# Patient Record
Sex: FEMALE | Race: WHITE | NOT HISPANIC OR LATINO | Employment: UNEMPLOYED | URBAN - METROPOLITAN AREA
[De-identification: names, ages, dates, MRNs, and addresses within clinical notes are randomized per-mention and may not be internally consistent; named-entity substitution may affect disease eponyms.]

---

## 2017-08-02 ENCOUNTER — TELEPHONE (OUTPATIENT)
Dept: SMOKING CESSATION | Facility: CLINIC | Age: 62
End: 2017-08-02

## 2017-08-08 ENCOUNTER — TELEPHONE (OUTPATIENT)
Dept: SMOKING CESSATION | Facility: CLINIC | Age: 62
End: 2017-08-08

## 2017-08-14 ENCOUNTER — OFFICE VISIT (OUTPATIENT)
Dept: INTERNAL MEDICINE | Facility: CLINIC | Age: 62
End: 2017-08-14
Payer: COMMERCIAL

## 2017-08-14 VITALS
DIASTOLIC BLOOD PRESSURE: 63 MMHG | TEMPERATURE: 98 F | WEIGHT: 138.88 LBS | HEIGHT: 62 IN | BODY MASS INDEX: 25.55 KG/M2 | SYSTOLIC BLOOD PRESSURE: 138 MMHG | RESPIRATION RATE: 16 BRPM | HEART RATE: 90 BPM | OXYGEN SATURATION: 100 %

## 2017-08-14 DIAGNOSIS — Z72.0 TOBACCO USE: ICD-10-CM

## 2017-08-14 DIAGNOSIS — G47.00 INSOMNIA, UNSPECIFIED TYPE: ICD-10-CM

## 2017-08-14 DIAGNOSIS — Z12.11 COLON CANCER SCREENING: ICD-10-CM

## 2017-08-14 DIAGNOSIS — J30.9 ACUTE ALLERGIC RHINITIS, UNSPECIFIED SEASONALITY, UNSPECIFIED TRIGGER: Primary | ICD-10-CM

## 2017-08-14 DIAGNOSIS — Z11.59 ENCOUNTER FOR HEPATITIS C SCREENING TEST FOR LOW RISK PATIENT: ICD-10-CM

## 2017-08-14 PROCEDURE — 99214 OFFICE O/P EST MOD 30 MIN: CPT | Mod: 25,S$GLB,, | Performed by: FAMILY MEDICINE

## 2017-08-14 PROCEDURE — 90471 IMMUNIZATION ADMIN: CPT | Mod: S$GLB,,, | Performed by: FAMILY MEDICINE

## 2017-08-14 PROCEDURE — 99999 PR PBB SHADOW E&M-EST. PATIENT-LVL III: CPT | Mod: PBBFAC,,, | Performed by: FAMILY MEDICINE

## 2017-08-14 PROCEDURE — 3008F BODY MASS INDEX DOCD: CPT | Mod: S$GLB,,, | Performed by: FAMILY MEDICINE

## 2017-08-14 PROCEDURE — 90732 PPSV23 VACC 2 YRS+ SUBQ/IM: CPT | Mod: S$GLB,,, | Performed by: FAMILY MEDICINE

## 2017-08-14 RX ORDER — ZOLPIDEM TARTRATE 5 MG/1
TABLET ORAL
Qty: 30 TABLET | Refills: 0 | Status: SHIPPED | OUTPATIENT
Start: 2017-08-14 | End: 2017-09-07 | Stop reason: SDUPTHER

## 2017-08-14 NOTE — PROGRESS NOTES
Subjective:       Patient ID: Kenia Alonzo is a 62 y.o. female.    Chief Complaint: Insomnia and Rhinitis (allergies)    Sinus Problem   This is a new problem. The current episode started in the past 7 days. The problem has been waxing and waning since onset. There has been no fever. She is experiencing no pain. Associated symptoms include congestion, headaches and sinus pressure. Pertinent negatives include no coughing or neck pain. Past treatments include oral decongestants. The treatment provided mild relief.     Has tried melatonin to help her fall asleep, but this is not really helped very much.  She is taking medication for depression with Wellbutrin and feels like this is helping relatively.  Once she falls asleep she does not have any staying asleep but is open to have something that can help her fall asleep.  She has tried Benadryl also and this will make her very hung over the next day.    Family History   Problem Relation Age of Onset    Colon cancer Father     Diabetes Father     Diabetes Sister        Current Outpatient Prescriptions:     ascorbate calcium 500 mg Tab, Take 2 tablets by mouth once daily., Disp: 180 each, Rfl: 3    bumetanide (BUMEX) 2 MG tablet, Take 2 mg by mouth., Disp: , Rfl:     buPROPion (WELLBUTRIN XL) 300 MG 24 hr tablet, Take 1 tablet (300 mg total) by mouth once daily., Disp: 90 tablet, Rfl: 3    dicyclomine (BENTYL) 10 MG capsule, Take 1 capsule (10 mg total) by mouth 3 (three) times daily., Disp: 180 capsule, Rfl: 2    levocetirizine (XYZAL) 5 MG tablet, Take 1 tablet (5 mg total) by mouth every evening., Disp: 30 tablet, Rfl: 11    MELATONIN ORAL, Take by mouth., Disp: , Rfl:     simvastatin (ZOCOR) 20 MG tablet, Take 20 mg by mouth every evening., Disp: , Rfl:     topiramate (TOPAMAX) 100 MG tablet, Take 1 tablet (100 mg total) by mouth every evening., Disp: 90 tablet, Rfl: 3    fluticasone (FLONASE) 50 mcg/actuation nasal spray, 2 sprays by Each Nare route once  "daily., Disp: 16 g, Rfl: 3    ibuprofen-famotidine 800-26.6 mg Tab, Take 1 tablet by mouth., Disp: , Rfl:     Review of Systems   Constitutional: Negative for activity change and unexpected weight change.   HENT: Positive for congestion, hearing loss, rhinorrhea and sinus pressure. Negative for trouble swallowing.    Eyes: Positive for visual disturbance. Negative for discharge.   Respiratory: Positive for wheezing. Negative for cough.    Cardiovascular: Negative for chest pain and palpitations.   Gastrointestinal: Positive for constipation. Negative for blood in stool, diarrhea and vomiting.   Endocrine: Negative for polydipsia and polyuria.   Genitourinary: Negative for difficulty urinating, dysuria, hematuria and menstrual problem.   Musculoskeletal: Positive for arthralgias and joint swelling. Negative for neck pain.   Neurological: Positive for headaches. Negative for weakness.   Psychiatric/Behavioral: Positive for sleep disturbance (trouble falling asleep). Negative for confusion and dysphoric mood.       Objective:   /63 (BP Location: Left arm, Patient Position: Sitting, BP Method: Medium (Automatic))   Pulse 90   Temp 97.5 °F (36.4 °C) (Tympanic)   Resp 16   Ht 5' 2" (1.575 m)   Wt 63 kg (138 lb 14.2 oz)   SpO2 100%   BMI 25.40 kg/m²      Physical Exam   Constitutional: She is oriented to person, place, and time. She appears well-developed and well-nourished.   HENT:   Head: Normocephalic and atraumatic.   Eyes: Conjunctivae are normal.   Cardiovascular: Normal rate.    Pulmonary/Chest: Effort normal. No respiratory distress.   Musculoskeletal: She exhibits no edema.   Neurological: She is alert and oriented to person, place, and time. Coordination normal.   Skin: Skin is warm and dry. No rash noted.   Psychiatric: She has a normal mood and affect. Her behavior is normal.   Vitals reviewed.      Assessment & Plan     1. Acute allergic rhinitis, unspecified seasonality, unspecified " trigger  Recommended continued use of Xyzal and to add Flonase.  No signs of infection at this time.  Also recommend that she may want to consider sinus rinses.    2. Tobacco use  Emphasize the importance of smoking cessation.  She is interested in quitting.  She'll like to be referred to the smoking cessation program.  - Lipid panel; Future  - Comprehensive metabolic panel; Future  - Hemoglobin A1c; Future  - Ambulatory referral to Smoking Cessation Program    3. Insomnia, unspecified type  Once she is able to fall asleep she does not have any trouble staying asleep so I think Ambien would be a good option at least temporarily.  She has not had good results with melatonin.  Certainly don't want to continue this chronically.    4. Colon cancer screening  Needs initial colonoscopy.  - Case request GI: COLONOSCOPY    5. Encounter for hepatitis C screening test for low risk patient  - Hepatitis C antibody; Future

## 2017-08-16 DIAGNOSIS — F41.8 DEPRESSION WITH ANXIETY: ICD-10-CM

## 2017-08-16 DIAGNOSIS — E83.51 HYPOCALCEMIA: ICD-10-CM

## 2017-08-16 DIAGNOSIS — K58.9 IRRITABLE BOWEL SYNDROME WITHOUT DIARRHEA: ICD-10-CM

## 2017-08-16 RX ORDER — BUPROPION HYDROCHLORIDE 300 MG/1
300 TABLET ORAL DAILY
Qty: 90 TABLET | Refills: 3 | Status: SHIPPED | OUTPATIENT
Start: 2017-08-16 | End: 2018-08-30 | Stop reason: SDUPTHER

## 2017-08-16 RX ORDER — LEVOCETIRIZINE DIHYDROCHLORIDE 5 MG/1
5 TABLET, FILM COATED ORAL NIGHTLY
Qty: 90 TABLET | Refills: 3 | Status: SHIPPED | OUTPATIENT
Start: 2017-08-16 | End: 2018-06-26 | Stop reason: SDUPTHER

## 2017-08-16 RX ORDER — DICYCLOMINE HYDROCHLORIDE 10 MG/1
10 CAPSULE ORAL 3 TIMES DAILY
Qty: 180 CAPSULE | Refills: 3 | Status: SHIPPED | OUTPATIENT
Start: 2017-08-16 | End: 2018-08-30 | Stop reason: SDUPTHER

## 2017-08-16 RX ORDER — ASCORBATE CALCIUM 500 MG
2 TABLET ORAL DAILY
Qty: 180 EACH | Refills: 3 | Status: SHIPPED | OUTPATIENT
Start: 2017-08-16 | End: 2019-04-23 | Stop reason: ALTCHOICE

## 2017-08-17 ENCOUNTER — OFFICE VISIT (OUTPATIENT)
Dept: INTERNAL MEDICINE | Facility: CLINIC | Age: 62
End: 2017-08-17
Payer: COMMERCIAL

## 2017-08-17 ENCOUNTER — LAB VISIT (OUTPATIENT)
Dept: LAB | Facility: HOSPITAL | Age: 62
End: 2017-08-17
Attending: INTERNAL MEDICINE
Payer: COMMERCIAL

## 2017-08-17 ENCOUNTER — PATIENT MESSAGE (OUTPATIENT)
Dept: INTERNAL MEDICINE | Facility: CLINIC | Age: 62
End: 2017-08-17

## 2017-08-17 VITALS
HEIGHT: 62 IN | TEMPERATURE: 97 F | BODY MASS INDEX: 24.75 KG/M2 | DIASTOLIC BLOOD PRESSURE: 77 MMHG | WEIGHT: 134.5 LBS | HEART RATE: 105 BPM | RESPIRATION RATE: 16 BRPM | SYSTOLIC BLOOD PRESSURE: 142 MMHG | OXYGEN SATURATION: 98 %

## 2017-08-17 DIAGNOSIS — J01.00 ACUTE NON-RECURRENT MAXILLARY SINUSITIS: Primary | ICD-10-CM

## 2017-08-17 DIAGNOSIS — Z11.59 ENCOUNTER FOR HEPATITIS C SCREENING TEST FOR LOW RISK PATIENT: ICD-10-CM

## 2017-08-17 DIAGNOSIS — Z72.0 TOBACCO USE: ICD-10-CM

## 2017-08-17 LAB
ALBUMIN SERPL BCP-MCNC: 4.4 G/DL
ALP SERPL-CCNC: 81 U/L
ALT SERPL W/O P-5'-P-CCNC: 24 U/L
ANION GAP SERPL CALC-SCNC: 18 MMOL/L
AST SERPL-CCNC: 17 U/L
BILIRUB SERPL-MCNC: 0.2 MG/DL
BUN SERPL-MCNC: 47 MG/DL
CALCIUM SERPL-MCNC: 10.3 MG/DL
CHLORIDE SERPL-SCNC: 99 MMOL/L
CO2 SERPL-SCNC: 21 MMOL/L
CREAT SERPL-MCNC: 2.5 MG/DL
EST. GFR  (AFRICAN AMERICAN): 23.4 ML/MIN/1.73 M^2
EST. GFR  (NON AFRICAN AMERICAN): 20.3 ML/MIN/1.73 M^2
GLUCOSE SERPL-MCNC: 99 MG/DL
POTASSIUM SERPL-SCNC: 3.8 MMOL/L
PROT SERPL-MCNC: 8.4 G/DL
SODIUM SERPL-SCNC: 138 MMOL/L

## 2017-08-17 PROCEDURE — 80061 LIPID PANEL: CPT

## 2017-08-17 PROCEDURE — 99999 PR PBB SHADOW E&M-EST. PATIENT-LVL IV: CPT | Mod: PBBFAC,,, | Performed by: NURSE PRACTITIONER

## 2017-08-17 PROCEDURE — 3008F BODY MASS INDEX DOCD: CPT | Mod: S$GLB,,, | Performed by: NURSE PRACTITIONER

## 2017-08-17 PROCEDURE — 83036 HEMOGLOBIN GLYCOSYLATED A1C: CPT

## 2017-08-17 PROCEDURE — 86803 HEPATITIS C AB TEST: CPT

## 2017-08-17 PROCEDURE — 36415 COLL VENOUS BLD VENIPUNCTURE: CPT | Mod: PO

## 2017-08-17 PROCEDURE — 99214 OFFICE O/P EST MOD 30 MIN: CPT | Mod: S$GLB,,, | Performed by: NURSE PRACTITIONER

## 2017-08-17 PROCEDURE — 80053 COMPREHEN METABOLIC PANEL: CPT | Mod: PO

## 2017-08-17 RX ORDER — BUMETANIDE 2 MG/1
2 TABLET ORAL DAILY
Qty: 30 TABLET | Refills: 5 | Status: SHIPPED | OUTPATIENT
Start: 2017-08-17 | End: 2018-04-06 | Stop reason: SDUPTHER

## 2017-08-17 RX ORDER — AMOXICILLIN AND CLAVULANATE POTASSIUM 875; 125 MG/1; MG/1
1 TABLET, FILM COATED ORAL 2 TIMES DAILY
Qty: 20 TABLET | Refills: 0 | Status: SHIPPED | OUTPATIENT
Start: 2017-08-17 | End: 2017-08-27

## 2017-08-17 RX ORDER — METHYLPREDNISOLONE 4 MG/1
TABLET ORAL
Qty: 1 PACKAGE | Refills: 0 | Status: SHIPPED | OUTPATIENT
Start: 2017-08-17 | End: 2018-01-23

## 2017-08-17 NOTE — PATIENT INSTRUCTIONS
Acute Sinusitis    Acute sinusitis is irritation and swelling of the sinuses. It is usually caused by a viral infection after a common cold. Your doctor can help you find relief.  What is acute sinusitis?  Sinuses are air-filled spaces in the skull behind the face. They are kept moist and clean by a lining of mucosa. Things such as pollen, smoke, and chemical fumes can irritate the mucosa. It can then swell up. As a response to irritation, the mucosa makes more mucus and other fluids. Tiny hairlike cilia cover the mucosa. Cilia help carry mucus toward the opening of the sinus. Too much mucus may cause the cilia to stop working. This blocks the sinus opening. A buildup of fluid in the sinuses then causes pain and pressure. It can also encourage bacteria to grow in the sinuses.  Common symptoms of acute sinusitis  You may have:  · Facial soreness pain  · Headache  · Fever  · Fluid draining in the back of the throat (postnasal drip)  · Congestion  · Drainage that is thick and colored, instead of clear  · Cough  Diagnosing acute sinusitis  Your doctor will ask about your symptoms and health history. He or she will look at your ear, nose, and throat. You usually won't need to have X-rays taken.    The doctor may take a sample of mucus to check for bacteria. If you have sinusitis that keeps coming back, you may need imaging tests such as X-rays or CAT scans. This will help your doctor check for a structural problem that may be causing the infection.  Treating acute sinusitis  Treatment is aimed at unblocking the sinus opening and helping the cilia work again. You may need to take antihistamine and decongestant medicine. These can reduce inflammation and decrease the amount of fluid your sinuses make. If you have a bacterial infection, you will need to take antibiotic medicine for 10 to 14 days. Take this medicine until it is gone, even if you feel better.  Date Last Reviewed: 10/1/2016  © 2297-8828 [employer] [address] [place] [address]. All rights reserved. This information is not intended as a substitute for professional medical care. Always follow your healthcare professional's instructions.        Preventing Sinusitis    Colds, flu, and allergies make it more likely for you to get sinusitis. Do your best to prevent sinusitis by preventing these problems. Do what you can to avoid getting colds and other infections. Stay away from things that cause allergies (allergens). Keep your sinuses as moist as you can.  Tips for air travel  When traveling on an airplane, use saline nasal spray to keep your sinuses moist. Drink plenty of fluids. You may also want to take a decongestant before you get on the plane.   Prevent colds  Do what you can to avoid being exposed to colds and flu. When possible, take more time to rest when you feel something coming on.  · Wash your hands often. This is especially important during cold and flu season. Try not to touch your face.  · As much as possible, stay away from infected people.  · Follow these standbys for staying healthy: Eat balanced meals, exercise regularly, and get plenty of sleep.  Stay away from allergens  First find out what things youre allergic to. Then take steps to stay away from allergens or irritants in the air such as dust, pollution, and pollen.  · Wear a mask when you clean. Or consider hiring a  to help you stay away from dust.  · Sit in the nonsmoking sections of restaurants.  · Don't go outdoors during peak pollution hours such as rush hour.  · Keep an air conditioner on during allergy season. Clean its filter regularly.  · Ask your healthcare provider about a referral to have an allergy evaluation. Or ask for a referral to see an allergy specialist.  Boost moisture  Keeping your sinuses moist makes your mucus thinner. This allows your sinuses to drain better. And this helps prevent infection. Ask your doctor about these  suggestions:  · Use a humidifier. Clean it often to remove any mold or mildew.  · Drink several glasses of water a day.  · Stay away from drying beverages such as alcohol and coffee.  · Stay away from all types of smoke, which dries out sinus linings. This includes tobacco smoke and chemical smoke in workplace settings.  · Use saltwater rinses.  Date Last Reviewed: 10/1/2016  © 2706-2711 [employer]. [address] [place] [address], [place] [address]. All rights reserved. This information is not intended as a substitute for professional medical care. Always follow your healthcare professional's instructions.        Self-Care for Sinusitis     Drinking plenty of water can help sinuses drain.   Sinusitis can often be managed with self-care. Self-care can keep sinuses moist and make you feel more comfortable. Remember to follow your doctor's instructions closely. This can make a big difference in getting your sinus problem under control.  Drink fluids  Drinking extra fluids helps thin your mucus. This lets it drain from your sinuses more easily. Have a glass of water every hour or two. A humidifier helps in much the same way. Fluids can also offset the drying effects of certain medicines. If you use a humidifier, follow the product maker's instructions on how to use it. Clean it on a regular schedule.  Use saltwater rinses  Rinses help keep your sinuses and nose moist. Mix a teaspoon of salt in 8 ounces of fresh, warm water. Use a bulb syringe to gently squirt the water into your nose a few times a day. You can also buy ready-made saline nasal sprays.  Apply hot or cold packs  Applying heat to the area surrounding your sinuses may make you feel more comfortable. Use a hot water bottle or a hand towel dipped in hot water. Some people also find ice packs effective for relieving pain.  Medicines  Your doctor may prescribe medications to help treat your sinusitis. If you have an infection, antibiotics can help clear  it up. If you are prescribed antibiotics, take all pills on schedule until they are gone, even if you feel better. Decongestants help relieve swelling. Use decongestant sprays for short periods only under the direction of your doctor. If you have allergies, your doctor may prescribe medications to help relieve them.   Date Last Reviewed: 10/1/2016  © 5447-7568 [employer]. [address] [place] [address], [place] [address]. All rights reserved. This information is not intended as a substitute for professional medical care. Always follow your healthcare professional's instructions.        When to Use Antibiotics   Antibiotics are medicines used to treat infections caused by bacteria. They dont work for illnesses caused by viruses or an allergic reaction. In fact, taking antibiotics for reasons other than a bacterial infection can cause problems. For example, you may have side effects from the medicine. And if you really need an antibiotic, it may not work well.                                                                                                                                              When antibiotics wont help  Your healthcare provider wont usually prescribe antibiotics for the following conditions. You can help by not asking for them if you have:   · A cold. This type of illness is caused by a virus. It can cause a runny nose, stuffed-up nose, sneezing, coughing, headache, mild body aches, and low fever. A cold gets better on its own in a few days to a week.  · The flu (influenza). This is a respiratory illness caused by a virus. The flu usually goes away on its own in a week or so. It can cause fever, body aches, sore throat, and fatigue.  · Bronchitis. This is an infection in the lungs most often caused by a virus. You may have coughing, phlegm, body aches, and a low fever. A common type of bronchitis is known as a chest cold (acute bronchitis). This often happens after you have a  respiratory infection like a common cold. Bronchitis can take weeks to go away, but antibiotics usually dont help.  · Most sore throats. Sore throats are most often caused by viruses. Your throat may feel scratchy or achy, and it may hurt to swallow. You may also have a low fever and body aches. A sore throat usually gets better in a few days.  · Most ear infections. An ear infection may be caused by a virus or bacteria. It causes pain in the ear. Antibiotics usually dont help, and the infection goes away on its own.  · Most sinus infections (sinusitis). This kind of infection causes sinus pain and swelling, and a runny nose. In most cases, sinusitis goes away on its own, and antibiotics dont make recovery quicker.  · Allergic rhinitis. This is a set of symptoms caused by an allergic reaction. You may have sneezing, a runny nose, itchy or watery eyes, or a sore throat. Allergies are not treated with antibiotics.  · Low fever. A mild fever thats less than 100.4°F (38°C) most likely doesnt need treatment with antibiotics.   When antibiotics can help   Antibiotics can be used to treat:                                                     · Strep throat. This is a throat infectioncaused by a certain type of bacteria. Symptoms of strep throat include a sore throat, white patches on the tonsils, red spots on the roof of the mouth, fever, body aches, and nausea and vomiting.  · Urinary tract infection (UTI). This is a bacterial infection of the bladder and the tube that takes urine out of the body. It can cause burning pain and urine thats cloudy or tinted with blood. UTIs are very common. Antibiotics usually help treat these infections.  · Some ear infections. In some cases, a healthcare provider may prescribe antibiotics for an ear infection. You may need a test to show whats causing the ear infection.  · Some sinus infections. In some cases, yourhealthcare provider may give you antibiotics. He or she may first  need to make sure your symptoms arent caused by a virus, fungus, allergies, or air pollutants such as smoke.   Your doctor may also recommend antibiotics if you have a condition that can affect your immune system, such as diabetes or cancer.   Self-care at home   If your infection cant be treated with antibiotics, you can take other steps to feel better. Try the remedies below. In general:   · Rest and sleep as much as needed.  · Drink water and other clear fluids.  · Dont smoke, and avoid smoke from other people.  · Use over-the-counter medicine such as acetaminophen to ease pain or fever, as directed by your healthcare provider.   To treat sinus pain or nasal congestion:   · Put a warm, moist washcloth on your face where you feel sinus pain or pressure.  · Use a nasal spray with medicine or saline, as directed by your healthcare provider.  · Breathe in steam from a hot shower.  · Use a humidifier or cool mist vaporizer.   To quiet a cough:   · Use a humidifier or cool mist vaporizer.  · Breathe in steam from a hot shower.  · Use cough lozenges.   To sooth a sore throat:   · Suck on ice chips, popsicles, or lozenges.  · Use a sore throat spray.  · Use a humidifier or cool mist vaporizer.  · Gargle with saltwater.  · Drink warm liquids.   To ease ear pain:   · Hold a warm, moist washcloth on the ear for 10 minutes at a time.  Date Last Reviewed: 9/1/2016  © 4968-7827 [employer]. [address] [place] [address], [place] [address]. All rights reserved. This information is not intended as a substitute for professional medical care. Always follow your healthcare professional's instructions.

## 2017-08-17 NOTE — PROGRESS NOTES
Subjective:       Patient ID: Kenia Alonzo is a 62 y.o. female.    Chief Complaint: Nausea; Headache; and Ear Fullness (rt)    Sinus Problem   This is a new problem. The current episode started 1 to 4 weeks ago. The problem has been waxing and waning since onset. There has been no fever. She is experiencing no pain. Associated symptoms include chills, congestion, diaphoresis, ear pain, headaches, sinus pressure, sneezing and a sore throat. Pertinent negatives include no coughing, hoarse voice, neck pain or shortness of breath. Treatments tried: excedrine, pseudafed, xyzal, flonase. The treatment provided mild relief.     Review of Systems   Constitutional: Positive for appetite change, chills, diaphoresis and fatigue. Negative for activity change, fever and unexpected weight change.   HENT: Positive for congestion, ear pain, hearing loss, postnasal drip, rhinorrhea, sinus pressure, sneezing and sore throat. Negative for hoarse voice and trouble swallowing.    Eyes: Negative for pain, discharge, itching and visual disturbance.   Respiratory: Positive for wheezing. Negative for cough and shortness of breath.    Cardiovascular: Negative for chest pain and palpitations.   Gastrointestinal: Positive for constipation, nausea and vomiting. Negative for abdominal pain, blood in stool and diarrhea.   Endocrine: Negative for polydipsia and polyuria.   Genitourinary: Negative for difficulty urinating, dysuria, hematuria and menstrual problem.   Musculoskeletal: Positive for arthralgias and joint swelling. Negative for neck pain.   Skin: Negative.    Neurological: Positive for headaches. Negative for dizziness and weakness.   Psychiatric/Behavioral: Negative for confusion and dysphoric mood.       Objective:      Physical Exam   Constitutional: She is oriented to person, place, and time. She appears well-developed and well-nourished.   HENT:   Head: Normocephalic and atraumatic.   Right Ear: Hearing, tympanic membrane, external  ear and ear canal normal.   Left Ear: Hearing, tympanic membrane, external ear and ear canal normal.   Nose: Mucosal edema and rhinorrhea present. Right sinus exhibits maxillary sinus tenderness. Left sinus exhibits maxillary sinus tenderness.   Mouth/Throat: Posterior oropharyngeal edema and posterior oropharyngeal erythema present.   Eyes: Conjunctivae are normal.   Neck: Normal range of motion. Neck supple.   Cardiovascular: Normal rate, regular rhythm and normal heart sounds.    Pulmonary/Chest: Effort normal and breath sounds normal. No respiratory distress.   Abdominal: Soft. Bowel sounds are normal. She exhibits no distension. There is no tenderness.   Musculoskeletal: She exhibits no edema.   Lymphadenopathy:     She has no cervical adenopathy.   Neurological: She is alert and oriented to person, place, and time. Coordination normal.   Skin: Skin is warm and dry. No rash noted.   Psychiatric: She has a normal mood and affect. Her behavior is normal.   Vitals reviewed.      Assessment:       1. Acute non-recurrent maxillary sinusitis        Plan:       *Acute non-recurrent maxillary sinusitis  Discussed supportive home care including rest, hydration, hot fluids with honey, saline spray and nasal washes, avoidance of smoke and tylenol/motrin for sore throat and body aches. Handout given. Pt verbalizes understanding.  RTC if not improving in next 3-5 days  -     amoxicillin-clavulanate 875-125mg (AUGMENTIN) 875-125 mg per tablet; Take 1 tablet by mouth 2 (two) times daily.  Dispense: 20 tablet; Refill: 0  -     methylPREDNISolone (MEDROL DOSEPACK) 4 mg tablet; use as directed  Dispense: 1 Package; Refill: 0    **

## 2017-08-17 NOTE — LETTER
August 17, 2017               [facility] Internal Medicine  Internal Medicine  [address] [place] [id number]  Phone: [phone]   August 17, 2017     Patient: Kenia Alonzo   YOB: 1955   Date of Visit: 8/17/2017       To Whom it May Concern:    Kenia Alonzo was seen in my clinic on 8/17/2017. She may return to work on 8/18/2017.    If you have any questions or concerns, please don't hesitate to call.    Sincerely,         MARTA Valle,FNP-C

## 2017-08-18 ENCOUNTER — TELEPHONE (OUTPATIENT)
Dept: INTERNAL MEDICINE | Facility: CLINIC | Age: 62
End: 2017-08-18

## 2017-08-18 ENCOUNTER — PATIENT MESSAGE (OUTPATIENT)
Dept: INTERNAL MEDICINE | Facility: CLINIC | Age: 62
End: 2017-08-18

## 2017-08-18 DIAGNOSIS — N18.4 CHRONIC KIDNEY DISEASE (CKD) STAGE G4/A1, SEVERELY DECREASED GLOMERULAR FILTRATION RATE (GFR) BETWEEN 15-29 ML/MIN/1.73 SQUARE METER AND ALBUMINURIA CREATININE RATIO LESS THAN 30 MG/G: Primary | ICD-10-CM

## 2017-08-18 LAB
CHOLEST/HDLC SERPL: 5.8 {RATIO}
ESTIMATED AVG GLUCOSE: 97 MG/DL
HBA1C MFR BLD HPLC: 5 %
HCV AB SERPL QL IA: NEGATIVE
HDL/CHOLESTEROL RATIO: 17.3 %
HDLC SERPL-MCNC: 248 MG/DL
HDLC SERPL-MCNC: 43 MG/DL
LDLC SERPL CALC-MCNC: 165 MG/DL
NONHDLC SERPL-MCNC: 205 MG/DL
TRIGL SERPL-MCNC: 200 MG/DL

## 2017-08-18 NOTE — TELEPHONE ENCOUNTER
Left a message to contact   Spoke with patient explain doctor want her to see hans since she have decrease in function

## 2017-08-18 NOTE — TELEPHONE ENCOUNTER
Elevated lipids. Need to adhere to simvastatin therapy.  More importantly is stage 4 renal dysfunction. Has she seen nephrologist.  Made several Send message via patient portal

## 2017-08-18 NOTE — TELEPHONE ENCOUNTER
Elevated lipids. Need to adhere to simvastatin therapy.  More importantly is stage 4 renal dysfunction. Has she seen nephrologist?

## 2017-08-18 NOTE — TELEPHONE ENCOUNTER
----- Message from Jose Montalvo sent at 8/18/2017 12:16 PM CDT -----  Contact: Pt   Pt is returning Nurse staff call.Pt call back [phone] thanks

## 2017-08-18 NOTE — TELEPHONE ENCOUNTER
----- Message from Maria C Escobar sent at 8/18/2017 12:55 PM CDT -----  Patient states that she was returning your call.   Call hr at [phone].                                [email or web address]

## 2017-08-23 ENCOUNTER — TELEPHONE (OUTPATIENT)
Dept: SMOKING CESSATION | Facility: CLINIC | Age: 62
End: 2017-08-23

## 2017-09-07 RX ORDER — PANTOPRAZOLE SODIUM 40 MG/1
TABLET, DELAYED RELEASE ORAL
Qty: 30 TABLET | Refills: 5 | Status: SHIPPED | OUTPATIENT
Start: 2017-09-07 | End: 2019-07-31

## 2017-09-07 RX ORDER — ZOLPIDEM TARTRATE 5 MG/1
TABLET ORAL
Qty: 30 TABLET | Refills: 1 | Status: SHIPPED | OUTPATIENT
Start: 2017-09-07 | End: 2018-05-31

## 2017-09-11 ENCOUNTER — PATIENT MESSAGE (OUTPATIENT)
Dept: INTERNAL MEDICINE | Facility: CLINIC | Age: 62
End: 2017-09-11

## 2017-11-15 RX ORDER — ZOLPIDEM TARTRATE 5 MG/1
TABLET ORAL
Qty: 30 TABLET | OUTPATIENT
Start: 2017-11-15

## 2017-11-15 RX ORDER — HYDROXYZINE HYDROCHLORIDE 50 MG/1
TABLET, FILM COATED ORAL
Qty: 30 TABLET | OUTPATIENT
Start: 2017-11-15

## 2017-12-11 ENCOUNTER — OFFICE VISIT (OUTPATIENT)
Dept: INTERNAL MEDICINE | Facility: CLINIC | Age: 62
End: 2017-12-11
Payer: MEDICAID

## 2017-12-11 VITALS
BODY MASS INDEX: 25.36 KG/M2 | OXYGEN SATURATION: 98 % | RESPIRATION RATE: 18 BRPM | HEIGHT: 62 IN | WEIGHT: 137.81 LBS | SYSTOLIC BLOOD PRESSURE: 132 MMHG | HEART RATE: 113 BPM | DIASTOLIC BLOOD PRESSURE: 80 MMHG | TEMPERATURE: 98 F

## 2017-12-11 DIAGNOSIS — J11.1 INFLUENZA: Primary | ICD-10-CM

## 2017-12-11 LAB
FLUAV AG SPEC QL IA: NEGATIVE
FLUBV AG SPEC QL IA: NEGATIVE
SPECIMEN SOURCE: NORMAL

## 2017-12-11 PROCEDURE — 99214 OFFICE O/P EST MOD 30 MIN: CPT | Mod: PBBFAC,PO | Performed by: NURSE PRACTITIONER

## 2017-12-11 PROCEDURE — 99214 OFFICE O/P EST MOD 30 MIN: CPT | Mod: S$PBB,,, | Performed by: NURSE PRACTITIONER

## 2017-12-11 PROCEDURE — 87400 INFLUENZA A/B EACH AG IA: CPT | Mod: 59,PO

## 2017-12-11 PROCEDURE — 99999 PR PBB SHADOW E&M-EST. PATIENT-LVL IV: CPT | Mod: PBBFAC,,, | Performed by: NURSE PRACTITIONER

## 2017-12-11 RX ORDER — OSELTAMIVIR PHOSPHATE 75 MG/1
75 CAPSULE ORAL 2 TIMES DAILY
Qty: 10 CAPSULE | Refills: 0 | Status: SHIPPED | OUTPATIENT
Start: 2017-12-11 | End: 2017-12-16

## 2017-12-11 RX ORDER — IBUPROFEN 600 MG/1
600 TABLET ORAL EVERY 6 HOURS PRN
Qty: 20 TABLET | Refills: 0 | Status: SHIPPED | OUTPATIENT
Start: 2017-12-11 | End: 2017-12-16

## 2017-12-11 NOTE — PATIENT INSTRUCTIONS
The Flu (Influenza)     The virus that causes the flu spreads through the air in droplets when someone who has the flu coughs, sneezes, laughs, or talks.   The flu (influenza) is an infection that affects your respiratory tract. This tract is made up of your mouth, nose, and lungs, and the passages between them. Unlike a cold, the flu can make you very ill. And it can lead to pneumonia, a serious lung infection. The flu can have serious complications and even cause death.  Who is at risk for the flu?  Anyone can get the flu. But you are more likely to become infected if you:  · Have a weakened immune system  · Work in a healthcare setting where you may be exposed to flu germs  · Live or work with someone who has the flu  · Havent had an annual flu shot  How does the flu spread?  The flu is caused by a virus. The virus spreads through the air in droplets when someone who has the flu coughs, sneezes, laughs, or talks. You can become infected when you inhale these viruses directly. You can also become infected when you touch a surface on which the droplets have landed and then transfer the germs to your eyes, nose, or mouth. Touching used tissues, or sharing utensils, drinking glasses, or a toothbrush from an infected person can expose you to flu viruses, too.  What are the symptoms of the flu?  Flu symptoms tend to come on quickly and may last a few days to a few weeks. They include:  · Fever usually higher than 100.4°F  (38°C) and chills  · Sore throat and headache  · Dry cough  · Runny nose  · Tiredness and weakness  · Muscle aches  Who is at risk for flu complications?  For some people, the flu can be very serious. The risk for complications is greater for:  · Children younger than age 5  · Adults ages 65 and older  · People with a chronic illness such as diabetes or heart, kidney, or lung disease  · People who live in a nursing home or long-term care facility   How is the flu treated?  The flu usually gets  better after 7 days or so. In some cases, your healthcare provider may prescribe an antiviral medicine. This may help you get well a little sooner. For the medicine to help, you need to take it as soon as possible (ideally within 48 hours) after your symptoms start. If you develop pneumonia or other serious illness, you may need to stay in the hospital.  Easing flu symptoms  · Drink lots of fluids such as water, juice, and warm soup. A good rule is to drink enough so that you urinate your normal amount.  · Get plenty of rest.  · Ask your healthcare provider what to take for fever and pain.  · Call your provider if your fever is 100.4°F (38°C) or higher, or you become dizzy, lightheaded, or short of breath.  Taking steps to protect others  · Wash your hands often, especially after coughing or sneezing. Or clean your hands with an alcohol-based hand  containing at least 60% alcohol.  · Cough or sneeze into a tissue. Then throw the tissue away and wash your hands. If you dont have a tissue, cough and sneeze into your elbow.  · Stay home until at least 24 hours after you no longer have a fever or chills. Be sure the fever isnt being hidden by fever-reducing medicine.  · Dont share food, utensils, drinking glasses, or a toothbrush with others.  · Ask your healthcare provider if others in your household should get antiviral medicine to help them avoid infection.  How can the flu be prevented?  · One of the best ways to avoid the flu is to get a flu vaccine each year. The virus that causes the flu changes from year to year. For that reason, healthcare providers recommend getting the flu vaccine each year, as soon as it's available in your area. The vaccine is given as a shot. Your healthcare provider can tell you which vaccine is right for you. A nasal spray is also available but is not recommended for the 5847-4669 flu season. The CDC says this is because the nasal spray did not seem to protect against the flu  over the last several flu seasons. In the past, it was meant for people ages 2 to 49.  · Wash your hands often. Frequent handwashing is a proven way to help prevent infection.  · Carry an alcohol-based hand gel containing at least 60% alcohol. Use it when you can't use soap and water. Then wash your hands as soon as you can.  · Avoid touching your eyes, nose, and mouth.  · At home and work, clean phones, computer keyboards, and toys often with disinfectant wipes.  · If possible, avoid close contact with others who have the flu or symptoms of the flu.  Handwashing tips  Handwashing is one of the best ways to prevent many common infections. If you are caring for or visiting someone with the flu, wash your hands each time you enter and leave the room. Follow these steps:  · Use warm water and plenty of soap. Rub your hands together well.  · Clean the whole hand, including under your nails, between your fingers, and up the wrists.  · Wash for at least 15 seconds.  · Rinse, letting the water run down your fingers, not up your wrists.  · Dry your hands well. Use a paper towel to turn off the faucet and open the door.  Using alcohol-based hand   Alcohol-based hand  are also a good choice. Use them when you can't use soap and water. Follow these steps:  · Squeeze about a tablespoon of gel into the palm of one hand.  · Rub your hands together briskly, cleaning the backs of your hands, the palms, between your fingers, and up the wrists.  · Rub until the gel is gone and your hands are completely dry.  Preventing the flu in healthcare settings  The flu is a special concern for people in hospitals and long-term care facilities. To help prevent the spread of flu, many hospitals and nursing homes take these steps:  · Healthcare providers wash their hands or use an alcohol-based hand  before and after treating each patient.  · People with the flu have private rooms and bathrooms or share a room with someone  with the same infection.  · People who are at high risk for the flu but don't have it are encouraged to get the flu and pneumonia vaccines.  · All healthcare workers are encouraged or required to get flu shots.   Date Last Reviewed: 12/1/2016  © 3309-7737 [employer]. [address] [place] [address]. All rights reserved. This information is not intended as a substitute for professional medical care. Always follow your healthcare professional's instructions.

## 2017-12-11 NOTE — PROGRESS NOTES
Subjective:       Patient ID: Kenia Alonzo is a 62 y.o. female.    Chief Complaint: Generalized Body Aches; Epistaxis; and Cough    Cough   This is a new problem. The current episode started yesterday. The problem has been rapidly worsening. The problem occurs constantly. The cough is productive of blood-tinged sputum. Associated symptoms include chills, a fever, headaches, myalgias, nasal congestion, postnasal drip, a rash and rhinorrhea. Pertinent negatives include no chest pain, ear congestion, ear pain, heartburn, hemoptysis, sore throat, shortness of breath, sweats, weight loss or wheezing.     Review of Systems   Constitutional: Positive for appetite change, chills, diaphoresis, fatigue and fever. Negative for weight loss.   HENT: Positive for postnasal drip, rhinorrhea and sneezing. Negative for congestion, ear pain, sinus pain, sinus pressure and sore throat.    Respiratory: Positive for cough. Negative for hemoptysis, shortness of breath and wheezing.    Cardiovascular: Negative for chest pain.   Gastrointestinal: Negative.  Negative for heartburn.   Musculoskeletal: Positive for myalgias. Negative for arthralgias.   Skin: Positive for rash.   Neurological: Positive for light-headedness and headaches. Negative for dizziness and weakness.   Hematological: Negative.    Psychiatric/Behavioral: Negative.        Objective:      Physical Exam   Constitutional: She is oriented to person, place, and time. She appears well-developed and well-nourished. No distress.   HENT:   Head: Normocephalic and atraumatic.   Right Ear: Hearing, external ear and ear canal normal. A middle ear effusion is present.   Left Ear: Hearing, external ear and ear canal normal. A middle ear effusion is present.   Nose: Mucosal edema and rhinorrhea present. Right sinus exhibits no maxillary sinus tenderness and no frontal sinus tenderness. Left sinus exhibits no maxillary sinus tenderness and no frontal sinus tenderness.   Mouth/Throat: No  oropharyngeal exudate, posterior oropharyngeal edema or posterior oropharyngeal erythema. No tonsillar exudate.   Eyes: Conjunctivae are normal. Pupils are equal, round, and reactive to light. Right eye exhibits no discharge. Left eye exhibits no discharge.   Neck: Normal range of motion. Neck supple. Muscular tenderness present. No spinous process tenderness present. No neck rigidity. Normal range of motion present.   Cardiovascular: Regular rhythm.  Tachycardia present.    Pulmonary/Chest: Effort normal and breath sounds normal. No respiratory distress. She has no wheezes.   Abdominal: Soft. Bowel sounds are normal. She exhibits no distension. There is no tenderness.   Lymphadenopathy:     She has no cervical adenopathy.   Neurological: She is alert and oriented to person, place, and time.   Skin: Skin is warm and dry. No rash noted. She is not diaphoretic.   Psychiatric: She has a normal mood and affect. Her behavior is normal.       Assessment:       1. 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 Handout given. Pt verbalizes understanding.  RTC if not improving over next 3-5 days  -     Influenza antigen Nasal Swab  -     oseltamivir (TAMIFLU) 75 MG capsule; Take 1 capsule (75 mg total) by mouth 2 (two) times daily.  Dispense: 10 capsule; Refill: 0    **

## 2018-01-23 ENCOUNTER — TELEPHONE (OUTPATIENT)
Dept: INTERNAL MEDICINE | Facility: CLINIC | Age: 63
End: 2018-01-23

## 2018-01-23 ENCOUNTER — OFFICE VISIT (OUTPATIENT)
Dept: INTERNAL MEDICINE | Facility: CLINIC | Age: 63
End: 2018-01-23
Payer: MEDICAID

## 2018-01-23 VITALS
OXYGEN SATURATION: 99 % | DIASTOLIC BLOOD PRESSURE: 68 MMHG | HEART RATE: 94 BPM | BODY MASS INDEX: 24.25 KG/M2 | WEIGHT: 136.88 LBS | HEIGHT: 63 IN | RESPIRATION RATE: 16 BRPM | TEMPERATURE: 98 F | SYSTOLIC BLOOD PRESSURE: 128 MMHG

## 2018-01-23 DIAGNOSIS — J01.00 ACUTE NON-RECURRENT MAXILLARY SINUSITIS: Primary | ICD-10-CM

## 2018-01-23 PROCEDURE — 99214 OFFICE O/P EST MOD 30 MIN: CPT | Mod: S$PBB,,, | Performed by: NURSE PRACTITIONER

## 2018-01-23 PROCEDURE — 99214 OFFICE O/P EST MOD 30 MIN: CPT | Mod: PBBFAC,PO | Performed by: NURSE PRACTITIONER

## 2018-01-23 PROCEDURE — 99999 PR PBB SHADOW E&M-EST. PATIENT-LVL IV: CPT | Mod: PBBFAC,,, | Performed by: NURSE PRACTITIONER

## 2018-01-23 RX ORDER — BETAMETHASONE SODIUM PHOSPHATE AND BETAMETHASONE ACETATE 3; 3 MG/ML; MG/ML
12 INJECTION, SUSPENSION INTRA-ARTICULAR; INTRALESIONAL; INTRAMUSCULAR; SOFT TISSUE
Status: COMPLETED | OUTPATIENT
Start: 2018-01-23 | End: 2018-01-23

## 2018-01-23 RX ORDER — PROMETHAZINE HYDROCHLORIDE AND CODEINE PHOSPHATE 6.25; 1 MG/5ML; MG/5ML
5 SOLUTION ORAL EVERY 6 HOURS PRN
Qty: 100 ML | Refills: 0 | Status: SHIPPED | OUTPATIENT
Start: 2018-01-23 | End: 2018-02-02

## 2018-01-23 RX ADMIN — BETAMETHASONE ACETATE AND BETAMETHASONE SODIUM PHOSPHATE 12 MG: 3; 3 INJECTION, SUSPENSION INTRA-ARTICULAR; INTRALESIONAL; INTRAMUSCULAR; SOFT TISSUE at 03:01

## 2018-01-23 NOTE — PROGRESS NOTES
Subjective:       Patient ID: Kenia Alonzo is a 62 y.o. female.    Chief Complaint: No chief complaint on file.    Sinus Problem   This is a new problem. The current episode started yesterday. The problem has been rapidly worsening since onset. The maximum temperature recorded prior to her arrival was 100.4 - 100.9 F. The pain is mild. Associated symptoms include congestion, coughing, headaches, sinus pressure and sneezing. Pertinent negatives include no chills, diaphoresis, ear pain, hoarse voice, neck pain, shortness of breath, sore throat or swollen glands. Treatments tried: mucinex DM,nyquil.     Review of Systems   Constitutional: Positive for fatigue and fever. Negative for appetite change, chills and diaphoresis.   HENT: Positive for congestion, postnasal drip, rhinorrhea, sinus pain, sinus pressure and sneezing. Negative for ear pain, hoarse voice and sore throat.    Respiratory: Positive for cough. Negative for chest tightness, shortness of breath and wheezing.    Cardiovascular: Negative.    Gastrointestinal: Positive for nausea. Negative for abdominal pain, diarrhea and vomiting.   Musculoskeletal: Negative for arthralgias and neck pain.   Skin: Negative.    Neurological: Positive for headaches. Negative for dizziness and weakness.   Hematological: Negative.        Objective:      Physical Exam   Constitutional: She is oriented to person, place, and time. Vital signs are normal. She appears well-developed and well-nourished. No distress.   HENT:   Head: Normocephalic and atraumatic.   Right Ear: Hearing, tympanic membrane, external ear and ear canal normal.   Left Ear: Hearing, tympanic membrane, external ear and ear canal normal.   Nose: Mucosal edema and rhinorrhea present. Right sinus exhibits maxillary sinus tenderness. Right sinus exhibits no frontal sinus tenderness. Left sinus exhibits maxillary sinus tenderness. Left sinus exhibits no frontal sinus tenderness.   Mouth/Throat: No oropharyngeal  exudate, posterior oropharyngeal edema or posterior oropharyngeal erythema. No tonsillar exudate.   Eyes: Conjunctivae are normal. Pupils are equal, round, and reactive to light. Right eye exhibits no discharge. Left eye exhibits no discharge.   Neck: Normal range of motion. Neck supple.   Cardiovascular: Normal rate and regular rhythm.    Pulmonary/Chest: Effort normal and breath sounds normal. No respiratory distress. She has no wheezes.   Abdominal: Soft. Bowel sounds are normal. She exhibits no distension. There is no tenderness.   Lymphadenopathy:     She has no cervical adenopathy.   Neurological: She is alert and oriented to person, place, and time.   Skin: Skin is warm and dry. No rash noted. She is not diaphoretic.   Psychiatric: She has a normal mood and affect. Her behavior is normal.       Assessment:       1. Acute non-recurrent maxillary sinusitis        Plan:     Problem List Items Addressed This Visit        ENT    Acute non-recurrent maxillary sinusitis - Primary    Current Assessment & Plan     Discussed supportive home care including rest, hydration, hot fluids with honey, saline spray and nasal washes, avoidance of smoke and tylenol/motrin for sore throat and body aches. Handout given. Pt verbalizes understanding.  RTC if not improving in next 3-5 days will consider ABX         Relevant Medications    betamethasone acetate-betamethasone sodium phosphate injection 12 mg (Start on 1/23/2018  3:00 PM)    promethazine-codeine 6.25-10 mg/5 ml (PHENERGAN WITH CODEINE) 6.25-10 mg/5 mL syrup

## 2018-01-23 NOTE — ASSESSMENT & PLAN NOTE
Discussed supportive home care including rest, hydration, hot fluids with honey, saline spray and nasal washes, avoidance of smoke and tylenol/motrin for sore throat and body aches. Handout given. Pt verbalizes understanding.  RTC if not improving in next 3-5 days will consider ABX

## 2018-01-23 NOTE — PATIENT INSTRUCTIONS

## 2018-01-23 NOTE — TELEPHONE ENCOUNTER
----- Message from Jigna Clemons sent at 1/23/2018  8:51 AM CST -----  Contact: pt   States she's calling to see if she can come in later for her appt today around 1-2 pm and can be reached at [phone]//thanks/dbw

## 2018-01-25 ENCOUNTER — PATIENT MESSAGE (OUTPATIENT)
Dept: INTERNAL MEDICINE | Facility: CLINIC | Age: 63
End: 2018-01-25

## 2018-01-25 DIAGNOSIS — J01.00 ACUTE NON-RECURRENT MAXILLARY SINUSITIS: Primary | ICD-10-CM

## 2018-01-26 ENCOUNTER — PATIENT MESSAGE (OUTPATIENT)
Dept: INTERNAL MEDICINE | Facility: CLINIC | Age: 63
End: 2018-01-26

## 2018-01-26 RX ORDER — AMOXICILLIN AND CLAVULANATE POTASSIUM 875; 125 MG/1; MG/1
1 TABLET, FILM COATED ORAL EVERY 12 HOURS
Qty: 20 TABLET | Refills: 0 | Status: SHIPPED | OUTPATIENT
Start: 2018-01-26 | End: 2018-02-05

## 2018-03-12 ENCOUNTER — PATIENT MESSAGE (OUTPATIENT)
Dept: INTERNAL MEDICINE | Facility: CLINIC | Age: 63
End: 2018-03-12

## 2018-03-12 ENCOUNTER — OFFICE VISIT (OUTPATIENT)
Dept: INTERNAL MEDICINE | Facility: CLINIC | Age: 63
End: 2018-03-12
Payer: MEDICAID

## 2018-03-12 VITALS
WEIGHT: 135.56 LBS | SYSTOLIC BLOOD PRESSURE: 140 MMHG | RESPIRATION RATE: 18 BRPM | BODY MASS INDEX: 24.02 KG/M2 | HEIGHT: 63 IN | OXYGEN SATURATION: 99 % | TEMPERATURE: 97 F | HEART RATE: 105 BPM | DIASTOLIC BLOOD PRESSURE: 86 MMHG

## 2018-03-12 DIAGNOSIS — Z72.0 TOBACCO USE: ICD-10-CM

## 2018-03-12 DIAGNOSIS — H81.01 MENIERE'S DISEASE OF RIGHT EAR: Primary | ICD-10-CM

## 2018-03-12 PROCEDURE — 99213 OFFICE O/P EST LOW 20 MIN: CPT | Mod: PBBFAC,PO | Performed by: FAMILY MEDICINE

## 2018-03-12 PROCEDURE — 99214 OFFICE O/P EST MOD 30 MIN: CPT | Mod: S$PBB,,, | Performed by: FAMILY MEDICINE

## 2018-03-12 PROCEDURE — 99999 PR PBB SHADOW E&M-EST. PATIENT-LVL III: CPT | Mod: PBBFAC,,, | Performed by: FAMILY MEDICINE

## 2018-03-12 RX ORDER — PREDNISONE 20 MG/1
20 TABLET ORAL DAILY
Qty: 10 TABLET | Refills: 0 | Status: SHIPPED | OUTPATIENT
Start: 2018-03-12 | End: 2018-03-20 | Stop reason: SDUPTHER

## 2018-03-12 RX ORDER — SIMVASTATIN 5 MG/1
TABLET, FILM COATED ORAL
COMMUNITY
Start: 2018-02-12 | End: 2018-04-26 | Stop reason: SDUPTHER

## 2018-03-12 RX ORDER — DIAZEPAM 2 MG/1
2 TABLET ORAL EVERY 8 HOURS PRN
Qty: 30 TABLET | Refills: 0 | Status: SHIPPED | OUTPATIENT
Start: 2018-03-12 | End: 2018-03-20 | Stop reason: SDUPTHER

## 2018-03-14 ENCOUNTER — PATIENT MESSAGE (OUTPATIENT)
Dept: INTERNAL MEDICINE | Facility: CLINIC | Age: 63
End: 2018-03-14

## 2018-03-14 PROBLEM — Z72.0 TOBACCO USE: Status: ACTIVE | Noted: 2018-03-14

## 2018-03-14 PROBLEM — J01.00 ACUTE NON-RECURRENT MAXILLARY SINUSITIS: Status: RESOLVED | Noted: 2018-01-23 | Resolved: 2018-03-14

## 2018-03-14 NOTE — PROGRESS NOTES
Subjective:       Patient ID: Kenia Alonzo is a 62 y.o. female.    Chief Complaint: Sinus Problem and Cough    HPI  Kenia is here today presenting with several days of worsening symptoms similar to her whenever she had Ménière attacks in the past.  She had a partial vestibulectomy on the right.  She has not seen ENT in some time since she has some issues with insurance.  Her symptoms now include some dizziness as well as sinus pressure and ringing in her right ear.  Reports that in the past she used a course of steroid and Valium would be hopeful with her symptoms.  She is not having any fever or sinus pain.  She did have recent sinusitis that was treated with Augmentin in January.    Family History   Problem Relation Age of Onset    Colon cancer Father     Diabetes Father     Diabetes Sister        Current Outpatient Prescriptions:     ascorbate calcium 500 mg Tab, Take 2 tablets by mouth once daily., Disp: 180 each, Rfl: 3    bumetanide (BUMEX) 2 MG tablet, Take 1 tablet (2 mg total) by mouth once daily., Disp: 30 tablet, Rfl: 5    buPROPion (WELLBUTRIN XL) 300 MG 24 hr tablet, Take 1 tablet (300 mg total) by mouth once daily., Disp: 90 tablet, Rfl: 3    dicyclomine (BENTYL) 10 MG capsule, Take 1 capsule (10 mg total) by mouth 3 (three) times daily., Disp: 180 capsule, Rfl: 3    fluticasone (FLONASE) 50 mcg/actuation nasal spray, 2 sprays by Each Nare route once daily., Disp: 16 g, Rfl: 3    levocetirizine (XYZAL) 5 MG tablet, Take 1 tablet (5 mg total) by mouth every evening., Disp: 90 tablet, Rfl: 3    MELATONIN ORAL, Take by mouth., Disp: , Rfl:     pantoprazole (PROTONIX) 40 MG tablet, TAKE ONE TABLET BY MOUTH EVERY DAY, Disp: 30 tablet, Rfl: 5    diazePAM (VALIUM) 2 MG tablet, Take 1 tablet (2 mg total) by mouth every 8 (eight) hours as needed for Anxiety., Disp: 30 tablet, Rfl: 0    predniSONE (DELTASONE) 20 MG tablet, Take 1 tablet (20 mg total) by mouth once daily., Disp: 10 tablet, Rfl: 0     "simvastatin (ZOCOR) 20 MG tablet, Take 20 mg by mouth every evening., Disp: , Rfl:     simvastatin (ZOCOR) 5 MG tablet, , Disp: , Rfl:     zolpidem (AMBIEN) 5 MG Tab, TAKE ONE-HALF TO 1 TABLET BY MOUTH AT BEDTIME, Disp: 30 tablet, Rfl: 1    Review of Systems   Constitutional: Negative for chills and fever.   HENT: Positive for ear pain, sinus pain, sinus pressure and tinnitus.    Respiratory: Negative for cough and shortness of breath.    Cardiovascular: Negative for chest pain.   Gastrointestinal: Negative for abdominal pain.   Skin: Negative for rash.   Neurological: Positive for light-headedness. Negative for dizziness.       Objective:   BP (!) 140/86 (BP Location: Right arm, Patient Position: Sitting, BP Method: Large (Manual))   Pulse 105   Temp 97.4 °F (36.3 °C) (Tympanic)   Resp 18   Ht 5' 2.5" (1.588 m)   Wt 61.5 kg (135 lb 9.3 oz)   SpO2 99%   BMI 24.40 kg/m²      Physical Exam   Constitutional: She is oriented to person, place, and time. She appears well-developed and well-nourished. No distress.   HENT:   Head: Normocephalic and atraumatic.   Nose: Nose normal.   Dullness of right TM   Eyes: Conjunctivae and EOM are normal. Pupils are equal, round, and reactive to light. Right eye exhibits no discharge. Left eye exhibits no discharge.   Neck: No thyromegaly present.   Cardiovascular: Normal rate and regular rhythm.    No murmur heard.  Pulmonary/Chest: Effort normal and breath sounds normal. No respiratory distress.   Abdominal: Soft. She exhibits no distension.   Musculoskeletal: She exhibits no edema.   Neurological: She is alert and oriented to person, place, and time.   Skin: No rash noted. She is not diaphoretic.   Psychiatric: She has a normal mood and affect. Her behavior is normal.       Assessment & Plan     Problem List Items Addressed This Visit     None      Visit Diagnoses     Meniere's disease of right ear    -  Primary    Relevant Orders    Ambulatory Referral to ENT    Tobacco " use        Relevant Orders    Ambulatory referral to Smoking Cessation Program        She will arrange for follow up soon to discuss health maintenance    Follow-up if symptoms worsen or fail to improve.

## 2018-03-14 NOTE — ASSESSMENT & PLAN NOTE
Treatment course of oral steroid and Valium.  Recommended referral to ENT.  Advised her to let me know if symptoms worsening or she is developing more issues with her sinuses that was suggest a sinus infection.  No antibiotics indicated at this time.

## 2018-03-19 ENCOUNTER — TELEPHONE (OUTPATIENT)
Dept: OTOLARYNGOLOGY | Facility: CLINIC | Age: 63
End: 2018-03-19

## 2018-03-19 ENCOUNTER — PATIENT MESSAGE (OUTPATIENT)
Dept: INTERNAL MEDICINE | Facility: CLINIC | Age: 63
End: 2018-03-19

## 2018-03-19 DIAGNOSIS — H81.01 MENIERE'S DISEASE OF RIGHT EAR: Primary | ICD-10-CM

## 2018-03-19 NOTE — TELEPHONE ENCOUNTER
Can someone in ENT please schedule this pt to be seen by one of our ENT providers. It will not let me schedule her BC of her insurance. Please call her to make the appt.

## 2018-03-20 ENCOUNTER — PATIENT MESSAGE (OUTPATIENT)
Dept: INTERNAL MEDICINE | Facility: CLINIC | Age: 63
End: 2018-03-20

## 2018-03-20 RX ORDER — DIAZEPAM 2 MG/1
2 TABLET ORAL EVERY 8 HOURS PRN
Qty: 30 TABLET | Refills: 0 | Status: SHIPPED | OUTPATIENT
Start: 2018-03-20 | End: 2018-03-30

## 2018-03-20 RX ORDER — PREDNISONE 20 MG/1
20 TABLET ORAL DAILY
Qty: 10 TABLET | Refills: 0 | Status: SHIPPED | OUTPATIENT
Start: 2018-03-20 | End: 2018-03-30

## 2018-03-20 RX ORDER — LEVOFLOXACIN 750 MG/1
750 TABLET ORAL DAILY
Qty: 5 TABLET | Refills: 0 | Status: SHIPPED | OUTPATIENT
Start: 2018-03-20 | End: 2018-03-25

## 2018-03-21 ENCOUNTER — PATIENT MESSAGE (OUTPATIENT)
Dept: AUDIOLOGY | Facility: CLINIC | Age: 63
End: 2018-03-21

## 2018-03-22 ENCOUNTER — TELEPHONE (OUTPATIENT)
Dept: OTOLARYNGOLOGY | Facility: CLINIC | Age: 63
End: 2018-03-22

## 2018-03-22 ENCOUNTER — PATIENT MESSAGE (OUTPATIENT)
Dept: OTOLARYNGOLOGY | Facility: CLINIC | Age: 63
End: 2018-03-22

## 2018-03-22 NOTE — TELEPHONE ENCOUNTER
----- Message from Dexter Acosta, CCC-A sent at 3/21/2018  5:12 PM CDT -----  Patient missed her audio/ENT ([facility]) appt for today and sent a message requesting to reschedule to this Friday.  I responded to her that no ENT available at that time.  It don't know how to schedule ENT NP Medicaid appts - can one of you R/S ENT/Audio? Thanks!

## 2018-03-28 ENCOUNTER — TELEPHONE (OUTPATIENT)
Dept: INTERNAL MEDICINE | Facility: CLINIC | Age: 63
End: 2018-03-28

## 2018-03-28 NOTE — TELEPHONE ENCOUNTER
----- Message from Kelly Mathew sent at 3/28/2018 11:11 AM CDT -----  Contact: pt  States she has a migraine headache and would like to see Dr Hernadnez late this afternoon or tomorrow. Please call pt at [phone]. Thank you

## 2018-03-28 NOTE — TELEPHONE ENCOUNTER
----- Message from Sharyn Ledezma sent at 3/28/2018 11:51 AM CDT -----  Contact: Pt  Pt called and stated she was returning a call to the nurse. She can be reached at [phone].    Thanks,  TF

## 2018-04-06 RX ORDER — BUMETANIDE 2 MG/1
TABLET ORAL
Qty: 30 TABLET | Refills: 2 | Status: SHIPPED | OUTPATIENT
Start: 2018-04-06 | End: 2018-07-30 | Stop reason: SDUPTHER

## 2018-04-27 RX ORDER — SIMVASTATIN 5 MG/1
TABLET, FILM COATED ORAL
Qty: 30 TABLET | Refills: 1 | Status: SHIPPED | OUTPATIENT
Start: 2018-04-27 | End: 2018-06-13 | Stop reason: SDUPTHER

## 2018-05-23 ENCOUNTER — HOSPITAL ENCOUNTER (OUTPATIENT)
Dept: RADIOLOGY | Facility: HOSPITAL | Age: 63
Discharge: HOME | End: 2018-05-23
Attending: FAMILY MEDICINE
Payer: COMMERCIAL

## 2018-05-23 ENCOUNTER — PATIENT MESSAGE (OUTPATIENT)
Dept: INTERNAL MEDICINE | Facility: CLINIC | Age: 63
End: 2018-05-23

## 2018-05-23 ENCOUNTER — OFFICE VISIT (OUTPATIENT)
Dept: INTERNAL MEDICINE | Facility: CLINIC | Age: 63
End: 2018-05-23
Payer: COMMERCIAL

## 2018-05-23 VITALS
HEART RATE: 85 BPM | TEMPERATURE: 96 F | WEIGHT: 131.81 LBS | SYSTOLIC BLOOD PRESSURE: 136 MMHG | RESPIRATION RATE: 16 BRPM | HEIGHT: 63 IN | OXYGEN SATURATION: 99 % | DIASTOLIC BLOOD PRESSURE: 72 MMHG | BODY MASS INDEX: 23.36 KG/M2

## 2018-05-23 DIAGNOSIS — N18.4 CHRONIC KIDNEY DISEASE (CKD) STAGE G4/A1, SEVERELY DECREASED GLOMERULAR FILTRATION RATE (GFR) BETWEEN 15-29 ML/MIN/1.73 SQUARE METER AND ALBUMINURIA CREATININE RATIO LESS THAN 30 MG/G: Primary | ICD-10-CM

## 2018-05-23 DIAGNOSIS — J30.1 SEASONAL ALLERGIC RHINITIS DUE TO POLLEN: ICD-10-CM

## 2018-05-23 DIAGNOSIS — N18.4 CHRONIC KIDNEY DISEASE (CKD) STAGE G4/A1, SEVERELY DECREASED GLOMERULAR FILTRATION RATE (GFR) BETWEEN 15-29 ML/MIN/1.73 SQUARE METER AND ALBUMINURIA CREATININE RATIO LESS THAN 30 MG/G: ICD-10-CM

## 2018-05-23 DIAGNOSIS — Z72.0 TOBACCO USE: ICD-10-CM

## 2018-05-23 PROCEDURE — 99999 PR PBB SHADOW E&M-EST. PATIENT-LVL IV: CPT | Mod: PBBFAC,,, | Performed by: FAMILY MEDICINE

## 2018-05-23 PROCEDURE — 99214 OFFICE O/P EST MOD 30 MIN: CPT | Mod: S$GLB,,, | Performed by: FAMILY MEDICINE

## 2018-05-23 PROCEDURE — 3008F BODY MASS INDEX DOCD: CPT | Mod: CPTII,S$GLB,, | Performed by: FAMILY MEDICINE

## 2018-05-23 RX ORDER — METHYLPREDNISOLONE 4 MG/1
TABLET ORAL
Qty: 1 PACKAGE | Refills: 0 | Status: SHIPPED | OUTPATIENT
Start: 2018-05-23 | End: 2018-05-31

## 2018-05-23 RX ORDER — LIDOCAINE AND PRILOCAINE 25; 25 MG/G; MG/G
CREAM TOPICAL
COMMUNITY
Start: 2018-05-02 | End: 2019-09-11

## 2018-05-23 NOTE — ASSESSMENT & PLAN NOTE
No indication for antibiotics for sinusitis at this time.  Told her to continue using the nasal saline rinses along with daily Flonase and Xyzal.

## 2018-05-23 NOTE — PROGRESS NOTES
Subjective:       Patient ID: Kenia Alonzo is a 62 y.o. female.    Chief Complaint: Migraine    Sinus Problem   This is a new problem. The current episode started in the past 7 days. The problem has been gradually worsening since onset. There has been no fever. The pain is mild. Associated symptoms include chills, congestion, headaches and sinus pressure. Pertinent negatives include no coughing, ear pain, hoarse voice, neck pain, shortness of breath, sore throat or swollen glands. Past treatments include oral decongestants, saline sprays and spray decongestants. The treatment provided mild relief.     She also needs to reschedule ENT appointment and Nephrology.  She had lost insurance after we had made referral to Nephrology so she has not followed up on this.  I stressed the importance of her not using any nonsteroidal anti-inflammatory medication.    She is also concerned about a bump on her right upper back that has been there for quite some time now.  It has not really changed sizes and does not itch her or give her any pain but she just wants to know what it is.  She was told that it was may be a sebaceous cyst.  It has never had any drainage  .  Family History   Problem Relation Age of Onset    Colon cancer Father     Diabetes Father     Diabetes Sister        Current Outpatient Prescriptions:     ascorbate calcium 500 mg Tab, Take 2 tablets by mouth once daily., Disp: 180 each, Rfl: 3    bumetanide (BUMEX) 2 MG tablet, TAKE ONE TABLET BY MOUTH ONCE DAILY, Disp: 30 tablet, Rfl: 2    dicyclomine (BENTYL) 10 MG capsule, Take 1 capsule (10 mg total) by mouth 3 (three) times daily., Disp: 180 capsule, Rfl: 3    fluticasone (FLONASE) 50 mcg/actuation nasal spray, 2 sprays by Each Nare route once daily., Disp: 16 g, Rfl: 3    levocetirizine (XYZAL) 5 MG tablet, Take 1 tablet (5 mg total) by mouth every evening., Disp: 90 tablet, Rfl: 3    lidocaine-prilocaine (EMLA) cream, , Disp: , Rfl:     MELATONIN ORAL,  "Take by mouth., Disp: , Rfl:     pantoprazole (PROTONIX) 40 MG tablet, TAKE ONE TABLET BY MOUTH EVERY DAY, Disp: 30 tablet, Rfl: 5    simvastatin (ZOCOR) 5 MG tablet, TAKE 1 TABLET BY MOUTH ONCE DAILY AT BEDTIME, Disp: 30 tablet, Rfl: 1    buPROPion (WELLBUTRIN XL) 300 MG 24 hr tablet, Take 1 tablet (300 mg total) by mouth once daily., Disp: 90 tablet, Rfl: 3    diazePAM (VALIUM) 2 MG tablet, Take 1 tablet (2 mg total) by mouth every 8 (eight) hours as needed for Anxiety., Disp: 30 tablet, Rfl: 0    methylPREDNISolone (MEDROL DOSEPACK) 4 mg tablet, use as directed, Disp: 1 Package, Rfl: 0    zolpidem (AMBIEN) 5 MG Tab, TAKE ONE-HALF TO 1 TABLET BY MOUTH AT BEDTIME, Disp: 30 tablet, Rfl: 1    Review of Systems   Constitutional: Positive for chills. Negative for fever.   HENT: Positive for congestion and sinus pressure. Negative for ear pain, hoarse voice and sore throat.    Respiratory: Negative for cough and shortness of breath.    Cardiovascular: Negative for chest pain.   Gastrointestinal: Negative for abdominal pain.   Musculoskeletal: Negative for neck pain.   Skin: Negative for rash.   Neurological: Positive for headaches. Negative for dizziness.       Objective:   /72 (BP Location: Left arm, Patient Position: Sitting, BP Method: Large (Manual))   Pulse 85   Temp 96 °F (35.6 °C) (Tympanic)   Resp 16   Ht 5' 2.5" (1.588 m)   Wt 59.8 kg (131 lb 13.4 oz)   SpO2 99%   BMI 23.73 kg/m²      Physical Exam   Constitutional: She is oriented to person, place, and time. She appears well-developed and well-nourished.   HENT:   Head: Normocephalic and atraumatic.   Nose: Mucosal edema present. Right sinus exhibits maxillary sinus tenderness and frontal sinus tenderness. Left sinus exhibits maxillary sinus tenderness and frontal sinus tenderness.   Eyes: Conjunctivae are normal.   Cardiovascular: Normal rate.    Pulmonary/Chest: Effort normal. No respiratory distress.   Musculoskeletal: She exhibits no " edema.   Neurological: She is alert and oriented to person, place, and time. Coordination normal.   Skin: Skin is warm and dry. No rash noted.   Has a small lipoma on the right side of her back around the scapula.   Psychiatric: She has a normal mood and affect. Her behavior is normal.   Vitals reviewed.      Assessment & Plan     Problem List Items Addressed This Visit        Renal/    Chronic kidney disease (CKD) stage G4/A1, severely decreased glomerular filtration rate (GFR) between 15-29 mL/min/1.73 square meter and albuminuria creatinine ratio less than 30 mg/g - Primary    Current Assessment & Plan     Need to repeat renal function.  She has a history of taking a lot of nonsteroidal anti-inflammatories with high doses of ibuprofen in the past for knee pain.  I told her she needs to only take Tylenol for any pains that she has.  Trying to get her set up with Nephrology again.  Ordering renal ultrasound that she has not had this done since being found to have decreased GFR.         Relevant Orders    Comprehensive metabolic panel    Ambulatory referral to Nephrology    US Retroperitoneal Complete (Kidney and    CBC auto differential       Other    Allergic rhinitis due to pollen    Current Assessment & Plan     No indication for antibiotics for sinusitis at this time.  Told her to continue using the nasal saline rinses along with daily Flonase and Xyzal.         Tobacco use    Current Assessment & Plan     Emphasized the urgency to quit smoking and provided her with referral paper work for smoking cessation.                 No Follow-up on file.

## 2018-05-23 NOTE — ASSESSMENT & PLAN NOTE
Emphasized the urgency to quit smoking and provided her with referral paper work for smoking cessation.

## 2018-05-23 NOTE — ASSESSMENT & PLAN NOTE
Need to repeat renal function.  She has a history of taking a lot of nonsteroidal anti-inflammatories with high doses of ibuprofen in the past for knee pain.  I told her she needs to only take Tylenol for any pains that she has.  Trying to get her set up with Nephrology again.  Ordering renal ultrasound that she has not had this done since being found to have decreased GFR.

## 2018-05-27 ENCOUNTER — TELEPHONE (OUTPATIENT)
Dept: RADIOLOGY | Facility: HOSPITAL | Age: 63
End: 2018-05-27

## 2018-05-28 ENCOUNTER — PATIENT MESSAGE (OUTPATIENT)
Dept: INTERNAL MEDICINE | Facility: CLINIC | Age: 63
End: 2018-05-28

## 2018-05-28 ENCOUNTER — HOSPITAL ENCOUNTER (OUTPATIENT)
Dept: RADIOLOGY | Facility: HOSPITAL | Age: 63
Discharge: HOME | End: 2018-05-28
Attending: FAMILY MEDICINE
Payer: COMMERCIAL

## 2018-05-28 ENCOUNTER — TELEPHONE (OUTPATIENT)
Dept: INTERNAL MEDICINE | Facility: CLINIC | Age: 63
End: 2018-05-28

## 2018-05-28 ENCOUNTER — PATIENT OUTREACH (OUTPATIENT)
Dept: ADMINISTRATIVE | Facility: HOSPITAL | Age: 63
End: 2018-05-28

## 2018-05-28 PROCEDURE — 76770 US EXAM ABDO BACK WALL COMP: CPT | Mod: 26,,, | Performed by: RADIOLOGY

## 2018-05-28 PROCEDURE — 76770 US EXAM ABDO BACK WALL COMP: CPT | Mod: TC,PO

## 2018-05-28 NOTE — TELEPHONE ENCOUNTER
Left msg for pt I was returning her call. That she did not have to call us back that I sent her a msg to her pt portal that is short and sweet. But that if she needs us to call at [phone]

## 2018-05-28 NOTE — TELEPHONE ENCOUNTER
----- Message from Sharyn Ledezma sent at 5/28/2018  1:31 PM CDT -----  Contact: Pt  Pt called and stated she was returning a call to the nurse. She can be reached at [phone].    Thanks,  TF

## 2018-05-28 NOTE — PROGRESS NOTES
I have attempted without success to schedule annual mammogram exam, depression, and other health maintenance. Patient will call back with work schedule.

## 2018-05-31 ENCOUNTER — OFFICE VISIT (OUTPATIENT)
Dept: NEPHROLOGY | Facility: CLINIC | Age: 63
End: 2018-05-31
Payer: COMMERCIAL

## 2018-05-31 ENCOUNTER — PATIENT MESSAGE (OUTPATIENT)
Dept: NEPHROLOGY | Facility: CLINIC | Age: 63
End: 2018-05-31

## 2018-05-31 ENCOUNTER — PATIENT MESSAGE (OUTPATIENT)
Dept: INTERNAL MEDICINE | Facility: CLINIC | Age: 63
End: 2018-05-31

## 2018-05-31 VITALS
SYSTOLIC BLOOD PRESSURE: 128 MMHG | HEART RATE: 76 BPM | WEIGHT: 131.63 LBS | HEIGHT: 63 IN | BODY MASS INDEX: 23.32 KG/M2 | DIASTOLIC BLOOD PRESSURE: 80 MMHG

## 2018-05-31 DIAGNOSIS — N18.30 CKD (CHRONIC KIDNEY DISEASE) STAGE 3, GFR 30-59 ML/MIN: Primary | ICD-10-CM

## 2018-05-31 PROBLEM — N18.4 CHRONIC KIDNEY DISEASE (CKD) STAGE G4/A1, SEVERELY DECREASED GLOMERULAR FILTRATION RATE (GFR) BETWEEN 15-29 ML/MIN/1.73 SQUARE METER AND ALBUMINURIA CREATININE RATIO LESS THAN 30 MG/G: Status: RESOLVED | Noted: 2017-08-18 | Resolved: 2018-05-31

## 2018-05-31 PROCEDURE — 99205 OFFICE O/P NEW HI 60 MIN: CPT | Mod: S$GLB,,, | Performed by: INTERNAL MEDICINE

## 2018-05-31 PROCEDURE — 99999 PR PBB SHADOW E&M-EST. PATIENT-LVL III: CPT | Mod: PBBFAC,,, | Performed by: INTERNAL MEDICINE

## 2018-05-31 PROCEDURE — 3008F BODY MASS INDEX DOCD: CPT | Mod: CPTII,S$GLB,, | Performed by: INTERNAL MEDICINE

## 2018-05-31 NOTE — LETTER
May 31, 2018      [place]  [address] [id number] [place] [address] Nephrology  [address] [place] [id number]  Phone: [phone]  Fax: [phone]          Patient: Kenia Alonzo   MR Number: [id number]   YOB: 1955   Date of Visit: 5/31/2018       Dear Dr. Harish Hernandez:    Thank you for referring Kenia Alonzo to me for evaluation. Attached you will find relevant portions of my assessment and plan of care.    If you have questions, please do not hesitate to call me. I look forward to following Kenia Alonzo along with you.    Sincerely,    Tony Oliveros MD    Enclosure  CC:  No Recipients    If you would like to receive this communication electronically, please contact [email or web address] or [phone] to request more information on [facility] [employer] access.    For providers and/or their staff who would like to refer a patient to [facility], please contact us through our one-stop-shop provider referral line, [facility], at [phone].    If you feel you have received this communication in error or would no longer like to receive these types of communications, please e-mail [email or web address]

## 2018-05-31 NOTE — PATIENT INSTRUCTIONS
Avoid NSAID pain medications such as advil, aleve, motrin, ibuprofen, naprosyn, meloxicam, diclofenac, mobic.     Tylenol and Excedrin OK for pain.    Hydrate with 60-70 ounces of water per day.

## 2018-05-31 NOTE — PROGRESS NOTES
"NEPHROLOGY CONSULTATION    PHYSICIAN REQUESTING THE CONSULT:  Dr. Harish Hernandez    REASON FOR CONSULTATION: Renal insufficiency    62 y.o. female with history of Meniere's disease, hyperlipidemia, gout, migraines, IBS, allergic rhinitis  presents to the renal clinic for evaluation of renal insufficiency. A consultation has been requested by the patient's PMD, Dr. Harish Hernandez. Patient today presents to the clinic complaining of intermittent headaches, intermittent LE swelling. She denies any congenital hearing loss, chest pain, SOB, hemoptysis, abdominal or flank pain, diarrhea, nausea/vomiting, hematuria, dysuria, rashes, hand/foot paresthesia, nasal congestion. Patient reports strong NSAID use history. She had been on daily ibuprofen for at least 6 months. Last use was about 2 weeks ago.   Patient reports history of gout (associated with right toe pain, currently using "cherry pills" only, no recent attacks), migraines (treated with NSAIDS in past, triggered by MSG), Meniere's disease (using valium intermittently for exacerbations). She denies any history of nephrolithiasis, HTN, DM2, heart disease.  Patient's son suffers from obstructive nephropathy. Laboratory review revealed that the patient's renal function has been fluctuating with creatinine at 2.1 (16), 2.5 (17) and 1.5 (18).         Past Medical History:   Diagnosis Date    Depression     Gout     Hyperlipidemia     Meniere disease        Past Surgical History:   Procedure Laterality Date     SECTION         Review of patient's allergies indicates:   Allergen Reactions    Codeine Itching    Hydrocodone Itching       Current Outpatient Prescriptions   Medication Sig Dispense Refill    ascorbate calcium 500 mg Tab Take 2 tablets by mouth once daily. 180 each 3    bumetanide (BUMEX) 2 MG tablet TAKE ONE TABLET BY MOUTH ONCE DAILY 30 tablet 2    buPROPion (WELLBUTRIN XL) 300 MG 24 hr tablet Take 1 tablet (300 mg total) by mouth " once daily. 90 tablet 3    diazePAM (VALIUM) 2 MG tablet Take 1 tablet (2 mg total) by mouth every 8 (eight) hours as needed for Anxiety. 30 tablet 0    dicyclomine (BENTYL) 10 MG capsule Take 1 capsule (10 mg total) by mouth 3 (three) times daily. 180 capsule 3    fluticasone (FLONASE) 50 mcg/actuation nasal spray 2 sprays by Each Nare route once daily. 16 g 3    levocetirizine (XYZAL) 5 MG tablet Take 1 tablet (5 mg total) by mouth every evening. 90 tablet 3    lidocaine-prilocaine (EMLA) cream       MELATONIN ORAL Take by mouth.      methylPREDNISolone (MEDROL DOSEPACK) 4 mg tablet use as directed 1 Package 0    pantoprazole (PROTONIX) 40 MG tablet TAKE ONE TABLET BY MOUTH EVERY DAY 30 tablet 5    simvastatin (ZOCOR) 5 MG tablet TAKE 1 TABLET BY MOUTH ONCE DAILY AT BEDTIME 30 tablet 1    zolpidem (AMBIEN) 5 MG Tab TAKE ONE-HALF TO 1 TABLET BY MOUTH AT BEDTIME 30 tablet 1     No current facility-administered medications for this visit.        Family History   Problem Relation Age of Onset    Colon cancer Father     Diabetes Father     Diabetes Sister        Social History     Social History    Marital status:      Spouse name: N/A    Number of children: N/A    Years of education: N/A     Occupational History    Not on file.     Social History Main Topics    Smoking status: Current Every Day Smoker    Smokeless tobacco: Never Used      Comment: she is in the smoking program    Alcohol use No      Comment: social few times monthly    Drug use: No    Sexual activity: No     Other Topics Concern    Not on file     Social History Narrative    No narrative on file       Review of Systems:  1. GENERAL: patient denies any fever, weight changes, generalized weakness, dizziness.  2. HEENT: patient reports intermittent headaches, no visual disturbances, swallowing problems, sinus pain, nasal congestion.  3. CARDIOVASCULAR: patient denies chest pain, palpitations.  4. PULMONARY: patient denies  "SOB, coughing, hemoptysis, wheezing.  5. GASTROINTESTINAL: patient denies abdominal pain, nausea, vomiting, diarrhea.  6. GENITOURINARY: patient denies dysuria, hematuria, hesitancy, frequency.  7. EXTREMITIES: patient reports intermittent LE edema, no LE cramping.  8. DERMATOLOGY: patient denies rashes, ulcers.  9. NEURO: patient denies tremors, extremity weakness, extremity numbness/tingling.  10. MUSCULOSKELETAL: patient denies joint pain, joint swelling.  11. HEMATOLOGY: patient denies rectal or gum bleeding.  12: PSYCH: patient denies anxiety, depression.      PHYSICAL EXAM:  /80   Pulse 76   Ht 5' 2.5" (1.588 m)   Wt 59.7 kg (131 lb 9.8 oz)   BMI 23.69 kg/m²     GENERAL: Pleasant lady presents to clinic with non-labored breathing.  HEENT: PER, no nasal discharge, no icterus, no oral exudates, moist mucosal membranes.  NECK: no thyroid mass, no lymphadenopathy.  HEART: RRR S1/S2, no rubs, good peripheral pulses.  LUNGS: CTA bilaterally, no wheezing, breathing is nonlabored.  ABDOMEN: soft, mild epigastric tenderness, not distended, bowel sounds are present, no abdominal hernia.  EXTREM: no LE edema.  SKIN: no rashes, skin is warm and dry.  NEURO: A & O x 3, no obvious focal signs.    LABORATORY RESULTS:    Lab Results   Component Value Date    CREATININE 1.5 (H) 05/23/2018    BUN 29 (H) 05/23/2018     05/23/2018    K 4.2 05/23/2018     05/23/2018    CO2 24 05/23/2018      Lab Results   Component Value Date    CALCIUM 10.0 05/23/2018     Lab Results   Component Value Date    ALBUMIN 4.3 05/23/2018     Lab Results   Component Value Date    WBC 11.10 05/23/2018    HGB 14.3 05/23/2018    HCT 42.8 05/23/2018    MCV 94 05/23/2018     (H) 05/23/2018       Renal US from 5/28/18:  Bilateral renal cysts, some mildly complex.       ASSESSMENT AND PLAN:  62 y.o. female with history of Meniere's disease, hyperlipidemia, gout, migraines, IBS, allergic rhinitis  presents to the renal clinic for " evaluation of renal insufficiency.     1. Renal insufficiency: Patient presents with mild renal insufficiency, consistent with CKD stage 3. Likely cause of her renal insufficiency is his her previous NSAID use. Patient's renal function will be monitored closely and she will return to the clinic in 1 month for follow up. I will order a renal panel, CBC, urinalysis, protein creatinine ratio, PTH prior to his return visit. Patient was advised to avoid NSAID pain medications such as advil, aleve, motrin, ibuprofen, naprosyn, meloxicam, diclofenac, mobic. Patient was advised to hydrate with 60-65 ounces of water per day.     2. Electrolytes: Within normal limits.    3. Acid base status: No acute issues.    4. Volume: Euvolemic.    5. Hypertension: Good BP control.     6. Medications: Reviewed. Patient was advised to avoid NSAID pain medications such as advil, aleve, motrin, ibuprofen, naprosyn, meloxicam, diclofenac, mobic.     7. Mildly complex renal cysts: Repeat US in 18 months.         Thank you very much for this consult. Please see my note in Epic for recommendations.    Total time spent was about 45 min. 50 % or more was spend on counseling about treatment options disease etiology. Level 5 consult.

## 2018-06-13 RX ORDER — SIMVASTATIN 5 MG/1
TABLET, FILM COATED ORAL
Qty: 30 TABLET | Refills: 2 | Status: SHIPPED | OUTPATIENT
Start: 2018-06-13 | End: 2018-11-15 | Stop reason: SDUPTHER

## 2018-06-26 ENCOUNTER — OFFICE VISIT (OUTPATIENT)
Dept: OTOLARYNGOLOGY | Facility: CLINIC | Age: 63
End: 2018-06-26
Payer: COMMERCIAL

## 2018-06-26 ENCOUNTER — CLINICAL SUPPORT (OUTPATIENT)
Dept: AUDIOLOGY | Facility: CLINIC | Age: 63
End: 2018-06-26
Payer: COMMERCIAL

## 2018-06-26 VITALS
HEART RATE: 87 BPM | DIASTOLIC BLOOD PRESSURE: 70 MMHG | TEMPERATURE: 98 F | RESPIRATION RATE: 20 BRPM | HEIGHT: 63 IN | SYSTOLIC BLOOD PRESSURE: 119 MMHG

## 2018-06-26 DIAGNOSIS — H90.A22 SENSORINEURAL HEARING LOSS (SNHL) OF LEFT EAR WITH RESTRICTED HEARING OF RIGHT EAR: Primary | ICD-10-CM

## 2018-06-26 DIAGNOSIS — H90.A31 MIXED CONDUCTIVE AND SENSORINEURAL HEARING LOSS OF RIGHT EAR WITH RESTRICTED HEARING OF LEFT EAR: ICD-10-CM

## 2018-06-26 DIAGNOSIS — H81.01 MENIERE'S DISEASE OF RIGHT EAR: Primary | ICD-10-CM

## 2018-06-26 DIAGNOSIS — J30.2 SEASONAL ALLERGIC RHINITIS, UNSPECIFIED TRIGGER: ICD-10-CM

## 2018-06-26 PROCEDURE — 92567 TYMPANOMETRY: CPT | Mod: S$GLB,,, | Performed by: AUDIOLOGIST-HEARING AID FITTER

## 2018-06-26 PROCEDURE — 92557 COMPREHENSIVE HEARING TEST: CPT | Mod: S$GLB,,, | Performed by: AUDIOLOGIST-HEARING AID FITTER

## 2018-06-26 PROCEDURE — 99999 PR PBB SHADOW E&M-EST. PATIENT-LVL III: CPT | Mod: PBBFAC,,, | Performed by: ORTHOPAEDIC SURGERY

## 2018-06-26 PROCEDURE — 99204 OFFICE O/P NEW MOD 45 MIN: CPT | Mod: S$GLB,,, | Performed by: ORTHOPAEDIC SURGERY

## 2018-06-26 RX ORDER — LEVOCETIRIZINE DIHYDROCHLORIDE 5 MG/1
5 TABLET, FILM COATED ORAL NIGHTLY
Qty: 90 TABLET | Refills: 3 | Status: SHIPPED | OUTPATIENT
Start: 2018-06-26 | End: 2019-07-08 | Stop reason: SDUPTHER

## 2018-06-26 RX ORDER — DIAZEPAM 2 MG/1
2 TABLET ORAL EVERY 6 HOURS PRN
Qty: 10 TABLET | Refills: 0 | Status: SHIPPED | OUTPATIENT
Start: 2018-06-26 | End: 2018-07-26 | Stop reason: SDUPTHER

## 2018-06-26 NOTE — PROGRESS NOTES
Referring Provider: Dr. Ruiz    Kenia Aolnzo was seen 06/26/2018 for an audiological evaluation.  Patient complains of Meniere's disease previously diagnosed by an ENT in [place], [place]. Sh reports fluctuating hearing loss in her right ear and and slight hearing loss in her left ear. Reports constant roaring tinnitus.  She also reported allergy problems that seem worse today. Denied  otalgia and  aural fullness. She reports having significant cerumen bilaterally, per her primary care physician.     Results reveal a mild-to-moderately severe  Mixed hearing loss 250-8000 Hz for the right ear, and a mild-to-moderate  sensorineural hearing loss 250-8000 Hz for the left ear.   Speech Reception Thresholds were  30 dBHL for the right ear and 10 dBHL for the left ear.   Word recognition scores were excellent for the right ear and excellent for the left ear.   Tympanograms were Type A, normal for the right ear and Type As, shallow for the left ear.    Patient was counseled on the above findings.    Recommendations:  1. ENT  2. Cerumen removal bilaterally  3. Binaural hearing aids. Patient was provided hearing aid information and encouraged to schedule and appointment for a consultation.        Provided hearing aid information.

## 2018-06-26 NOTE — PROGRESS NOTES
Subjective:       Patient ID: Kenia Alonzo is a 62 y.o. female.    Chief Complaint: Nasal Congestion (for a week )    Patient is a very pleasant 61 yo female who presents to clinic for a consult for right hearing loss. She has a hx of Ménière's Disease and a vestibular nerve section around [phone] per Dr. Raul Sebastian, in [place], [place]. The last time she was seen by ENT was in 2016 in [place], [place]. She reports significant relief and decrease in episodes after surgery. She now only experiences episodes of dizziness/ nausea/ HA's once every 2-3 months, she believes episodes are brought on by a flare-up in allergy symptoms. Her allergy symptoms include sneezing and nasal congestion.  Her last episode was in April/May, she was seen by her PCP and treated with Valium 2 mg and prednisone which completely resolved. She reports an episode this AM when getting out of bed but this was not nearly as severe. She has managed allergies with Xyzal and Flonase in the past, which worked great. She has not taken Xyzal recently due to not having a prescription and insurance transfer. She does continue to experience hearing loss in her right ear and received an audiogram today.       Review of Systems   Constitutional: Negative for chills and fever.   HENT: Positive for congestion and sneezing. Negative for ear discharge, ear pain, facial swelling, nosebleeds, rhinorrhea, sinus pain, sinus pressure, sore throat and tinnitus.    Eyes: Negative for discharge, itching and visual disturbance.   Respiratory: Negative for shortness of breath.    Cardiovascular: Negative for chest pain.   Gastrointestinal: Negative for abdominal pain, nausea and vomiting.   Endocrine: Negative for cold intolerance and heat intolerance.   Genitourinary: Negative for dysuria and frequency.   Musculoskeletal: Negative for arthralgias and gait problem.   Skin: Negative for rash.   Neurological: Negative for speech difficulty and weakness.   Psychiatric/Behavioral:  Negative for agitation and sleep disturbance.       Objective:      Physical Exam   Constitutional: She is oriented to person, place, and time. She appears well-developed and well-nourished. No distress.   HENT:   Head: Normocephalic and atraumatic.   Right Ear: Tympanic membrane and ear canal normal.   Left Ear: Hearing, tympanic membrane and ear canal normal.   Nose: Nose normal.   Cerumen impactions present in bilateral EAC's    Eyes: EOM are normal. Right eye exhibits no discharge. Left eye exhibits no discharge.   Neck: Normal range of motion. Neck supple.   Cardiovascular: Normal rate and regular rhythm.    Pulmonary/Chest: Effort normal. No respiratory distress.   Neurological: She is alert and oriented to person, place, and time. No cranial nerve deficit.   Skin: Skin is warm and dry. No rash noted. She is not diaphoretic.   Psychiatric: She has a normal mood and affect. Her behavior is normal. Thought content normal.   Nursing note and vitals reviewed.      Audiogram:     Procedure Note    CHIEF COMPLAINT:  Cerumen Impaction    Description:  The patient was seated in an exam chair.  An ear speculum was placed in the right EAC and was examined under the microscope.  Suction and/or loop curettes were used to remove a large cerumen impaction.  The tympanic membrane was visualized and was normal in appearance.  The procedure was repeated on the left side in a similar fashion.  The TM was intact and normal on this side as well.  The patient tolerated the procedure well.        Assessment:       1. Meniere's disease of right ear    2. Seasonal allergic rhinitis, unspecified trigger    3. Asymmetrical right sensorineural hearing loss        Plan:       1. Meniere's disease:  Overall, she has been able to control her Meniere's with VNS and now is taking Valium/prednisone 3-4 times yearly for flares.  I would recommend she continue with that regimen.  Discussed that we could schedule her to see a neurotologist if  "needs further treatment (TT gent injection, etc.).  However, at this time she is stable and will continue to follow.  Continue with Hydrops diet.  2.  AR:  I would recommend the patient start on daily, consistent medication for allergies.  I would recommend daily use of an oral antihistamine as well as a steroid nasal spray.  There are a variety of oral antihistamines available over the counter, and one is not inherently the "best," though often patients will find that one works best for them.  We also discussed the proper mechanism of using a steroid nasal spray, to direct the spray away from the patient's nasal septum.  I would recommend continuing with these medications for 2-3 weeks, and then will have the patient contact me with progress.  If allergy symptoms persist at that time, would then consider pursuing allergy skin testing.  3.  Asymmetric right SNHL:  Will have her sign release for us to obtain her documentation from her previous ENT regarding her imaging and testing.  She has an asymmetric SNHL in the right ear, but has had a normal MRI (per patient).  We reviewed the patient's recent audiogram and hearing loss in detail.  We also discussed that she is a good candidate for hearing aids, if and when she the patient is motivated.  She was given handouts with information and pricing of hearing aids, and will contact audiology when ready to proceed.  We also discussed the use hearing protection when exposed to loud noise, including lawn equipment.        "

## 2018-06-26 NOTE — LETTER
June 26, 2018      [place]  [address] [address] [address]           [facility] - ENT  [address]  [place] [address]  Phone: [phone]  Fax: [phone]          Patient: Kenia Alonzo   MR Number: [id number]   YOB: 1955   Date of Visit: 6/26/2018       Dear Dr. Harish Hernandez:    Thank you for referring Kenia Alonzo to me for evaluation. Attached you will find relevant portions of my assessment and plan of care.    If you have questions, please do not hesitate to call me. I look forward to following Kenia Alonzo along with you.    Sincerely,    Nikole Ruiz MD    Enclosure  CC:  No Recipients    If you would like to receive this communication electronically, please contact [email or web address] or [phone] to request more information on [employer] Link access.    For providers and/or their staff who would like to refer a patient to [facility], please contact us through our one-stop-shop provider referral line, [facility], at [phone].    If you feel you have received this communication in error or would no longer like to receive these types of communications, please e-mail [email or web address]

## 2018-07-02 ENCOUNTER — PATIENT MESSAGE (OUTPATIENT)
Dept: OTOLARYNGOLOGY | Facility: CLINIC | Age: 63
End: 2018-07-02

## 2018-07-03 RX ORDER — AZITHROMYCIN 250 MG/1
TABLET, FILM COATED ORAL
Qty: 6 TABLET | Refills: 0 | Status: SHIPPED | OUTPATIENT
Start: 2018-07-03 | End: 2018-07-13 | Stop reason: ALTCHOICE

## 2018-07-11 ENCOUNTER — PATIENT MESSAGE (OUTPATIENT)
Dept: OTOLARYNGOLOGY | Facility: CLINIC | Age: 63
End: 2018-07-11

## 2018-07-13 ENCOUNTER — OFFICE VISIT (OUTPATIENT)
Dept: INTERNAL MEDICINE | Facility: CLINIC | Age: 63
End: 2018-07-13
Payer: COMMERCIAL

## 2018-07-13 VITALS
WEIGHT: 130.94 LBS | DIASTOLIC BLOOD PRESSURE: 62 MMHG | HEIGHT: 63 IN | SYSTOLIC BLOOD PRESSURE: 130 MMHG | RESPIRATION RATE: 18 BRPM | BODY MASS INDEX: 23.2 KG/M2 | TEMPERATURE: 99 F | HEART RATE: 116 BPM | OXYGEN SATURATION: 97 %

## 2018-07-13 DIAGNOSIS — J30.89 NON-SEASONAL ALLERGIC RHINITIS, UNSPECIFIED TRIGGER: Primary | ICD-10-CM

## 2018-07-13 DIAGNOSIS — Z12.39 BREAST CANCER SCREENING: ICD-10-CM

## 2018-07-13 PROCEDURE — 99213 OFFICE O/P EST LOW 20 MIN: CPT | Mod: S$GLB,,, | Performed by: FAMILY MEDICINE

## 2018-07-13 PROCEDURE — 3008F BODY MASS INDEX DOCD: CPT | Mod: CPTII,S$GLB,, | Performed by: FAMILY MEDICINE

## 2018-07-13 PROCEDURE — 99999 PR PBB SHADOW E&M-EST. PATIENT-LVL III: CPT | Mod: PBBFAC,,, | Performed by: FAMILY MEDICINE

## 2018-07-13 RX ORDER — ONDANSETRON 4 MG/1
4 TABLET, ORALLY DISINTEGRATING ORAL EVERY 8 HOURS PRN
Qty: 21 TABLET | Refills: 1 | Status: SHIPPED | OUTPATIENT
Start: 2018-07-13 | End: 2018-10-24 | Stop reason: SDUPTHER

## 2018-07-13 RX ORDER — FLUTICASONE PROPIONATE 50 MCG
2 SPRAY, SUSPENSION (ML) NASAL DAILY
Qty: 16 G | Refills: 3 | Status: SHIPPED | OUTPATIENT
Start: 2018-07-13 | End: 2019-09-26 | Stop reason: SDUPTHER

## 2018-07-13 NOTE — PROGRESS NOTES
Subjective:       Patient ID: Kenia Alonzo is a 63 y.o. female.    Chief Complaint: Sinusitis; Fatigue; and Otalgia (rt)    HPI    Here today with a few days of sinus congestion, fatigue with some nausea and mild right-sided ear pain.  She does have a history of meniere but her symptoms at this time are not consistent with that.  Recently her son was in the hospital for 8 days and she stayed there pretty much the entire time.  He was discharged about 1 week ago and she feels that maybe that combination of not getting a lot of sleep in being worried about him caused her to get run down.  She has not been eating well either.  She has not been using her Flonase on a routine basis for chronic seasonal allergies.  Family History   Problem Relation Age of Onset    Colon cancer Father     Diabetes Father     Diabetes Sister        Current Outpatient Prescriptions:     ascorbate calcium 500 mg Tab, Take 2 tablets by mouth once daily., Disp: 180 each, Rfl: 3    bumetanide (BUMEX) 2 MG tablet, TAKE ONE TABLET BY MOUTH ONCE DAILY, Disp: 30 tablet, Rfl: 2    buPROPion (WELLBUTRIN XL) 300 MG 24 hr tablet, Take 1 tablet (300 mg total) by mouth once daily., Disp: 90 tablet, Rfl: 3    diazePAM (VALIUM) 2 MG tablet, Take 1 tablet (2 mg total) by mouth every 6 (six) hours as needed for Anxiety., Disp: 10 tablet, Rfl: 0    dicyclomine (BENTYL) 10 MG capsule, Take 1 capsule (10 mg total) by mouth 3 (three) times daily., Disp: 180 capsule, Rfl: 3    fluticasone (FLONASE) 50 mcg/actuation nasal spray, 2 sprays (100 mcg total) by Each Nare route once daily., Disp: 16 g, Rfl: 3    levocetirizine (XYZAL) 5 MG tablet, Take 1 tablet (5 mg total) by mouth every evening., Disp: 90 tablet, Rfl: 3    lidocaine-prilocaine (EMLA) cream, , Disp: , Rfl:     MELATONIN ORAL, Take by mouth., Disp: , Rfl:     pantoprazole (PROTONIX) 40 MG tablet, TAKE ONE TABLET BY MOUTH EVERY DAY, Disp: 30 tablet, Rfl: 5    simvastatin (ZOCOR) 5 MG tablet,  "TAKE 1 TABLET BY MOUTH ONCE DAILY AT BEDTIME, Disp: 30 tablet, Rfl: 2    ondansetron (ZOFRAN-ODT) 4 MG TbDL, Take 1 tablet (4 mg total) by mouth every 8 (eight) hours as needed., Disp: 21 tablet, Rfl: 1    Review of Systems   Constitutional: Positive for fatigue. Negative for chills and fever.   HENT: Positive for congestion.    Respiratory: Negative for cough and shortness of breath.    Cardiovascular: Negative for chest pain.   Gastrointestinal: Positive for nausea. Negative for abdominal pain.   Skin: Negative for rash.   Neurological: Positive for headaches. Negative for dizziness.       Objective:   /62 (BP Location: Left arm, Patient Position: Sitting, BP Method: Large (Manual))   Pulse (!) 116   Temp 98.9 °F (37.2 °C) (Oral)   Resp 18   Ht 5' 2.5" (1.588 m)   Wt 59.4 kg (130 lb 15.3 oz)   SpO2 97%   BMI 23.57 kg/m²      Physical Exam   Constitutional: She is oriented to person, place, and time. She appears well-developed and well-nourished.   HENT:   Head: Normocephalic and atraumatic.   Eyes: Conjunctivae are normal.   Cardiovascular: Normal rate.    Pulmonary/Chest: Effort normal. No respiratory distress.   Musculoskeletal: She exhibits no edema.   Neurological: She is alert and oriented to person, place, and time. Coordination normal.   Skin: Skin is warm and dry. No rash noted.   Psychiatric: She has a normal mood and affect. Her behavior is normal.   Vitals reviewed.      Assessment & Plan     Problem List Items Addressed This Visit        Other    Non-seasonal allergic rhinitis - Primary    Current Assessment & Plan      No concerning findings on physical exam.  I suspect that her symptoms are secondary to possibly a mild viral infection or just being fatigued from being with her son recently.  I recommended rest hydration and to take some Zofran for the nausea and use her Flonase for nasal congestion.  Follow-up as needed                 No Follow-up on file.  "

## 2018-07-13 NOTE — ASSESSMENT & PLAN NOTE
No concerning findings on physical exam.  I suspect that her symptoms are secondary to possibly a mild viral infection or just being fatigued from being with her son recently.  I recommended rest hydration and to take some Zofran for the nausea and use her Flonase for nasal congestion.  Follow-up as needed

## 2018-07-17 ENCOUNTER — PATIENT MESSAGE (OUTPATIENT)
Dept: INTERNAL MEDICINE | Facility: CLINIC | Age: 63
End: 2018-07-17

## 2018-07-17 ENCOUNTER — TELEPHONE (OUTPATIENT)
Dept: INTERNAL MEDICINE | Facility: CLINIC | Age: 63
End: 2018-07-17

## 2018-07-17 NOTE — TELEPHONE ENCOUNTER
Called and spoke with patient ,who complain of her ear hurting. Patient though it was her tooth, so she saw dentist today, had x-rays done and was normal.  Patient states was told gland was swollen.Patient states was given a prescription for an antibiotic Amoxicillin 875 mg, but her right ear is hurting mostly at night about 7/10 on pain scale. Patient was advise antibiotic she got should help with ear, but will sent a message to doctor. Patient states would like something for ear pain.

## 2018-07-17 NOTE — TELEPHONE ENCOUNTER
Called and left a message advising patient she can take 600 mg of ibuprofen three times day as needed pre Dr. Hernandez. If she have any question to contact office at [phone]

## 2018-07-17 NOTE — TELEPHONE ENCOUNTER
----- Message from Nora Souza sent at 7/17/2018 10:08 AM CDT -----  Contact: pt  She's calling in regards to speak with nurse pls call pt back at [phone] (home)

## 2018-07-24 ENCOUNTER — PATIENT MESSAGE (OUTPATIENT)
Dept: NEPHROLOGY | Facility: CLINIC | Age: 63
End: 2018-07-24

## 2018-07-25 ENCOUNTER — PATIENT MESSAGE (OUTPATIENT)
Dept: OTOLARYNGOLOGY | Facility: CLINIC | Age: 63
End: 2018-07-25

## 2018-07-26 ENCOUNTER — PATIENT MESSAGE (OUTPATIENT)
Dept: OTOLARYNGOLOGY | Facility: CLINIC | Age: 63
End: 2018-07-26

## 2018-07-26 RX ORDER — DIAZEPAM 2 MG/1
2 TABLET ORAL EVERY 6 HOURS PRN
Qty: 10 TABLET | Refills: 0 | Status: SHIPPED | OUTPATIENT
Start: 2018-07-26 | End: 2018-10-24 | Stop reason: SDUPTHER

## 2018-07-26 RX ORDER — AZELASTINE 1 MG/ML
1 SPRAY, METERED NASAL 2 TIMES DAILY
Qty: 30 ML | Refills: 3 | Status: SHIPPED | OUTPATIENT
Start: 2018-07-26 | End: 2018-10-31

## 2018-07-30 RX ORDER — BUMETANIDE 2 MG/1
TABLET ORAL
Qty: 30 TABLET | Refills: 1 | Status: SHIPPED | OUTPATIENT
Start: 2018-07-30 | End: 2018-10-05 | Stop reason: SDUPTHER

## 2018-08-20 ENCOUNTER — PATIENT MESSAGE (OUTPATIENT)
Dept: INTERNAL MEDICINE | Facility: CLINIC | Age: 63
End: 2018-08-20

## 2018-08-20 DIAGNOSIS — Z12.11 COLON CANCER SCREENING: Primary | ICD-10-CM

## 2018-08-30 DIAGNOSIS — K58.9 IRRITABLE BOWEL SYNDROME WITHOUT DIARRHEA: ICD-10-CM

## 2018-08-30 DIAGNOSIS — F41.8 DEPRESSION WITH ANXIETY: ICD-10-CM

## 2018-08-30 RX ORDER — DICYCLOMINE HYDROCHLORIDE 10 MG/1
CAPSULE ORAL
Qty: 90 CAPSULE | Refills: 1 | Status: SHIPPED | OUTPATIENT
Start: 2018-08-30 | End: 2018-11-15 | Stop reason: SDUPTHER

## 2018-08-30 RX ORDER — BUPROPION HYDROCHLORIDE 300 MG/1
TABLET ORAL
Qty: 30 TABLET | Refills: 1 | Status: SHIPPED | OUTPATIENT
Start: 2018-08-30 | End: 2018-11-15 | Stop reason: SDUPTHER

## 2018-09-19 ENCOUNTER — PATIENT MESSAGE (OUTPATIENT)
Dept: INTERNAL MEDICINE | Facility: CLINIC | Age: 63
End: 2018-09-19

## 2018-10-05 RX ORDER — BUMETANIDE 2 MG/1
TABLET ORAL
Qty: 30 TABLET | Refills: 1 | Status: SHIPPED | OUTPATIENT
Start: 2018-10-05 | End: 2018-12-19 | Stop reason: SDUPTHER

## 2018-10-24 RX ORDER — ONDANSETRON 4 MG/1
4 TABLET, ORALLY DISINTEGRATING ORAL EVERY 8 HOURS PRN
Qty: 21 TABLET | Refills: 1 | Status: SHIPPED | OUTPATIENT
Start: 2018-10-24 | End: 2019-09-11

## 2018-10-24 RX ORDER — DIAZEPAM 2 MG/1
2 TABLET ORAL EVERY 6 HOURS PRN
Qty: 30 TABLET | Refills: 0 | Status: SHIPPED | OUTPATIENT
Start: 2018-10-24 | End: 2018-12-06 | Stop reason: SDUPTHER

## 2018-10-24 RX ORDER — PREDNISONE 20 MG/1
TABLET ORAL
Qty: 5 TABLET | Refills: 0 | Status: SHIPPED | OUTPATIENT
Start: 2018-10-24 | End: 2018-12-06 | Stop reason: SDUPTHER

## 2018-10-31 ENCOUNTER — OFFICE VISIT (OUTPATIENT)
Dept: INTERNAL MEDICINE | Facility: CLINIC | Age: 63
End: 2018-10-31
Payer: COMMERCIAL

## 2018-10-31 VITALS
DIASTOLIC BLOOD PRESSURE: 71 MMHG | WEIGHT: 131.63 LBS | SYSTOLIC BLOOD PRESSURE: 130 MMHG | HEIGHT: 62 IN | RESPIRATION RATE: 18 BRPM | HEART RATE: 104 BPM | TEMPERATURE: 97 F | BODY MASS INDEX: 24.22 KG/M2

## 2018-10-31 DIAGNOSIS — J40 BRONCHITIS: Primary | ICD-10-CM

## 2018-10-31 DIAGNOSIS — Z72.0 TOBACCO USE: ICD-10-CM

## 2018-10-31 PROCEDURE — 3008F BODY MASS INDEX DOCD: CPT | Mod: CPTII,S$GLB,, | Performed by: NURSE PRACTITIONER

## 2018-10-31 PROCEDURE — 99999 PR PBB SHADOW E&M-EST. PATIENT-LVL V: CPT | Mod: PBBFAC,,, | Performed by: NURSE PRACTITIONER

## 2018-10-31 PROCEDURE — 99214 OFFICE O/P EST MOD 30 MIN: CPT | Mod: S$GLB,,, | Performed by: NURSE PRACTITIONER

## 2018-10-31 RX ORDER — AZELASTINE 1 MG/ML
1 SPRAY, METERED NASAL 2 TIMES DAILY
Qty: 30 ML | Refills: 3 | Status: SHIPPED | OUTPATIENT
Start: 2018-10-31 | End: 2019-09-26 | Stop reason: SDUPTHER

## 2018-10-31 RX ORDER — BENZONATATE 100 MG/1
100 CAPSULE ORAL 3 TIMES DAILY PRN
Qty: 30 CAPSULE | Refills: 0 | Status: SHIPPED | OUTPATIENT
Start: 2018-10-31 | End: 2018-11-10

## 2018-10-31 RX ORDER — PROMETHAZINE HYDROCHLORIDE AND DEXTROMETHORPHAN HYDROBROMIDE 6.25; 15 MG/5ML; MG/5ML
5 SYRUP ORAL NIGHTLY PRN
Qty: 100 ML | Refills: 0 | Status: SHIPPED | OUTPATIENT
Start: 2018-10-31 | End: 2018-11-10

## 2018-10-31 NOTE — LETTER
October 31, 2018                 [facility] Internal Medicine  Internal Medicine  [address] [place] [id number]  Phone: [phone]   October 31, 2018     Patient: Kenia Alonzo   YOB: 1955   Date of Visit: 10/31/2018       To Whom it May Concern:    Kenia Alonzo was seen in my clinic on 10/31/2018. She may return to work on 11/1/2018.    If you have any questions or concerns, please don't hesitate to call.    Sincerely,         MARTA Valle,FNP-C

## 2018-10-31 NOTE — PATIENT INSTRUCTIONS
Bronchitis, Viral (Adult)    You have a viral bronchitis. Bronchitis is inflammation and swelling of the lining of the lungs. This is often caused by an infection. Symptoms include a dry, hacking cough that is worse at night. The cough may bring up yellow-green mucus. You may also feel short of breath or wheeze. Other symptoms may include tiredness, chest discomfort, and chills.  Bronchitis that is caused by a virus is not treated with antibiotics. Instead, medicines may be given to help relieve symptoms. Symptoms can last up to 2 weeks, although the cough may last much longer.  This illness is contagious during the first few days and is spread through the air by coughing and sneezing, or by direct contact (touching the sick person and then touching your own eyes, nose, or mouth).  Most viral illnesses resolve within 10 to 14 days with rest and simple home remedies, although they may sometimes last for several weeks.  Home care  · If symptoms are severe, rest at home for the first 2 to 3 days. When you go back to your usual activities, don't let yourself get too tired.  · Do not smoke. Also avoid being exposed to secondhand smoke.  · You may use over-the-counter medicine to control fever or pain, unless another pain medicine was prescribed. (Note: If you have chronic liver or kidney disease or have ever had a stomach ulcer or gastrointestinal bleeding, talk with your healthcare provider before using these medicines. Also talk to your provider if you are taking medicine to prevent blood clots.) Aspirin should never be given to anyone younger than 18 years of age who is ill with a viral infection or fever. It may cause severe liver or brain damage.  · Your appetite may be poor, so a light diet is fine. Avoid dehydration by drinking 6 to 8 glasses of fluids per day (such as water, soft drinks, sports drinks, juices, tea, or soup). Extra fluids will help loosen secretions in the nose and lungs.  · Over-the-counter  cough, cold, and sore-throat medicines will not shorten the length of the illness, but they may help to reduce symptoms. (Note: Do not use decongestants if you have high blood pressure.)  Follow-up care  Follow up with your healthcare provider, or as advised. If you had an X-ray or ECG (electrocardiogram), a specialist will review it. You will be notified of any new findings that may affect your care.  Note: If you are age 65 or older, or if you have a chronic lung disease or condition that affects your immune system, or you smoke, talk to your healthcare provider about having pneumococcal vaccinations and a yearly influenza vaccination (flu shot).  When to seek medical advice  Call your healthcare provider right away if any of these occur:  · Fever of 100.4°F (38°C) or higher  · Coughing up increased amounts of colored sputum  · Weakness, drowsiness, headache, facial pain, ear pain, or a stiff neck  Call 911, or get immediate medical care  Contact emergency services right away if any of these occur:  · Coughing up blood  · Worsening weakness, drowsiness, headache, or stiff neck  · Trouble breathing, wheezing, or pain with breathing  Date Last Reviewed: 9/13/2015  © 8019-9282 [employer]. [address] [place] [address], [place] [address]. All rights reserved. This information is not intended as a substitute for professional medical care. Always follow your healthcare professional's instructions.

## 2018-10-31 NOTE — PROGRESS NOTES
Subjective:       Patient ID: Kenia Alonzo is a 63 y.o. female.    Chief Complaint: Cough    Fever    This is a new problem. The current episode started in the past 7 days. The problem occurs constantly. The problem has been waxing and waning. The maximum temperature noted was 99 to 99.9 F. The temperature was taken using an oral thermometer. Associated symptoms include congestion, coughing, headaches, muscle aches, nausea, sleepiness and wheezing. Pertinent negatives include no abdominal pain, chest pain, diarrhea, ear pain, rash, sore throat, urinary pain or vomiting. She has tried NSAIDs and fluids for the symptoms. The treatment provided mild relief.     Review of Systems   Constitutional: Positive for appetite change, chills and fatigue. Negative for diaphoresis and fever.   HENT: Positive for congestion, postnasal drip and rhinorrhea. Negative for ear discharge, ear pain, sinus pressure, sinus pain and sore throat.    Eyes: Negative.    Respiratory: Positive for cough and wheezing. Negative for chest tightness and shortness of breath.    Cardiovascular: Negative for chest pain and palpitations.   Gastrointestinal: Positive for nausea. Negative for abdominal pain, diarrhea and vomiting.   Genitourinary: Negative for dysuria.   Musculoskeletal: Negative.    Skin: Negative for rash.   Allergic/Immunologic: Positive for environmental allergies. Negative for immunocompromised state.   Neurological: Positive for headaches. Negative for dizziness, weakness and light-headedness.   Hematological: Negative.        Objective:      Physical Exam   Constitutional: She is oriented to person, place, and time. Vital signs are normal. She appears well-developed and well-nourished. No distress.   HENT:   Head: Normocephalic and atraumatic.   Right Ear: Hearing, external ear and ear canal normal. A middle ear effusion is present.   Left Ear: Hearing, external ear and ear canal normal. A middle ear effusion is present.   Nose:  Mucosal edema and rhinorrhea present. Right sinus exhibits no maxillary sinus tenderness and no frontal sinus tenderness. Left sinus exhibits no maxillary sinus tenderness and no frontal sinus tenderness.   Mouth/Throat: Posterior oropharyngeal edema and posterior oropharyngeal erythema present. No oropharyngeal exudate. No tonsillar exudate.   Eyes: Conjunctivae are normal. Pupils are equal, round, and reactive to light. Right eye exhibits no discharge. Left eye exhibits no discharge.   Neck: Normal range of motion. Neck supple.   Cardiovascular: Normal rate and regular rhythm.   Pulmonary/Chest: Effort normal and breath sounds normal. No respiratory distress. She has no wheezes.   Abdominal: Soft. Bowel sounds are normal. She exhibits no distension. There is no tenderness.   Lymphadenopathy:     She has no cervical adenopathy.   Neurological: She is alert and oriented to person, place, and time.   Skin: Skin is warm and dry. No rash noted. She is not diaphoretic.   Psychiatric: She has a normal mood and affect. Her behavior is normal.       Assessment:       1. Bronchitis    2. Tobacco use        Plan:     Problem List Items Addressed This Visit        Pulmonary    Bronchitis - Primary    Current Assessment & Plan     Discussed supportive home care including rest, hydration, hot fluids with honey and tylenol for sore throat and body aches. Handout given. Pt verbalizes understanding.         Relevant Medications    promethazine-dextromethorphan (PROMETHAZINE-DM) 6.25-15 mg/5 mL Syrp    benzonatate (TESSALON) 100 MG capsule    acetaminophen (TYLENOL) 500 MG tablet       Other    Tobacco use    Relevant Orders    Ambulatory referral to Smoking Cessation Program        Follow-up if symptoms worsen or fail to improve.

## 2018-11-02 ENCOUNTER — PATIENT MESSAGE (OUTPATIENT)
Dept: INTERNAL MEDICINE | Facility: CLINIC | Age: 63
End: 2018-11-02

## 2018-11-02 RX ORDER — ACETAMINOPHEN 500 MG
500 TABLET ORAL EVERY 6 HOURS PRN
Refills: 0 | COMMUNITY
Start: 2018-11-02 | End: 2019-04-23

## 2018-11-02 RX ORDER — AZITHROMYCIN 250 MG/1
TABLET, FILM COATED ORAL
Qty: 6 TABLET | Refills: 0 | Status: SHIPPED | OUTPATIENT
Start: 2018-11-02 | End: 2018-11-07

## 2018-11-02 NOTE — ASSESSMENT & PLAN NOTE
Discussed supportive home care including rest, hydration, hot fluids with honey and tylenol for sore throat and body aches. Handout given. Pt verbalizes understanding.   ROM intact/strength intact/normal shape

## 2018-11-13 DIAGNOSIS — F41.8 DEPRESSION WITH ANXIETY: ICD-10-CM

## 2018-11-13 DIAGNOSIS — K58.9 IRRITABLE BOWEL SYNDROME WITHOUT DIARRHEA: ICD-10-CM

## 2018-11-13 RX ORDER — DICYCLOMINE HYDROCHLORIDE 10 MG/1
CAPSULE ORAL
Qty: 90 CAPSULE | Refills: 1 | Status: CANCELLED | OUTPATIENT
Start: 2018-11-13

## 2018-11-13 RX ORDER — SIMVASTATIN 5 MG/1
TABLET, FILM COATED ORAL
Qty: 30 TABLET | Refills: 2 | Status: CANCELLED | OUTPATIENT
Start: 2018-11-13

## 2018-11-15 ENCOUNTER — PATIENT MESSAGE (OUTPATIENT)
Dept: INTERNAL MEDICINE | Facility: CLINIC | Age: 63
End: 2018-11-15

## 2018-11-15 DIAGNOSIS — F41.8 DEPRESSION WITH ANXIETY: ICD-10-CM

## 2018-11-15 DIAGNOSIS — K58.9 IRRITABLE BOWEL SYNDROME WITHOUT DIARRHEA: ICD-10-CM

## 2018-11-15 RX ORDER — DICYCLOMINE HYDROCHLORIDE 10 MG/1
10 CAPSULE ORAL 3 TIMES DAILY
Qty: 90 CAPSULE | Refills: 1 | Status: SHIPPED | OUTPATIENT
Start: 2018-11-15 | End: 2019-03-01 | Stop reason: SDUPTHER

## 2018-11-15 RX ORDER — SIMVASTATIN 5 MG/1
5 TABLET, FILM COATED ORAL NIGHTLY
Qty: 90 TABLET | Refills: 3 | Status: SHIPPED | OUTPATIENT
Start: 2018-11-15 | End: 2020-02-27 | Stop reason: SDUPTHER

## 2018-11-15 RX ORDER — BUPROPION HYDROCHLORIDE 300 MG/1
TABLET ORAL
Qty: 30 TABLET | Refills: 1 | Status: SHIPPED | OUTPATIENT
Start: 2018-11-15 | End: 2019-04-23 | Stop reason: SDUPTHER

## 2018-11-15 RX ORDER — BUPROPION HYDROCHLORIDE 300 MG/1
300 TABLET ORAL DAILY
Qty: 30 TABLET | Refills: 1 | Status: SHIPPED | OUTPATIENT
Start: 2018-11-15 | End: 2018-11-15

## 2018-11-15 NOTE — TELEPHONE ENCOUNTER
called patient pharmacy to verified pharmacy has received prescription. Spoke with Anastasia, who stated she just received prescription

## 2018-11-15 NOTE — TELEPHONE ENCOUNTER
----- Message from Sharyn Salazarite sent at 11/15/2018  9:36 AM CST -----  Contact: Anastasia with [facility]  Anastasia called and stated she was checking the status of a refill request. She can be reached at [phone].    Thanks,  Tf

## 2018-12-06 RX ORDER — PREDNISONE 20 MG/1
TABLET ORAL
Qty: 5 TABLET | Refills: 0 | Status: SHIPPED | OUTPATIENT
Start: 2018-12-06 | End: 2019-07-31

## 2018-12-06 RX ORDER — DIAZEPAM 2 MG/1
2 TABLET ORAL EVERY 6 HOURS PRN
Qty: 30 TABLET | Refills: 0 | Status: SHIPPED | OUTPATIENT
Start: 2018-12-06 | End: 2019-05-01 | Stop reason: SDUPTHER

## 2018-12-06 NOTE — TELEPHONE ENCOUNTER
Valium and Prednisone refilled as requested.  Emailed patient to say appointment needed before any additional refills.

## 2018-12-19 ENCOUNTER — PATIENT MESSAGE (OUTPATIENT)
Dept: INTERNAL MEDICINE | Facility: CLINIC | Age: 63
End: 2018-12-19

## 2018-12-19 RX ORDER — BUMETANIDE 2 MG/1
2 TABLET ORAL DAILY
Qty: 30 TABLET | Refills: 1 | Status: SHIPPED | OUTPATIENT
Start: 2018-12-19 | End: 2019-03-01 | Stop reason: SDUPTHER

## 2018-12-20 ENCOUNTER — PATIENT MESSAGE (OUTPATIENT)
Dept: INTERNAL MEDICINE | Facility: CLINIC | Age: 63
End: 2018-12-20

## 2019-01-09 ENCOUNTER — TELEPHONE (OUTPATIENT)
Dept: INTERNAL MEDICINE | Facility: CLINIC | Age: 64
End: 2019-01-09

## 2019-01-09 NOTE — TELEPHONE ENCOUNTER
Tried to contact patient left a message to call office at [phone], I have the number for smoking cessation [phone]

## 2019-02-19 ENCOUNTER — CLINICAL SUPPORT (OUTPATIENT)
Dept: SMOKING CESSATION | Facility: CLINIC | Age: 64
End: 2019-02-19
Payer: COMMERCIAL

## 2019-02-19 DIAGNOSIS — F17.200 NICOTINE DEPENDENCE: Primary | ICD-10-CM

## 2019-02-19 PROCEDURE — 99999 PR PBB SHADOW E&M-EST. PATIENT-LVL III: CPT | Mod: PBBFAC,,,

## 2019-02-19 PROCEDURE — 99999 PR PBB SHADOW E&M-EST. PATIENT-LVL III: ICD-10-PCS | Mod: PBBFAC,,,

## 2019-02-19 PROCEDURE — 99404 PREV MED CNSL INDIV APPRX 60: CPT | Mod: S$GLB,,,

## 2019-02-19 PROCEDURE — 99404 PR PREVENT COUNSEL,INDIV,60 MIN: ICD-10-PCS | Mod: S$GLB,,,

## 2019-02-19 NOTE — Clinical Note
Patient currently smoking cigarettes 10 cigarettes per day. FTND score of 2 indicates a low level of nicotine dependence. NEFTALI- D score of 8 is perceived as no mental distress / possible depression at this time.  Patient will be participating in bi weekly tobacco cessation meetings and will begin the prescribed tobacco cessation medication regimen of Chantix. Patient currently on Wellbutrin 300 mg QD ordered by another physician. Exhaled carbon monoxide level of 14 ppm per [employer]oker[employer] ( 0-6 nonsmoker).

## 2019-02-20 VITALS
DIASTOLIC BLOOD PRESSURE: 70 MMHG | RESPIRATION RATE: 16 BRPM | OXYGEN SATURATION: 97 % | SYSTOLIC BLOOD PRESSURE: 130 MMHG | WEIGHT: 125 LBS | HEIGHT: 62 IN | BODY MASS INDEX: 23 KG/M2 | HEART RATE: 90 BPM

## 2019-02-20 RX ORDER — VARENICLINE TARTRATE 0.5 (11)-1
KIT ORAL
Qty: 53 TABLET | Refills: 0 | Status: SHIPPED | OUTPATIENT
Start: 2019-02-20 | End: 2019-09-04

## 2019-03-01 DIAGNOSIS — K58.9 IRRITABLE BOWEL SYNDROME WITHOUT DIARRHEA: ICD-10-CM

## 2019-03-01 RX ORDER — BUMETANIDE 2 MG/1
TABLET ORAL
Qty: 30 TABLET | Refills: 1 | Status: SHIPPED | OUTPATIENT
Start: 2019-03-01 | End: 2019-05-23 | Stop reason: SDUPTHER

## 2019-03-01 RX ORDER — DICYCLOMINE HYDROCHLORIDE 10 MG/1
CAPSULE ORAL
Qty: 90 CAPSULE | Refills: 1 | Status: SHIPPED | OUTPATIENT
Start: 2019-03-01 | End: 2019-05-23 | Stop reason: SDUPTHER

## 2019-03-06 ENCOUNTER — CLINICAL SUPPORT (OUTPATIENT)
Dept: SMOKING CESSATION | Facility: CLINIC | Age: 64
End: 2019-03-06
Payer: COMMERCIAL

## 2019-03-06 DIAGNOSIS — F17.200 NICOTINE DEPENDENCE: Primary | ICD-10-CM

## 2019-03-06 PROCEDURE — 99407 PR TOBACCO USE CESSATION INTENSIVE >10 MINUTES: ICD-10-PCS | Mod: S$GLB,,,

## 2019-03-06 PROCEDURE — 99407 BEHAV CHNG SMOKING > 10 MIN: CPT | Mod: S$GLB,,,

## 2019-04-08 DIAGNOSIS — F41.8 DEPRESSION WITH ANXIETY: ICD-10-CM

## 2019-04-08 RX ORDER — BUPROPION HYDROCHLORIDE 300 MG/1
TABLET ORAL
Qty: 30 TABLET | Refills: 1 | OUTPATIENT
Start: 2019-04-08

## 2019-04-11 DIAGNOSIS — F41.8 DEPRESSION WITH ANXIETY: ICD-10-CM

## 2019-04-11 RX ORDER — BUPROPION HYDROCHLORIDE 300 MG/1
300 TABLET ORAL DAILY
Qty: 30 TABLET | Refills: 1 | Status: CANCELLED | OUTPATIENT
Start: 2019-04-11

## 2019-04-23 ENCOUNTER — OFFICE VISIT (OUTPATIENT)
Dept: INTERNAL MEDICINE | Facility: CLINIC | Age: 64
End: 2019-04-23
Payer: COMMERCIAL

## 2019-04-23 ENCOUNTER — LAB VISIT (OUTPATIENT)
Dept: LAB | Facility: HOSPITAL | Age: 64
End: 2019-04-23
Attending: FAMILY MEDICINE
Payer: COMMERCIAL

## 2019-04-23 VITALS
HEIGHT: 62 IN | WEIGHT: 134.06 LBS | RESPIRATION RATE: 19 BRPM | TEMPERATURE: 98 F | OXYGEN SATURATION: 100 % | BODY MASS INDEX: 24.67 KG/M2 | HEART RATE: 86 BPM | DIASTOLIC BLOOD PRESSURE: 70 MMHG | SYSTOLIC BLOOD PRESSURE: 137 MMHG

## 2019-04-23 DIAGNOSIS — F43.9 STRESS: ICD-10-CM

## 2019-04-23 DIAGNOSIS — N18.30 CKD (CHRONIC KIDNEY DISEASE) STAGE 3, GFR 30-59 ML/MIN: Primary | ICD-10-CM

## 2019-04-23 DIAGNOSIS — Z12.31 ENCOUNTER FOR SCREENING MAMMOGRAM FOR BREAST CANCER: ICD-10-CM

## 2019-04-23 DIAGNOSIS — F33.1 MODERATE EPISODE OF RECURRENT MAJOR DEPRESSIVE DISORDER: ICD-10-CM

## 2019-04-23 DIAGNOSIS — F41.8 DEPRESSION WITH ANXIETY: ICD-10-CM

## 2019-04-23 DIAGNOSIS — N18.30 CKD (CHRONIC KIDNEY DISEASE) STAGE 3, GFR 30-59 ML/MIN: ICD-10-CM

## 2019-04-23 DIAGNOSIS — Z12.11 COLON CANCER SCREENING: ICD-10-CM

## 2019-04-23 DIAGNOSIS — H81.01 MENIERE'S DISEASE OF RIGHT EAR: ICD-10-CM

## 2019-04-23 LAB
ALBUMIN SERPL BCP-MCNC: 4 G/DL (ref 3.5–5.2)
ANION GAP SERPL CALC-SCNC: 11 MMOL/L (ref 8–16)
BASOPHILS # BLD AUTO: 0.09 K/UL (ref 0–0.2)
BASOPHILS NFR BLD: 0.9 % (ref 0–1.9)
BILIRUB UR QL STRIP: NEGATIVE
BUN SERPL-MCNC: 24 MG/DL (ref 8–23)
CALCIUM SERPL-MCNC: 9.8 MG/DL (ref 8.7–10.5)
CHLORIDE SERPL-SCNC: 108 MMOL/L (ref 95–110)
CLARITY UR REFRACT.AUTO: CLEAR
CO2 SERPL-SCNC: 22 MMOL/L (ref 23–29)
COLOR UR AUTO: YELLOW
CREAT SERPL-MCNC: 1.1 MG/DL (ref 0.5–1.4)
DIFFERENTIAL METHOD: ABNORMAL
EOSINOPHIL # BLD AUTO: 0.2 K/UL (ref 0–0.5)
EOSINOPHIL NFR BLD: 1.7 % (ref 0–8)
ERYTHROCYTE [DISTWIDTH] IN BLOOD BY AUTOMATED COUNT: 13.2 % (ref 11.5–14.5)
EST. GFR  (AFRICAN AMERICAN): >60 ML/MIN/1.73 M^2
EST. GFR  (NON AFRICAN AMERICAN): 54.3 ML/MIN/1.73 M^2
GLUCOSE SERPL-MCNC: 86 MG/DL (ref 70–110)
GLUCOSE UR QL STRIP: NEGATIVE
HCT VFR BLD AUTO: 40.2 % (ref 37–48.5)
HGB BLD-MCNC: 13.1 G/DL (ref 12–16)
HGB UR QL STRIP: NEGATIVE
KETONES UR QL STRIP: NEGATIVE
LEUKOCYTE ESTERASE UR QL STRIP: NEGATIVE
LYMPHOCYTES # BLD AUTO: 3.6 K/UL (ref 1–4.8)
LYMPHOCYTES NFR BLD: 34.7 % (ref 18–48)
MCH RBC QN AUTO: 32 PG (ref 27–31)
MCHC RBC AUTO-ENTMCNC: 32.6 G/DL (ref 32–36)
MCV RBC AUTO: 98 FL (ref 82–98)
MONOCYTES # BLD AUTO: 0.6 K/UL (ref 0.3–1)
MONOCYTES NFR BLD: 5.7 % (ref 4–15)
NEUTROPHILS # BLD AUTO: 5.8 K/UL (ref 1.8–7.7)
NEUTROPHILS NFR BLD: 56.6 % (ref 38–73)
NITRITE UR QL STRIP: NEGATIVE
PH UR STRIP: 6 [PH] (ref 5–8)
PHOSPHATE SERPL-MCNC: 2.4 MG/DL (ref 2.7–4.5)
PLATELET # BLD AUTO: 362 K/UL (ref 150–350)
PMV BLD AUTO: 9.7 FL (ref 9.2–12.9)
POTASSIUM SERPL-SCNC: 3.6 MMOL/L (ref 3.5–5.1)
PROT UR QL STRIP: NEGATIVE
RBC # BLD AUTO: 4.09 M/UL (ref 4–5.4)
SODIUM SERPL-SCNC: 141 MMOL/L (ref 136–145)
SP GR UR STRIP: <=1.005 (ref 1–1.03)
TSH SERPL DL<=0.005 MIU/L-ACNC: 0.97 UIU/ML (ref 0.4–4)
URN SPEC COLLECT METH UR: ABNORMAL
UROBILINOGEN UR STRIP-ACNC: NEGATIVE EU/DL
WBC # BLD AUTO: 10.32 K/UL (ref 3.9–12.7)

## 2019-04-23 PROCEDURE — 99214 OFFICE O/P EST MOD 30 MIN: CPT | Mod: 25,S$GLB,, | Performed by: FAMILY MEDICINE

## 2019-04-23 PROCEDURE — 3008F BODY MASS INDEX DOCD: CPT | Mod: CPTII,S$GLB,, | Performed by: FAMILY MEDICINE

## 2019-04-23 PROCEDURE — 81003 URINALYSIS AUTO W/O SCOPE: CPT | Mod: PO

## 2019-04-23 PROCEDURE — 90715 TDAP VACCINE 7 YRS/> IM: CPT | Mod: S$GLB,,, | Performed by: FAMILY MEDICINE

## 2019-04-23 PROCEDURE — 3008F PR BODY MASS INDEX (BMI) DOCUMENTED: ICD-10-PCS | Mod: CPTII,S$GLB,, | Performed by: FAMILY MEDICINE

## 2019-04-23 PROCEDURE — 83970 ASSAY OF PARATHORMONE: CPT

## 2019-04-23 PROCEDURE — 90471 TDAP VACCINE GREATER THAN OR EQUAL TO 7YO IM: ICD-10-PCS | Mod: S$GLB,,, | Performed by: FAMILY MEDICINE

## 2019-04-23 PROCEDURE — 90715 TDAP VACCINE GREATER THAN OR EQUAL TO 7YO IM: ICD-10-PCS | Mod: S$GLB,,, | Performed by: FAMILY MEDICINE

## 2019-04-23 PROCEDURE — 84443 ASSAY THYROID STIM HORMONE: CPT | Mod: PO

## 2019-04-23 PROCEDURE — 36415 COLL VENOUS BLD VENIPUNCTURE: CPT | Mod: PO

## 2019-04-23 PROCEDURE — 85025 COMPLETE CBC W/AUTO DIFF WBC: CPT | Mod: PO

## 2019-04-23 PROCEDURE — 82043 UR ALBUMIN QUANTITATIVE: CPT

## 2019-04-23 PROCEDURE — 90471 IMMUNIZATION ADMIN: CPT | Mod: S$GLB,,, | Performed by: FAMILY MEDICINE

## 2019-04-23 PROCEDURE — 99999 PR PBB SHADOW E&M-EST. PATIENT-LVL IV: ICD-10-PCS | Mod: PBBFAC,,, | Performed by: FAMILY MEDICINE

## 2019-04-23 PROCEDURE — 99214 PR OFFICE/OUTPT VISIT, EST, LEVL IV, 30-39 MIN: ICD-10-PCS | Mod: 25,S$GLB,, | Performed by: FAMILY MEDICINE

## 2019-04-23 PROCEDURE — 80069 RENAL FUNCTION PANEL: CPT | Mod: PO

## 2019-04-23 PROCEDURE — 99999 PR PBB SHADOW E&M-EST. PATIENT-LVL IV: CPT | Mod: PBBFAC,,, | Performed by: FAMILY MEDICINE

## 2019-04-23 RX ORDER — BUPROPION HYDROCHLORIDE 300 MG/1
300 TABLET ORAL DAILY
Qty: 30 TABLET | Refills: 11 | Status: SHIPPED | OUTPATIENT
Start: 2019-04-23 | End: 2020-01-24 | Stop reason: DRUGHIGH

## 2019-04-23 RX ORDER — FLUCONAZOLE 150 MG/1
TABLET ORAL
COMMUNITY
Start: 2019-04-10 | End: 2019-07-31

## 2019-04-23 RX ORDER — DOXYCYCLINE 100 MG/1
CAPSULE ORAL
COMMUNITY
Start: 2019-04-10 | End: 2019-09-04

## 2019-04-23 RX ORDER — BENZONATATE 200 MG/1
CAPSULE ORAL
COMMUNITY
Start: 2019-04-02 | End: 2019-07-31 | Stop reason: SDUPTHER

## 2019-04-23 RX ORDER — AMOXICILLIN 875 MG/1
TABLET, FILM COATED ORAL
COMMUNITY
Start: 2019-03-27 | End: 2019-04-23 | Stop reason: ALTCHOICE

## 2019-04-23 NOTE — PROGRESS NOTES
Subjective:       Patient ID: Kenia Alonzo is a 63 y.o. female.    Chief Complaint: Advice Only (medical leave, asked for blood work)    HPI  Came in today to follow-up on recent meniere flare ups.  She has been to different ENT physicians over the last few months outside of the [facility] system.  Currently she is doing stable but these episodes caused her to miss a lot of work.    Requesting short term leave from work. For meniere's. But, she is not having any symptoms at this time.    Her son has been having some medical issues.    Family History   Problem Relation Age of Onset    Colon cancer Father     Diabetes Father     Diabetes Sister        Current Outpatient Medications:     azelastine (ASTELIN) 137 mcg (0.1 %) nasal spray, 1 spray (137 mcg total) by Nasal route 2 (two) times daily., Disp: 30 mL, Rfl: 3    bumetanide (BUMEX) 2 MG tablet, TAKE ONE TABLET BY MOUTH ONCE DAILY, Disp: 30 tablet, Rfl: 1    buPROPion (WELLBUTRIN XL) 300 MG 24 hr tablet, Take 1 tablet (300 mg total) by mouth once daily., Disp: 30 tablet, Rfl: 11    dicyclomine (BENTYL) 10 MG capsule, TAKE 1 CAPSULE BY MOUTH THREE TIMES DAILY, Disp: 90 capsule, Rfl: 1    doxycycline (MONODOX) 100 MG capsule, , Disp: , Rfl:     fluconazole (DIFLUCAN) 150 MG Tab, , Disp: , Rfl:     fluticasone (FLONASE) 50 mcg/actuation nasal spray, 2 sprays (100 mcg total) by Each Nare route once daily., Disp: 16 g, Rfl: 3    levocetirizine (XYZAL) 5 MG tablet, Take 1 tablet (5 mg total) by mouth every evening., Disp: 90 tablet, Rfl: 3    lidocaine-prilocaine (EMLA) cream, , Disp: , Rfl:     MELATONIN ORAL, Take by mouth., Disp: , Rfl:     ondansetron (ZOFRAN-ODT) 4 MG TbDL, Take 1 tablet (4 mg total) by mouth every 8 (eight) hours as needed., Disp: 21 tablet, Rfl: 1    simvastatin (ZOCOR) 5 MG tablet, Take 1 tablet (5 mg total) by mouth nightly., Disp: 90 tablet, Rfl: 3    varenicline (CHANTIX STARTING MONTH BOX) 0.5 mg (11)- 1 mg (42) tablet, Take one  "0.5mg tab by mouth once daily for 3 days,then increase to one 0.5mg tab twice daily for 4 days,then increase to one 1mg tab twice daily, Disp: 53 tablet, Rfl: 0    benzonatate (TESSALON) 200 MG capsule, , Disp: , Rfl:     diazePAM (VALIUM) 2 MG tablet, Take 1 tablet (2 mg total) by mouth every 6 (six) hours as needed (dizziness/Meniere's attack)., Disp: 30 tablet, Rfl: 0    pantoprazole (PROTONIX) 40 MG tablet, TAKE ONE TABLET BY MOUTH EVERY DAY, Disp: 30 tablet, Rfl: 5    predniSONE (DELTASONE) 20 MG tablet, Take one tablet by mouth daily for 3 days and then one-half tablet by mouth for 3 days, Disp: 5 tablet, Rfl: 0    Review of Systems   Constitutional: Negative for chills and fever.   HENT: Positive for tinnitus.    Respiratory: Negative for cough and shortness of breath.    Cardiovascular: Negative for chest pain.   Gastrointestinal: Negative for abdominal pain.   Skin: Negative for rash.   Neurological: Positive for dizziness and headaches.       Objective:   /70 (BP Location: Left arm, Patient Position: Sitting, BP Method: Medium (Automatic))   Pulse 86   Temp 97.9 °F (36.6 °C) (Tympanic)   Resp 19   Ht 5' 2" (1.575 m)   Wt 60.8 kg (134 lb 0.6 oz)   SpO2 100%   BMI 24.52 kg/m²      Physical Exam   Constitutional: She is oriented to person, place, and time. She appears well-developed and well-nourished.   HENT:   Head: Normocephalic and atraumatic.   Eyes: Conjunctivae are normal.   Cardiovascular: Normal rate.   Pulmonary/Chest: Effort normal. No respiratory distress.   Musculoskeletal: She exhibits no edema.   Neurological: She is alert and oriented to person, place, and time. Coordination normal.   Skin: Skin is warm and dry. No rash noted.   Psychiatric: She has a normal mood and affect. Her behavior is normal.   Vitals reviewed.      Assessment & Plan     Problem List Items Addressed This Visit        Psychiatric    Depression    Current Assessment & Plan       Sent refill for Wellbutrin. "  She recently ran out of the medication and has been struggling a good bit with her symptoms of depression and anxiety.         Relevant Medications    buPROPion (WELLBUTRIN XL) 300 MG 24 hr tablet    Other Relevant Orders    CBC auto differential (Completed)    TSH (Completed)       ENT    Meniere's disease of right ear    Current Assessment & Plan       Been significant amount of time totalling about 25 min discussing with the patient and completing paperwork for FMLA.  Currently her symptoms are stable.  Encouraged her to keep follow-up with ENT.            Renal/    CKD (chronic kidney disease) stage 3, GFR 30-59 ml/min - Primary    Relevant Orders    PTH, intact (Completed)    Renal function panel (Completed)      Other Visit Diagnoses     Encounter for screening mammogram for breast cancer        Colon cancer screening        Relevant Orders    Fecal Immunochemical Test (iFOBT)    Stress        Relevant Orders    Ambulatory Referral to Psychology    Depression with anxiety        Relevant Medications    buPROPion (WELLBUTRIN XL) 300 MG 24 hr tablet            No follow-ups on file.    Disclaimer:  This note may have been prepared using voice recognition software, it may have not been extensively proofed, as such there could be errors within the text such as sound alike errors.

## 2019-04-24 ENCOUNTER — PATIENT MESSAGE (OUTPATIENT)
Dept: INTERNAL MEDICINE | Facility: CLINIC | Age: 64
End: 2019-04-24

## 2019-04-24 LAB
ALBUMIN/CREAT UR: 183.3 UG/MG (ref 0–30)
CREAT UR-MCNC: 12 MG/DL (ref 15–325)
MICROALBUMIN UR DL<=1MG/L-MCNC: 22 UG/ML
PTH-INTACT SERPL-MCNC: 84 PG/ML (ref 9–77)

## 2019-04-25 ENCOUNTER — HOSPITAL ENCOUNTER (OUTPATIENT)
Dept: RADIOLOGY | Facility: HOSPITAL | Age: 64
Discharge: HOME | End: 2019-04-25
Attending: FAMILY MEDICINE
Payer: COMMERCIAL

## 2019-04-25 ENCOUNTER — PATIENT MESSAGE (OUTPATIENT)
Dept: INTERNAL MEDICINE | Facility: CLINIC | Age: 64
End: 2019-04-25

## 2019-04-25 VITALS — BODY MASS INDEX: 24.66 KG/M2 | HEIGHT: 62 IN | WEIGHT: 134 LBS

## 2019-04-25 DIAGNOSIS — Z12.31 ENCOUNTER FOR SCREENING MAMMOGRAM FOR BREAST CANCER: ICD-10-CM

## 2019-04-25 PROCEDURE — 77063 BREAST TOMOSYNTHESIS BI: CPT | Mod: 26,,, | Performed by: RADIOLOGY

## 2019-04-25 PROCEDURE — 77067 MAMMO DIGITAL SCREENING BILAT WITH TOMOSYNTHESIS_CAD: ICD-10-PCS | Mod: 26,,, | Performed by: RADIOLOGY

## 2019-04-25 PROCEDURE — 77067 SCR MAMMO BI INCL CAD: CPT | Mod: 26,,, | Performed by: RADIOLOGY

## 2019-04-25 PROCEDURE — 77067 SCR MAMMO BI INCL CAD: CPT | Mod: TC,PO

## 2019-04-25 PROCEDURE — 77063 MAMMO DIGITAL SCREENING BILAT WITH TOMOSYNTHESIS_CAD: ICD-10-PCS | Mod: 26,,, | Performed by: RADIOLOGY

## 2019-04-26 NOTE — ASSESSMENT & PLAN NOTE
Been significant amount of time totalling about 25 min discussing with the patient and completing paperwork for FMLA.  Currently her symptoms are stable.  Encouraged her to keep follow-up with ENT.

## 2019-04-26 NOTE — ASSESSMENT & PLAN NOTE
Sent refill for Wellbutrin.  She recently ran out of the medication and has been struggling a good bit with her symptoms of depression and anxiety.

## 2019-04-29 ENCOUNTER — PATIENT OUTREACH (OUTPATIENT)
Dept: ADMINISTRATIVE | Facility: HOSPITAL | Age: 64
End: 2019-04-29

## 2019-04-29 ENCOUNTER — PATIENT MESSAGE (OUTPATIENT)
Dept: ADMINISTRATIVE | Facility: HOSPITAL | Age: 64
End: 2019-04-29

## 2019-04-30 ENCOUNTER — APPOINTMENT (OUTPATIENT)
Dept: LAB | Facility: HOSPITAL | Age: 64
End: 2019-04-30
Attending: FAMILY MEDICINE
Payer: COMMERCIAL

## 2019-05-01 ENCOUNTER — INITIAL CONSULT (OUTPATIENT)
Dept: PSYCHIATRY | Facility: CLINIC | Age: 64
End: 2019-05-01
Payer: COMMERCIAL

## 2019-05-01 ENCOUNTER — OFFICE VISIT (OUTPATIENT)
Dept: OTOLARYNGOLOGY | Facility: CLINIC | Age: 64
End: 2019-05-01
Payer: COMMERCIAL

## 2019-05-01 VITALS
SYSTOLIC BLOOD PRESSURE: 125 MMHG | HEART RATE: 96 BPM | BODY MASS INDEX: 24.44 KG/M2 | TEMPERATURE: 99 F | WEIGHT: 133.63 LBS | DIASTOLIC BLOOD PRESSURE: 66 MMHG

## 2019-05-01 DIAGNOSIS — G43.809 OTHER MIGRAINE WITHOUT STATUS MIGRAINOSUS, NOT INTRACTABLE: ICD-10-CM

## 2019-05-01 DIAGNOSIS — F33.1 MODERATE EPISODE OF RECURRENT MAJOR DEPRESSIVE DISORDER: ICD-10-CM

## 2019-05-01 DIAGNOSIS — H81.01 MENIERE'S DISEASE OF RIGHT EAR: Primary | ICD-10-CM

## 2019-05-01 DIAGNOSIS — F41.1 GENERALIZED ANXIETY DISORDER: Primary | ICD-10-CM

## 2019-05-01 PROCEDURE — 99999 PR PBB SHADOW E&M-EST. PATIENT-LVL V: CPT | Mod: PBBFAC,,, | Performed by: ORTHOPAEDIC SURGERY

## 2019-05-01 PROCEDURE — 90791 PR PSYCHIATRIC DIAGNOSTIC EVALUATION: ICD-10-PCS | Mod: S$GLB,,, | Performed by: SOCIAL WORKER

## 2019-05-01 PROCEDURE — 90791 PSYCH DIAGNOSTIC EVALUATION: CPT | Mod: S$GLB,,, | Performed by: SOCIAL WORKER

## 2019-05-01 PROCEDURE — 99999 PR PBB SHADOW E&M-EST. PATIENT-LVL V: ICD-10-PCS | Mod: PBBFAC,,, | Performed by: ORTHOPAEDIC SURGERY

## 2019-05-01 PROCEDURE — 99214 OFFICE O/P EST MOD 30 MIN: CPT | Mod: S$PBB,,, | Performed by: ORTHOPAEDIC SURGERY

## 2019-05-01 PROCEDURE — 99214 PR OFFICE/OUTPT VISIT, EST, LEVL IV, 30-39 MIN: ICD-10-PCS | Mod: S$PBB,,, | Performed by: ORTHOPAEDIC SURGERY

## 2019-05-01 RX ORDER — DIAZEPAM 2 MG/1
2 TABLET ORAL EVERY 6 HOURS PRN
Qty: 20 TABLET | Refills: 0 | Status: SHIPPED | OUTPATIENT
Start: 2019-05-01 | End: 2019-05-08 | Stop reason: SDUPTHER
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
## 2019-05-01 NOTE — PROGRESS NOTES
"Subjective:       Patient ID: Kenia Alonzo is a 63 y.o. female.    Chief Complaint: Dizziness and Tinnitus    Patient is a very pleasant 63 yo female who presents to clinic for a consult for right hearing loss. She has a hx of Ménière's Disease and a vestibular nerve section around [id number] per Dr. Raul Sebastian, in [place], [place]. The last time she was seen by ENT was in 2016 in [place], [place]. She reports significant relief and decrease in episodes after surgery. She now only experiences episodes of dizziness/ nausea/ HA's once every 2-3 months, she believes episodes are brought on by a flare-up in allergy symptoms. Her allergy symptoms include sneezing and nasal congestion.   She has recently had increased symptoms and was seen by Dr. Trejo 3/2019.  At that time, she had tinnitus that she describes as "crackling" in her right ear.  She had an audiogram done, and was told that she had an AOM.  She was given a steroid injection as well as oral antibiotics, Amoxil.  She did not have any improvement, and was then given cefdinir.  Currently, she has a headache and a generalized foggy sensation.  She is no longer having as much "crackling" in the right ear.  She has not noted any recent change in her hearing in the right ear.  She continues to have dizziness.  She has had recent increased life stress with regards to her son.  She has a history of migraines, was on Topamax in the past and she did find it to be helpful, but was worried it caused memory loss at higher doses.  She has had increased dizziness, and feels as if she is going to fall over if she bends down and is generally off balance when she is walking.  She denies spinning sensations, and has no issue with rolling over in bed.    Review of Systems   Constitutional: Positive for fever. Negative for chills, fatigue and unexpected weight change.   HENT: Positive for hearing loss and tinnitus. Negative for congestion, dental problem, ear discharge, ear pain, facial " swelling, nosebleeds, postnasal drip, rhinorrhea, sinus pressure, sneezing, sore throat, trouble swallowing and voice change.    Eyes: Negative for redness, itching and visual disturbance.   Respiratory: Negative for cough, choking, shortness of breath and wheezing.    Cardiovascular: Negative for chest pain and palpitations.   Gastrointestinal: Negative for abdominal pain.        No reflux.   Musculoskeletal: Negative for gait problem.   Skin: Negative for rash.   Neurological: Positive for dizziness, light-headedness and headaches.       Objective:      Physical Exam   Constitutional: She is oriented to person, place, and time. She appears well-developed and well-nourished. No distress.   HENT:   Head: Normocephalic and atraumatic.   Right Ear: Ear canal normal. Tympanic membrane is scarred.   Left Ear: Hearing, tympanic membrane and ear canal normal.   Nose: Nose normal.   Eyes: EOM are normal. Right eye exhibits no discharge. Left eye exhibits no discharge.   Neck: Normal range of motion. Neck supple.   Cardiovascular: Normal rate and regular rhythm.   Pulmonary/Chest: Effort normal. No respiratory distress.   Neurological: She is alert and oriented to person, place, and time. No cranial nerve deficit.   Skin: Skin is warm and dry. No rash noted. She is not diaphoretic.   Psychiatric: She has a normal mood and affect. Her behavior is normal. Thought content normal.   Nursing note and vitals reviewed.        While the patient was in clinic, her previous records from Dr. Trejo were requested and received.            Assessment:       1. Meniere's disease of right ear    2. Other migraine without status migrainosus, not intractable        Plan:       1.  Meniere's:  In reviewing her audiogram, it is slightly worse than her last audiogram done here 6 months prior, which does support that she is currently having a flare of her Meniere's disease.  While she does have a type B tympanogram on that side, there is  no associated conductive hearing loss to suggest active fluid in her middle ear.  At this point, I would recommend a 2 week course of Valium, 2 mg nightly.  Review was made of her prescription history, has not been refilled since December of 2018.  I would like to see her back with a repeat audiogram in 6 months, sooner if her symptoms worsen.  If she does not note any improvement, would then consider a VNG and possibly referral to otology as well.     2.  Migraine:  She also has noted increasing headaches recently, and she does have a strong history of migraine in the past.  I would recommend evaluation with Neurology, as she has found benefit with oral Topamax previously.

## 2019-05-03 ENCOUNTER — PATIENT MESSAGE (OUTPATIENT)
Dept: INTERNAL MEDICINE | Facility: CLINIC | Age: 64
End: 2019-05-03

## 2019-05-06 ENCOUNTER — PATIENT MESSAGE (OUTPATIENT)
Dept: OTOLARYNGOLOGY | Facility: CLINIC | Age: 64
End: 2019-05-06

## 2019-05-06 ENCOUNTER — PATIENT MESSAGE (OUTPATIENT)
Dept: INTERNAL MEDICINE | Facility: CLINIC | Age: 64
End: 2019-05-06

## 2019-05-06 NOTE — TELEPHONE ENCOUNTER
This patient has an appointment with Dr. Rios on 6/10/19.  The patient wanted to know if there was anyway to get in with him earlier so I told her I would send y'all a message.  I am going to send her back a message via [facility] letting her know that we are checking with y'all.  Thanks.

## 2019-05-08 ENCOUNTER — PATIENT MESSAGE (OUTPATIENT)
Dept: PSYCHIATRY | Facility: CLINIC | Age: 64
End: 2019-05-08

## 2019-05-08 ENCOUNTER — PATIENT MESSAGE (OUTPATIENT)
Dept: OTOLARYNGOLOGY | Facility: CLINIC | Age: 64
End: 2019-05-08

## 2019-05-08 RX ORDER — DIAZEPAM 2 MG/1
2 TABLET ORAL EVERY 6 HOURS PRN
Qty: 20 TABLET | Refills: 0 | Status: SHIPPED | OUTPATIENT
Start: 2019-05-08 | End: 2019-06-10 | Stop reason: SDUPTHER

## 2019-05-14 ENCOUNTER — OFFICE VISIT (OUTPATIENT)
Dept: PSYCHIATRY | Facility: CLINIC | Age: 64
End: 2019-05-14
Payer: COMMERCIAL

## 2019-05-14 ENCOUNTER — PATIENT MESSAGE (OUTPATIENT)
Dept: PSYCHIATRY | Facility: CLINIC | Age: 64
End: 2019-05-14

## 2019-05-14 DIAGNOSIS — F41.1 GENERALIZED ANXIETY DISORDER: Primary | ICD-10-CM

## 2019-05-14 DIAGNOSIS — F33.1 MODERATE EPISODE OF RECURRENT MAJOR DEPRESSIVE DISORDER: ICD-10-CM

## 2019-05-14 PROCEDURE — 90834 PSYTX W PT 45 MINUTES: CPT | Mod: S$GLB,,, | Performed by: SOCIAL WORKER

## 2019-05-14 PROCEDURE — 90834 PR PSYCHOTHERAPY W/PATIENT, 45 MIN: ICD-10-PCS | Mod: S$GLB,,, | Performed by: SOCIAL WORKER

## 2019-05-14 NOTE — PROGRESS NOTES
"Individual Psychotherapy (PhD/LCSW)    5/14/2019    Site:  [facility]         Therapeutic Intervention: Met with patient.  Outpatient - Insight oriented psychotherapy 45 min - CPT code 01245    Chief complaint/reason for encounter: depression and anxiety     Interval history and content of current session: Pt reports that depression has decreased (4-5/10 over past two weeks); denies SI, plan or intent. She endorses anxiety and fatigue. Her mood appears more stable today. Session focused on establishing rapport and exploring her history more in depth. She has been successful at maintaining boundaries with her son since last visit. Helped pt to identify codependent relationship patterns and draw connections between family of origin dynamics, low self esteem and current struggles. Encouraged her to read "Codependent No More" and "The Language of Letting Go" by Zuelika Rivera.    Treatment plan:  · Target symptoms: depression, anxiety   · Why chosen therapy is appropriate versus another modality: relevant to diagnosis, evidence based practice  · Outcome monitoring methods: self-report, observation  · Therapeutic intervention type: insight oriented psychotherapy    Risk parameters:  Patient reports no suicidal ideation  Patient reports no homicidal ideation  Patient reports no self-injurious behavior  Patient reports no violent behavior    Verbal deficits: None    Patient's response to intervention:  The patient's response to intervention is accepting.    Progress toward goals and other mental status changes:  The patient's progress toward goals is good.    Diagnosis:     ICD-10-CM ICD-9-CM   1. Generalized anxiety disorder F41.1 300.02   2. Moderate episode of recurrent major depressive disorder F33.1 296.32       Plan:  individual psychotherapy and medication management by physician    Return to clinic: 2 weeks    Length of Service (minutes): 45      "

## 2019-05-15 ENCOUNTER — PATIENT MESSAGE (OUTPATIENT)
Dept: PSYCHIATRY | Facility: CLINIC | Age: 64
End: 2019-05-15

## 2019-05-16 ENCOUNTER — PATIENT MESSAGE (OUTPATIENT)
Dept: PSYCHIATRY | Facility: CLINIC | Age: 64
End: 2019-05-16

## 2019-05-23 ENCOUNTER — OFFICE VISIT (OUTPATIENT)
Dept: NEUROLOGY | Facility: CLINIC | Age: 64
End: 2019-05-23
Payer: COMMERCIAL

## 2019-05-23 ENCOUNTER — PATIENT MESSAGE (OUTPATIENT)
Dept: INTERNAL MEDICINE | Facility: CLINIC | Age: 64
End: 2019-05-23

## 2019-05-23 VITALS
BODY MASS INDEX: 24.67 KG/M2 | HEIGHT: 62 IN | HEART RATE: 92 BPM | SYSTOLIC BLOOD PRESSURE: 118 MMHG | WEIGHT: 134.06 LBS | DIASTOLIC BLOOD PRESSURE: 80 MMHG

## 2019-05-23 DIAGNOSIS — K58.9 IRRITABLE BOWEL SYNDROME WITHOUT DIARRHEA: ICD-10-CM

## 2019-05-23 DIAGNOSIS — N18.30 CKD (CHRONIC KIDNEY DISEASE) STAGE 3, GFR 30-59 ML/MIN: ICD-10-CM

## 2019-05-23 DIAGNOSIS — G43.809 VESTIBULAR MIGRAINE: Primary | ICD-10-CM

## 2019-05-23 DIAGNOSIS — H81.01 MENIERE'S DISEASE OF RIGHT EAR: ICD-10-CM

## 2019-05-23 DIAGNOSIS — G43.709 CHRONIC MIGRAINE WITHOUT AURA WITHOUT STATUS MIGRAINOSUS, NOT INTRACTABLE: ICD-10-CM

## 2019-05-23 DIAGNOSIS — R25.9 ABNORMAL INVOLUNTARY MOVEMENTS: ICD-10-CM

## 2019-05-23 DIAGNOSIS — F32.A DEPRESSION, UNSPECIFIED DEPRESSION TYPE: ICD-10-CM

## 2019-05-23 DIAGNOSIS — E78.5 HYPERLIPIDEMIA, UNSPECIFIED HYPERLIPIDEMIA TYPE: ICD-10-CM

## 2019-05-23 DIAGNOSIS — G44.89 CHRONIC MIXED HEADACHE SYNDROME: ICD-10-CM

## 2019-05-23 DIAGNOSIS — Z72.0 TOBACCO USE: ICD-10-CM

## 2019-05-23 DIAGNOSIS — J40 BRONCHITIS: ICD-10-CM

## 2019-05-23 DIAGNOSIS — J30.89 NON-SEASONAL ALLERGIC RHINITIS, UNSPECIFIED TRIGGER: ICD-10-CM

## 2019-05-23 PROCEDURE — 99999 PR PBB SHADOW E&M-EST. PATIENT-LVL IV: ICD-10-PCS | Mod: PBBFAC,,, | Performed by: PSYCHIATRY & NEUROLOGY

## 2019-05-23 PROCEDURE — 99999 PR PBB SHADOW E&M-EST. PATIENT-LVL IV: CPT | Mod: PBBFAC,,, | Performed by: PSYCHIATRY & NEUROLOGY

## 2019-05-23 PROCEDURE — 99244 OFF/OP CNSLTJ NEW/EST MOD 40: CPT | Mod: S$GLB,,, | Performed by: PSYCHIATRY & NEUROLOGY

## 2019-05-23 PROCEDURE — 99244 PR OFFICE CONSULTATION,LEVEL IV: ICD-10-PCS | Mod: S$GLB,,, | Performed by: PSYCHIATRY & NEUROLOGY

## 2019-05-23 RX ORDER — BUMETANIDE 2 MG/1
TABLET ORAL
Qty: 30 TABLET | Refills: 1 | Status: SHIPPED | OUTPATIENT
Start: 2019-05-23 | End: 2019-07-08 | Stop reason: SDUPTHER

## 2019-05-23 RX ORDER — DICYCLOMINE HYDROCHLORIDE 10 MG/1
CAPSULE ORAL
Qty: 90 CAPSULE | Refills: 1 | Status: SHIPPED | OUTPATIENT
Start: 2019-05-23 | End: 2019-07-08 | Stop reason: SDUPTHER

## 2019-05-23 RX ORDER — SUMATRIPTAN SUCCINATE 100 MG/1
100 TABLET ORAL
Qty: 9 TABLET | Refills: 3 | Status: SHIPPED | OUTPATIENT
Start: 2019-05-23 | End: 2019-05-29 | Stop reason: SINTOL

## 2019-05-23 NOTE — PROGRESS NOTES
Subjective:       Patient ID: Kenia Alonzo is a 63 y.o. female.    Chief Complaint: Neurologic Problem    HPI     The patient was referred by Dr. Ruiz for evaluation.    The patient is here for intractable headaches. The patient has had migraine headaches throughout her adult life and improved afte menopause but seem to recur. In the past she failed Elavil, VPA and Inderal. TPM helped significantly but caused memory loss. PRN Imitrex helped but caused palpitation and chest tightness.  The headaches are 2-3 times a week, 08-10/10, throbbing, holocephalic, last for several hours and associated with nausea, light and noise sensitivity. The patient has been under tremendous stress related to her son's health problems. In addition, she has a longstanding history of Ménière's Disease  S/P vestibular nerve section in the late 1990's.  The patient has also noted recurrence of Ménière's Disease's symptoms.        Review of Systems   Constitutional: Negative for appetite change and fatigue.   HENT: Positive for hearing loss. Negative for tinnitus.    Eyes: Negative for photophobia and visual disturbance.   Respiratory: Negative for apnea and shortness of breath.    Cardiovascular: Negative for chest pain and palpitations.   Gastrointestinal: Negative for nausea and vomiting.   Endocrine: Negative for cold intolerance and heat intolerance.   Genitourinary: Negative for difficulty urinating and urgency.   Musculoskeletal: Negative for arthralgias, back pain, gait problem, joint swelling, myalgias, neck pain and neck stiffness.   Skin: Negative for color change and rash.   Allergic/Immunologic: Negative for environmental allergies and immunocompromised state.   Neurological: Positive for dizziness and headaches. Negative for tremors, seizures, syncope, facial asymmetry, speech difficulty, weakness, light-headedness and numbness.   Hematological: Negative for adenopathy. Does not bruise/bleed easily.   Psychiatric/Behavioral:  Negative for agitation, behavioral problems, confusion, decreased concentration, dysphoric mood, hallucinations, self-injury, sleep disturbance and suicidal ideas. The patient is not hyperactive.          Current Outpatient Medications:     bumetanide (BUMEX) 2 MG tablet, TAKE ONE TABLET BY MOUTH ONCE DAILY, Disp: 30 tablet, Rfl: 1    buPROPion (WELLBUTRIN XL) 300 MG 24 hr tablet, Take 1 tablet (300 mg total) by mouth once daily., Disp: 30 tablet, Rfl: 11    diazePAM (VALIUM) 2 MG tablet, Take 1 tablet (2 mg total) by mouth every 6 (six) hours as needed (dizziness/Meniere's attack)., Disp: 20 tablet, Rfl: 0    dicyclomine (BENTYL) 10 MG capsule, TAKE 1 CAPSULE BY MOUTH THREE TIMES DAILY, Disp: 90 capsule, Rfl: 1    doxycycline (MONODOX) 100 MG capsule, , Disp: , Rfl:     fluconazole (DIFLUCAN) 150 MG Tab, , Disp: , Rfl:     fluticasone (FLONASE) 50 mcg/actuation nasal spray, 2 sprays (100 mcg total) by Each Nare route once daily., Disp: 16 g, Rfl: 3    levocetirizine (XYZAL) 5 MG tablet, Take 1 tablet (5 mg total) by mouth every evening., Disp: 90 tablet, Rfl: 3    lidocaine-prilocaine (EMLA) cream, , Disp: , Rfl:     MELATONIN ORAL, Take by mouth., Disp: , Rfl:     ondansetron (ZOFRAN-ODT) 4 MG TbDL, Take 1 tablet (4 mg total) by mouth every 8 (eight) hours as needed., Disp: 21 tablet, Rfl: 1    pantoprazole (PROTONIX) 40 MG tablet, TAKE ONE TABLET BY MOUTH EVERY DAY, Disp: 30 tablet, Rfl: 5    predniSONE (DELTASONE) 20 MG tablet, Take one tablet by mouth daily for 3 days and then one-half tablet by mouth for 3 days, Disp: 5 tablet, Rfl: 0    simvastatin (ZOCOR) 5 MG tablet, Take 1 tablet (5 mg total) by mouth nightly., Disp: 90 tablet, Rfl: 3    varenicline (CHANTIX STARTING MONTH BOX) 0.5 mg (11)- 1 mg (42) tablet, Take one 0.5mg tab by mouth once daily for 3 days,then increase to one 0.5mg tab twice daily for 4 days,then increase to one 1mg tab twice daily, Disp: 53 tablet, Rfl: 0    azelastine  (ASTELIN) 137 mcg (0.1 %) nasal spray, 1 spray (137 mcg total) by Nasal route 2 (two) times daily., Disp: 30 mL, Rfl: 3    benzonatate (TESSALON) 200 MG capsule, , Disp: , Rfl:     erenumab-aooe 140 mg/mL AtIn, Inject 140 mg into the skin every 28 days., Disp: 1 mL, Rfl: 11    sumatriptan (IMITREX) 100 MG tablet, Take 1 tablet (100 mg total) by mouth every 72 hours as needed., Disp: 9 tablet, Rfl: 3  Past Medical History:   Diagnosis Date    Depression     Gout     Headache     Hyperlipidemia     Meniere disease     Vertigo      Past Surgical History:   Procedure Laterality Date    BREAST BIOPSY       SECTION       Social History     Socioeconomic History    Marital status:      Spouse name: Not on file    Number of children: Not on file    Years of education: Not on file    Highest education level: Not on file   Occupational History    Not on file   Social Needs    Financial resource strain: Not on file    Food insecurity:     Worry: Not on file     Inability: Not on file    Transportation needs:     Medical: Not on file     Non-medical: Not on file   Tobacco Use    Smoking status: Current Every Day Smoker     Packs/day: 0.50     Years: 43.00     Pack years: 21.50     Types: Cigarettes     Start date: 1976    Smokeless tobacco: Never Used   Substance and Sexual Activity    Alcohol use: Yes     Alcohol/week: 0.6 - 1.8 oz     Types: 1 - 3 Glasses of wine per week     Comment: social few times monthly    Drug use: No    Sexual activity: Never     Birth control/protection: None, Post-menopausal   Lifestyle    Physical activity:     Days per week: Not on file     Minutes per session: Not on file    Stress: Not on file   Relationships    Social connections:     Talks on phone: Not on file     Gets together: Not on file     Attends [employer] service: Not on file     Active member of club or organization: Not on file     Attends meetings of clubs or organizations: Not on file      Relationship status: Not on file   Other Topics Concern    Not on file   Social History Narrative    Not on file       Objective:     Vital signs reviewed     GENERAL APPEARANCE:     The patient looks comfortable.    No signs of medical or psychiatric distress.    Normal breathing pattern.    No dysmorphic features    Normal eye contact.     GENERAL MEDICAL EXAM:    HEENT:  Head is atraumatic normocephalic. No tender temporal arteries.     Neck and Axillae: No JVD. No carotid bruits. No thyromegaly. No lymphadenopathy.    Cardiopulmonary: No cyanosis. No tachypnea. Normal respiratory effort.  Clear breath sounds. Normal heart sounds with regular rhythm and no murmurs.    Gastrointestinal:  No stomas or lesions. No hernias.  Abdomen is soft non-tender. No masses or organomegaly.    Skin, Hair and Nails: No pathognonomic skin rash. No neurofibromatosis.   No stigmata of autoimmune disease.     Limbs: No varicose veins. No edema. Symmetric pulses.     Muskoskeletal: No deformities.No spine tenderness.   No signs of longstanding neuropathy. No dislocations or fractures.        Neurologic Exam     Mental Status   Oriented to person, place, and time.   Registration: recalls 3 of 3 objects. Recall at 5 minutes: recalls 3 of 3 objects. Follows 3 step commands.   Attention: normal. Concentration: normal.   Speech: speech is normal   Level of consciousness: alert  Knowledge: good and consistent with education. Able to perform simple calculations.   Able to name object. Able to read. Able to repeat. Able to write. Normal comprehension.     Cranial Nerves     CN II   Visual fields full to confrontation.   Visual acuity: normal  Right visual field deficit: none  Left visual field deficit: none     CN III, IV, VI   Pupils are equal, round, and reactive to light.  Extraocular motions are normal.   Right pupil: Size: 2 mm. Shape: regular. Reactivity: brisk. Consensual response: intact. Accommodation: intact.   Left pupil:  Size: 2 mm. Shape: regular. Reactivity: brisk. Consensual response: intact. Accommodation: intact.   CN III: no CN III palsy  CN VI: no CN VI palsy  Nystagmus: none   Diplopia: none  Ophthalmoparesis: none  Upgaze: normal  Downgaze: normal  Conjugate gaze: present  Vestibulo-ocular reflex: present    CN V   Facial sensation intact.   Right facial sensation deficit: none  Left facial sensation deficit: none  Right corneal reflex: normal  Left corneal reflex: normal    CN VII   Right facial weakness: none  Left facial weakness: none  Right taste: normal  Left taste: normal    CN VIII   CN VIII normal.   Hearing: impaired  Right Rinne: AC > BC  Left Rinne: AC > BC  Jenkins: does not lateralize     CN IX, X   CN IX normal.   CN X normal.   Palate: symmetric  Right gag reflex: normal  Left gag reflex: normal    CN XI   CN XI normal.   Right sternocleidomastoid strength: normal  Left sternocleidomastoid strength: normal  Right trapezius strength: normal  Left trapezius strength: normal    CN XII   CN XII normal.   Tongue: not atrophic  Fasciculations: absent  Tongue deviation: none    Motor Exam   Muscle bulk: normal  Overall muscle tone: normal  Right arm tone: normal  Left arm tone: normal  Right arm pronator drift: absent  Left arm pronator drift: absent  Right leg tone: normal  Left leg tone: normal    Strength   Strength 5/5 throughout.   Right neck flexion: 5/5  Left neck flexion: 5/5  Right neck extension: 5/5  Left neck extension: 5/5  Right deltoid: 5/5  Left deltoid: 5/5  Right biceps: 5/5  Left biceps: 5/5  Right triceps: 5/5  Left triceps: 5/5  Right wrist flexion: 5/5  Left wrist flexion: 5/5  Right wrist extension: 5/5  Left wrist extension: 5/5  Right interossei: 5/5  Left interossei: 5/5  Right abdominals: 5/5  Left abdominals: 5/5  Right iliopsoas: 5/5  Left iliopsoas: 5/5  Right quadriceps: 5/5  Left quadriceps: 5/5  Right hamstrin/5  Left hamstrin/5  Right glutei: 5/5  Left glutei: 5/5  Right  anterior tibial: 5/5  Left anterior tibial: 5/5  Right posterior tibial: 5/5  Left posterior tibial: 5/5  Right peroneal: 5/5  Left peroneal: 5/5  Right gastroc: 5/5  Left gastroc: 5/5    Sensory Exam   Light touch normal.   Right arm light touch: normal  Left arm light touch: normal  Right leg light touch: normal  Left leg light touch: normal  Vibration normal.   Right arm vibration: normal  Left arm vibration: normal  Right leg vibration: normal  Left leg vibration: normal  Proprioception normal.   Right arm proprioception: normal  Left arm proprioception: normal  Right leg proprioception: normal  Left leg proprioception: normal  Pinprick normal.   Right arm pinprick: normal  Left arm pinprick: normal  Right leg pinprick: normal  Left leg pinprick: normal  Graphesthesia: normal  Stereognosis: normal    Gait, Coordination, and Reflexes     Gait  Gait: normal    Coordination   Romberg: negative  Finger to nose coordination: normal  Heel to shin coordination: normal  Tandem walking coordination: normal    Tremor   Resting tremor: absent  Intention tremor: absent  Action tremor: absent    Reflexes   Right brachioradialis: 2+  Left brachioradialis: 2+  Right biceps: 2+  Left biceps: 2+  Right triceps: 2+  Left triceps: 2+  Right patellar: 2+  Left patellar: 2+  Right achilles: 2+  Left achilles: 2+  Right : 2+  Left : 2+  Right plantar: normal  Left plantar: normal  Right Olivares: absent  Left Olivares: absent  Right ankle clonus: absent  Left ankle clonus: absent  Right pendular knee jerk: absent  Left pendular knee jerk: absent      Lab Results   Component Value Date    WBC 10.32 04/23/2019    HGB 13.1 04/23/2019    HCT 40.2 04/23/2019    MCV 98 04/23/2019     (H) 04/23/2019     Sodium   Date Value Ref Range Status   04/23/2019 141 136 - 145 mmol/L Final     Potassium   Date Value Ref Range Status   04/23/2019 3.6 3.5 - 5.1 mmol/L Final     Chloride   Date Value Ref Range Status   04/23/2019 108 95 -  110 mmol/L Final     CO2   Date Value Ref Range Status   04/23/2019 22 (L) 23 - 29 mmol/L Final     Glucose   Date Value Ref Range Status   04/23/2019 86 70 - 110 mg/dL Final     BUN, Bld   Date Value Ref Range Status   04/23/2019 24 (H) 8 - 23 mg/dL Final     Creatinine   Date Value Ref Range Status   04/23/2019 1.1 0.5 - 1.4 mg/dL Final     Calcium   Date Value Ref Range Status   04/23/2019 9.8 8.7 - 10.5 mg/dL Final     Total Protein   Date Value Ref Range Status   05/23/2018 7.5 6.0 - 8.4 g/dL Final     Albumin   Date Value Ref Range Status   04/23/2019 4.0 3.5 - 5.2 g/dL Final     Total Bilirubin   Date Value Ref Range Status   05/23/2018 0.4 0.1 - 1.0 mg/dL Final     Comment:     For infants and newborns, interpretation of results should be based  on gestational age, weight and in agreement with clinical  observations.  Premature Infant recommended reference ranges:  Up to 24 hours.............<8.0 mg/dL  Up to 48 hours............<12.0 mg/dL  3-5 days..................<15.0 mg/dL  6-29 days.................<15.0 mg/dL       Alkaline Phosphatase   Date Value Ref Range Status   05/23/2018 67 55 - 135 U/L Final     AST   Date Value Ref Range Status   05/23/2018 15 10 - 40 U/L Final     ALT   Date Value Ref Range Status   05/23/2018 16 10 - 44 U/L Final     Anion Gap   Date Value Ref Range Status   04/23/2019 11 8 - 16 mmol/L Final     eGFR if    Date Value Ref Range Status   04/23/2019 >60.0 >60 mL/min/1.73 m^2 Final     eGFR if non    Date Value Ref Range Status   04/23/2019 54.3 (A) >60 mL/min/1.73 m^2 Final     Comment:     Calculation used to obtain the estimated glomerular filtration  rate (eGFR) is the CKD-EPI equation.        Lab Results   Component Value Date    [id number] 502 09/02/2016           Reviewed the neuroimaging independently       Assessment:       1. Vestibular migraine    2. Depression, unspecified depression type    3. Meniere's disease of right ear    4.  Chronic migraine without aura without status migrainosus, not intractable    5. Abnormal involuntary movements    6. Irritable bowel syndrome without diarrhea    7. Hyperlipidemia, unspecified hyperlipidemia type    8. Tobacco use    9. CKD (chronic kidney disease) stage 3, GFR 30-59 ml/min    10. Non-seasonal allergic rhinitis, unspecified trigger    11. Bronchitis    12. Chronic mixed headache syndrome        Plan:             VESTIBULAR MIGRAINE          Discussed with the patient the very close relationship between Ménière's Disease and Vestibular Migraine in terms of pathophysiology.     Check brain MRI WO    Migraine Diary       Lifestyle changes:    Good sleep hygiene  Avoid triggers like certain foods, TV and computer work   Minimize physical and emotional stress  Smoking avoidance and cessation  Tapering off caffeine   Good hydration   Small frequent meals   Moderate 30-minute-long aerobic exercises 3 times/week         Abortive medications:    Take at the ONSET of the headache sumatriptan 100 mg  PO in combination with naproxen 500 mg PO or Ibuprofen 800 mg PO  for headache without nausea or vomiting.  This regimen can be repeated only once in 24 hours after 2 hours. She can take 1/2 dose to alleviate SEs. Should only be taken 2-3 times/week to avoid rebound and overuse headaches.      Side effects of triptans were discussed and include rare cardiac and cerebral ischemia.  NSAIDs can cause peptic ulcers, renal insufficiency and may increase the risk of cardiovascular diseases.  SEs were discussed with the patient.        Preventative medications:    Since the patients headache is very frequent a lengthy discussion about preventative medications was carried out.    Amitriptyline/Elavil failed    Topiramate/Topamax caused cognitive SEs    Propranolol/Inderal failed    Valproic acid/Depakote failed     Will try Anti-CGRP Agents Erenumab (Aimovig) (Receptor Blocker): 140 mg SQ Pen monthly (Contains Latex).  The patient was made aware that any new medication can cause serious allergic reaction with unknown long term cardiovascular and wound healing effects    If no response after 3 months will proceed with Botox         MEDICAL/SURGICAL COMORBIDITIES     All relevant medical comorbidities noted and managed by primary care physician and medical care team.        I spent 60 minutes face to face with the patient    More than 35  minutes of the time spent in counseling and coordination of care including discussions etiology of diagnosis, pathonogenesis of diagnosis, prognosis of diagnosis,, diagnostic results, impression and recommendations, diagnostic studies, management, risks and benefits of treatment, instructions of disease self management, treatment instructions, follow up requirements, patient and family counseling/involvement in care compliance with treatment regimen. All of the patient's questions were answered during this discussion.      RTC in 3 months                     Vipin Matthews MD, FAAN    Attending Neurologist/Epileptologist         Diplomate, American Board-Psychiatry and Neurology (Neurology)    Diplomate, American Board-Clinical Neurophysiology (Epilpesy-Neuromuscular)     Fellow, American Academy of Neurology

## 2019-05-23 NOTE — PATIENT INSTRUCTIONS
Sumatriptan tablets  What is this medicine?  SUMATRIPTAN (ksenia ma TRIP tan) is used to treat migraines with or without aura. An aura is a strange feeling or visual disturbance that warns you of an attack. It is not used to prevent migraines.  How should I use this medicine?  Take this medicine by mouth with a glass of water. Follow the directions on the prescription label. This medicine is taken at the first symptoms of a migraine. It is not for everyday use. If your migraine headache returns after one dose, you can take another dose as directed. You must leave at least 2 hours between doses, and do not take more than 100 mg as a single dose. Do not take more than 200 mg total in any 24 hour period. If there is no improvement at all after the first dose, do not take a second dose without talking to your doctor or health care professional. Do not take your medicine more often than directed.  Talk to your pediatrician regarding the use of this medicine in children. Special care may be needed.  What side effects may I notice from receiving this medicine?  Side effects that you should report to your doctor or health care professional as soon as possible:  · allergic reactions like skin rash, itching or hives, swelling of the face, lips, or tongue  · bloody or watery diarrhea  · hallucination, loss of contact with reality  · pain, tingling, numbness in the face, hands, or feet  · seizures  · signs and symptoms of a blood clot such as breathing problems; changes in vision; chest pain; severe, sudden headache; pain, swelling, warmth in the leg; trouble speaking; sudden numbness or weakness of the face, arm, or leg  · signs and symptoms of a dangerous change in heartbeat or heart rhythm like chest pain; dizziness; fast or irregular heartbeat; palpitations, feeling faint or lightheaded; falls; breathing problems  · signs and symptoms of a stroke like changes in vision; confusion; trouble speaking or understanding; severe  headaches; sudden numbness or weakness of the face, arm, or leg; trouble walking; dizziness; loss of balance or coordination  · stomach pain  Side effects that usually do not require medical attention (report these to your doctor or health care professional if they continue or are bothersome):  · changes in taste  · facial flushing  · headache  · muscle cramps  · muscle pain  · nausea, vomiting  · weak or tired  What may interact with this medicine?  Do not take this medicine with any of the following medicines:  · cocaine  · ergot alkaloids like dihydroergotamine, ergonovine, ergotamine, methylergonovine  · feverfew  · MAOIs like Carbex, Eldepryl, Marplan, Nardil, and Parnate  · other medicines for migraine headache like almotriptan, eletriptan, frovatriptan, naratriptan, rizatriptan, zolmitriptan  · tryptophan  This medicine may also interact with the following medications:  · certain medicines for depression, anxiety, or psychotic disturbances  What if I miss a dose?  This does not apply; this medicine is not for regular use.  Where should I keep my medicine?  Keep out of the reach of children.  Store at room temperature between 2 and 30 degrees C (36 and 86 degrees F). Throw away any unused medicine after the expiration date.  What should I tell my health care provider before I take this medicine?  They need to know if you have any of these conditions:  · circulation problems in fingers and toes  · diabetes  · heart disease  · high blood pressure  · high cholesterol  · history of irregular heartbeat  · history of stroke  · kidney disease  · liver disease  · postmenopausal or surgical removal of uterus and ovaries  · seizures  · smoke tobacco  · stomach or intestine problems  · an unusual or allergic reaction to sumatriptan, other medicines, foods, dyes, or preservatives  · pregnant or trying to get pregnant  · breast-feeding  What should I watch for while using this medicine?  Only take this medicine for a  migraine headache. Take it if you get warning symptoms or at the start of a migraine attack. It is not for regular use to prevent migraine attacks.  You may get drowsy or dizzy. Do not drive, use machinery, or do anything that needs mental alertness until you know how this medicine affects you. To reduce dizzy or fainting spells, do not sit or stand up quickly, especially if you are an older patient. Alcohol can increase drowsiness, dizziness and flushing. Avoid alcoholic drinks.  Smoking cigarettes may increase the risk of heart-related side effects from using this medicine.  If you take migraine medicines for 10 or more days a month, your migraines may get worse. Keep a diary of headache days and medicine use. Contact your healthcare professional if your migraine attacks occur more frequently.  NOTE:This sheet is a summary. It may not cover all possible information. If you have questions about this medicine, talk to your doctor, pharmacist, or health care provider. Copyright© 2017 [employer]        Migraine Headache: Stages and Treatment    A migraine headache tends to progress in stages. Learning these stages can help you better understand what is happening. Then you can learn ways to reduce pain and relieve other symptoms. Methods for relieving your symptoms include self-care and medicines.  Migraine stages  Migraines tend to progress through 4 stages. Many people don't have all stages, and stages may differ with each headache:  · Prodrome. A few hours to a day or so before the headache, you may feel tired, (yawning many times), uneasy, or rodriguez. You may also feel bloated or crave certain foods.  · Aura. Up to an hour before the headache starts, some migraine sufferers experience aura--flashing lights, blind spots, other vision problems, confusion, difficulty speaking, or other neurologic symptoms.  · Headache. Moderate to severe pain affects one side of the head and then can spread to both sides, often along  "with nausea. You may be highly sensitive to light, sound, and odors. Vomiting or diarrhea may also happen. This stage lasts 4 to 72 hours.  · Postdrome. After your headache ends, you may feel tired, achy, and "washed out." This may last for a day or so.  Self-care during a migraine  Here is what you can do:  · Use a cold compress. Wrap a thin cloth around a cold pack, a cold can of soda, or a bag of frozen vegetables. Apply this to your temple or other pain site.  · Drink fluids. If nausea makes it hard to drink, try sucking on ice.  · Rest. If possible, lie down. Try not to bend over, as this may increase your pain. Sometimes laying in a dark quiet room can help the migraine from being aggravated.    · Try caffeine. Some people find that drinking fluids with caffeine, such as coffee or tea, helps to lessen migraine pain.  Using medicines  Work with your healthcare provider to find the right medicines for you. Medicines for migraine may relieve pain (analgesics), relieve nausea, or attack the migraine's root causes (migraine-specific medicines).  Rebound headache  Taking analgesics each day, or even several times a week, may lead to more frequent and severe headaches. These are called rebound headaches. If you think you're having rebound headaches, tell your healthcare provider. He or she can help you safely decrease your medicine. Rebound caffeine withdrawal headaches can also happen.    Date Last Reviewed: 10/9/2015  © 1255-5746 [employer]. [address] [place] [address], [place] [address]. All rights reserved. This information is not intended as a substitute for professional medical care. Always follow your healthcare professional's instructions.        "

## 2019-05-23 NOTE — LETTER
May 23, 2019      Lencho Childs MD  [address]  [place] [address]           [facility]  [address] [place] [address]  Phone: [phone]  Fax: [phone]          Patient: Kenia Alonzo   MR Number: [id number]   YOB: 1955   Date of Visit: 5/23/2019       Dear Dr. Lencho Childs:    Thank you for referring Kenia Alnozo to me for evaluation. Attached you will find relevant portions of my assessment and plan of care.    If you have questions, please do not hesitate to call me. I look forward to following Kenia Alonzo along with you.    Sincerely,    Vipin Matthews MD    Enclosure  CC:  No Recipients    If you would like to receive this communication electronically, please contact [email or web address] or [phone] to request more information on [employer] access.    For providers and/or their staff who would like to refer a patient to [facility], please contact us through our one-stop-shop provider referral line, [facility], at [phone].    If you feel you have received this communication in error or would no longer like to receive these types of communications, please e-mail [email or web address]

## 2019-05-24 ENCOUNTER — TELEPHONE (OUTPATIENT)
Dept: PHARMACY | Facility: CLINIC | Age: 64
End: 2019-05-24

## 2019-05-24 ENCOUNTER — PATIENT MESSAGE (OUTPATIENT)
Dept: NEUROLOGY | Facility: CLINIC | Age: 64
End: 2019-05-24

## 2019-05-24 RX ORDER — DIAZEPAM 5 MG/1
TABLET ORAL
Qty: 1 TABLET | Refills: 0 | Status: SHIPPED | OUTPATIENT
Start: 2019-05-24 | End: 2019-07-31 | Stop reason: DRUGHIGH

## 2019-05-24 NOTE — TELEPHONE ENCOUNTER
Informed Patient  that [facility] received prescription for Aimovig and prior authorization is required.  [facility] will be back in touch once insurance determination is received.

## 2019-05-27 ENCOUNTER — HOSPITAL ENCOUNTER (OUTPATIENT)
Dept: RADIOLOGY | Facility: HOSPITAL | Age: 64
Discharge: HOME | End: 2019-05-27
Attending: PSYCHIATRY & NEUROLOGY
Payer: COMMERCIAL

## 2019-05-27 DIAGNOSIS — G44.89 CHRONIC MIXED HEADACHE SYNDROME: ICD-10-CM

## 2019-05-27 PROCEDURE — 70551 MRI BRAIN WITHOUT CONTRAST: ICD-10-PCS | Mod: 26,,, | Performed by: RADIOLOGY

## 2019-05-27 PROCEDURE — 70551 MRI BRAIN STEM W/O DYE: CPT | Mod: TC,PO

## 2019-05-27 PROCEDURE — 70551 MRI BRAIN STEM W/O DYE: CPT | Mod: 26,,, | Performed by: RADIOLOGY

## 2019-05-28 ENCOUNTER — PATIENT MESSAGE (OUTPATIENT)
Dept: NEUROLOGY | Facility: CLINIC | Age: 64
End: 2019-05-28

## 2019-05-28 ENCOUNTER — TELEPHONE (OUTPATIENT)
Dept: NEUROLOGY | Facility: CLINIC | Age: 64
End: 2019-05-28

## 2019-05-28 NOTE — TELEPHONE ENCOUNTER
----- Message from Rosalva Angeles sent at 5/28/2019 11:23 AM CDT -----  Contact: pt   Type:  Patient Returning Call    Who Called: pt   Who Left Message for Patient: Eden   Does the patient know what this is regarding?: not sure   Would the patient rather a call back or a response via [employer]alinener? Call back   Best Call Back Number: [phone]   Additional Information:

## 2019-05-28 NOTE — TELEPHONE ENCOUNTER
----- Message from Rosalva Angeles sent at 5/28/2019 11:23 AM CDT -----  Contact: pt   Type:  Patient Returning Call    Who Called: pt   Who Left Message for Patient: Eden   Does the patient know what this is regarding?: not sure   Would the patient rather a call back or a response via Digifey[employer]? Call back   Best Call Back Number: [phone]   Additional Information:

## 2019-05-28 NOTE — TELEPHONE ENCOUNTER
Faxed prior authorization for Aimovig 140 mg/ml to insurance company for review on 5/28/2019 3:13pm [facility]

## 2019-05-28 NOTE — TELEPHONE ENCOUNTER
Tried calling Ms. Pantoja back but I was left on hold for 17mins I will send this message to Dr. Mathtews staff

## 2019-05-28 NOTE — TELEPHONE ENCOUNTER
----- Message from Jenny Ross sent at 5/28/2019  3:38 PM CDT -----  Contact: [facility]  needs call back to discuss aimovitj authorization, clarification...[phone] (case reference is [id number])

## 2019-05-29 RX ORDER — NARATRIPTAN 2.5 MG/1
2.5 TABLET ORAL
Qty: 9 TABLET | Refills: 0 | Status: SHIPPED | OUTPATIENT
Start: 2019-05-29 | End: 2019-06-10 | Stop reason: SDUPTHER

## 2019-05-29 NOTE — TELEPHONE ENCOUNTER
Aimovig Copay Card     BIN:[id number]  JUANY:JASMINE  ID:[id number]  GP:[id number]    Good from 05/29/2019 until 12/31/2019   Cover up to $3,500 annually

## 2019-05-29 NOTE — TELEPHONE ENCOUNTER
DOCUMENTATION ONLY:  Prior authorization for Aimovig approved from 5/28/2019 to 8/28/2019.      Case ID# [id number]    Co-pay: $40    Patient Assistance IS required and is being researched.     Forward to patient assistance for review. [facility]

## 2019-06-05 ENCOUNTER — TELEPHONE (OUTPATIENT)
Dept: PHARMACY | Facility: CLINIC | Age: 64
End: 2019-06-05

## 2019-06-10 ENCOUNTER — PATIENT MESSAGE (OUTPATIENT)
Dept: OTOLARYNGOLOGY | Facility: CLINIC | Age: 64
End: 2019-06-10

## 2019-06-10 ENCOUNTER — PATIENT MESSAGE (OUTPATIENT)
Dept: NEUROLOGY | Facility: CLINIC | Age: 64
End: 2019-06-10

## 2019-06-10 ENCOUNTER — PATIENT MESSAGE (OUTPATIENT)
Dept: INTERNAL MEDICINE | Facility: CLINIC | Age: 64
End: 2019-06-10

## 2019-06-10 RX ORDER — NARATRIPTAN 2.5 MG/1
2.5 TABLET ORAL
Qty: 9 TABLET | Refills: 0 | Status: SHIPPED | OUTPATIENT
Start: 2019-06-10 | End: 2019-07-08 | Stop reason: SDUPTHER

## 2019-06-10 RX ORDER — DIAZEPAM 2 MG/1
2 TABLET ORAL EVERY 6 HOURS PRN
Qty: 20 TABLET | Refills: 0 | Status: SHIPPED | OUTPATIENT
Start: 2019-06-10 | End: 2019-07-31 | Stop reason: SDUPTHER

## 2019-06-21 ENCOUNTER — TELEPHONE (OUTPATIENT)
Dept: INTERNAL MEDICINE | Facility: CLINIC | Age: 64
End: 2019-06-21

## 2019-06-21 NOTE — TELEPHONE ENCOUNTER
Pt call returned, pt declines appt. Pt states she will continue to apply warm soaks and see how it gets over the weekend. Pt states if it does not get any better she will call and schedule appt to be seen.

## 2019-06-21 NOTE — TELEPHONE ENCOUNTER
----- Message from Nayeli Beach sent at 6/21/2019  8:37 AM CDT -----  Contact: [phone]  Would like to consult with the nurse, patient states that's she has a cyst on her back, and its very painfully and would like to get some Antibiotic call in, patient states that she has no Insurance right now and cannot come in for an office visit,  Please, call back at [phone], thanks sj

## 2019-07-05 ENCOUNTER — TELEPHONE (OUTPATIENT)
Dept: PHARMACY | Facility: CLINIC | Age: 64
End: 2019-07-05

## 2019-07-05 DIAGNOSIS — G44.89 CHRONIC MIXED HEADACHE SYNDROME: Primary | ICD-10-CM

## 2019-07-05 NOTE — TELEPHONE ENCOUNTER
Informed Patient  that [facility] received prescription for Emgality and benefits investigation is required.  [facility] will be back in touch once insurance determination is received.

## 2019-07-08 DIAGNOSIS — K58.9 IRRITABLE BOWEL SYNDROME WITHOUT DIARRHEA: ICD-10-CM

## 2019-07-08 RX ORDER — LEVOCETIRIZINE DIHYDROCHLORIDE 5 MG/1
5 TABLET, FILM COATED ORAL NIGHTLY
Qty: 90 TABLET | Refills: 3 | Status: SHIPPED | OUTPATIENT
Start: 2019-07-08 | End: 2020-01-18

## 2019-07-08 RX ORDER — BUMETANIDE 2 MG/1
2 TABLET ORAL DAILY
Qty: 30 TABLET | Refills: 1 | Status: SHIPPED | OUTPATIENT
Start: 2019-07-08 | End: 2019-10-21 | Stop reason: SDUPTHER

## 2019-07-08 RX ORDER — NARATRIPTAN 2.5 MG/1
2.5 TABLET ORAL
Qty: 9 TABLET | Refills: 3 | Status: SHIPPED | OUTPATIENT
Start: 2019-07-08 | End: 2020-07-31

## 2019-07-08 RX ORDER — DICYCLOMINE HYDROCHLORIDE 10 MG/1
10 CAPSULE ORAL 3 TIMES DAILY
Qty: 90 CAPSULE | Refills: 1 | Status: SHIPPED | OUTPATIENT
Start: 2019-07-08 | End: 2020-01-21

## 2019-07-09 DIAGNOSIS — G43.809 VESTIBULAR MIGRAINE: Primary | ICD-10-CM

## 2019-07-12 ENCOUNTER — TELEPHONE (OUTPATIENT)
Dept: GASTROENTEROLOGY | Facility: CLINIC | Age: 64
End: 2019-07-12

## 2019-07-12 NOTE — TELEPHONE ENCOUNTER
----- Message from Nikole Palacios sent at 7/12/2019  3:27 PM CDT -----  Contact: leonie/Esme MITCHELL dept [phone]  States that she is calling to speak to nurse regarding PA for emgality. Please call back at [phone]//thank you acc

## 2019-07-31 ENCOUNTER — OFFICE VISIT (OUTPATIENT)
Dept: OTOLARYNGOLOGY | Facility: CLINIC | Age: 64
End: 2019-07-31
Payer: COMMERCIAL

## 2019-07-31 VITALS
SYSTOLIC BLOOD PRESSURE: 136 MMHG | BODY MASS INDEX: 24.07 KG/M2 | WEIGHT: 131.63 LBS | DIASTOLIC BLOOD PRESSURE: 78 MMHG | TEMPERATURE: 98 F | HEART RATE: 93 BPM

## 2019-07-31 DIAGNOSIS — H81.01 MENIERE'S DISEASE OF RIGHT EAR: ICD-10-CM

## 2019-07-31 DIAGNOSIS — J30.2 SEASONAL ALLERGIC RHINITIS, UNSPECIFIED TRIGGER: ICD-10-CM

## 2019-07-31 DIAGNOSIS — J01.00 ACUTE NON-RECURRENT MAXILLARY SINUSITIS: Primary | ICD-10-CM

## 2019-07-31 PROCEDURE — 99214 PR OFFICE/OUTPT VISIT, EST, LEVL IV, 30-39 MIN: ICD-10-PCS | Mod: S$GLB,,, | Performed by: PHYSICIAN ASSISTANT

## 2019-07-31 PROCEDURE — 99999 PR PBB SHADOW E&M-EST. PATIENT-LVL III: CPT | Mod: PBBFAC,,, | Performed by: PHYSICIAN ASSISTANT

## 2019-07-31 PROCEDURE — 99999 PR PBB SHADOW E&M-EST. PATIENT-LVL III: ICD-10-PCS | Mod: PBBFAC,,, | Performed by: PHYSICIAN ASSISTANT

## 2019-07-31 PROCEDURE — 99214 OFFICE O/P EST MOD 30 MIN: CPT | Mod: S$GLB,,, | Performed by: PHYSICIAN ASSISTANT

## 2019-07-31 RX ORDER — DIAZEPAM 2 MG/1
2 TABLET ORAL EVERY 6 HOURS PRN
Qty: 20 TABLET | Refills: 0 | Status: SHIPPED | OUTPATIENT
Start: 2019-07-31 | End: 2019-09-13 | Stop reason: SDUPTHER

## 2019-07-31 RX ORDER — BENZONATATE 200 MG/1
200 CAPSULE ORAL 2 TIMES DAILY PRN
Qty: 10 CAPSULE | Refills: 0 | Status: SHIPPED | OUTPATIENT
Start: 2019-07-31 | End: 2019-09-04

## 2019-07-31 RX ORDER — PREDNISONE 20 MG/1
TABLET ORAL
Qty: 5 TABLET | Refills: 0 | Status: SHIPPED | OUTPATIENT
Start: 2019-07-31 | End: 2019-09-04

## 2019-07-31 RX ORDER — CEFDINIR 300 MG/1
300 CAPSULE ORAL 2 TIMES DAILY
Qty: 20 CAPSULE | Refills: 0 | Status: SHIPPED | OUTPATIENT
Start: 2019-07-31 | End: 2019-08-10

## 2019-07-31 NOTE — PROGRESS NOTES
Subjective:       Patient ID: Kenia Alonzo is a 64 y.o. female.    Chief Complaint: Sore Throat (started Saturday) and Sinus Problem (post nasal drip)    Patient is a very pleasant 63 yo female who presents to clinic for evaluation of worsening sinus pain and pressure, postnasal drip causing cough and throat irritation.  Her symptoms started about 6 days ago and seem to be worsening.  She now has pain in her mid-face, headache and is dizzy when she wakes for past several days.  She manages her allergies with Xyzal and Flonase which usually work great.  She does continue to experience hearing loss and tinnitus in her right ear.  No fever but has had fatigue.  She has pain with swallowing but denies dysphagia or choking.  She noticed a swollen lymph node in her right neck yesterday but says it seems better today.    She was last here with Dr. Ruiz in 5/2019 w/right hearing loss. She has a hx of Ménière's Disease and a vestibular nerve section around [id number] per Dr. Raul Sebastian, in [place], [place].  She reports significant relief and decrease in episodes after surgery. She now only experiences episodes of dizziness/ nausea/ HA's once every 2-3 months, she believes episodes are brought on by a flare-up in allergy symptoms. Her allergy symptoms include sneezing and nasal congestion.       Review of Systems   Constitutional: Positive for fatigue. Negative for fever.   HENT: Positive for congestion, hearing loss, postnasal drip, rhinorrhea, sinus pressure, sinus pain, sneezing, sore throat and tinnitus. Negative for ear discharge, ear pain and trouble swallowing.    Respiratory: Positive for cough. Negative for shortness of breath and wheezing.    Cardiovascular: Negative for chest pain.   Gastrointestinal: Negative for vomiting.   Neurological: Positive for dizziness and headaches.       Objective:      Physical Exam   Constitutional: She is oriented to person, place, and time. She appears well-developed and  well-nourished. She is cooperative. No distress.   HENT:   Head: Normocephalic and atraumatic.   Right Ear: Ear canal normal. Tympanic membrane is scarred. Tympanic membrane is not erythematous. No middle ear effusion.   Left Ear: Hearing, tympanic membrane and ear canal normal. Tympanic membrane is not erythematous.  No middle ear effusion.   Nose: Mucosal edema and rhinorrhea present. No nasal deformity. Right sinus exhibits maxillary sinus tenderness. Right sinus exhibits no frontal sinus tenderness. Left sinus exhibits maxillary sinus tenderness. Left sinus exhibits no frontal sinus tenderness.   Mouth/Throat: Mucous membranes are normal. No trismus in the jaw. No uvula swelling. Posterior oropharyngeal erythema (mild) present. No oropharyngeal exudate or posterior oropharyngeal edema.   Eyes: EOM are normal. Right eye exhibits no discharge. Left eye exhibits no discharge.   Neck: Normal range of motion. Neck supple.   Cardiovascular: Normal rate and regular rhythm.   Pulmonary/Chest: Effort normal. No respiratory distress.   Lymphadenopathy:        Head (right side): No submental, no submandibular, no tonsillar and no preauricular adenopathy present.        Head (left side): No submental, no submandibular, no tonsillar and no preauricular adenopathy present.     She has no cervical adenopathy.   Neurological: She is alert and oriented to person, place, and time. No cranial nerve deficit.   Skin: Skin is warm and dry. No rash noted. She is not diaphoretic.   Psychiatric: She has a normal mood and affect. Her behavior is normal. Thought content normal.   Nursing note and vitals reviewed.      Assessment:       1. Acute non-recurrent maxillary sinusitis    2. Seasonal allergic rhinitis, unspecified trigger    3. Meniere's disease of right ear        Plan:         Recommend Omnicef x 10 days.  She prefers to avoid Augmentin due to GI upset.  Will also send in Prednisone x 5 days.  She should continue her Flonase  and Xyzal for allergies and I'll send in Tessalon perles as requested for cough.  She notes increased dizziness since sinus/allergy symptoms have worsened and requests Valium refill.  Instructed her to call with any worsening symptoms.  RTC only as needed.

## 2019-08-19 ENCOUNTER — PATIENT MESSAGE (OUTPATIENT)
Dept: NEUROLOGY | Facility: CLINIC | Age: 64
End: 2019-08-19

## 2019-08-20 RX ORDER — METOCLOPRAMIDE 5 MG/1
5 TABLET ORAL EVERY 12 HOURS PRN
Qty: 10 TABLET | Refills: 3 | Status: SHIPPED | OUTPATIENT
Start: 2019-08-20 | End: 2020-09-10

## 2019-08-22 ENCOUNTER — TELEPHONE (OUTPATIENT)
Dept: NEUROLOGY | Facility: CLINIC | Age: 64
End: 2019-08-22

## 2019-08-22 NOTE — TELEPHONE ENCOUNTER
----- Message from Noar Souza sent at 8/22/2019  2:00 PM CDT -----  Contact: pt  She's calling in regards to speak with nurse    Pt has a few questions       pls call pt back at  [phone] (home)

## 2019-08-26 NOTE — TELEPHONE ENCOUNTER
Phoned [employer] in regard to prior authorization for Emgality 120 mg/ml to insurance company for review on 8/26/2019 2:15 pm  [facility]

## 2019-09-03 ENCOUNTER — PATIENT MESSAGE (OUTPATIENT)
Dept: NEUROLOGY | Facility: CLINIC | Age: 64
End: 2019-09-03

## 2019-09-04 ENCOUNTER — OFFICE VISIT (OUTPATIENT)
Dept: NEUROLOGY | Facility: CLINIC | Age: 64
End: 2019-09-04
Payer: COMMERCIAL

## 2019-09-04 VITALS
RESPIRATION RATE: 16 BRPM | HEART RATE: 82 BPM | BODY MASS INDEX: 24.91 KG/M2 | SYSTOLIC BLOOD PRESSURE: 130 MMHG | DIASTOLIC BLOOD PRESSURE: 88 MMHG | WEIGHT: 135.38 LBS | HEIGHT: 62 IN

## 2019-09-04 DIAGNOSIS — F32.A DEPRESSION, UNSPECIFIED DEPRESSION TYPE: ICD-10-CM

## 2019-09-04 DIAGNOSIS — K58.9 IRRITABLE BOWEL SYNDROME WITHOUT DIARRHEA: ICD-10-CM

## 2019-09-04 DIAGNOSIS — G44.89 CHRONIC MIXED HEADACHE SYNDROME: ICD-10-CM

## 2019-09-04 DIAGNOSIS — Z72.0 TOBACCO USE: ICD-10-CM

## 2019-09-04 DIAGNOSIS — G43.809 VESTIBULAR MIGRAINE: Primary | ICD-10-CM

## 2019-09-04 DIAGNOSIS — J30.89 NON-SEASONAL ALLERGIC RHINITIS, UNSPECIFIED TRIGGER: ICD-10-CM

## 2019-09-04 DIAGNOSIS — H81.01 MENIERE'S DISEASE OF RIGHT EAR: ICD-10-CM

## 2019-09-04 DIAGNOSIS — J40 BRONCHITIS: ICD-10-CM

## 2019-09-04 DIAGNOSIS — N18.30 CKD (CHRONIC KIDNEY DISEASE) STAGE 3, GFR 30-59 ML/MIN: ICD-10-CM

## 2019-09-04 DIAGNOSIS — R25.9 ABNORMAL INVOLUNTARY MOVEMENTS: ICD-10-CM

## 2019-09-04 DIAGNOSIS — E78.5 HYPERLIPIDEMIA, UNSPECIFIED HYPERLIPIDEMIA TYPE: ICD-10-CM

## 2019-09-04 PROCEDURE — 99999 PR PBB SHADOW E&M-EST. PATIENT-LVL IV: ICD-10-PCS | Mod: PBBFAC,,, | Performed by: PSYCHIATRY & NEUROLOGY

## 2019-09-04 PROCEDURE — 99215 PR OFFICE/OUTPT VISIT, EST, LEVL V, 40-54 MIN: ICD-10-PCS | Mod: S$GLB,,, | Performed by: PSYCHIATRY & NEUROLOGY

## 2019-09-04 PROCEDURE — 99999 PR PBB SHADOW E&M-EST. PATIENT-LVL IV: CPT | Mod: PBBFAC,,, | Performed by: PSYCHIATRY & NEUROLOGY

## 2019-09-04 PROCEDURE — 99215 OFFICE O/P EST HI 40 MIN: CPT | Mod: S$GLB,,, | Performed by: PSYCHIATRY & NEUROLOGY

## 2019-09-04 RX ORDER — TOPIRAMATE 100 MG/1
100 CAPSULE, EXTENDED RELEASE ORAL DAILY
Qty: 30 CAPSULE | Refills: 11 | Status: SHIPPED | OUTPATIENT
Start: 2019-09-04 | End: 2020-09-14 | Stop reason: SDUPTHER

## 2019-09-04 NOTE — PROGRESS NOTES
Subjective:       Patient ID: Kenia Alonzo is a 64 y.o. female.    Chief Complaint: Vestibular migraine    HPI       BACKGROUND HISTORY       The patient was referred by Dr. Ruiz for evaluation.    The patient is here for intractable headaches. The patient has had migraine headaches throughout her adult life and improved afte menopause but seem to recur. In the past she failed Elavil, VPA and Inderal. TPM helped significantly but caused memory loss. PRN Imitrex helped but caused palpitation and chest tightness.  The headaches are 2-3 times a week, 08-10/10, throbbing, holocephalic, last for several hours and associated with nausea, light and noise sensitivity. The patient has been under tremendous stress related to her son's health problems. In addition, she has a longstanding history of Ménière's Disease  S/P vestibular nerve section in the late 1990's.  The patient has also noted recurrence of Ménière's Disease's symptoms. Started her on Emgality 120 mg SQ monthly. Ordered Brain MRI.      INTERVAL HISTORY       Emgality did help tremendously but unfortunately she broke the last 2 pens and the headaches have recurred. 05- Brain MRI Unremarkable            Review of Systems   Constitutional: Negative for appetite change and fatigue.   HENT: Positive for hearing loss. Negative for tinnitus.    Eyes: Negative for photophobia and visual disturbance.   Respiratory: Negative for apnea and shortness of breath.    Cardiovascular: Negative for chest pain and palpitations.   Gastrointestinal: Negative for nausea and vomiting.   Endocrine: Negative for cold intolerance and heat intolerance.   Genitourinary: Negative for difficulty urinating and urgency.   Musculoskeletal: Negative for arthralgias, back pain, gait problem, joint swelling, myalgias, neck pain and neck stiffness.   Skin: Negative for color change and rash.   Allergic/Immunologic: Negative for environmental allergies and immunocompromised state.    Neurological: Positive for dizziness and headaches. Negative for tremors, seizures, syncope, facial asymmetry, speech difficulty, weakness, light-headedness and numbness.   Hematological: Negative for adenopathy. Does not bruise/bleed easily.   Psychiatric/Behavioral: Negative for agitation, behavioral problems, confusion, decreased concentration, dysphoric mood, hallucinations, self-injury, sleep disturbance and suicidal ideas. The patient is not hyperactive.          Current Outpatient Medications:     azelastine (ASTELIN) 137 mcg (0.1 %) nasal spray, 1 spray (137 mcg total) by Nasal route 2 (two) times daily., Disp: 30 mL, Rfl: 3    buPROPion (WELLBUTRIN XL) 300 MG 24 hr tablet, Take 1 tablet (300 mg total) by mouth once daily., Disp: 30 tablet, Rfl: 11    diazePAM (VALIUM) 2 MG tablet, Take 1 tablet (2 mg total) by mouth every 6 (six) hours as needed (dizziness/Meniere's attack)., Disp: 20 tablet, Rfl: 0    dicyclomine (BENTYL) 10 MG capsule, Take 1 capsule (10 mg total) by mouth 3 (three) times daily., Disp: 90 capsule, Rfl: 1    fluticasone (FLONASE) 50 mcg/actuation nasal spray, 2 sprays (100 mcg total) by Each Nare route once daily., Disp: 16 g, Rfl: 3    galcanezumab-gnlm (EMGALITY PEN) 120 mg/mL PnIj, Inject 120 mg into the skin every 28 days. Loading dose of 240 mg then 120 mg monthly, Disp: 1 mL, Rfl: 11    levocetirizine (XYZAL) 5 MG tablet, Take 1 tablet (5 mg total) by mouth every evening., Disp: 90 tablet, Rfl: 3    lidocaine-prilocaine (EMLA) cream, , Disp: , Rfl:     MELATONIN ORAL, Take by mouth., Disp: , Rfl:     metoclopramide HCl (REGLAN) 5 MG tablet, Take 1 tablet (5 mg total) by mouth every 12 (twelve) hours as needed., Disp: 10 tablet, Rfl: 3    naratriptan (AMERGE) 2.5 MG tablet, Take 1 tablet (2.5 mg total) by mouth every 72 hours as needed. 2.5 mg at onset of headache, may repeat in 4 hours if needed, Disp: 9 tablet, Rfl: 3    ondansetron (ZOFRAN-ODT) 4 MG TbDL, Take 1  tablet (4 mg total) by mouth every 8 (eight) hours as needed., Disp: 21 tablet, Rfl: 1    simvastatin (ZOCOR) 5 MG tablet, Take 1 tablet (5 mg total) by mouth nightly., Disp: 90 tablet, Rfl: 3    bumetanide (BUMEX) 2 MG tablet, Take 1 tablet (2 mg total) by mouth once daily., Disp: 30 tablet, Rfl: 1    topiramate (TROKENDI XR) 100 mg Cp24, Take 1 capsule (100 mg total) by mouth once daily., Disp: 30 capsule, Rfl: 11  Past Medical History:   Diagnosis Date    Depression     Gout     Headache     Hyperlipidemia     Meniere disease     Vertigo      Past Surgical History:   Procedure Laterality Date    BREAST BIOPSY       SECTION       Social History     Socioeconomic History    Marital status:      Spouse name: Not on file    Number of children: Not on file    Years of education: Not on file    Highest education level: Not on file   Occupational History    Not on file   Social Needs    Financial resource strain: Not on file    Food insecurity:     Worry: Not on file     Inability: Not on file    Transportation needs:     Medical: Not on file     Non-medical: Not on file   Tobacco Use    Smoking status: Current Every Day Smoker     Packs/day: 0.50     Years: 43.00     Pack years: 21.50     Types: Cigarettes     Start date: 1976    Smokeless tobacco: Never Used   Substance and Sexual Activity    Alcohol use: Yes     Alcohol/week: 0.6 - 1.8 oz     Types: 1 - 3 Glasses of wine per week     Comment: social few times monthly    Drug use: No    Sexual activity: Never     Birth control/protection: None, Post-menopausal   Lifestyle    Physical activity:     Days per week: Not on file     Minutes per session: Not on file    Stress: Not on file   Relationships    Social connections:     Talks on phone: Not on file     Gets together: Not on file     Attends Restoration service: Not on file     Active member of club or organization: Not on file     Attends meetings of clubs or  organizations: Not on file     Relationship status: Not on file   Other Topics Concern    Not on file   Social History Narrative    Not on file       Objective:     Vital signs reviewed     GENERAL APPEARANCE:     The patient looks comfortable.    No signs of medical or psychiatric distress.    Normal breathing pattern.    No dysmorphic features    Normal eye contact.     GENERAL MEDICAL EXAM:    HEENT:  Head is atraumatic normocephalic.     Neck and Axillae: No JVD.     Cardiopulmonary: No cyanosis. No tachypnea. Normal respiratory effort.    Gastrointestinal:  No stomas or lesions. No hernias.    Skin, Hair and Nails: No pathognonomic skin rash. No neurofibromatosis. No stigmata of autoimmune disease.     Limbs: No varicose veins. No edema.     Muskoskeletal: No deformities.No signs of longstanding neuropathy. No dislocations or fractures.        Neurologic Exam     Mental Status   Oriented to person, place, and time.   Registration: recalls 3 of 3 objects. Recall at 5 minutes: recalls 3 of 3 objects. Follows 3 step commands.   Attention: normal. Concentration: normal.   Speech: speech is normal   Level of consciousness: alert  Knowledge: good and consistent with education. Able to perform simple calculations.   Able to name object. Able to read. Able to repeat. Able to write. Normal comprehension.     Cranial Nerves     CN II   Visual fields full to confrontation.   Visual acuity: normal  Right visual field deficit: none  Left visual field deficit: none     CN III, IV, VI   Pupils are equal, round, and reactive to light.  Extraocular motions are normal.   Right pupil: Size: 2 mm. Shape: regular. Reactivity: brisk. Consensual response: intact. Accommodation: intact.   Left pupil: Size: 2 mm. Shape: regular. Reactivity: brisk. Consensual response: intact. Accommodation: intact.   CN III: no CN III palsy  CN VI: no CN VI palsy  Nystagmus: none   Diplopia: none  Ophthalmoparesis: none  Upgaze: normal  Downgaze:  normal  Conjugate gaze: present  Vestibulo-ocular reflex: present    CN V   Facial sensation intact.   Right facial sensation deficit: none  Left facial sensation deficit: none  Right corneal reflex: normal  Left corneal reflex: normal    CN VII   Right facial weakness: none  Left facial weakness: none  Right taste: normal  Left taste: normal    CN VIII   CN VIII normal.   Hearing: impaired  Right Rinne: AC > BC  Left Rinne: AC > BC  Jenkins: does not lateralize     CN IX, X   CN IX normal.   CN X normal.   Palate: symmetric  Right gag reflex: normal  Left gag reflex: normal    CN XI   CN XI normal.   Right sternocleidomastoid strength: normal  Left sternocleidomastoid strength: normal  Right trapezius strength: normal  Left trapezius strength: normal    CN XII   CN XII normal.   Tongue: not atrophic  Fasciculations: absent  Tongue deviation: none    Motor Exam   Muscle bulk: normal  Overall muscle tone: normal  Right arm tone: normal  Left arm tone: normal  Right arm pronator drift: absent  Left arm pronator drift: absent  Right leg tone: normal  Left leg tone: normal    Strength   Strength 5/5 throughout.   Right neck flexion: 5/5  Left neck flexion: 5/5  Right neck extension: 5/5  Left neck extension: 5/5  Right deltoid: 5/5  Left deltoid: 5/5  Right biceps: 5/5  Left biceps: 5/5  Right triceps: 5/5  Left triceps: 5/5  Right wrist flexion: 5/5  Left wrist flexion: 5/5  Right wrist extension: 5/5  Left wrist extension: 5/5  Right interossei: 5/5  Left interossei: 5/5  Right abdominals: 5/5  Left abdominals: 5/5  Right iliopsoas: 5/5  Left iliopsoas: 5/5  Right quadriceps: 5/5  Left quadriceps: 5/5  Right hamstrin/5  Left hamstrin/5  Right glutei: 5/5  Left glutei: 5/5  Right anterior tibial: 5/5  Left anterior tibial: 5/5  Right posterior tibial: 5/5  Left posterior tibial: 5/5  Right peroneal: 5/5  Left peroneal: 5/5  Right gastroc: 5/5  Left gastroc: 5/5    Sensory Exam   Light touch normal.   Right arm  light touch: normal  Left arm light touch: normal  Right leg light touch: normal  Left leg light touch: normal  Vibration normal.   Right arm vibration: normal  Left arm vibration: normal  Right leg vibration: normal  Left leg vibration: normal  Proprioception normal.   Right arm proprioception: normal  Left arm proprioception: normal  Right leg proprioception: normal  Left leg proprioception: normal  Pinprick normal.   Right arm pinprick: normal  Left arm pinprick: normal  Right leg pinprick: normal  Left leg pinprick: normal  Graphesthesia: normal  Stereognosis: normal    Gait, Coordination, and Reflexes     Gait  Gait: normal    Coordination   Romberg: negative  Finger to nose coordination: normal  Heel to shin coordination: normal  Tandem walking coordination: normal    Tremor   Resting tremor: absent  Intention tremor: absent  Action tremor: absent    Reflexes   Right brachioradialis: 2+  Left brachioradialis: 2+  Right biceps: 2+  Left biceps: 2+  Right triceps: 2+  Left triceps: 2+  Right patellar: 2+  Left patellar: 2+  Right achilles: 2+  Left achilles: 2+  Right : 2+  Left : 2+  Right plantar: normal  Left plantar: normal  Right Olivares: absent  Left Olivares: absent  Right ankle clonus: absent  Left ankle clonus: absent  Right pendular knee jerk: absent  Left pendular knee jerk: absent      Lab Results   Component Value Date    WBC 10.32 04/23/2019    HGB 13.1 04/23/2019    HCT 40.2 04/23/2019    MCV 98 04/23/2019     (H) 04/23/2019     Sodium   Date Value Ref Range Status   04/23/2019 141 136 - 145 mmol/L Final     Potassium   Date Value Ref Range Status   04/23/2019 3.6 3.5 - 5.1 mmol/L Final     Chloride   Date Value Ref Range Status   04/23/2019 108 95 - 110 mmol/L Final     CO2   Date Value Ref Range Status   04/23/2019 22 (L) 23 - 29 mmol/L Final     Glucose   Date Value Ref Range Status   04/23/2019 86 70 - 110 mg/dL Final     BUN, Bld   Date Value Ref Range Status   04/23/2019 24 (H)  8 - 23 mg/dL Final     Creatinine   Date Value Ref Range Status   04/23/2019 1.1 0.5 - 1.4 mg/dL Final     Calcium   Date Value Ref Range Status   04/23/2019 9.8 8.7 - 10.5 mg/dL Final     Total Protein   Date Value Ref Range Status   05/23/2018 7.5 6.0 - 8.4 g/dL Final     Albumin   Date Value Ref Range Status   04/23/2019 4.0 3.5 - 5.2 g/dL Final     Total Bilirubin   Date Value Ref Range Status   05/23/2018 0.4 0.1 - 1.0 mg/dL Final     Comment:     For infants and newborns, interpretation of results should be based  on gestational age, weight and in agreement with clinical  observations.  Premature Infant recommended reference ranges:  Up to 24 hours.............<8.0 mg/dL  Up to 48 hours............<12.0 mg/dL  3-5 days..................<15.0 mg/dL  6-29 days.................<15.0 mg/dL       Alkaline Phosphatase   Date Value Ref Range Status   05/23/2018 67 55 - 135 U/L Final     AST   Date Value Ref Range Status   05/23/2018 15 10 - 40 U/L Final     ALT   Date Value Ref Range Status   05/23/2018 16 10 - 44 U/L Final     Anion Gap   Date Value Ref Range Status   04/23/2019 11 8 - 16 mmol/L Final     eGFR if    Date Value Ref Range Status   04/23/2019 >60.0 >60 mL/min/1.73 m^2 Final     eGFR if non    Date Value Ref Range Status   04/23/2019 54.3 (A) >60 mL/min/1.73 m^2 Final     Comment:     Calculation used to obtain the estimated glomerular filtration  rate (eGFR) is the CKD-EPI equation.        Lab Results   Component Value Date    [id number] 502 09/02/2016 05-    Brain MRI Unremarkable     Reviewed the neuroimaging independently       Assessment:       1. Vestibular migraine    2. Depression, unspecified depression type    3. Meniere's disease of right ear    4. Abnormal involuntary movements    5. Irritable bowel syndrome without diarrhea    6. Hyperlipidemia, unspecified hyperlipidemia type    7. Tobacco use    8. CKD (chronic kidney disease) stage 3, GFR  30-59 ml/min    9. Non-seasonal allergic rhinitis, unspecified trigger    10. Bronchitis    11. Chronic mixed headache syndrome        Plan:             VESTIBULAR MIGRAINE          Discussed with the patient the very close relationship between Ménière's Disease and Vestibular Migraine in terms of pathophysiology.       Migraine Diary       Lifestyle changes:    Good sleep hygiene  Avoid triggers like certain foods, TV and computer work   Minimize physical and emotional stress  Smoking avoidance and cessation  Tapering off caffeine   Good hydration   Small frequent meals   Moderate 30-minute-long aerobic exercises 3 times/week         Abortive medications:    Take at the ONSET of the headache sumatriptan 100 mg  PO in combination with naproxen 500 mg PO or Ibuprofen 800 mg PO  for headache without nausea or vomiting.  This regimen can be repeated only once in 24 hours after 2 hours. She can take 1/2 dose to alleviate SEs. Should only be taken 2-3 times/week to avoid rebound and overuse headaches.          Preventative medications:    Amitriptyline/Elavil failed    Topiramate/Topamax caused cognitive SEs.     Propranolol/Inderal failed    Valproic acid/Depakote failed     Continue Emgality 120 mg SQ monthly and meanwhile try TPM Trokendi  mg QD titration.       MEDICAL/SURGICAL COMORBIDITIES     All relevant medical comorbidities noted and managed by primary care physician and medical care team.            I spent 40 minutes face to face with the patient    More than 25 minutes of the time spent in counseling and coordination of care including discussions etiology of diagnosis, pathonogenesis of diagnosis, prognosis of diagnosis,, diagnostic results, impression and recommendations, diagnostic studies, management, risks and benefits of treatment, instructions of disease self management, treatment instructions, follow up requirements, patient and family counseling/involvement in care compliance with treatment  regimen. All of the patient's questions were answered during this discussion.      RTC in 6 months                     Vipin Matthews MD, FAAN    Attending Neurologist/Epileptologist         Diplomate, American Board-Psychiatry and Neurology (Neurology)    Diplomate, American Board-Clinical Neurophysiology (Epilpesy-Neuromuscular)     Fellow, American Academy of Neurology

## 2019-09-04 NOTE — PATIENT INSTRUCTIONS
Topiramate extended-release capsules  What is this medicine?  TOPIRAMATE (toe PYRE a mate) is used to treat seizures in adults or children with epilepsy.  How should I use this medicine?  Take this medicine by mouth with a glass of water. Follow the directions on the prescription label. Trokendi XR capsules must be swallowed whole. Do not sprinkle on food, break, crush, dissolve, or chew. Qudexy XR capsules may be swallowed whole or opened and sprinkled on a small amount of soft food. This mixture must be swallowed immediately. Do not chew or store mixture for later use. You may take this medicine with meals. Take your medicine at regular intervals. Do not take it more often than directed.  Talk to your pediatrician regarding the use of this medicine in children. Special care may be needed. While Trokendi XR may be prescribed for children as young as 6 years and Qudexy XR may be prescribed for children as young as 2 years for selected conditions, precautions do apply.  What side effects may I notice from receiving this medicine?  Side effects that you should report to your doctor or health care professional as soon as possible:  · allergic reactions like skin rash, itching or hives, swelling of the face, lips, or tongue  · decreased sweating and/or rise in body temperature  · depression  · difficulty breathing, fast or irregular breathing patterns  · difficulty speaking  · difficulty walking or controlling muscle movements  · hearing impairment  · redness, blistering, peeling or loosening of the skin, including inside the mouth  · tingling, pain or numbness in the hands or feet  · unusually weak or tired  · worsening of mood, thoughts or actions of suicide or dying  Side effects that usually do not require medical attention (Report these to your doctor or health care professional if they continue or are bothersome.):  · altered taste  · back pain, joint or muscle aches and pains  · diarrhea, or  constipation  · headache  · loss of appetite  · nausea  · stomach upset, indigestion  · tremors  What may interact with this medicine?  Do not take this medicine with any of the following medications:  · probenecid  This medicine may also interact with the following medications:  · acetazolamide  · alcohol  · amitriptyline  · birth control pills  · digoxin  · hydrochlorothiazide  · lithium  · medicines for pain, sleep, or muscle relaxation  · metformin  · methazolamide  · other seizure or epilepsy medicines  · pioglitazone  · risperidone  What if I miss a dose?  If you miss a dose, take it as soon as you can. If it is almost time for your next dose, take only that dose. Do not take double or extra doses.  Where should I keep my medicine?  Keep out of the reach of children.  Store at room temperature between 15 and 30 degrees C (59 and 86 degrees F) in a tightly closed container. Protect from moisture. Throw away any unused medicine after the expiration date.  What should I tell my health care provider before I take this medicine?  They need to know if you have any of these conditions:  · cirrhosis of the liver or liver disease  · diarrhea  · glaucoma  · kidney stones or kidney disease  · lung disease like asthma, obstructive pulmonary disease, emphysema  · metabolic acidosis  · on a ketogenic diet  · scheduled for surgery or a procedure  · suicidal thoughts, plans, or attempt; a previous suicide attempt by you or a family member  · an unusual or allergic reaction to topiramate, other medicines, foods, dyes, or preservatives  · pregnant or trying to get pregnant  · breast-feeding  What should I watch for while using this medicine?  Visit your doctor or health care professional for regular checks on your progress. Do not stop taking this medicine suddenly. This increases the risk of seizures if you are using this medicine to control epilepsy. Wear a medical identification bracelet or chain to say you have epilepsy or  seizures, and carry a card that lists all your medicines.  This medicine can decrease sweating and increase your body temperature. Watch for signs of  sweating or fever, especially in children. Avoid extreme heat, hot baths, and saunas. Be careful about exercising, especially in hot weather. Contact your health care provider right away if you notice a fever or decrease in sweating.  You should drink plenty of fluids while taking this medicine. If you have had kidney stones in the past, this will help to reduce your chances of forming kidney stones.  If you have stomach pain, with nausea or vomiting and yellowing of your eyes or skin, call your doctor immediately.  You may get drowsy, dizzy, or have blurred vision. Do not drive, use machinery, or do anything that needs mental alertness until you know how this medicine affects you. To reduce dizziness, do not sit or stand up quickly, especially if you are an older patient. Alcohol can increase drowsiness and dizziness. Avoid alcoholic drinks. Do not drink alcohol for 6 hours before or 6 hours after taking Trokendi XR.  If you notice blurred vision, eye pain, or other eye problems, seek medical attention at once for an eye exam.  The use of this medicine may increase the chance of suicidal thoughts or actions. Pay special attention to how you are responding while on this medicine. Any worsening of mood, or thoughts of suicide or dying should be reported to your health care professional right away.  This medicine may increase the chance of developing metabolic acidosis. If left untreated, this can cause kidney stones, bone disease, or slowed growth in children. Symptoms include breathing fast, fatigue, loss of appetite, irregular heartbeat, or loss of consciousness. Call your doctor immediately if you experience any of these side effects. Also, tell your doctor about any surgery you plan on having while taking this medicine since this may increase your risk for  metabolic acidosis.  Birth control pills may not work properly while you are taking this medicine. Talk to your doctor about using an extra method of birth control.  Women who become pregnant while using this medicine may enroll in the [place] Antiepileptic Drug Pregnancy Registry by calling 1-570.523.5546. This registry collects information about the safety of antiepileptic drug use during pregnancy.  NOTE:This sheet is a summary. It may not cover all possible information. If you have questions about this medicine, talk to your doctor, pharmacist, or health care provider. Copyright© 2017 [employer]

## 2019-09-09 ENCOUNTER — PATIENT MESSAGE (OUTPATIENT)
Dept: PSYCHIATRY | Facility: CLINIC | Age: 64
End: 2019-09-09

## 2019-09-09 ENCOUNTER — PATIENT MESSAGE (OUTPATIENT)
Dept: OTOLARYNGOLOGY | Facility: CLINIC | Age: 64
End: 2019-09-09

## 2019-09-11 ENCOUNTER — OFFICE VISIT (OUTPATIENT)
Dept: OTOLARYNGOLOGY | Facility: CLINIC | Age: 64
End: 2019-09-11
Payer: COMMERCIAL

## 2019-09-11 ENCOUNTER — APPOINTMENT (OUTPATIENT)
Dept: LAB | Facility: HOSPITAL | Age: 64
End: 2019-09-11
Attending: OBSTETRICS & GYNECOLOGY
Payer: COMMERCIAL

## 2019-09-11 ENCOUNTER — CLINICAL SUPPORT (OUTPATIENT)
Dept: AUDIOLOGY | Facility: CLINIC | Age: 64
End: 2019-09-11
Payer: COMMERCIAL

## 2019-09-11 ENCOUNTER — OFFICE VISIT (OUTPATIENT)
Dept: GYNECOLOGIC ONCOLOGY | Facility: CLINIC | Age: 64
End: 2019-09-11
Payer: COMMERCIAL

## 2019-09-11 VITALS
WEIGHT: 131.19 LBS | DIASTOLIC BLOOD PRESSURE: 70 MMHG | HEART RATE: 108 BPM | BODY MASS INDEX: 24.14 KG/M2 | HEIGHT: 62 IN | SYSTOLIC BLOOD PRESSURE: 124 MMHG

## 2019-09-11 VITALS
HEART RATE: 106 BPM | WEIGHT: 130.94 LBS | DIASTOLIC BLOOD PRESSURE: 88 MMHG | SYSTOLIC BLOOD PRESSURE: 122 MMHG | TEMPERATURE: 99 F | BODY MASS INDEX: 23.95 KG/M2

## 2019-09-11 DIAGNOSIS — J30.2 SEASONAL ALLERGIC RHINITIS, UNSPECIFIED TRIGGER: ICD-10-CM

## 2019-09-11 DIAGNOSIS — N90.3 VULVAR DYSPLASIA: ICD-10-CM

## 2019-09-11 DIAGNOSIS — Z01.419 GYNECOLOGIC EXAM NORMAL: Primary | ICD-10-CM

## 2019-09-11 DIAGNOSIS — H93.13 TINNITUS, BILATERAL: ICD-10-CM

## 2019-09-11 DIAGNOSIS — Z72.0 TOBACCO USE: ICD-10-CM

## 2019-09-11 DIAGNOSIS — H91.93 BILATERAL HEARING LOSS, UNSPECIFIED HEARING LOSS TYPE: ICD-10-CM

## 2019-09-11 DIAGNOSIS — D07.1 SEVERE DYSPLASIA OF VULVA: ICD-10-CM

## 2019-09-11 DIAGNOSIS — H81.01 MENIERE'S DISEASE OF RIGHT EAR: Primary | ICD-10-CM

## 2019-09-11 PROCEDURE — 88141 LIQUID-BASED PAP SMEAR, SCREENING: ICD-10-PCS | Mod: ,,, | Performed by: PATHOLOGY

## 2019-09-11 PROCEDURE — 99999 PR PBB SHADOW E&M-EST. PATIENT-LVL III: ICD-10-PCS | Mod: PBBFAC,,, | Performed by: OBSTETRICS & GYNECOLOGY

## 2019-09-11 PROCEDURE — 99999 PR PBB SHADOW E&M-EST. PATIENT-LVL III: CPT | Mod: PBBFAC,,, | Performed by: OBSTETRICS & GYNECOLOGY

## 2019-09-11 PROCEDURE — 87624 HPV HI-RISK TYP POOLED RSLT: CPT

## 2019-09-11 PROCEDURE — 99214 OFFICE O/P EST MOD 30 MIN: CPT | Mod: S$GLB,,, | Performed by: OBSTETRICS & GYNECOLOGY

## 2019-09-11 PROCEDURE — 99999 PR PBB SHADOW E&M-EST. PATIENT-LVL III: CPT | Mod: PBBFAC,,, | Performed by: ORTHOPAEDIC SURGERY

## 2019-09-11 PROCEDURE — 99999 PR PBB SHADOW E&M-EST. PATIENT-LVL III: ICD-10-PCS | Mod: PBBFAC,,, | Performed by: ORTHOPAEDIC SURGERY

## 2019-09-11 PROCEDURE — 99214 PR OFFICE/OUTPT VISIT, EST, LEVL IV, 30-39 MIN: ICD-10-PCS | Mod: S$GLB,,, | Performed by: OBSTETRICS & GYNECOLOGY

## 2019-09-11 PROCEDURE — 88141 CYTOPATH C/V INTERPRET: CPT | Mod: ,,, | Performed by: PATHOLOGY

## 2019-09-11 PROCEDURE — 88175 CYTOPATH C/V AUTO FLUID REDO: CPT | Performed by: PATHOLOGY

## 2019-09-11 PROCEDURE — 99213 PR OFFICE/OUTPT VISIT, EST, LEVL III, 20-29 MIN: ICD-10-PCS | Mod: S$GLB,,, | Performed by: ORTHOPAEDIC SURGERY

## 2019-09-11 PROCEDURE — 99213 OFFICE O/P EST LOW 20 MIN: CPT | Mod: S$GLB,,, | Performed by: ORTHOPAEDIC SURGERY

## 2019-09-11 PROCEDURE — 92567 PR TYMPA2METRY: ICD-10-PCS | Mod: S$GLB,,, | Performed by: AUDIOLOGIST-HEARING AID FITTER

## 2019-09-11 PROCEDURE — 92557 COMPREHENSIVE HEARING TEST: CPT | Mod: S$GLB,,, | Performed by: AUDIOLOGIST-HEARING AID FITTER

## 2019-09-11 PROCEDURE — 92557 PR COMPREHENSIVE HEARING TEST: ICD-10-PCS | Mod: S$GLB,,, | Performed by: AUDIOLOGIST-HEARING AID FITTER

## 2019-09-11 PROCEDURE — 92567 TYMPANOMETRY: CPT | Mod: S$GLB,,, | Performed by: AUDIOLOGIST-HEARING AID FITTER

## 2019-09-11 NOTE — LETTER
September 11, 2019      [place]  [address] [address] [address]            [facility]  [id number] [place] [id number]  Phone: [phone]  Fax: [phone]          Patient: Kenia Alonzo   MR Number: [id number]   YOB: 1955   Date of Visit: 9/11/2019       Dear Dr. Harish Hernandez:    Thank you for referring Kenia Alonzo to me for evaluation. Attached you will find relevant portions of my assessment and plan of care.    If you have questions, please do not hesitate to call me. I look forward to following Kenia Alonzo along with you.    Sincerely,    Tania Smith, LPN    Enclosure  CC:  No Recipients    If you would like to receive this communication electronically, please contact [email or web address] or [phone] to request more information on [employer] Link access.    For providers and/or their staff who would like to refer a patient to [facility], please contact us through our one-stop-shop provider referral line, [facility], at [phone].    If you feel you have received this communication in error or would no longer like to receive these types of communications, please e-mail [email or web address]

## 2019-09-11 NOTE — PROGRESS NOTES
"Subjective:       Patient ID: Kenia Alonzo is a 64 y.o. female.    Chief Complaint: Hearing Loss    Patient is a very pleasant 61 yo female who is here today for evaluation of her ears. She has a hx of Ménière's Disease and a vestibular nerve section around 7154-8335 per Dr. Raul Sebastian, in [place], [place]. She reports significant relief and decrease in episodes after surgery. She now only experiences episodes of dizziness/ nausea/ HA's once every 2-3 months, she believes episodes are brought on by a flare-up in allergy symptoms.t that time, she had tinnitus that she describes as "crackling" in her right ear.  Currently, she has a headache and a generalized foggy sensation.  She is no longer having as much "crackling" in the right ear.  She has not noted any recent change in her hearing in the right ear.  She continues to have dizziness.  She has had recent increased life stress with regards to her son.  She has a history of migraines, was on Topamax in the past and she did find it to be helpful, but was worried it caused memory loss at higher doses.  She has had increased dizziness, and feels as if she is going to fall over if she bends down and is generally off balance when she is walking.  She denies spinning sensations, and has no issue with rolling over in bed.  She has recently had increased stress in her life with loss of her job as well as caretaking responsibilities.    Review of Systems   Constitutional: Negative for chills, fatigue, fever and unexpected weight change.   HENT: Positive for congestion, hearing loss and tinnitus. Negative for dental problem, ear discharge, ear pain, facial swelling, nosebleeds, postnasal drip, rhinorrhea, sinus pressure, sneezing, sore throat, trouble swallowing and voice change.    Eyes: Negative for redness, itching and visual disturbance.   Respiratory: Negative for cough, choking, shortness of breath and wheezing.    Cardiovascular: Negative for chest pain and palpitations. "   Gastrointestinal: Negative for abdominal pain.        No reflux.   Musculoskeletal: Negative for gait problem.   Skin: Negative for rash.   Allergic/Immunologic: Positive for environmental allergies.   Neurological: Positive for headaches. Negative for dizziness and light-headedness.       Objective:      Physical Exam   Constitutional: She is oriented to person, place, and time. She appears well-developed and well-nourished. No distress.   HENT:   Head: Normocephalic and atraumatic.   Right Ear: Ear canal normal. Tympanic membrane is scarred.   Left Ear: Hearing, tympanic membrane and ear canal normal.   Nose: Nose normal.   Eyes: EOM are normal. Right eye exhibits no discharge. Left eye exhibits no discharge.   Neck: Normal range of motion. Neck supple.   Cardiovascular: Normal rate and regular rhythm.   Pulmonary/Chest: Effort normal. No respiratory distress.   Neurological: She is alert and oriented to person, place, and time. No cranial nerve deficit.   Skin: Skin is warm and dry. No rash noted. She is not diaphoretic.   Psychiatric: She has a normal mood and affect. Her behavior is normal. Thought content normal.   Nursing note and vitals reviewed.      Assessment:       1. Meniere's disease of right ear    2. Seasonal allergic rhinitis, unspecified trigger    3. Bilateral hearing loss, unspecified hearing loss type        Plan:       1.  Meniere's:   Overall stable, continue with management.  2. AR:  Continue with daily medicaiton.  3.  Bilateral hearing loss:  She is due for an audiogram, will schedule and review at her convenience.  Pending those results, she may be a good candidate for hearing aids.  Will follow results when available.

## 2019-09-11 NOTE — PROGRESS NOTES
"Subjective:      Patient ID: Kenia Alonzo is a 64 y.o. female.    Chief Complaint: New patient (labia pre-cancer)      HPI  Presents today for vulvar lesion.     Review of records shows vulvar biopsies from 2016  FINAL PATHOLOGIC DIAGNOSIS  1. VULVAR BIOPSY SHOWING HIGH-GRADE SQUAMOUS DYSPLASIA (FLORA 2-3)  2. VULVAR BIOPSY SHOWING SEVERE DYSPLASIA (FLORA 3) WITH MARKED PARAKERATOSIS, NO  DEFINITIVE INVASION IDENTIFIED.  3. VULVAR BIOPSY SHOWING MILD SQUAMOUS DYSPLASIA (FLORA 1)    Recommended treatment at that time. She says she got treatment in [place] in 2016 with "scraping". No records available for review.     Denies PMB. No recent pap.   Review of Systems   Constitutional: Negative for appetite change, chills, fatigue and fever.   HENT: Negative for mouth sores.    Respiratory: Negative for cough and shortness of breath.    Cardiovascular: Negative for leg swelling.   Gastrointestinal: Negative for abdominal pain, blood in stool, constipation and diarrhea.   Endocrine: Negative for cold intolerance.   Genitourinary: Negative for dysuria and vaginal bleeding.   Musculoskeletal: Negative for myalgias.   Skin: Negative for rash.   Allergic/Immunologic: Negative.    Neurological: Negative for weakness and numbness.   Hematological: Negative for adenopathy. Does not bruise/bleed easily.   Psychiatric/Behavioral: Negative for confusion.       Objective:   Physical Exam:   Constitutional: She is oriented to person, place, and time. She appears well-developed and well-nourished.    HENT:   Head: Normocephalic and atraumatic.    Eyes: Pupils are equal, round, and reactive to light. EOM are normal.    Neck: Normal range of motion. Neck supple. No thyromegaly present.    Cardiovascular: Normal rate, regular rhythm and intact distal pulses.     Pulmonary/Chest: Effort normal and breath sounds normal. No respiratory distress. She has no wheezes.        Abdominal: Soft. Bowel sounds are normal. She exhibits no distension, no " ascites and no mass. There is no tenderness.     Genitourinary: Vagina normal and uterus normal.       Pelvic exam was performed with patient supine. There is lesion on the left labia. Cervix is normal. Right adnexum displays no mass. Left adnexum displays no mass. Additional cervical findings: pap smear done          Musculoskeletal: Normal range of motion and moves all extremeties.      Lymphadenopathy:     She has no cervical adenopathy.        Right: No inguinal and no supraclavicular adenopathy present.        Left: No inguinal and no supraclavicular adenopathy present.    Neurological: She is alert and oriented to person, place, and time.    Skin: Skin is warm and dry. No rash noted.    Psychiatric: She has a normal mood and affect.       Assessment:     1. Gynecologic exam normal    2. Tobacco use    3. Severe dysplasia of vulva        Plan:     Orders Placed This Encounter   Procedures    HPV High Risk Genotypes, PCR    Comprehensive metabolic panel    CBC auto differential    EKG 12-lead     Discussion with the patient regarding vulvar dysplasia and the natural history of HPV infections. It is unclear to me if she received any treatment for her prior diagnosis of severe vulvar dysplasia in 2016. Nonetheless she has additional lesions now that require treatment. I asked to perform a biopsy today for optimal surgical planning however she refused. Will plan for excision and treatment for diagnostic and therapeutic purposes. She is aware should a malignant lesion be found she may require an additional second surgery to appropriately treat a vulvar malignancy.   Pap and HPV collected today to determine if any cervical procedures need to be performed at the time of surgery.   Plan for vulvar excision 9/25/19 [facility].    The risks, benefits, and indications of the procedure were discussed with the patient and her family members if present.  These included bleeding, transfusion, infection, damage to  surrounding tissues (bowel, bladder, ureter), wound separation, lymphedema, conversion to laparotomy if laparoscopic, perioperative cardiac events, VTE, pneumonia, and possible death.  She voiced understanding, all questions were answered and consents were signed.

## 2019-09-11 NOTE — Clinical Note
Patient would like to pursue hearing aids at [facility]. Could you please place a referral? Thank you!

## 2019-09-12 ENCOUNTER — TELEPHONE (OUTPATIENT)
Dept: INTERNAL MEDICINE | Facility: CLINIC | Age: 64
End: 2019-09-12

## 2019-09-12 ENCOUNTER — PATIENT MESSAGE (OUTPATIENT)
Dept: INTERNAL MEDICINE | Facility: CLINIC | Age: 64
End: 2019-09-12

## 2019-09-12 NOTE — PROGRESS NOTES
"Referring Provider:Dr. Ruiz    Kenia Alonzo was seen 09/12/2019 for an audiological evaluation.  She has a hx of Ménière's Disease and a vestibular nerve section around [id number] per Dr. Raul Sebastian, in [place], [place]. The last time she was seen by ENT was in 2016 in [place], [place]. She reports significant relief and decrease in episodes after surgery. She now only experiences episodes of dizziness/ nausea/ HA's once every 2-3 months, she believes episodes are brought on by a flare-up in allergy symptoms. Her allergy symptoms include sneezing and nasal congestion.   She has recently had increased symptoms and was seen by Dr. Trejo 3/2019.  At that time, she had tinnitus that she describes as "crackling" in her right ear.  She had an audiogram done, and was told that she had an AOM.  She was given a steroid injection as well as oral antibiotics, Amoxil.  She did not have any improvement, and was then given cefdinir.  Currently, she has a headache and a generalized foggy sensation.  She is no longer having as much "crackling" in the right ear.  She has not noted any recent change in her hearing in the right ear.  She continues to have dizziness.  She has had recent increased life stress with regards to her son.  She has a history of migraines, was on Topamax in the past and she did find it to be helpful, but was worried it caused memory loss at higher doses.  She has had increased dizziness, and feels as if she is going to fall over if she bends down and is generally off balance when she is walking.  She denies spinning sensations, and has no issue with rolling over in bed.    Results reveal a mild to moderately severe sensorineural hearing loss 250-8000 Hz for the right ear, and a normal-to-moderate sensorineural hearing loss 250-8000 Hz for the left ear.   Speech Reception Thresholds were  40 dBHL for the right ear and 20 dBHL for the left ear.   Word recognition scores were excellent for the right ear and excellent " for the left ear.   Tympanograms were Type A, normal for the right ear and Type A, normal for the left ear.    Patient was counseled on the above findings.    Recommendations:  1. Binaural hearing aids. Discussed with patient and she would like a referral to [facility].  2. Annual Audiograms

## 2019-09-12 NOTE — TELEPHONE ENCOUNTER
Patient wants to know if something can be called in for her, she states that her son was diagnosed with ALS. Patient was crying uncontrollably and could not stop. Patient states that she is at [facility] with her son. LOV:  04/23/19.      Please Advise

## 2019-09-12 NOTE — TELEPHONE ENCOUNTER
----- Message from Rosalva Angeles sent at 9/12/2019  2:41 PM CDT -----  Contact: pt   Stated she's calling to have her medication sent to another pharmacy, she can be reached at [phone]    [facility] [place] [address], [phone]

## 2019-09-12 NOTE — TELEPHONE ENCOUNTER
Patient called in regards to having her script called to [facility] [address] instead of the [facility].

## 2019-09-12 NOTE — TELEPHONE ENCOUNTER
Patient wants to know if something can be called in for her, she states that her son was diagnosed with ALS. Patient was crying uncontrollably and could not stop. Patient states that she is at [facility] with her son. LOV:  04/23/19.        Please Advise

## 2019-09-13 ENCOUNTER — PATIENT MESSAGE (OUTPATIENT)
Dept: INTERNAL MEDICINE | Facility: CLINIC | Age: 64
End: 2019-09-13

## 2019-09-13 RX ORDER — DIAZEPAM 2 MG/1
2 TABLET ORAL EVERY 6 HOURS PRN
Qty: 20 TABLET | Refills: 0 | Status: SHIPPED | OUTPATIENT
Start: 2019-09-13 | End: 2019-09-26 | Stop reason: SDUPTHER

## 2019-09-13 NOTE — TELEPHONE ENCOUNTER
I see she was given valium at the end of July. This would be appropriate for this problem. She may  Need to come in to discuss further.

## 2019-09-15 PROBLEM — D07.1 SEVERE DYSPLASIA OF VULVA: Status: ACTIVE | Noted: 2019-09-15

## 2019-09-15 RX ORDER — LIDOCAINE HYDROCHLORIDE 10 MG/ML
1 INJECTION, SOLUTION EPIDURAL; INFILTRATION; INTRACAUDAL; PERINEURAL ONCE
Status: CANCELLED | OUTPATIENT
Start: 2019-09-15 | End: 2019-09-15

## 2019-09-15 RX ORDER — SODIUM CHLORIDE 9 MG/ML
INJECTION, SOLUTION INTRAVENOUS CONTINUOUS
Status: CANCELLED | OUTPATIENT
Start: 2019-09-15

## 2019-09-16 ENCOUNTER — PATIENT OUTREACH (OUTPATIENT)
Dept: ADMINISTRATIVE | Facility: HOSPITAL | Age: 64
End: 2019-09-16

## 2019-09-16 LAB
HPV HR 12 DNA CVX QL NAA+PROBE: NEGATIVE
HPV16 AG SPEC QL: NEGATIVE
HPV18 DNA SPEC QL NAA+PROBE: NEGATIVE

## 2019-09-17 ENCOUNTER — TELEPHONE (OUTPATIENT)
Dept: GYNECOLOGIC ONCOLOGY | Facility: CLINIC | Age: 64
End: 2019-09-17

## 2019-09-17 ENCOUNTER — PATIENT MESSAGE (OUTPATIENT)
Dept: SURGERY | Facility: HOSPITAL | Age: 64
End: 2019-09-17

## 2019-09-17 NOTE — TELEPHONE ENCOUNTER
Called to discuss normal pap and HPV. No need for intervention for her cervix at the time of planned vulvar excision. No answer. Left voicemail.

## 2019-09-23 ENCOUNTER — OFFICE VISIT (OUTPATIENT)
Dept: INTERNAL MEDICINE | Facility: CLINIC | Age: 64
End: 2019-09-23
Payer: COMMERCIAL

## 2019-09-23 VITALS
BODY MASS INDEX: 24.09 KG/M2 | HEART RATE: 112 BPM | OXYGEN SATURATION: 98 % | TEMPERATURE: 97 F | RESPIRATION RATE: 16 BRPM | SYSTOLIC BLOOD PRESSURE: 120 MMHG | DIASTOLIC BLOOD PRESSURE: 82 MMHG | WEIGHT: 130.94 LBS | HEIGHT: 62 IN

## 2019-09-23 DIAGNOSIS — F43.21 COMPLICATED GRIEF: Primary | ICD-10-CM

## 2019-09-23 PROCEDURE — 99999 PR PBB SHADOW E&M-EST. PATIENT-LVL III: CPT | Mod: PBBFAC,,, | Performed by: FAMILY MEDICINE

## 2019-09-23 PROCEDURE — 99999 PR PBB SHADOW E&M-EST. PATIENT-LVL III: ICD-10-PCS | Mod: PBBFAC,,, | Performed by: FAMILY MEDICINE

## 2019-09-23 PROCEDURE — 99213 PR OFFICE/OUTPT VISIT, EST, LEVL III, 20-29 MIN: ICD-10-PCS | Mod: S$GLB,,, | Performed by: FAMILY MEDICINE

## 2019-09-23 PROCEDURE — 99213 OFFICE O/P EST LOW 20 MIN: CPT | Mod: S$GLB,,, | Performed by: FAMILY MEDICINE

## 2019-09-25 ENCOUNTER — DOCUMENTATION ONLY (OUTPATIENT)
Dept: GYNECOLOGIC ONCOLOGY | Facility: CLINIC | Age: 64
End: 2019-09-25

## 2019-09-26 ENCOUNTER — PATIENT MESSAGE (OUTPATIENT)
Dept: OTOLARYNGOLOGY | Facility: CLINIC | Age: 64
End: 2019-09-26

## 2019-09-26 RX ORDER — FLUTICASONE PROPIONATE 50 MCG
2 SPRAY, SUSPENSION (ML) NASAL DAILY
Qty: 16 G | Refills: 3 | Status: SHIPPED | OUTPATIENT
Start: 2019-09-26 | End: 2020-09-28 | Stop reason: SDUPTHER

## 2019-09-26 RX ORDER — AZELASTINE 1 MG/ML
1 SPRAY, METERED NASAL 2 TIMES DAILY
Qty: 30 ML | Refills: 3 | Status: SHIPPED | OUTPATIENT
Start: 2019-09-26 | End: 2020-09-28 | Stop reason: SDUPTHER

## 2019-09-27 PROBLEM — F43.21 COMPLICATED GRIEF: Status: ACTIVE | Noted: 2019-09-27

## 2019-09-27 RX ORDER — DIAZEPAM 2 MG/1
2 TABLET ORAL EVERY 6 HOURS PRN
Qty: 20 TABLET | Refills: 0 | Status: SHIPPED | OUTPATIENT
Start: 2019-09-27 | End: 2019-10-10 | Stop reason: SDUPTHER

## 2019-09-27 NOTE — PROGRESS NOTES
Subjective:       Patient ID: Kenia Alonzo is a 64 y.o. female.    Chief Complaint: Abdominal Pain    HPI   came today with concerns about anxiety stress and grieving that she has been going through over the last several weeks.  Son has been diagnosed with ALS although this is a preliminary diagnosis.  He is still going through workup at [facility] better determine what is going on and treatment may be helpful.  Several days ago she had sent me a message requesting medication to help with coping.  She has taking Valium in the past and had recommended her to take this episode which she feels has been helpful.        Family History   Problem Relation Age of Onset    Colon cancer Father     Diabetes Father     Diabetes Sister        Current Outpatient Medications:     bumetanide (BUMEX) 2 MG tablet, Take 1 tablet (2 mg total) by mouth once daily. (Patient taking differently: Take 1 mg by mouth once daily. ), Disp: 30 tablet, Rfl: 1    buPROPion (WELLBUTRIN XL) 300 MG 24 hr tablet, Take 1 tablet (300 mg total) by mouth once daily., Disp: 30 tablet, Rfl: 11    dicyclomine (BENTYL) 10 MG capsule, Take 1 capsule (10 mg total) by mouth 3 (three) times daily. (Patient taking differently: Take 10 mg by mouth every evening. ), Disp: 90 capsule, Rfl: 1    levocetirizine (XYZAL) 5 MG tablet, Take 1 tablet (5 mg total) by mouth every evening., Disp: 90 tablet, Rfl: 3    MELATONIN ORAL, Take by mouth nightly as needed. , Disp: , Rfl:     metoclopramide HCl (REGLAN) 5 MG tablet, Take 1 tablet (5 mg total) by mouth every 12 (twelve) hours as needed., Disp: 10 tablet, Rfl: 3    naratriptan (AMERGE) 2.5 MG tablet, Take 1 tablet (2.5 mg total) by mouth every 72 hours as needed. 2.5 mg at onset of headache, may repeat in 4 hours if needed, Disp: 9 tablet, Rfl: 3    simvastatin (ZOCOR) 5 MG tablet, Take 1 tablet (5 mg total) by mouth nightly., Disp: 90 tablet, Rfl: 3    topiramate (TROKENDI XR) 100 mg Cp24, Take 1  "capsule (100 mg total) by mouth once daily. (Patient taking differently: Take 100 mg by mouth nightly. ), Disp: 30 capsule, Rfl: 11    azelastine (ASTELIN) 137 mcg (0.1 %) nasal spray, 1 spray (137 mcg total) by Nasal route 2 (two) times daily., Disp: 30 mL, Rfl: 3    diazePAM (VALIUM) 2 MG tablet, Take 1 tablet (2 mg total) by mouth every 6 (six) hours as needed (dizziness/Meniere's attack)., Disp: 20 tablet, Rfl: 0    fluticasone propionate (FLONASE) 50 mcg/actuation nasal spray, 2 sprays (100 mcg total) by Each Nostril route once daily., Disp: 16 g, Rfl: 3    Review of Systems   Constitutional: Negative for activity change and unexpected weight change.   HENT: Negative for hearing loss, rhinorrhea and trouble swallowing.    Eyes: Negative for discharge and visual disturbance.   Respiratory: Negative for chest tightness and wheezing.    Cardiovascular: Positive for palpitations. Negative for chest pain.   Gastrointestinal: Positive for constipation and diarrhea. Negative for blood in stool and vomiting.   Endocrine: Negative for polydipsia and polyuria.   Genitourinary: Negative for difficulty urinating, dysuria, hematuria and menstrual problem.   Musculoskeletal: Positive for joint swelling. Negative for neck pain.   Neurological: Positive for headaches. Negative for weakness.   Psychiatric/Behavioral: Positive for confusion and dysphoric mood.       Objective:   /82 (BP Location: Right arm, Patient Position: Sitting, BP Method: Medium (Manual))   Pulse (!) 112   Temp 97.1 °F (36.2 °C) (Tympanic)   Resp 16   Ht 5' 2" (1.575 m)   Wt 59.4 kg (130 lb 15.3 oz)   SpO2 98%   BMI 23.95 kg/m²      Physical Exam   Constitutional: She is oriented to person, place, and time. She appears well-developed and well-nourished.   HENT:   Head: Normocephalic and atraumatic.   Eyes: Conjunctivae are normal.   Cardiovascular: Normal rate.   Pulmonary/Chest: Effort normal. No respiratory distress.   Musculoskeletal: " She exhibits no edema.   Neurological: She is alert and oriented to person, place, and time. Coordination normal.   Skin: Skin is warm and dry. No rash noted.   Psychiatric: She has a normal mood and affect. Her behavior is normal.   Vitals reviewed.      Assessment & Plan     Problem List Items Addressed This Visit        Psychiatric    Complicated grief - Primary    Current Assessment & Plan      Spent all of today's visit listening to patient counseling cope her son's illness.  Recommended using Valium as needed since this has been helpful.                 No follow-ups on file.    Disclaimer:  This note may have been prepared using voice recognition software, it may have not been extensively proofed, as such there could be errors within the text such as sound alike errors.

## 2019-09-27 NOTE — TELEPHONE ENCOUNTER
Patient call to give an update on her son and to thanks for all my help. Listen to her. Offering assistance where and when she needed it..

## 2019-09-27 NOTE — ASSESSMENT & PLAN NOTE
Spent all of today's visit listening to patient counseling cope her son's illness.  Recommended using Valium as needed since this has been helpful.

## 2019-09-27 NOTE — TELEPHONE ENCOUNTER
----- Message from Cira Velez MA sent at 9/27/2019 10:12 AM CDT -----  Contact: pt      ----- Message -----  From: Dorina Fuentse  Sent: 9/27/2019   9:42 AM CDT  To: Mary SYKES Staff    Please call pt @ [phone], pt need to speak with Kerri, states she was helping her with a issue, pt will discuss with her.

## 2019-10-02 ENCOUNTER — TELEPHONE (OUTPATIENT)
Dept: PHARMACY | Facility: CLINIC | Age: 64
End: 2019-10-02

## 2019-10-10 ENCOUNTER — OFFICE VISIT (OUTPATIENT)
Dept: INTERNAL MEDICINE | Facility: CLINIC | Age: 64
End: 2019-10-10
Payer: MEDICAID

## 2019-10-10 VITALS
OXYGEN SATURATION: 99 % | DIASTOLIC BLOOD PRESSURE: 85 MMHG | TEMPERATURE: 97 F | SYSTOLIC BLOOD PRESSURE: 124 MMHG | BODY MASS INDEX: 23.24 KG/M2 | WEIGHT: 126.31 LBS | HEART RATE: 106 BPM | HEIGHT: 62 IN | RESPIRATION RATE: 16 BRPM

## 2019-10-10 DIAGNOSIS — F43.21 COMPLICATED GRIEF: Primary | ICD-10-CM

## 2019-10-10 DIAGNOSIS — G43.809 VESTIBULAR MIGRAINE: ICD-10-CM

## 2019-10-10 PROCEDURE — 96372 THER/PROPH/DIAG INJ SC/IM: CPT | Mod: PBBFAC,PO

## 2019-10-10 PROCEDURE — 99215 OFFICE O/P EST HI 40 MIN: CPT | Mod: PBBFAC,PO,25 | Performed by: FAMILY MEDICINE

## 2019-10-10 PROCEDURE — 99214 PR OFFICE/OUTPT VISIT, EST, LEVL IV, 30-39 MIN: ICD-10-PCS | Mod: S$PBB,,, | Performed by: FAMILY MEDICINE

## 2019-10-10 PROCEDURE — 99214 OFFICE O/P EST MOD 30 MIN: CPT | Mod: S$PBB,,, | Performed by: FAMILY MEDICINE

## 2019-10-10 PROCEDURE — 99999 PR PBB SHADOW E&M-EST. PATIENT-LVL V: ICD-10-PCS | Mod: PBBFAC,,, | Performed by: FAMILY MEDICINE

## 2019-10-10 PROCEDURE — 99999 PR PBB SHADOW E&M-EST. PATIENT-LVL V: CPT | Mod: PBBFAC,,, | Performed by: FAMILY MEDICINE

## 2019-10-10 RX ORDER — ONDANSETRON 4 MG/1
4 TABLET, ORALLY DISINTEGRATING ORAL EVERY 8 HOURS PRN
Qty: 60 TABLET | Refills: 1 | Status: SHIPPED | OUTPATIENT
Start: 2019-10-10 | End: 2021-07-21

## 2019-10-10 RX ORDER — DIAZEPAM 2 MG/1
2 TABLET ORAL EVERY 6 HOURS PRN
Qty: 30 TABLET | Refills: 1 | Status: SHIPPED | OUTPATIENT
Start: 2019-10-10 | End: 2020-01-16 | Stop reason: SDUPTHER

## 2019-10-10 RX ORDER — METHYLPREDNISOLONE 4 MG/1
TABLET ORAL
Qty: 1 PACKAGE | Refills: 0 | Status: SHIPPED | OUTPATIENT
Start: 2019-10-10 | End: 2019-11-20

## 2019-10-10 RX ORDER — KETOROLAC TROMETHAMINE 30 MG/ML
60 INJECTION, SOLUTION INTRAMUSCULAR; INTRAVENOUS
Status: COMPLETED | OUTPATIENT
Start: 2019-10-10 | End: 2019-10-10

## 2019-10-10 RX ADMIN — KETOROLAC TROMETHAMINE 60 MG: 60 INJECTION, SOLUTION INTRAMUSCULAR at 04:10

## 2019-10-10 NOTE — PROGRESS NOTES
Subjective:       Patient ID: Kenia Alonzo is a 64 y.o. female.    Chief Complaint: Headache and Sinus Problem    HPI  Stress: from son being sick and currently at [facility]. Has been having headache worsening over the last 4 days. Out of topamax for the 5 days. Out of insurance and can't afford it. Also taking excedrin which is not helping much. She drove herself to clinic today.  Not having any new symptoms unlike previous migraines.    Family History   Problem Relation Age of Onset    Colon cancer Father     Diabetes Father     Diabetes Sister        Current Outpatient Medications:     azelastine (ASTELIN) 137 mcg (0.1 %) nasal spray, 1 spray (137 mcg total) by Nasal route 2 (two) times daily., Disp: 30 mL, Rfl: 3    bumetanide (BUMEX) 2 MG tablet, Take 1 tablet (2 mg total) by mouth once daily. (Patient taking differently: Take 1 mg by mouth once daily. ), Disp: 30 tablet, Rfl: 1    buPROPion (WELLBUTRIN XL) 300 MG 24 hr tablet, Take 1 tablet (300 mg total) by mouth once daily., Disp: 30 tablet, Rfl: 11    diazePAM (VALIUM) 2 MG tablet, Take 1 tablet (2 mg total) by mouth every 6 (six) hours as needed (dizziness/Meniere's attack)., Disp: 30 tablet, Rfl: 1    dicyclomine (BENTYL) 10 MG capsule, Take 1 capsule (10 mg total) by mouth 3 (three) times daily. (Patient taking differently: Take 10 mg by mouth every evening. ), Disp: 90 capsule, Rfl: 1    fluticasone propionate (FLONASE) 50 mcg/actuation nasal spray, 2 sprays (100 mcg total) by Each Nostril route once daily., Disp: 16 g, Rfl: 3    galcanezumab-gnlm (EMGALITY PEN) 120 mg/mL PnIj, Inject 120 mg into the skin every 28 days., Disp: 1 mL, Rfl: 11    levocetirizine (XYZAL) 5 MG tablet, Take 1 tablet (5 mg total) by mouth every evening., Disp: 90 tablet, Rfl: 3    MELATONIN ORAL, Take by mouth nightly as needed. , Disp: , Rfl:     metoclopramide HCl (REGLAN) 5 MG tablet, Take 1 tablet (5 mg total) by mouth every 12 (twelve) hours as needed., Disp: 10  "tablet, Rfl: 3    naratriptan (AMERGE) 2.5 MG tablet, Take 1 tablet (2.5 mg total) by mouth every 72 hours as needed. 2.5 mg at onset of headache, may repeat in 4 hours if needed, Disp: 9 tablet, Rfl: 3    simvastatin (ZOCOR) 5 MG tablet, Take 1 tablet (5 mg total) by mouth nightly., Disp: 90 tablet, Rfl: 3    topiramate (TROKENDI XR) 100 mg Cp24, Take 1 capsule (100 mg total) by mouth once daily. (Patient taking differently: Take 100 mg by mouth nightly. ), Disp: 30 capsule, Rfl: 11    methylPREDNISolone (MEDROL DOSEPACK) 4 mg tablet, use as directed, Disp: 1 Package, Rfl: 0    ondansetron (ZOFRAN-ODT) 4 MG TbDL, Take 1 tablet (4 mg total) by mouth every 8 (eight) hours as needed., Disp: 60 tablet, Rfl: 1    Review of Systems   Constitutional: Negative for activity change.   Eyes: Negative for discharge.   Respiratory: Negative for wheezing.    Cardiovascular: Positive for palpitations. Negative for chest pain.   Gastrointestinal: Positive for constipation, diarrhea and vomiting.   Endocrine: Negative for polydipsia and polyuria.   Genitourinary: Negative for difficulty urinating and hematuria.   Neurological: Positive for weakness and headaches.   Psychiatric/Behavioral: Positive for confusion and dysphoric mood.       Objective:   /85 (BP Location: Left arm, Patient Position: Sitting, BP Method: Medium (Automatic))   Pulse 106   Temp 97.3 °F (36.3 °C) (Tympanic)   Resp 16   Ht 5' 2" (1.575 m)   Wt 57.3 kg (126 lb 5.2 oz)   SpO2 99%   BMI 23.10 kg/m²      Physical Exam   Constitutional: She is oriented to person, place, and time. She appears well-developed and well-nourished. No distress.   HENT:   Head: Normocephalic and atraumatic.   Nose: Nose normal.   Eyes: Pupils are equal, round, and reactive to light. Conjunctivae and EOM are normal. Right eye exhibits no discharge. Left eye exhibits no discharge.   Neck: No thyromegaly present.   Cardiovascular: Normal rate and regular rhythm.   No " murmur heard.  Pulmonary/Chest: Effort normal and breath sounds normal. No respiratory distress.   Abdominal: Soft. She exhibits no distension.   Musculoskeletal: She exhibits no edema.   Neurological: She is alert and oriented to person, place, and time. No cranial nerve deficit. Coordination normal.   Skin: No rash noted. She is not diaphoretic.   Psychiatric: She has a normal mood and affect. Her behavior is normal.       Assessment & Plan     Problem List Items Addressed This Visit        Neuro    Vestibular migraine    Current Assessment & Plan     Acute treatment given today for migraine that is likely flared up since she recently discontinued the Topamax secondary to not being ensured in unable to afford the medication.  Advised her to let me know when she has medicates I can refill that prescription which had been working for a while.           Relevant Medications    ketorolac injection 60 mg (Completed)    ondansetron (ZOFRAN-ODT) 4 MG TbDL    methylPREDNISolone (MEDROL DOSEPACK) 4 mg tablet       Psychiatric    Complicated grief - Primary    Current Assessment & Plan     Encouraged her to continue distance seen herself from her son while he is going through this difficult time since it seems to be exacerbating her stress and migraines due to his mean demeanor.         Relevant Medications    diazePAM (VALIUM) 2 MG tablet    Other Relevant Orders    Ambulatory Referral to Psychiatry            No follow-ups on file.    Disclaimer:  This note may have been prepared using voice recognition software, it may have not been extensively proofed, as such there could be errors within the text such as sound alike errors.

## 2019-10-16 ENCOUNTER — TELEPHONE (OUTPATIENT)
Dept: PSYCHIATRY | Facility: CLINIC | Age: 64
End: 2019-10-16

## 2019-10-16 ENCOUNTER — PATIENT MESSAGE (OUTPATIENT)
Dept: INTERNAL MEDICINE | Facility: CLINIC | Age: 64
End: 2019-10-16

## 2019-10-16 ENCOUNTER — TELEPHONE (OUTPATIENT)
Dept: INTERNAL MEDICINE | Facility: CLINIC | Age: 64
End: 2019-10-16

## 2019-10-16 NOTE — ASSESSMENT & PLAN NOTE
Encouraged her to continue distance seen herself from her son while he is going through this difficult time since it seems to be exacerbating her stress and migraines due to his mean demeanor.

## 2019-10-16 NOTE — ASSESSMENT & PLAN NOTE
Acute treatment given today for migraine that is likely flared up since she recently discontinued the Topamax secondary to not being ensured in unable to afford the medication.  Advised her to let me know when she has medicates I can refill that prescription which had been working for a while.

## 2019-10-16 NOTE — TELEPHONE ENCOUNTER
Ret call and lm for pt that I would randal as requested with the  and our first avail np apt is in Jan '20.  I also offered counseling services additionally if she is interested and to call back.

## 2019-10-16 NOTE — TELEPHONE ENCOUNTER
----- Message from Floresita Rodriguez sent at 10/16/2019 10:12 AM CDT -----  Contact: Arcamed request  Message     Appointment Request From: Kenia Alonzo    With Provider: Dr Bustamante    Preferred Date Range: Any    Preferred Times: Any time    Reason for visit: Depression, grief, anxiety, maybe manic episodes, followed by depression    Comments:  Evaluate if I need meds, address panic anxiety, depression. Lost my job, my identity, having difficulty coping. I need to be evaluated

## 2019-10-21 RX ORDER — BUMETANIDE 2 MG/1
1 TABLET ORAL DAILY
Qty: 90 TABLET | Refills: 1 | Status: SHIPPED | OUTPATIENT
Start: 2019-10-21 | End: 2020-09-14 | Stop reason: SDUPTHER

## 2019-10-22 ENCOUNTER — PATIENT MESSAGE (OUTPATIENT)
Dept: INTERNAL MEDICINE | Facility: CLINIC | Age: 64
End: 2019-10-22

## 2019-10-22 DIAGNOSIS — M17.0 OSTEOARTHRITIS OF BOTH KNEES, UNSPECIFIED OSTEOARTHRITIS TYPE: Primary | ICD-10-CM

## 2019-10-23 ENCOUNTER — PATIENT MESSAGE (OUTPATIENT)
Dept: INTERNAL MEDICINE | Facility: CLINIC | Age: 64
End: 2019-10-23

## 2019-10-24 ENCOUNTER — IMMUNIZATION (OUTPATIENT)
Dept: INTERNAL MEDICINE | Facility: CLINIC | Age: 64
End: 2019-10-24
Payer: MEDICAID

## 2019-10-24 PROCEDURE — 99999 PR PBB SHADOW E&M-EST. PATIENT-LVL II: CPT | Mod: PBBFAC,,,

## 2019-10-24 PROCEDURE — 99212 OFFICE O/P EST SF 10 MIN: CPT | Mod: PBBFAC,PO,25

## 2019-10-24 PROCEDURE — 99999 PR PBB SHADOW E&M-EST. PATIENT-LVL II: ICD-10-PCS | Mod: PBBFAC,,,

## 2019-10-24 PROCEDURE — 90471 IMMUNIZATION ADMIN: CPT | Mod: PBBFAC,PO

## 2019-10-28 ENCOUNTER — TELEPHONE (OUTPATIENT)
Dept: PHARMACY | Facility: CLINIC | Age: 64
End: 2019-10-28

## 2019-10-30 ENCOUNTER — PATIENT MESSAGE (OUTPATIENT)
Dept: PHARMACY | Facility: CLINIC | Age: 64
End: 2019-10-30

## 2019-10-31 NOTE — TELEPHONE ENCOUNTER
Patient returned phone call back regarding specialty medication refill for Emgality $0/004- Patient scheduled to have medication shipped out on 10/31/19 to receive on 11/1/19 address confirmed. Patient's next injection is due 11/3/19. Patient informed no new medications, conditions or allergies since last talked to [facility]. Patient has 0 injections on hand & no missed doses. Patient informed me that she would give Emgality another try but expressed that she liked Aimovig better. Although she only took Aimovig one time she felt it was easier and worked better. Scarlett informed me that [employer] should approve Aimovig as long as she tried/ failed Emgality first. Patient declined questions for the clinical pharmacist. Patient voiced understanding. JESÚS

## 2019-11-05 ENCOUNTER — PATIENT MESSAGE (OUTPATIENT)
Dept: AUDIOLOGY | Facility: CLINIC | Age: 64
End: 2019-11-05

## 2019-11-05 ENCOUNTER — PATIENT MESSAGE (OUTPATIENT)
Dept: INTERNAL MEDICINE | Facility: CLINIC | Age: 64
End: 2019-11-05

## 2019-11-08 ENCOUNTER — TELEPHONE (OUTPATIENT)
Dept: INTERNAL MEDICINE | Facility: CLINIC | Age: 64
End: 2019-11-08

## 2019-11-08 NOTE — TELEPHONE ENCOUNTER
----- Message from Bolivar Aguilera sent at 11/8/2019  2:56 PM CST -----  Contact: Pt  Pt called in regards to getting medication for knee pains. Pt can be reached at [phone] (home). Pt stated that she did not want narcotics she wants something that will help with the inflammation the joints.    [facility] - [place], [place] - [address] [address]  [address] [place] [address]  Phone: [phone] Fax: [phone]

## 2019-11-08 NOTE — TELEPHONE ENCOUNTER
Patient complain for knee pain,7/10 on pain scale. Patient would like to know if there is something she can take for pain. Patient states her son got out of hospital. And she have been on her legs since Tuesday, and the weather don't help either.     Please advise

## 2019-11-15 DIAGNOSIS — K58.9 IRRITABLE BOWEL SYNDROME WITHOUT DIARRHEA: ICD-10-CM

## 2019-11-15 RX ORDER — DICYCLOMINE HYDROCHLORIDE 10 MG/1
CAPSULE ORAL
Qty: 90 CAPSULE | Refills: 1 | Status: SHIPPED | OUTPATIENT
Start: 2019-11-15 | End: 2020-01-21

## 2019-11-18 ENCOUNTER — PATIENT OUTREACH (OUTPATIENT)
Dept: ADMINISTRATIVE | Facility: HOSPITAL | Age: 64
End: 2019-11-18

## 2019-11-20 ENCOUNTER — OFFICE VISIT (OUTPATIENT)
Dept: INTERNAL MEDICINE | Facility: CLINIC | Age: 64
End: 2019-11-20
Payer: MEDICAID

## 2019-11-20 ENCOUNTER — PATIENT MESSAGE (OUTPATIENT)
Dept: PSYCHIATRY | Facility: CLINIC | Age: 64
End: 2019-11-20

## 2019-11-20 ENCOUNTER — HOSPITAL ENCOUNTER (OUTPATIENT)
Dept: RADIOLOGY | Facility: HOSPITAL | Age: 64
Discharge: HOME | End: 2019-11-20
Attending: FAMILY MEDICINE
Payer: MEDICAID

## 2019-11-20 VITALS
OXYGEN SATURATION: 98 % | RESPIRATION RATE: 16 BRPM | BODY MASS INDEX: 23.4 KG/M2 | WEIGHT: 127.19 LBS | HEART RATE: 100 BPM | DIASTOLIC BLOOD PRESSURE: 62 MMHG | TEMPERATURE: 97 F | SYSTOLIC BLOOD PRESSURE: 100 MMHG | HEIGHT: 62 IN

## 2019-11-20 DIAGNOSIS — F43.21 COMPLICATED GRIEF: ICD-10-CM

## 2019-11-20 DIAGNOSIS — M17.12 PRIMARY OSTEOARTHRITIS OF LEFT KNEE: ICD-10-CM

## 2019-11-20 DIAGNOSIS — J30.89 NON-SEASONAL ALLERGIC RHINITIS, UNSPECIFIED TRIGGER: Primary | ICD-10-CM

## 2019-11-20 PROCEDURE — 20610 DRAIN/INJ JOINT/BURSA W/O US: CPT | Mod: PBBFAC,PO,LT | Performed by: FAMILY MEDICINE

## 2019-11-20 PROCEDURE — 73562 XR KNEE ORTHO LEFT: ICD-10-PCS | Mod: 26,LT,, | Performed by: RADIOLOGY

## 2019-11-20 PROCEDURE — 99214 PR OFFICE/OUTPT VISIT, EST, LEVL IV, 30-39 MIN: ICD-10-PCS | Mod: S$PBB,25,, | Performed by: FAMILY MEDICINE

## 2019-11-20 PROCEDURE — 99999 PR PBB SHADOW E&M-EST. PATIENT-LVL III: ICD-10-PCS | Mod: PBBFAC,,, | Performed by: FAMILY MEDICINE

## 2019-11-20 PROCEDURE — 73562 X-RAY EXAM OF KNEE 3: CPT | Mod: 26,LT,, | Performed by: RADIOLOGY

## 2019-11-20 PROCEDURE — 73560 X-RAY EXAM OF KNEE 1 OR 2: CPT | Mod: 59,TC,PO,RT

## 2019-11-20 PROCEDURE — 73562 X-RAY EXAM OF KNEE 3: CPT | Mod: TC,PO,LT

## 2019-11-20 PROCEDURE — 20610 PR DRAIN/INJECT LARGE JOINT/BURSA: ICD-10-PCS | Mod: S$PBB,LT,, | Performed by: FAMILY MEDICINE

## 2019-11-20 PROCEDURE — 20610 DRAIN/INJ JOINT/BURSA W/O US: CPT | Mod: S$PBB,LT,, | Performed by: FAMILY MEDICINE

## 2019-11-20 PROCEDURE — 73560 X-RAY EXAM OF KNEE 1 OR 2: CPT | Mod: 26,59,RT, | Performed by: RADIOLOGY

## 2019-11-20 PROCEDURE — 99213 OFFICE O/P EST LOW 20 MIN: CPT | Mod: PBBFAC,25,PO | Performed by: FAMILY MEDICINE

## 2019-11-20 PROCEDURE — 99214 OFFICE O/P EST MOD 30 MIN: CPT | Mod: S$PBB,25,, | Performed by: FAMILY MEDICINE

## 2019-11-20 PROCEDURE — 99999 PR PBB SHADOW E&M-EST. PATIENT-LVL III: CPT | Mod: PBBFAC,,, | Performed by: FAMILY MEDICINE

## 2019-11-20 PROCEDURE — 73560 XR KNEE ORTHO LEFT: ICD-10-PCS | Mod: 26,59,RT, | Performed by: RADIOLOGY

## 2019-11-20 RX ORDER — PREDNISONE 20 MG/1
20 TABLET ORAL DAILY
Qty: 5 TABLET | Refills: 0 | Status: SHIPPED | OUTPATIENT
Start: 2019-11-20 | End: 2019-11-25

## 2019-11-20 RX ORDER — METHYLPREDNISOLONE ACETATE 80 MG/ML
80 INJECTION, SUSPENSION INTRA-ARTICULAR; INTRALESIONAL; INTRAMUSCULAR; SOFT TISSUE
Status: DISCONTINUED | OUTPATIENT
Start: 2019-11-20 | End: 2019-11-20 | Stop reason: SDUPTHER

## 2019-11-20 RX ORDER — METHYLPREDNISOLONE ACETATE 80 MG/ML
80 INJECTION, SUSPENSION INTRA-ARTICULAR; INTRALESIONAL; INTRAMUSCULAR; SOFT TISSUE
Status: COMPLETED | OUTPATIENT
Start: 2019-11-20 | End: 2019-11-20

## 2019-11-20 RX ADMIN — METHYLPREDNISOLONE ACETATE 80 MG: 80 INJECTION, SUSPENSION INTRALESIONAL; INTRAMUSCULAR; INTRASYNOVIAL; SOFT TISSUE at 05:11

## 2019-11-20 NOTE — PROGRESS NOTES
Subjective:       Patient ID: eKnia Alonzo is a 64 y.o. female.    Chief Complaint: Sinus Problem    Sinus Problem   This is a new problem. The current episode started 1 to 4 weeks ago. The problem has been waxing and waning since onset. There has been no fever. The pain is mild. Associated symptoms include chills, coughing, headaches and a sore throat. Pertinent negatives include no shortness of breath. Past treatments include antibiotics (took some left over amoxicillin and tamiflu last week).   Knee Pain    The incident occurred more than 1 week ago. There was no injury mechanism. The pain is present in the left knee. The pain is at a severity of 6/10. The pain is moderate. The pain has been fluctuating since onset. Pertinent negatives include no inability to bear weight, loss of motion, loss of sensation, muscle weakness, numbness or tingling. The symptoms are aggravated by movement and weight bearing. Treatments tried: has had steroid and synvisc in  past for know OA. The treatment provided moderate (had relief in past with injections. ) relief.       Family History   Problem Relation Age of Onset    Colon cancer Father     Diabetes Father     Diabetes Sister        Current Outpatient Medications:     bumetanide (BUMEX) 2 MG tablet, Take 0.5 tablets (1 mg total) by mouth once daily., Disp: 90 tablet, Rfl: 1    buPROPion (WELLBUTRIN XL) 300 MG 24 hr tablet, Take 1 tablet (300 mg total) by mouth once daily., Disp: 30 tablet, Rfl: 11    diazePAM (VALIUM) 2 MG tablet, Take 1 tablet (2 mg total) by mouth every 6 (six) hours as needed (dizziness/Meniere's attack)., Disp: 30 tablet, Rfl: 1    dicyclomine (BENTYL) 10 MG capsule, Take 1 capsule (10 mg total) by mouth 3 (three) times daily. (Patient taking differently: Take 10 mg by mouth every evening. ), Disp: 90 capsule, Rfl: 1    dicyclomine (BENTYL) 10 MG capsule, TAKE 1 CAPSULE BY MOUTH THREE TIMES DAILY, Disp: 90 capsule, Rfl: 1    fluticasone propionate  (FLONASE) 50 mcg/actuation nasal spray, 2 sprays (100 mcg total) by Each Nostril route once daily., Disp: 16 g, Rfl: 3    galcanezumab-gnlm (EMGALITY PEN) 120 mg/mL PnIj, Inject 120 mg into the skin every 28 days., Disp: 1 mL, Rfl: 11    levocetirizine (XYZAL) 5 MG tablet, Take 1 tablet (5 mg total) by mouth every evening., Disp: 90 tablet, Rfl: 3    MELATONIN ORAL, Take by mouth nightly as needed. , Disp: , Rfl:     metoclopramide HCl (REGLAN) 5 MG tablet, Take 1 tablet (5 mg total) by mouth every 12 (twelve) hours as needed., Disp: 10 tablet, Rfl: 3    naratriptan (AMERGE) 2.5 MG tablet, Take 1 tablet (2.5 mg total) by mouth every 72 hours as needed. 2.5 mg at onset of headache, may repeat in 4 hours if needed, Disp: 9 tablet, Rfl: 3    ondansetron (ZOFRAN-ODT) 4 MG TbDL, Take 1 tablet (4 mg total) by mouth every 8 (eight) hours as needed., Disp: 60 tablet, Rfl: 1    simvastatin (ZOCOR) 5 MG tablet, Take 1 tablet (5 mg total) by mouth nightly., Disp: 90 tablet, Rfl: 3    topiramate (TROKENDI XR) 100 mg Cp24, Take 1 capsule (100 mg total) by mouth once daily. (Patient taking differently: Take 100 mg by mouth nightly. ), Disp: 30 capsule, Rfl: 11    azelastine (ASTELIN) 137 mcg (0.1 %) nasal spray, 1 spray (137 mcg total) by Nasal route 2 (two) times daily., Disp: 30 mL, Rfl: 3    predniSONE (DELTASONE) 20 MG tablet, Take 1 tablet (20 mg total) by mouth once daily. for 5 days, Disp: 5 tablet, Rfl: 0    Review of Systems   Constitutional: Positive for chills and fever. Negative for weight loss.   HENT: Positive for postnasal drip, rhinorrhea and sore throat.    Respiratory: Positive for cough. Negative for hemoptysis, shortness of breath and wheezing.    Cardiovascular: Negative for chest pain.   Gastrointestinal: Negative for heartburn.   Musculoskeletal: Positive for arthralgias (left knee is worse) and myalgias.   Skin: Negative for rash.   Allergic/Immunologic: Positive for environmental allergies.  "  Neurological: Positive for headaches. Negative for tingling and numbness.       Objective:   /62 (BP Location: Left arm, Patient Position: Sitting, BP Method: Medium (Automatic))   Pulse 100   Temp 96.9 °F (36.1 °C) (Tympanic)   Resp 16   Ht 5' 2" (1.575 m)   Wt 57.7 kg (127 lb 3.3 oz)   SpO2 98%   BMI 23.27 kg/m²      Physical Exam   Constitutional: She is oriented to person, place, and time. She appears well-developed and well-nourished.   HENT:   Head: Normocephalic and atraumatic.   orophar erythema/ cobblestoning   Eyes: Conjunctivae are normal.   Cardiovascular: Normal rate.   Pulmonary/Chest: Effort normal. No respiratory distress.   Musculoskeletal: She exhibits no edema.   Crepitus of left knee   Neurological: She is alert and oriented to person, place, and time. Coordination normal.   Skin: Skin is warm and dry. No rash noted.   Psychiatric: She has a normal mood and affect. Her behavior is normal.   Vitals reviewed.      Procedure Note:  Obtained verbal consent for knee injection. Cleaned skin with alcohol prep and using "no touch technique" inserted 22ga needle into lateral joint space of left knee. Attempted aspiration but no fluid was retrieved. injected 80mg of depo-medrol and 1mL of 1% lidocaine. Patient tolerated procedure well and had no problems ambulating afterwards.    Assessment & Plan     Problem List Items Addressed This Visit        Psychiatric    Complicated grief    Current Assessment & Plan     Son's health is much better. She is doing better            Orthopedic    Primary osteoarthritis of left knee    Current Assessment & Plan      Patient has known history of osteoarthritis and in the past received steroid and Synvisc injections.  Obtained imaging today to confirm that she does have pretty significant osteoarthritis and she requested getting steroid injection to help hold her over for a while.  Has not had recent intra-articular injection.  She is aware that this is " not a curative therapy.         Relevant Medications    methylPREDNISolone acetate injection 80 mg (Completed)       Other    Non-seasonal allergic rhinitis - Primary    Current Assessment & Plan     Continue azelastine, start flonase. Burst course of prednisone                 No follow-ups on file.    Disclaimer:  This note may have been prepared using voice recognition software, it may have not been extensively proofed, as such there could be errors within the text such as sound alike errors.

## 2019-11-21 PROBLEM — M17.12 PRIMARY OSTEOARTHRITIS OF LEFT KNEE: Status: ACTIVE | Noted: 2019-11-21

## 2019-11-21 NOTE — ASSESSMENT & PLAN NOTE
Patient has known history of osteoarthritis and in the past received steroid and Synvisc injections.  Obtained imaging today to confirm that she does have pretty significant osteoarthritis and she requested getting steroid injection to help hold her over for a while.  Has not had recent intra-articular injection.  She is aware that this is not a curative therapy.

## 2019-11-27 ENCOUNTER — TELEPHONE (OUTPATIENT)
Dept: PHARMACY | Facility: CLINIC | Age: 64
End: 2019-11-27

## 2019-11-27 NOTE — TELEPHONE ENCOUNTER
Patient returned refill call for Emgality. Verified to ship on Monday 12/2 for delivery on Tuesday 12/3. $0.00 copay at 004.     Noe Lopez, PharmD  Clinical Pharmacist  [facility]  P: [phone]

## 2019-11-27 NOTE — TELEPHONE ENCOUNTER
Call attempt 1  for Emgality refill and clinical follow up -[facility]; [employer] message sent. Copay $0 at 004.

## 2019-11-29 ENCOUNTER — PATIENT OUTREACH (OUTPATIENT)
Dept: ADMINISTRATIVE | Facility: HOSPITAL | Age: 64
End: 2019-11-29

## 2019-12-11 ENCOUNTER — TELEPHONE (OUTPATIENT)
Dept: PHARMACY | Facility: CLINIC | Age: 64
End: 2019-12-11

## 2019-12-11 DIAGNOSIS — G44.89 CHRONIC MIXED HEADACHE SYNDROME: Primary | ICD-10-CM

## 2019-12-11 NOTE — TELEPHONE ENCOUNTER
LVM for callback to inform patient that [facility] received prescription for AIMOVIG and prior authorization is required.  [facility] will be back in touch once insurance determination is received.

## 2019-12-20 ENCOUNTER — PATIENT OUTREACH (OUTPATIENT)
Dept: ADMINISTRATIVE | Facility: HOSPITAL | Age: 64
End: 2019-12-20

## 2019-12-27 ENCOUNTER — TELEPHONE (OUTPATIENT)
Dept: PHARMACY | Facility: CLINIC | Age: 64
End: 2019-12-27

## 2019-12-27 NOTE — TELEPHONE ENCOUNTER
Initial Aimovig consult completed on . Aimovig 140mg will be shipped on  to arrive at patient's home on  via [employer]. $0.00 copay. Patient intends to start Aimovig on . Address confirmed. Confirmed 2 patient identifiers - name and . Therapy Appropriate.    Patient is returning to Aimovig therapy. She had no benefit on Emgality. She declined much of the consult since she had been on Aimovig before.     Indication: Migraine prophylaxis   Goals of treatment: Reduction in migraine frequency, duration, and/or intensity (may take 3 months to reach full efficacy)    --Injection experience: Has used Aimovig before, tried and failed Emgality due to insurance requirements.   Informed patient on online injection video on  website. [[email or web address]    Storage: keep refrigerated, do not freeze. Take out of the refrigerator 30-60 minutes prior to injection.    Patient declined administration consult    Patient was counseled on possible side effects:  - Injection site reaction: redness, soreness, itching, bruising  - constipation  - Muscle cramps (?2%)    Med rec completed. No known drug interactions with Aimovig.    Encouraged patient to reach out to [facility] with questions.     Cash Love, PharmD  Clinical Pharmacist  [facility]  P: [phone]

## 2020-01-01 ENCOUNTER — PATIENT MESSAGE (OUTPATIENT)
Dept: ADMINISTRATIVE | Facility: OTHER | Age: 65
End: 2020-01-01

## 2020-01-02 ENCOUNTER — CLINICAL SUPPORT (OUTPATIENT)
Dept: SMOKING CESSATION | Facility: CLINIC | Age: 65
End: 2020-01-02
Payer: COMMERCIAL

## 2020-01-02 DIAGNOSIS — F17.200 NICOTINE DEPENDENCE: Primary | ICD-10-CM

## 2020-01-02 PROCEDURE — 99406 BEHAV CHNG SMOKING 3-10 MIN: CPT | Mod: S$GLB,,,

## 2020-01-02 PROCEDURE — 99406 PR TOBACCO USE CESSATION INTERMEDIATE 3-10 MINUTES: ICD-10-PCS | Mod: S$GLB,,,

## 2020-01-02 NOTE — PROGRESS NOTES
Spoke with patient today in regard to smoking cessation progress for 3 & 6 month phone follow up on quit 2. She states that she is not tobacco free. Patient would like to schedule an appointment to return to the program for Quit attempt #3 but would like to call back tomorrow. Informed patient of benefit period, future follow ups, and contact information if any further help or support is needed. Will complete 3 & 6 month smart form for Quit attempt #2.

## 2020-01-04 ENCOUNTER — HOSPITAL ENCOUNTER (EMERGENCY)
Facility: HOSPITAL | Age: 65
Discharge: HOME | End: 2020-01-04
Attending: EMERGENCY MEDICINE
Payer: MEDICAID

## 2020-01-04 VITALS
WEIGHT: 123.38 LBS | OXYGEN SATURATION: 100 % | DIASTOLIC BLOOD PRESSURE: 61 MMHG | HEART RATE: 68 BPM | BODY MASS INDEX: 22.56 KG/M2 | TEMPERATURE: 98 F | RESPIRATION RATE: 18 BRPM | SYSTOLIC BLOOD PRESSURE: 134 MMHG

## 2020-01-04 DIAGNOSIS — H10.9 CONJUNCTIVITIS OF RIGHT EYE, UNSPECIFIED CONJUNCTIVITIS TYPE: Primary | ICD-10-CM

## 2020-01-04 PROCEDURE — 99283 EMERGENCY DEPT VISIT LOW MDM: CPT | Mod: ER

## 2020-01-04 RX ORDER — GENTAMICIN SULFATE 3 MG/ML
2 SOLUTION/ DROPS OPHTHALMIC 4 TIMES DAILY
Qty: 5 ML | Refills: 0 | Status: SHIPPED | OUTPATIENT
Start: 2020-01-04 | End: 2020-01-09

## 2020-01-04 NOTE — ED PROVIDER NOTES
History      Chief Complaint   Patient presents with    Eye Problem     Right eye irritation x 2 weeks, worse this am       Review of patient's allergies indicates:   Allergen Reactions    Demerol [meperidine] Nausea And Vomiting     Pt refuses Demerol     Codeine Itching        HPI   HPI    2020, 12:46 PM   History obtained from the patient      History of Present Illness: Kenia Alonzo is a 64 y.o. female patient who presents to the Emergency Department for right eye redness, clear drainage for 2 weeks.  This morning eye was stuck closed when she woke.  Denies f/n/v. Symptoms are constant and moderate in severity.  No further complaints or concerns at this time.           PCP: Harish Hernandez DO       Past Medical History:  Past Medical History:   Diagnosis Date    Arthritis     knee    Depression     Encounter for blood transfusion     General anesthetics causing adverse effect in therapeutic use     severe headache after crainiotomy    Gout     Headache     Hyperlipidemia     Meniere disease     MVP (mitral valve prolapse)     minor    Vertigo          Past Surgical History:  Past Surgical History:   Procedure Laterality Date    BRAIN SURGERY      vestibular neurectomy    BREAST SURGERY      benign     SECTION      x 1    TONSILLECTOMY             Family History:  Family History   Problem Relation Age of Onset    Colon cancer Father     Diabetes Father     Diabetes Sister            Social History:  Social History     Tobacco Use    Smoking status: Current Every Day Smoker     Packs/day: 0.50     Years: 43.00     Pack years: 21.50     Types: Cigarettes     Start date: 1976    Smokeless tobacco: Never Used    Tobacco comment: no smoking after m.n prior to sx   Substance and Sexual Activity    Alcohol use: Yes     Alcohol/week: 1.0 - 3.0 standard drinks     Types: 1 - 3 Glasses of wine per week     Frequency: Monthly or less     Drinks per session: 1 or 2     Binge  frequency: Never     Comment: social few times monthly   No alcohol 72h prior to sx    Drug use: No    Sexual activity: Never     Birth control/protection: None, Post-menopausal       ROS   Review of Systems   Constitutional: Negative for activity change, chills and fever.   HENT: Negative for ear discharge and trouble swallowing.    Eyes: Positive for discharge and redness.   Respiratory: Negative for cough.    Cardiovascular: Negative for palpitations and leg swelling.   Gastrointestinal: Negative for diarrhea and vomiting.   Genitourinary: Negative for decreased urine volume and hematuria.   Musculoskeletal: Negative for joint swelling.   Skin: Negative for pallor, rash and wound.   Neurological: Negative for seizures, weakness and headaches.   Hematological: Does not bruise/bleed easily.   All other systems reviewed and are negative.            Review of Systems    Physical Exam      Initial Vitals [01/04/20 1214]   BP Pulse Resp Temp SpO2   134/61 68 18 98.4 °F (36.9 °C) 100 %      MAP       --         Physical Exam  Vital signs and nursing notes reviewed.  Constitutional: Patient is in NAD. Awake and alert. Well-developed and well-nourished.  Head: Atraumatic. Normocephalic.  Eyes: PERRL. EOM intact.  No scleral icterus.  Right eye with mild diffuse conjunctival erythema.  No fb, vesicles, facial edema or proptosis.  ENT: Mucous membranes are moist. Oropharynx is clear.  Neck: Supple. No JVD. No lymphadenopathy.  No meningismus  Cardiovascular: Regular rate and rhythm. No murmurs, rubs, or gallops. Distal pulses are 2+ and symmetric.  Pulmonary/Chest: No respiratory distress. Clear to auscultation bilaterally. No wheezing, rales, or rhonchi.  Abdominal: Soft. Non-distended. No TTP. No rebound, guarding, or rigidity. Good bowel sounds.  Genitourinary: No CVA tenderness  Musculoskeletal: Moves all extremities. No edema.   Skin: Warm and dry.  Neurological: Awake and alert. No acute focal neurological deficits  are appreciated.  Psychiatric: Normal affect. Good eye contact. Appropriate in content.      ED Course      Procedures  ED Vital Signs:  Vitals:    01/04/20 1214   BP: 134/61   Pulse: 68   Resp: 18   Temp: 98.4 °F (36.9 °C)   TempSrc: Oral   SpO2: 100%   Weight: 56 kg (123 lb 5.6 oz)                 Imaging Results:  Imaging Results    None            The Emergency Provider reviewed the vital signs and test results, which are outlined above.    ED Discussion             Medication(s) given in the ER:  Medications - No data to display        Follow-up Information     [facility] In 2 days.    Specialty:  Ophthalmology  Contact information:  [address]  [facility] [address] [phone]                        Medication List      START taking these medications    gentamicin 0.3 % ophthalmic solution  Commonly known as:  GARAMYCIN  Place 2 drops into the right eye 4 (four) times daily. for 5 days        ASK your doctor about these medications    Aimovig Autoinjector 140 mg/mL Atin  Generic drug:  erenumab-aooe  Inject 140 mg into the skin every 28 days.     azelastine 137 mcg (0.1 %) nasal spray  Commonly known as:  ASTELIN  1 spray (137 mcg total) by Nasal route 2 (two) times daily.     bumetanide 2 MG tablet  Commonly known as:  BUMEX  Take 0.5 tablets (1 mg total) by mouth once daily.     buPROPion 300 MG 24 hr tablet  Commonly known as:  WELLBUTRIN XL  Take 1 tablet (300 mg total) by mouth once daily.     diazePAM 2 MG tablet  Commonly known as:  VALIUM  Take 1 tablet (2 mg total) by mouth every 6 (six) hours as needed (dizziness/Meniere's attack).     * dicyclomine 10 MG capsule  Commonly known as:  BENTYL  Take 1 capsule (10 mg total) by mouth 3 (three) times daily.     * dicyclomine 10 MG capsule  Commonly known as:  BENTYL  TAKE 1 CAPSULE BY MOUTH THREE TIMES DAILY     fluticasone propionate 50 mcg/actuation nasal spray  Commonly known as:  FLONASE  2 sprays (100 mcg total) by Each  Nostril route once daily.     levocetirizine 5 MG tablet  Commonly known as:  XYZAL  Take 1 tablet (5 mg total) by mouth every evening.     MELATONIN ORAL     metoclopramide HCl 5 MG tablet  Commonly known as:  REGLAN  Take 1 tablet (5 mg total) by mouth every 12 (twelve) hours as needed.     naratriptan 2.5 MG tablet  Commonly known as:  AMERGE  Take 1 tablet (2.5 mg total) by mouth every 72 hours as needed. 2.5 mg at onset of headache, may repeat in 4 hours if needed     ondansetron 4 MG Tbdl  Commonly known as:  ZOFRAN-ODT  Take 1 tablet (4 mg total) by mouth every 8 (eight) hours as needed.     simvastatin 5 MG tablet  Commonly known as:  ZOCOR  Take 1 tablet (5 mg total) by mouth nightly.     topiramate 100 mg Cp24  Commonly known as:  Trokendi XR  Take 1 capsule (100 mg total) by mouth once daily.         * This list has 2 medication(s) that are the same as other medications prescribed for you. Read the directions carefully, and ask your doctor or other care provider to review them with you.               Where to Get Your Medications      You can get these medications from any pharmacy    Bring a paper prescription for each of these medications  · gentamicin 0.3 % ophthalmic solution             Medical Decision Making      Hx of rosacea.  Discussed ocular rosacea.      All findings were reviewed with the patient/family in detail.   All remaining questions and concerns were addressed at that time.  Patient/family has been counseled regarding the need for follow-up as well as the indication to return to the emergency room should new or worrisome developments occur.              MDM               Clinical Impression:        ICD-10-CM ICD-9-CM   1. Conjunctivitis of right eye, unspecified conjunctivitis type H10.9 372.30               Salome Serrano PA-C  01/04/20 1609

## 2020-01-08 ENCOUNTER — PATIENT MESSAGE (OUTPATIENT)
Dept: INTERNAL MEDICINE | Facility: CLINIC | Age: 65
End: 2020-01-08

## 2020-01-09 ENCOUNTER — LAB VISIT (OUTPATIENT)
Dept: LAB | Facility: HOSPITAL | Age: 65
End: 2020-01-09
Attending: FAMILY MEDICINE
Payer: MEDICAID

## 2020-01-09 ENCOUNTER — OFFICE VISIT (OUTPATIENT)
Dept: INTERNAL MEDICINE | Facility: CLINIC | Age: 65
End: 2020-01-09
Payer: MEDICAID

## 2020-01-09 VITALS
TEMPERATURE: 97 F | BODY MASS INDEX: 22.07 KG/M2 | RESPIRATION RATE: 16 BRPM | HEIGHT: 62 IN | WEIGHT: 119.94 LBS | DIASTOLIC BLOOD PRESSURE: 84 MMHG | HEART RATE: 99 BPM | SYSTOLIC BLOOD PRESSURE: 132 MMHG | OXYGEN SATURATION: 97 %

## 2020-01-09 DIAGNOSIS — J06.9 VIRAL UPPER RESPIRATORY TRACT INFECTION: ICD-10-CM

## 2020-01-09 DIAGNOSIS — H81.01 MENIERE'S DISEASE OF RIGHT EAR: ICD-10-CM

## 2020-01-09 DIAGNOSIS — L30.9 DERMATITIS OF FACE: ICD-10-CM

## 2020-01-09 DIAGNOSIS — Z13.220 LIPID SCREENING: ICD-10-CM

## 2020-01-09 DIAGNOSIS — H10.31 ACUTE CONJUNCTIVITIS OF RIGHT EYE, UNSPECIFIED ACUTE CONJUNCTIVITIS TYPE: Primary | ICD-10-CM

## 2020-01-09 DIAGNOSIS — Z28.9 DELAYED IMMUNIZATIONS: ICD-10-CM

## 2020-01-09 PROCEDURE — 99999 PR PBB SHADOW E&M-EST. PATIENT-LVL IV: ICD-10-PCS | Mod: PBBFAC,,, | Performed by: NURSE PRACTITIONER

## 2020-01-09 PROCEDURE — 99214 PR OFFICE/OUTPT VISIT, EST, LEVL IV, 30-39 MIN: ICD-10-PCS | Mod: S$PBB,,, | Performed by: NURSE PRACTITIONER

## 2020-01-09 PROCEDURE — 99214 OFFICE O/P EST MOD 30 MIN: CPT | Mod: S$PBB,,, | Performed by: NURSE PRACTITIONER

## 2020-01-09 PROCEDURE — 80061 LIPID PANEL: CPT

## 2020-01-09 PROCEDURE — 36415 COLL VENOUS BLD VENIPUNCTURE: CPT | Mod: PO

## 2020-01-09 PROCEDURE — 99214 OFFICE O/P EST MOD 30 MIN: CPT | Mod: PBBFAC,PO | Performed by: NURSE PRACTITIONER

## 2020-01-09 PROCEDURE — 90471 IMMUNIZATION ADMIN: CPT | Mod: PBBFAC,PO

## 2020-01-09 PROCEDURE — 99999 PR PBB SHADOW E&M-EST. PATIENT-LVL IV: CPT | Mod: PBBFAC,,, | Performed by: NURSE PRACTITIONER

## 2020-01-09 RX ORDER — CLOTRIMAZOLE AND BETAMETHASONE DIPROPIONATE 10; .64 MG/G; MG/G
CREAM TOPICAL 2 TIMES DAILY
Qty: 15 G | Refills: 0 | Status: SHIPPED | OUTPATIENT
Start: 2020-01-09 | End: 2020-01-09 | Stop reason: ALTCHOICE

## 2020-01-09 RX ORDER — HYDROCORTISONE 25 MG/G
CREAM TOPICAL 2 TIMES DAILY
Qty: 20 G | Refills: 0 | Status: SHIPPED | OUTPATIENT
Start: 2020-01-09 | End: 2020-07-31

## 2020-01-09 NOTE — PROGRESS NOTES
Subjective:       Patient ID: Kenia Alonzo is a 64 y.o. female.    Chief Complaint: Otalgia; Eye Problem; and Dizziness    Mrs. Alonzo is here today for ED follow up after being diagnosed with conjunctivitis. She had extensive erythema surrounding R side orbital area, believed to be from rubbing and itching. She was discharge with gentamycin from ED. Symptoms of erythema, itching, and pain have improved since starting abx. She also reports using a previous prescription of a clotrimazole-betamethasone on the affected eye area with improvement of s/s.     Eye Problem    The right eye is affected. This is a new problem. The current episode started in the past 7 days. The problem occurs intermittently. The problem has been gradually improving. There was no injury mechanism. The pain is at a severity of 0/10. The patient is experiencing no pain. There is no known exposure to pink eye. She does not wear contacts. Associated symptoms include an eye discharge (improved), eye redness (improved), itching, nausea (improved) and vomiting (Monday, improved). Pertinent negatives include no blurred vision, fever, foreign body sensation, photophobia or weakness. She has tried eye drops for the symptoms. The treatment provided significant relief.   Dizziness:   Chronicity:  Recurrent  Onset:  In the past 7 days  Progression since onset:  Gradually improving  Pain Scale:  0/10  Dizziness characteristics:  Sensation of movement   Associated symptoms: ear pain, headaches, tinnitus, nausea (improved) and vomiting (Monday, improved).no fever, no weakness, no palpitations and no chest pain.  Aggravated by:  Position changes  Treatments tried:  Body position changes, valium and rest  Improvements on treatment:  Significant   PMH includes: anxiety.   meniere's      Patient Active Problem List   Diagnosis    Depression    Meniere's disease of right ear    Vestibular migraine    Abnormal involuntary movements    Irritable bowel syndrome  without diarrhea    Hyperlipidemia    Tobacco use    CKD (chronic kidney disease) stage 3, GFR 30-59 ml/min    Non-seasonal allergic rhinitis    Bronchitis    Chronic mixed headache syndrome    Severe dysplasia of vulva    Complicated grief    Primary osteoarthritis of left knee    Acute conjunctivitis of right eye    Dermatitis of face       Family History   Problem Relation Age of Onset    Colon cancer Father     Diabetes Father     Diabetes Sister      Past Surgical History:   Procedure Laterality Date    BRAIN SURGERY      vestibular neurectomy    BREAST SURGERY      benign     SECTION      x 1    TONSILLECTOMY           Current Outpatient Medications:     bumetanide (BUMEX) 2 MG tablet, Take 0.5 tablets (1 mg total) by mouth once daily., Disp: 90 tablet, Rfl: 1    buPROPion (WELLBUTRIN XL) 300 MG 24 hr tablet, Take 1 tablet (300 mg total) by mouth once daily., Disp: 30 tablet, Rfl: 11    diazePAM (VALIUM) 2 MG tablet, Take 1 tablet (2 mg total) by mouth every 6 (six) hours as needed (dizziness/Meniere's attack)., Disp: 30 tablet, Rfl: 1    dicyclomine (BENTYL) 10 MG capsule, Take 1 capsule (10 mg total) by mouth 3 (three) times daily. (Patient taking differently: Take 10 mg by mouth every evening. ), Disp: 90 capsule, Rfl: 1    dicyclomine (BENTYL) 10 MG capsule, TAKE 1 CAPSULE BY MOUTH THREE TIMES DAILY, Disp: 90 capsule, Rfl: 1    erenumab-aooe (AIMOVIG AUTOINJECTOR) 140 mg/mL AtIn, Inject 140 mg into the skin every 28 days., Disp: 1 mL, Rfl: 11    fluticasone propionate (FLONASE) 50 mcg/actuation nasal spray, 2 sprays (100 mcg total) by Each Nostril route once daily., Disp: 16 g, Rfl: 3    levocetirizine (XYZAL) 5 MG tablet, Take 1 tablet (5 mg total) by mouth every evening., Disp: 90 tablet, Rfl: 3    MELATONIN ORAL, Take by mouth nightly as needed. , Disp: , Rfl:     metoclopramide HCl (REGLAN) 5 MG tablet, Take 1 tablet (5 mg total) by mouth every 12 (twelve) hours  as needed., Disp: 10 tablet, Rfl: 3    naratriptan (AMERGE) 2.5 MG tablet, Take 1 tablet (2.5 mg total) by mouth every 72 hours as needed. 2.5 mg at onset of headache, may repeat in 4 hours if needed, Disp: 9 tablet, Rfl: 3    ondansetron (ZOFRAN-ODT) 4 MG TbDL, Take 1 tablet (4 mg total) by mouth every 8 (eight) hours as needed., Disp: 60 tablet, Rfl: 1    simvastatin (ZOCOR) 5 MG tablet, Take 1 tablet (5 mg total) by mouth nightly., Disp: 90 tablet, Rfl: 3    topiramate (TROKENDI XR) 100 mg Cp24, Take 1 capsule (100 mg total) by mouth once daily. (Patient taking differently: Take 100 mg by mouth nightly. ), Disp: 30 capsule, Rfl: 11    azelastine (ASTELIN) 137 mcg (0.1 %) nasal spray, 1 spray (137 mcg total) by Nasal route 2 (two) times daily., Disp: 30 mL, Rfl: 3    hydrocortisone 2.5 % cream, Apply topically 2 (two) times daily., Disp: 20 g, Rfl: 0    Review of Systems   Constitutional: Negative for chills, fatigue and fever.   HENT: Positive for congestion, ear pain and tinnitus. Negative for postnasal drip, rhinorrhea, sinus pressure, sinus pain and sore throat.    Eyes: Positive for pain (improved), discharge (improved), redness (improved) and itching. Negative for blurred vision, photophobia and visual disturbance.   Respiratory: Negative for cough, chest tightness, shortness of breath and wheezing.    Cardiovascular: Negative for chest pain and palpitations.   Gastrointestinal: Positive for constipation, nausea (improved) and vomiting (Monday, improved). Negative for abdominal pain and diarrhea.   Genitourinary: Negative for difficulty urinating, dysuria, flank pain and frequency.   Musculoskeletal: Negative for back pain, gait problem, myalgias, neck pain and neck stiffness.   Skin: Positive for rash (R eye).   Neurological: Positive for dizziness and headaches. Negative for weakness and numbness.   Psychiatric/Behavioral: Positive for dysphoric mood (due to life stressors). The patient is not  "nervous/anxious.        Objective:   /84 (BP Location: Left arm, Patient Position: Sitting, BP Method: Medium (Automatic))   Pulse 99   Temp 97 °F (36.1 °C) (Tympanic)   Resp 16   Ht 5' 2" (1.575 m)   Wt 54.4 kg (119 lb 14.9 oz)   SpO2 97%   BMI 21.94 kg/m²      Physical Exam   Constitutional: She appears well-developed and well-nourished. She is cooperative. She does not appear ill. No distress.   HENT:   Head: Normocephalic and atraumatic.   Right Ear: Tympanic membrane is not injected and not erythematous. A middle ear effusion is present.   Left Ear: Tympanic membrane is not injected and not erythematous. A middle ear effusion is present.   Nose: No mucosal edema or rhinorrhea.   Mouth/Throat: Uvula is midline, oropharynx is clear and moist and mucous membranes are normal.   Eyes: Pupils are equal, round, and reactive to light. Conjunctivae and EOM are normal. Right eye exhibits no discharge. Left eye exhibits no discharge. Right conjunctiva is not injected. Left conjunctiva is not injected.   Neck: Normal range of motion. Neck supple.   Cardiovascular: Normal rate, regular rhythm, normal heart sounds and intact distal pulses. Exam reveals no gallop and no friction rub.   No murmur heard.  Pulmonary/Chest: Effort normal and breath sounds normal. No respiratory distress. She has no wheezes.   Musculoskeletal: Normal range of motion. She exhibits no edema.   Lymphadenopathy:     She has no cervical adenopathy.   Neurological: She is alert.   Skin: Skin is warm and dry. Rash noted. She is not diaphoretic. There is erythema (R orbital area).                 Assessment & Plan     Problem List Items Addressed This Visit        Ophtho    Acute conjunctivitis of right eye - Primary    Current Assessment & Plan     Continue gentamycin as prescribed from ED.             ENT    Meniere's disease of right ear    Current Assessment & Plan     Symptoms have been improving since Monday. No further tx needed at " this time. Followed by ENT.            Derm    Dermatitis of face    Current Assessment & Plan     I believe this to be from itching and rubbing eye due to irritation from conjunctivitis. Will send low dose steroid cream, but do not recommend using more than a couple of days.          Relevant Medications    hydrocortisone 2.5 % cream      Other Visit Diagnoses     Viral upper respiratory tract infection        Delayed immunizations        Relevant Orders    (In Office Administered) Pneumococcal Conjugate Vaccine (13 Valent) (IM) (Completed)    Lipid screening        Relevant Orders    Lipid panel (Completed)

## 2020-01-10 ENCOUNTER — PATIENT OUTREACH (OUTPATIENT)
Dept: ADMINISTRATIVE | Facility: HOSPITAL | Age: 65
End: 2020-01-10

## 2020-01-10 LAB
CHOLEST SERPL-MCNC: 207 MG/DL (ref 120–199)
CHOLEST/HDLC SERPL: 6.3 {RATIO} (ref 2–5)
HDLC SERPL-MCNC: 33 MG/DL (ref 40–75)
HDLC SERPL: 15.9 % (ref 20–50)
LDLC SERPL CALC-MCNC: 125.2 MG/DL (ref 63–159)
NONHDLC SERPL-MCNC: 174 MG/DL
TRIGL SERPL-MCNC: 244 MG/DL (ref 30–150)

## 2020-01-16 ENCOUNTER — OFFICE VISIT (OUTPATIENT)
Dept: PSYCHIATRY | Facility: CLINIC | Age: 65
End: 2020-01-16
Payer: MEDICAID

## 2020-01-16 DIAGNOSIS — F33.1 MODERATE EPISODE OF RECURRENT MAJOR DEPRESSIVE DISORDER: ICD-10-CM

## 2020-01-16 DIAGNOSIS — F43.21 COMPLICATED GRIEF: ICD-10-CM

## 2020-01-16 DIAGNOSIS — F41.1 GENERALIZED ANXIETY DISORDER: Primary | ICD-10-CM

## 2020-01-16 PROBLEM — H10.31 ACUTE CONJUNCTIVITIS OF RIGHT EYE: Status: ACTIVE | Noted: 2020-01-16

## 2020-01-16 PROBLEM — L30.9 DERMATITIS OF FACE: Status: ACTIVE | Noted: 2020-01-16

## 2020-01-16 PROCEDURE — 90834 PR PSYCHOTHERAPY W/PATIENT, 45 MIN: ICD-10-PCS | Mod: AJ,HB,S$PBB, | Performed by: SOCIAL WORKER

## 2020-01-16 PROCEDURE — 90834 PSYTX W PT 45 MINUTES: CPT | Mod: AJ,HB,S$PBB, | Performed by: SOCIAL WORKER

## 2020-01-16 NOTE — PROGRESS NOTES
"  Beatriz Holloway, MSW, LCSW  Outpatient Psychiatry  [facility]  [address] [place] [place] [address]  [phone]            Individual Psychotherapy Progress Note (PhD/LCSW)     Outpatient - Insight oriented psychotherapy 45 min - CPT code 64251    1/16/2020  MRN: [id number]  Primary Care Provider: Harish Hernandez DO    Kenia Alonzo is a 64 y.o. female who presents today for follow-up of depression and anxiety. Met with patient.        Subjective:       Patient report: "A lot's happened. I got laid off because I was sick. 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 I had too many phone calls regarding my son. He's now in a wheelchair, and his girlfriend's not very smart, so I'm his advocate." Worrying all the time, feeling depressed, on unemployment but that will end in June.      Current symptoms:   · Depression: depressed mood, diminished interest, fatigue and tearfulness  · Anxiety: excessive anxiety/worry, restlessness/keyed up and irritability  · Substance abuse: denied  · Cognitive functioning: denied  · Iram: none noted  · Psychosis: none noted    Psychosocial stressors and topics discussed: identifying feelings, symptom recognition/mgmt, self care, goals, coping strategies, interpersonal issues, codependency, boundaries, parenting issues, financial issues, managing anxiety/panic/stress and motivation for change      Objective:       Mental Status Evaluation  Appearance: unremarkable, age appropriate  Behavior: normal, cooperative  Speech: normal tone, normal rate, normal pitch, normal volume  Mood: anxious  Affect: congruent and appropriate, blunted  Thought Process: normal and logical  Thought Content: normal, no suicidality, no homicidality, delusions, or paranoia  Sensorium: grossly intact  Cognition: grossly intact  Insight: good  Judgment: adequate to circumstances    Risk parameters:  Patient reports no " suicidal ideation  Patient reports no homicidal ideation  Patient reports no self-injurious behavior  Patient reports no violent behavior      Assessment & Plan:       Therapeutic interventions used: Educated pt re: how anxious fears are maintained by a cycle of unwarranted fear and avoidance that precludes positive, corrective experiences with the feared object/situation  Discussed how treatment targets worry, anxiety symptoms, and avoidance to help pt manage worry effectively  The reduction of overarousal and unnecessary avoidance were emphasized as treatment targets  Encouraged pt to share feelings of depression to clarify them and gain insight into their causes  Assessed pt's current and past mood episodes, including the features, frequency, intensity and duration  Assessed pt's level of insight toward the presenting problems  Monitored the effectiveness and side effects of antidepressant medication prescribed by physician/NP/MP  Discussed cognitive, behavioral, interpersonal and other factors that contribute to depression    The patient's response to the interventions is accepting    The patient's progress toward treatment goals is fair     Homework assigned: daily journaling     Treatment plan:   A. Target symptoms: Depression, Anxiety and Poor Coping Skills   B. Therapeutic modalities: insight oriented psychotherapy, supportive psychotherapy  C. Why chosen therapy is appropriate versus another modality: relevant to diagnosis, evidence based practice   D. Outcome monitoring methods: self-report, observation     Visit Diagnosis:   1. Generalized anxiety disorder    2. Moderate episode of recurrent major depressive disorder        Follow-up: individual psychotherapy and medication management by physician    Return to Clinic: 2 weeks    Length of Service (minutes): 45

## 2020-01-16 NOTE — ASSESSMENT & PLAN NOTE
I believe this to be from itching and rubbing eye due to irritation from conjunctivitis. Will send low dose steroid cream, but do not recommend using more than a couple of days.

## 2020-01-17 ENCOUNTER — PATIENT OUTREACH (OUTPATIENT)
Dept: ADMINISTRATIVE | Facility: HOSPITAL | Age: 65
End: 2020-01-17

## 2020-01-17 RX ORDER — DIAZEPAM 2 MG/1
2 TABLET ORAL EVERY 6 HOURS PRN
Qty: 30 TABLET | Refills: 1 | Status: SHIPPED | OUTPATIENT
Start: 2020-01-17 | End: 2020-04-22 | Stop reason: SDUPTHER

## 2020-01-18 DIAGNOSIS — K58.9 IRRITABLE BOWEL SYNDROME WITHOUT DIARRHEA: ICD-10-CM

## 2020-01-18 RX ORDER — LEVOCETIRIZINE DIHYDROCHLORIDE 5 MG/1
TABLET, FILM COATED ORAL
Qty: 90 TABLET | Refills: 3 | Status: SHIPPED | OUTPATIENT
Start: 2020-01-18 | End: 2021-01-19

## 2020-01-20 ENCOUNTER — PATIENT MESSAGE (OUTPATIENT)
Dept: PSYCHIATRY | Facility: CLINIC | Age: 65
End: 2020-01-20

## 2020-01-20 ENCOUNTER — PATIENT MESSAGE (OUTPATIENT)
Dept: INTERNAL MEDICINE | Facility: CLINIC | Age: 65
End: 2020-01-20

## 2020-01-21 ENCOUNTER — TELEPHONE (OUTPATIENT)
Dept: TRANSPLANT | Facility: CLINIC | Age: 65
End: 2020-01-21

## 2020-01-21 ENCOUNTER — CLINICAL SUPPORT (OUTPATIENT)
Dept: SMOKING CESSATION | Facility: CLINIC | Age: 65
End: 2020-01-21
Payer: COMMERCIAL

## 2020-01-21 DIAGNOSIS — F17.200 NICOTINE DEPENDENCE: Primary | ICD-10-CM

## 2020-01-21 PROCEDURE — 99404 PREV MED CNSL INDIV APPRX 60: CPT | Mod: S$GLB,,,

## 2020-01-21 PROCEDURE — 99999 PR PBB SHADOW E&M-EST. PATIENT-LVL I: ICD-10-PCS | Mod: PBBFAC,,,

## 2020-01-21 PROCEDURE — 99404 PR PREVENT COUNSEL,INDIV,60 MIN: ICD-10-PCS | Mod: S$GLB,,,

## 2020-01-21 PROCEDURE — 99999 PR PBB SHADOW E&M-EST. PATIENT-LVL I: CPT | Mod: PBBFAC,,,

## 2020-01-21 RX ORDER — DICYCLOMINE HYDROCHLORIDE 10 MG/1
CAPSULE ORAL
Qty: 90 CAPSULE | Refills: 1 | Status: SHIPPED | OUTPATIENT
Start: 2020-01-21 | End: 2020-05-15

## 2020-01-21 RX ORDER — VARENICLINE TARTRATE 0.5 (11)-1
KIT ORAL
Qty: 53 TABLET | Refills: 0 | Status: SHIPPED | OUTPATIENT
Start: 2020-01-21 | End: 2020-03-17

## 2020-01-21 NOTE — PROGRESS NOTES
Patient was seen today as a new intake for tobacco cessation. Patient states she is smoking 3-5  cigarettes per day. Patient will be participating in biweekly tobacco cessation meetings and will begin the prescribed tobacco cessation medication Chantix starter pack has been successful in the past without any side effects. FTND score of 1 indicates a low level of nicotine dependence. NEFTALI- D score of 28 is perceived as have some degree of mental distress / probable depression at this time. Patient stated that she is seeing a conselor in regards to her depression  and stress at this time. Will continue to monitor.

## 2020-01-21 NOTE — TELEPHONE ENCOUNTER
Kenia Alonzo called and stated that she is interested in becoming a living donor for her son, Tad Alonzo.  Patient notified that donor testing can not being until the recipient has been approved for transplant (currently declined). All questions answered.  Patient verbalized understanding.

## 2020-01-22 ENCOUNTER — PATIENT MESSAGE (OUTPATIENT)
Dept: INTERNAL MEDICINE | Facility: CLINIC | Age: 65
End: 2020-01-22

## 2020-01-22 ENCOUNTER — HOSPITAL ENCOUNTER (EMERGENCY)
Facility: HOSPITAL | Age: 65
Discharge: HOME | End: 2020-01-22
Attending: EMERGENCY MEDICINE
Payer: MEDICAID

## 2020-01-22 VITALS
OXYGEN SATURATION: 98 % | HEART RATE: 71 BPM | DIASTOLIC BLOOD PRESSURE: 58 MMHG | RESPIRATION RATE: 16 BRPM | HEIGHT: 62 IN | TEMPERATURE: 98 F | BODY MASS INDEX: 22.15 KG/M2 | SYSTOLIC BLOOD PRESSURE: 121 MMHG | WEIGHT: 120.38 LBS

## 2020-01-22 DIAGNOSIS — R51.9 ACUTE NONINTRACTABLE HEADACHE, UNSPECIFIED HEADACHE TYPE: Primary | ICD-10-CM

## 2020-01-22 PROCEDURE — 96372 THER/PROPH/DIAG INJ SC/IM: CPT | Mod: 59,ER

## 2020-01-22 PROCEDURE — 63600175 PHARM REV CODE 636 W HCPCS: Mod: ER | Performed by: EMERGENCY MEDICINE

## 2020-01-22 PROCEDURE — 99284 EMERGENCY DEPT VISIT MOD MDM: CPT | Mod: 25,ER

## 2020-01-22 RX ORDER — KETOROLAC TROMETHAMINE 30 MG/ML
10 INJECTION, SOLUTION INTRAMUSCULAR; INTRAVENOUS
Status: COMPLETED | OUTPATIENT
Start: 2020-01-22 | End: 2020-01-22

## 2020-01-22 RX ORDER — METOCLOPRAMIDE HYDROCHLORIDE 5 MG/ML
10 INJECTION INTRAMUSCULAR; INTRAVENOUS
Status: COMPLETED | OUTPATIENT
Start: 2020-01-22 | End: 2020-01-22

## 2020-01-22 RX ADMIN — KETOROLAC TROMETHAMINE 10 MG: 30 INJECTION, SOLUTION INTRAMUSCULAR at 10:01

## 2020-01-22 RX ADMIN — METOCLOPRAMIDE 10 MG: 5 INJECTION, SOLUTION INTRAMUSCULAR; INTRAVENOUS at 10:01

## 2020-01-22 NOTE — ED PROVIDER NOTES
Encounter Date: 2020       History     Chief Complaint   Patient presents with    Headache     headache since 4am constant. vomited x2 this am. zofran at 8am.     The history is provided by the patient.   Headache    This is a new problem. The current episode started today. The problem occurs constantly. The problem has been unchanged. The pain is located in the bilateral region. The pain quality is similar to prior headaches. The quality of the pain is described as aching. The pain is at a severity of 7/10. Associated symptoms include nausea, photophobia and vomiting. Pertinent negatives include no abdominal pain, blurred vision, fever, neck pain, phonophobia, seizures, sinus pressure or sore throat. The symptoms are aggravated by bright light. She has tried nothing for the symptoms. The treatment provided no relief.     Review of patient's allergies indicates:   Allergen Reactions    Demerol [meperidine] Nausea And Vomiting     Pt refuses Demerol     Codeine Itching     Past Medical History:   Diagnosis Date    Arthritis     knee    Depression     Encounter for blood transfusion     General anesthetics causing adverse effect in therapeutic use     severe headache after crainiotomy    Gout     Headache     Hyperlipidemia     Meniere disease     MVP (mitral valve prolapse)     minor    Vertigo      Past Surgical History:   Procedure Laterality Date    BRAIN SURGERY      vestibular neurectomy    BREAST SURGERY      benign     SECTION      x 1    TONSILLECTOMY       Family History   Problem Relation Age of Onset    Colon cancer Father     Diabetes Father     Diabetes Sister      Social History     Tobacco Use    Smoking status: Current Every Day Smoker     Packs/day: 0.50     Years: 43.00     Pack years: 21.50     Types: Cigarettes     Start date: 1976    Smokeless tobacco: Never Used   Substance Use Topics    Alcohol use: Yes     Alcohol/week: 1.0 - 3.0 standard drinks      Types: 1 - 3 Glasses of wine per week     Frequency: Monthly or less     Drinks per session: 1 or 2     Binge frequency: Never     Comment: social few times monthly   No alcohol 72h prior to sx    Drug use: No     Review of Systems   Constitutional: Negative for fever.   HENT: Negative for sinus pressure and sore throat.    Eyes: Positive for photophobia. Negative for blurred vision.   Gastrointestinal: Positive for nausea and vomiting. Negative for abdominal pain.   Musculoskeletal: Negative for neck pain.   Neurological: Positive for headaches. Negative for seizures.   All other systems reviewed and are negative.      Physical Exam     Initial Vitals [01/22/20 0948]   BP Pulse Resp Temp SpO2   (!) 121/58 71 16 97.7 °F (36.5 °C) 98 %      MAP       --         Physical Exam    Nursing note and vitals reviewed.  Constitutional: She appears well-developed and well-nourished. No distress.   HENT:   Head: Normocephalic and atraumatic.   Mouth/Throat: Oropharynx is clear and moist.   Eyes: Conjunctivae and EOM are normal. Pupils are equal, round, and reactive to light.   Neck: Normal range of motion. Neck supple.   Cardiovascular: Normal rate, regular rhythm and normal heart sounds.   Pulmonary/Chest: Breath sounds normal. No respiratory distress.   Abdominal: Soft. Bowel sounds are normal. She exhibits no distension. There is no tenderness.   Musculoskeletal: Normal range of motion.   Neurological: She is alert and oriented to person, place, and time. She has normal strength.   Skin: Skin is warm and dry.   Psychiatric: She has a normal mood and affect. Thought content normal.         ED Course   Procedures  Labs Reviewed - No data to display       Imaging Results    None     Patient's headache is either consistent with previous headache and/or lacks features concerning for emergent or life threatening condition.  I do not suspect SAH, meningitis, increased IC pressure, infectious, toxic, vascular, CNS, or other EMC.   I have discussed this at length with patient and/or family/caretaker.    10:12 AM - Counseling: Spoke with the patient and discussed todays findings, in addition to providing specific details for the plan of care and counseling regarding the diagnosis and prognosis. Questions are answered at this time. GCS 15                                        Clinical Impression:       ICD-10-CM ICD-9-CM   1. Acute nonintractable headache, unspecified headache type R51 784.0         Disposition:   Disposition: Discharged  Condition: Stable                     Reza Almazan MD  01/24/20 1934

## 2020-01-24 ENCOUNTER — INITIAL CONSULT (OUTPATIENT)
Dept: PSYCHIATRY | Facility: CLINIC | Age: 65
End: 2020-01-24
Payer: MEDICAID

## 2020-01-24 VITALS
WEIGHT: 121.69 LBS | BODY MASS INDEX: 22.26 KG/M2 | HEART RATE: 85 BPM | SYSTOLIC BLOOD PRESSURE: 104 MMHG | DIASTOLIC BLOOD PRESSURE: 69 MMHG

## 2020-01-24 DIAGNOSIS — F41.1 GENERALIZED ANXIETY DISORDER: ICD-10-CM

## 2020-01-24 DIAGNOSIS — F33.1 MODERATE EPISODE OF RECURRENT MAJOR DEPRESSIVE DISORDER: Primary | ICD-10-CM

## 2020-01-24 PROCEDURE — 99212 OFFICE O/P EST SF 10 MIN: CPT | Mod: PBBFAC | Performed by: PSYCHIATRY & NEUROLOGY

## 2020-01-24 PROCEDURE — 99999 PR PBB SHADOW E&M-EST. PATIENT-LVL II: CPT | Mod: PBBFAC,,, | Performed by: PSYCHIATRY & NEUROLOGY

## 2020-01-24 PROCEDURE — 99999 PR PBB SHADOW E&M-EST. PATIENT-LVL II: ICD-10-PCS | Mod: PBBFAC,,, | Performed by: PSYCHIATRY & NEUROLOGY

## 2020-01-24 PROCEDURE — 90792 PR PSYCHIATRIC DIAGNOSTIC EVALUATION W/MEDICAL SERVICES: ICD-10-PCS | Mod: AF,HB,, | Performed by: PSYCHIATRY & NEUROLOGY

## 2020-01-24 PROCEDURE — 90792 PSYCH DIAG EVAL W/MED SRVCS: CPT | Mod: AF,HB,, | Performed by: PSYCHIATRY & NEUROLOGY

## 2020-01-24 RX ORDER — BUPROPION HYDROCHLORIDE 150 MG/1
450 TABLET ORAL DAILY
Qty: 90 TABLET | Refills: 2 | Status: SHIPPED | OUTPATIENT
Start: 2020-01-24 | End: 2020-03-18

## 2020-01-24 RX ORDER — BUSPIRONE HYDROCHLORIDE 15 MG/1
15 TABLET ORAL 2 TIMES DAILY
Qty: 60 TABLET | Refills: 2 | Status: SHIPPED | OUTPATIENT
Start: 2020-01-24 | End: 2020-04-22
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
## 2020-01-27 ENCOUNTER — TELEPHONE (OUTPATIENT)
Dept: PHARMACY | Facility: CLINIC | Age: 65
End: 2020-01-27

## 2020-01-30 ENCOUNTER — PATIENT OUTREACH (OUTPATIENT)
Dept: ADMINISTRATIVE | Facility: HOSPITAL | Age: 65
End: 2020-01-30

## 2020-02-17 ENCOUNTER — PATIENT MESSAGE (OUTPATIENT)
Dept: PSYCHIATRY | Facility: CLINIC | Age: 65
End: 2020-02-17

## 2020-02-18 ENCOUNTER — CLINICAL SUPPORT (OUTPATIENT)
Dept: SMOKING CESSATION | Facility: CLINIC | Age: 65
End: 2020-02-18
Payer: COMMERCIAL

## 2020-02-18 DIAGNOSIS — F17.200 NICOTINE DEPENDENCE: Primary | ICD-10-CM

## 2020-02-18 PROCEDURE — 99999 PR PBB SHADOW E&M-EST. PATIENT-LVL I: CPT | Mod: PBBFAC,,,

## 2020-02-18 PROCEDURE — 99402 PREV MED CNSL INDIV APPRX 30: CPT | Mod: S$GLB,,,

## 2020-02-18 PROCEDURE — 99402 PR PREVENT COUNSEL,INDIV,30 MIN: ICD-10-PCS | Mod: S$GLB,,,

## 2020-02-18 PROCEDURE — 99999 PR PBB SHADOW E&M-EST. PATIENT-LVL I: ICD-10-PCS | Mod: PBBFAC,,,

## 2020-02-18 NOTE — Clinical Note
completion of TCRS (Tobacco Cessation Rating Scale) reviewed strategies, controlling environment, cues, triggers, new goals set. Introduced high risk situations with preparation interventions, caffeine similarities with withdrawal issues of habit and nicotine, Alcohol, Understanding urges, cravings, stress and relaxation. Open discussion with intervention discussion.Patient remains tobacco free as of 1/25/20. Commended her on her quit. Patient stated that Dr Bustamante increased her Wellbutrin to 450 mg QD. Patient is only doing Chantix in AM due to vivid dreams, stated that she feel good and has not urges with one dose just in morning so she will continue on that medication regimen. Exhaled carbon monoxide level was 1 ppm per Smokerlyzer (0-6 non-smoker). The patient denies any abnormal behavioral or mental changes at this time.

## 2020-02-19 NOTE — PROGRESS NOTES
Individual Follow-Up Form    2/18/20    Quit Date: 1/25/20    Clinical Status of Patient: Outpatient    Length of Service: 30 minutes    Continuing Medication: yes  Chantix    Other Medications:      Target Symptoms: Withdrawal and medication side effects. The following were  rated moderate (3) to severe (4) on TCRS:  · Moderate (3): none  · Severe (4): none    Comments: completion of TCRS (Tobacco Cessation Rating Scale) reviewed strategies, controlling environment, cues, triggers, new goals set. Introduced high risk situations with preparation interventions, caffeine similarities with withdrawal issues of habit and nicotine, Alcohol, Understanding urges, cravings, stress and relaxation. Open discussion with intervention discussion.Patient remains tobacco free as of 1/25/20. Commended her on her quit. Patient stated that Dr Bustamante increased her Wellbutrin to 450 mg QD. Patient is only doing Chantix in AM due to vivid dreams, stated that she feel good and has not urges with one dose just in morning so she will continue on that medication regimen. Exhaled carbon monoxide level was 1 ppm per Smokerlyzer (0-6 non-smoker). The patient denies any abnormal behavioral or mental changes at this time.       Diagnosis: F17.200    Next Visit: 2 weeks

## 2020-02-20 ENCOUNTER — TELEPHONE (OUTPATIENT)
Dept: PHARMACY | Facility: CLINIC | Age: 65
End: 2020-02-20

## 2020-02-20 ENCOUNTER — OFFICE VISIT (OUTPATIENT)
Dept: PSYCHIATRY | Facility: CLINIC | Age: 65
End: 2020-02-20
Payer: MEDICAID

## 2020-02-20 DIAGNOSIS — F33.1 MODERATE EPISODE OF RECURRENT MAJOR DEPRESSIVE DISORDER: ICD-10-CM

## 2020-02-20 DIAGNOSIS — F41.1 GENERALIZED ANXIETY DISORDER: Primary | ICD-10-CM

## 2020-02-20 PROCEDURE — 90834 PR PSYCHOTHERAPY W/PATIENT, 45 MIN: ICD-10-PCS | Mod: AJ,HB,S$PBB, | Performed by: SOCIAL WORKER

## 2020-02-20 PROCEDURE — 90834 PSYTX W PT 45 MINUTES: CPT | Mod: AJ,HB,S$PBB, | Performed by: SOCIAL WORKER

## 2020-02-20 NOTE — TELEPHONE ENCOUNTER
Aimovig refill. Confirmed two patient identifiers - name + . Patient confirms dosage (monthly SQ injections). Patient has 0 doses remaining, next dose needed by 3/1/20. [facility] to ship out Aimovig on 20 to arrive at patients home on 20 (after the holiday - pt questions which holiday - Lucas Gras, the one she hates most!) via [employer]. Address confirmed (Notes to [employer]: none), $0 Copay, Supplies needed: injection kit. Patient denies starting any new medications, having any new diagnoses or allergies, side effects, or missing any doses. All questions answered to patients satisfaction. [facility] will continue to reach out to patient monthly for refills. TTN

## 2020-02-20 NOTE — PROGRESS NOTES
"  Beatriz Holloway, MSW, LCSW  Outpatient Psychiatry  [facility]  [id number] [place] [address], [place] [address]  [phone]            Individual Psychotherapy Progress Note (PhD/LCSW)     Outpatient - Insight oriented psychotherapy 45 min - CPT code 02791    2/20/2020  MRN: [id number]  Primary Care Provider: Harish Hernandez DO    Kenia Nunez" is a 64 y.o. female who presents today for follow-up of depression and anxiety. Met with patient.        Subjective:       Patient report: "I'm much better. I think the increased Wellbutrin has helped." Pt started smoking cessation program and has not had a cigarette in the past three weeks. Endorses some worry about weight gain. Discussed hx of anorexia during adolescence after father called her "fat." She denies severe restricting of calories but does become preoccupied with perceived physical flaws and weighs herself daily. She has been going to [employer] again every Sunday and [employer] on Thursdays, both of which provide a sense of peace. She is less worried about finances. Son is progressing in his physical therapy.     Current symptoms:   · Depression: depressed mood, diminished interest, fatigue and tearfulness  · Anxiety: excessive anxiety/worry, restlessness/keyed up and irritability  · Substance abuse: denied  · Cognitive functioning: denied  · Iram: none noted  · Psychosis: none noted    Psychosocial stressors and topics discussed: identifying feelings, symptom recognition/mgmt, self care, treatment progress, goals, coping strategies, boundaries, parenting issues, financial issues, family of origin dynamics and managing anxiety/panic/stress      Objective:       Mental Status Evaluation  Appearance: unremarkable, age appropriate  Behavior: normal, cooperative  Speech: normal tone, normal rate, normal pitch, normal volume  Mood: anxious  Affect: congruent and appropriate, blunted  Thought Process: normal and logical  Thought " Content: normal, no suicidality, no homicidality, delusions, or paranoia  Sensorium: grossly intact  Cognition: grossly intact  Insight: good  Judgment: adequate to circumstances    Risk parameters:  Patient reports no suicidal ideation  Patient reports no homicidal ideation  Patient reports no self-injurious behavior  Patient reports no violent behavior      Assessment & Plan:       Therapeutic interventions used: Educated pt re: how anxious fears are maintained by a cycle of unwarranted fear and avoidance that precludes positive, corrective experiences with the feared object/situation  Discussed how treatment targets worry, anxiety symptoms, and avoidance to help pt manage worry effectively  The reduction of overarousal and unnecessary avoidance were emphasized as treatment targets  Assessed pt's current and past mood episodes, including the features, frequency, intensity and duration  Monitored the effectiveness and side effects of antidepressant medication prescribed by physician/NP/MP  Reinforced pt's successes in reframing cognitive distortions  Encouraged pt to commit time and effort to activities that are consistent with personally meaningful values    The patient's response to the interventions is accepting    The patient's progress toward treatment goals is good    Homework assigned: daily journaling     Treatment plan:   A. Target symptoms: Depression, Anxiety and Poor Coping Skills   B. Therapeutic modalities: insight oriented psychotherapy, supportive psychotherapy  C. Why chosen therapy is appropriate versus another modality: relevant to diagnosis, evidence based practice   D. Outcome monitoring methods: self-report, observation     Visit Diagnosis:   1. Generalized anxiety disorder    2. Moderate episode of recurrent major depressive disorder        Follow-up: individual psychotherapy and medication management by physician    Return to Clinic: 2 weeks    Length of Service (minutes): 45

## 2020-02-26 ENCOUNTER — PATIENT MESSAGE (OUTPATIENT)
Dept: INTERNAL MEDICINE | Facility: CLINIC | Age: 65
End: 2020-02-26

## 2020-02-27 RX ORDER — SIMVASTATIN 5 MG/1
5 TABLET, FILM COATED ORAL NIGHTLY
Qty: 90 TABLET | Refills: 3 | Status: SHIPPED | OUTPATIENT
Start: 2020-02-27 | End: 2021-03-30

## 2020-02-28 ENCOUNTER — PATIENT OUTREACH (OUTPATIENT)
Dept: ADMINISTRATIVE | Facility: OTHER | Age: 65
End: 2020-02-28

## 2020-02-28 ENCOUNTER — PATIENT MESSAGE (OUTPATIENT)
Dept: NEPHROLOGY | Facility: CLINIC | Age: 65
End: 2020-02-28

## 2020-02-28 ENCOUNTER — PATIENT OUTREACH (OUTPATIENT)
Dept: ADMINISTRATIVE | Facility: HOSPITAL | Age: 65
End: 2020-02-28

## 2020-03-02 ENCOUNTER — PATIENT MESSAGE (OUTPATIENT)
Dept: PSYCHIATRY | Facility: CLINIC | Age: 65
End: 2020-03-02

## 2020-03-02 ENCOUNTER — TELEPHONE (OUTPATIENT)
Dept: ORTHOPEDICS | Facility: CLINIC | Age: 65
End: 2020-03-02

## 2020-03-02 ENCOUNTER — TELEPHONE (OUTPATIENT)
Dept: INTERNAL MEDICINE | Facility: CLINIC | Age: 65
End: 2020-03-02

## 2020-03-02 ENCOUNTER — PATIENT MESSAGE (OUTPATIENT)
Dept: INTERNAL MEDICINE | Facility: CLINIC | Age: 65
End: 2020-03-02

## 2020-03-02 DIAGNOSIS — M17.12 PRIMARY OSTEOARTHRITIS OF LEFT KNEE: Primary | ICD-10-CM

## 2020-03-02 NOTE — TELEPHONE ENCOUNTER
Pt called requesting a referral to see orthopedic doctor here in [facility] for knee pain

## 2020-03-03 ENCOUNTER — CLINICAL SUPPORT (OUTPATIENT)
Dept: SMOKING CESSATION | Facility: CLINIC | Age: 65
End: 2020-03-03
Payer: COMMERCIAL

## 2020-03-03 ENCOUNTER — OFFICE VISIT (OUTPATIENT)
Dept: PSYCHIATRY | Facility: CLINIC | Age: 65
End: 2020-03-03
Payer: MEDICAID

## 2020-03-03 DIAGNOSIS — F33.1 MODERATE EPISODE OF RECURRENT MAJOR DEPRESSIVE DISORDER: ICD-10-CM

## 2020-03-03 DIAGNOSIS — F17.200 NICOTINE DEPENDENCE: Primary | ICD-10-CM

## 2020-03-03 DIAGNOSIS — F41.1 GENERALIZED ANXIETY DISORDER: Primary | ICD-10-CM

## 2020-03-03 PROCEDURE — 99407 BEHAV CHNG SMOKING > 10 MIN: CPT | Mod: S$GLB,,,

## 2020-03-03 PROCEDURE — 90834 PSYTX W PT 45 MINUTES: CPT | Mod: AJ,HB,, | Performed by: SOCIAL WORKER

## 2020-03-03 PROCEDURE — 90834 PR PSYCHOTHERAPY W/PATIENT, 45 MIN: ICD-10-PCS | Mod: AJ,HB,, | Performed by: SOCIAL WORKER

## 2020-03-03 PROCEDURE — 99407 PR TOBACCO USE CESSATION INTENSIVE >10 MINUTES: ICD-10-PCS | Mod: S$GLB,,,
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
## 2020-03-11 ENCOUNTER — PATIENT MESSAGE (OUTPATIENT)
Dept: NEUROLOGY | Facility: CLINIC | Age: 65
End: 2020-03-11

## 2020-03-12 ENCOUNTER — PATIENT MESSAGE (OUTPATIENT)
Dept: INTERNAL MEDICINE | Facility: CLINIC | Age: 65
End: 2020-03-12

## 2020-03-13 ENCOUNTER — TELEPHONE (OUTPATIENT)
Dept: ORTHOPEDICS | Facility: CLINIC | Age: 65
End: 2020-03-13

## 2020-03-13 DIAGNOSIS — M25.561 PAIN IN BOTH KNEES, UNSPECIFIED CHRONICITY: Primary | ICD-10-CM

## 2020-03-13 DIAGNOSIS — M25.562 PAIN IN BOTH KNEES, UNSPECIFIED CHRONICITY: Primary | ICD-10-CM

## 2020-03-13 NOTE — TELEPHONE ENCOUNTER
Spoke with patient in regards to ortho appointment on 03/16/2020. Patient was okay with being seen at [facility] at 1:20. Patient was informed to arrive 30 minutes prior to scheduled appointment time. Patient verbalized understanding. MS

## 2020-03-15 ENCOUNTER — PATIENT MESSAGE (OUTPATIENT)
Dept: PSYCHIATRY | Facility: CLINIC | Age: 65
End: 2020-03-15

## 2020-03-16 ENCOUNTER — TELEPHONE (OUTPATIENT)
Dept: ORTHOPEDICS | Facility: CLINIC | Age: 65
End: 2020-03-16

## 2020-03-16 ENCOUNTER — PATIENT MESSAGE (OUTPATIENT)
Dept: INTERNAL MEDICINE | Facility: CLINIC | Age: 65
End: 2020-03-16

## 2020-03-16 NOTE — TELEPHONE ENCOUNTER
Spoke with patient in regards with her appointment today. She wants to reschedule at a later time due to her son being sick. I told her to give us a call when she felt comfortable. Patient verbalized understanding. MS       ----- Message from Coby Baker sent at 3/16/2020  8:09 AM CDT -----  Contact: self  Pt is requesting a call back regarding her appt on 3/16/2020 at the [facility].States she does not feel comfortable with coming to the clinic. Please call pt back at [phone]

## 2020-03-17 ENCOUNTER — PATIENT MESSAGE (OUTPATIENT)
Dept: PSYCHIATRY | Facility: CLINIC | Age: 65
End: 2020-03-17

## 2020-03-17 ENCOUNTER — CLINICAL SUPPORT (OUTPATIENT)
Dept: SMOKING CESSATION | Facility: CLINIC | Age: 65
End: 2020-03-17
Payer: COMMERCIAL

## 2020-03-17 DIAGNOSIS — F17.200 NICOTINE DEPENDENCE: Primary | ICD-10-CM

## 2020-03-17 PROCEDURE — 99402 PR PREVENT COUNSEL,INDIV,30 MIN: ICD-10-PCS | Mod: S$GLB,,,

## 2020-03-17 PROCEDURE — 99402 PREV MED CNSL INDIV APPRX 30: CPT | Mod: S$GLB,,,

## 2020-03-17 RX ORDER — VARENICLINE TARTRATE 1 MG/1
1 TABLET, FILM COATED ORAL 2 TIMES DAILY
Qty: 56 TABLET | Refills: 0 | Status: SHIPPED | OUTPATIENT
Start: 2020-03-17 | End: 2020-04-16

## 2020-03-17 NOTE — Clinical Note
Spoke with patients by telephone at length in regard to her tobacco cessation progress. Patient remains tobacco free as of 1/25/20. Patient stated that she has been having more urges lately due to stress related. Did not go to [place] with son this week. Encouraged her to use strategies and wait it out 15 mins and stay positive. The patient remains on the prescribed tobacco cessation medication regimen of Chantix 1mg QD, without any negative side effects at this time. The patient will continue with bi-weekly therapy sessions and medication monitoring by CTTS. Prescribed medication management will be by physician. The patient denies any abnormal behavioral or mental changes at this time.

## 2020-03-17 NOTE — PROGRESS NOTES
Individual Follow-Up Form    3/17/2020    Quit Date: 1/25/20    Clinical Status of Patient: Outpatient    Length of Service: 30 minutes    Continuing Medication: yes  Chantix    Other Medications:      Target Symptoms: Withdrawal and medication side effects. The following were  rated moderate (3) to severe (4) on TCRS:  · Moderate (3): none  · Severe (4): none    Comments: Spoke with patients by telephone at length in regard to her tobacco cessation progress. Patient remains tobacco free as of 1/25/20. Patient stated that she has been having more urges lately due to stress related. Did not go to [place] with son this week. Encouraged her to use strategies and wait it out 15 mins and stay positive. The patient remains on the prescribed tobacco cessation medication regimen of Chantix 1mg QD, without any negative side effects at this time. The patient will continue with bi-weekly therapy sessions and medication monitoring by CTTS. Prescribed medication management will be by physician. The patient denies any abnormal behavioral or mental changes at this time.     Diagnosis: F17.200    Next Visit: 2 weeks   BLE WFL

## 2020-03-18 RX ORDER — BUPROPION HYDROCHLORIDE 150 MG/1
TABLET ORAL
Qty: 90 TABLET | Refills: 1 | Status: SHIPPED | OUTPATIENT
Start: 2020-03-18 | End: 2020-04-22 | Stop reason: SDUPTHER

## 2020-03-20 ENCOUNTER — TELEPHONE (OUTPATIENT)
Dept: INTERNAL MEDICINE | Facility: CLINIC | Age: 65
End: 2020-03-20

## 2020-03-20 ENCOUNTER — PATIENT MESSAGE (OUTPATIENT)
Dept: INTERNAL MEDICINE | Facility: CLINIC | Age: 65
End: 2020-03-20

## 2020-03-20 NOTE — TELEPHONE ENCOUNTER
----- Message from Ivette Drake sent at 3/20/2020  9:14 AM CDT -----  Contact: Patient  Patient would like Nicolle to give her a call back concerning her appointment today. Please call at Ph .[phone] (home)

## 2020-03-23 ENCOUNTER — TELEPHONE (OUTPATIENT)
Dept: PHARMACY | Facility: CLINIC | Age: 65
End: 2020-03-23

## 2020-03-23 NOTE — TELEPHONE ENCOUNTER
Pt confirmed shipping of Aimovig on 3/25 to arrive on 3/26. Address and  verified. $3 copay in 004, [facility]. Pt is in need of a sharps container at this time.  Pt has 0 doses on hand, next dose due .  Pt reported no missed doses. Pt denies any injection site reactions or side effects. Pt did not start any new medications. Pt had no further questions or concerns.

## 2020-03-31 ENCOUNTER — CLINICAL SUPPORT (OUTPATIENT)
Dept: SMOKING CESSATION | Facility: CLINIC | Age: 65
End: 2020-03-31
Payer: COMMERCIAL

## 2020-03-31 DIAGNOSIS — F17.200 NICOTINE DEPENDENCE: Primary | ICD-10-CM

## 2020-03-31 PROCEDURE — 99999 PR PBB SHADOW E&M-EST. PATIENT-LVL I: CPT | Mod: PBBFAC,,,

## 2020-03-31 PROCEDURE — 99402 PR PREVENT COUNSEL,INDIV,30 MIN: ICD-10-PCS | Mod: S$GLB,,,

## 2020-03-31 PROCEDURE — 99402 PREV MED CNSL INDIV APPRX 30: CPT | Mod: S$GLB,,,

## 2020-03-31 PROCEDURE — 99999 PR PBB SHADOW E&M-EST. PATIENT-LVL I: ICD-10-PCS | Mod: PBBFAC,,,

## 2020-03-31 NOTE — PROGRESS NOTES
Individual Follow-Up Form    3/31/2020    Quit Date: 1/25/20    Clinical Status of Patient: Outpatient    Length of Service: 30 minutes    Continuing Medication: yes  Chantix    Other Medications:      Target Symptoms: Withdrawal and medication side effects. The following were  rated moderate (3) to severe (4) on TCRS:  · Moderate (3): none  · Severe (4): none    Comments: Due to COVID-19 session done by phone with patient. Discussed and reviewed symptoms, precautions, and protocol of the COVID-19.completion of TCRS (Tobacco Cessation Rating Scale) reviewed strategies, habitual behavior, stress, and high risk situations. Introduced stress with addition interventions, SOLVE, relaxation with interventions, nutrition, exercise, weight gain, and the importance of rewarding oneself for accomplishments toward becoming tobacco free. Open discussion of all items with interventions.  Patient remains tobacco free at this time as of 1/25/20. Commended her on her continued quit through these stressful times. Patient stated in having a little anxiety at this time with everything going on at this time with COVID 19. The patient remains on the prescribed tobacco cessation medication regimen of Chantix 1 mg QD without any negative side effects at this time.The patient denies any abnormal behavioral or mental changes at this time. The patient will continue with individual therapy sessions and medication monitoring by CTTS. Prescribed medication management will be by physician.      Diagnosis: F17.200    Next Visit: 2 weeks

## 2020-03-31 NOTE — Clinical Note
Patient remains tobacco free at this time as of 1/25/20. Commended her on her continued quit through these stressful times. Patient stated in having a little anxiety at this time with everything going on at this time with COVID 19. The patient remains on the prescribed tobacco cessation medication regimen of Chantix 1 mg QD without any negative side effects at this time.The patient denies any abnormal behavioral or mental changes at this time. The patient will continue with individual therapy sessions and medication monitoring by CTTS.

## 2020-04-16 ENCOUNTER — PATIENT MESSAGE (OUTPATIENT)
Dept: PSYCHIATRY | Facility: CLINIC | Age: 65
End: 2020-04-16

## 2020-04-17 ENCOUNTER — PATIENT MESSAGE (OUTPATIENT)
Dept: INTERNAL MEDICINE | Facility: CLINIC | Age: 65
End: 2020-04-17

## 2020-04-17 ENCOUNTER — PATIENT MESSAGE (OUTPATIENT)
Dept: GYNECOLOGIC ONCOLOGY | Facility: CLINIC | Age: 65
End: 2020-04-17

## 2020-04-22 ENCOUNTER — OFFICE VISIT (OUTPATIENT)
Dept: PSYCHIATRY | Facility: CLINIC | Age: 65
End: 2020-04-22
Payer: COMMERCIAL

## 2020-04-22 DIAGNOSIS — F43.21 COMPLICATED GRIEF: ICD-10-CM

## 2020-04-22 DIAGNOSIS — F41.1 GENERALIZED ANXIETY DISORDER: Primary | ICD-10-CM

## 2020-04-22 DIAGNOSIS — F33.1 MODERATE EPISODE OF RECURRENT MAJOR DEPRESSIVE DISORDER: ICD-10-CM

## 2020-04-22 PROCEDURE — 99214 OFFICE O/P EST MOD 30 MIN: CPT | Mod: 95,AF,HB, | Performed by: PSYCHIATRY & NEUROLOGY

## 2020-04-22 PROCEDURE — 99214 PR OFFICE/OUTPT VISIT, EST, LEVL IV, 30-39 MIN: ICD-10-PCS | Mod: 95,AF,HB, | Performed by: PSYCHIATRY & NEUROLOGY

## 2020-04-22 RX ORDER — DIAZEPAM 2 MG/1
2 TABLET ORAL EVERY 6 HOURS PRN
Qty: 30 TABLET | Refills: 1 | Status: SHIPPED | OUTPATIENT
Start: 2020-04-22 | End: 2020-06-22 | Stop reason: SDUPTHER

## 2020-04-22 RX ORDER — BUPROPION HYDROCHLORIDE 150 MG/1
450 TABLET ORAL DAILY
Qty: 90 TABLET | Refills: 2 | Status: SHIPPED | OUTPATIENT
Start: 2020-04-22 | End: 2020-06-22 | Stop reason: SDUPTHER

## 2020-04-22 RX ORDER — BUSPIRONE HYDROCHLORIDE 10 MG/1
10 TABLET ORAL 2 TIMES DAILY
Qty: 120 TABLET | Refills: 2 | Status: SHIPPED | OUTPATIENT
Start: 2020-04-22 | End: 2020-06-22 | Stop reason: SDUPTHER
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
## 2020-04-23 ENCOUNTER — TELEPHONE (OUTPATIENT)
Dept: PHARMACY | Facility: CLINIC | Age: 65
End: 2020-04-23

## 2020-04-23 ENCOUNTER — PATIENT MESSAGE (OUTPATIENT)
Dept: INTERNAL MEDICINE | Facility: CLINIC | Age: 65
End: 2020-04-23

## 2020-04-24 ENCOUNTER — TELEPHONE (OUTPATIENT)
Dept: INTERNAL MEDICINE | Facility: CLINIC | Age: 65
End: 2020-04-24

## 2020-04-24 RX ORDER — ACYCLOVIR 50 MG/G
OINTMENT TOPICAL
Qty: 30 G | Refills: 1 | Status: SHIPPED | OUTPATIENT
Start: 2020-04-24 | End: 2023-10-05

## 2020-04-24 NOTE — TELEPHONE ENCOUNTER
LOV: 01/09/2020 .Patient sent over a My Chart message requesting to have Zovirax cream called in for her fever blister. I don't see this medication on her medication list, neither on her medication list history.

## 2020-04-28 ENCOUNTER — TELEPHONE (OUTPATIENT)
Dept: ORTHOPEDICS | Facility: CLINIC | Age: 65
End: 2020-04-28

## 2020-04-28 ENCOUNTER — CLINICAL SUPPORT (OUTPATIENT)
Dept: SMOKING CESSATION | Facility: CLINIC | Age: 65
End: 2020-04-28
Payer: COMMERCIAL

## 2020-04-28 ENCOUNTER — PATIENT MESSAGE (OUTPATIENT)
Dept: GYNECOLOGIC ONCOLOGY | Facility: CLINIC | Age: 65
End: 2020-04-28

## 2020-04-28 DIAGNOSIS — F17.200 NICOTINE DEPENDENCE: Primary | ICD-10-CM

## 2020-04-28 PROCEDURE — 99402 PREV MED CNSL INDIV APPRX 30: CPT | Mod: S$PBB,,,

## 2020-04-28 PROCEDURE — 99999 PR PBB SHADOW E&M-EST. PATIENT-LVL I: CPT | Mod: PBBFAC,,,

## 2020-04-28 PROCEDURE — 99999 PR PBB SHADOW E&M-EST. PATIENT-LVL I: ICD-10-PCS | Mod: PBBFAC,,,

## 2020-04-28 PROCEDURE — 99402 PR PREVENT COUNSEL,INDIV,30 MIN: ICD-10-PCS | Mod: S$PBB,,,

## 2020-04-28 NOTE — Clinical Note
Due to COVID-19 session done by phone with patient. Discussed and reviewed symptoms, precautions, and protocol of the COVID-19. Patient remains tobacco free currently as of 1/26/20. Commended her begin tobacco free for 3 months. Patient stopped Chantix due to talking in her sleep. Patient is on 450 mg Wellbutrin that was prescribed by Dr Bustamante and monitor by him. The patient denies any abnormal behavioral or mental changes at this time. The patient will continue with individual therapy sessions and medication monitoring by CTTS. Prescribed medication management will be by physician.

## 2020-04-28 NOTE — PROGRESS NOTES
Individual Follow-Up Form    4/28/2020    Quit Date: 1/22/20    Clinical Status of Patient: Outpatient    Length of Service: 30 minutes    Continuing Medication: none    Other Medications:      Target Symptoms: Withdrawal and medication side effects. The following were  rated moderate (3) to severe (4) on TCRS:  · Moderate (3): none  · Severe (4): none    Comments: Due to COVID-19 session done by phone with patient. Discussed and reviewed symptoms, precautions, and protocol of the COVID-19. Patient remains tobacco free currently as of 1/26/20. Commended her begin tobacco free for 3 months. Patient stopped Chantix due to talking in her sleep. Patient is on 450 mg Wellbutrin that was prescribed by Dr Bustamante and monitor by him. The patient denies any abnormal behavioral or mental changes at this time. The patient will continue with individual therapy sessions and medication monitoring by CTTS. Prescribed medication management will be by physician.     Diagnosis: F17.200    Next Visit: 2 weeks

## 2020-04-28 NOTE — TELEPHONE ENCOUNTER
----- Message from Noemy Ferrari sent at 4/28/2020  2:14 PM CDT -----  Type :  Needs Medical Advice    Who Called:  Kenia  Symptoms (please be specific): knee appt  How long has patient had these symptoms:   knee  Pharmacy name and phone #:  no  Would the patient rather a call back or a response via [facility]? call  Best Call Back Number: [phone]  Additional Information:  Appt

## 2020-04-28 NOTE — TELEPHONE ENCOUNTER
Spoke with pt informing her that we are currently not seeing patients in the clinic due to the virus but we could do TM visit. Pt declined and is now on r/s list. Pt verbalized understanding.

## 2020-04-29 ENCOUNTER — PATIENT MESSAGE (OUTPATIENT)
Dept: NEUROLOGY | Facility: CLINIC | Age: 65
End: 2020-04-29

## 2020-04-29 ENCOUNTER — PATIENT MESSAGE (OUTPATIENT)
Dept: PSYCHIATRY | Facility: CLINIC | Age: 65
End: 2020-04-29

## 2020-04-30 ENCOUNTER — OFFICE VISIT (OUTPATIENT)
Dept: PSYCHIATRY | Facility: CLINIC | Age: 65
End: 2020-04-30
Payer: MEDICAID

## 2020-04-30 DIAGNOSIS — F33.1 MODERATE EPISODE OF RECURRENT MAJOR DEPRESSIVE DISORDER: ICD-10-CM

## 2020-04-30 DIAGNOSIS — F41.1 GENERALIZED ANXIETY DISORDER: Primary | ICD-10-CM

## 2020-04-30 PROCEDURE — 90834 PSYTX W PT 45 MINUTES: CPT | Mod: 95,AJ,HB, | Performed by: SOCIAL WORKER

## 2020-04-30 PROCEDURE — 90834 PR PSYCHOTHERAPY W/PATIENT, 45 MIN: ICD-10-PCS | Mod: 95,AJ,HB, | Performed by: SOCIAL WORKER

## 2020-05-01 ENCOUNTER — TELEPHONE (OUTPATIENT)
Dept: ADMINISTRATIVE | Facility: OTHER | Age: 65
End: 2020-05-01

## 2020-05-06 ENCOUNTER — TELEPHONE (OUTPATIENT)
Dept: GYNECOLOGIC ONCOLOGY | Facility: CLINIC | Age: 65
End: 2020-05-06

## 2020-05-06 NOTE — TELEPHONE ENCOUNTER
Spoke with pt. States she has a migraine coming on and would like to reschedule. appt rescheduled to Tuesday and pt states she will see it in the portal. Denies any other needs at this time.   ----- Message from Kadie Montalvo sent at 5/6/2020  4:16 PM CDT -----  Contact: NOE JOSE  Name of Who is Calling: NOE JOSE      What is the request in detail: Would like to speak to staff in regards to suffering from severe migraines, states she has one coming on now and it'll only get worse. She states Dr. Crane accommodated her with an earlier appointment for tomorrow, but she wanted to know if she can reschedule for another day, but sooner than 05/28, if not she will come into the office tomorrow. Please advise.       Can the clinic reply by [facility]: Yes      What Number to Call Back if not in [facility]: [phone]

## 2020-05-07 ENCOUNTER — PATIENT MESSAGE (OUTPATIENT)
Dept: NEUROLOGY | Facility: CLINIC | Age: 65
End: 2020-05-07

## 2020-05-08 ENCOUNTER — TELEPHONE (OUTPATIENT)
Dept: NEPHROLOGY | Facility: CLINIC | Age: 65
End: 2020-05-08

## 2020-05-08 NOTE — TELEPHONE ENCOUNTER
----- Message from Deann Edmondson sent at 5/8/2020  4:44 PM CDT -----  Contact: [facility] pharmacy  [facility] pharmacy called about pt please reach out to them to discuss at [phone]

## 2020-05-11 DIAGNOSIS — F43.21 COMPLICATED GRIEF: ICD-10-CM

## 2020-05-11 RX ORDER — DIAZEPAM 2 MG/1
TABLET ORAL
Qty: 30 TABLET | Refills: 1 | OUTPATIENT
Start: 2020-05-11

## 2020-05-12 ENCOUNTER — TELEPHONE (OUTPATIENT)
Dept: GYNECOLOGIC ONCOLOGY | Facility: CLINIC | Age: 65
End: 2020-05-12

## 2020-05-12 ENCOUNTER — OFFICE VISIT (OUTPATIENT)
Dept: GYNECOLOGIC ONCOLOGY | Facility: CLINIC | Age: 65
End: 2020-05-12
Payer: MEDICAID

## 2020-05-12 VITALS
SYSTOLIC BLOOD PRESSURE: 132 MMHG | BODY MASS INDEX: 22.07 KG/M2 | HEART RATE: 101 BPM | DIASTOLIC BLOOD PRESSURE: 80 MMHG | WEIGHT: 119.94 LBS | HEIGHT: 62 IN

## 2020-05-12 DIAGNOSIS — Z72.0 TOBACCO USE: ICD-10-CM

## 2020-05-12 DIAGNOSIS — D07.1 SEVERE DYSPLASIA OF VULVA: Primary | ICD-10-CM

## 2020-05-12 PROCEDURE — 99214 PR OFFICE/OUTPT VISIT, EST, LEVL IV, 30-39 MIN: ICD-10-PCS | Mod: S$PBB,,, | Performed by: OBSTETRICS & GYNECOLOGY

## 2020-05-12 PROCEDURE — 99999 PR PBB SHADOW E&M-EST. PATIENT-LVL III: ICD-10-PCS | Mod: PBBFAC,,, | Performed by: OBSTETRICS & GYNECOLOGY

## 2020-05-12 PROCEDURE — 99999 PR PBB SHADOW E&M-EST. PATIENT-LVL III: CPT | Mod: PBBFAC,,, | Performed by: OBSTETRICS & GYNECOLOGY

## 2020-05-12 PROCEDURE — 99214 OFFICE O/P EST MOD 30 MIN: CPT | Mod: S$PBB,,, | Performed by: OBSTETRICS & GYNECOLOGY

## 2020-05-12 PROCEDURE — 99213 OFFICE O/P EST LOW 20 MIN: CPT | Mod: PBBFAC | Performed by: OBSTETRICS & GYNECOLOGY

## 2020-05-12 NOTE — PROGRESS NOTES
"Subjective:      Patient ID: Kenia Alonzo is a 64 y.o. female.    Chief Complaint: Vulvar Dysplasia      HPI  Presents today for vulvar lesion follow up. Previously scheduled for vulvar excision in  on 9/25/19, but surgery was cancelled per request of the patient.     Pap and HPV 9/11/2019 normal.   Patient refused vulvar biopsy at previous visit.    Review of records shows vulvar biopsies from 2016  FINAL PATHOLOGIC DIAGNOSIS  1. VULVAR BIOPSY SHOWING HIGH-GRADE SQUAMOUS DYSPLASIA (FLORA 2-3)  2. VULVAR BIOPSY SHOWING SEVERE DYSPLASIA (FLORA 3) WITH MARKED PARAKERATOSIS, NO  DEFINITIVE INVASION IDENTIFIED.  3. VULVAR BIOPSY SHOWING MILD SQUAMOUS DYSPLASIA (FLORA 1)    Recommended treatment at that time. She says she got treatment in [place] in 2016 with "scraping". No records available for review.       Review of Systems   Constitutional: Negative for appetite change, chills, fatigue and fever.   HENT: Negative for mouth sores.    Respiratory: Negative for cough and shortness of breath.    Cardiovascular: Negative for leg swelling.   Gastrointestinal: Negative for abdominal pain, blood in stool, constipation and diarrhea.   Endocrine: Negative for cold intolerance.   Genitourinary: Negative for dysuria and vaginal bleeding.   Musculoskeletal: Negative for myalgias.   Skin: Negative for rash.   Allergic/Immunologic: Negative.    Neurological: Negative for weakness and numbness.   Hematological: Negative for adenopathy. Does not bruise/bleed easily.   Psychiatric/Behavioral: Negative for confusion.       Objective:   Physical Exam:   Constitutional: She is oriented to person, place, and time. She appears well-developed and well-nourished.    HENT:   Head: Normocephalic and atraumatic.    Eyes: Pupils are equal, round, and reactive to light. EOM are normal.    Neck: Normal range of motion. Neck supple. No thyromegaly present.    Cardiovascular: Normal rate, regular rhythm and intact distal pulses.     Pulmonary/Chest: " Effort normal and breath sounds normal. No respiratory distress. She has no wheezes.        Abdominal: Soft. Bowel sounds are normal. She exhibits no distension, no ascites and no mass. There is no tenderness.     Genitourinary: Vagina normal and uterus normal.       Pelvic exam was performed with patient supine. There is lesion on the left labia. No change in lesions from previous pelvic exam. Cervix is normal. Right adnexum displays no mass. Left adnexum displays no mass.        Musculoskeletal: Normal range of motion and moves all extremeties.      Lymphadenopathy:     She has no cervical adenopathy.        Right: No inguinal and no supraclavicular adenopathy present.        Left: No inguinal and no supraclavicular adenopathy present.    Neurological: She is alert and oriented to person, place, and time.    Skin: Skin is warm and dry. No rash noted.    Psychiatric: She has a normal mood and affect.     Assessment:     1. Severe dysplasia of vulva    2. Tobacco use        Plan:     No orders of the defined types were placed in this encounter.    Previously cancelled surgery. Minimal change to exam findings.   We again reviewed the natural history of vulvar dysplasia, recommend treatment.   Given patient has multifocal lesions, recommend vulvar laser ablation.  The risks, benefits, and indications of the procedure were discussed with the patient and her family members if present.  These included bleeding, transfusion, infection, damage to surrounding tissues (bowel, bladder, ureter), wound separation, lymphedema, conversion to laparotomy if laparoscopic, perioperative cardiac events, VTE, pneumonia, and possible death.  She voiced understanding, all questions were answered and consents were signed.    Laser ablation 5/12/2020.

## 2020-05-13 ENCOUNTER — TELEPHONE (OUTPATIENT)
Dept: ORTHOPEDICS | Facility: CLINIC | Age: 65
End: 2020-05-13

## 2020-05-13 ENCOUNTER — CLINICAL SUPPORT (OUTPATIENT)
Dept: SMOKING CESSATION | Facility: CLINIC | Age: 65
End: 2020-05-13
Payer: COMMERCIAL

## 2020-05-13 DIAGNOSIS — F17.200 NICOTINE DEPENDENCE: Primary | ICD-10-CM

## 2020-05-13 PROCEDURE — 99402 PR PREVENT COUNSEL,INDIV,30 MIN: ICD-10-PCS | Mod: S$GLB,,,

## 2020-05-13 PROCEDURE — 99402 PREV MED CNSL INDIV APPRX 30: CPT | Mod: S$GLB,,,

## 2020-05-13 NOTE — Clinical Note
Due to COVID-19 session done by phone with patient.  Patient remains tobacco free currently as of 1/26/20. Commended her begin tobacco free for over 3 months. Patient stopped Chantix due to talking in her sleep. Patient is on 450 mg Wellbutrin that was prescribed by Dr Allen and monitor by him. The patient denies any abnormal behavioral or mental changes at this time. The patient will continue with individual therapy sessions and medication monitoring by CTTS.

## 2020-05-13 NOTE — PROGRESS NOTES
Individual Follow-Up Form    5/13/2020    Quit Date:1/26/20    Clinical Status of Patient: Outpatient    Length of Service: 30 minutes    Continuing Medication: no    Other Medications:      Target Symptoms: Withdrawal and medication side effects. The following were  rated moderate (3) to severe (4) on TCRS:  · Moderate (3): nine  · Severe (4): none    Comments: Due to COVID-19 session done by phone with patient.  Patient remains tobacco free currently as of 1/26/20. Commended her begin tobacco free for over 3 months. Patient stopped Chantix due to talking in her sleep. Patient is on 450 mg Wellbutrin that was prescribed by Dr Allen and monitor by him. The patient denies any abnormal behavioral or mental changes at this time. The patient will continue with individual therapy sessions and medication monitoring by CTTS.     Diagnosis: F17.200    Next Visit: 2 weeks
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
## 2020-05-15 ENCOUNTER — TELEPHONE (OUTPATIENT)
Dept: GYNECOLOGIC ONCOLOGY | Facility: CLINIC | Age: 65
End: 2020-05-15

## 2020-05-15 DIAGNOSIS — K58.9 IRRITABLE BOWEL SYNDROME WITHOUT DIARRHEA: ICD-10-CM

## 2020-05-15 RX ORDER — DICYCLOMINE HYDROCHLORIDE 10 MG/1
CAPSULE ORAL
Qty: 90 CAPSULE | Refills: 1 | Status: SHIPPED | OUTPATIENT
Start: 2020-05-15 | End: 2020-09-14 | Stop reason: SDUPTHER

## 2020-05-15 NOTE — TELEPHONE ENCOUNTER
Spoke with pt. States she may need to reschedule her surgery since no one can come in the hospital. Informed pt I would check with the most updated visitor policy and call her back today or Monday. Verbalized understanding. Surgery not cancelled as of now.   ----- Message from Martha Will sent at 5/15/2020  9:19 AM CDT -----  Contact: NOE JOSE [[id number]]  Name of Who is Calling: NOE JOSE [[id number]]    What is the request in detail: Would like to speak with Mita in regards to upcoming procedure and not having transportation. She states she will most likely have to reschedule the procedure. Please contact to further discuss and advise      Can the clinic reply by [facility]: no    What Number to Call Back [facility] not [facility]: [phone]

## 2020-05-18 ENCOUNTER — TELEPHONE (OUTPATIENT)
Dept: GYNECOLOGIC ONCOLOGY | Facility: CLINIC | Age: 65
End: 2020-05-18

## 2020-05-18 ENCOUNTER — TELEPHONE (OUTPATIENT)
Dept: INTERNAL MEDICINE | Facility: CLINIC | Age: 65
End: 2020-05-18

## 2020-05-18 ENCOUNTER — OFFICE VISIT (OUTPATIENT)
Dept: INTERNAL MEDICINE | Facility: CLINIC | Age: 65
End: 2020-05-18
Payer: MEDICAID

## 2020-05-18 ENCOUNTER — PATIENT MESSAGE (OUTPATIENT)
Dept: GYNECOLOGIC ONCOLOGY | Facility: CLINIC | Age: 65
End: 2020-05-18

## 2020-05-18 ENCOUNTER — TELEPHONE (OUTPATIENT)
Dept: PHARMACY | Facility: CLINIC | Age: 65
End: 2020-05-18

## 2020-05-18 DIAGNOSIS — J01.10 ACUTE FRONTAL SINUSITIS, RECURRENCE NOT SPECIFIED: ICD-10-CM

## 2020-05-18 DIAGNOSIS — R50.9 FEVER, UNSPECIFIED FEVER CAUSE: Primary | ICD-10-CM

## 2020-05-18 PROCEDURE — 99213 PR OFFICE/OUTPT VISIT, EST, LEVL III, 20-29 MIN: ICD-10-PCS | Mod: 95,,, | Performed by: NURSE PRACTITIONER

## 2020-05-18 PROCEDURE — 99213 OFFICE O/P EST LOW 20 MIN: CPT | Mod: 95,,, | Performed by: NURSE PRACTITIONER

## 2020-05-18 PROCEDURE — U0003 INFECTIOUS AGENT DETECTION BY NUCLEIC ACID (DNA OR RNA); SEVERE ACUTE RESPIRATORY SYNDROME CORONAVIRUS 2 (SARS-COV-2) (CORONAVIRUS DISEASE [COVID-19]), AMPLIFIED PROBE TECHNIQUE, MAKING USE OF HIGH THROUGHPUT TECHNOLOGIES AS DESCRIBED BY CMS-2020-01-R: HCPCS

## 2020-05-18 NOTE — PROGRESS NOTES
"Subjective:       Patient ID: Kenia Alonzo is a 64 y.o. female.    Chief Complaint: No chief complaint on file.    Mrs. Alonzo presents to virtual visit with c/o fever. She states that his seemed like her "Typical seasonal allergic rhinitis", but started with leg pain, fever, HA, and body aches this morning. She has been working in the yard and sat outside for mother's day near some [employer] bushes.  She has also been having some nausea 2/2  post nasal drip. She reports temp this morning 99.9 after taking tylenol. She has been alternating tylenol/ibuprofen for fever, and has also been taking xyzal for allergy symptom relief. She states that she has been having blood with blowing nose for approx 1 week.     Fever    This is a recurrent problem. The current episode started yesterday. The problem has been gradually worsening. The maximum temperature noted was 100 to 100.9 F. The temperature was taken using an oral thermometer. Associated symptoms include congestion, coughing, headaches, muscle aches, nausea and a sore throat. Pertinent negatives include no abdominal pain, chest pain, diarrhea, ear pain, rash, sleepiness, urinary pain, vomiting or wheezing. She has tried NSAIDs for the symptoms. The treatment provided mild relief.       Patient Active Problem List   Diagnosis    Depression    Meniere's disease of right ear    Vestibular migraine    Abnormal involuntary movements    Irritable bowel syndrome without diarrhea    Hyperlipidemia    Tobacco use    CKD (chronic kidney disease) stage 3, GFR 30-59 ml/min    Non-seasonal allergic rhinitis    Bronchitis    Chronic mixed headache syndrome    Severe dysplasia of vulva    Complicated grief    Primary osteoarthritis of left knee    Acute conjunctivitis of right eye    Dermatitis of face    Fever    Acute frontal sinusitis       Family History   Problem Relation Age of Onset    Colon cancer Father     Diabetes Father     Diabetes Sister      Past " Surgical History:   Procedure Laterality Date    BRAIN SURGERY      vestibular neurectomy    BREAST SURGERY      benign     SECTION      x 1    CHOLECYSTECTOMY      TONSILLECTOMY           Current Outpatient Medications:     acyclovir 5% (ZOVIRAX) 5 % ointment, Apply topically 5 (five) times daily., Disp: 30 g, Rfl: 1    amoxicillin-clavulanate 875-125mg (AUGMENTIN) 875-125 mg per tablet, Take 1 tablet by mouth every 12 (twelve) hours. for 10 days, Disp: 20 tablet, Rfl: 0    azelastine (ASTELIN) 137 mcg (0.1 %) nasal spray, 1 spray (137 mcg total) by Nasal route 2 (two) times daily., Disp: 30 mL, Rfl: 3    bumetanide (BUMEX) 2 MG tablet, Take 0.5 tablets (1 mg total) by mouth once daily., Disp: 90 tablet, Rfl: 1    buPROPion (WELLBUTRIN XL) 150 MG TB24 tablet, Take 3 tablets (450 mg total) by mouth once daily., Disp: 90 tablet, Rfl: 2    busPIRone (BUSPAR) 10 MG tablet, Take 1 tablet (10 mg total) by mouth 2 (two) times daily., Disp: 120 tablet, Rfl: 2    diazePAM (VALIUM) 2 MG tablet, Take 1 tablet (2 mg total) by mouth every 6 (six) hours as needed (dizziness/Meniere's attack)., Disp: 30 tablet, Rfl: 1    dicyclomine (BENTYL) 10 MG capsule, TAKE 1 CAPSULE BY MOUTH THREE TIMES DAILY, Disp: 90 capsule, Rfl: 1    erenumab-aooe (AIMOVIG AUTOINJECTOR) 140 mg/mL AtIn, Inject 140 mg into the skin every 28 days., Disp: 1 mL, Rfl: 11    fluticasone propionate (FLONASE) 50 mcg/actuation nasal spray, 2 sprays (100 mcg total) by Each Nostril route once daily., Disp: 16 g, Rfl: 3    hydrocortisone 2.5 % cream, Apply topically 2 (two) times daily., Disp: 20 g, Rfl: 0    levocetirizine (XYZAL) 5 MG tablet, TAKE ONE TABLET BY MOUTH EVERY MORNING, Disp: 90 tablet, Rfl: 3    MELATONIN ORAL, Take by mouth nightly as needed. , Disp: , Rfl:     metoclopramide HCl (REGLAN) 5 MG tablet, Take 1 tablet (5 mg total) by mouth every 12 (twelve) hours as needed., Disp: 10 tablet, Rfl: 3    naratriptan (AMERGE)  2.5 MG tablet, Take 1 tablet (2.5 mg total) by mouth every 72 hours as needed. 2.5 mg at onset of headache, may repeat in 4 hours if needed, Disp: 9 tablet, Rfl: 3    ondansetron (ZOFRAN-ODT) 4 MG TbDL, Take 1 tablet (4 mg total) by mouth every 8 (eight) hours as needed., Disp: 60 tablet, Rfl: 1    simvastatin (ZOCOR) 5 MG tablet, Take 1 tablet (5 mg total) by mouth nightly., Disp: 90 tablet, Rfl: 3    topiramate (TROKENDI XR) 100 mg Cp24, Take 1 capsule (100 mg total) by mouth once daily. (Patient taking differently: Take 100 mg by mouth nightly. ), Disp: 30 capsule, Rfl: 11    ubrogepant (UBRELVY)tablet 100 mg, Take 1 tablet (100 mg total) by mouth every 72 hours as needed for Migraine., Disp: 9 tablet, Rfl: 3    Review of Systems   Constitutional: Positive for activity change, appetite change, chills, fatigue and fever.   HENT: Positive for congestion, postnasal drip, rhinorrhea, sinus pressure and sore throat. Negative for ear pain and sinus pain.    Respiratory: Positive for cough. Negative for chest tightness, shortness of breath and wheezing.    Cardiovascular: Negative for chest pain and palpitations.   Gastrointestinal: Positive for nausea. Negative for abdominal pain, diarrhea and vomiting.   Genitourinary: Negative for dysuria.   Musculoskeletal: Positive for myalgias.   Skin: Negative for rash.   Neurological: Positive for headaches. Negative for dizziness and light-headedness.       Objective:   There were no vitals taken for this visit.     Physical Exam   Constitutional: She is oriented to person, place, and time. She appears well-developed and well-nourished. She is cooperative. She does not appear ill. No distress.   HENT:   Nose: Right sinus exhibits frontal sinus tenderness. Right sinus exhibits no maxillary sinus tenderness. Left sinus exhibits frontal sinus tenderness. Left sinus exhibits no maxillary sinus tenderness.   Pulmonary/Chest: Effort normal. No respiratory distress.    Neurological: She is alert and oriented to person, place, and time. No cranial nerve deficit.   Skin: She is not diaphoretic.       Assessment & Plan     Problem List Items Addressed This Visit        ENT    Acute frontal sinusitis    Current Assessment & Plan     Tylenol/ibuprofen for pain and fever.   Hand hygiene.   Maintain adequate hydration.   Antihistamine and flonase for nasal congestion and upper respiratory symptoms.   Rest.            Relevant Medications    amoxicillin-clavulanate 875-125mg (AUGMENTIN) 875-125 mg per tablet       Other    Fever - Primary    Current Assessment & Plan     Negative COVID testing prior to sending abx for sinusitis symptoms.   Hand hygiene.   Continue covid precautions.   Maintain adequate hydration.   Rest.         Relevant Orders    COVID-19 Routine Screening (Completed)            Follow up if symptoms worsen or fail to improve.

## 2020-05-18 NOTE — TELEPHONE ENCOUNTER
Returned call to pt. Pt stated that she has nasal bleeding in morning, Nausea, leg pain. Scheduled pt with Almita. Np today at 2 pm

## 2020-05-18 NOTE — TELEPHONE ENCOUNTER
refill attempt for aimovig, pt was unable to discuss refill at this time. pt had just arrive for appointment with [facility]. pt states that she had received her replacement injection. she reports that she injected on 5/16 and next dose is due on 6/12. was unable to transfer pt to a [facility], located within [facility] at the time, will reach out to LT[facility], located [facility] for consult.

## 2020-05-18 NOTE — TELEPHONE ENCOUNTER
----- Message from Radha Stiles sent at 5/18/2020 10:08 AM CDT -----  Contact: patient  Type:  Needs Medical Advice    Who Called: patient  Symptoms (please be specific): dry cough headache legs pain   How long has patient had these symptoms:  Post cleaning @ home  Pharmacy name and phone #:    [facility] - [place], [place] - [address]  [address]  [place] [address]  Phone: [phone] Fax: [phone]    Would the patient rather a call back or a response via [facility]? call  Best Call Back Number: [phone]  Additional Information: please call patient ASAP TODAY

## 2020-05-18 NOTE — TELEPHONE ENCOUNTER
Spoke with pt. States that she is not feeling well and would like to cancel her procedure for Friday. Denies any other needs. Pt surgery case placed in the depot and message sent to Dr miller.       ----- Message from Parish Morris, Patient Care Assistant sent at 5/18/2020 10:38 AM CDT -----  Contact: NOE JOSE [[id number]]  Name of Who is Calling: NOE JOSE [[id number]]    What is the request in detail:Requesting a call back in regards of scheduled surgery and not feeling well. Please contact to further discuss and advise      Can the clinic reply by [facility]: No    What Number to Call Back if not in [facility]: [phone]

## 2020-05-19 ENCOUNTER — PATIENT MESSAGE (OUTPATIENT)
Dept: INTERNAL MEDICINE | Facility: CLINIC | Age: 65
End: 2020-05-19

## 2020-05-19 ENCOUNTER — TELEPHONE (OUTPATIENT)
Dept: INTERNAL MEDICINE | Facility: CLINIC | Age: 65
End: 2020-05-19

## 2020-05-19 LAB — SARS-COV-2 RNA RESP QL NAA+PROBE: NOT DETECTED

## 2020-05-19 RX ORDER — AMOXICILLIN AND CLAVULANATE POTASSIUM 875; 125 MG/1; MG/1
1 TABLET, FILM COATED ORAL EVERY 12 HOURS
Qty: 20 TABLET | Refills: 0 | Status: SHIPPED | OUTPATIENT
Start: 2020-05-19 | End: 2020-05-29

## 2020-05-19 RX ORDER — PREDNISONE 20 MG/1
20 TABLET ORAL DAILY
Qty: 5 TABLET | Refills: 0 | Status: SHIPPED | OUTPATIENT
Start: 2020-05-19 | End: 2020-05-24

## 2020-05-19 NOTE — TELEPHONE ENCOUNTER
Patient returned call to office, wanted to know if her result was in. Advise patient result was not back.

## 2020-05-19 NOTE — TELEPHONE ENCOUNTER
----- Message from Nayeli Beach sent at 5/19/2020  2:24 PM CDT -----  Contact: [phone]  Would like to consult with the nurse, patient would like to know if she is going to get a Covid-19 test, patient would like to speak with the nurse as soon as possible concerning this, please call back at [phone], thanks sj

## 2020-05-20 ENCOUNTER — TELEPHONE (OUTPATIENT)
Dept: ORTHOPEDICS | Facility: CLINIC | Age: 65
End: 2020-05-20

## 2020-05-26 ENCOUNTER — CLINICAL SUPPORT (OUTPATIENT)
Dept: SMOKING CESSATION | Facility: CLINIC | Age: 65
End: 2020-05-26
Payer: COMMERCIAL

## 2020-05-26 DIAGNOSIS — F17.200 NICOTINE DEPENDENCE: Primary | ICD-10-CM

## 2020-05-26 PROBLEM — J01.10 ACUTE FRONTAL SINUSITIS: Status: ACTIVE | Noted: 2020-05-26

## 2020-05-26 PROBLEM — R50.9 FEVER: Status: ACTIVE | Noted: 2020-05-26

## 2020-05-26 PROCEDURE — 99407 PR TOBACCO USE CESSATION INTENSIVE >10 MINUTES: ICD-10-PCS | Mod: S$GLB,,,

## 2020-05-26 PROCEDURE — 99407 BEHAV CHNG SMOKING > 10 MIN: CPT | Mod: S$GLB,,,

## 2020-05-26 NOTE — ASSESSMENT & PLAN NOTE
Negative COVID testing prior to sending abx for sinusitis symptoms.   Hand hygiene.   Continue covid precautions.   Maintain adequate hydration.   Rest.

## 2020-05-28 ENCOUNTER — PATIENT OUTREACH (OUTPATIENT)
Dept: ADMINISTRATIVE | Facility: OTHER | Age: 65
End: 2020-05-28

## 2020-06-01 ENCOUNTER — HOSPITAL ENCOUNTER (OUTPATIENT)
Dept: RADIOLOGY | Facility: HOSPITAL | Age: 65
Discharge: HOME | End: 2020-06-01
Attending: ORTHOPAEDIC SURGERY
Payer: MEDICAID

## 2020-06-01 ENCOUNTER — OFFICE VISIT (OUTPATIENT)
Dept: ORTHOPEDICS | Facility: CLINIC | Age: 65
End: 2020-06-01
Payer: MEDICAID

## 2020-06-01 VITALS
HEIGHT: 62 IN | BODY MASS INDEX: 21.9 KG/M2 | DIASTOLIC BLOOD PRESSURE: 79 MMHG | HEART RATE: 93 BPM | SYSTOLIC BLOOD PRESSURE: 141 MMHG | WEIGHT: 119 LBS

## 2020-06-01 DIAGNOSIS — M17.0 PRIMARY OSTEOARTHRITIS OF BOTH KNEES: ICD-10-CM

## 2020-06-01 DIAGNOSIS — M25.561 PAIN IN BOTH KNEES, UNSPECIFIED CHRONICITY: ICD-10-CM

## 2020-06-01 DIAGNOSIS — M17.12 PRIMARY OSTEOARTHRITIS OF LEFT KNEE: Primary | ICD-10-CM

## 2020-06-01 DIAGNOSIS — M25.562 PAIN IN BOTH KNEES, UNSPECIFIED CHRONICITY: ICD-10-CM

## 2020-06-01 PROCEDURE — 99499 UNLISTED E&M SERVICE: CPT | Mod: S$PBB,,, | Performed by: PHYSICIAN ASSISTANT

## 2020-06-01 PROCEDURE — 99499 NO LOS: ICD-10-PCS | Mod: S$PBB,,, | Performed by: PHYSICIAN ASSISTANT

## 2020-06-01 PROCEDURE — 20610 DRAIN/INJ JOINT/BURSA W/O US: CPT | Mod: PBBFAC,LT | Performed by: PHYSICIAN ASSISTANT

## 2020-06-01 PROCEDURE — 20610 DRAIN/INJ JOINT/BURSA W/O US: CPT | Mod: S$PBB,LT,, | Performed by: PHYSICIAN ASSISTANT

## 2020-06-01 PROCEDURE — 99999 PR PBB SHADOW E&M-EST. PATIENT-LVL IV: CPT | Mod: PBBFAC,,, | Performed by: PHYSICIAN ASSISTANT

## 2020-06-01 PROCEDURE — 73564 X-RAY EXAM KNEE 4 OR MORE: CPT | Mod: TC,50

## 2020-06-01 PROCEDURE — 99214 OFFICE O/P EST MOD 30 MIN: CPT | Mod: PBBFAC,25 | Performed by: PHYSICIAN ASSISTANT

## 2020-06-01 PROCEDURE — 73564 X-RAY EXAM KNEE 4 OR MORE: CPT | Mod: 26,50,, | Performed by: RADIOLOGY

## 2020-06-01 PROCEDURE — 20610 LARGE JOINT ASPIRATION/INJECTION: L KNEE: ICD-10-PCS | Mod: S$PBB,LT,, | Performed by: PHYSICIAN ASSISTANT

## 2020-06-01 PROCEDURE — 73564 XR KNEE ORTHO BILAT WITH FLEXION: ICD-10-PCS | Mod: 26,50,, | Performed by: RADIOLOGY

## 2020-06-01 PROCEDURE — 99999 PR PBB SHADOW E&M-EST. PATIENT-LVL IV: ICD-10-PCS | Mod: PBBFAC,,, | Performed by: PHYSICIAN ASSISTANT

## 2020-06-01 RX ORDER — METHYLPREDNISOLONE ACETATE 80 MG/ML
80 INJECTION, SUSPENSION INTRA-ARTICULAR; INTRALESIONAL; INTRAMUSCULAR; SOFT TISSUE
Status: DISCONTINUED | OUTPATIENT
Start: 2020-06-01 | End: 2020-06-01 | Stop reason: HOSPADM

## 2020-06-01 RX ADMIN — METHYLPREDNISOLONE ACETATE 80 MG: 80 INJECTION, SUSPENSION INTRALESIONAL; INTRAMUSCULAR; INTRASYNOVIAL; SOFT TISSUE at 02:06

## 2020-06-01 NOTE — PROGRESS NOTES
Patient ID: Kenia Alonzo  YOB: 1955  MRN: [id number]    Chief Complaint: Pain of the Right Knee and Pain of the Left Knee    Referred By: Dr. Harish Hernandez     History of Present Illness: Kenia Alonzo is a right-hand dominant 64 y.o. female who does not work. She was referred by Dr. Harish Hernandez. She is here for an evaluation of both knees. The patient states that she first started having knee pain in 2014.  She states that her pain is 9/10 today, worse in the left knee vs the right. She states that in the past she has had Visco gel injections which has improved her knee pain in the past. She has tried compound cream in the past which has helped some.  She states that she does use a compression brace for her knee and she states that she has had injections. Most of the pain is located in the inside of both her knees which is worse with activities such as standing or walking for prolonged time. She states that she does get periodic swelling and currently has swelling in her left knee today. She recently completed a course of oral antibiotics for a sinus infection. She currently denies any symptoms of fevers, chills, night sweats, numbness and tingling.     Knee Pain    The pain is present in the left knee and right knee. This is a chronic problem. The current episode started more than 1 year ago. The problem occurs daily and constantly. The problem has been unchanged. The quality of the pain is described as throbbing and burning. The pain is at a severity of 9/10. Associated symptoms include joint locking and joint swelling. Pertinent negatives include no fever, itching or stiffness. The symptoms are aggravated by bearing weight, activity, walking and standing. She has tried brace/corset, cold, injection treatment and OTC pain meds for the symptoms. The treatment provided no relief. Physical therapy was ineffective and not tried.      Past Medical History:   Past Medical History:   Diagnosis  Date    Arthritis     knee    Depression     Encounter for blood transfusion     General anesthetics causing adverse effect in therapeutic use     severe headache after crainiotomy    Gout     Headache     Hyperlipidemia     Meniere disease     MVP (mitral valve prolapse)     minor    Vertigo      Past Surgical History:   Procedure Laterality Date    BRAIN SURGERY      vestibular neurectomy    BREAST SURGERY      benign     SECTION      x 1    CHOLECYSTECTOMY      TONSILLECTOMY       Family History   Problem Relation Age of Onset    Colon cancer Father     Diabetes Father     Diabetes Sister      Social History     Socioeconomic History    Marital status:      Spouse name: Not on file    Number of children: Not on file    Years of education: Not on file    Highest education level: Not on file   Occupational History    Not on file   Social Needs    Financial resource strain: Somewhat hard    Food insecurity:     Worry: Sometimes true     Inability: Patient refused    Transportation needs:     Medical: No     Non-medical: No   Tobacco Use    Smoking status: Former Smoker     Packs/day: 0.50     Years: 43.00     Pack years: 21.50     Types: Cigarettes     Start date: 1976     Last attempt to quit: 2020     Years since quittin.3    Smokeless tobacco: Never Used    Tobacco comment: Quit 20   Substance and Sexual Activity    Alcohol use: Yes     Alcohol/week: 1.0 - 3.0 standard drinks     Types: 1 - 3 Glasses of wine per week     Frequency: Monthly or less     Drinks per session: 1 or 2     Binge frequency: Never     Comment: social few times monthly   No alcohol 72h prior to sx    Drug use: No    Sexual activity: Never     Birth control/protection: None, Post-menopausal   Lifestyle    Physical activity:     Days per week: 0 days     Minutes per session: 0 min    Stress: Very much   Relationships    Social connections:     Talks on phone: More than  three times a week     Gets together: Twice a week     Attends Nondenominational service: Not on file     Active member of club or organization: No     Attends meetings of clubs or organizations: Never     Relationship status:    Other Topics Concern    Not on file   Social History Narrative    Not on file     Medication List with Changes/Refills   Current Medications    ACYCLOVIR 5% (ZOVIRAX) 5 % OINTMENT    Apply topically 5 (five) times daily.    AZELASTINE (ASTELIN) 137 MCG (0.1 %) NASAL SPRAY    1 spray (137 mcg total) by Nasal route 2 (two) times daily.    BUMETANIDE (BUMEX) 2 MG TABLET    Take 0.5 tablets (1 mg total) by mouth once daily.    BUPROPION (WELLBUTRIN XL) 150 MG TB24 TABLET    Take 3 tablets (450 mg total) by mouth once daily.    BUSPIRONE (BUSPAR) 10 MG TABLET    Take 1 tablet (10 mg total) by mouth 2 (two) times daily.    DIAZEPAM (VALIUM) 2 MG TABLET    Take 1 tablet (2 mg total) by mouth every 6 (six) hours as needed (dizziness/Meniere's attack).    DICYCLOMINE (BENTYL) 10 MG CAPSULE    TAKE 1 CAPSULE BY MOUTH THREE TIMES DAILY    ERENUMAB-AOOE (AIMOVIG AUTOINJECTOR) 140 MG/ML ATIN    Inject 140 mg into the skin every 28 days.    FLUTICASONE PROPIONATE (FLONASE) 50 MCG/ACTUATION NASAL SPRAY    2 sprays (100 mcg total) by Each Nostril route once daily.    HYDROCORTISONE 2.5 % CREAM    Apply topically 2 (two) times daily.    LEVOCETIRIZINE (XYZAL) 5 MG TABLET    TAKE ONE TABLET BY MOUTH EVERY MORNING    MELATONIN ORAL    Take by mouth nightly as needed.     METOCLOPRAMIDE HCL (REGLAN) 5 MG TABLET    Take 1 tablet (5 mg total) by mouth every 12 (twelve) hours as needed.    NARATRIPTAN (AMERGE) 2.5 MG TABLET    Take 1 tablet (2.5 mg total) by mouth every 72 hours as needed. 2.5 mg at onset of headache, may repeat in 4 hours if needed    ONDANSETRON (ZOFRAN-ODT) 4 MG TBDL    Take 1 tablet (4 mg total) by mouth every 8 (eight) hours as needed.    SIMVASTATIN (ZOCOR) 5 MG TABLET    Take 1 tablet (5  mg total) by mouth nightly.    TOPIRAMATE (TROKENDI XR) 100 MG CP24    Take 1 capsule (100 mg total) by mouth once daily.    UBROGEPANT (UBRELVY)TABLET 100 MG    Take 1 tablet (100 mg total) by mouth every 72 hours as needed for Migraine.     Review of patient's allergies indicates:   Allergen Reactions    Demerol [meperidine] Nausea And Vomiting     Pt refuses Demerol     Codeine Itching     Review of Systems   Constitution: Negative for fever.   HENT: Negative for sore throat.    Eyes: Negative for blurred vision.   Cardiovascular: Negative for dyspnea on exertion.   Respiratory: Negative for shortness of breath.    Hematologic/Lymphatic: Does not bruise/bleed easily.   Skin: Negative for itching.   Musculoskeletal: Positive for joint pain, joint swelling, muscle cramps and muscle weakness. Negative for stiffness.   Gastrointestinal: Negative for vomiting.   Genitourinary: Negative for dysuria.   Neurological: Positive for loss of balance. Negative for dizziness.   Psychiatric/Behavioral: The patient does not have insomnia.        Physical Exam:   Body mass index is 21.77 kg/m².  Vitals:    06/01/20 1451   BP: (!) 141/79   Pulse: 93      GENERAL: Well appearing, appropriate for stated age, no acute distress.  CARDIOVASCULAR: Pulses regular by peripheral palpation.  PULMONARY: Respirations are even and non-labored.  NEURO: Awake, alert, and oriented x 3.  PSYCH: Mood & affect are appropriate.  HEENT: Head is normocephalic and atraumatic.  General    Nursing note and vitals reviewed.          Right Knee Exam     Tenderness   The patient is tender to palpation of the medial joint line.    Crepitus   The patient has crepitus of the patella.    Range of Motion   Extension: 0   Flexion: 120     Tests   Ligament Examination Lachman: normal (-1 to 2mm) PCL-Posterior Drawer: normal (0 to 2mm)     MCL - Valgus: normal (0 to 2mm)  LCL - Varus: normal    Other   Popliteal (Baker's) Cyst: present  Sensation: normal    Left  Knee Exam     Inspection   Effusion: present (mild )    Tenderness   The patient tender to palpation of the medial joint line.    Crepitus   The patient has crepitus of the patella.    Range of Motion   Extension: 0   Flexion: 120     Tests   Stability Lachman: normal (-1 to 2mm) PCL-Posterior Drawer: normal (0 to 2mm)  MCL - Valgus: abnormal  LCL - Varus: normal (0 to 2mm)    Other   Popliteal (Baker's) Cyst: present  Sensation: normal    Comments:  Intact EHL, FHL, gastrocsoleus, and tibialis anterior. Sensation intact to light touch in superficial peroneal, deep peroneal, tibial, sural, and saphenous nerve distributions. Foot warm and well perfused with capillary refill of less than 2 seconds and palpable pedal pulses.      Muscle Strength   Right Lower Extremity   Hip Abduction: 5/5   Quadriceps:  5/5   Hamstrin/5   Left Lower Extremity   Hip Abduction: 5/5   Quadriceps:  5/5   Hamstrin/5     Vascular Exam     Right Pulses  Dorsalis Pedis:      2+  Posterior Tibial:      2+        Left Pulses  Dorsalis Pedis:      2+  Posterior Tibial:      2+              Imaging:    X-ray Knee Ortho Bilateral with Flexion  Narrative: EXAMINATION:  XR KNEE ORTHO BILAT WITH FLEXION    CLINICAL HISTORY:  Pain in right knee    TECHNIQUE:  AP standing of both knees, PA flexion standing views of both knees, and Merchant views of both knees were performed.  Lateral views of both knees were also performed.    COMPARISON:  2019    FINDINGS:  Right knee: There is no radiographic evidence of acute osseous, articular, or soft tissue abnormality. There is mild-to-moderate joint space loss in the medial compartment with some small marginal osteophytes present.    Left knee:  Probable small joint effusion.  No acute fractures or dislocations visualized.  Moderate to severe joint space loss in the medial compartment with prominent marginal osteophytes present.  Probable small ossified intra-articular body seen posterior to the  knee.  Impression: As above    Electronically signed by: Roque Salomon MD  Date:    06/01/2020  Time:    15:17    Bilateral OA tricompartmental worse in the medial compartment space. In the left knee bone on bone OA in the medial compartment space, with osteophytes present.   Relevant imaging results reviewed and interpreted by me, discussed with the patient and / or family today.       Other Tests:     No other tests performed today.  Patient has tried conservative treatment in the past for bilateral knee OA with a hx of bilateral knee CSI injections, visco injection, topical anti-inflammatories, OTC meds, and compressive knee brace. After a long discussion with the patient and her other medical issues of CKD and blood pressure of 141/79 I will only administer a half dose of CSI to the left knee at today's visit.     Assessment:  Kenia Alonzo is a 64 y.o. female    Bilateral knee pain and swelling   Bilateral knee OA    Encounter Diagnoses   Name Primary?    Primary osteoarthritis of both knees     Primary osteoarthritis of left knee Yes        Plan:  · Left knee CSI 2/2/40  · Bilateral Visco gel injection  · Left knee  brace  · Compound cream #1, if not approved or too expensive will do voltaren gel   Referral for physical therapy for bilateral knee OA.    Treatment plan was developed with input from the patient/family, and they expressed understanding and agreement with the plan. All questions were answered today.    Follow-up: 4 weeks for bilateral Visco injections or sooner if there are any problems between now and then.    Disclaimer: This note was prepared using a voice recognition system and is likely to have sound alike errors within the text.

## 2020-06-01 NOTE — LETTER
June 1, 2020      Harish Hernandez,   [address] [address] [address]           [facility]  [address] [place] [id number]  Phone: [phone]  Fax: [phone]          Patient: Kenia Alonzo   MR Number: [id number]   YOB: 1955   Date of Visit: 6/1/2020       Dear Dr. Harish Hernandez:    Thank you for referring Kenia Alonzo to me for evaluation. Attached you will find relevant portions of my assessment and plan of care.    If you have questions, please do not hesitate to call me. I look forward to following Kenia Alonzo along with you.    Sincerely,    Jen Interiano PA-C    Enclosure  CC:  No Recipients    If you would like to receive this communication electronically, please contact [email or web address] or [phone] to request more information on [facility] Link access.    For providers and/or their staff who would like to refer a patient to [facility], please contact us through our one-stop-shop provider referral line, [facility], at [phone].    If you feel you have received this communication in error or would no longer like to receive these types of communications, please e-mail [email or web address]

## 2020-06-01 NOTE — PATIENT INSTRUCTIONS
DX: Bilateral knee OA worse in left vs right    · Left knee CSI 2/2/40  · Bilateral Visco gel injection  · Left knee  brace  · Compound cream #1, if not approved or too expensive will do voltaren gel    Thank you for choosing [facility] and Dr. Yehuda Beck for your orthopedic & sports medicine care. It is our goal to provide you with exceptional care that will help keep you healthy, active, and get you back in the game.    If you felt that you received exemplary care today, please consider leaving us feedback on [employer] at [email or web address].     Please do not hesitate to reach out to us via email, phone, or MyChart with any questions, concerns, or feedback.    If you are experiencing pain/discomfort ,or have questions after 5pm and would like to be connected to the [facility] on-call team, please call this number and specify which Sports Medicine provider is treating you: [phone]

## 2020-06-02 ENCOUNTER — TELEPHONE (OUTPATIENT)
Dept: ORTHOPEDICS | Facility: CLINIC | Age: 65
End: 2020-06-02

## 2020-06-02 NOTE — TELEPHONE ENCOUNTER
Spoke to patient in regards to the steroid injection that she received. I spoke to the patient about her concerns an answered all questions she had pertaining to the injections. Patient verbalized understanding and was grateful for the call. MS

## 2020-06-02 NOTE — PROCEDURES
Large Joint Aspiration/Injection: L knee  Date/Time: 6/1/2020 2:00 PM  Performed by: Jen Interiano PA-C  Authorized by: Jen Interiano PA-C     Consent Done?:  Yes (Verbal)  Indications:  Joint swelling and pain  Site marked: the procedure site was marked    Timeout: prior to procedure the correct patient, procedure, and site was verified    Prep: patient was prepped and draped in usual sterile fashion      Local anesthesia used?: Yes    Anesthesia:  Local infiltration  Local anesthetic:  Bupivacaine 0.5% without epinephrine, lidocaine 1% without epinephrine and topical anesthetic    Details:  Needle Size:  22 G  Ultrasonic Guidance for needle placement?: No    Approach:  Superior  Location:  Knee  Site:  L knee  Medications:  80 mg methylPREDNISolone acetate 80 mg/mL  Patient tolerance:  Patient tolerated the procedure well with no immediate complications     2cc 1% lidocaine plain, 2cc 0.5% marcaine plain, 0.5cc 80mg methylprednisone

## 2020-06-03 ENCOUNTER — TELEPHONE (OUTPATIENT)
Dept: PSYCHIATRY | Facility: CLINIC | Age: 65
End: 2020-06-03

## 2020-06-05 ENCOUNTER — TELEPHONE (OUTPATIENT)
Dept: PHARMACY | Facility: CLINIC | Age: 65
End: 2020-06-05

## 2020-06-09 ENCOUNTER — CLINICAL SUPPORT (OUTPATIENT)
Dept: SMOKING CESSATION | Facility: CLINIC | Age: 65
End: 2020-06-09
Payer: COMMERCIAL

## 2020-06-09 DIAGNOSIS — F17.200 NICOTINE DEPENDENCE: Primary | ICD-10-CM

## 2020-06-09 PROCEDURE — 99999 PR PBB SHADOW E&M-EST. PATIENT-LVL I: ICD-10-PCS | Mod: PBBFAC,,,

## 2020-06-09 PROCEDURE — 99999 PR PBB SHADOW E&M-EST. PATIENT-LVL I: CPT | Mod: PBBFAC,,,

## 2020-06-09 PROCEDURE — 99404 PR PREVENT COUNSEL,INDIV,60 MIN: ICD-10-PCS | Mod: S$GLB,,,

## 2020-06-09 PROCEDURE — 99404 PREV MED CNSL INDIV APPRX 60: CPT | Mod: S$GLB,,,

## 2020-06-09 RX ORDER — VARENICLINE TARTRATE 0.5 MG/1
0.5 TABLET, FILM COATED ORAL DAILY
Qty: 30 TABLET | Refills: 0 | Status: SHIPPED | OUTPATIENT
Start: 2020-06-09 | End: 2020-07-01 | Stop reason: SDUPTHER

## 2020-06-09 NOTE — Clinical Note
Per patient request session was done by phone today, completion of TCRS (Tobacco Cessation Rating Scale) reviewed strategies, habitual behavior, high risks situations, understanding urges and cravings, stress and relaxation with open discussion and additional interventions, Introduced lapses, relapses, understanding them and analyzing the situation of a lapse, conflict issues that may be linked to a lapse. Patient stated that she had slipped and smoke several cigarettes last week. Stated that she was having some family related stress issue and drove to buy a pack and kept 5 cigarettes.She still has two left. She stated that she is back on track and felt so guilty after the 3 she smoked. She will throw remaining cigarettes away.Patient wants to try taking 0.5 Chantix QD to she if can tolerated low dose. Patient remains on Wellbutrtin 450mg  which is being monitored by Dr. Bustamante. The patient denies any abnormal behavioral or mental changes at this time.

## 2020-06-09 NOTE — PROGRESS NOTES
Individual Follow-Up Form    6/9/2020    Quit Date: 1/26/20    Clinical Status of Patient: Outpatient    Length of Service: 60 minutes    Continuing Medication: yes  Chantix    Other Medications:     Target Symptoms: Withdrawal and medication side effects. The following were  rated moderate (3) to severe (4) on TCRS:  · Moderate (3): none  · Severe (4): none    Comments: Per patient request session was done by phone today, completion of TCRS (Tobacco Cessation Rating Scale) reviewed strategies, habitual behavior, high risks situations, understanding urges and cravings, stress and relaxation with open discussion and additional interventions, Introduced lapses, relapses, understanding them and analyzing the situation of a lapse, conflict issues that may be linked to a lapse. Patient stated that she had slipped and smoke several cigarettes last week. Stated that she was having some family related stress issue and drove to buy a pack and kept 5 cigarettes.She still has two left. She stated that she is back on track and felt so guilty after the 3 she smoked. She will throw remaining cigarettes away.Patient wants to try taking 0.5 Chantix QD to she if can tolerated low dose. Patient remains on Wellbutrtin 450mg  which is being monitored by Dr. Bustamante. The patient denies any abnormal behavioral or mental changes at this time.     Diagnosis: F17.200    Next Visit: 2 weeks

## 2020-06-12 ENCOUNTER — PATIENT OUTREACH (OUTPATIENT)
Dept: ADMINISTRATIVE | Facility: OTHER | Age: 65
End: 2020-06-12

## 2020-06-12 NOTE — PROGRESS NOTES
Chart reviewed.   Immunizations: Triggered Imm Registry     Orders placed: n/a  Upcoming appts to satisfy DANIELLE topics: n/a

## 2020-06-15 ENCOUNTER — OFFICE VISIT (OUTPATIENT)
Dept: NEUROLOGY | Facility: CLINIC | Age: 65
End: 2020-06-15
Payer: MEDICAID

## 2020-06-15 DIAGNOSIS — J30.89 NON-SEASONAL ALLERGIC RHINITIS, UNSPECIFIED TRIGGER: ICD-10-CM

## 2020-06-15 DIAGNOSIS — G44.89 CHRONIC MIXED HEADACHE SYNDROME: ICD-10-CM

## 2020-06-15 DIAGNOSIS — D07.1 SEVERE DYSPLASIA OF VULVA: ICD-10-CM

## 2020-06-15 DIAGNOSIS — F32.A DEPRESSION, UNSPECIFIED DEPRESSION TYPE: ICD-10-CM

## 2020-06-15 DIAGNOSIS — H81.01 MENIERE'S DISEASE OF RIGHT EAR: ICD-10-CM

## 2020-06-15 DIAGNOSIS — F43.21 COMPLICATED GRIEF: ICD-10-CM

## 2020-06-15 DIAGNOSIS — Z72.0 TOBACCO USE: ICD-10-CM

## 2020-06-15 DIAGNOSIS — R25.9 ABNORMAL INVOLUNTARY MOVEMENTS: ICD-10-CM

## 2020-06-15 DIAGNOSIS — M17.12 PRIMARY OSTEOARTHRITIS OF LEFT KNEE: ICD-10-CM

## 2020-06-15 DIAGNOSIS — K58.9 IRRITABLE BOWEL SYNDROME WITHOUT DIARRHEA: ICD-10-CM

## 2020-06-15 DIAGNOSIS — J40 BRONCHITIS: ICD-10-CM

## 2020-06-15 DIAGNOSIS — G43.809 VESTIBULAR MIGRAINE: Primary | ICD-10-CM

## 2020-06-15 DIAGNOSIS — L30.9 DERMATITIS OF FACE: ICD-10-CM

## 2020-06-15 DIAGNOSIS — H10.31 ACUTE CONJUNCTIVITIS OF RIGHT EYE, UNSPECIFIED ACUTE CONJUNCTIVITIS TYPE: ICD-10-CM

## 2020-06-15 DIAGNOSIS — E78.5 HYPERLIPIDEMIA, UNSPECIFIED HYPERLIPIDEMIA TYPE: ICD-10-CM

## 2020-06-15 DIAGNOSIS — N18.30 CKD (CHRONIC KIDNEY DISEASE) STAGE 3, GFR 30-59 ML/MIN: ICD-10-CM

## 2020-06-15 PROBLEM — R50.9 FEVER: Status: RESOLVED | Noted: 2020-05-26 | Resolved: 2020-06-15

## 2020-06-15 PROBLEM — J01.10 ACUTE FRONTAL SINUSITIS: Status: RESOLVED | Noted: 2020-05-26 | Resolved: 2020-06-15

## 2020-06-15 PROCEDURE — 99213 OFFICE O/P EST LOW 20 MIN: CPT | Mod: 95,,, | Performed by: PSYCHIATRY & NEUROLOGY

## 2020-06-15 PROCEDURE — 99213 PR OFFICE/OUTPT VISIT, EST, LEVL III, 20-29 MIN: ICD-10-PCS | Mod: 95,,, | Performed by: PSYCHIATRY & NEUROLOGY

## 2020-06-15 NOTE — PROGRESS NOTES
Subjective:       Patient ID: Kenia Alonzo is a 64 y.o. female.    Chief Complaint: No chief complaint on file.    HPI       BACKGROUND HISTORY       The patient was referred by Dr. Ruiz for evaluation.    The patient is here for intractable headaches. The patient has had migraine headaches throughout her adult life and improved afte menopause but seem to recur. In the past she failed Elavil, VPA and Inderal. TPM helped significantly but caused memory loss. PRN Imitrex helped but caused palpitation and chest tightness.  The headaches are 2-3 times a week, 08-10/10, throbbing, holocephalic, last for several hours and associated with nausea, light and noise sensitivity. The patient has been under tremendous stress related to her son's health problems. In addition, she has a longstanding history of Ménière's Disease  S/P vestibular nerve section in the late 1990's.  The patient has also noted recurrence of Ménière's Disease's symptoms. Started her on Emgality 120 mg SQ monthly. Ordered Brain MRI.Emgality did help tremendously but unfortunately she broke the last 2 pens and the headaches have recurred. 05- Brain MRI Unremarkable. Ultimately changed Emgality to Aimovig 140 mg SQ monthly and prescribed her Ubrogepant (Ubrelvy) 100 mg PRN. Also changed TPM to  mg QD.       INTERVAL HISTORY       Aimovig 140 mg SQ monthly and TPM  mg QD with Ubrogepant (Ubrelvy) 100 mg PRN have helped tremendously.            The originating site (patient location) is: Home.      The distant site (neurologist location) is: Neurology Clinic at [facility].      The chief complaint leading to consultation is: F/U Migraine       Visit type: Virtual visit with synchronous audio and video.      Total time spent with patient: 20 minutes       Special circumstances: This visit occurred during COVID-19 Pandemic Public Health Emergency.       Consent: The patient verbally consented to participating in the video  visit and informed that may decline to receive medical services by telemedicine and may withdraw from such care at any time.      I discussed with the patient the nature of our telemedicine visits, that:      I  would evaluate the patient and recommend diagnostics and treatments based on my assessment.    Our sessions are not being recorded and that personal health information is protected.    Our team would provide follow up care in person if/when the patient needs it.    Virtual (video/telemedicine) visits have significant limitations. A telemedicine exam is primarily focused on the history and what I can observe. Several critical parts of the neurological exam cannot be performed.          Review of Systems   Constitutional: Negative for appetite change and fatigue.   HENT: Positive for hearing loss. Negative for tinnitus.    Eyes: Negative for photophobia and visual disturbance.   Respiratory: Negative for apnea and shortness of breath.    Cardiovascular: Negative for chest pain and palpitations.   Gastrointestinal: Negative for nausea and vomiting.   Endocrine: Negative for cold intolerance and heat intolerance.   Genitourinary: Negative for difficulty urinating and urgency.   Musculoskeletal: Negative for arthralgias, back pain, gait problem, joint swelling, myalgias, neck pain and neck stiffness.   Skin: Negative for color change and rash.   Allergic/Immunologic: Negative for environmental allergies and immunocompromised state.   Neurological: Positive for dizziness and headaches. Negative for tremors, seizures, syncope, facial asymmetry, speech difficulty, weakness, light-headedness and numbness.   Hematological: Negative for adenopathy. Does not bruise/bleed easily.   Psychiatric/Behavioral: Negative for agitation, behavioral problems, confusion, decreased concentration, dysphoric mood, hallucinations, self-injury, sleep disturbance and suicidal ideas. The patient is not hyperactive.          Current  Outpatient Medications:     acyclovir 5% (ZOVIRAX) 5 % ointment, Apply topically 5 (five) times daily., Disp: 30 g, Rfl: 1    azelastine (ASTELIN) 137 mcg (0.1 %) nasal spray, 1 spray (137 mcg total) by Nasal route 2 (two) times daily., Disp: 30 mL, Rfl: 3    bumetanide (BUMEX) 2 MG tablet, Take 0.5 tablets (1 mg total) by mouth once daily., Disp: 90 tablet, Rfl: 1    buPROPion (WELLBUTRIN XL) 150 MG TB24 tablet, Take 3 tablets (450 mg total) by mouth once daily., Disp: 90 tablet, Rfl: 2    busPIRone (BUSPAR) 10 MG tablet, Take 1 tablet (10 mg total) by mouth 2 (two) times daily., Disp: 120 tablet, Rfl: 2    diazePAM (VALIUM) 2 MG tablet, Take 1 tablet (2 mg total) by mouth every 6 (six) hours as needed (dizziness/Meniere's attack)., Disp: 30 tablet, Rfl: 1    dicyclomine (BENTYL) 10 MG capsule, TAKE 1 CAPSULE BY MOUTH THREE TIMES DAILY, Disp: 90 capsule, Rfl: 1    erenumab-aooe (AIMOVIG AUTOINJECTOR) 140 mg/mL AtIn, Inject 140 mg into the skin every 28 days., Disp: 1 mL, Rfl: 11    fluticasone propionate (FLONASE) 50 mcg/actuation nasal spray, 2 sprays (100 mcg total) by Each Nostril route once daily., Disp: 16 g, Rfl: 3    hydrocortisone 2.5 % cream, Apply topically 2 (two) times daily., Disp: 20 g, Rfl: 0    levocetirizine (XYZAL) 5 MG tablet, TAKE ONE TABLET BY MOUTH EVERY MORNING, Disp: 90 tablet, Rfl: 3    MELATONIN ORAL, Take by mouth nightly as needed. , Disp: , Rfl:     metoclopramide HCl (REGLAN) 5 MG tablet, Take 1 tablet (5 mg total) by mouth every 12 (twelve) hours as needed., Disp: 10 tablet, Rfl: 3    naratriptan (AMERGE) 2.5 MG tablet, Take 1 tablet (2.5 mg total) by mouth every 72 hours as needed. 2.5 mg at onset of headache, may repeat in 4 hours if needed, Disp: 9 tablet, Rfl: 3    ondansetron (ZOFRAN-ODT) 4 MG TbDL, Take 1 tablet (4 mg total) by mouth every 8 (eight) hours as needed., Disp: 60 tablet, Rfl: 1    simvastatin (ZOCOR) 5 MG tablet, Take 1 tablet (5 mg total) by mouth  nightly., Disp: 90 tablet, Rfl: 3    topiramate (TROKENDI XR) 100 mg Cp24, Take 1 capsule (100 mg total) by mouth once daily. (Patient taking differently: Take 100 mg by mouth nightly. ), Disp: 30 capsule, Rfl: 11    ubrogepant (UBRELVY)tablet 100 mg, Take 1 tablet (100 mg total) by mouth every 72 hours as needed for Migraine., Disp: 9 tablet, Rfl: 3    varenicline (CHANTIX) 0.5 MG Tab, Take 1 tablet (0.5 mg total) by mouth once daily., Disp: 30 tablet, Rfl: 0  Past Medical History:   Diagnosis Date    Arthritis     knee    Depression     Encounter for blood transfusion     General anesthetics causing adverse effect in therapeutic use     severe headache after crainiotomy    Gout     Headache     Hyperlipidemia     Meniere disease     MVP (mitral valve prolapse)     minor    Vertigo      Past Surgical History:   Procedure Laterality Date    BRAIN SURGERY      vestibular neurectomy    BREAST SURGERY      benign     SECTION      x 1    CHOLECYSTECTOMY      TONSILLECTOMY       Social History     Socioeconomic History    Marital status:      Spouse name: Not on file    Number of children: Not on file    Years of education: Not on file    Highest education level: Not on file   Occupational History    Not on file   Social Needs    Financial resource strain: Somewhat hard    Food insecurity     Worry: Sometimes true     Inability: Patient refused    Transportation needs     Medical: No     Non-medical: No   Tobacco Use    Smoking status: Former Smoker     Packs/day: 0.50     Years: 43.00     Pack years: 21.50     Types: Cigarettes     Start date: 1976     Quit date: 2020     Years since quittin.3    Smokeless tobacco: Never Used    Tobacco comment: Quit 20   Substance and Sexual Activity    Alcohol use: Yes     Alcohol/week: 1.0 - 3.0 standard drinks     Types: 1 - 3 Glasses of wine per week     Frequency: Monthly or less     Drinks per session: 1 or 2      Binge frequency: Never     Comment: social few times monthly   No alcohol 72h prior to sx    Drug use: No    Sexual activity: Never     Birth control/protection: None, Post-menopausal   Lifestyle    Physical activity     Days per week: 0 days     Minutes per session: 0 min    Stress: Very much   Relationships    Social connections     Talks on phone: More than three times a week     Gets together: Twice a week     Attends Yazidi service: Not on file     Active member of club or organization: No     Attends meetings of clubs or organizations: Never     Relationship status:    Other Topics Concern    Not on file   Social History Narrative    Not on file       Objective:         GENERAL APPEARANCE:     The patient looks comfortable.    No signs of medical or psychiatric distress.    Normal breathing pattern.    No dysmorphic features    Normal eye contact.     GENERAL MEDICAL EXAM:    HEENT:  Head is atraumatic normocephalic.     Neck and Axillae: No JVD.     Cardiopulmonary: No cyanosis. No tachypnea. Normal respiratory effort.    Gastrointestinal:  No stomas or lesions. No hernias.    Skin, Hair and Nails: No pathognonomic skin rash. No neurofibromatosis. No stigmata of autoimmune disease.     Limbs: No varicose veins. No edema.     Muskoskeletal: No deformities.No signs of longstanding neuropathy. No dislocations or fractures.        Neurologic Exam     Mental Status   Oriented to person, place, and time.   Registration: recalls 3 of 3 objects. Recall at 5 minutes: recalls 3 of 3 objects. Follows 3 step commands.   Attention: normal. Concentration: normal.   Speech: speech is normal   Level of consciousness: alert  Knowledge: good and consistent with education. Able to perform simple calculations.   Able to name object. Able to read. Able to repeat. Able to write. Normal comprehension.     Cranial Nerves     CN III, IV, VI   Extraocular motions are normal.   Right pupil: Shape: regular.   Left  pupil: Shape: regular.   CN III: no CN III palsy  CN VI: no CN VI palsy  Nystagmus: none   Diplopia: none  Ophthalmoparesis: none  Upgaze: normal  Downgaze: normal  Conjugate gaze: present    CN VII   Facial expression full, symmetric.   Right facial weakness: none  Left facial weakness: none    CN VIII   Hearing: impaired    CN IX, X   CN IX normal.   CN X normal.   Palate: symmetric    CN XII   CN XII normal.   Tongue: not atrophic  Fasciculations: absent  Tongue deviation: none    Motor Exam   Muscle bulk: normal  Right arm tone: normal  Right arm pronator drift: absent  Left arm pronator drift: absent    Strength   Strength 5/5 throughout.   Moves 4 extremities symmetrically      Sensory Exam     Sensory exam cannot be done.      Gait, Coordination, and Reflexes     Gait  Gait: normal    Coordination   Romberg: negative  Finger to nose coordination: normal  Heel to shin coordination: normal  Tandem walking coordination: normal    Tremor   Resting tremor: absent  Intention tremor: absent  Action tremor: absent  MSRs cannot be done       Lab Results   Component Value Date    WBC 10.32 04/23/2019    HGB 13.1 04/23/2019    HCT 40.2 04/23/2019    MCV 98 04/23/2019     (H) 04/23/2019     Sodium   Date Value Ref Range Status   04/23/2019 141 136 - 145 mmol/L Final     Potassium   Date Value Ref Range Status   04/23/2019 3.6 3.5 - 5.1 mmol/L Final     Chloride   Date Value Ref Range Status   04/23/2019 108 95 - 110 mmol/L Final     CO2   Date Value Ref Range Status   04/23/2019 22 (L) 23 - 29 mmol/L Final     Glucose   Date Value Ref Range Status   04/23/2019 86 70 - 110 mg/dL Final     BUN, Bld   Date Value Ref Range Status   04/23/2019 24 (H) 8 - 23 mg/dL Final     Creatinine   Date Value Ref Range Status   04/23/2019 1.1 0.5 - 1.4 mg/dL Final     Calcium   Date Value Ref Range Status   04/23/2019 9.8 8.7 - 10.5 mg/dL Final     Total Protein   Date Value Ref Range Status   05/23/2018 7.5 6.0 - 8.4 g/dL Final      Albumin   Date Value Ref Range Status   04/23/2019 4.0 3.5 - 5.2 g/dL Final     Total Bilirubin   Date Value Ref Range Status   05/23/2018 0.4 0.1 - 1.0 mg/dL Final     Comment:     For infants and newborns, interpretation of results should be based  on gestational age, weight and in agreement with clinical  observations.  Premature Infant recommended reference ranges:  Up to 24 hours.............<8.0 mg/dL  Up to 48 hours............<12.0 mg/dL  3-5 days..................<15.0 mg/dL  6-29 days.................<15.0 mg/dL       Alkaline Phosphatase   Date Value Ref Range Status   05/23/2018 67 55 - 135 U/L Final     AST   Date Value Ref Range Status   05/23/2018 15 10 - 40 U/L Final     ALT   Date Value Ref Range Status   05/23/2018 16 10 - 44 U/L Final     Anion Gap   Date Value Ref Range Status   04/23/2019 11 8 - 16 mmol/L Final     eGFR if    Date Value Ref Range Status   04/23/2019 >60.0 >60 mL/min/1.73 m^2 Final     eGFR if non    Date Value Ref Range Status   04/23/2019 54.3 (A) >60 mL/min/1.73 m^2 Final     Comment:     Calculation used to obtain the estimated glomerular filtration  rate (eGFR) is the CKD-EPI equation.        Lab Results   Component Value Date    OEJUQTVH33 502 09/02/2016 05-    Brain MRI Unremarkable     Reviewed the neuroimaging independently       Assessment:       1. Vestibular migraine    2. Depression, unspecified depression type    3. Meniere's disease of right ear    4. Abnormal involuntary movements    5. Irritable bowel syndrome without diarrhea    6. Hyperlipidemia, unspecified hyperlipidemia type    7. Tobacco use    8. CKD (chronic kidney disease) stage 3, GFR 30-59 ml/min    9. Non-seasonal allergic rhinitis, unspecified trigger    10. Bronchitis    11. Chronic mixed headache syndrome    12. Severe dysplasia of vulva    13. Complicated grief    14. Primary osteoarthritis of left knee    15. Acute conjunctivitis of right eye,  unspecified acute conjunctivitis type    16. Dermatitis of face        Plan:             VESTIBULAR MIGRAINE          Discussed with the patient the very close relationship between Ménière's Disease and Vestibular Migraine in terms of pathophysiology.       Migraine Diary       Lifestyle changes:    Good sleep hygiene  Avoid triggers like certain foods, TV and computer work   Minimize physical and emotional stress  Smoking avoidance and cessation  Tapering off caffeine   Good hydration   Small frequent meals   Moderate 30-minute-long aerobic exercises 3 times/week         Abortive medications:    Continue Ubrogepant (Ubrelvy) 100 mg PRN.        Preventative medications:    Amitriptyline/Elavil failed    Topiramate/Topamax caused cognitive SEs.     Propranolol/Inderal failed    Valproic acid/Depakote failed     Continue Aimovig 140 mg SQ monthly.    Continue TPM  mg QD.      MEDICAL/SURGICAL COMORBIDITIES     All relevant medical comorbidities noted and managed by primary care physician and medical care team.        HEALTHY LIFESTYLE AND PREVENTATIVE CARE     Encouraged the patient to adhere to the age-appropriate health maintenance guidelines including screening tests and vaccinations.      Discussed the overall importance of healthy lifestyle, optimal weight, exercise, healthy diet, good sleep hygiene and avoiding drugs including smoking, alcohol and recreational drugs. The patient verbalized full understanding.         Advised the patient to follow COVID-19 prevention measures.         I spent 20 minutes face to face with the patient     More than 15 minutes of the time spent in counseling and coordination of care including discussions etiology of diagnosis (MIGRAINE), pathogenesis of diagnosis, prognosis of diagnosis,, diagnostic results, impression and recommendations, diagnostic studies, management, risks and benefits of treatment, instructions of disease self-management, treatment instructions, follow up  requirements, patient and family counseling/involvement in care compliance with treatment regimen. All of the patient's questions were answered during this discussion.           RTC in 6 months             Amnato Matthews MD, FAAN    Attending Neurologist/Epileptologist         Diplomate, American Board-Psychiatry and Neurology (Neurology)    Diplomate, American Board-Clinical Neurophysiology (Epilpesy-Neuromuscular)     Fellow, American Academy of Neurology

## 2020-06-17 ENCOUNTER — OFFICE VISIT (OUTPATIENT)
Dept: PSYCHIATRY | Facility: CLINIC | Age: 65
End: 2020-06-17
Payer: MEDICAID

## 2020-06-17 DIAGNOSIS — F33.1 MODERATE EPISODE OF RECURRENT MAJOR DEPRESSIVE DISORDER: ICD-10-CM

## 2020-06-17 DIAGNOSIS — F41.1 GENERALIZED ANXIETY DISORDER: Primary | ICD-10-CM

## 2020-06-17 PROCEDURE — 90834 PSYTX W PT 45 MINUTES: CPT | Mod: 95,AJ,HB, | Performed by: SOCIAL WORKER

## 2020-06-17 PROCEDURE — 90834 PR PSYCHOTHERAPY W/PATIENT, 45 MIN: ICD-10-PCS | Mod: 95,AJ,HB, | Performed by: SOCIAL WORKER

## 2020-06-18 NOTE — PROGRESS NOTES
Individual Psychotherapy Follow-up Visit Progress Note (PhD/LCSW)     Outpatient - Supportive psychotherapy 45 min - CPT Code 21931    Virtual visit with synchronous audio/video, Location of patient: home    Each patient provided medical services by telemedicine is: (1) informed of the relationship between the provider and patient and the respective role of any other health care provider with respect to management of the patient; and (2) notified that he or she may decline to receive medical services by telemedicine and may withdraw from such care at any time.    6/17/2020  MRN: [id number]  Primary Care Provider: Harish Hernandez DO    Kenia Alonzo is a 64 y.o. female who presents today for follow-up of depression and anxiety. Met with patient.        Subjective:       Patient report: Pt is anxious, worried, tearful, irritable, upset due to an issue with her 5 y/o granddtr. Over the weekend, when she picked granddtr up from son and his gf's apartment for a visit, she noticed that the child's hair was severely matted. She took her to her own salon, where it reportedly took 2 hairdressers 45 minutes to get the mats out. She also reports that son's apartment is unkempt, his gf smokes marijuana in the home, and granddtr told pt it had been a long time since she had a bath. Pt is fearful that if she reports to DCFS that son won't allow her to see the child anymore, or that maternal grandmother will get custody of the child and take her out of state. Pt verbalized understanding when informed that as a mandatory , this provider will contact DCFS and make a report of suspected child neglect.     Current symptoms:   · Depression: depressed mood, diminished interest, fatigue and poor concentration, tearfulness  · Anxiety: excessive anxiety/worry, restlessness/keyed up, irritability and muscle tension  · Substance abuse: denied  · Cognitive functioning: denied  · Iram: none noted  · Psychosis: none  noted    Psychosocial stressors and topics discussed: identifying feelings, interpersonal issues, parenting issues, managing anxiety/panic/stress and challenging thought distortions      Objective:       Mental Status Evaluation  Appearance: unremarkable, age appropriate  Behavior: normal, cooperative  Speech: normal tone, normal rate, normal pitch, normal volume  Mood: anxious, depressed  Affect: congruent and appropriate, blunted  Thought Process: normal and logical  Thought Content: normal, no suicidality, no homicidality, delusions, or paranoia  Sensorium: grossly intact  Cognition: grossly intact  Insight: good  Judgment: adequate to circumstances    Risk parameters:  Patient reports no suicidal ideation  Patient reports no homicidal ideation  Patient reports no self-injurious behavior  Patient reports no violent behavior      Assessment & Plan:       Therapeutic interventions used: Pt was taught how to apply relaxation skills to specific situations  Assigned pt to practice relaxation on a daily basis  Helped pt to identify fearful self-talk and create reality-based alternatives  Pt was taught problem-solving strategies  Utilized acceptance and commitment therapy (ACT) to help pt accept uncomfortable realities  Monitored the effectiveness and side effects of antidepressant medication prescribed by physician/NP/MP  Pt's automatic depressive thoughts were tested against past, present and future experiences  Helped pt to develop an awareness of distorted cognitive messages that reinforce hopelessness and helplessness   Consistent eye contact, active listening, unconditional positive regard and warm acceptance were used to help build trust     The patient's response to the interventions is accepting    The patient's progress toward treatment goals is good    Homework assigned: daily journaling and practice relaxation skills daily     Treatment plan:   A. Target symptoms: Depression, Anxiety and Poor Coping  Skills   B. Therapeutic modalities: insight oriented psychotherapy, supportive psychotherapy  C. Why chosen therapy is appropriate versus another modality: relevant to diagnosis, evidence based practice   D. Outcome monitoring methods: self-report, observation, feedback from clinical staff     Visit Diagnosis:   1. Generalized anxiety disorder    2. Moderate episode of recurrent major depressive disorder        Follow-up: individual psychotherapy and medication management by physician, [facility] this encounter.    Return to Clinic: 2 weeks    Length of Service (minutes): 45      CAROL Gasca, [facility]  Outpatient Psychiatry  [facility]  [phone]

## 2020-06-22 ENCOUNTER — TELEPHONE (OUTPATIENT)
Dept: ORTHOPEDICS | Facility: CLINIC | Age: 65
End: 2020-06-22

## 2020-06-22 ENCOUNTER — OFFICE VISIT (OUTPATIENT)
Dept: PSYCHIATRY | Facility: CLINIC | Age: 65
End: 2020-06-22
Payer: MEDICAID

## 2020-06-22 DIAGNOSIS — F41.1 GAD (GENERALIZED ANXIETY DISORDER): Primary | ICD-10-CM

## 2020-06-22 DIAGNOSIS — F43.21 COMPLICATED GRIEF: ICD-10-CM

## 2020-06-22 PROCEDURE — 99213 PR OFFICE/OUTPT VISIT, EST, LEVL III, 20-29 MIN: ICD-10-PCS | Mod: 95,AF,HB, | Performed by: PSYCHIATRY & NEUROLOGY

## 2020-06-22 PROCEDURE — 99213 OFFICE O/P EST LOW 20 MIN: CPT | Mod: 95,AF,HB, | Performed by: PSYCHIATRY & NEUROLOGY

## 2020-06-22 RX ORDER — BUSPIRONE HYDROCHLORIDE 10 MG/1
10 TABLET ORAL 2 TIMES DAILY
Qty: 120 TABLET | Refills: 2 | Status: SHIPPED | OUTPATIENT
Start: 2020-06-22 | End: 2020-09-21

## 2020-06-22 RX ORDER — DIAZEPAM 2 MG/1
2 TABLET ORAL EVERY 6 HOURS PRN
Qty: 30 TABLET | Refills: 1 | Status: SHIPPED | OUTPATIENT
Start: 2020-06-22 | End: 2020-09-11 | Stop reason: SDUPTHER

## 2020-06-22 RX ORDER — BUPROPION HYDROCHLORIDE 150 MG/1
450 TABLET ORAL DAILY
Qty: 90 TABLET | Refills: 2 | Status: SHIPPED | OUTPATIENT
Start: 2020-06-22 | End: 2020-09-21 | Stop reason: SDUPTHER

## 2020-06-22 NOTE — TELEPHONE ENCOUNTER
Called patient regarding message sent to reschedule her missed appointment. I offered the patient an earlier appointment on July 6th. Patient states that she is out of town then. I let patient know that 's earliest date available is on July 13th, the day of her current appointment. Patient states that she would be shocked if medicaid pays for her visco injections, but that she is wanting a steroid injection in her other knee. I let patient know that we can keep her current appointment and if it is not approved then we can switch it to a regular appointment with Dr. Beck. Patient agreed and was grateful for call.
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
## 2020-06-23 ENCOUNTER — PATIENT OUTREACH (OUTPATIENT)
Dept: ADMINISTRATIVE | Facility: OTHER | Age: 65
End: 2020-06-23

## 2020-06-23 ENCOUNTER — CLINICAL SUPPORT (OUTPATIENT)
Dept: SMOKING CESSATION | Facility: CLINIC | Age: 65
End: 2020-06-23
Payer: COMMERCIAL

## 2020-06-23 DIAGNOSIS — F17.200 NICOTINE DEPENDENCE: Primary | ICD-10-CM

## 2020-06-23 PROCEDURE — 99402 PREV MED CNSL INDIV APPRX 30: CPT | Mod: S$GLB,,,

## 2020-06-23 PROCEDURE — 99402 PR PREVENT COUNSEL,INDIV,30 MIN: ICD-10-PCS | Mod: S$GLB,,,

## 2020-06-23 PROCEDURE — 99999 PR PBB SHADOW E&M-EST. PATIENT-LVL I: CPT | Mod: PBBFAC,,,

## 2020-06-23 PROCEDURE — 99999 PR PBB SHADOW E&M-EST. PATIENT-LVL I: ICD-10-PCS | Mod: PBBFAC,,,

## 2020-06-23 NOTE — Clinical Note
Session done by phone with patient. Patient remains tobacco free as of 1/26/20. Commended her begin tobacco free for over 5 months. Patient remains on Chantix 0.5mg with out any negative side effects. Patient is on 450 mg Wellbutrin that was prescribed by Dr Allen and monitor by him. The patient denies any abnormal behavioral or mental changes at this time. The patient will continue with individual therapy sessions and medication monitoring by CTTS.

## 2020-06-23 NOTE — PROGRESS NOTES
Individual Follow-Up Form    6/23/2020    Quit Date:1/26/20    Clinical Status of Patient: Outpatient    Length of Service: 30 minutes    Continuing Medication: yes  Chantix    Other Medications:      Target Symptoms: Withdrawal and medication side effects. The following were  rated moderate (3) to severe (4) on TCRS:  · Moderate (3): none  · Severe (4): none    Comments: Session done by phone with patient. Patient remains tobacco free as of 1/26/20. Commended her begin tobacco free for over 5 months. Patient remains on Chantix 0.5mg with out any negative side effects. Patient is on 450 mg Wellbutrin that was prescribed by Dr Allen and monitor by him. The patient denies any abnormal behavioral or mental changes at this time. The patient will continue with individual therapy sessions and medication monitoring by CTTS.          Diagnosis: F17.200    Next Visit: 2 weeks

## 2020-06-23 NOTE — PROGRESS NOTES
Patient's chart was reviewed.   Requested updates within Care Everywhere.  Immunizations reconciled.    Health Maintenance was updated.

## 2020-06-24 ENCOUNTER — OFFICE VISIT (OUTPATIENT)
Dept: ORTHOPEDICS | Facility: CLINIC | Age: 65
End: 2020-06-24
Payer: MEDICAID

## 2020-06-24 VITALS
HEIGHT: 62 IN | BODY MASS INDEX: 21.9 KG/M2 | HEART RATE: 71 BPM | DIASTOLIC BLOOD PRESSURE: 64 MMHG | WEIGHT: 119 LBS | SYSTOLIC BLOOD PRESSURE: 119 MMHG

## 2020-06-24 DIAGNOSIS — D07.1 SEVERE DYSPLASIA OF VULVA: Primary | ICD-10-CM

## 2020-06-24 DIAGNOSIS — M17.0 PRIMARY OSTEOARTHRITIS OF BOTH KNEES: Primary | ICD-10-CM

## 2020-06-24 DIAGNOSIS — M17.12 PRIMARY OSTEOARTHRITIS OF LEFT KNEE: ICD-10-CM

## 2020-06-24 DIAGNOSIS — M17.11 PRIMARY OSTEOARTHRITIS OF RIGHT KNEE: ICD-10-CM

## 2020-06-24 PROCEDURE — 99999 PR PBB SHADOW E&M-EST. PATIENT-LVL V: ICD-10-PCS | Mod: PBBFAC,,, | Performed by: ORTHOPAEDIC SURGERY

## 2020-06-24 PROCEDURE — 99999 PR PBB SHADOW E&M-EST. PATIENT-LVL V: CPT | Mod: PBBFAC,,, | Performed by: ORTHOPAEDIC SURGERY

## 2020-06-24 PROCEDURE — 99214 OFFICE O/P EST MOD 30 MIN: CPT | Mod: 25,S$PBB,, | Performed by: ORTHOPAEDIC SURGERY

## 2020-06-24 PROCEDURE — 20610 DRAIN/INJ JOINT/BURSA W/O US: CPT | Mod: PBBFAC | Performed by: ORTHOPAEDIC SURGERY

## 2020-06-24 PROCEDURE — 99214 PR OFFICE/OUTPT VISIT, EST, LEVL IV, 30-39 MIN: ICD-10-PCS | Mod: 25,S$PBB,, | Performed by: ORTHOPAEDIC SURGERY

## 2020-06-24 PROCEDURE — 20610 LARGE JOINT ASPIRATION/INJECTION: R KNEE: ICD-10-PCS | Mod: S$PBB,RT,, | Performed by: ORTHOPAEDIC SURGERY

## 2020-06-24 PROCEDURE — 99215 OFFICE O/P EST HI 40 MIN: CPT | Mod: PBBFAC | Performed by: ORTHOPAEDIC SURGERY

## 2020-06-24 RX ORDER — METHYLPREDNISOLONE ACETATE 80 MG/ML
80 INJECTION, SUSPENSION INTRA-ARTICULAR; INTRALESIONAL; INTRAMUSCULAR; SOFT TISSUE
Status: DISCONTINUED | OUTPATIENT
Start: 2020-06-24 | End: 2020-06-24 | Stop reason: HOSPADM

## 2020-06-24 RX ADMIN — METHYLPREDNISOLONE ACETATE 80 MG: 80 INJECTION, SUSPENSION INTRALESIONAL; INTRAMUSCULAR; INTRASYNOVIAL; SOFT TISSUE at 09:06

## 2020-06-24 NOTE — PROCEDURES
Large Joint Aspiration/Injection: R knee    Date/Time: 6/24/2020 9:00 AM  Performed by: Yehuda Beck MD  Authorized by: Yehuda Beck MD     Consent Done?:  Yes (Verbal)  Indications:  Joint swelling and pain  Site marked: the procedure site was marked    Timeout: prior to procedure the correct patient, procedure, and site was verified    Prep: patient was prepped and draped in usual sterile fashion      Local anesthesia used?: Yes    Anesthesia:  Local infiltration  Local anesthetic:  Bupivacaine 0.5% without epinephrine, lidocaine 1% without epinephrine, topical anesthetic and lidocaine spray    Details:  Needle Size:  22 G  Approach:  Superior  Location:  Knee  Site:  R knee  Medications:  80 mg methylPREDNISolone acetate 80 mg/mL  Patient tolerance:  Patient tolerated the procedure well with no immediate complications     2cc 1% lidocaine plain, 2cc 0.5% marcaine plain, 0.5cc 80mg methylprednisolone

## 2020-06-24 NOTE — PATIENT INSTRUCTIONS
Assessment:  Kenia Alonzo is a 64 y.o. female with bilateral knee arthritis, L>R   History of multiple CSI's both knees and visco with Dr. Wade Acevedo   Left knee CSI with Jen Interiano 2 weeks ago   Persistent pain, functional limitations   Does not want to be evaluated for TKA    Encounter Diagnosis   Name Primary?    Primary osteoarthritis of both knees Yes        Plan:   Right knee corticosteroid injection today (2/2/40)   PT - OA protocol, consider water treadmill   Visco bilateral knees   Uriel update to patient on brace status    Although there is not a cure for arthritis, there are effective ways to improve symptoms. I recommended low impact activities such as elliptical and bicycle. Aquatic and pool therapy is often helpful because it lessens the impact on the joint, strengthens the leg and thigh muscles, and helps to control swelling. If walking long distances, I recommend good quality well-cushioned shoes. Knee motion is important to the health of the knee. Sometimes a compression knee sleeve can help limit swelling and provide proprioceptive feedback. I do recommend formal physical therapy or at minimum a home exercise program. Some over the counter solutions such as glucosamine and chondroitin may help with symptoms, although the evidence is mixed.    Follow-up: for Visco or sooner if there are any problems between now and then.    Thank you for choosing [facility] and Dr. Yehuda Beck for your orthopedic & sports medicine care. It is our goal to provide you with exceptional care that will help keep you healthy, active, and get you back in the game.    If you felt that you received exemplary care today, please consider leaving us feedback on Healthgrades at [email or web address].     Please do not hesitate to reach out to us via email, phone, or MyChart with any questions, concerns, or feedback.    If you are experiencing  pain/discomfort ,or have questions after 5pm and would like to be connected to the [facility] on-call team, please call this number and specify which Sports Medicine provider is treating you: [phone]

## 2020-06-24 NOTE — PROGRESS NOTES
Patient ID: Kenia Alonzo  YOB: 1955  MRN: [id number]    Chief Complaint: Pain of the Right Knee and Pain of the Left Knee    Referred By: Dr. Harish Hernandez    History of Present Illness: Kenia Alonzo is a right-hand dominant 64 y.o. female who does not work, here for a bilateral knee follow up. Patient is 3 weeks s/p left knee CSI.  She has known knee arthritis and was receiving corticosteroid injections and viscosupplementation in the past from Dr. Wade Acevedo at the [facility].  She is not approved for viscosupplementation and is requesting repeat evaluation for both of her knees, and in her left knee today.  Says the pain is worse with weight-bearing.  It affects her daily periods in both of her knees but the left she also expressed frustration that she did not get an injection in her right knee at her last visit.  Initially then 1 offer an injection the right knee today she deferred and said that she wanted another injection in her left knee instead.  Then became tearful when we talked about fact that we do not like to do repeat steroid injections that close together and later stated that she was crying because she was not getting way.            Previous (6/1/2020) History of Present Illness: Kenia Alonzo is a right-hand dominant 64 y.o. female who does not work. She was referred by Dr. Harish Hernandez. She is here for an evaluation of both knees. The patient states that she first started having knee pain in 2014.  She states that her pain is 9/10 today, worse in the left knee vs the right. She states that in the past she has had Visco gel injections which has improved her knee pain in the past. She has tried compound cream in the past which has helped some.  She states that she does use a compression brace for her knee and she states that she has had injections. Most of the pain is located in the inside of both her knees which is worse with activities such as  standing or walking for prolonged time. She states that she does get periodic swelling and currently has swelling in her left knee today. She recently completed a course of oral antibiotics for a sinus infection. She currently denies any symptoms of fevers, chills, night sweats, numbness and tingling.     Knee Pain   The pain is present in the right knee and left knee. The pain radiates to the left thigh and right thigh. The current episode started more than 1 month ago. The problem occurs constantly. The problem has been unchanged. The quality of the pain is described as throbbing and aching. The pain is at a severity of 10/10. Associated symptoms include joint swelling. Pertinent negatives include no fever or itching. The symptoms are aggravated by standing, activity and exercise. She has tried injection treatment, rest, NSAIDs and OTC pain meds for the symptoms. The treatment provided mild relief. Physical therapy was not tried.      Past Medical History:   Past Medical History:   Diagnosis Date    Arthritis     knee    Depression     Encounter for blood transfusion     General anesthetics causing adverse effect in therapeutic use     severe headache after crainiotomy    Gout     Headache     Hyperlipidemia     Meniere disease     MVP (mitral valve prolapse)     minor    Vertigo      Past Surgical History:   Procedure Laterality Date    BRAIN SURGERY      vestibular neurectomy    BREAST SURGERY      benign     SECTION      x 1    CHOLECYSTECTOMY      TONSILLECTOMY       Family History   Problem Relation Age of Onset    Colon cancer Father     Diabetes Father     Diabetes Sister      Social History     Socioeconomic History    Marital status:      Spouse name: Not on file    Number of children: Not on file    Years of education: Not on file    Highest education level: Not on file   Occupational History    Not on file   Social Needs    Financial resource strain: Somewhat  hard    Food insecurity     Worry: Sometimes true     Inability: Patient refused    Transportation needs     Medical: No     Non-medical: No   Tobacco Use    Smoking status: Former Smoker     Packs/day: 0.50     Years: 43.00     Pack years: 21.50     Types: Cigarettes     Start date: 1976     Quit date: 2020     Years since quittin.4    Smokeless tobacco: Never Used    Tobacco comment: Quit 20   Substance and Sexual Activity    Alcohol use: Yes     Alcohol/week: 1.0 - 3.0 standard drinks     Types: 1 - 3 Glasses of wine per week     Frequency: Monthly or less     Drinks per session: 1 or 2     Binge frequency: Never     Comment: social few times monthly   No alcohol 72h prior to sx    Drug use: No    Sexual activity: Never     Birth control/protection: None, Post-menopausal   Lifestyle    Physical activity     Days per week: 0 days     Minutes per session: 0 min    Stress: Very much   Relationships    Social connections     Talks on phone: More than three times a week     Gets together: Twice a week     Attends [employer] service: Not on file     Active member of club or organization: No     Attends meetings of clubs or organizations: Never     Relationship status:    Other Topics Concern    Not on file   Social History Narrative    Not on file     Medication List with Changes/Refills   Current Medications    ACYCLOVIR 5% (ZOVIRAX) 5 % OINTMENT    Apply topically 5 (five) times daily.    AZELASTINE (ASTELIN) 137 MCG (0.1 %) NASAL SPRAY    1 spray (137 mcg total) by Nasal route 2 (two) times daily.    BUMETANIDE (BUMEX) 2 MG TABLET    Take 0.5 tablets (1 mg total) by mouth once daily.    BUPROPION (WELLBUTRIN XL) 150 MG TB24 TABLET    Take 3 tablets (450 mg total) by mouth once daily.    BUSPIRONE (BUSPAR) 10 MG TABLET    Take 1 tablet (10 mg total) by mouth 2 (two) times daily.    DIAZEPAM (VALIUM) 2 MG TABLET    Take 1 tablet (2 mg total) by mouth every 6 (six) hours as needed  (dizziness/Meniere's attack).    DICYCLOMINE (BENTYL) 10 MG CAPSULE    TAKE 1 CAPSULE BY MOUTH THREE TIMES DAILY    ERENUMAB-AOOE (AIMOVIG AUTOINJECTOR) 140 MG/ML ATIN    Inject 140 mg into the skin every 28 days.    FLUTICASONE PROPIONATE (FLONASE) 50 MCG/ACTUATION NASAL SPRAY    2 sprays (100 mcg total) by Each Nostril route once daily.    HYDROCORTISONE 2.5 % CREAM    Apply topically 2 (two) times daily.    LEVOCETIRIZINE (XYZAL) 5 MG TABLET    TAKE ONE TABLET BY MOUTH EVERY MORNING    MELATONIN ORAL    Take by mouth nightly as needed.     METOCLOPRAMIDE HCL (REGLAN) 5 MG TABLET    Take 1 tablet (5 mg total) by mouth every 12 (twelve) hours as needed.    NARATRIPTAN (AMERGE) 2.5 MG TABLET    Take 1 tablet (2.5 mg total) by mouth every 72 hours as needed. 2.5 mg at onset of headache, may repeat in 4 hours if needed    ONDANSETRON (ZOFRAN-ODT) 4 MG TBDL    Take 1 tablet (4 mg total) by mouth every 8 (eight) hours as needed.    SIMVASTATIN (ZOCOR) 5 MG TABLET    Take 1 tablet (5 mg total) by mouth nightly.    TOPIRAMATE (TROKENDI XR) 100 MG CP24    Take 1 capsule (100 mg total) by mouth once daily.    UBROGEPANT (UBRELVY)TABLET 100 MG    Take 1 tablet (100 mg total) by mouth every 72 hours as needed for Migraine.    VARENICLINE (CHANTIX) 0.5 MG TAB    Take 1 tablet (0.5 mg total) by mouth once daily.     Review of patient's allergies indicates:   Allergen Reactions    Demerol [meperidine] Nausea And Vomiting     Pt refuses Demerol     Codeine Itching     Review of Systems   Constitution: Negative for fever.   HENT: Negative for sore throat.    Eyes: Negative for blurred vision.   Cardiovascular: Negative for dyspnea on exertion.   Respiratory: Negative for shortness of breath.    Hematologic/Lymphatic: Does not bruise/bleed easily.   Skin: Negative for itching.   Musculoskeletal: Positive for joint pain and joint swelling.   Gastrointestinal: Negative for vomiting.   Genitourinary: Negative for dysuria.    Neurological: Negative for dizziness.   Psychiatric/Behavioral: The patient does not have insomnia.        Physical Exam:   Body mass index is 21.77 kg/m².  Vitals:    06/24/20 0932   BP: 119/64   Pulse: 71      GENERAL: Well appearing, appropriate for stated age, no acute distress.  CARDIOVASCULAR: Pulses regular by peripheral palpation.  PULMONARY: Respirations are even and non-labored.  NEURO: Awake, alert, and oriented x 3.  PSYCH:  Very emotionally labile throughout visit.  Was crying when I entered the room and then went back and forth between being happy and tearful throughout the visit.  When asked about what treatment options as she was interested than she mentioned multiple contradict think and was very anxious throughout.  HEENT: Head is normocephalic and atraumatic.  Ortho/SPM Exam  Bilateral knees: 0-120. TTP medial jointline. Small effusion. + PF crepitus. 5/5 knee flex and ext strength. Some opening to valgus with firm endpoint. Neg A/P drawer. Calves soft and non-tender. NVID    Imaging:    X-ray Knee Ortho Bilateral with Flexion  Narrative: EXAMINATION:  XR KNEE ORTHO BILAT WITH FLEXION    CLINICAL HISTORY:  Pain in right knee    TECHNIQUE:  AP standing of both knees, PA flexion standing views of both knees, and Merchant views of both knees were performed.  Lateral views of both knees were also performed.    COMPARISON:  11/20/2019    FINDINGS:  Right knee: There is no radiographic evidence of acute osseous, articular, or soft tissue abnormality. There is mild-to-moderate joint space loss in the medial compartment with some small marginal osteophytes present.    Left knee:  Probable small joint effusion.  No acute fractures or dislocations visualized.  Moderate to severe joint space loss in the medial compartment with prominent marginal osteophytes present.  Probable small ossified intra-articular body seen posterior to the knee.  Impression: As above    Electronically signed by: Roque Salomon  MD  Date:    06/01/2020  Time:    15:17    Relevant imaging results reviewed and interpreted by me, discussed with the patient and / or family today.     Other Tests:     No other tests performed today.    Assessment:  Kenia Alonzo is a 64 y.o. female with bilateral knee arthritis, L>R   History of multiple CSI's both knees and visco with Dr. Wade Acevedo   Left knee CSI with Jen Interiano 2 weeks ago   Persistent pain, functional limitations   Does not want to be evaluated for TKA    Encounter Diagnosis   Name Primary?    Primary osteoarthritis of both knees Yes        Plan:   Right knee corticosteroid injection today (2/2/40)   PT - OA protocol, consider water treadmill  Visco bilateral knees  I had a long discussion with the patient about the causes, treatments, and prognosis for knee arthritis. We discussed that although there is not a cure for arthritis, there are effective ways to improve symptoms. I recommended low impact activities such as elliptical and bicycle. I talked about the fact that aquatic and pool therapy is often helpful. I advised the patient to consider good quality stability and cushioned shoes. We talked about the importance of knee motion in the health of the knee. The patient can also use a compression knee sleeve to help limit swelling and provide proprioceptive feedback. We recommend formal physical therapy or at minimum a home exercise program, which we discussed with the patient. I advised that over the counter solutions such as glucosamine and chondroitin may help with symptoms.  I discussed worrisome and red flag signs and symptoms with the patient. The patient expressed understanding and agreed to alert me immediately or to go to the emergency room if they experience any of these.   Uriel update to patient on brace status    MEDICAL NECESSITY FOR VISCOSUPPLEMENTATION: After thorough evaluation of the patient, I have determined that visco-supplementation is medically  necessary. The patient has painful DJD of the knee with failure of conservative therapy including lifestyle modifications and rehabilitation exercises. Oral analgesis/NSAIDs have not adequately controlled symptoms and there is radiographic evidence of joint space narrowing, subchondral sclerosis, and some early osteophytic changes, or in lack of radiographic evidence, there is arthroscopic or other evidence of chondrosis.    Disclaimer: This note was prepared using a voice recognition system and is likely to have sound alike errors within the text.

## 2020-06-25 ENCOUNTER — TELEPHONE (OUTPATIENT)
Dept: ORTHOPEDICS | Facility: CLINIC | Age: 65
End: 2020-06-25

## 2020-06-25 DIAGNOSIS — M17.0 PRIMARY OSTEOARTHRITIS OF BOTH KNEES: Primary | ICD-10-CM

## 2020-07-01 ENCOUNTER — OFFICE VISIT (OUTPATIENT)
Dept: PSYCHIATRY | Facility: CLINIC | Age: 65
End: 2020-07-01
Payer: MEDICAID

## 2020-07-01 DIAGNOSIS — F17.200 NICOTINE DEPENDENCE: ICD-10-CM

## 2020-07-01 DIAGNOSIS — F43.21 COMPLICATED GRIEF: ICD-10-CM

## 2020-07-01 DIAGNOSIS — F41.1 GAD (GENERALIZED ANXIETY DISORDER): Primary | ICD-10-CM

## 2020-07-01 PROCEDURE — 90834 PR PSYCHOTHERAPY W/PATIENT, 45 MIN: ICD-10-PCS | Mod: 95,AJ,HB, | Performed by: SOCIAL WORKER

## 2020-07-01 PROCEDURE — 90834 PSYTX W PT 45 MINUTES: CPT | Mod: 95,AJ,HB, | Performed by: SOCIAL WORKER

## 2020-07-01 RX ORDER — VARENICLINE TARTRATE 0.5 MG/1
0.5 TABLET, FILM COATED ORAL DAILY
Qty: 30 TABLET | Refills: 0 | Status: CANCELLED | OUTPATIENT
Start: 2020-07-01

## 2020-07-01 RX ORDER — VARENICLINE TARTRATE 0.5 MG/1
0.5 TABLET, FILM COATED ORAL DAILY
Qty: 30 TABLET | Refills: 0 | Status: SHIPPED | OUTPATIENT
Start: 2020-07-01 | End: 2020-08-04 | Stop reason: SDUPTHER

## 2020-07-01 NOTE — TELEPHONE ENCOUNTER
Patient called to say that she was almost out of Chantix and she was going out of town for the weekend. Patient still remains tobacco free no side effect noted while taking Chantix 0.5 mg. Will reorder.

## 2020-07-01 NOTE — PROGRESS NOTES
"  Individual Psychotherapy Follow-up Visit Progress Note (PhD/LCSW)     Outpatient - Supportive psychotherapy 45 min - CPT Code 95788    Virtual visit with synchronous audio/video, Location of patient: home in [place]    Each patient provided medical services by telemedicine is: (1) informed of the relationship between the provider and patient and the respective role of any other health care provider with respect to management of the patient; and (2) notified that he or she may decline to receive medical services by telemedicine and may withdraw from such care at any time.    7/1/2020  MRN: [id number]  Primary Care Provider: Harish Hernandez DO    Kenia Alonzo is a 65 y.o. female who presents today for follow-up of depression and anxiety. Met with patient.        Subjective:       Patient report: Feeling better, reports she understands that DCFS report regarding the suspected neglect of her granddtr by the parents was necessary. Has not been contacted as yet by DCFS. States she feels relieved. She has had some conflict with her son and DIL; confronted them on granddtr's unkempt apperance. Feels that medications are helping her; flying to [place] this weekend with a friend; nervous about flying but looking forward to going to [place] for the holiday weekend. Feeling better about herself: "I no longer feel worthless." Able to recognize her accomplishments.    From previous visit on 6/17/2020: Pt is anxious, worried, tearful, irritable, upset due to an issue with her 5 y/o granddtr. Over the weekend, when she picked granddtr up from son and his gf's apartment for a visit, she noticed that the child's hair was severely matted. She took her to her own salon, where it reportedly took 2 hairdressers 45 minutes to get the mats out. She also reports that son's apartment is unkempt, his gf smokes marijuana in the home, and granddtr told pt it had been a long time since she had a bath. Pt is fearful that if she reports to " DCFS that son won't allow her to see the child anymore, or that maternal grandmother will get custody of the child and take her out of state. Pt verbalized understanding when informed that as a mandatory , this provider will contact DCFS and make a report of suspected child neglect.     Current symptoms:   · Depression: denied  · Anxiety: excessive anxiety/worry, restlessness/keyed up  · Substance abuse: denied  · Cognitive functioning: denied  · Iram: none noted  · Psychosis: none noted    Psychosocial stressors and topics discussed: identifying feelings, symptom recognition/mgmt, self care, treatment progress, goals, coping strategies, parenting issues, adjustment problems, financial issues, managing anxiety/panic/stress and challenging thought distortions      Objective:       Mental Status Evaluation  Appearance: unremarkable, age appropriate  Behavior: normal, cooperative  Speech: normal tone, normal rate, normal pitch, normal volume  Mood: anxious  Affect: congruent and appropriate  Thought Process: normal and logical  Thought Content: normal, no suicidality, no homicidality, delusions, or paranoia  Sensorium: grossly intact  Cognition: grossly intact  Insight: good  Judgment: adequate to circumstances    Risk parameters:  Patient reports no suicidal ideation  Patient reports no homicidal ideation  Patient reports no self-injurious behavior  Patient reports no violent behavior      Assessment & Plan:       Therapeutic interventions used: Educated pt re: mindfulness strategies to manage anxious thoughts and feelings  Pt was taught how to apply relaxation skills to specific situations  Assigned pt to practice relaxation on a daily basis  Pt was assigned to engage in behavioral activation by scheduling activities that have a high likelihood for pleasure and mastery  Monitored the effectiveness and side effects of antidepressant medication prescribed by physician/NP/MP  Reinforced pt's successes in  reframing cognitive distortions  Taught pt about the variation in mood that is within the normal sphere   Reinforced pt for statements that reflected confidence in himself/herself or positive self-assessment     The patient's response to the interventions is accepting    The patient's progress toward treatment goals is good    Homework assigned: daily journaling and practice relaxation skills daily     Treatment plan:   A. Target symptoms: Depression, Anxiety and Poor Coping Skills   B. Therapeutic modalities: insight oriented psychotherapy, supportive psychotherapy  C. Why chosen therapy is appropriate versus another modality: relevant to diagnosis, evidence based practice   D. Outcome monitoring methods: self-report, observation, feedback from clinical staff     Visit Diagnosis:   1. JOSIAH (generalized anxiety disorder)    2. Complicated grief        Follow-up: individual psychotherapy and medication management by physician     Return to Clinic: 2 weeks    Length of Service (minutes): 45      CAROL Gasca, [facility]  Outpatient Psychiatry  [facility]  [phone]

## 2020-07-06 ENCOUNTER — TELEPHONE (OUTPATIENT)
Dept: PHARMACY | Facility: CLINIC | Age: 65
End: 2020-07-06

## 2020-07-08 ENCOUNTER — CLINICAL SUPPORT (OUTPATIENT)
Dept: SMOKING CESSATION | Facility: CLINIC | Age: 65
End: 2020-07-08
Payer: COMMERCIAL

## 2020-07-08 DIAGNOSIS — F17.200 NICOTINE DEPENDENCE: Primary | ICD-10-CM

## 2020-07-08 PROCEDURE — 99401 PR PREVENT COUNSEL,INDIV,15 MIN: ICD-10-PCS | Mod: S$GLB,,,

## 2020-07-08 PROCEDURE — 99401 PREV MED CNSL INDIV APPRX 15: CPT | Mod: S$GLB,,,

## 2020-07-08 NOTE — Clinical Note
Patient called and stated that she was still in [place] for several more days. Enjoying some relaxation. Patient remains tobacco free as of 1/26/20. Congratulated her on he continued accomplishment. Patient proud that she has been quit for 6 months. Patient remains on 0.5 mg Chantix without any negative side effects noted at this time. The patient will continue with group therapy sessions and medication monitoring by CTTS. Prescribed medication management will be by physician.The patient denies any abnormal behavioral or mental changes at this time.

## 2020-07-08 NOTE — PROGRESS NOTES
Individual Follow-Up Form    7/8/2020    Quit Date: 1/26/20    Clinical Status of Patient: Outpatient    Length of Service: 15 minutes    Continuing Medication: yes  Chantix    Other Medications:      Target Symptoms: Withdrawal and medication side effects. The following were  rated moderate (3) to severe (4) on TCRS:  · Moderate (3): none  · Severe (4): none    Comments: Patient called and stated that she was still in [place] for several more days. Enjoying some relaxation. Patient remains tobacco free as of 1/26/20. Congratulated her on he continued accomplishment. Patient proud that she has been quit for 6 months. Patient remains on 0.5 mg Chantix without any negative side effects noted at this time. The patient will continue with group therapy sessions and medication monitoring by CTTS. Prescribed medication management will be by physician.The patient denies any abnormal behavioral or mental changes at this time.        Diagnosis: F17.200    Next Visit: 2 weeks

## 2020-07-16 ENCOUNTER — CLINICAL SUPPORT (OUTPATIENT)
Dept: REHABILITATION | Facility: HOSPITAL | Age: 65
End: 2020-07-16
Attending: ORTHOPAEDIC SURGERY
Payer: MEDICAID

## 2020-07-16 DIAGNOSIS — M25.561 CHRONIC PAIN OF RIGHT KNEE: ICD-10-CM

## 2020-07-16 DIAGNOSIS — G89.29 CHRONIC PAIN OF LEFT KNEE: ICD-10-CM

## 2020-07-16 DIAGNOSIS — R26.2 DIFFICULTY WALKING: ICD-10-CM

## 2020-07-16 DIAGNOSIS — R53.1 GENERALIZED WEAKNESS: ICD-10-CM

## 2020-07-16 DIAGNOSIS — G89.29 CHRONIC PAIN OF RIGHT KNEE: ICD-10-CM

## 2020-07-16 DIAGNOSIS — M17.0 PRIMARY OSTEOARTHRITIS OF BOTH KNEES: ICD-10-CM

## 2020-07-16 DIAGNOSIS — M25.562 CHRONIC PAIN OF LEFT KNEE: ICD-10-CM

## 2020-07-16 PROCEDURE — 97110 THERAPEUTIC EXERCISES: CPT

## 2020-07-16 PROCEDURE — 97161 PT EVAL LOW COMPLEX 20 MIN: CPT

## 2020-07-17 NOTE — PLAN OF CARE
[facility]  Physical Therapy Initial Evaluation    Date: 2020   Name: Kenia Alonzo  Clinic Number: [id number]    Therapy Diagnosis:   Encounter Diagnoses   Name Primary?    Primary osteoarthritis of both knees     Chronic pain of right knee     Chronic pain of left knee     Difficulty walking     Generalized weakness      Physician: Yehuda Beck MD    Physician Orders: PT Eval and Treat   Medical Diagnosis from Referral: M17.0 (ICD-10-CM) - Primary osteoarthritis of both knees  Evaluation Date: 2020  Authorization Period Expiration: 2020  Plan of Care Expiration: 10/16/2020  Visit # / Visits authorized:     Time In: 10:55 am  (pt arrived late)   Time Out: 11:35 am   Total Appointment Time (timed & untimed codes): 40 minutes    Precautions: Standard    Subjective   Date of onset: pt reports that she has had B knee pain for many years   History of current condition - Kenia reports: complaints of B knee pain that has been ongoing for may years. She states that she has been seen by ortho before who recommended knee replacements, however, states that she is not ready for a knee replacement at this time. Pt reports that she has the most pain with prolonged standing, especially with cooking. Pt states that she would like to try to take a conservative approach and wants to push off surgery as long as she can.      Medical History:   Past Medical History:   Diagnosis Date    Arthritis     knee    Depression     Encounter for blood transfusion     General anesthetics causing adverse effect in therapeutic use     severe headache after crainiotomy    Gout     Headache     Hyperlipidemia     Meniere disease     MVP (mitral valve prolapse)     minor    Vertigo        Surgical History:   Kenia Alonzo  has a past surgical history that includes  section; Breast surgery; Brain surgery (); Tonsillectomy; and Cholecystectomy.    Medications:   Kenia has  "a current medication list which includes the following prescription(s): acyclovir 5%, azelastine, bumetanide, bupropion, buspirone, diazepam, dicyclomine, erenumab-aooe, fluticasone propionate, hydrocortisone, levocetirizine, melatonin, metoclopramide hcl, naratriptan, ondansetron, simvastatin, topiramate, ubrogepant, and varenicline.    Allergies:   Review of patient's allergies indicates:   Allergen Reactions    Demerol [meperidine] Nausea And Vomiting     Pt refuses Demerol     Codeine Itching        Imaging, B knee x-rays: FINDINGS:  Right knee: There is no radiographic evidence of acute osseous, articular, or soft tissue abnormality. There is mild-to-moderate joint space loss in the medial compartment with some small marginal osteophytes present.     Left knee:  Probable small joint effusion.  No acute fractures or dislocations visualized.  Moderate to severe joint space loss in the medial compartment with prominent marginal osteophytes present.  Probable small ossified intra-articular body seen posterior to the knee.    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  Prior Level of Function: pt reports that she used to have no difficulty with standing and enjoyed standing and cooking  Current Level of Function: pt reports that she struggles to stand long enough to cook a small dinner    Pain (B knees) :  Current 7/10, worst 8/10, best 5/10   Location:  B knees   Description: Aching, Throbbing, Deep, Shooting and stabbing   Aggravating Factors: Standing, Walking and standing for cooking  Easing Factors: injection, rest, elevation and pain medication     Patients goals: "I want to push off surgery as long as I can"     Objective       Range of Motion:   Knee Left active Right Active   Flexion " 120 p! 115 p!   Extension -2 from neutral 0           Lower Extremity Strength  Right LE  Left LE    Knee extension: 4-/5 Knee extension: 4-/5   Knee flexion: 4-/5 Knee flexion: 4-/5   Hip flexion: 3+/5 Hip flexion: 3+/5   Hip extension:  3+/5 Hip extension: 3+/5   Hip abduction: 4-/5 Hip abduction: 4-/5   Hip adduction: 4-/5 Hip adduction 4-/5           Special Tests:   Left Right   Valgus Stress Test negative Negative    Varus Stress test Negative  Negative    Lachman's test Negative  Negative    Posterior Lachman Negative  Negative    Patellar Grind Test Positive for pain Positive for pain     Step down test: unable to perform today d/t pain       Function:  - Squat: only able to squat to 35 degrees of knee flexion before reports of pain    - Sit <--> Stand:pain with performance      Joint Mobility: hypomobility to B  Patellas    Palpation: tenderness to medial joint line tenderness     Sensation: intact     Flexibility: impaired HS flexibility noted in B knee       Edema: mild edema noted in L knee         Limitation/Restriction for FOTO Knee Survey    Therapist reviewed FOTO scores for Kenia Alonzo on 7/16/2020.   FOTO documents entered into [employer] - see Media section.    Limitation Score: 56%         TREATMENT   Treatment Time In: 11:25 am   Treatment Time Out: 11:35 am   Total Treatment time (time-based codes) separate from Evaluation: 10 minutes    Kenia received therapeutic exercises are performed bilaterally to develop strength, endurance, ROM, flexibility and posture for 10 minutes including:    QS x 10 reps  Heel slides x 10 reps  SLR x 10 reps       Home Exercises and Patient Education Provided    Education provided:   - - PT POC  - HEP  - PT prognosis and diagnosis  - all questions and concerns answered       Written Home Exercises Provided: yes.  Exercises were reviewed and Kenia was able to demonstrate them prior to the end of the session.  Kenia demonstrated good  understanding of the education  provided.     See EMR under Patient Instructions for exercises provided 7/16/2020.    Assessment   Kenia is a 65 y.o. female referred to outpatient Physical Therapy with a medical diagnosis of M17.0 (ICD-10-CM) - Primary osteoarthritis of both knees.     Patient presents with complaints of B knee pain with L>R. Pt with hx of B knee OA and chronic knee pain. Pt with impaired ROM as well as functional strength in B knees with difficulty with WB d/t knee pain. Pt will benefit from skilled PT intervention in order to address strength, ROM, and functional mobility deficits in order to improve overall QOL and return to her PLOF. Educated pt on the need for daily activities, especially quad strengthening to address these impairments.     Patient prognosis is Good.   Patientt will benefit from skilled outpatient Physical Therapy to address the deficits stated above and in the chart below, provide patient /family education, and to maximize patientt's level of independence.     Plan of care discussed with patient: Yes  Patient's spiritual, cultural and educational needs considered and patient is agreeable to the plan of care and goals as stated below:     Anticipated Barriers for therapy: chronicity of pain     Medical Necessity is demonstrated by the following  History  Co-morbidities and personal factors that may impact the plan of care Co-morbidities:   advanced age, anxiety and coping style/mechanism    Personal Factors:   age  coping style     low   Examination  Body Structures and Functions, activity limitations and participation restrictions that may impact the plan of care Body Regions:   lower extremities    Body Systems:    gross symmetry  ROM  strength  gross coordinated movement  gait  transfers  transitions  motor control    Participation Restrictions:   Pt limited to standing of about 30 minutes or less     Activity limitations:   Learning and applying knowledge  no deficits    General Tasks and Commands  no  deficits    Communication  no deficits    Mobility  walking    Self care  dressing  looking after one's health    Domestic Life  shopping  cooking  doing house work (cleaning house, washing dishes, laundry)  assisting others    Interactions/Relationships  no deficits    Life Areas  no deficits    Community and Social Life  no deficits         low   Clinical Presentation stable and uncomplicated low   Decision Making/ Complexity Score: low     Goals:  Short Term Goals: 4 weeks   1. Pt will be independent with HEP in order to continue with PT progress at home   2. Pt will improve B knee extension strength to at least 4+/5 in order to promote improved gait performance and standing tolerance  3. Pt will demonstrate AROM in B knees 0-120 with no complaints of pain  4. Pt will report pain at highest of 5/10 or less in B knees  5. Pt will demonstrate ability to tolerate ambulating for 20 minutes daily as exercise with no complaint of increased knee pain     Long Term Goals: 8 weeks   1. Pt will improve FOTO to 50% limited or less in order to improve overall QOL and return to PLOF.   2. Pt will be independent with progressed HEP in order to continue LE strengthening after discharge from PT  3. Pt will be able to get into and out of the car without increased reports of knee pain  4. Pt will demonstrate ability to ascend/descend at least 6 steps without increased pain in B knees.     Plan   Plan of care Certification: 7/16/2020 to 10/16/2020.    Outpatient Physical Therapy 2 times weekly for 8 weeks to include the following interventions: Aquatic Therapy, Electrical Stimulation PRN, Gait Training, Manual Therapy, Moist Heat/ Ice, Neuromuscular Re-ed, Patient Education, Therapeutic Activites, Therapeutic Exercise, Ultrasound and any other therapies or modalities as clinically appropriate . Pt may be seen by PTA as part of pt's care team.       Maame Fitzpatrick, PT, DPT  7/16/2020

## 2020-07-17 NOTE — PROGRESS NOTES
Physical Therapy Treatment Note     Name: Kenia Alonzo  Clinic Number: [id number]    Therapy Diagnosis:   Encounter Diagnoses   Name Primary?    Chronic pain of right knee     Chronic pain of left knee     Difficulty walking     Generalized weakness      Physician: Yehuda Beck MD    Visit Date: 7/20/2020    Physician Orders: PT Eval and Treat   Medical Diagnosis from Referral: M17.0 (ICD-10-CM) - Primary osteoarthritis of both knees  Evaluation Date: 7/16/2020     PN DUE: 8/16/2020  Authorization Period Expiration: 8/13/2020  Plan of Care Expiration: 10/16/2020  Visit # / Visits authorized:  2/ 20      Time In: 2:05 pm  (pt arrived late for appointment)   Time Out: 2:40 pm   Total Billable Time: 35 minutes    Precautions: Standard    Subjective     Pt reports: no new complaints. She reports that she did not perform HEP since evaluation, however, she was in [place] all weekend.   She was not compliant with home exercise program.  Response to previous treatment: mild soreness   Functional change: none reported at this time     Pain: 5/10  Location:  B knees      Objective     Kenia received therapeutic exercises to develop strength, endurance, ROM, flexibility and posture for 35 minutes including:    QS 2 x 10 reps  +SAQ 2 x 10   Heel slides x 10 reps  SLR 2 x 10 reps  +hip adduction squeeze with ball 2 x 10   +bridges 2 x 10   sidelying SLR 2 x 10   +sidlying hip adduction x 10 reps       Home Exercises Provided and Patient Education Provided     Education provided:   - continue previously issued HEP  - educated pt to make sure that she is walking and being mobile at home    Written Home Exercises Provided: Patient instructed to cont prior HEP.  Exercises were reviewed and Kenia was able to demonstrate them prior to the end of the session.  Kenia demonstrated fair  understanding of the education provided.     See EMR under Patient Instructions for exercises provided prior visit.    Assessment     Pt with  fair tolerance of today's session. She continues to report B knee pain, however, tolerated progression and performance of exercise today with no complaints. Pt with decreased strength in B quads, and requires cues for proper exercise performance and to stay on task. Pt will continue to benefit from skilled PT intervention in order to further progress B knee strength, ROM, and functional mobility.   Kenia is progressing well towards her goals.   Pt prognosis is Good.     Pt will continue to benefit from skilled outpatient physical therapy to address the deficits listed in the problem list box on initial evaluation, provide pt/family education and to maximize pt's level of independence in the home and community environment.     Pt's spiritual, cultural and educational needs considered and pt agreeable to plan of care and goals.     Anticipated barriers to physical therapy: chronicity of pain     Goals:  Short Term Goals: 4 weeks   1. Pt will be independent with HEP in order to continue with PT progress at home -progressing, not met   2. Pt will improve B knee extension strength to at least 4+/5 in order to promote improved gait performance and standing tolerance -progressing, not met   3. Pt will demonstrate AROM in B knees 0-120 with no complaints of pain -progressing, not met   4. Pt will report pain at highest of 5/10 or less in B knees -progressing, not met   5. Pt will demonstrate ability to tolerate ambulating for 20 minutes daily as exercise with no complaint of increased knee pain -progressing, not met      Long Term Goals: 8 weeks   1. Pt will improve FOTO to 50% limited or less in order to improve overall QOL and return to PLOF. -progressing, not met   2. Pt will be independent with progressed HEP in order to continue LE strengthening after discharge from PT -progressing, not met   3. Pt will be able to get into and out of the car without increased reports of knee pain -progressing, not met   4. Pt will  demonstrate ability to ascend/descend at least 6 steps without increased pain in B knees. -progressing, not met       Plan     Continue per PT POC, progress exercise as tolerated and appropriate    Plan of care Certification: 7/16/2020 to 10/16/2020.     Outpatient Physical Therapy 2 times weekly for 8 weeks to include the following interventions: Aquatic Therapy, Electrical Stimulation PRN, Gait Training, Manual Therapy, Moist Heat/ Ice, Neuromuscular Re-ed, Patient Education, Therapeutic Activites, Therapeutic Exercise, Ultrasound and any other therapies or modalities as clinically appropriate . Pt may be seen by PTA as part of pt's care team.       Maame Fitzpatrick, PT, DPT  7/20/2020

## 2020-07-20 ENCOUNTER — CLINICAL SUPPORT (OUTPATIENT)
Dept: REHABILITATION | Facility: HOSPITAL | Age: 65
End: 2020-07-20
Payer: MEDICAID

## 2020-07-20 DIAGNOSIS — M25.561 CHRONIC PAIN OF RIGHT KNEE: ICD-10-CM

## 2020-07-20 DIAGNOSIS — M25.562 CHRONIC PAIN OF LEFT KNEE: ICD-10-CM

## 2020-07-20 DIAGNOSIS — R26.2 DIFFICULTY WALKING: ICD-10-CM

## 2020-07-20 DIAGNOSIS — G89.29 CHRONIC PAIN OF RIGHT KNEE: ICD-10-CM

## 2020-07-20 DIAGNOSIS — R53.1 GENERALIZED WEAKNESS: ICD-10-CM

## 2020-07-20 DIAGNOSIS — G89.29 CHRONIC PAIN OF LEFT KNEE: ICD-10-CM

## 2020-07-20 PROCEDURE — 97110 THERAPEUTIC EXERCISES: CPT

## 2020-07-21 ENCOUNTER — CLINICAL SUPPORT (OUTPATIENT)
Dept: SMOKING CESSATION | Facility: CLINIC | Age: 65
End: 2020-07-21
Payer: COMMERCIAL

## 2020-07-21 DIAGNOSIS — F17.200 NICOTINE DEPENDENCE: Primary | ICD-10-CM

## 2020-07-21 PROCEDURE — 99999 PR PBB SHADOW E&M-EST. PATIENT-LVL I: ICD-10-PCS | Mod: PBBFAC,,,

## 2020-07-21 PROCEDURE — 99402 PR PREVENT COUNSEL,INDIV,30 MIN: ICD-10-PCS | Mod: S$GLB,,,

## 2020-07-21 PROCEDURE — 99999 PR PBB SHADOW E&M-EST. PATIENT-LVL I: CPT | Mod: PBBFAC,,,

## 2020-07-21 PROCEDURE — 99402 PREV MED CNSL INDIV APPRX 30: CPT | Mod: S$GLB,,,

## 2020-07-22 NOTE — PROGRESS NOTES
Individual Follow-Up Form    7/21/20    Quit Date: 1/26/20    Clinical Status of Patient: Outpatient    Length of Service: 30 minutes    Continuing Medication: yes  Chantix    Other Medications:      Target Symptoms: Withdrawal and medication side effects. The following were  rated moderate (3) to severe (4) on TCRS:  · Moderate (3): none  · Severe (4): none    Comments: Patient remains tobacco free as of 1/26/20. Congratulated her on he continued accomplishment. Patient proud that she has been quit for 6 months. Patient remains smoking cessation medication regimen of  0.5 mg Chantix without any negative side effects noted at this time. The patient will continue with group therapy/ individula sessions and medication monitoring by CTTS. Prescribed medication management will be by physician.The patient denies any abnormal behavioral or mental changes at this time.     Diagnosis: F17.210    Next Visit: 2 weeks

## 2020-07-23 ENCOUNTER — OFFICE VISIT (OUTPATIENT)
Dept: PSYCHIATRY | Facility: CLINIC | Age: 65
End: 2020-07-23
Payer: MEDICAID

## 2020-07-23 DIAGNOSIS — F41.1 GAD (GENERALIZED ANXIETY DISORDER): Primary | ICD-10-CM

## 2020-07-23 PROCEDURE — 90834 PR PSYCHOTHERAPY W/PATIENT, 45 MIN: ICD-10-PCS | Mod: 95,AJ,HB, | Performed by: SOCIAL WORKER

## 2020-07-23 PROCEDURE — 90834 PSYTX W PT 45 MINUTES: CPT | Mod: 95,AJ,HB, | Performed by: SOCIAL WORKER

## 2020-07-23 NOTE — PROGRESS NOTES
"  Individual Psychotherapy Follow-up Visit Progress Note (PhD/LCSW)     Outpatient - Supportive psychotherapy 45 min - CPT Code 54731    Virtual visit with synchronous audio/video, Location of patient: home in [place]    Each patient provided medical services by telemedicine is: (1) informed of the relationship between the provider and patient and the respective role of any other health care provider with respect to management of the patient; and (2) notified that he or she may decline to receive medical services by telemedicine and may withdraw from such care at any time.    7/23/2020  MRN: [id number]  Primary Care Provider: Harish Hernandez DO    Kenia Alonzo is a 65 y.o. female who presents today for follow-up of depression and anxiety. Met with patient.        Subjective:       Patient report: Doing much better; went to [place], [place] with a friend; mostly stayed in the condo but enjoyed getting away. Sleep has been WNL. She is less worried about her granddtr; has not heard from DCFS since report was made; feels she did the right thing in discussing her concerns about granddtr possibly being neglected; feeling more hopeful. Endorses anxiety about her appearance and slight weight gain.     From 7/1/2020: Feeling better, reports she understands that DCFS report regarding the suspected neglect of her granddtr by the parents was necessary. Has not been contacted as yet by DCFS. States she feels relieved. She has had some conflict with her son and DIL; confronted them on granddtr's unkempt apperance. Feels that medications are helping her; flying to [place] this weekend with a friend; nervous about flying but looking forward to going to [place] for the holiday weekend. Feeling better about herself: "I no longer feel worthless." Able to recognize her accomplishments.    From previous visit on 6/17/2020: Pt is anxious, worried, tearful, irritable, upset due to an issue with her 5 y/o granddtr. Over the weekend, when " she picked granddtr up from son and his gf's apartment for a visit, she noticed that the child's hair was severely matted. She took her to her own salon, where it reportedly took 2 hairdressers 45 minutes to get the mats out. She also reports that son's apartment is unkempt, his gf smokes marijuana in the home, and flexr told pt it had been a long time since she had a bath. Pt is fearful that if she reports to DCFS that son won't allow her to see the child anymore, or that maternal grandmother will get custody of the child and take her out of state. Pt verbalized understanding when informed that as a mandatory , this provider will contact DCFS and make a report of suspected child neglect.     Current symptoms:   · Depression: denied  · Anxiety: excessive anxiety/worry, restlessness/keyed up  · Substance abuse: denied  · Cognitive functioning: denied  · Iram: none noted  · Psychosis: none noted    Psychosocial stressors and topics discussed: identifying feelings, symptom recognition/mgmt, self care, treatment progress, goals, coping strategies, parenting issues, adjustment problems, financial issues, managing anxiety/panic/stress and challenging thought distortions      Objective:       Mental Status Evaluation  Appearance: unremarkable, age appropriate  Behavior: normal, cooperative  Speech: normal tone, normal rate, normal pitch, normal volume  Mood: anxious  Affect: congruent and appropriate  Thought Process: normal and logical  Thought Content: normal, no suicidality, no homicidality, delusions, or paranoia  Sensorium: grossly intact  Cognition: grossly intact  Insight: good  Judgment: adequate to circumstances    Risk parameters:  Patient reports no suicidal ideation  Patient reports no homicidal ideation  Patient reports no self-injurious behavior  Patient reports no violent behavior      Assessment & Plan:       Therapeutic interventions used: Educated pt re: mindfulness strategies to manage  "anxious thoughts and feelings  Pt was taught how to apply relaxation skills to specific situations  Assigned pt to practice relaxation on a daily basis  Pt was taught to implement 15-20 minutes of designated "worry time"  Helped pt to identify fearful self-talk and create reality-based alternatives  Pt was assigned to engage in behavioral activation by scheduling activities that have a high likelihood for pleasure and mastery  Helped pt to identify and accept situations in which she does not have complete control, has imperfection or needs to tolerate uncertainty and unpleasant emotions  Monitored the effectiveness and side effects of antidepressant medication prescribed by physician/NP/MP  Reinforced pt's successes in reframing cognitive distortions   Reinforced pt for statements that reflected confidence in himself/herself or positive self-assessment     The patient's response to the interventions is accepting    The patient's progress toward treatment goals is good    Homework assigned: daily journaling and practice relaxation skills daily     Treatment plan:   A. Target symptoms: Depression, Anxiety and Poor Coping Skills   B. Therapeutic modalities: insight oriented psychotherapy, supportive psychotherapy  C. Why chosen therapy is appropriate versus another modality: relevant to diagnosis, evidence based practice   D. Outcome monitoring methods: self-report, observation, feedback from clinical staff     Visit Diagnosis:   1. JOSIAH (generalized anxiety disorder)        Follow-up: individual psychotherapy and medication management by physician     Return to Clinic: 2 weeks    Length of Service (minutes): 45      CAROL Gasca, [facility]  Outpatient Psychiatry  [facility]  [phone]    "

## 2020-07-25 ENCOUNTER — PATIENT OUTREACH (OUTPATIENT)
Dept: ADMINISTRATIVE | Facility: OTHER | Age: 65
End: 2020-07-25

## 2020-07-27 ENCOUNTER — TELEPHONE (OUTPATIENT)
Dept: INTERNAL MEDICINE | Facility: CLINIC | Age: 65
End: 2020-07-27

## 2020-07-27 ENCOUNTER — OFFICE VISIT (OUTPATIENT)
Dept: ORTHOPEDICS | Facility: CLINIC | Age: 65
End: 2020-07-27
Payer: MEDICAID

## 2020-07-27 ENCOUNTER — OFFICE VISIT (OUTPATIENT)
Dept: INTERNAL MEDICINE | Facility: CLINIC | Age: 65
End: 2020-07-27
Payer: MEDICAID

## 2020-07-27 VITALS
OXYGEN SATURATION: 99 % | HEIGHT: 62 IN | WEIGHT: 125.69 LBS | HEART RATE: 86 BPM | TEMPERATURE: 99 F | BODY MASS INDEX: 23.13 KG/M2 | DIASTOLIC BLOOD PRESSURE: 78 MMHG | RESPIRATION RATE: 16 BRPM | SYSTOLIC BLOOD PRESSURE: 136 MMHG

## 2020-07-27 VITALS — BODY MASS INDEX: 21.9 KG/M2 | HEIGHT: 62 IN | WEIGHT: 119 LBS

## 2020-07-27 DIAGNOSIS — M10.9 ACUTE GOUT OF RIGHT FOOT, UNSPECIFIED CAUSE: Primary | ICD-10-CM

## 2020-07-27 DIAGNOSIS — M17.0 PRIMARY OSTEOARTHRITIS OF BOTH KNEES: Primary | ICD-10-CM

## 2020-07-27 PROCEDURE — 20610 DRAIN/INJ JOINT/BURSA W/O US: CPT | Mod: 50,PBBFAC,PO | Performed by: ORTHOPAEDIC SURGERY

## 2020-07-27 PROCEDURE — 99999 PR PBB SHADOW E&M-EST. PATIENT-LVL IV: CPT | Mod: PBBFAC,,, | Performed by: ORTHOPAEDIC SURGERY

## 2020-07-27 PROCEDURE — 99499 UNLISTED E&M SERVICE: CPT | Mod: S$PBB,,, | Performed by: ORTHOPAEDIC SURGERY

## 2020-07-27 PROCEDURE — 99214 OFFICE O/P EST MOD 30 MIN: CPT | Mod: S$PBB,,, | Performed by: FAMILY MEDICINE

## 2020-07-27 PROCEDURE — 99214 OFFICE O/P EST MOD 30 MIN: CPT | Mod: PBBFAC,27,PO,25 | Performed by: ORTHOPAEDIC SURGERY

## 2020-07-27 PROCEDURE — 99215 OFFICE O/P EST HI 40 MIN: CPT | Mod: PBBFAC,PO,25 | Performed by: FAMILY MEDICINE

## 2020-07-27 PROCEDURE — 99214 PR OFFICE/OUTPT VISIT, EST, LEVL IV, 30-39 MIN: ICD-10-PCS | Mod: S$PBB,,, | Performed by: FAMILY MEDICINE

## 2020-07-27 PROCEDURE — 20610 LARGE JOINT ASPIRATION/INJECTION: BILATERAL KNEE: ICD-10-PCS | Mod: 50,S$PBB,, | Performed by: ORTHOPAEDIC SURGERY

## 2020-07-27 PROCEDURE — 99999 PR PBB SHADOW E&M-EST. PATIENT-LVL V: CPT | Mod: PBBFAC,,, | Performed by: FAMILY MEDICINE

## 2020-07-27 PROCEDURE — 99999 PR PBB SHADOW E&M-EST. PATIENT-LVL IV: ICD-10-PCS | Mod: PBBFAC,,, | Performed by: ORTHOPAEDIC SURGERY

## 2020-07-27 PROCEDURE — 99499 NO LOS: ICD-10-PCS | Mod: S$PBB,,, | Performed by: ORTHOPAEDIC SURGERY

## 2020-07-27 PROCEDURE — 99999 PR PBB SHADOW E&M-EST. PATIENT-LVL V: ICD-10-PCS | Mod: PBBFAC,,, | Performed by: FAMILY MEDICINE

## 2020-07-27 RX ORDER — PREDNISONE 20 MG/1
TABLET ORAL
Qty: 12 TABLET | Refills: 0 | Status: SHIPPED | OUTPATIENT
Start: 2020-07-27 | End: 2020-09-10

## 2020-07-27 RX ADMIN — Medication 20 MG: at 01:07

## 2020-07-27 NOTE — LETTER
August 18, 2020      Jen Interiano PA-C  [address]  [place] [address]           [facility]  [address]  [facility] [address]  Phone: [phone]          Patient: Kenia Alonzo   MR Number: [id number]   YOB: 1955   Date of Visit: 7/27/2020       Dear Jen Interiano:    Thank you for referring Kenia Alonzo to me for evaluation. Attached you will find relevant portions of my assessment and plan of care.    If you have questions, please do not hesitate to call me. I look forward to following Kenia Alonzo along with you.    Sincerely,    Yehuda Beck MD    Enclosure  CC:  No Recipients    If you would like to receive this communication electronically, please contact [email or web address] or [phone] to request more information on [facility] [employer] access.    For providers and/or their staff who would like to refer a patient to [facility], please contact us through our one-stop-shop provider referral line, [facility], at [phone].    If you feel you have received this communication in error or would no longer like to receive these types of communications, please e-mail [email or web address]

## 2020-07-27 NOTE — PROGRESS NOTES
Patient ID: Kenia Alonzo is a 65 y.o. female.    Chief Complaint: Gout    HPI Patient is 65-year-old female who presents with gout flare-up of her right toes and foot that began yesterday.  Has had similar episodes of gout in the past and current episode is similar to previous.  Took Advil today which provided some relief.  Says when she previously took allopurinol it made her gout worse.  Avoids alcohol and high-protein diet.  Also takes tart cherry which helps.      Family History   Problem Relation Age of Onset    Colon cancer Father     Diabetes Father     Diabetes Sister        Current Outpatient Medications:     acyclovir 5% (ZOVIRAX) 5 % ointment, Apply topically 5 (five) times daily., Disp: 30 g, Rfl: 1    azelastine (ASTELIN) 137 mcg (0.1 %) nasal spray, 1 spray (137 mcg total) by Nasal route 2 (two) times daily., Disp: 30 mL, Rfl: 3    bumetanide (BUMEX) 2 MG tablet, Take 0.5 tablets (1 mg total) by mouth once daily., Disp: 90 tablet, Rfl: 1    buPROPion (WELLBUTRIN XL) 150 MG TB24 tablet, Take 3 tablets (450 mg total) by mouth once daily., Disp: 90 tablet, Rfl: 2    busPIRone (BUSPAR) 10 MG tablet, Take 1 tablet (10 mg total) by mouth 2 (two) times daily., Disp: 120 tablet, Rfl: 2    C/sourcherry/celery/grape seed (TART CHERRY ORAL), Take 1 tablet by mouth., Disp: , Rfl:     diazePAM (VALIUM) 2 MG tablet, Take 1 tablet (2 mg total) by mouth every 6 (six) hours as needed (dizziness/Meniere's attack)., Disp: 30 tablet, Rfl: 1    dicyclomine (BENTYL) 10 MG capsule, TAKE 1 CAPSULE BY MOUTH THREE TIMES DAILY, Disp: 90 capsule, Rfl: 1    erenumab-aooe (AIMOVIG AUTOINJECTOR) 140 mg/mL AtIn, Inject 140 mg into the skin every 28 days., Disp: 1 mL, Rfl: 11    fluticasone propionate (FLONASE) 50 mcg/actuation nasal spray, 2 sprays (100 mcg total) by Each Nostril route once daily., Disp: 16 g, Rfl: 3    levocetirizine (XYZAL) 5 MG tablet, TAKE ONE TABLET BY MOUTH EVERY MORNING, Disp: 90 tablet, Rfl:  "3    MELATONIN ORAL, Take by mouth nightly as needed. , Disp: , Rfl:     metoclopramide HCl (REGLAN) 5 MG tablet, Take 1 tablet (5 mg total) by mouth every 12 (twelve) hours as needed., Disp: 10 tablet, Rfl: 3    ondansetron (ZOFRAN-ODT) 4 MG TbDL, Take 1 tablet (4 mg total) by mouth every 8 (eight) hours as needed., Disp: 60 tablet, Rfl: 1    simvastatin (ZOCOR) 5 MG tablet, Take 1 tablet (5 mg total) by mouth nightly., Disp: 90 tablet, Rfl: 3    topiramate (TROKENDI XR) 100 mg Cp24, Take 1 capsule (100 mg total) by mouth once daily. (Patient taking differently: Take 100 mg by mouth nightly. ), Disp: 30 capsule, Rfl: 11    ubrogepant (UBRELVY)tablet 100 mg, Take 1 tablet (100 mg total) by mouth every 72 hours as needed for Migraine., Disp: 9 tablet, Rfl: 3    varenicline (CHANTIX) 0.5 MG Tab, Take 1 tablet (0.5 mg total) by mouth once daily., Disp: 30 tablet, Rfl: 0    hydrocortisone 2.5 % cream, Apply topically 2 (two) times daily. (Patient not taking: Reported on 7/27/2020), Disp: 20 g, Rfl: 0    naratriptan (AMERGE) 2.5 MG tablet, Take 1 tablet (2.5 mg total) by mouth every 72 hours as needed. 2.5 mg at onset of headache, may repeat in 4 hours if needed (Patient not taking: Reported on 7/27/2020), Disp: 9 tablet, Rfl: 3    predniSONE (DELTASONE) 20 MG tablet, Take 2 tabs daily for 4 days, then 1 tab daily for 3 days then 1/2 tab daily for 2 days., Disp: 12 tablet, Rfl: 0    Review of Systems   Constitutional: Negative for chills and fever.   Musculoskeletal: Positive for joint swelling.   Skin: Positive for color change. Negative for rash and wound.       Objective:   /78 (BP Location: Left arm, Patient Position: Sitting, BP Method: Large (Manual))   Pulse 86   Temp 98.8 °F (37.1 °C) (Temporal)   Resp 16   Ht 5' 2" (1.575 m)   Wt 57 kg (125 lb 10.6 oz)   SpO2 99%   BMI 22.98 kg/m²      Physical Exam  Constitutional:       Appearance: Normal appearance.   Cardiovascular:      Rate and " Rhythm: Normal rate and regular rhythm.   Pulmonary:      Effort: Pulmonary effort is normal.      Breath sounds: Normal breath sounds.   Musculoskeletal:         General: Swelling and tenderness present.      Comments: Right great toe and metatarsal bones with tenderness, edema and erythema   Neurological:      Mental Status: She is alert.         Assessment & Plan     Problem List Items Addressed This Visit        Orthopedic    Acute gout of right foot - Primary    Current Assessment & Plan     -avoid nsaids due to ckd         Relevant Medications    predniSONE (DELTASONE) 20 MG tablet            Follow up if symptoms worsen or fail to improve.      Disclaimer:  This note may have been prepared using voice recognition software, without extensive proofreading. As such, there could be errors within the text such as sound alike errors.

## 2020-07-27 NOTE — TELEPHONE ENCOUNTER
----- Message from Sierra Abrams sent at 7/27/2020  9:09 AM CDT -----  Regarding: same day  Contact: pt  Type:  Same Day Appointment Request    Caller is requesting a same day appointment.  Caller declined first available appointment listed below.    Name of Caller:pt  When is the first available appointment?9/22/20  Symptoms:gout  Best Call Back Number: [phone] (home)   Additional Information: none

## 2020-07-27 NOTE — TELEPHONE ENCOUNTER
----- Message from Deborah Chappell sent at 7/27/2020 10:44 AM CDT -----  Contact: Kenia Aquino miss a call and would like another call back at [phone], Regards to getting an appointment.    Thanks  Td

## 2020-07-30 ENCOUNTER — TELEPHONE (OUTPATIENT)
Dept: PHARMACY | Facility: CLINIC | Age: 65
End: 2020-07-30

## 2020-07-31 RX ORDER — AMOXICILLIN 500 MG
1 CAPSULE ORAL DAILY
COMMUNITY

## 2020-07-31 NOTE — PRE ADMISSION SCREENING
Pre op instructions reviewed with patient per phone:    To confirm, Your surgeon has instructed you:  Surgery is scheduled 08/12/20 at 0830.      Please report to [facility] floor main lobby by 0700.  Pre admit office to call afternoon prior to surgery with final arrival time.  If surgery is on Monday, Pre admit office to call Friday afternoon with with final arrival time.    Covid 19 testing is scheduled for 08/12/20 at 1240 @ [facility]  Please self quarantine after Covid testing, prior to surgery    INSTRUCTIONS IMPORTANT!!!  ¨ No smoking after 12 midnight, the night before surgery.  ¨ No solid food after 12 midnight, but you may have clear liquids up until 3 hours prior to surgery.  This includes: grape, cranberry, and apple juice (not orange, and no coffee.)   ¨ OK to brush teeth, but no gum, candy or mints!    ¨ Take only these medicines with a small swallow of water-morning of surgery.  Wellbutrin, Buspar, Prednisone         ____   Due to COVID 19 concerns, 1 visitor will be allowed in the pre operative area, and must adhere to social distancing guidelines.  One visitor/family member is currently allowed to visit in-patient rooms from 08:00 a.m to 6:00 p.m  ____   Family/caregivers will be updated re pt status via text/cell phone      ____  Do not wear makeup, including mascara.  ____  No powder, lotions or creams to surgical area.  ____  Please remove all jewelry, including piercings and leave at home.  ____  No money or valuables needed. Please leave at home.  ____  Please bring identification and insurance information to hospital.  ____  If going home the same day, arrange for a ride home. You will not be able to   drive if Anesthesia was used.  ____  Children, under 12 years old, must remain in the waiting room with an adult.  They are not allowed in patient areas.  ____  Wear loose fitting clothing. Allow for dressings, bandages.  ____  Stop Aspirin, Ibuprofen, Motrin and Aleve at  least 5-7 days before surgery, unless otherwise instructed by your doctor, or the nurse.   You MAY use Tylenol/acetaminophen until day of surgery.  ____  If you take diabetic medication, do not take am of surgery unless instructed by   Doctor.  ____ Stop taking any Fish Oil supplement or any Vitamins that contain Vitamin E at least 5 days prior to surgery.          Bathing Instructions-- The night before surgery and the morning prior to coming to the hospital:   -Do not shave the surgical area.   -Shower and wash your hair and body as usual with your regular soap and shampoo.   -Rinse your hair and body completely.   -Use one packet of hibiclens to wash the surgical site (using your hand) gently for 5 minutes.  Do not scrub you skin too hard.   -Do not use hibiclens on your head, face, or genitals.   -Do not wash with regular soap after you use the hibiclens.   -Rinse your body thoroughly.   -Dry with clean, soft towel.  Do not use lotion, cream, deodorant, or powders on   the surgical site.    Use antibacterial soap in place of hibiclens if your surgery is on the head, face or genitals.         Surgical Site Infection    Prevention of surgical site infections:     -Keep incisions clean and dry.   -Do not soak/submerge incisions in water until completely healed.   -Do not apply lotions, powders, creams, or deodorants to site.   -Always make sure hands are cleaned with antibacterial soap/ alcohol-based   prior to touching the surgical site.  (This includes doctors, nurses, staff, and yourself.)    Signs and symptoms:   -Redness and pain around the area where you had surgery   -Drainage of cloudy fluid from your surgical wound   -Fever over 100.4  I have read or had read and explained to me, and understand the above information.

## 2020-08-04 ENCOUNTER — CLINICAL SUPPORT (OUTPATIENT)
Dept: SMOKING CESSATION | Facility: CLINIC | Age: 65
End: 2020-08-04
Payer: COMMERCIAL

## 2020-08-04 DIAGNOSIS — F17.200 NICOTINE DEPENDENCE: Primary | ICD-10-CM

## 2020-08-04 PROCEDURE — 99402 PR PREVENT COUNSEL,INDIV,30 MIN: ICD-10-PCS | Mod: S$GLB,,,

## 2020-08-04 PROCEDURE — 99999 PR PBB SHADOW E&M-EST. PATIENT-LVL I: CPT | Mod: PBBFAC,,,

## 2020-08-04 PROCEDURE — 99999 PR PBB SHADOW E&M-EST. PATIENT-LVL I: ICD-10-PCS | Mod: PBBFAC,,,

## 2020-08-04 PROCEDURE — 99402 PREV MED CNSL INDIV APPRX 30: CPT | Mod: S$GLB,,,

## 2020-08-04 RX ORDER — VARENICLINE TARTRATE 0.5 MG/1
0.5 TABLET, FILM COATED ORAL DAILY
Qty: 30 TABLET | Refills: 0 | Status: SHIPPED | OUTPATIENT
Start: 2020-08-04 | End: 2020-09-02 | Stop reason: SDUPTHER

## 2020-08-04 NOTE — PROGRESS NOTES
Individual Follow-Up Form    8/4/2020    Quit Date: 1/26/20    Clinical Status of Patient: Outpatient    Length of Service: 30 minutes    Continuing Medication: yes  Chantix    Other Medications:      Target Symptoms: Withdrawal and medication side effects. The following were  rated moderate (3) to severe (4) on TCRS:  · Moderate (3): none  · Severe (4): none    Comments: Patient remains tobacco free as of 1/26/20. Congratulated patient on her continued accomplishment of 6 months as tobacco free. She is proud of her quit. Patient stated that she was dealing with family stress and has been concentrating on not going to the store to pick of cigarettes. Encouraged patient to stay strong / focused and it will not change out come if she does smoke. Patient remains on smoking cessation medication regimen of Chantix 0.5 mg BID without any side effects noted at this time.The patient denies any abnormal behavioral or mental changes at this time.The patient will continue with group/individual therapy sessions and medication monitoring by CTTS. Prescribed medication management will be by physician.        Diagnosis: F17.200    Next Visit: 2 weeks

## 2020-08-04 NOTE — Clinical Note
Patient remains tobacco free as of 1/26/20. Congratulated patient on her continued accomplishment of 6 months as tobacco free. She is proud of her quit. Patient stated that she was dealing with family stress and has been concentrating on not going to the store to pick of cigarettes. Encouraged patient to stay strong / focused and it will not change out come if she does smoke. Patient remains on smoking cessation medication regimen of Chantix 0.5 mg BID without any side effects noted at this time.The patient denies any abnormal behavioral or mental changes at this time.The patient will continue with group/individual therapy sessions and medication monitoring by CTTS. Prescribed medication management will be by physician.

## 2020-08-06 ENCOUNTER — OFFICE VISIT (OUTPATIENT)
Dept: PSYCHIATRY | Facility: CLINIC | Age: 65
End: 2020-08-06
Payer: MEDICAID

## 2020-08-06 DIAGNOSIS — F41.1 GAD (GENERALIZED ANXIETY DISORDER): Primary | ICD-10-CM

## 2020-08-06 PROCEDURE — 90834 PR PSYCHOTHERAPY W/PATIENT, 45 MIN: ICD-10-PCS | Mod: 95,AJ,HB, | Performed by: SOCIAL WORKER

## 2020-08-06 PROCEDURE — 90834 PSYTX W PT 45 MINUTES: CPT | Mod: 95,AJ,HB, | Performed by: SOCIAL WORKER

## 2020-08-07 NOTE — PROGRESS NOTES
Individual Psychotherapy Follow-up Visit Progress Note (PhD/LCSW)     Outpatient - Supportive psychotherapy 45 min - CPT Code 14588    Virtual visit with synchronous audio/video, Location of patient: home in [place]    Each patient provided medical services by telemedicine is: (1) informed of the relationship between the provider and patient and the respective role of any other health care provider with respect to management of the patient; and (2) notified that he or she may decline to receive medical services by telemedicine and may withdraw from such care at any time.    8/6/2020  MRN: [id number]  Primary Care Provider: Harish Hernandez DO    Kenia Alonzo is a 65 y.o. female who presents today for follow-up of depression and anxiety. Met with patient.        Subjective:       Patient report: Discussed recent conflict with family over her aunt, who has dementia and with whom she lives. She felt guilty after lashing out at her niece and cousin. Otherwise reports she is doing okay but is worried about her upcoming surgery. She has been spending a lot of time with her young granddaughter, who is reportedly doing well. No recent conflicts with son and DIL.    From previous visit on 7/23/2020: Doing much better; went to [place], [place] with a friend; mostly stayed in the [facility] but enjoyed getting away. Sleep has been WNL. She is less worried about her granddtr; has not heard from DCFS since report was made; feels she did the right thing in discussing her concerns about granddtr possibly being neglected; feeling more hopeful. Endorses anxiety about her appearance and slight weight gain.     From 7/1/2020: Feeling better, reports she understands that DCFS report regarding the suspected neglect of her granddtr by the parents was necessary. Has not been contacted as yet by DCFS. States she feels relieved. She has had some conflict with her son and DIL; confronted them on granddtr's unkempt apperance. Feels that  "medications are helping her; flying to [place] this weekend with a friend; nervous about flying but looking forward to going to [place] for the holiday weekend. Feeling better about herself: "I no longer feel worthless." Able to recognize her accomplishments.    From previous visit on 6/17/2020: Pt is anxious, worried, tearful, irritable, upset due to an issue with her 5 y/o granddtr. Over the weekend, when she picked granddtr up from son and his gf's apartment for a visit, she noticed that the child's hair was severely matted. She took her to her own salon, where it reportedly took 2 hairdressers 45 minutes to get the mats out. She also reports that son's apartment is unkempt, his gf smokes marijuana in the home, and granddtr told pt it had been a long time since she had a bath. Pt is fearful that if she reports to DCFS that son won't allow her to see the child anymore, or that maternal grandmother will get custody of the child and take her out of state. Pt verbalized understanding when informed that as a mandatory , this provider will contact DCFS and make a report of suspected child neglect.     Current symptoms:   · Depression: denied  · Anxiety: excessive anxiety/worry, restlessness/keyed up  · Substance abuse: denied  · Cognitive functioning: denied  · Iram: none noted  · Psychosis: none noted    Psychosocial stressors and topics discussed: identifying feelings, symptom recognition/mgmt, treatment progress, coping strategies, interpersonal issues, physical health issues, family of origin dynamics, managing anxiety/panic/stress and challenging thought distortions      Objective:       Mental Status Evaluation  Appearance: unremarkable, age appropriate  Behavior: normal, cooperative  Speech: normal tone, normal rate, normal pitch, normal volume  Mood: anxious  Affect: congruent and appropriate  Thought Process: normal and logical  Thought Content: normal, no suicidality, no homicidality, delusions, or " paranoia  Sensorium: grossly intact  Cognition: grossly intact  Insight: good  Judgment: adequate to circumstances    Risk parameters:  Patient reports no suicidal ideation  Patient reports no homicidal ideation  Patient reports no self-injurious behavior  Patient reports no violent behavior      Assessment & Plan:       Therapeutic interventions used: Educated pt re: mindfulness strategies to manage anxious thoughts and feelings  Assigned pt to practice relaxation on a daily basis  Helped pt to identify distorted schemas and related automatic thoughts that mediate anxiety responses  Assisted pt in replacing distorted messages with positive, realistic cognitions  Pt was assigned to engage in behavioral activation by scheduling activities that have a high likelihood for pleasure and mastery  Utilized acceptance and commitment therapy (ACT) to help pt accept uncomfortable realities  Monitored the effectiveness and side effects of antidepressant medication prescribed by physician/NP/MP  Reinforced pt's successes in reframing cognitive distortions  Assigned pt to keep a daily gratitude journal        The patient's response to the interventions is accepting    The patient's progress toward treatment goals is good    Homework assigned: daily journaling and practice relaxation skills daily     Treatment plan:   A. Target symptoms: Depression, Anxiety and Poor Coping Skills   B. Therapeutic modalities: insight oriented psychotherapy, supportive psychotherapy  C. Why chosen therapy is appropriate versus another modality: relevant to diagnosis, evidence based practice   D. Outcome monitoring methods: self-report, observation, feedback from clinical staff     Visit Diagnosis:   1. JOSIAH (generalized anxiety disorder)        Follow-up: individual psychotherapy and medication management by physician     Return to Clinic: 2 weeks    Length of Service (minutes): 45      Beatriz Holloway MSW, LCSW  Outpatient  Psychiatry  [facility]  [phone]

## 2020-08-08 ENCOUNTER — NURSE TRIAGE (OUTPATIENT)
Dept: ADMINISTRATIVE | Facility: CLINIC | Age: 65
End: 2020-08-08

## 2020-08-08 ENCOUNTER — PATIENT OUTREACH (OUTPATIENT)
Dept: ADMINISTRATIVE | Facility: OTHER | Age: 65
End: 2020-08-08

## 2020-08-08 ENCOUNTER — HOSPITAL ENCOUNTER (EMERGENCY)
Facility: HOSPITAL | Age: 65
Discharge: HOME | End: 2020-08-08
Attending: EMERGENCY MEDICINE
Payer: MEDICAID

## 2020-08-08 VITALS
HEART RATE: 80 BPM | SYSTOLIC BLOOD PRESSURE: 152 MMHG | BODY MASS INDEX: 22.58 KG/M2 | WEIGHT: 123.44 LBS | DIASTOLIC BLOOD PRESSURE: 78 MMHG | OXYGEN SATURATION: 97 % | TEMPERATURE: 100 F | RESPIRATION RATE: 18 BRPM

## 2020-08-08 DIAGNOSIS — R05.9 COUGH: ICD-10-CM

## 2020-08-08 DIAGNOSIS — R06.02 SOB (SHORTNESS OF BREATH): ICD-10-CM

## 2020-08-08 DIAGNOSIS — J06.9 VIRAL URI WITH COUGH: Primary | ICD-10-CM

## 2020-08-08 LAB — SARS-COV-2 RDRP RESP QL NAA+PROBE: NEGATIVE

## 2020-08-08 PROCEDURE — 93010 ELECTROCARDIOGRAM REPORT: CPT | Mod: ,,, | Performed by: INTERNAL MEDICINE

## 2020-08-08 PROCEDURE — 99284 EMERGENCY DEPT VISIT MOD MDM: CPT | Mod: 25,ER

## 2020-08-08 PROCEDURE — 93010 EKG 12-LEAD: ICD-10-PCS | Mod: ,,, | Performed by: INTERNAL MEDICINE

## 2020-08-08 PROCEDURE — 93005 ELECTROCARDIOGRAM TRACING: CPT | Mod: ER

## 2020-08-08 PROCEDURE — U0002 COVID-19 LAB TEST NON-CDC: HCPCS | Mod: ER

## 2020-08-08 RX ORDER — BENZONATATE 100 MG/1
100 CAPSULE ORAL 3 TIMES DAILY PRN
Qty: 20 CAPSULE | Refills: 0 | Status: SHIPPED | OUTPATIENT
Start: 2020-08-08 | End: 2020-08-18

## 2020-08-08 NOTE — TELEPHONE ENCOUNTER
Spoke with patient she states the she has a procedure scheduled for Wednesday.  States that she has a 101 fever, chest tightness, and nausea.  Also reports feeling SOB with exertion.  Reports that she used an old inhaler last night to help with symptoms.  Patient denies having any difficulty breathing at this time.  Advised patient to go to ER for Further evaluation.  States that she can drive herself.  Patient also reports that she is isolating herself at home from others.   Patient verbalized understanding of the above.     Reason for Disposition   SEVERE or constant chest pain or pressure (Exception: mild central chest pain, present only when coughing)    Additional Information   Negative: SEVERE difficulty breathing (e.g., struggling for each breath, speaks in single words)   Negative: Difficult to awaken or acting confused (e.g., disoriented, slurred speech)   Negative: Bluish (or gray) lips or face now   Negative: Shock suspected (e.g., cold/pale/clammy skin, too weak to stand, low BP, rapid pulse)   Negative: Sounds like a life-threatening emergency to the triager    Protocols used: CORONAVIRUS (COVID-19) DIAGNOSED OR [id number]

## 2020-08-08 NOTE — ED PROVIDER NOTES
Encounter Date: 2020       History     Chief Complaint   Patient presents with    COVID-19 Concerns     c/o sob, cough, HA and leg pain onset 2 days ago. pt in no resp distress.     Cough     The history is provided by the patient.   Cough  This is a new problem. The current episode started two days ago. The problem has been gradually worsening. The cough is non-productive. There has been no fever. The fever has been present for 1 to 2 days. Associated symptoms include rhinorrhea and shortness of breath. Pertinent negatives include no chest pain, no chills, no sweats, no weight loss, no ear congestion, no ear pain, no sore throat, no myalgias, no wheezing and no eye redness. Treatments tried: nasal washes. She is not a smoker. Her past medical history is significant for asthma. Her past medical history does not include bronchitis, pneumonia, bronchiectasis, COPD or emphysema.     Review of patient's allergies indicates:   Allergen Reactions    Demerol [meperidine] Nausea And Vomiting     Pt refuses Demerol     Codeine Itching     Past Medical History:   Diagnosis Date    Arthritis     knee    Asthma     childhood     Depression     Encounter for blood transfusion     General anesthetics causing adverse effect in therapeutic use     severe headache after crainiotomy    GI problem     IBS    Gout     Headache     Hyperlipidemia     Meniere disease     MVP (mitral valve prolapse)     minor    Vertigo      Past Surgical History:   Procedure Laterality Date    BRAIN SURGERY      vestibular neurectomy    BREAST SURGERY      benign     SECTION      x 1    CHOLECYSTECTOMY      TONSILLECTOMY       Family History   Problem Relation Age of Onset    Colon cancer Father     Diabetes Father     Diabetes Sister      Social History     Tobacco Use    Smoking status: Former Smoker     Packs/day: 0.50     Years: 43.00     Pack years: 21.50     Types: Cigarettes     Start date: 1976      Quit date: 2020     Years since quittin.5    Smokeless tobacco: Never Used    Tobacco comment: Quit 20   Substance Use Topics    Alcohol use: Not Currently     Alcohol/week: 1.0 - 3.0 standard drinks     Types: 1 - 3 Glasses of wine per week     Frequency: Monthly or less     Drinks per session: 1 or 2     Binge frequency: Never     Comment: social few times monthly.   No alcohol 72h prior to sx    Drug use: No     Review of Systems   Constitutional: Negative for chills, fever and weight loss.   HENT: Positive for rhinorrhea. Negative for ear pain and sore throat.    Eyes: Negative for redness.   Respiratory: Positive for cough and shortness of breath. Negative for wheezing.    Cardiovascular: Negative for chest pain.   Gastrointestinal: Negative for nausea.   Genitourinary: Negative for dysuria.   Musculoskeletal: Negative for back pain and myalgias.   Skin: Negative for rash.   Neurological: Negative for weakness.   Hematological: Does not bruise/bleed easily.   All other systems reviewed and are negative.      Physical Exam     Initial Vitals [20 1527]   BP Pulse Resp Temp SpO2   (!) 152/78 88 18 99.5 °F (37.5 °C) 97 %      MAP       --         Physical Exam    Nursing note and vitals reviewed.  Constitutional: Vital signs are normal. She appears well-developed and well-nourished.   HENT:   Head: Normocephalic and atraumatic.   Right Ear: Hearing, tympanic membrane, external ear and ear canal normal.   Left Ear: Hearing, tympanic membrane, external ear and ear canal normal.   Nose: Nose normal.   Mouth/Throat: Uvula is midline and oropharynx is clear and moist. No oropharyngeal exudate.   Eyes: Conjunctivae, EOM and lids are normal. Pupils are equal, round, and reactive to light. Lids are everted and swept, no foreign bodies found.   Neck: Trachea normal, normal range of motion, full passive range of motion without pain and phonation normal. Neck supple.   Cardiovascular: Normal rate,  regular rhythm, S1 normal, S2 normal, normal heart sounds, intact distal pulses and normal pulses.   Pulmonary/Chest: Effort normal and breath sounds normal. No respiratory distress.   Abdominal: Soft. Bowel sounds are normal.   Lymphadenopathy:     She has no cervical adenopathy.     She has no axillary adenopathy.   Neurological: She is alert and oriented to person, place, and time. She has normal strength and normal reflexes. No cranial nerve deficit or sensory deficit. She displays a negative Romberg sign.   Skin: Skin is warm and dry. Capillary refill takes less than 2 seconds.         ED Course   Procedures  Labs Reviewed   SARS-COV-2 RNA AMPLIFICATION, QUAL     EKG Readings: (Independently Interpreted)   Initial Reading: No STEMI. Previous EKG: Compared with most recent EKG Rhythm: Normal Sinus Rhythm. Heart Rate: 78. Ectopy: No Ectopy. ST Segments: Normal ST Segments. T Waves: Normal. Axis: Normal. Other Impression: possible left atrial enlargement       Imaging Results    None                       Imaging Results          X-Ray Chest AP Portable (Final result)  Result time 08/08/20 16:16:34    Final result by Luis Fernando Barr MD (08/08/20 16:16:34)                 Impression:      No acute radiographic abnormality in the chest.      Electronically signed by: Luis Fernando Barr  Date:    08/08/2020  Time:    16:16             Narrative:    EXAMINATION:  XR CHEST AP PORTABLE    CLINICAL HISTORY:  Cough    TECHNIQUE:  Single frontal view of the chest was performed.    COMPARISON:  None    FINDINGS:  Cardiomediastinal silhouette is within normal limits.  Aortic atherosclerotic calcifications.  Lungs are symmetrically expanded.  No large consolidative opacity or infiltrate.  No large effusion.  No pneumothorax.                              Results for orders placed or performed during the hospital encounter of 08/08/20   COVID-19 Rapid Screening   Result Value Ref Range    SARS-CoV-2 RNA, Amplification, Qual  Negative Negative     Regarding UPPER RESPIRATORY ILLNESS, for treatment, I encouraged patient to: drink plenty of fluids; get lots of rest; take medications as prescribed; use over-the-counter medications for symptomatic treatment;  and use a humidifier or steam in the bathroom to improve patency of upper airway.  Patient instructed to notify primary care provider, or return to the emergency department, if the they: have a cough most days or have a cough that returns frequently; begin coughing up blood; develop a high fever or shaking chills; have a low-grade fever for three or more days; develop thick, greenish mucus, especially if it has a bad smell; and experience shortness of breath or chest pain. For prevention, discussed with patient the importance of refraining from smoking (if applicable), getting annual influenza vaccines, reducing exposure to air pollution, and the need to frequently wash hands to avoid spread of infection.       All historical, clinical, radiographic, and laboratory findings were reviewed with the patient in detail.  Findings are consistent with a diagnosis of URI.  All remaining questions and concerns were addressed at that time.  Patient has been counseled regarding the need for follow-up as well as the indication to return to the emergency room should new or worrisome developments occur.                    Clinical Impression:       ICD-10-CM ICD-9-CM   1. Viral URI with cough  J06.9 465.9    B97.89    2. SOB (shortness of breath)  R06.02 786.05   3. Cough  R05 786.2                                Randy Brennan NP  08/08/20 [id number]

## 2020-08-09 ENCOUNTER — LAB VISIT (OUTPATIENT)
Dept: URGENT CARE | Facility: CLINIC | Age: 65
End: 2020-08-09
Payer: MEDICAID

## 2020-08-09 VITALS
HEART RATE: 107 BPM | TEMPERATURE: 98 F | HEIGHT: 62 IN | OXYGEN SATURATION: 96 % | RESPIRATION RATE: 20 BRPM | WEIGHT: 123.44 LBS | BODY MASS INDEX: 22.71 KG/M2

## 2020-08-09 DIAGNOSIS — D07.1 SEVERE DYSPLASIA OF VULVA: ICD-10-CM

## 2020-08-09 PROCEDURE — U0003 INFECTIOUS AGENT DETECTION BY NUCLEIC ACID (DNA OR RNA); SEVERE ACUTE RESPIRATORY SYNDROME CORONAVIRUS 2 (SARS-COV-2) (CORONAVIRUS DISEASE [COVID-19]), AMPLIFIED PROBE TECHNIQUE, MAKING USE OF HIGH THROUGHPUT TECHNOLOGIES AS DESCRIBED BY CMS-2020-01-R: HCPCS

## 2020-08-10 ENCOUNTER — TELEPHONE (OUTPATIENT)
Dept: INTERNAL MEDICINE | Facility: CLINIC | Age: 65
End: 2020-08-10

## 2020-08-10 ENCOUNTER — OFFICE VISIT (OUTPATIENT)
Dept: ORTHOPEDICS | Facility: CLINIC | Age: 65
End: 2020-08-10
Payer: MEDICAID

## 2020-08-10 ENCOUNTER — OFFICE VISIT (OUTPATIENT)
Dept: INTERNAL MEDICINE | Facility: CLINIC | Age: 65
End: 2020-08-10
Payer: MEDICAID

## 2020-08-10 VITALS
OXYGEN SATURATION: 97 % | BODY MASS INDEX: 22.63 KG/M2 | TEMPERATURE: 99 F | HEART RATE: 85 BPM | SYSTOLIC BLOOD PRESSURE: 134 MMHG | HEIGHT: 62 IN | WEIGHT: 123 LBS | DIASTOLIC BLOOD PRESSURE: 74 MMHG

## 2020-08-10 VITALS — WEIGHT: 123 LBS | HEIGHT: 62 IN | BODY MASS INDEX: 22.63 KG/M2

## 2020-08-10 DIAGNOSIS — J01.10 ACUTE NON-RECURRENT FRONTAL SINUSITIS: ICD-10-CM

## 2020-08-10 DIAGNOSIS — M17.0 PRIMARY OSTEOARTHRITIS OF BOTH KNEES: Primary | ICD-10-CM

## 2020-08-10 DIAGNOSIS — G44.89 CHRONIC MIXED HEADACHE SYNDROME: Primary | ICD-10-CM

## 2020-08-10 LAB — SARS-COV-2 RNA RESP QL NAA+PROBE: NOT DETECTED

## 2020-08-10 PROCEDURE — 20610 LARGE JOINT ASPIRATION/INJECTION: BILATERAL KNEE: ICD-10-PCS | Mod: 50,S$PBB,, | Performed by: ORTHOPAEDIC SURGERY

## 2020-08-10 PROCEDURE — 99499 NO LOS: ICD-10-PCS | Mod: S$PBB,,, | Performed by: ORTHOPAEDIC SURGERY

## 2020-08-10 PROCEDURE — 99499 UNLISTED E&M SERVICE: CPT | Mod: S$PBB,,, | Performed by: ORTHOPAEDIC SURGERY

## 2020-08-10 PROCEDURE — 99999 PR PBB SHADOW E&M-EST. PATIENT-LVL IV: ICD-10-PCS | Mod: PBBFAC,,, | Performed by: ORTHOPAEDIC SURGERY

## 2020-08-10 PROCEDURE — 99999 PR PBB SHADOW E&M-EST. PATIENT-LVL IV: CPT | Mod: PBBFAC,,, | Performed by: ORTHOPAEDIC SURGERY

## 2020-08-10 PROCEDURE — 99215 OFFICE O/P EST HI 40 MIN: CPT | Mod: PBBFAC,25,27,PO | Performed by: NURSE PRACTITIONER

## 2020-08-10 PROCEDURE — 99214 OFFICE O/P EST MOD 30 MIN: CPT | Mod: S$PBB,,, | Performed by: NURSE PRACTITIONER

## 2020-08-10 PROCEDURE — 99214 PR OFFICE/OUTPT VISIT, EST, LEVL IV, 30-39 MIN: ICD-10-PCS | Mod: S$PBB,,, | Performed by: NURSE PRACTITIONER

## 2020-08-10 PROCEDURE — 99999 PR PBB SHADOW E&M-EST. PATIENT-LVL V: CPT | Mod: PBBFAC,,, | Performed by: NURSE PRACTITIONER

## 2020-08-10 PROCEDURE — 20610 DRAIN/INJ JOINT/BURSA W/O US: CPT | Mod: 50,PBBFAC,PO | Performed by: ORTHOPAEDIC SURGERY

## 2020-08-10 PROCEDURE — 99214 OFFICE O/P EST MOD 30 MIN: CPT | Mod: PBBFAC,PO | Performed by: ORTHOPAEDIC SURGERY

## 2020-08-10 PROCEDURE — 99999 PR PBB SHADOW E&M-EST. PATIENT-LVL V: ICD-10-PCS | Mod: PBBFAC,,, | Performed by: NURSE PRACTITIONER

## 2020-08-10 RX ORDER — KETOROLAC TROMETHAMINE 30 MG/ML
15 INJECTION, SOLUTION INTRAMUSCULAR; INTRAVENOUS
Status: COMPLETED | OUTPATIENT
Start: 2020-08-10 | End: 2020-08-10

## 2020-08-10 RX ORDER — AZITHROMYCIN 250 MG/1
TABLET, FILM COATED ORAL
Qty: 6 TABLET | Refills: 0 | Status: SHIPPED | OUTPATIENT
Start: 2020-08-10 | End: 2020-09-10

## 2020-08-10 RX ADMIN — Medication 20 MG: at 02:08

## 2020-08-10 RX ADMIN — KETOROLAC TROMETHAMINE 15 MG: 30 INJECTION, SOLUTION INTRAMUSCULAR; INTRAVENOUS at 04:08

## 2020-08-10 NOTE — PROGRESS NOTES
Pain level 9/10 to head. Injection given Ketorolac 15 mg IM left hip. Patient tolerated injection well. Advised to wait 10 minutes after injection. Reassessed pain level 7/10 some improvement.

## 2020-08-10 NOTE — TELEPHONE ENCOUNTER
----- Message from Kelly Mathew sent at 8/10/2020  8:25 AM CDT -----  Type:  Same Day Appointment Request    Caller is requesting a same day appointment.  Caller declined first available appointment listed below.    Name of Caller:pt  When is the first available appointment?nothing coming up on the schedule  Symptoms:ER f/u resp infection/negative for covid  Best Call Back Number:[phone]  Additional Information: .    Thank you

## 2020-08-10 NOTE — PROGRESS NOTES
Subjective:       Patient ID: Kenia Alonzo is a 65 y.o. female.    Chief Complaint: URI (ED [facility] )    Mrs. Alonzo presents to clinic for ER follow up after having SOB, cough and URI symptoms on 2020. Negative CXR. Unable to get migraine med filled due to needing PA from neurology. Started with migraine on today without relief.     URI   This is a new problem. The current episode started in the past 7 days (Friday). There has been no fever. Associated symptoms include congestion, coughing, ear pain (right), headaches, nausea, sinus pain and a sore throat (intermittent). Pertinent negatives include no abdominal pain, chest pain, diarrhea, dysuria, rash, rhinorrhea, sneezing, vomiting or wheezing. Associated symptoms comments: SOB reported. She has tried NSAIDs and antihistamine (flonase, xyzal) for the symptoms. The treatment provided no relief.       Patient Active Problem List   Diagnosis    Depression    Meniere's disease of right ear    Vestibular migraine    Abnormal involuntary movements    Irritable bowel syndrome without diarrhea    Hyperlipidemia    Tobacco use    CKD (chronic kidney disease) stage 3, GFR 30-59 ml/min    Non-seasonal allergic rhinitis    Bronchitis    Chronic mixed headache syndrome    Severe dysplasia of vulva    Complicated grief    Primary osteoarthritis of left knee    Acute conjunctivitis of right eye    Dermatitis of face    Acute non-recurrent frontal sinusitis    Chronic pain of right knee    Chronic pain of left knee    Difficulty walking    Generalized weakness    Acute gout of right foot    Vulvar dysplasia       Family History   Problem Relation Age of Onset    Colon cancer Father     Diabetes Father     Diabetes Sister      Past Surgical History:   Procedure Laterality Date    BRAIN SURGERY      vestibular neurectomy    BREAST SURGERY      benign     SECTION      x 1    CHOLECYSTECTOMY      EXAMINATION UNDER ANESTHESIA N/A  8/12/2020    Procedure: EXAM UNDER ANESTHESIA;  Surgeon: Lucy Crane MD;  Location: [facility];  Service: OB/GYN;  Laterality: N/A;    LASER ABLATION N/A 8/12/2020    Procedure: ABLATION, USING LASER;  Surgeon: Lucy Crane MD;  Location: [facility] OR;  Service: OB/GYN;  Laterality: N/A;    TONSILLECTOMY           Current Outpatient Medications:     acyclovir 5% (ZOVIRAX) 5 % ointment, Apply topically 5 (five) times daily. (Patient not taking: Reported on 8/17/2020), Disp: 30 g, Rfl: 1    bumetanide (BUMEX) 2 MG tablet, Take 0.5 tablets (1 mg total) by mouth once daily. (Patient taking differently: Take 1 mg by mouth once daily. ), Disp: 90 tablet, Rfl: 1    buPROPion (WELLBUTRIN XL) 150 MG TB24 tablet, Take 3 tablets (450 mg total) by mouth once daily., Disp: 90 tablet, Rfl: 2    busPIRone (BUSPAR) 10 MG tablet, Take 1 tablet (10 mg total) by mouth 2 (two) times daily., Disp: 120 tablet, Rfl: 2    C/sourcherry/celery/grape seed (TART CHERRY ORAL), Take 1 tablet by mouth daily as needed. , Disp: , Rfl:     diazePAM (VALIUM) 2 MG tablet, Take 1 tablet (2 mg total) by mouth every 6 (six) hours as needed (dizziness/Meniere's attack)., Disp: 30 tablet, Rfl: 1    dicyclomine (BENTYL) 10 MG capsule, TAKE 1 CAPSULE BY MOUTH THREE TIMES DAILY (Patient taking differently: Take 10 mg by mouth 3 (three) times daily. ), Disp: 90 capsule, Rfl: 1    erenumab-aooe (AIMOVIG AUTOINJECTOR) 140 mg/mL autoinjector, Inject 140 mg into the skin every 28 days., Disp: 1 mL, Rfl: 11    fluticasone propionate (FLONASE) 50 mcg/actuation nasal spray, 2 sprays (100 mcg total) by Each Nostril route once daily. (Patient taking differently: 2 sprays by Each Nostril route daily as needed. ), Disp: 16 g, Rfl: 3    levocetirizine (XYZAL) 5 MG tablet, TAKE ONE TABLET BY MOUTH EVERY MORNING (Patient taking differently: Take 5 mg by mouth every evening. ), Disp: 90 tablet, Rfl: 3    MELATONIN ORAL, Take by mouth nightly as needed. , Disp:  , Rfl:     metoclopramide HCl (REGLAN) 5 MG tablet, Take 1 tablet (5 mg total) by mouth every 12 (twelve) hours as needed. (Patient not taking: Reported on 8/17/2020), Disp: 10 tablet, Rfl: 3    omega-3 fatty acids/fish oil (FISH OIL-OMEGA-3 FATTY ACIDS) 300-1,000 mg capsule, Take 1 capsule by mouth once daily., Disp: , Rfl:     ondansetron (ZOFRAN-ODT) 4 MG TbDL, Take 1 tablet (4 mg total) by mouth every 8 (eight) hours as needed., Disp: 60 tablet, Rfl: 1    predniSONE (DELTASONE) 20 MG tablet, Take 2 tabs daily for 4 days, then 1 tab daily for 3 days then 1/2 tab daily for 2 days. (Patient not taking: Reported on 8/17/2020), Disp: 12 tablet, Rfl: 0    simvastatin (ZOCOR) 5 MG tablet, Take 1 tablet (5 mg total) by mouth nightly., Disp: 90 tablet, Rfl: 3    topiramate (TROKENDI XR) 100 mg Cp24, Take 1 capsule (100 mg total) by mouth once daily. (Patient taking differently: Take 100 mg by mouth nightly. ), Disp: 30 capsule, Rfl: 11    ubrogepant (UBRELVY)tablet 100 mg, Take 1 tablet (100 mg total) by mouth every 72 hours as needed for Migraine. (Patient not taking: Reported on 8/17/2020), Disp: 9 tablet, Rfl: 3    varenicline (CHANTIX) 0.5 MG Tab, Take 1 tablet (0.5 mg total) by mouth once daily., Disp: 30 tablet, Rfl: 0    vitamin B complex/folic acid (B COMPLEX 100 ORAL), Take by mouth nightly., Disp: , Rfl:     azelastine (ASTELIN) 137 mcg (0.1 %) nasal spray, 1 spray (137 mcg total) by Nasal route 2 (two) times daily., Disp: 30 mL, Rfl: 3    azithromycin (Z-BOUCHRA) 250 MG tablet, Take 2 tablets by mouth on day 1; Take 1 tablet by mouth on days 2-5 (Patient not taking: Reported on 8/17/2020), Disp: 6 tablet, Rfl: 0    HYDROcodone-acetaminophen (NORCO) 5-325 mg per tablet, Take 1 tablet by mouth every 8 (eight) hours as needed for Pain., Disp: 20 tablet, Rfl: 0    Review of Systems   Constitutional: Positive for fatigue. Negative for chills and fever.   HENT: Positive for congestion, ear pain (right),  "sinus pain and sore throat (intermittent). Negative for rhinorrhea and sneezing.    Eyes: Positive for photophobia.   Respiratory: Positive for cough and shortness of breath (improved). Negative for wheezing.    Cardiovascular: Negative for chest pain.   Gastrointestinal: Positive for nausea. Negative for abdominal pain, diarrhea and vomiting.   Genitourinary: Negative for dysuria.   Skin: Negative for rash.   Neurological: Positive for headaches. Negative for dizziness and light-headedness.       Objective:   /74   Pulse 85   Temp 99.1 °F (37.3 °C) (Temporal)   Ht 5' 2" (1.575 m)   Wt 55.8 kg (123 lb 0.3 oz)   SpO2 97%   BMI 22.50 kg/m²      Physical Exam  Constitutional:       General: She is not in acute distress.     Appearance: Normal appearance. She is not ill-appearing.   HENT:      Head: Normocephalic and atraumatic.      Right Ear: A middle ear effusion is present. Tympanic membrane is not erythematous.      Left Ear: A middle ear effusion is present. Tympanic membrane is not erythematous.      Nose: Mucosal edema and congestion present.      Right Sinus: Frontal sinus tenderness present. No maxillary sinus tenderness.      Left Sinus: Frontal sinus tenderness present. No maxillary sinus tenderness.      Mouth/Throat:      Mouth: Mucous membranes are moist.      Pharynx: Oropharynx is clear. No pharyngeal swelling or posterior oropharyngeal erythema.   Eyes:      Extraocular Movements: Extraocular movements intact.      Pupils: Pupils are equal, round, and reactive to light.   Neck:      Musculoskeletal: Normal range of motion and neck supple.   Cardiovascular:      Rate and Rhythm: Normal rate and regular rhythm.      Pulses: Normal pulses.      Heart sounds: Normal heart sounds. No murmur.   Pulmonary:      Effort: Pulmonary effort is normal. No respiratory distress.      Breath sounds: Normal breath sounds.   Lymphadenopathy:      Cervical: No cervical adenopathy.   Skin:     General: Skin is " "warm and dry.   Neurological:      General: No focal deficit present.      Mental Status: She is alert and oriented to person, place, and time.         Assessment & Plan     Problem List Items Addressed This Visit        Neuro    Chronic mixed headache syndrome - Primary    Current Assessment & Plan     Giving toradol injection today for pain relief. Continue prophylactic medication as prescribed by neurology.             ENT    Acute non-recurrent frontal sinusitis    Current Assessment & Plan     Tylenol/ibuprofen for pain and fever.   Hand hygiene.   Maintain adequate hydration.   Antihistamine and flonase for nasal congestion and upper respiratory symptoms.   Rest.          Relevant Medications    azithromycin (Z-BOUCHRA) 250 MG tablet                  Portions of this note may have been created with voice recognition software. Occasional "wrong-word" or "sound-a-like" substitutions may have occurred due to the inherent limitations of voice recognition software. Please, read the note carefully and recognize, using context, where substitutions have occurred.       "

## 2020-08-10 NOTE — PROCEDURES
Large Joint Aspiration/Injection: bilateral knee    Date/Time: 8/10/2020 2:30 PM  Performed by: Yehuda Beck MD  Authorized by: Yehuda Beck MD     Consent Done?:  Yes (Verbal)  Indications:  Pain  Site marked: the procedure site was marked    Timeout: prior to procedure the correct patient, procedure, and site was verified    Prep: patient was prepped and draped in usual sterile fashion      Local anesthesia used?: Yes    Local anesthetic:  Topical anesthetic    Details:  Needle Size:  22 G  Ultrasonic Guidance for needle placement?: No    Approach:  Superior  Location:  Knee  Laterality:  Bilateral  Site:  Bilateral knee  Medications (Right):  20 mg sodium hyaluronate (EUFLEXXA) 10 mg/mL(mw 2.4 -3.6 million)  Medications (Left):  20 mg sodium hyaluronate (EUFLEXXA) 10 mg/mL(mw 2.4 -3.6 million)  Patient tolerance:  Patient tolerated the procedure well with no immediate complications     Bilateral Euflexxa injection

## 2020-08-10 NOTE — TELEPHONE ENCOUNTER
Patient called in regards to scheduling her ER F/U appointment for URI. Patient was scheduled & verbally understood the information.

## 2020-08-11 ENCOUNTER — ANESTHESIA EVENT (OUTPATIENT)
Dept: SURGERY | Facility: HOSPITAL | Age: 65
End: 2020-08-11
Payer: MEDICAID

## 2020-08-11 ENCOUNTER — LAB VISIT (OUTPATIENT)
Dept: LAB | Facility: HOSPITAL | Age: 65
End: 2020-08-11
Attending: OBSTETRICS & GYNECOLOGY
Payer: MEDICAID

## 2020-08-11 DIAGNOSIS — Z01.419 GYNECOLOGIC EXAM NORMAL: ICD-10-CM

## 2020-08-11 DIAGNOSIS — N18.30 CKD (CHRONIC KIDNEY DISEASE) STAGE 3, GFR 30-59 ML/MIN: Primary | ICD-10-CM

## 2020-08-11 LAB
ALBUMIN SERPL BCP-MCNC: 4.1 G/DL (ref 3.5–5.2)
ALP SERPL-CCNC: 51 U/L (ref 55–135)
ALT SERPL W/O P-5'-P-CCNC: 15 U/L (ref 10–44)
ANION GAP SERPL CALC-SCNC: 12 MMOL/L (ref 8–16)
AST SERPL-CCNC: 14 U/L (ref 10–40)
BASOPHILS # BLD AUTO: 0.08 K/UL (ref 0–0.2)
BASOPHILS NFR BLD: 0.9 % (ref 0–1.9)
BILIRUB SERPL-MCNC: 0.3 MG/DL (ref 0.1–1)
BUN SERPL-MCNC: 47 MG/DL (ref 8–23)
CALCIUM SERPL-MCNC: 9.8 MG/DL (ref 8.7–10.5)
CHLORIDE SERPL-SCNC: 102 MMOL/L (ref 95–110)
CO2 SERPL-SCNC: 27 MMOL/L (ref 23–29)
CREAT SERPL-MCNC: 1.8 MG/DL (ref 0.5–1.4)
DIFFERENTIAL METHOD: ABNORMAL
EOSINOPHIL # BLD AUTO: 0.2 K/UL (ref 0–0.5)
EOSINOPHIL NFR BLD: 2 % (ref 0–8)
ERYTHROCYTE [DISTWIDTH] IN BLOOD BY AUTOMATED COUNT: 12.2 % (ref 11.5–14.5)
EST. GFR  (AFRICAN AMERICAN): 34 ML/MIN/1.73 M^2
EST. GFR  (NON AFRICAN AMERICAN): 29.5 ML/MIN/1.73 M^2
GLUCOSE SERPL-MCNC: 88 MG/DL (ref 70–110)
HCT VFR BLD AUTO: 37.1 % (ref 37–48.5)
HGB BLD-MCNC: 11.8 G/DL (ref 12–16)
IMM GRANULOCYTES # BLD AUTO: 0.03 K/UL (ref 0–0.04)
IMM GRANULOCYTES NFR BLD AUTO: 0.3 % (ref 0–0.5)
LYMPHOCYTES # BLD AUTO: 3.1 K/UL (ref 1–4.8)
LYMPHOCYTES NFR BLD: 35.3 % (ref 18–48)
MCH RBC QN AUTO: 30.7 PG (ref 27–31)
MCHC RBC AUTO-ENTMCNC: 31.8 G/DL (ref 32–36)
MCV RBC AUTO: 97 FL (ref 82–98)
MONOCYTES # BLD AUTO: 0.5 K/UL (ref 0.3–1)
MONOCYTES NFR BLD: 5.8 % (ref 4–15)
NEUTROPHILS # BLD AUTO: 4.9 K/UL (ref 1.8–7.7)
NEUTROPHILS NFR BLD: 55.7 % (ref 38–73)
NRBC BLD-RTO: 0 /100 WBC
PLATELET # BLD AUTO: 300 K/UL (ref 150–350)
PMV BLD AUTO: 9.3 FL (ref 9.2–12.9)
POTASSIUM SERPL-SCNC: 3.9 MMOL/L (ref 3.5–5.1)
PROT SERPL-MCNC: 6.5 G/DL (ref 6–8.4)
RBC # BLD AUTO: 3.84 M/UL (ref 4–5.4)
SODIUM SERPL-SCNC: 141 MMOL/L (ref 136–145)
WBC # BLD AUTO: 8.85 K/UL (ref 3.9–12.7)

## 2020-08-11 PROCEDURE — 85025 COMPLETE CBC W/AUTO DIFF WBC: CPT | Mod: PO

## 2020-08-11 PROCEDURE — 80053 COMPREHEN METABOLIC PANEL: CPT | Mod: PO

## 2020-08-11 PROCEDURE — 36415 COLL VENOUS BLD VENIPUNCTURE: CPT | Mod: PO

## 2020-08-11 NOTE — ANESTHESIA PREPROCEDURE EVALUATION
08/11/2020  Kenia Alonzo is a 65 y.o., female.  Patient Active Problem List   Diagnosis    Depression    Meniere's disease of right ear    Vestibular migraine    Abnormal involuntary movements    Irritable bowel syndrome without diarrhea    Hyperlipidemia    Tobacco use    CKD (chronic kidney disease) stage 3, GFR 30-59 ml/min    Non-seasonal allergic rhinitis    Bronchitis    Chronic mixed headache syndrome    Severe dysplasia of vulva    Complicated grief    Primary osteoarthritis of left knee    Acute conjunctivitis of right eye    Dermatitis of face    Chronic pain of right knee    Chronic pain of left knee    Difficulty walking    Generalized weakness    Acute gout of right foot     No current facility-administered medications on file prior to encounter.      Current Outpatient Medications on File Prior to Encounter   Medication Sig Dispense Refill    acyclovir 5% (ZOVIRAX) 5 % ointment Apply topically 5 (five) times daily. 30 g 1    azelastine (ASTELIN) 137 mcg (0.1 %) nasal spray 1 spray (137 mcg total) by Nasal route 2 (two) times daily. 30 mL 3    bumetanide (BUMEX) 2 MG tablet Take 0.5 tablets (1 mg total) by mouth once daily. (Patient taking differently: Take 1 mg by mouth once daily. ) 90 tablet 1    buPROPion (WELLBUTRIN XL) 150 MG TB24 tablet Take 3 tablets (450 mg total) by mouth once daily. 90 tablet 2    busPIRone (BUSPAR) 10 MG tablet Take 1 tablet (10 mg total) by mouth 2 (two) times daily. 120 tablet 2    diazePAM (VALIUM) 2 MG tablet Take 1 tablet (2 mg total) by mouth every 6 (six) hours as needed (dizziness/Meniere's attack). 30 tablet 1    dicyclomine (BENTYL) 10 MG capsule TAKE 1 CAPSULE BY MOUTH THREE TIMES DAILY (Patient taking differently: Take 10 mg by mouth 3 (three) times daily. ) 90 capsule 1    erenumab-aooe (AIMOVIG AUTOINJECTOR) 140 mg/mL  autoinjector Inject 140 mg into the skin every 28 days. 1 mL 11    levocetirizine (XYZAL) 5 MG tablet TAKE ONE TABLET BY MOUTH EVERY MORNING (Patient taking differently: Take 5 mg by mouth every evening. ) 90 tablet 3    MELATONIN ORAL Take by mouth nightly as needed.       omega-3 fatty acids/fish oil (FISH OIL-OMEGA-3 FATTY ACIDS) 300-1,000 mg capsule Take 1 capsule by mouth once daily.      ondansetron (ZOFRAN-ODT) 4 MG TbDL Take 1 tablet (4 mg total) by mouth every 8 (eight) hours as needed. 60 tablet 1    simvastatin (ZOCOR) 5 MG tablet Take 1 tablet (5 mg total) by mouth nightly. 90 tablet 3    topiramate (TROKENDI XR) 100 mg Cp24 Take 1 capsule (100 mg total) by mouth once daily. (Patient taking differently: Take 100 mg by mouth nightly. ) 30 capsule 11    ubrogepant (UBRELVY)tablet 100 mg Take 1 tablet (100 mg total) by mouth every 72 hours as needed for Migraine. 9 tablet 3    vitamin B complex/folic acid (B COMPLEX 100 ORAL) Take by mouth nightly.      fluticasone propionate (FLONASE) 50 mcg/actuation nasal spray 2 sprays (100 mcg total) by Each Nostril route once daily. (Patient taking differently: 2 sprays by Each Nostril route daily as needed. ) 16 g 3    metoclopramide HCl (REGLAN) 5 MG tablet Take 1 tablet (5 mg total) by mouth every 12 (twelve) hours as needed. 10 tablet 3     Past Surgical History:   Procedure Laterality Date    BRAIN SURGERY      vestibular neurectomy    BREAST SURGERY      benign     SECTION      x 1    CHOLECYSTECTOMY      TONSILLECTOMY         Anesthesia Evaluation    I have reviewed the Patient Summary Reports.    I have reviewed the Nursing Notes.    I have reviewed the Medications.     Review of Systems  Anesthesia Hx:  No problems with previous Anesthesia  History of prior surgery of interest to airway management or planning: Previous anesthesia: General  Denies Personal Hx of Anesthesia complications.   Social:  Former Smoker     Hematology/Oncology:  Hematology Normal   Oncology Normal     EENT/Dental:EENT/Dental Normal   Cardiovascular:  Cardiovascular Normal  ECG has been reviewed.    Pulmonary:   Asthma    Renal/:   Chronic Renal Disease, CRI    Hepatic/GI:  Hepatic/GI Normal    Musculoskeletal:  Musculoskeletal Normal Abnormal involuntary movements   Neurological:   Headaches ( Chronic mixed headache syndrome , Vestibular migraine)    Endocrine:  Endocrine Normal    Dermatological:  Skin Normal    Psych:   Psychiatric History depression          Chemistry        Component Value Date/Time     08/11/2020 1217    K 3.9 08/11/2020 1217     08/11/2020 1217    CO2 27 08/11/2020 1217    BUN 47 (H) 08/11/2020 1217    CREATININE 1.8 (H) 08/11/2020 1217    GLU 88 08/11/2020 1217        Component Value Date/Time    CALCIUM 9.8 08/11/2020 1217    ALKPHOS 51 (L) 08/11/2020 1217    AST 14 08/11/2020 1217    ALT 15 08/11/2020 1217    BILITOT 0.3 08/11/2020 1217    ESTGFRAFRICA 34.0 (A) 08/11/2020 1217    EGFRNONAA 29.5 (A) 08/11/2020 1217        Lab Results   Component Value Date    WBC 8.85 08/11/2020    HGB 11.8 (L) 08/11/2020    HCT 37.1 08/11/2020    MCV 97 08/11/2020     08/11/2020              Anesthesia Plan  Type of Anesthesia, risks & benefits discussed:  Anesthesia Type:  general  Patient's Preference:   Intra-op Monitoring Plan: standard ASA monitors  Intra-op Monitoring Plan Comments:   Post Op Pain Control Plan: multimodal analgesia  Post Op Pain Control Plan Comments:   Induction:   IV  Beta Blocker:  Patient is not currently on a Beta-Blocker (No further documentation required).       Informed Consent: Patient understands risks and agrees with Anesthesia plan.  Questions answered. Anesthesia consent signed with patient.  ASA Score: 3     Day of Surgery Review of History & Physical: I have interviewed and examined the patient. I have reviewed the patient's H&P dated:    H&P update referred to the surgeon.      Anesthesia Plan Notes: Consider Stress Steroid Dose        Ready For Surgery From Anesthesia Perspective.

## 2020-08-12 ENCOUNTER — HOSPITAL ENCOUNTER (OUTPATIENT)
Facility: HOSPITAL | Age: 65
Discharge: HOME | End: 2020-08-12
Attending: OBSTETRICS & GYNECOLOGY | Admitting: OBSTETRICS & GYNECOLOGY
Payer: MEDICAID

## 2020-08-12 ENCOUNTER — TELEPHONE (OUTPATIENT)
Dept: URGENT CARE | Facility: CLINIC | Age: 65
End: 2020-08-12

## 2020-08-12 ENCOUNTER — ANESTHESIA (OUTPATIENT)
Dept: SURGERY | Facility: HOSPITAL | Age: 65
End: 2020-08-12
Payer: MEDICAID

## 2020-08-12 DIAGNOSIS — D07.1 SEVERE DYSPLASIA OF VULVA: Primary | ICD-10-CM

## 2020-08-12 DIAGNOSIS — N90.3 VULVAR DYSPLASIA: ICD-10-CM

## 2020-08-12 PROCEDURE — 56515 PR DESTRUCTION,LESION(S),VULVA;EXTENSIVE: ICD-10-PCS | Mod: ,,, | Performed by: OBSTETRICS & GYNECOLOGY

## 2020-08-12 PROCEDURE — 37000008 HC ANESTHESIA 1ST 15 MINUTES: Performed by: OBSTETRICS & GYNECOLOGY

## 2020-08-12 PROCEDURE — 71000033 HC RECOVERY, INTIAL HOUR: Performed by: OBSTETRICS & GYNECOLOGY

## 2020-08-12 PROCEDURE — 00940 ANES VAGINAL PX NOS: CPT | Performed by: OBSTETRICS & GYNECOLOGY

## 2020-08-12 PROCEDURE — 56515 DESTROY VULVA LESION/S COMPL: CPT | Mod: ,,, | Performed by: OBSTETRICS & GYNECOLOGY

## 2020-08-12 PROCEDURE — 63600175 PHARM REV CODE 636 W HCPCS: Performed by: NURSE ANESTHETIST, CERTIFIED REGISTERED

## 2020-08-12 PROCEDURE — 37000009 HC ANESTHESIA EA ADD 15 MINS: Performed by: OBSTETRICS & GYNECOLOGY

## 2020-08-12 PROCEDURE — 71000015 HC POSTOP RECOV 1ST HR: Performed by: OBSTETRICS & GYNECOLOGY

## 2020-08-12 PROCEDURE — 36000707: Performed by: OBSTETRICS & GYNECOLOGY

## 2020-08-12 PROCEDURE — 36000706: Performed by: OBSTETRICS & GYNECOLOGY

## 2020-08-12 PROCEDURE — 63600175 PHARM REV CODE 636 W HCPCS: Performed by: ANESTHESIOLOGY

## 2020-08-12 PROCEDURE — 25000003 PHARM REV CODE 250: Performed by: OBSTETRICS & GYNECOLOGY

## 2020-08-12 PROCEDURE — 25000003 PHARM REV CODE 250: Performed by: NURSE ANESTHETIST, CERTIFIED REGISTERED

## 2020-08-12 RX ORDER — IBUPROFEN 600 MG/1
600 TABLET ORAL EVERY 6 HOURS PRN
Status: DISCONTINUED | OUTPATIENT
Start: 2020-08-12 | End: 2020-08-12 | Stop reason: HOSPADM

## 2020-08-12 RX ORDER — HYDROCODONE BITARTRATE AND ACETAMINOPHEN 5; 325 MG/1; MG/1
1 TABLET ORAL EVERY 8 HOURS PRN
Qty: 20 TABLET | Refills: 0 | Status: SHIPPED | OUTPATIENT
Start: 2020-08-12 | End: 2020-09-10

## 2020-08-12 RX ORDER — LIDOCAINE HYDROCHLORIDE 20 MG/ML
INJECTION INTRAVENOUS
Status: DISCONTINUED | OUTPATIENT
Start: 2020-08-12 | End: 2020-08-12

## 2020-08-12 RX ORDER — DEXAMETHASONE SODIUM PHOSPHATE 4 MG/ML
INJECTION, SOLUTION INTRA-ARTICULAR; INTRALESIONAL; INTRAMUSCULAR; INTRAVENOUS; SOFT TISSUE
Status: DISCONTINUED | OUTPATIENT
Start: 2020-08-12 | End: 2020-08-12

## 2020-08-12 RX ORDER — FENTANYL CITRATE 50 UG/ML
INJECTION, SOLUTION INTRAMUSCULAR; INTRAVENOUS
Status: DISCONTINUED | OUTPATIENT
Start: 2020-08-12 | End: 2020-08-12

## 2020-08-12 RX ORDER — MIDAZOLAM HYDROCHLORIDE 1 MG/ML
INJECTION, SOLUTION INTRAMUSCULAR; INTRAVENOUS
Status: DISCONTINUED | OUTPATIENT
Start: 2020-08-12 | End: 2020-08-12

## 2020-08-12 RX ORDER — SODIUM CHLORIDE, SODIUM LACTATE, POTASSIUM CHLORIDE, CALCIUM CHLORIDE 600; 310; 30; 20 MG/100ML; MG/100ML; MG/100ML; MG/100ML
INJECTION, SOLUTION INTRAVENOUS CONTINUOUS PRN
Status: DISCONTINUED | OUTPATIENT
Start: 2020-08-12 | End: 2020-08-12

## 2020-08-12 RX ORDER — ONDANSETRON 2 MG/ML
INJECTION INTRAMUSCULAR; INTRAVENOUS
Status: DISCONTINUED | OUTPATIENT
Start: 2020-08-12 | End: 2020-08-12

## 2020-08-12 RX ORDER — HYDROCODONE BITARTRATE AND ACETAMINOPHEN 5; 325 MG/1; MG/1
1 TABLET ORAL EVERY 4 HOURS PRN
Status: DISCONTINUED | OUTPATIENT
Start: 2020-08-12 | End: 2020-08-12 | Stop reason: HOSPADM

## 2020-08-12 RX ORDER — HYDROMORPHONE HYDROCHLORIDE 2 MG/ML
0.2 INJECTION, SOLUTION INTRAMUSCULAR; INTRAVENOUS; SUBCUTANEOUS EVERY 5 MIN PRN
Status: COMPLETED | OUTPATIENT
Start: 2020-08-12 | End: 2020-08-12

## 2020-08-12 RX ORDER — SUCCINYLCHOLINE CHLORIDE 20 MG/ML
INJECTION INTRAMUSCULAR; INTRAVENOUS
Status: DISCONTINUED | OUTPATIENT
Start: 2020-08-12 | End: 2020-08-12

## 2020-08-12 RX ORDER — METOCLOPRAMIDE HYDROCHLORIDE 5 MG/ML
10 INJECTION INTRAMUSCULAR; INTRAVENOUS EVERY 10 MIN PRN
Status: DISCONTINUED | OUTPATIENT
Start: 2020-08-12 | End: 2020-08-12 | Stop reason: HOSPADM

## 2020-08-12 RX ORDER — ROCURONIUM BROMIDE 10 MG/ML
INJECTION, SOLUTION INTRAVENOUS
Status: DISCONTINUED | OUTPATIENT
Start: 2020-08-12 | End: 2020-08-12

## 2020-08-12 RX ORDER — SILVER SULFADIAZINE 10 G/1000G
CREAM TOPICAL DAILY
Status: DISCONTINUED | OUTPATIENT
Start: 2020-08-12 | End: 2020-08-12 | Stop reason: HOSPADM

## 2020-08-12 RX ORDER — HYDROMORPHONE HYDROCHLORIDE 2 MG/ML
0.2 INJECTION, SOLUTION INTRAMUSCULAR; INTRAVENOUS; SUBCUTANEOUS EVERY 5 MIN PRN
Status: DISCONTINUED | OUTPATIENT
Start: 2020-08-12 | End: 2020-08-12 | Stop reason: HOSPADM

## 2020-08-12 RX ORDER — ONDANSETRON 8 MG/1
8 TABLET, ORALLY DISINTEGRATING ORAL EVERY 8 HOURS PRN
Status: DISCONTINUED | OUTPATIENT
Start: 2020-08-12 | End: 2020-08-12 | Stop reason: HOSPADM

## 2020-08-12 RX ORDER — GLYCOPYRROLATE 0.2 MG/ML
INJECTION INTRAMUSCULAR; INTRAVENOUS
Status: DISCONTINUED | OUTPATIENT
Start: 2020-08-12 | End: 2020-08-12

## 2020-08-12 RX ORDER — SODIUM CHLORIDE 0.9 % (FLUSH) 0.9 %
3 SYRINGE (ML) INJECTION EVERY 8 HOURS
Status: DISCONTINUED | OUTPATIENT
Start: 2020-08-12 | End: 2020-08-12 | Stop reason: HOSPADM

## 2020-08-12 RX ORDER — PHENYLEPHRINE HYDROCHLORIDE 10 MG/ML
INJECTION INTRAVENOUS
Status: DISCONTINUED | OUTPATIENT
Start: 2020-08-12 | End: 2020-08-12

## 2020-08-12 RX ORDER — DIPHENHYDRAMINE HCL 25 MG
25 CAPSULE ORAL EVERY 4 HOURS PRN
Status: DISCONTINUED | OUTPATIENT
Start: 2020-08-12 | End: 2020-08-12 | Stop reason: HOSPADM

## 2020-08-12 RX ORDER — PROPOFOL 10 MG/ML
VIAL (ML) INTRAVENOUS
Status: DISCONTINUED | OUTPATIENT
Start: 2020-08-12 | End: 2020-08-12

## 2020-08-12 RX ORDER — DIPHENHYDRAMINE HYDROCHLORIDE 50 MG/ML
25 INJECTION INTRAMUSCULAR; INTRAVENOUS EVERY 6 HOURS PRN
Status: DISCONTINUED | OUTPATIENT
Start: 2020-08-12 | End: 2020-08-12 | Stop reason: HOSPADM

## 2020-08-12 RX ORDER — DIPHENHYDRAMINE HYDROCHLORIDE 50 MG/ML
25 INJECTION INTRAMUSCULAR; INTRAVENOUS EVERY 4 HOURS PRN
Status: DISCONTINUED | OUTPATIENT
Start: 2020-08-12 | End: 2020-08-12 | Stop reason: HOSPADM

## 2020-08-12 RX ADMIN — FENTANYL CITRATE 100 MCG: 50 INJECTION, SOLUTION INTRAMUSCULAR; INTRAVENOUS at 08:08

## 2020-08-12 RX ADMIN — HYDROMORPHONE HYDROCHLORIDE 0.2 MG: 2 INJECTION INTRAMUSCULAR; INTRAVENOUS; SUBCUTANEOUS at 09:08

## 2020-08-12 RX ADMIN — SODIUM CHLORIDE, SODIUM LACTATE, POTASSIUM CHLORIDE, AND CALCIUM CHLORIDE: 600; 310; 30; 20 INJECTION, SOLUTION INTRAVENOUS at 08:08

## 2020-08-12 RX ADMIN — ROBINUL 0.2 MG: 0.2 INJECTION INTRAMUSCULAR; INTRAVENOUS at 08:08

## 2020-08-12 RX ADMIN — SUCCINYLCHOLINE CHLORIDE 140 MG: 20 INJECTION, SOLUTION INTRAMUSCULAR; INTRAVENOUS at 08:08

## 2020-08-12 RX ADMIN — Medication 100 MG: at 08:08

## 2020-08-12 RX ADMIN — PHENYLEPHRINE HYDROCHLORIDE 200 MCG: 10 INJECTION INTRAVENOUS at 08:08

## 2020-08-12 RX ADMIN — ROCURONIUM BROMIDE 5 MG: 10 INJECTION, SOLUTION INTRAVENOUS at 08:08

## 2020-08-12 RX ADMIN — ONDANSETRON 4 MG: 2 INJECTION, SOLUTION INTRAMUSCULAR; INTRAVENOUS at 08:08

## 2020-08-12 RX ADMIN — DEXAMETHASONE SODIUM PHOSPHATE 8 MG: 4 INJECTION, SOLUTION INTRA-ARTICULAR; INTRALESIONAL; INTRAMUSCULAR; INTRAVENOUS; SOFT TISSUE at 08:08

## 2020-08-12 RX ADMIN — PHENYLEPHRINE HYDROCHLORIDE 100 MCG: 10 INJECTION INTRAVENOUS at 08:08

## 2020-08-12 RX ADMIN — MIDAZOLAM 2 MG: 1 INJECTION INTRAMUSCULAR; INTRAVENOUS at 08:08

## 2020-08-12 RX ADMIN — PROPOFOL 150 MG: 10 INJECTION, EMULSION INTRAVENOUS at 08:08

## 2020-08-12 RX ADMIN — SILVER SULFADIAZINE: 10 CREAM TOPICAL at 09:08

## 2020-08-12 NOTE — H&P
"Patient ID: Kenia Alonzo is a 64 y.o. female.     Chief Complaint: Vulvar Dysplasia       HPI  Presents today for vulvar lesion follow up. Previously scheduled for vulvar excision in  on 9/25/19, but surgery was cancelled per request of the patient.      Pap and HPV 9/11/2019 normal.   Patient refused vulvar biopsy at previous visit.     Review of records shows vulvar biopsies from 2016  FINAL PATHOLOGIC DIAGNOSIS  1. VULVAR BIOPSY SHOWING HIGH-GRADE SQUAMOUS DYSPLASIA (FLORA 2-3)  2. VULVAR BIOPSY SHOWING SEVERE DYSPLASIA (FLORA 3) WITH MARKED PARAKERATOSIS, NO  DEFINITIVE INVASION IDENTIFIED.  3. VULVAR BIOPSY SHOWING MILD SQUAMOUS DYSPLASIA (FLORA 1)     Recommended treatment at that time. She says she got treatment in [place] in 2016 with "scraping". No records available for review.         Review of Systems   Constitutional: Negative for appetite change, chills, fatigue and fever.   HENT: Negative for mouth sores.    Respiratory: Negative for cough and shortness of breath.    Cardiovascular: Negative for leg swelling.   Gastrointestinal: Negative for abdominal pain, blood in stool, constipation and diarrhea.   Endocrine: Negative for cold intolerance.   Genitourinary: Negative for dysuria and vaginal bleeding.   Musculoskeletal: Negative for myalgias.   Skin: Negative for rash.   Allergic/Immunologic: Negative.    Neurological: Negative for weakness and numbness.   Hematological: Negative for adenopathy. Does not bruise/bleed easily.   Psychiatric/Behavioral: Negative for confusion.         Objective:   Physical Exam:   Constitutional: She is oriented to person, place, and time. She appears well-developed and well-nourished.   HENT:   Head: Normocephalic and atraumatic.   Eyes: Pupils are equal, round, and reactive to light. EOM are normal.   Neck: Normal range of motion. Neck supple. No thyromegaly present.   Cardiovascular: Normal rate, regular rhythm and intact distal pulses.     Pulmonary/Chest: Effort normal and " breath sounds normal. No respiratory distress. She has no wheezes.         Abdominal: Soft. Bowel sounds are normal. She exhibits no distension, no ascites and no mass. There is no tenderness.     Genitourinary: Vagina normal and uterus normal.       Pelvic exam was performed with patient supine. There is lesion on the left labia. No change in lesions from previous pelvic exam. Cervix is normal. Right adnexum displays no mass. Left adnexum displays no mass.        Musculoskeletal: Normal range of motion and moves all extremeties.      Lymphadenopathy:     She has no cervical adenopathy.        Right: No inguinal and no supraclavicular adenopathy present.        Left: No inguinal and no supraclavicular adenopathy present.    Neurological: She is alert and oriented to person, place, and time.    Skin: Skin is warm and dry. No rash noted.   Psychiatric: She has a normal mood and affect.      Assessment:      1. Severe dysplasia of vulva    2. Tobacco use         Plan:      No orders of the defined types were placed in this encounter.    Previously cancelled surgery. Minimal change to exam findings.   We again reviewed the natural history of vulvar dysplasia, recommend treatment.   Given patient has multifocal lesions, recommend vulvar laser ablation.  The risks, benefits, and indications of the procedure were discussed with the patient and her family members if present.  These included bleeding, transfusion, infection, damage to surrounding tissues (bowel, bladder, ureter), wound separation, lymphedema, conversion to laparotomy if laparoscopic, perioperative cardiac events, VTE, pneumonia, and possible death.  She voiced understanding, all questions were answered and consents were signed.

## 2020-08-12 NOTE — DISCHARGE SUMMARY
[facility] -   Obstetrics & Gynecology  Discharge Summary    Patient Name: Kenia Alonzo  MRN: [id number]  Admission Date: 8/12/2020  Hospital Length of Stay: 0 days  Discharge Date and Time:  08/12/2020 10:26 AM  Attending Physician: Lucy Crane MD   Discharging Provider: Lucy Crane MD  Primary Care Provider: Harish Hernandez DO    HPI: Severe vulvar dysplasia    Hospital Course: Patient presented for scheduled procedure. Patient was passed back to OR for laser ablation of the vulva . Please see OP note for further details. Tolerated procedure well and patient was taken to recovery in a stable condition. Prior to discharge patient was able to void, ambulate, tolerate PO and pain was well controlled with PO meds. Patient was given routine post-op instructions for which patient voiced understanding. Patient was subsequently discharged home.      Procedure(s) (LRB):  EXAM UNDER ANESTHESIA (N/A)  ABLATION, USING LASER (N/A)     Consults: none    Significant Diagnostic Studies: none    Pending Diagnostic Studies:     None        Final Active Diagnoses:    Diagnosis Date Noted POA    Vulvar dysplasia [N90.3] 08/12/2020 Yes      Problems Resolved During this Admission:        Discharged Condition: good    Disposition: Home     Follow Up: 6 weeks     Patient Instructions:   No discharge procedures on file.  Medications:  Reconciled Home Medications:      Medication List      ASK your doctor about these medications    acyclovir 5% 5 % ointment  Commonly known as: ZOVIRAX  Apply topically 5 (five) times daily.     AIMOVIG AUTOINJECTOR 140 mg/mL autoinjector  Generic drug: erenumab-aooe  Inject 140 mg into the skin every 28 days.     azelastine 137 mcg (0.1 %) nasal spray  Commonly known as: ASTELIN  1 spray (137 mcg total) by Nasal route 2 (two) times daily.     azithromycin 250 MG tablet  Commonly known as: Z-BOUCHRA  Take 2 tablets by mouth on day 1; Take 1 tablet by mouth on days 2-5     B COMPLEX 100  ORAL  Take by mouth nightly.     benzonatate 100 MG capsule  Commonly known as: TESSALON  Take 1 capsule (100 mg total) by mouth 3 (three) times daily as needed.     bumetanide 2 MG tablet  Commonly known as: BUMEX  Take 0.5 tablets (1 mg total) by mouth once daily.     buPROPion 150 MG TB24 tablet  Commonly known as: WELLBUTRIN XL  Take 3 tablets (450 mg total) by mouth once daily.     busPIRone 10 MG tablet  Commonly known as: BUSPAR  Take 1 tablet (10 mg total) by mouth 2 (two) times daily.     CHANTIX 0.5 MG Tab  Generic drug: varenicline  Take 1 tablet (0.5 mg total) by mouth once daily.     diazePAM 2 MG tablet  Commonly known as: VALIUM  Take 1 tablet (2 mg total) by mouth every 6 (six) hours as needed (dizziness/Meniere's attack).     dicyclomine 10 MG capsule  Commonly known as: BENTYL  TAKE 1 CAPSULE BY MOUTH THREE TIMES DAILY     fish oil-omega-3 fatty acids 300-1,000 mg capsule  Take 1 capsule by mouth once daily.     fluticasone propionate 50 mcg/actuation nasal spray  Commonly known as: FLONASE  2 sprays (100 mcg total) by Each Nostril route once daily.     HYDROcodone-acetaminophen 5-325 mg per tablet  Commonly known as: NORCO  Take 1 tablet by mouth every 8 (eight) hours as needed for Pain.     levocetirizine 5 MG tablet  Commonly known as: XYZAL  TAKE ONE TABLET BY MOUTH EVERY MORNING     MELATONIN ORAL  Take by mouth nightly as needed.     metoclopramide HCl 5 MG tablet  Commonly known as: REGLAN  Take 1 tablet (5 mg total) by mouth every 12 (twelve) hours as needed.     ondansetron 4 MG Tbdl  Commonly known as: ZOFRAN-ODT  Take 1 tablet (4 mg total) by mouth every 8 (eight) hours as needed.     predniSONE 20 MG tablet  Commonly known as: DELTASONE  Take 2 tabs daily for 4 days, then 1 tab daily for 3 days then 1/2 tab daily for 2 days.     simvastatin 5 MG tablet  Commonly known as: ZOCOR  Take 1 tablet (5 mg total) by mouth nightly.     TART CANTU ORAL  Take 1 tablet by mouth daily as needed.      topiramate 100 mg Cp24  Commonly known as: TROKENDI XR  Take 1 capsule (100 mg total) by mouth once daily.     ubrogepant 100 mg tablet  Commonly known as: UBRELVY  Take 1 tablet (100 mg total) by mouth every 72 hours as needed for Migraine.            Lucy Crane MD  Obstetrics & Gynecology  [facility] -

## 2020-08-12 NOTE — TRANSFER OF CARE
"Anesthesia Transfer of Care Note    Patient: Kenia Alonzo    Procedure(s) Performed: Procedure(s) (LRB):  EXAM UNDER ANESTHESIA (N/A)  ABLATION, USING LASER (N/A)    Patient location: PACU    Anesthesia Type: general    Transport from OR: Transported from OR on room air with adequate spontaneous ventilation    Post pain: adequate analgesia    Post assessment: no apparent anesthetic complications and tolerated procedure well    Post vital signs: stable    Level of consciousness: awake    Nausea/Vomiting: no nausea/vomiting    Complications: none    Transfer of care protocol was followed      Last vitals:   Visit Vitals  BP (!) 127/56 (Patient Position: Sitting)   Pulse 62   Temp 36.7 °C (98.1 °F) (Skin)   Resp 16   Ht 5' 2" (1.575 m)   Wt 57.7 kg (127 lb 3.3 oz)   SpO2 100%   Breastfeeding No   BMI 23.27 kg/m²     "

## 2020-08-12 NOTE — ANESTHESIA PROCEDURE NOTES
Intubation  Performed by: Jose Hernández CRNA  Authorized by: Laura Ziegler MD     Intubation:     Induction:  Intravenous    Intubated:  Postinduction    Mask Ventilation:  Easy with oral airway    Attempts:  1    Attempted By:  CRNA    Method of Intubation:  Direct    Blade:  [place] 2    Laryngeal View Grade: Grade I - full view of chords      Difficult Airway Encountered?: No      Complications:  None    Airway Device:  Oral endotracheal tube    Airway Device Size:  7.5    Style/Cuff Inflation:  Cuffed    Inflation Amount (mL):  5    Tube secured:  21    Secured at:  The lips    Placement Verified By:  Capnometry    Complicating Factors:  None    Findings Post-Intubation:  BS equal bilateral and atraumatic/condition of teeth unchanged

## 2020-08-12 NOTE — OP NOTE
Procedure: Vulvar laser ablation     Date of Surgery: 08/12/2020     Surgeon: Lucy Crane MD      Pre-Op Diagnosis:   1. Severe vulvar dysplasia     Post-op Diagnosis:    1. Same     Complications: none     EBL: minimal      Specimen: None     Findings/Key Components of Procedure: 3 raised white plaque lesions noted:  1) 2x1cm lesion in the mid left labia minora  2) 2cm horseshoe shaped lesion at the posterior fourchette  3) 3mm lesion in the mid right right labia minora   Laser ablation was performed with no visible lesions remaining at conclusion of procedure.     Description of Procedure:   The patient was taken to the operating room after informed consent was confirmed. General anesthesia was obtained without difficulty. Once this was felt to be adequate, the patient was placed in the dorsal lithotomy position and was placed in yellowfin stirrups. The vulva were prepped with Dial soap in sterile saline. The patient was then draped with sterile moist blue towels. Laser ablation to the above lesions at 10 [place] was performed. No visible lesions remained at the conclusion of the case. Silvadene cream was applied to sites of laser ablation. All sponge and lap counts were correct x 2. The patient tolerated the procedure well, was awakened and transferred to the recovery room in stable condition.

## 2020-08-12 NOTE — ANESTHESIA POSTPROCEDURE EVALUATION
Anesthesia Post Evaluation    Patient: Kenia Alonzo    Procedure(s) Performed: Procedure(s) (LRB):  EXAM UNDER ANESTHESIA (N/A)  ABLATION, USING LASER (N/A)    Final Anesthesia Type: general    Patient location during evaluation: PACU  Patient participation: Yes- Able to Participate  Level of consciousness: awake  Post-procedure vital signs: reviewed and stable  Pain management: adequate  Airway patency: patent    PONV status at discharge: No PONV  Anesthetic complications: no      Cardiovascular status: blood pressure returned to baseline and hemodynamically stable  Respiratory status: unassisted and spontaneous ventilation  Hydration status: euvolemic  Follow-up not needed.          Vitals Value Taken Time   /56 08/12/20 0729   Temp 36.7 °C (98.1 °F) 08/12/20 0729   Pulse 62 08/12/20 0729   Resp 16 08/12/20 0729   SpO2 100 % 08/12/20 0729         No case tracking events are documented in the log.      Pain/Fan Score: No data recorded

## 2020-08-13 VITALS
HEART RATE: 70 BPM | RESPIRATION RATE: 16 BRPM | OXYGEN SATURATION: 100 % | WEIGHT: 127.19 LBS | HEIGHT: 62 IN | SYSTOLIC BLOOD PRESSURE: 125 MMHG | BODY MASS INDEX: 23.4 KG/M2 | DIASTOLIC BLOOD PRESSURE: 72 MMHG | TEMPERATURE: 98 F

## 2020-08-17 ENCOUNTER — OFFICE VISIT (OUTPATIENT)
Dept: ORTHOPEDICS | Facility: CLINIC | Age: 65
End: 2020-08-17
Payer: MEDICAID

## 2020-08-17 VITALS — BODY MASS INDEX: 23.37 KG/M2 | WEIGHT: 127 LBS | HEIGHT: 62 IN

## 2020-08-17 DIAGNOSIS — M17.0 PRIMARY OSTEOARTHRITIS OF BOTH KNEES: Primary | ICD-10-CM

## 2020-08-17 PROCEDURE — 99213 OFFICE O/P EST LOW 20 MIN: CPT | Mod: PBBFAC,PO,25 | Performed by: ORTHOPAEDIC SURGERY

## 2020-08-17 PROCEDURE — 99999 PR PBB SHADOW E&M-EST. PATIENT-LVL III: CPT | Mod: PBBFAC,,, | Performed by: ORTHOPAEDIC SURGERY

## 2020-08-17 PROCEDURE — 20610 DRAIN/INJ JOINT/BURSA W/O US: CPT | Mod: 50,PBBFAC,PO | Performed by: ORTHOPAEDIC SURGERY

## 2020-08-17 PROCEDURE — 99999 PR PBB SHADOW E&M-EST. PATIENT-LVL III: ICD-10-PCS | Mod: PBBFAC,,, | Performed by: ORTHOPAEDIC SURGERY

## 2020-08-17 PROCEDURE — 99499 UNLISTED E&M SERVICE: CPT | Mod: S$PBB,,, | Performed by: ORTHOPAEDIC SURGERY

## 2020-08-17 PROCEDURE — 20610 LARGE JOINT ASPIRATION/INJECTION: BILATERAL KNEE: ICD-10-PCS | Mod: 50,S$PBB,, | Performed by: ORTHOPAEDIC SURGERY

## 2020-08-17 PROCEDURE — 99499 NO LOS: ICD-10-PCS | Mod: S$PBB,,, | Performed by: ORTHOPAEDIC SURGERY

## 2020-08-17 RX ADMIN — Medication 20 MG: at 02:08

## 2020-08-17 NOTE — PROCEDURES
Large Joint Aspiration/Injection: bilateral knee    Date/Time: 8/17/2020 2:30 PM  Performed by: Yehuda Beck MD  Authorized by: Yehuda Beck MD     Consent Done?:  Yes (Verbal)  Indications:  Pain  Site marked: the procedure site was marked    Timeout: prior to procedure the correct patient, procedure, and site was verified    Prep: patient was prepped and draped in usual sterile fashion      Local anesthesia used?: Yes    Local anesthetic:  Topical anesthetic    Details:  Needle Size:  22 G  Ultrasonic Guidance for needle placement?: No    Approach:  Superior  Location:  Knee  Laterality:  Bilateral  Site:  Bilateral knee  Medications (Right):  20 mg sodium hyaluronate (EUFLEXXA) 10 mg/mL(mw 2.4 -3.6 million)  Medications (Left):  20 mg sodium hyaluronate (EUFLEXXA) 10 mg/mL(mw 2.4 -3.6 million)  Patient tolerance:  Patient tolerated the procedure well with no immediate complications     Bilateral Euflexxa injection

## 2020-08-18 ENCOUNTER — TELEPHONE (OUTPATIENT)
Dept: GYNECOLOGIC ONCOLOGY | Facility: CLINIC | Age: 65
End: 2020-08-18

## 2020-08-18 NOTE — PROCEDURES
Large Joint Aspiration/Injection: bilateral knee    Date/Time: 7/27/2020 1:30 PM  Performed by: Yehuda Beck MD  Authorized by: Yehuda Beck MD     Consent Done?:  Yes (Verbal)  Indications:  Pain  Site marked: the procedure site was marked    Timeout: prior to procedure the correct patient, procedure, and site was verified    Prep: patient was prepped and draped in usual sterile fashion      Local anesthesia used?: Yes    Local anesthetic:  Topical anesthetic    Details:  Needle Size:  22 G  Ultrasonic Guidance for needle placement?: No    Approach:  Superior  Location:  Knee  Laterality:  Bilateral  Site:  Bilateral knee  Medications (Right):  20 mg sodium hyaluronate (EUFLEXXA) 10 mg/mL(mw 2.4 -3.6 million)  Medications (Left):  20 mg sodium hyaluronate (EUFLEXXA) 10 mg/mL(mw 2.4 -3.6 million)  Patient tolerance:  Patient tolerated the procedure well with no immediate complications     Bilateral Euflexxa injection

## 2020-08-19 ENCOUNTER — OFFICE VISIT (OUTPATIENT)
Dept: NEPHROLOGY | Facility: CLINIC | Age: 65
End: 2020-08-19
Payer: MEDICAID

## 2020-08-19 ENCOUNTER — CLINICAL SUPPORT (OUTPATIENT)
Dept: SMOKING CESSATION | Facility: CLINIC | Age: 65
End: 2020-08-19
Payer: COMMERCIAL

## 2020-08-19 DIAGNOSIS — N18.30 CKD (CHRONIC KIDNEY DISEASE) STAGE 3, GFR 30-59 ML/MIN: Primary | ICD-10-CM

## 2020-08-19 DIAGNOSIS — F17.200 NICOTINE DEPENDENCE: Primary | ICD-10-CM

## 2020-08-19 DIAGNOSIS — N28.1 KIDNEY CYSTS: ICD-10-CM

## 2020-08-19 PROCEDURE — 99402 PR PREVENT COUNSEL,INDIV,30 MIN: ICD-10-PCS | Mod: S$GLB,,,

## 2020-08-19 PROCEDURE — 99999 PR PBB SHADOW E&M-EST. PATIENT-LVL I: CPT | Mod: PBBFAC,,,

## 2020-08-19 PROCEDURE — 99214 OFFICE O/P EST MOD 30 MIN: CPT | Mod: 95,,, | Performed by: INTERNAL MEDICINE

## 2020-08-19 PROCEDURE — 99999 PR PBB SHADOW E&M-EST. PATIENT-LVL I: ICD-10-PCS | Mod: PBBFAC,,,

## 2020-08-19 PROCEDURE — 99214 PR OFFICE/OUTPT VISIT, EST, LEVL IV, 30-39 MIN: ICD-10-PCS | Mod: 95,,, | Performed by: INTERNAL MEDICINE

## 2020-08-19 PROCEDURE — 99402 PREV MED CNSL INDIV APPRX 30: CPT | Mod: S$GLB,,,

## 2020-08-19 NOTE — PROGRESS NOTES
"NEPHROLOGY CONSULTATION    PHYSICIAN REQUESTING THE CONSULT:  Dr. Harish Hernandez    REASON FOR CONSULTATION: Renal insufficiency    The patient location is: patient's home  The chief complaint leading to consultation is: CKD    Visit type: audiovisual    Face to Face time with patient: 12 minutes  22 minutes of total time spent on the encounter, which includes face to face time and non-face to face time preparing to see the patient (eg, review of tests), Obtaining and/or reviewing separately obtained history, Documenting clinical information in the electronic or other health record, Independently interpreting results (not separately reported) and communicating results to the patient/family/caregiver, or Care coordination (not separately reported).     Each patient to whom he or she provides medical services by telemedicine is:  (1) informed of the relationship between the physician and patient and the respective role of any other health care provider with respect to management of the patient; and (2) notified that he or she may decline to receive medical services by telemedicine and may withdraw from such care at any time.    Notes: See below.       65 y.o. female with history of Meniere's disease, hyperlipidemia, gout, migraines, IBS, allergic rhinitis  presents to the renal clinic for evaluation of renal insufficiency. A consultation has been requested by the patient's PMD, Dr. Harish Hernandez. Patient today presents to the clinic complaining of intermittent headaches, intermittent LE swelling. She denies any congenital hearing loss, chest pain, SOB, hemoptysis, abdominal or flank pain, diarrhea, nausea/vomiting, hematuria, dysuria, rashes, hand/foot paresthesia, nasal congestion. Patient reports strong NSAID use history. She had been on daily ibuprofen for at least 6 months. Last use was about 2 weeks ago.   Patient reports history of gout (associated with right toe pain, currently using "cherry pills" only, no " recent attacks), migraines (treated with NSAIDS in past, triggered by MSG), Meniere's disease (using valium intermittently for exacerbations). She denies any history of nephrolithiasis, HTN, DM2, heart disease.  Patient's son suffers from obstructive nephropathy. Laboratory review revealed that the patient's renal function has been fluctuating with creatinine at 2.1 (16), 2.5 (17) and 1.5 (18).     2020:  Patient is feeling fine. Creatinine stable at 1.6.            Past Medical History:   Diagnosis Date    Arthritis     knee    Asthma     childhood     Depression     Encounter for blood transfusion     General anesthetics causing adverse effect in therapeutic use     severe headache after crainiotomy    GI problem     IBS    Gout     Headache     Hyperlipidemia     Meniere disease     MVP (mitral valve prolapse)     minor    Vertigo        Past Surgical History:   Procedure Laterality Date    BRAIN SURGERY      vestibular neurectomy    BREAST SURGERY      benign     SECTION      x 1    CHOLECYSTECTOMY      EXAMINATION UNDER ANESTHESIA N/A 2020    Procedure: EXAM UNDER ANESTHESIA;  Surgeon: Lucy Crane MD;  Location: [facility] OR;  Service: OB/GYN;  Laterality: N/A;    LASER ABLATION N/A 2020    Procedure: ABLATION, USING LASER;  Surgeon: Lucy Crane MD;  Location: [facility] OR;  Service: OB/GYN;  Laterality: N/A;    TONSILLECTOMY         Review of patient's allergies indicates:   Allergen Reactions    Codeine Itching    Hydrocodone Itching       Current Outpatient Medications   Medication Sig Dispense Refill    acyclovir 5% (ZOVIRAX) 5 % ointment Apply topically 5 (five) times daily. (Patient not taking: Reported on 2020) 30 g 1    azelastine (ASTELIN) 137 mcg (0.1 %) nasal spray 1 spray (137 mcg total) by Nasal route 2 (two) times daily. 30 mL 3    azithromycin (Z-BOUCHRA) 250 MG tablet Take 2 tablets by mouth on day 1; Take 1 tablet by mouth  on days 2-5 (Patient not taking: Reported on 8/17/2020) 6 tablet 0    bumetanide (BUMEX) 2 MG tablet Take 0.5 tablets (1 mg total) by mouth once daily. (Patient taking differently: Take 1 mg by mouth once daily. ) 90 tablet 1    buPROPion (WELLBUTRIN XL) 150 MG TB24 tablet Take 3 tablets (450 mg total) by mouth once daily. 90 tablet 2    busPIRone (BUSPAR) 10 MG tablet Take 1 tablet (10 mg total) by mouth 2 (two) times daily. 120 tablet 2    C/sourcherry/celery/grape seed (TART CHERRY ORAL) Take 1 tablet by mouth daily as needed.       diazePAM (VALIUM) 2 MG tablet Take 1 tablet (2 mg total) by mouth every 6 (six) hours as needed (dizziness/Meniere's attack). 30 tablet 1    dicyclomine (BENTYL) 10 MG capsule TAKE 1 CAPSULE BY MOUTH THREE TIMES DAILY (Patient taking differently: Take 10 mg by mouth 3 (three) times daily. ) 90 capsule 1    erenumab-aooe (AIMOVIG AUTOINJECTOR) 140 mg/mL autoinjector Inject 140 mg into the skin every 28 days. 1 mL 11    fluticasone propionate (FLONASE) 50 mcg/actuation nasal spray 2 sprays (100 mcg total) by Each Nostril route once daily. (Patient taking differently: 2 sprays by Each Nostril route daily as needed. ) 16 g 3    HYDROcodone-acetaminophen (NORCO) 5-325 mg per tablet Take 1 tablet by mouth every 8 (eight) hours as needed for Pain. 20 tablet 0    levocetirizine (XYZAL) 5 MG tablet TAKE ONE TABLET BY MOUTH EVERY MORNING (Patient taking differently: Take 5 mg by mouth every evening. ) 90 tablet 3    MELATONIN ORAL Take by mouth nightly as needed.       metoclopramide HCl (REGLAN) 5 MG tablet Take 1 tablet (5 mg total) by mouth every 12 (twelve) hours as needed. (Patient not taking: Reported on 8/17/2020) 10 tablet 3    omega-3 fatty acids/fish oil (FISH OIL-OMEGA-3 FATTY ACIDS) 300-1,000 mg capsule Take 1 capsule by mouth once daily.      ondansetron (ZOFRAN-ODT) 4 MG TbDL Take 1 tablet (4 mg total) by mouth every 8 (eight) hours as needed. 60 tablet 1     predniSONE (DELTASONE) 20 MG tablet Take 2 tabs daily for 4 days, then 1 tab daily for 3 days then 1/2 tab daily for 2 days. (Patient not taking: Reported on 2020) 12 tablet 0    simvastatin (ZOCOR) 5 MG tablet Take 1 tablet (5 mg total) by mouth nightly. 90 tablet 3    topiramate (TROKENDI XR) 100 mg Cp24 Take 1 capsule (100 mg total) by mouth once daily. (Patient taking differently: Take 100 mg by mouth nightly. ) 30 capsule 11    ubrogepant (UBRELVY)tablet 100 mg Take 1 tablet (100 mg total) by mouth every 72 hours as needed for Migraine. (Patient not taking: Reported on 2020) 9 tablet 3    varenicline (CHANTIX) 0.5 MG Tab Take 1 tablet (0.5 mg total) by mouth once daily. 30 tablet 0    vitamin B complex/folic acid (B COMPLEX 100 ORAL) Take by mouth nightly.       No current facility-administered medications for this visit.        Family History   Problem Relation Age of Onset    Colon cancer Father     Diabetes Father     Diabetes Sister        Social History     Socioeconomic History    Marital status:      Spouse name: Not on file    Number of children: Not on file    Years of education: Not on file    Highest education level: Not on file   Occupational History    Not on file   Social Needs    Financial resource strain: Somewhat hard    Food insecurity     Worry: Sometimes true     Inability: Patient refused    Transportation needs     Medical: No     Non-medical: No   Tobacco Use    Smoking status: Former Smoker     Packs/day: 0.50     Years: 43.00     Pack years: 21.50     Types: Cigarettes     Start date: 1976     Quit date: 2020     Years since quittin.5    Smokeless tobacco: Never Used    Tobacco comment: Quit 20   Substance and Sexual Activity    Alcohol use: Not Currently     Alcohol/week: 1.0 - 3.0 standard drinks     Types: 1 - 3 Glasses of wine per week     Frequency: Monthly or less     Drinks per session: 1 or 2     Binge frequency: Never      Comment: social few times monthly.   No alcohol 72h prior to sx    Drug use: No    Sexual activity: Never     Birth control/protection: None, Post-menopausal   Lifestyle    Physical activity     Days per week: 0 days     Minutes per session: 0 min    Stress: Very much   Relationships    Social connections     Talks on phone: More than three times a week     Gets together: Twice a week     Attends Rastafarian service: Not on file     Active member of club or organization: No     Attends meetings of clubs or organizations: Never     Relationship status:    Other Topics Concern    Not on file   Social History Narrative    Not on file       Review of Systems:  1. GENERAL: patient denies any fever, weight changes, generalized weakness, dizziness.  2. HEENT: patient reports intermittent headaches, no visual disturbances, swallowing problems, sinus pain, nasal congestion.  3. CARDIOVASCULAR: patient denies chest pain, palpitations.  4. PULMONARY: patient denies SOB, coughing, hemoptysis, wheezing.  5. GASTROINTESTINAL: patient denies abdominal pain, nausea, vomiting, diarrhea.  6. GENITOURINARY: patient denies dysuria, hematuria, hesitancy, frequency.  7. EXTREMITIES: patient reports intermittent LE edema, no LE cramping.  8. DERMATOLOGY: patient denies rashes, ulcers.  9. NEURO: patient denies tremors, extremity weakness, extremity numbness/tingling.  10. MUSCULOSKELETAL: patient denies joint pain, joint swelling.  11. HEMATOLOGY: patient denies rectal or gum bleeding.  12: PSYCH: patient denies anxiety, depression.      PHYSICAL EXAM:  She reports home SBP in 120s.    Exam was done during previous visit.     GENERAL: Pleasant lady presents to clinic with non-labored breathing.  HEENT: PER, no nasal discharge, no icterus, no oral exudates, moist mucosal membranes.  NECK: no thyroid mass, no lymphadenopathy.  HEART: RRR S1/S2, no rubs, good peripheral pulses.  LUNGS: CTA bilaterally, no wheezing, breathing is  nonlabored.  ABDOMEN: soft, mild epigastric tenderness, not distended, bowel sounds are present, no abdominal hernia.  EXTREM: no LE edema.  SKIN: no rashes, skin is warm and dry.  NEURO: A & O x 3, no obvious focal signs.    LABORATORY RESULTS:    Lab Results   Component Value Date    CREATININE 1.6 (H) 08/17/2020    BUN 28 (H) 08/17/2020     08/17/2020    K 3.6 08/17/2020     08/17/2020    CO2 22 (L) 08/17/2020      Lab Results   Component Value Date    PTH 84.0 (H) 04/23/2019    CALCIUM 9.7 08/17/2020    PHOS 3.8 08/17/2020     Lab Results   Component Value Date    ALBUMIN 4.0 08/17/2020     Lab Results   Component Value Date    WBC 7.43 08/17/2020    HGB 13.2 08/17/2020    HCT 41.8 08/17/2020    MCV 98 08/17/2020     08/17/2020       Renal US from 5/28/18:  Bilateral renal cysts, some mildly complex.       ASSESSMENT AND PLAN:  65 y.o. female with history of Meniere's disease, hyperlipidemia, gout, migraines, IBS, allergic rhinitis  presents to the renal clinic for evaluation of renal insufficiency.     1. Renal insufficiency: Patient presents with mild renal insufficiency, consistent with CKD stage 3. Likely cause of her renal insufficiency is his her previous NSAID use. Last creatinine was 1.6. Patient's renal function will be monitored closely and she will return to the clinic in 3 months for follow up. Patient was advised to avoid NSAID pain medications such as advil, aleve, motrin, ibuprofen, naprosyn, meloxicam, diclofenac, mobic. Patient was advised to hydrate with 60-80 ounces of water per day.     2. Electrolytes: Within normal limits.    3. Acid base status: borderline acidosis, monitor.     4. Volume: Euvolemic.    5. Hypertension: Good BP control.     6. Medications: Reviewed. Patient was advised to avoid NSAID pain medications such as advil, aleve, motrin, ibuprofen, naprosyn, meloxicam, diclofenac, mobic.     7. Mildly complex renal cysts: Repeat US in 3-4 months.

## 2020-08-19 NOTE — PROGRESS NOTES
"Individual Follow-Up Form    8/19/2020    Quit Date: 1/26/20    Clinical Status of Patient: Outpatient    Length of Service: 30 minutes    Continuing Medication: yes  Chantix    Other Medications:      Target Symptoms: Withdrawal and medication side effects. The following were  rated moderate (3) to severe (4) on TCRS:  · Moderate (3): none  · Severe (4): none    Comments: Spoke to patient in great length in regard to her smoking cessation quit episode. Patient continues to remain tobacco free as of 1/26/20. Congratulated her on her success. Patient state  "feels that she has this beat" this even  with all the stress lately she has not picked up one cigarette. Patient stated thanks to her  that she has some one to talk to with other family issues going on. She was depressed after finding out that she was not a kidney match for her son, but staying positive. The patient remains on the prescribed tobacco cessation medication regimen of 0.5 mg Chanitix  without any negative side effects at this time. Will see patient 1 more time before benefits run out. The patient denies any abnormal behavioral or mental changes at this time.     Diagnosis: F17.200    Next Visit: 2 weeks    "

## 2020-08-19 NOTE — Clinical Note
"Spoke to patient in great length in regard to her smoking cessation quit episode. Patient continues to remain tobacco free as of 1/26/20. Congratulated her on her success. Patient state  "feels that she has this beat" this even  with all the stress lately she has not picked up one cigarette. Patient stated thanks to her  that she has some one to talk to with other family issues going on. She was depressed after finding out that she was not a kidney match for her son, but staying positive. The patient remains on the prescribed tobacco cessation medication regimen of 0.5 mg Chanitix  without any negative side effects at this time. Will see patient 1 more time before benefits run out. The patient denies any abnormal behavioral or mental changes at this time.  "

## 2020-08-22 NOTE — ASSESSMENT & PLAN NOTE
Tylenol/ibuprofen for pain and fever.   Hand hygiene.   Maintain adequate hydration.   Antihistamine and flonase for nasal congestion and upper respiratory symptoms.   Rest.

## 2020-08-22 NOTE — ASSESSMENT & PLAN NOTE
Giving toradol injection today for pain relief. Continue prophylactic medication as prescribed by neurology.

## 2020-08-25 DIAGNOSIS — Z12.11 SCREENING FOR COLON CANCER: Primary | ICD-10-CM

## 2020-08-26 DIAGNOSIS — Z12.11 SPECIAL SCREENING FOR MALIGNANT NEOPLASM OF COLON: Primary | ICD-10-CM

## 2020-08-27 ENCOUNTER — TELEPHONE (OUTPATIENT)
Dept: PHARMACY | Facility: CLINIC | Age: 65
End: 2020-08-27

## 2020-08-28 ENCOUNTER — DOCUMENTATION ONLY (OUTPATIENT)
Dept: REHABILITATION | Facility: HOSPITAL | Age: 65
End: 2020-08-28

## 2020-08-28 PROBLEM — G89.29 CHRONIC PAIN OF RIGHT KNEE: Status: RESOLVED | Noted: 2020-07-16 | Resolved: 2020-08-28

## 2020-08-28 PROBLEM — M25.561 CHRONIC PAIN OF RIGHT KNEE: Status: RESOLVED | Noted: 2020-07-16 | Resolved: 2020-08-28

## 2020-08-28 PROBLEM — R53.1 GENERALIZED WEAKNESS: Status: RESOLVED | Noted: 2020-07-16 | Resolved: 2020-08-28

## 2020-08-28 PROBLEM — G89.29 CHRONIC PAIN OF LEFT KNEE: Status: RESOLVED | Noted: 2020-07-16 | Resolved: 2020-08-28

## 2020-08-28 PROBLEM — M25.562 CHRONIC PAIN OF LEFT KNEE: Status: RESOLVED | Noted: 2020-07-16 | Resolved: 2020-08-28

## 2020-08-28 PROBLEM — R26.2 DIFFICULTY WALKING: Status: RESOLVED | Noted: 2020-07-16 | Resolved: 2020-08-28

## 2020-08-28 NOTE — PROGRESS NOTES
Outpatient Therapy Discharge Summary     Name: Kenia Alonzo  Clinic Number: [id number]    Therapy Diagnosis:        Encounter Diagnoses   Name Primary?    Chronic pain of right knee      Chronic pain of left knee      Difficulty walking      Generalized weakness        Physician: Yehuda Beck MD     Physician Orders: PT Eval and Treat   Medical Diagnosis from Referral: M17.0 (ICD-10-CM) - Primary osteoarthritis of both knees  Evaluation Date: 7/16/2020     PN DUE: 8/16/2020  Authorization Period Expiration: 8/13/2020  Plan of Care Expiration: 10/16/2020  Visit # / Visits authorized:  2/ 20        Date of Last visit: 7/20/2020  Total Visits Received: 2      Assessment       Goals: no goals met, pt only attended evaluation and one follow up session (cited that family issues were the reason for cancels)  Short Term Goals: 4 weeks   1. Pt will be independent with HEP in order to continue with PT progress at home -progressing, not met   2. Pt will improve B knee extension strength to at least 4+/5 in order to promote improved gait performance and standing tolerance -progressing, not met   3. Pt will demonstrate AROM in B knees 0-120 with no complaints of pain -progressing, not met   4. Pt will report pain at highest of 5/10 or less in B knees -progressing, not met   5. Pt will demonstrate ability to tolerate ambulating for 20 minutes daily as exercise with no complaint of increased knee pain -progressing, not met      Long Term Goals: 8 weeks   1. Pt will improve FOTO to 50% limited or less in order to improve overall QOL and return to PLOF. -progressing, not met   2. Pt will be independent with progressed HEP in order to continue LE strengthening after discharge from PT -progressing, not met   3. Pt will be able to get into and out of the car without increased reports of knee pain -progressing, not met   4. Pt will demonstrate ability to ascend/descend at least 6 steps without increased pain in B knees.  -progressing, not met     Discharge reason: Patient has not attended therapy since 7/20/2020    Plan   This patient is discharged from Physical Therapy    Maame Fitzpatrick, PT  8/28/2020

## 2020-08-31 ENCOUNTER — TELEPHONE (OUTPATIENT)
Dept: OTOLARYNGOLOGY | Facility: CLINIC | Age: 65
End: 2020-08-31

## 2020-08-31 NOTE — TELEPHONE ENCOUNTER
----- Message from Margo Giron sent at 8/31/2020  9:44 AM CDT -----  Contact: patient  Name Of Caller: Kenia     Provider Name:  Jeanine     Does patient feel the need to be seen today?no     Relationship to the Pt?:  patient     Contact Preference?: [phone]    What is the nature of the call?: Patient called and would like to speak to your office for a different appointment because she cannot make it tomorrow at 2pm     She said she cannot make it because she has a dentist appointment that same day     I tried to reschedule but no appointments coming up please call patient as soon as you can

## 2020-09-02 ENCOUNTER — CLINICAL SUPPORT (OUTPATIENT)
Dept: SMOKING CESSATION | Facility: CLINIC | Age: 65
End: 2020-09-02
Payer: COMMERCIAL

## 2020-09-02 DIAGNOSIS — F17.200 NICOTINE DEPENDENCE: Primary | ICD-10-CM

## 2020-09-02 PROCEDURE — 99402 PR PREVENT COUNSEL,INDIV,30 MIN: ICD-10-PCS | Mod: S$GLB,,,

## 2020-09-02 PROCEDURE — 99402 PREV MED CNSL INDIV APPRX 30: CPT | Mod: S$GLB,,,

## 2020-09-02 PROCEDURE — 99999 PR PBB SHADOW E&M-EST. PATIENT-LVL I: CPT | Mod: PBBFAC,,,

## 2020-09-02 PROCEDURE — 99999 PR PBB SHADOW E&M-EST. PATIENT-LVL I: ICD-10-PCS | Mod: PBBFAC,,,

## 2020-09-02 RX ORDER — VARENICLINE TARTRATE 0.5 MG/1
0.5 TABLET, FILM COATED ORAL DAILY
Qty: 30 TABLET | Refills: 0 | Status: SHIPPED | OUTPATIENT
Start: 2020-09-02 | End: 2020-10-30

## 2020-09-02 NOTE — PROGRESS NOTES
Individual Follow-Up Form    9/2/2020    Quit Date: 1/26/20    Clinical Status of Patient: Outpatient    Length of Service: 30 minutes    Continuing Medication: yes  Chantix    Other Medications:      Target Symptoms: Withdrawal and medication side effects. The following were  rated moderate (3) to severe (4) on TCRS:  · Moderate (3): none  · Severe (4): none    Comments:  Spoke to patient at great length this morning. This is patient last session she has finished program and continues to remain tobacco free since 1/26/20. Congratulated her on her success and proud that she did follow through the urges/craves. Patient stated stat she feels so much better health wise less sinus infection and cough. Patient was given contract information if any additional support is needed. The patient remains on the prescribed tobacco cessation medication regimen of 0.5 mg Chantix for one more month without any negative side effects at this time. The patient denies any abnormal behavioral or mental changes at this time.         Diagnosis: F17.200    Next Visit: Finished program

## 2020-09-02 NOTE — Clinical Note
Spoke to patient at great length this morning. This is patient last session she has finished program and continues to remain tobacco free since 1/26/20. Congratulated her on her success and proud that she did follow through the urges/craves. Patient stated stat she feels so much better health wise less sinus infection and cough. Patient was given contract information if any additional support is needed. The patient remains on the prescribed tobacco cessation medication regimen of 0.5 mg Chantix for one more month without any negative side effects at this time. The patient denies any abnormal behavioral or mental changes at this time.

## 2020-09-03 ENCOUNTER — OFFICE VISIT (OUTPATIENT)
Dept: PSYCHIATRY | Facility: CLINIC | Age: 65
End: 2020-09-03
Payer: MEDICAID

## 2020-09-03 DIAGNOSIS — F41.1 GAD (GENERALIZED ANXIETY DISORDER): Primary | ICD-10-CM

## 2020-09-03 PROCEDURE — 90834 PR PSYCHOTHERAPY W/PATIENT, 45 MIN: ICD-10-PCS | Mod: 95,AJ,HB, | Performed by: SOCIAL WORKER

## 2020-09-03 PROCEDURE — 90834 PSYTX W PT 45 MINUTES: CPT | Mod: 95,AJ,HB, | Performed by: SOCIAL WORKER

## 2020-09-08 ENCOUNTER — OFFICE VISIT (OUTPATIENT)
Dept: OTOLARYNGOLOGY | Facility: CLINIC | Age: 65
End: 2020-09-08
Payer: MEDICAID

## 2020-09-08 VITALS
BODY MASS INDEX: 22.74 KG/M2 | SYSTOLIC BLOOD PRESSURE: 147 MMHG | DIASTOLIC BLOOD PRESSURE: 76 MMHG | WEIGHT: 124.31 LBS | TEMPERATURE: 98 F | HEART RATE: 107 BPM

## 2020-09-08 DIAGNOSIS — H61.23 BILATERAL IMPACTED CERUMEN: Primary | ICD-10-CM

## 2020-09-08 PROCEDURE — 99499 UNLISTED E&M SERVICE: CPT | Mod: S$PBB,,, | Performed by: PHYSICIAN ASSISTANT

## 2020-09-08 PROCEDURE — 99999 PR PBB SHADOW E&M-EST. PATIENT-LVL IV: CPT | Mod: PBBFAC,,, | Performed by: PHYSICIAN ASSISTANT

## 2020-09-08 PROCEDURE — 69210 REMOVE IMPACTED EAR WAX UNI: CPT | Mod: PBBFAC | Performed by: PHYSICIAN ASSISTANT

## 2020-09-08 PROCEDURE — 69210 REMOVE IMPACTED EAR WAX UNI: CPT | Mod: S$PBB,,, | Performed by: PHYSICIAN ASSISTANT

## 2020-09-08 PROCEDURE — 99214 OFFICE O/P EST MOD 30 MIN: CPT | Mod: PBBFAC | Performed by: PHYSICIAN ASSISTANT

## 2020-09-08 PROCEDURE — 69210 PR REMOVAL IMPACTED CERUMEN REQUIRING INSTRUMENTATION, UNILATERAL: ICD-10-PCS | Mod: S$PBB,,, | Performed by: PHYSICIAN ASSISTANT

## 2020-09-08 PROCEDURE — 99499 NO LOS: ICD-10-PCS | Mod: S$PBB,,, | Performed by: PHYSICIAN ASSISTANT

## 2020-09-08 PROCEDURE — 99999 PR PBB SHADOW E&M-EST. PATIENT-LVL IV: ICD-10-PCS | Mod: PBBFAC,,, | Performed by: PHYSICIAN ASSISTANT

## 2020-09-08 NOTE — PROGRESS NOTES
Subjective:   Patient ID: Kenia Alonzo is a 65 y.o. female.    Chief Complaint: Other (ear wax)    Patient is here to see me today for evaluation of a possible wax impaction in bilateral ears.  She has complaints of hearing loss in the affected ears, but denies pain or drainage.  This has been an issue in the past.  The patient has not been using any sort of ear drop to soften the wax.    Review of patient's allergies indicates:   Allergen Reactions    Demerol [meperidine] Nausea And Vomiting     Pt refuses Demerol     Codeine Itching           Review of Systems   HENT: Positive for hearing loss.        Procedure Note    CHIEF COMPLAINT:  Cerumen Impaction    Description:  The patient was seated in an exam chair.  An ear speculum was placed in the right EAC and was examined under the microscope.  Suction and/or loop curettes were used to remove a large cerumen impaction.  The tympanic membrane was visualized and was normal in appearance.  The procedure was repeated on the left side in a similar fashion.  The TM was intact and normal on this side as well.  The patient tolerated the procedure well.  Objective:   BP (!) 147/76 (BP Location: Left arm, Patient Position: Sitting, BP Method: Small (Automatic))   Pulse 107   Temp 97.7 °F (36.5 °C) (Tympanic)   Wt 56.4 kg (124 lb 5.4 oz)   BMI 22.74 kg/m²     Physical Exam  HENT:      Right Ear: There is impacted cerumen.      Left Ear: There is impacted cerumen.            Assessment:     1. Bilateral impacted cerumen        Plan:     Bilateral impacted cerumen       Cerumen impaction:  Removed today without difficulty.  I would recommend the use of a wax softening drop, either over the counter Debrox or mineral oil, on a weekly basis.  I also instructed the patient to avoid Qtips.

## 2020-09-10 ENCOUNTER — PATIENT MESSAGE (OUTPATIENT)
Dept: INTERNAL MEDICINE | Facility: CLINIC | Age: 65
End: 2020-09-10

## 2020-09-10 ENCOUNTER — OFFICE VISIT (OUTPATIENT)
Dept: INTERNAL MEDICINE | Facility: CLINIC | Age: 65
End: 2020-09-10
Payer: MEDICAID

## 2020-09-10 VITALS
DIASTOLIC BLOOD PRESSURE: 66 MMHG | OXYGEN SATURATION: 99 % | BODY MASS INDEX: 22.92 KG/M2 | RESPIRATION RATE: 18 BRPM | WEIGHT: 124.56 LBS | HEIGHT: 62 IN | HEART RATE: 95 BPM | TEMPERATURE: 99 F | SYSTOLIC BLOOD PRESSURE: 108 MMHG

## 2020-09-10 DIAGNOSIS — M17.12 PRIMARY OSTEOARTHRITIS OF LEFT KNEE: ICD-10-CM

## 2020-09-10 DIAGNOSIS — F32.A DEPRESSION, UNSPECIFIED DEPRESSION TYPE: ICD-10-CM

## 2020-09-10 DIAGNOSIS — Z23 NEEDS FLU SHOT: Primary | ICD-10-CM

## 2020-09-10 DIAGNOSIS — N18.30 CKD (CHRONIC KIDNEY DISEASE) STAGE 3, GFR 30-59 ML/MIN: ICD-10-CM

## 2020-09-10 PROBLEM — Z87.891 HISTORY OF TOBACCO ABUSE: Status: ACTIVE | Noted: 2018-03-14

## 2020-09-10 PROCEDURE — 99215 OFFICE O/P EST HI 40 MIN: CPT | Mod: PBBFAC,PO | Performed by: FAMILY MEDICINE

## 2020-09-10 PROCEDURE — 99999 PR PBB SHADOW E&M-EST. PATIENT-LVL V: CPT | Mod: PBBFAC,,, | Performed by: FAMILY MEDICINE

## 2020-09-10 PROCEDURE — 90471 IMMUNIZATION ADMIN: CPT | Mod: PBBFAC,PO

## 2020-09-10 PROCEDURE — 99214 OFFICE O/P EST MOD 30 MIN: CPT | Mod: S$PBB,,, | Performed by: FAMILY MEDICINE

## 2020-09-10 PROCEDURE — 99999 PR PBB SHADOW E&M-EST. PATIENT-LVL V: ICD-10-PCS | Mod: PBBFAC,,, | Performed by: FAMILY MEDICINE

## 2020-09-10 PROCEDURE — 99214 PR OFFICE/OUTPT VISIT, EST, LEVL IV, 30-39 MIN: ICD-10-PCS | Mod: S$PBB,,, | Performed by: FAMILY MEDICINE

## 2020-09-10 PROCEDURE — 90750 HZV VACC RECOMBINANT IM: CPT | Mod: PBBFAC,PO

## 2020-09-10 NOTE — PROGRESS NOTES
Subjective:       Patient ID: Kenia Alonzo is a 65 y.o. female.    Chief Complaint: Annual Exam    HPI  Came in today for routine exam.  She is up-to-date on labs as I reviewed them with her from recent Nephrology orders.  Everything appears stable from that standpoint.  She has been continuing to follow-up with Psychiatry and feels she has made a lot of progress in that realm.  Has not smoked any cigarettes since January of this year.  I congratulated her on this accomplishment.    Family History   Problem Relation Age of Onset    Colon cancer Father     Diabetes Father     Diabetes Sister        Current Outpatient Medications:     acyclovir 5% (ZOVIRAX) 5 % ointment, Apply topically 5 (five) times daily., Disp: 30 g, Rfl: 1    azelastine (ASTELIN) 137 mcg (0.1 %) nasal spray, 1 spray (137 mcg total) by Nasal route 2 (two) times daily., Disp: 30 mL, Rfl: 3    bumetanide (BUMEX) 2 MG tablet, Take 0.5 tablets (1 mg total) by mouth once daily. (Patient taking differently: Take 1 mg by mouth once daily. ), Disp: 90 tablet, Rfl: 1    buPROPion (WELLBUTRIN XL) 150 MG TB24 tablet, Take 3 tablets (450 mg total) by mouth once daily., Disp: 90 tablet, Rfl: 2    busPIRone (BUSPAR) 10 MG tablet, Take 1 tablet (10 mg total) by mouth 2 (two) times daily., Disp: 120 tablet, Rfl: 2    C/sourcherry/celery/grape seed (TART CHERRY ORAL), Take 1 tablet by mouth daily as needed. , Disp: , Rfl:     diazePAM (VALIUM) 2 MG tablet, Take 1 tablet (2 mg total) by mouth every 6 (six) hours as needed (dizziness/Meniere's attack)., Disp: 30 tablet, Rfl: 1    dicyclomine (BENTYL) 10 MG capsule, TAKE 1 CAPSULE BY MOUTH THREE TIMES DAILY (Patient taking differently: Take 10 mg by mouth 3 (three) times daily. ), Disp: 90 capsule, Rfl: 1    erenumab-aooe (AIMOVIG AUTOINJECTOR) 140 mg/mL autoinjector, Inject 140 mg into the skin every 28 days., Disp: 1 mL, Rfl: 11    fluticasone propionate (FLONASE) 50 mcg/actuation nasal spray, 2  "sprays (100 mcg total) by Each Nostril route once daily. (Patient taking differently: 2 sprays by Each Nostril route daily as needed. ), Disp: 16 g, Rfl: 3    levocetirizine (XYZAL) 5 MG tablet, TAKE ONE TABLET BY MOUTH EVERY MORNING (Patient taking differently: Take 5 mg by mouth every evening. ), Disp: 90 tablet, Rfl: 3    MELATONIN ORAL, Take by mouth nightly as needed. , Disp: , Rfl:     omega-3 fatty acids/fish oil (FISH OIL-OMEGA-3 FATTY ACIDS) 300-1,000 mg capsule, Take 1 capsule by mouth once daily., Disp: , Rfl:     ondansetron (ZOFRAN-ODT) 4 MG TbDL, Take 1 tablet (4 mg total) by mouth every 8 (eight) hours as needed., Disp: 60 tablet, Rfl: 1    simvastatin (ZOCOR) 5 MG tablet, Take 1 tablet (5 mg total) by mouth nightly., Disp: 90 tablet, Rfl: 3    topiramate (TROKENDI XR) 100 mg Cp24, Take 1 capsule (100 mg total) by mouth once daily. (Patient taking differently: Take 100 mg by mouth nightly. ), Disp: 30 capsule, Rfl: 11    ubrogepant (UBRELVY)tablet 100 mg, Take 1 tablet (100 mg total) by mouth every 72 hours as needed for Migraine., Disp: 9 tablet, Rfl: 3    varenicline (CHANTIX) 0.5 MG Tab, Take 1 tablet (0.5 mg total) by mouth once daily., Disp: 30 tablet, Rfl: 0    vitamin B complex/folic acid (B COMPLEX 100 ORAL), Take by mouth nightly., Disp: , Rfl:     Review of Systems   Constitutional: Negative for chills and fever.   Respiratory: Negative for cough and shortness of breath.    Cardiovascular: Negative for chest pain.   Gastrointestinal: Negative for abdominal pain.   Genitourinary: Positive for vaginal pain (still residual discomfort from surgical procedure).   Skin: Negative for rash.   Neurological: Negative for dizziness.       Objective:   /66 (BP Location: Left arm)   Pulse 95   Temp 98.8 °F (37.1 °C)   Resp 18   Ht 5' 2" (1.575 m)   Wt 56.5 kg (124 lb 9 oz)   SpO2 99%   BMI 22.78 kg/m²      Physical Exam  Vitals signs reviewed.   Constitutional:       Appearance: She " is well-developed.   HENT:      Head: Normocephalic and atraumatic.   Eyes:      Conjunctiva/sclera: Conjunctivae normal.   Cardiovascular:      Rate and Rhythm: Normal rate.   Pulmonary:      Effort: Pulmonary effort is normal. No respiratory distress.   Skin:     General: Skin is warm and dry.      Findings: No rash.   Neurological:      Mental Status: She is alert and oriented to person, place, and time.      Coordination: Coordination normal.   Psychiatric:         Behavior: Behavior normal.         Assessment & Plan     Problem List Items Addressed This Visit        Psychiatric    Depression    Current Assessment & Plan     Improving. Continue f/u with psych            Renal/    CKD (chronic kidney disease) stage 3, GFR 30-59 ml/min    Current Assessment & Plan     Stable. Continue f/u with nephro             Orthopedic    Primary osteoarthritis of left knee    Current Assessment & Plan     Restarting on PT           Other Visit Diagnoses     Needs flu shot    -  Primary    Relevant Orders    Influenza - Quadrivalent *Preferred* (6 months+) (PF)            No follow-ups on file.    Disclaimer:  This note may have been prepared using voice recognition software, it may have not been extensively proofed, as such there could be errors within the text such as sound alike errors.

## 2020-09-11 ENCOUNTER — PATIENT MESSAGE (OUTPATIENT)
Dept: INTERNAL MEDICINE | Facility: CLINIC | Age: 65
End: 2020-09-11

## 2020-09-11 DIAGNOSIS — Z01.00 ROUTINE EYE EXAM: Primary | ICD-10-CM

## 2020-09-11 DIAGNOSIS — F43.21 COMPLICATED GRIEF: ICD-10-CM

## 2020-09-11 RX ORDER — DIAZEPAM 2 MG/1
2 TABLET ORAL EVERY 6 HOURS PRN
Qty: 30 TABLET | Refills: 0 | Status: SHIPPED | OUTPATIENT
Start: 2020-09-11 | End: 2020-09-21 | Stop reason: SDUPTHER

## 2020-09-14 DIAGNOSIS — G43.809 VESTIBULAR MIGRAINE: ICD-10-CM

## 2020-09-14 DIAGNOSIS — K58.9 IRRITABLE BOWEL SYNDROME WITHOUT DIARRHEA: ICD-10-CM

## 2020-09-14 DIAGNOSIS — G44.89 CHRONIC MIXED HEADACHE SYNDROME: ICD-10-CM

## 2020-09-14 RX ORDER — DICYCLOMINE HYDROCHLORIDE 10 MG/1
10 CAPSULE ORAL 3 TIMES DAILY
Qty: 90 CAPSULE | Refills: 1 | Status: SHIPPED | OUTPATIENT
Start: 2020-09-14 | End: 2020-12-21

## 2020-09-14 RX ORDER — TOPIRAMATE 100 MG/1
100 CAPSULE, EXTENDED RELEASE ORAL DAILY
Qty: 30 CAPSULE | Refills: 11 | Status: SHIPPED | OUTPATIENT
Start: 2020-09-14 | End: 2020-09-14

## 2020-09-18 NOTE — PROGRESS NOTES
Individual Psychotherapy Follow-up Visit Progress Note (PhD/LCSW)     Outpatient - Supportive psychotherapy 45 min - CPT Code 34628    Virtual visit with synchronous audio/video, Location of patient: home in [place]    Each patient provided medical services by telemedicine is: (1) informed of the relationship between the provider and patient and the respective role of any other health care provider with respect to management of the patient; and (2) notified that he or she may decline to receive medical services by telemedicine and may withdraw from such care at any time.    9/3/2020  MRN: [id number]  Primary Care Provider: Harish Hernandez DO    Kenia Alonzo is a 65 y.o. female who presents today for follow-up of depression and anxiety. Met with patient.        Subjective:       Patient report: Recent conflict with son's girlfriend, who has reportedly been abusing alcohol and marijuana. Pt is worried about her granddtr and confronted son about his girlfriend's behavior and how it is putting her granddtr at risk. States she will seek guardianship if it continues. Also discussed frustration with a friend of hers, whom she feels is intrusive and judgmental.    From previous visit on 8/6/2020: Discussed recent conflict with family over her aunt, who has dementia and with whom she lives. She felt guilty after lashing out at her niece and cousin. Otherwise reports she is doing okay but is worried about her upcoming surgery. She has been spending a lot of time with her young granddaughter, who is reportedly doing well. No recent conflicts with son and DIL.     Current symptoms:   · Depression: denied  · Anxiety: excessive anxiety/worry, restlessness/keyed up  · Substance abuse: denied  · Cognitive functioning: denied  · Iram: none noted  · Psychosis: none noted    Psychosocial stressors and topics discussed: identifying feelings, symptom recognition/mgmt, treatment progress, goals, coping strategies, interpersonal  issues, boundaries, managing anxiety/panic/stress and challenging thought distortions      Objective:       Mental Status Evaluation  Appearance: unremarkable, age appropriate  Behavior: normal, cooperative  Speech: normal tone, normal rate, normal pitch, normal volume  Mood: anxious, irritable  Affect: congruent and appropriate  Thought Process: normal and logical  Thought Content: normal, no suicidality, no homicidality, delusions, or paranoia  Sensorium: grossly intact  Cognition: grossly intact  Insight: good  Judgment: adequate to circumstances    Risk parameters:  Patient reports no suicidal ideation  Patient reports no homicidal ideation  Patient reports no self-injurious behavior  Patient reports no violent behavior      Assessment & Plan:       Therapeutic interventions used: Educated pt re: mindfulness strategies to manage anxious thoughts and feelings  Pt was taught how to apply relaxation skills to specific situations  Assigned pt to practice relaxation on a daily basis  Utilized acceptance and commitment therapy (ACT) to help pt accept uncomfortable realities  Monitored the effectiveness and side effects of antidepressant medication prescribed by physician/NP/MP  Reinforced pt's successes in reframing cognitive distortions  Taught pt conflict resolution skills such as practicing empathy, active listening, respectful communication, assertiveness and compromise        The patient's response to the interventions is accepting    The patient's progress toward treatment goals is good    Homework assigned: daily journaling and practice relaxation skills daily     Treatment plan:   A. Target symptoms: Depression, Anxiety and Poor Coping Skills   B. Therapeutic modalities: insight oriented psychotherapy, supportive psychotherapy  C. Why chosen therapy is appropriate versus another modality: relevant to diagnosis, evidence based practice   D. Outcome monitoring methods: self-report, observation, feedback from  clinical staff     Visit Diagnosis:   1. JOSIAH (generalized anxiety disorder)        Follow-up: individual psychotherapy and medication management by physician     Return to Clinic: 2 weeks    Length of Service (minutes): 45      CAROL Gasca, LCSW  Outpatient Psychiatry

## 2020-09-21 ENCOUNTER — TELEPHONE (OUTPATIENT)
Dept: PREADMISSION TESTING | Facility: HOSPITAL | Age: 65
End: 2020-09-21

## 2020-09-21 ENCOUNTER — OFFICE VISIT (OUTPATIENT)
Dept: PSYCHIATRY | Facility: CLINIC | Age: 65
End: 2020-09-21
Payer: MEDICAID

## 2020-09-21 DIAGNOSIS — Z01.818 PREOP TESTING: ICD-10-CM

## 2020-09-21 DIAGNOSIS — F43.21 COMPLICATED GRIEF: ICD-10-CM

## 2020-09-21 PROCEDURE — 99213 PR OFFICE/OUTPT VISIT, EST, LEVL III, 20-29 MIN: ICD-10-PCS | Mod: 95,AF,HB, | Performed by: PSYCHIATRY & NEUROLOGY

## 2020-09-21 PROCEDURE — 99213 OFFICE O/P EST LOW 20 MIN: CPT | Mod: 95,AF,HB, | Performed by: PSYCHIATRY & NEUROLOGY

## 2020-09-21 RX ORDER — BUPROPION HYDROCHLORIDE 150 MG/1
450 TABLET ORAL DAILY
Qty: 90 TABLET | Refills: 2 | Status: SHIPPED | OUTPATIENT
Start: 2020-09-21 | End: 2021-02-19 | Stop reason: SDUPTHER

## 2020-09-21 RX ORDER — DIAZEPAM 2 MG/1
2 TABLET ORAL EVERY 6 HOURS PRN
Qty: 60 TABLET | Refills: 2 | Status: SHIPPED | OUTPATIENT
Start: 2020-09-21 | End: 2021-01-07 | Stop reason: SDUPTHER

## 2020-09-21 RX ORDER — BUSPIRONE HYDROCHLORIDE 15 MG/1
15 TABLET ORAL 2 TIMES DAILY
Qty: 60 TABLET | Refills: 2 | Status: SHIPPED | OUTPATIENT
Start: 2020-09-21 | End: 2020-12-21
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
## 2020-09-22 ENCOUNTER — PATIENT MESSAGE (OUTPATIENT)
Dept: ENDOSCOPY | Facility: HOSPITAL | Age: 65
End: 2020-09-22

## 2020-09-23 ENCOUNTER — TELEPHONE (OUTPATIENT)
Dept: ENDOSCOPY | Facility: HOSPITAL | Age: 65
End: 2020-09-23

## 2020-09-23 NOTE — TELEPHONE ENCOUNTER
----- Message from Taylor Gamboa RN sent at 9/23/2020  8:22 AM CDT -----  Contact: NOE    ----- Message -----  From: Virginia Clemons  Sent: 9/23/2020   8:15 AM CDT  To: [facility] Staff    Would like to consult with nurse about reschedule my endo for another day pleas call back [phone]

## 2020-09-23 NOTE — TELEPHONE ENCOUNTER
Spoke with patient in regards to cancelling colonoscopy that is scheduled on 09/29/2020. Patient stated that she will be out of town. Patient stated that she will call office to reschedule, number provided. Pre-procedure covid test was also cancelled.

## 2020-09-25 ENCOUNTER — TELEPHONE (OUTPATIENT)
Dept: PHARMACY | Facility: CLINIC | Age: 65
End: 2020-09-25

## 2020-09-25 ENCOUNTER — TELEPHONE (OUTPATIENT)
Dept: ORTHOPEDICS | Facility: CLINIC | Age: 65
End: 2020-09-25

## 2020-09-28 DIAGNOSIS — M17.0 PRIMARY OSTEOARTHRITIS OF BOTH KNEES: Primary | ICD-10-CM

## 2020-09-28 RX ORDER — AZELASTINE 1 MG/ML
1 SPRAY, METERED NASAL 2 TIMES DAILY
Qty: 30 ML | Refills: 3 | Status: SHIPPED | OUTPATIENT
Start: 2020-09-28 | End: 2021-06-02

## 2020-09-28 RX ORDER — FLUTICASONE PROPIONATE 50 MCG
2 SPRAY, SUSPENSION (ML) NASAL DAILY
Qty: 16 G | Refills: 3 | Status: SHIPPED | OUTPATIENT
Start: 2020-09-28 | End: 2021-07-02

## 2020-09-28 NOTE — TELEPHONE ENCOUNTER
Spoke with the patient and let her know that Dr. Ruiz sent her prescription to the pharmacy on file.        Please see the attached refill request.

## 2020-09-29 ENCOUNTER — PATIENT MESSAGE (OUTPATIENT)
Dept: PSYCHIATRY | Facility: CLINIC | Age: 65
End: 2020-09-29

## 2020-10-01 ENCOUNTER — OFFICE VISIT (OUTPATIENT)
Dept: PSYCHIATRY | Facility: CLINIC | Age: 65
End: 2020-10-01
Payer: MEDICAID

## 2020-10-01 DIAGNOSIS — F33.0 MILD EPISODE OF RECURRENT MAJOR DEPRESSIVE DISORDER: ICD-10-CM

## 2020-10-01 DIAGNOSIS — F41.1 GAD (GENERALIZED ANXIETY DISORDER): Primary | ICD-10-CM

## 2020-10-01 PROCEDURE — 90834 PSYTX W PT 45 MINUTES: CPT | Mod: 95,AJ,HB, | Performed by: SOCIAL WORKER

## 2020-10-01 PROCEDURE — 90834 PR PSYCHOTHERAPY W/PATIENT, 45 MIN: ICD-10-PCS | Mod: 95,AJ,HB, | Performed by: SOCIAL WORKER
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
## 2020-10-09 DIAGNOSIS — Z12.11 COLON CANCER SCREENING: Primary | ICD-10-CM

## 2020-10-12 ENCOUNTER — OFFICE VISIT (OUTPATIENT)
Dept: INTERNAL MEDICINE | Facility: CLINIC | Age: 65
End: 2020-10-12
Payer: MEDICAID

## 2020-10-12 ENCOUNTER — OFFICE VISIT (OUTPATIENT)
Dept: ORTHOPEDICS | Facility: CLINIC | Age: 65
End: 2020-10-12
Payer: MEDICAID

## 2020-10-12 ENCOUNTER — TELEPHONE (OUTPATIENT)
Dept: INTERNAL MEDICINE | Facility: CLINIC | Age: 65
End: 2020-10-12

## 2020-10-12 ENCOUNTER — TELEPHONE (OUTPATIENT)
Dept: ORTHOPEDICS | Facility: CLINIC | Age: 65
End: 2020-10-12

## 2020-10-12 DIAGNOSIS — J30.89 NON-SEASONAL ALLERGIC RHINITIS, UNSPECIFIED TRIGGER: Primary | ICD-10-CM

## 2020-10-12 DIAGNOSIS — M17.0 PRIMARY OSTEOARTHRITIS OF BOTH KNEES: Primary | ICD-10-CM

## 2020-10-12 PROCEDURE — 99999 PR PBB SHADOW E&M-EST. PATIENT-LVL III: CPT | Mod: PBBFAC,,, | Performed by: ORTHOPAEDIC SURGERY

## 2020-10-12 PROCEDURE — 99213 PR OFFICE/OUTPT VISIT, EST, LEVL III, 20-29 MIN: ICD-10-PCS | Mod: 25,S$PBB,, | Performed by: ORTHOPAEDIC SURGERY

## 2020-10-12 PROCEDURE — 20610 DRAIN/INJ JOINT/BURSA W/O US: CPT | Mod: PBBFAC,PO | Performed by: ORTHOPAEDIC SURGERY

## 2020-10-12 PROCEDURE — 20610 LARGE JOINT ASPIRATION/INJECTION: L KNEE: ICD-10-PCS | Mod: S$PBB,LT,, | Performed by: ORTHOPAEDIC SURGERY

## 2020-10-12 PROCEDURE — 99213 OFFICE O/P EST LOW 20 MIN: CPT | Mod: 95,,, | Performed by: NURSE PRACTITIONER

## 2020-10-12 PROCEDURE — 99213 OFFICE O/P EST LOW 20 MIN: CPT | Mod: PBBFAC,PO,25 | Performed by: ORTHOPAEDIC SURGERY

## 2020-10-12 PROCEDURE — 99999 PR PBB SHADOW E&M-EST. PATIENT-LVL III: ICD-10-PCS | Mod: PBBFAC,,, | Performed by: ORTHOPAEDIC SURGERY

## 2020-10-12 PROCEDURE — 99213 OFFICE O/P EST LOW 20 MIN: CPT | Mod: 25,S$PBB,, | Performed by: ORTHOPAEDIC SURGERY

## 2020-10-12 PROCEDURE — 99213 PR OFFICE/OUTPT VISIT, EST, LEVL III, 20-29 MIN: ICD-10-PCS | Mod: 95,,, | Performed by: NURSE PRACTITIONER

## 2020-10-12 RX ORDER — METHYLPREDNISOLONE ACETATE 80 MG/ML
80 INJECTION, SUSPENSION INTRA-ARTICULAR; INTRALESIONAL; INTRAMUSCULAR; SOFT TISSUE
Status: DISCONTINUED | OUTPATIENT
Start: 2020-10-12 | End: 2020-10-12 | Stop reason: HOSPADM

## 2020-10-12 RX ADMIN — METHYLPREDNISOLONE ACETATE 80 MG: 80 INJECTION, SUSPENSION INTRALESIONAL; INTRAMUSCULAR; INTRASYNOVIAL; SOFT TISSUE at 02:10

## 2020-10-12 NOTE — PROCEDURES
Large Joint Aspiration/Injection: L knee    Date/Time: 10/12/2020 2:15 PM  Performed by: Yehuda Beck MD  Authorized by: Yehuda Beck MD     Consent Done?:  Yes (Verbal)  Indications:  Joint swelling and pain  Site marked: the procedure site was marked    Timeout: prior to procedure the correct patient, procedure, and site was verified      Local anesthesia used?: Yes    Local anesthetic:  Bupivacaine 0.5% without epinephrine, lidocaine 1% without epinephrine and topical anesthetic    Details:  Needle Size:  22 G  Approach:  Superior  Location:  Knee  Site:  L knee  Medications:  80 mg methylPREDNISolone acetate 80 mg/mL  Patient tolerance:  Patient tolerated the procedure well with no immediate complications     4cc 1% lidocaine plain, 4cc 0.5% marcaine plain, 1cc 80mg methylprednisolone    Procedure Note:  We discussed the risk and benefits of injections, including pain, infection, bleeding, damage to adjacent structures, risk of reaction to injection. We discussed the steroid/cortisone injections will not heal the problem but mat help decrease inflammation and help with symptoms. We discussed the risk of repeated injections. The patient expressed understanding and wanted to proceed with the injection. We performed a timeout to verify the proper patient, proper procedure, and the proper site. The injection site was prepared in a sterile fashion. The patient tolerated it well and there were no complication. We did discuss with the patient that steroid injections can cause some increase in blood sugar and blood pressure for up to a week after the injection.
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
## 2020-10-12 NOTE — PATIENT INSTRUCTIONS
Assessment:  Kenia Alonzo is a 65 y.o. female with bilateral knee OA failing conservative treatment.    Encounter Diagnosis   Name Primary?    Primary osteoarthritis of both knees Yes        Plan:   Kenia is failing conservative treatment and would like to investigate surgical options.   Refer to Dr. Pop to discuss further   CSI today 4/4/80   Treatment plan was developed with input from the patient/family, and they expressed understanding and agreement with the plan. All questions were answered today.    Follow-up: PRN or sooner if there are any problems between now and then.    Thank you for choosing [facility] and Dr. Yehuda Beck for your orthopedic & sports medicine care. It is our goal to provide you with exceptional care that will help keep you healthy, active, and get you back in the game.    If you felt that you received exemplary care today, please consider leaving us feedback on [employer] at [email or web address].     Please do not hesitate to reach out to us via email, phone, or MyChart with any questions, concerns, or feedback.    If you are experiencing pain/discomfort ,or have questions after 5pm and would like to be connected to the [facility] on-call team, please call this number and specify which Sports Medicine provider is treating you: [phone]

## 2020-10-12 NOTE — PATIENT INSTRUCTIONS

## 2020-10-12 NOTE — ASSESSMENT & PLAN NOTE
Continue OTC antihistamine.   Discouraged against another steroid since she had rx for one at the end of July, and since symptoms have improved.   Continue flonase.

## 2020-10-12 NOTE — PROGRESS NOTES
Patient ID: Kenia Alonzo  YOB: 1955  MRN: [id number]    Chief Complaint: Pain of the Right Knee and Pain of the Left Knee    Referred By: Dr. Hernandez    History of Present Illness: Kenia Alonzo is a 65 y.o. female follow-up left knee pain.    Pain  This is a chronic problem. The current episode started more than 1 year ago. The problem occurs constantly. The problem has been unchanged. Pertinent negatives include no chest pain, chills, fever, nausea or vomiting. The treatment provided no relief.       Past Medical History:   Past Medical History:   Diagnosis Date    Arthritis     knee    Asthma     childhood     Depression     Encounter for blood transfusion     General anesthetics causing adverse effect in therapeutic use     severe headache after crainiotomy    GI problem     IBS    Gout     Headache     Hyperlipidemia     Meniere disease     MVP (mitral valve prolapse)     minor    Vertigo      Past Surgical History:   Procedure Laterality Date    BRAIN SURGERY      vestibular neurectomy    BREAST SURGERY      benign     SECTION      x 1    CHOLECYSTECTOMY      EXAMINATION UNDER ANESTHESIA N/A 2020    Procedure: EXAM UNDER ANESTHESIA;  Surgeon: Lucy Crane MD;  Location: [facility] OR;  Service: OB/GYN;  Laterality: N/A;    LASER ABLATION N/A 2020    Procedure: ABLATION, USING LASER;  Surgeon: Lucy Crane MD;  Location: [facility] OR;  Service: OB/GYN;  Laterality: N/A;    TONSILLECTOMY       Family History   Problem Relation Age of Onset    Colon cancer Father     Diabetes Father     Diabetes Sister      Social History     Socioeconomic History    Marital status:      Spouse name: Not on file    Number of children: Not on file    Years of education: Not on file    Highest education level: Not on file   Occupational History    Not on file   Social Needs    Financial resource strain: Somewhat hard    Food insecurity     Worry:  Sometimes true     Inability: Patient refused    Transportation needs     Medical: No     Non-medical: No   Tobacco Use    Smoking status: Former Smoker     Packs/day: 0.50     Years: 43.00     Pack years: 21.50     Types: Cigarettes     Start date: 1976     Quit date: 2020     Years since quittin.7    Smokeless tobacco: Never Used    Tobacco comment: Quit 20   Substance and Sexual Activity    Alcohol use: Not Currently     Alcohol/week: 1.0 - 3.0 standard drinks     Types: 1 - 3 Glasses of wine per week     Frequency: Monthly or less     Drinks per session: 1 or 2     Binge frequency: Never     Comment: social few times monthly.   No alcohol 72h prior to sx    Drug use: No    Sexual activity: Never     Birth control/protection: None, Post-menopausal   Lifestyle    Physical activity     Days per week: 0 days     Minutes per session: 0 min    Stress: Very much   Relationships    Social connections     Talks on phone: More than three times a week     Gets together: Twice a week     Attends Anabaptist service: Not on file     Active member of club or organization: No     Attends meetings of clubs or organizations: Never     Relationship status:    Other Topics Concern    Not on file   Social History Narrative    Not on file     Medication List with Changes/Refills   Current Medications    ACYCLOVIR 5% (ZOVIRAX) 5 % OINTMENT    Apply topically 5 (five) times daily.    AZELASTINE (ASTELIN) 137 MCG (0.1 %) NASAL SPRAY    1 spray (137 mcg total) by Nasal route 2 (two) times daily.    BUMETANIDE (BUMEX) 2 MG TABLET    Take 0.5 tablets (1 mg total) by mouth once daily.    BUPROPION (WELLBUTRIN XL) 150 MG TB24 TABLET    Take 3 tablets (450 mg total) by mouth once daily.    BUSPIRONE (BUSPAR) 15 MG TABLET    Take 1 tablet (15 mg total) by mouth 2 (two) times daily.    C/SOURCHERRY/CELERY/GRAPE SEED (TART CHERRY ORAL)    Take 1 tablet by mouth daily as needed.     DIAZEPAM (VALIUM) 2 MG  TABLET    Take 1 tablet (2 mg total) by mouth every 6 (six) hours as needed (dizziness/Meniere's attack).    DICYCLOMINE (BENTYL) 10 MG CAPSULE    Take 1 capsule (10 mg total) by mouth 3 (three) times daily.    ERENUMAB-AOOE (AIMOVIG AUTOINJECTOR) 140 MG/ML AUTOINJECTOR    Inject 140 mg into the skin every 28 days.    FLUTICASONE PROPIONATE (FLONASE) 50 MCG/ACTUATION NASAL SPRAY    2 sprays (100 mcg total) by Each Nostril route once daily.    LEVOCETIRIZINE (XYZAL) 5 MG TABLET    TAKE ONE TABLET BY MOUTH EVERY MORNING    MELATONIN ORAL    Take by mouth nightly as needed.     OMEGA-3 FATTY ACIDS/FISH OIL (FISH OIL-OMEGA-3 FATTY ACIDS) 300-1,000 MG CAPSULE    Take 1 capsule by mouth once daily.    ONDANSETRON (ZOFRAN-ODT) 4 MG TBDL    Take 1 tablet (4 mg total) by mouth every 8 (eight) hours as needed.    SIMVASTATIN (ZOCOR) 5 MG TABLET    Take 1 tablet (5 mg total) by mouth nightly.    TROKENDI  MG CP24    TAKE 1 CAPSULE BY MOUTH DAILY    UBROGEPANT (UBRELVY)TABLET 100 MG    Take 1 tablet (100 mg total) by mouth every 72 hours as needed for Migraine.    VARENICLINE (CHANTIX) 0.5 MG TAB    Take 1 tablet (0.5 mg total) by mouth once daily.    VITAMIN B COMPLEX/FOLIC ACID (B COMPLEX 100 ORAL)    Take by mouth nightly.     Review of patient's allergies indicates:   Allergen Reactions    Demerol [meperidine] Nausea And Vomiting     Pt refuses Demerol     Codeine Itching     Review of Systems   Constitution: Negative for chills and fever.   Cardiovascular: Negative for chest pain.   Respiratory: Negative for shortness of breath.    Gastrointestinal: Negative for nausea and vomiting.       Physical Exam:   GENERAL: Well appearing, appropriate for stated age, no acute distress.  CARDIOVASCULAR: Pulses regular by peripheral palpation.  PULMONARY: Respirations are even and non-labored.  NEURO: Awake, alert, and oriented x 3.  PSYCH: Mood & affect are appropriate.  HEENT: Head is normocephalic and atraumatic.    Left  Knee:    Inspection: Normal    Palpation: Medial joint line tenderness to palpation    Range of motion: 0 deg extension - 120 deg flexion    Strength:  5/5 Extension    5/5 Flexion    5/5 Hip Abduction     Stability: Negative ACL/Lachman      Negative Posterior Drawer      Negative MCL/Valgus Stress      Negative LCL/Varus Stress     Patella Exam: Negative J-sign   Negative Patellar apprehension   Positive Patellar grind     N/V Exam:  Tibial:    Normal motor (FHL)                Normal sensory (plantar foot)   Superficial Peroneal:  Normal motor (Peroneals)             Normal sensory (dorsal foot)   Deep Peroneal:  Normal motor (EHL)         Normal sensory (1st web space)   Sural:   Normal sensory (lateral foot)   Saphenous:   Normal sensory (medial lower leg)    Normal pedal pulses, warm and well perfused with capillary refill < 2 sec     Left Knee:    Inspection: Normal    Palpation: Medial joint line tenderness to palpation    Range of motion: 0 deg extension - 120 deg flexion    Strength:  5/5 Extension    5/5 Flexion    5/5 Hip Abduction     Stability: Negative ACL/Lachman      Negative Posterior Drawer      Negative MCL/Valgus Stress      Negative LCL/Varus Stress     Patella Exam: Negative J-sign   Negative Patellar apprehension   Negative Patellar grind     N/V Exam:  Tibial:    Normal motor (FHL)                Normal sensory (plantar foot)   Superficial Peroneal:  Normal motor (Peroneals)             Normal sensory (dorsal foot)   Deep Peroneal:  Normal motor (EHL)         Normal sensory (1st web space)   Sural:   Normal sensory (lateral foot)   Saphenous:   Normal sensory (medial lower leg)    Normal pedal pulses, warm and well perfused with capillary refill < 2 sec           Imaging:  EXAMINATION:  XR KNEE ORTHO BILAT WITH FLEXION     CLINICAL HISTORY:  Pain in right knee     TECHNIQUE:  AP standing of both knees, PA flexion standing views of both knees, and Merchant views of both knees were performed.   Lateral views of both knees were also performed.     COMPARISON:  11/20/2019     FINDINGS:  Right knee: There is no radiographic evidence of acute osseous, articular, or soft tissue abnormality. There is mild-to-moderate joint space loss in the medial compartment with some small marginal osteophytes present.     Left knee:  Probable small joint effusion.  No acute fractures or dislocations visualized.  Moderate to severe joint space loss in the medial compartment with prominent marginal osteophytes present.  Probable small ossified intra-articular body seen posterior to the knee.     Impression:     As above        Electronically signed by: Roque Salomon MD  Date:                                            06/01/2020  Time:                                           15:17    Relevant imaging results reviewed and interpreted by me, discussed with the patient and / or family today. I agree with findings stated above.       Other Tests:     No other tests performed today.    Assessment:  Kenia Alonzo is a 65 y.o. female with bilateral knee OA failing conservative treatment.    Encounter Diagnosis   Name Primary?    Primary osteoarthritis of both knees Yes        Plan:   Kenia is failing conservative treatment and would like to investigate surgical options.   We talked about Refer to Dr. Pop to discuss further, she defers currently   CSI today 4/4/80 for symptomatic relief. She has responded well to csi in the past and had very limited relief from more recent visco   I had a long discussion with the patient about the causes, treatments, and prognosis for knee arthritis. We discussed that although there is not a cure for arthritis, there are effective ways to improve symptoms. I recommended low impact activities such as elliptical and bicycle. I talked about the fact that aquatic and pool therapy is often helpful. I advised the patient to consider good quality stability and cushioned shoes. We talked about  the importance of knee motion in the health of the knee. The patient can also use a compression knee sleeve to help limit swelling and provide proprioceptive feedback. We recommend formal physical therapy or at minimum a home exercise program, which we discussed with the patient. I advised that over the counter solutions such as glucosamine and chondroitin may help with symptoms.   Treatment plan was developed with input from the patient/family, and they expressed understanding and agreement with the plan. All questions were answered today.    Follow-up: PRN or sooner if there are any problems between now and then.    Disclaimer: This note was prepared using a voice recognition system and is likely to have sound alike errors within the text.     I, Alex Blunt, acted as a scribe for Dr. Yehuda Beck for the duration of this office visit.

## 2020-10-12 NOTE — TELEPHONE ENCOUNTER
----- Message from Kelly Mathew sent at 10/12/2020  8:34 AM CDT -----  Type:  Same Day Appointment Request    Caller is requesting a same day appointment.  Caller declined first available appointment listed below.    Name of Caller:pt  When is the first available appointment?nothing coming up on the schedule  Symptoms:allergies  Best Call Back Number:[phone]  Additional Information: .    Thank you

## 2020-10-13 RX ORDER — BUMETANIDE 2 MG/1
2 TABLET ORAL DAILY
Qty: 90 TABLET | Refills: 1 | Status: SHIPPED | OUTPATIENT
Start: 2020-10-13 | End: 2020-12-25 | Stop reason: SDUPTHER

## 2020-10-14 ENCOUNTER — PATIENT MESSAGE (OUTPATIENT)
Dept: GYNECOLOGIC ONCOLOGY | Facility: CLINIC | Age: 65
End: 2020-10-14

## 2020-10-14 ENCOUNTER — TELEPHONE (OUTPATIENT)
Dept: GYNECOLOGIC ONCOLOGY | Facility: CLINIC | Age: 65
End: 2020-10-14

## 2020-10-14 ENCOUNTER — TELEPHONE (OUTPATIENT)
Dept: ORTHOPEDICS | Facility: CLINIC | Age: 65
End: 2020-10-14

## 2020-10-14 ENCOUNTER — PATIENT MESSAGE (OUTPATIENT)
Dept: INTERNAL MEDICINE | Facility: CLINIC | Age: 65
End: 2020-10-14

## 2020-10-14 ENCOUNTER — PATIENT MESSAGE (OUTPATIENT)
Dept: OTOLARYNGOLOGY | Facility: CLINIC | Age: 65
End: 2020-10-14

## 2020-10-14 RX ORDER — PREDNISONE 20 MG/1
TABLET ORAL
Qty: 10 TABLET | Refills: 0 | Status: SHIPPED | OUTPATIENT
Start: 2020-10-14 | End: 2020-10-30

## 2020-10-15 ENCOUNTER — PATIENT MESSAGE (OUTPATIENT)
Dept: INTERNAL MEDICINE | Facility: CLINIC | Age: 65
End: 2020-10-15

## 2020-10-16 ENCOUNTER — PATIENT MESSAGE (OUTPATIENT)
Dept: NEUROLOGY | Facility: CLINIC | Age: 65
End: 2020-10-16

## 2020-10-19 ENCOUNTER — OFFICE VISIT (OUTPATIENT)
Dept: INTERNAL MEDICINE | Facility: CLINIC | Age: 65
End: 2020-10-19
Payer: MEDICAID

## 2020-10-19 ENCOUNTER — PATIENT MESSAGE (OUTPATIENT)
Dept: NEUROLOGY | Facility: CLINIC | Age: 65
End: 2020-10-19

## 2020-10-19 DIAGNOSIS — H81.01 MENIERE'S DISEASE OF RIGHT EAR: ICD-10-CM

## 2020-10-19 DIAGNOSIS — J01.10 ACUTE NON-RECURRENT FRONTAL SINUSITIS: Primary | ICD-10-CM

## 2020-10-19 PROCEDURE — 99213 PR OFFICE/OUTPT VISIT, EST, LEVL III, 20-29 MIN: ICD-10-PCS | Mod: 95,,, | Performed by: NURSE PRACTITIONER

## 2020-10-19 PROCEDURE — 99213 OFFICE O/P EST LOW 20 MIN: CPT | Mod: 95,,, | Performed by: NURSE PRACTITIONER

## 2020-10-19 RX ORDER — AMOXICILLIN AND CLAVULANATE POTASSIUM 875; 125 MG/1; MG/1
1 TABLET, FILM COATED ORAL EVERY 12 HOURS
Qty: 14 TABLET | Refills: 0 | Status: SHIPPED | OUTPATIENT
Start: 2020-10-19 | End: 2020-10-26

## 2020-10-19 RX ORDER — BENZONATATE 100 MG/1
100 CAPSULE ORAL 3 TIMES DAILY PRN
Qty: 30 CAPSULE | Refills: 0 | Status: SHIPPED | OUTPATIENT
Start: 2020-10-19 | End: 2020-10-30

## 2020-10-19 NOTE — PATIENT INSTRUCTIONS
Self-Care for Sinusitis     Drinking plenty of water can help sinuses drain.   Sinusitis can often be managed with self-care. Self-care can keep sinuses moist and make you feel more comfortable. Remember to follow your doctor's instructions closely. This can make a big difference in getting your sinus problem under control.  Drink fluids  Drinking extra fluids helps thin your mucus. This lets it drain from your sinuses more easily. Have a glass of water every hour or two. A humidifier helps in much the same way. Fluids can also offset the drying effects of certain medicines. If you use a humidifier, follow the product maker's instructions on how to use it. Clean it on a regular schedule.  Use saltwater rinses  Rinses help keep your sinuses and nose moist. Mix a teaspoon of salt in 8 ounces of fresh, warm water. Use a bulb syringe to gently squirt the water into your nose a few times a day. You can also buy ready-made saline nasal sprays.  Apply hot or cold packs  Applying heat to the area surrounding your sinuses may make you feel more comfortable. Use a hot water bottle or a hand towel dipped in hot water. Some people also find ice packs effective for relieving pain.  Medicines  Your doctor may prescribe medications to help treat your sinusitis. If you have an infection, antibiotics can help clear it up. If you are prescribed antibiotics, take all pills on schedule until they are gone, even if you feel better. Decongestants help relieve swelling. Use decongestant sprays for short periods only under the direction of your doctor. If you have allergies, your doctor may prescribe medications to help relieve them.   Date Last Reviewed: 10/1/2016  © 6885-5527 [employer]. [address] [place] [address]. All rights reserved. This information is not intended as a substitute for professional medical care. Always follow your healthcare professional's instructions.

## 2020-10-19 NOTE — PROGRESS NOTES
Subjective:       Patient ID: Kenia Alonzo is a 65 y.o. female.    Chief Complaint: No chief complaint on file.    The patient location is: [place]  The chief complaint leading to consultation is: Sinus congestion after prednisone     Visit type: audiovisual    Face to Face time with patient: 15 minutes  15 minutes of total time spent on the encounter, which includes face to face time and non-face to face time preparing to see the patient (eg, review of tests), Obtaining and/or reviewing separately obtained history, Documenting clinical information in the electronic or other health record, Independently interpreting results (not separately reported) and communicating results to the patient/family/caregiver, or Care coordination (not separately reported).      Each patient to whom he or she provides medical services by telemedicine is:  (1) informed of the relationship between the physician and patient and the respective role of any other health care provider with respect to management of the patient; and (2) notified that he or she may decline to receive medical services by telemedicine and may withdraw from such care at any time.    Notes:     Mrs. Alonzo presents to clinic for continued complaints of sinus congestion and headache for 1-2 weeks without relief. Original appt on 10/12/2020. Symptomatic tx and OTC antihistamine recommended. Pt then contacted ENT on 10/14/2020 and sent prednisone, on last day of prednisone rx from ENT. Needs new referral for ENT in order to make appt due to previous referral .      Sore Throat   This is a new problem. The current episode started in the past 7 days. The problem has been gradually worsening. The pain is worse on the left side. There has been no fever. The fever has been present for 1 to 2 days. The pain is at a severity of 4/10. The pain is moderate. Associated symptoms include congestion, coughing, ear pain, headaches, a hoarse voice, a plugged ear  sensation, neck pain and swollen glands. Pertinent negatives include no abdominal pain, diarrhea, drooling, ear discharge, shortness of breath, stridor, trouble swallowing or vomiting. She has had no exposure to strep or mono. She has tried acetaminophen and gargles for the symptoms. The treatment provided mild relief.       Patient Active Problem List   Diagnosis    Depression    Meniere's disease of right ear    Vestibular migraine    Abnormal involuntary movements    Irritable bowel syndrome without diarrhea    Hyperlipidemia    History of tobacco abuse    CKD (chronic kidney disease) stage 3, GFR 30-59 ml/min    Non-seasonal allergic rhinitis    Bronchitis    Chronic mixed headache syndrome    Severe dysplasia of vulva    Complicated grief    Primary osteoarthritis of left knee    Acute conjunctivitis of right eye    Dermatitis of face    Acute non-recurrent frontal sinusitis    Acute gout of right foot    Vulvar dysplasia       Family History   Problem Relation Age of Onset    Colon cancer Father     Diabetes Father     Diabetes Sister      Past Surgical History:   Procedure Laterality Date    BRAIN SURGERY      vestibular neurectomy    BREAST SURGERY      benign     SECTION      x 1    CHOLECYSTECTOMY      EXAMINATION UNDER ANESTHESIA N/A 2020    Procedure: EXAM UNDER ANESTHESIA;  Surgeon: Lucy Crane MD;  Location: [facility] OR;  Service: OB/GYN;  Laterality: N/A;    LASER ABLATION N/A 2020    Procedure: ABLATION, USING LASER;  Surgeon: Lucy Crane MD;  Location: [facility] OR;  Service: OB/GYN;  Laterality: N/A;    TONSILLECTOMY           Current Outpatient Medications:     acyclovir 5% (ZOVIRAX) 5 % ointment, Apply topically 5 (five) times daily., Disp: 30 g, Rfl: 1    azelastine (ASTELIN) 137 mcg (0.1 %) nasal spray, 1 spray (137 mcg total) by Nasal route 2 (two) times daily., Disp: 30 mL, Rfl: 3    bumetanide (BUMEX) 2 MG tablet, Take 1 tablet (2 mg  total) by mouth once daily., Disp: 90 tablet, Rfl: 1    buPROPion (WELLBUTRIN XL) 150 MG TB24 tablet, Take 3 tablets (450 mg total) by mouth once daily., Disp: 90 tablet, Rfl: 2    busPIRone (BUSPAR) 15 MG tablet, Take 1 tablet (15 mg total) by mouth 2 (two) times daily., Disp: 60 tablet, Rfl: 2    C/sourcherry/celery/grape seed (TART CHERRY ORAL), Take 1 tablet by mouth daily as needed. , Disp: , Rfl:     diazePAM (VALIUM) 2 MG tablet, Take 1 tablet (2 mg total) by mouth every 6 (six) hours as needed (dizziness/Meniere's attack)., Disp: 60 tablet, Rfl: 2    dicyclomine (BENTYL) 10 MG capsule, Take 1 capsule (10 mg total) by mouth 3 (three) times daily., Disp: 90 capsule, Rfl: 1    erenumab-aooe (AIMOVIG AUTOINJECTOR) 140 mg/mL autoinjector, Inject 140 mg into the skin every 28 days., Disp: 1 mL, Rfl: 11    fluticasone propionate (FLONASE) 50 mcg/actuation nasal spray, 2 sprays (100 mcg total) by Each Nostril route once daily., Disp: 16 g, Rfl: 3    levocetirizine (XYZAL) 5 MG tablet, TAKE ONE TABLET BY MOUTH EVERY MORNING (Patient taking differently: Take 5 mg by mouth every evening. ), Disp: 90 tablet, Rfl: 3    MELATONIN ORAL, Take by mouth nightly as needed. , Disp: , Rfl:     omega-3 fatty acids/fish oil (FISH OIL-OMEGA-3 FATTY ACIDS) 300-1,000 mg capsule, Take 1 capsule by mouth once daily., Disp: , Rfl:     ondansetron (ZOFRAN-ODT) 4 MG TbDL, Take 1 tablet (4 mg total) by mouth every 8 (eight) hours as needed., Disp: 60 tablet, Rfl: 1    simvastatin (ZOCOR) 5 MG tablet, Take 1 tablet (5 mg total) by mouth nightly., Disp: 90 tablet, Rfl: 3    TROKENDI  mg Cp24, TAKE 1 CAPSULE BY MOUTH DAILY, Disp: 30 capsule, Rfl: 11    ubrogepant (UBRELVY)tablet 100 mg, Take 1 tablet (100 mg total) by mouth once as needed for Migraine (Max 3 times a week)., Disp: 12 tablet, Rfl: 3    varenicline (CHANTIX) 0.5 MG Tab, Take 1 tablet (0.5 mg total) by mouth once daily., Disp: 30 tablet, Rfl: 0    vitamin B  complex/folic acid (B COMPLEX 100 ORAL), Take by mouth nightly., Disp: , Rfl:     amoxicillin-clavulanate 875-125mg (AUGMENTIN) 875-125 mg per tablet, Take 1 tablet by mouth every 12 (twelve) hours. for 7 days, Disp: 14 tablet, Rfl: 0    benzonatate (TESSALON) 100 MG capsule, Take 1 capsule (100 mg total) by mouth 3 (three) times daily as needed for Cough., Disp: 30 capsule, Rfl: 0    predniSONE (DELTASONE) 20 MG tablet, Take 2 tab in am x 3d, then 1 tab in am x 2d, then 1 tab  x 1d, then 1/2 tab in am x 1d, Disp: 10 tablet, Rfl: 0    Review of Systems   Constitutional: Positive for fatigue. Negative for appetite change, chills, diaphoresis and fever.   HENT: Positive for congestion, ear pain, hoarse voice, postnasal drip, rhinorrhea, sinus pressure, sinus pain, sneezing and sore throat. Negative for drooling, ear discharge and trouble swallowing.    Respiratory: Positive for cough. Negative for shortness of breath and stridor.    Cardiovascular: Negative for chest pain and palpitations.   Gastrointestinal: Negative for abdominal pain, diarrhea and vomiting.   Musculoskeletal: Positive for neck pain. Negative for gait problem and myalgias.   Neurological: Positive for headaches. Negative for dizziness and light-headedness.       Objective:   There were no vitals taken for this visit.     Physical Exam  Constitutional:       General: She is not in acute distress.     Appearance: Normal appearance.   HENT:      Head: Normocephalic and atraumatic.   Pulmonary:      Effort: Pulmonary effort is normal. No respiratory distress.   Neurological:      General: No focal deficit present.      Mental Status: She is alert and oriented to person, place, and time.         Assessment & Plan     Problem List Items Addressed This Visit        ENT    Meniere's disease of right ear    Relevant Orders    Ambulatory referral/consult to ENT    Acute non-recurrent frontal sinusitis - Primary    Current Assessment & Plan      "Tylenol/ibuprofen for pain and fever.   Hand hygiene.   Maintain adequate hydration.   Antihistamine and flonase for nasal congestion and upper respiratory symptoms.   Rest.          Relevant Medications    amoxicillin-clavulanate 875-125mg (AUGMENTIN) 875-125 mg per tablet    benzonatate (TESSALON) 100 MG capsule    Other Relevant Orders    Ambulatory referral/consult to ENT        Follow up if symptoms worsen or fail to improve.            Portions of this note may have been created with voice recognition software. Occasional "wrong-word" or "sound-a-like" substitutions may have occurred due to the inherent limitations of voice recognition software. Please, read the note carefully and recognize, using context, where substitutions have occurred.       "

## 2020-10-20 ENCOUNTER — OFFICE VISIT (OUTPATIENT)
Dept: PSYCHIATRY | Facility: CLINIC | Age: 65
End: 2020-10-20
Payer: MEDICAID

## 2020-10-20 ENCOUNTER — PATIENT MESSAGE (OUTPATIENT)
Dept: NEUROLOGY | Facility: CLINIC | Age: 65
End: 2020-10-20

## 2020-10-20 DIAGNOSIS — F33.0 MILD EPISODE OF RECURRENT MAJOR DEPRESSIVE DISORDER: ICD-10-CM

## 2020-10-20 DIAGNOSIS — F41.1 GAD (GENERALIZED ANXIETY DISORDER): Primary | ICD-10-CM

## 2020-10-20 PROCEDURE — 90834 PR PSYCHOTHERAPY W/PATIENT, 45 MIN: ICD-10-PCS | Mod: 95,AJ,HB, | Performed by: SOCIAL WORKER

## 2020-10-20 PROCEDURE — 90834 PSYTX W PT 45 MINUTES: CPT | Mod: 95,AJ,HB, | Performed by: SOCIAL WORKER

## 2020-10-21 ENCOUNTER — PATIENT MESSAGE (OUTPATIENT)
Dept: INTERNAL MEDICINE | Facility: CLINIC | Age: 65
End: 2020-10-21

## 2020-10-21 ENCOUNTER — PATIENT MESSAGE (OUTPATIENT)
Dept: NEUROLOGY | Facility: CLINIC | Age: 65
End: 2020-10-21

## 2020-10-21 DIAGNOSIS — G44.89 CHRONIC MIXED HEADACHE SYNDROME: Primary | ICD-10-CM

## 2020-10-21 RX ORDER — KETOROLAC TROMETHAMINE 10 MG/1
10 TABLET, FILM COATED ORAL ONCE AS NEEDED
Qty: 12 TABLET | Refills: 0 | Status: SHIPPED | OUTPATIENT
Start: 2020-10-21 | End: 2020-11-25

## 2020-10-21 RX ORDER — LASMIDITAN 100 MG/1
100 TABLET ORAL ONCE AS NEEDED
Qty: 12 TABLET | Refills: 3 | Status: SHIPPED | OUTPATIENT
Start: 2020-10-21 | End: 2020-10-21

## 2020-10-23 ENCOUNTER — SPECIALTY PHARMACY (OUTPATIENT)
Dept: PHARMACY | Facility: CLINIC | Age: 65
End: 2020-10-23

## 2020-10-23 NOTE — TELEPHONE ENCOUNTER
Specialty Pharmacy - Refill Coordination    Specialty Medication Orders Linked to Encounter      Most Recent Value   Medication #1  erenumab-aooe (AIMOVIG AUTOINJECTOR) 140 mg/mL autoinjector (Order#[id number], Rx#[id number])          Refill Questions - Documented Responses      Most Recent Value   Relationship to patient of person spoken to?  Self   HIPAA/medical authority confirmed?  Yes   Any changes in contact preferences or allowed representatives?  No   Has the patient had any insurance changes?  No   Has the patient had any changes to specialty medication, dose, or instructions?  No   Has the patient started taking any new medications, herbals, or supplements?  No   Has the patient been diagnosed with any new medical conditions?  No   Does the patient have any new allergies to medications or foods?  No   Does the patient have any concerns about side effects?  No   Can the patient store medication/sharps container properly (at the correct temperature, away from children/pets, etc.)?  Yes   Can the patient call emergency services (911) in the event of an emergency?  Yes   Does the patient have any concerns or questions about taking or administering this medication as prescribed?  No   How many doses did the patient miss in the past 4 weeks or since the last fill?  0   How many doses does the patient have on hand?  0   How many days does the patient report on hand quantity will last?  0   Does the number of doses/days supply remaining match pharmacy expected amounts?  Yes   How will the patient receive the medication?  Mail   When does the patient need to receive the medication?  10/28/20   Shipping Address  Home   Address in [facility] confirmed and updated if neccessary?  Yes   Expected Copay ($)  0   Is the patient able to afford the medication copay?  Yes   Payment Method  zero copay   Days supply of Refill  28   Would patient like to speak to a pharmacist?  No   Do you want to trigger an  intervention?  No   Do you want to trigger an additional referral task?  No   Refill activity completed?  Yes   Refill activity plan  Refill scheduled   Shipment/Pickup Date:  10/27/20          Current Outpatient Medications   Medication Sig    acyclovir 5% (ZOVIRAX) 5 % ointment Apply topically 5 (five) times daily.    amoxicillin-clavulanate 875-125mg (AUGMENTIN) 875-125 mg per tablet Take 1 tablet by mouth every 12 (twelve) hours. for 7 days    azelastine (ASTELIN) 137 mcg (0.1 %) nasal spray 1 spray (137 mcg total) by Nasal route 2 (two) times daily.    benzonatate (TESSALON) 100 MG capsule Take 1 capsule (100 mg total) by mouth 3 (three) times daily as needed for Cough.    bumetanide (BUMEX) 2 MG tablet Take 1 tablet (2 mg total) by mouth once daily.    buPROPion (WELLBUTRIN XL) 150 MG TB24 tablet Take 3 tablets (450 mg total) by mouth once daily.    busPIRone (BUSPAR) 15 MG tablet Take 1 tablet (15 mg total) by mouth 2 (two) times daily.    C/sourcherry/celery/grape seed (TART CHERRY ORAL) Take 1 tablet by mouth daily as needed.     diazePAM (VALIUM) 2 MG tablet Take 1 tablet (2 mg total) by mouth every 6 (six) hours as needed (dizziness/Meniere's attack).    dicyclomine (BENTYL) 10 MG capsule Take 1 capsule (10 mg total) by mouth 3 (three) times daily.    erenumab-aooe (AIMOVIG AUTOINJECTOR) 140 mg/mL autoinjector Inject 140 mg into the skin every 28 days.    fluticasone propionate (FLONASE) 50 mcg/actuation nasal spray 2 sprays (100 mcg total) by Each Nostril route once daily.    levocetirizine (XYZAL) 5 MG tablet TAKE ONE TABLET BY MOUTH EVERY MORNING (Patient taking differently: Take 5 mg by mouth every evening. )    MELATONIN ORAL Take by mouth nightly as needed.     omega-3 fatty acids/fish oil (FISH OIL-OMEGA-3 FATTY ACIDS) 300-1,000 mg capsule Take 1 capsule by mouth once daily.    ondansetron (ZOFRAN-ODT) 4 MG TbDL Take 1 tablet (4 mg total) by mouth every 8 (eight) hours as needed.     predniSONE (DELTASONE) 20 MG tablet Take 2 tab in am x 3d, then 1 tab in am x 2d, then 1 tab  x 1d, then 1/2 tab in am x 1d    simvastatin (ZOCOR) 5 MG tablet Take 1 tablet (5 mg total) by mouth nightly.    TROKENDI  mg Cp24 TAKE 1 CAPSULE BY MOUTH DAILY    varenicline (CHANTIX) 0.5 MG Tab Take 1 tablet (0.5 mg total) by mouth once daily.    vitamin B complex/folic acid (B COMPLEX 100 ORAL) Take by mouth nightly.   Last reviewed on 10/20/2020  3:21 PM by Beatriz Holloway LCSW    Review of patient's allergies indicates:   Allergen Reactions    Demerol [meperidine] Nausea And Vomiting     Pt refuses Demerol     Codeine Itching    Last reviewed on  10/23/2020 2:45 PM by Elmer Calvert      Tasks added this encounter   11/15/2020 - Refill Call (Auto Added)   Tasks due within next 3 months   No tasks due.     Elmer Calvert  [facility]  [address] [place] [id number]  Phone: [phone]  Fax: [phone]

## 2020-10-23 NOTE — TELEPHONE ENCOUNTER
Incoming call from pt for Aimovig refill. Pt next dose is due on 10/30. Shipping out on 10/27 for 10/28 arrival

## 2020-10-26 ENCOUNTER — OFFICE VISIT (OUTPATIENT)
Dept: OTOLARYNGOLOGY | Facility: CLINIC | Age: 65
End: 2020-10-26
Payer: MEDICAID

## 2020-10-26 ENCOUNTER — TELEPHONE (OUTPATIENT)
Dept: OTOLARYNGOLOGY | Facility: CLINIC | Age: 65
End: 2020-10-26

## 2020-10-26 DIAGNOSIS — J30.2 SEASONAL ALLERGIC RHINITIS, UNSPECIFIED TRIGGER: Primary | ICD-10-CM

## 2020-10-26 DIAGNOSIS — J01.00 ACUTE NON-RECURRENT MAXILLARY SINUSITIS: ICD-10-CM

## 2020-10-26 DIAGNOSIS — R05.9 COUGH: ICD-10-CM

## 2020-10-26 PROCEDURE — 99213 OFFICE O/P EST LOW 20 MIN: CPT | Mod: 95,,, | Performed by: ORTHOPAEDIC SURGERY

## 2020-10-26 PROCEDURE — 99213 PR OFFICE/OUTPT VISIT, EST, LEVL III, 20-29 MIN: ICD-10-PCS | Mod: 95,,, | Performed by: ORTHOPAEDIC SURGERY

## 2020-10-26 RX ORDER — MONTELUKAST SODIUM 10 MG/1
10 TABLET ORAL NIGHTLY
Qty: 30 TABLET | Refills: 12 | Status: SHIPPED | OUTPATIENT
Start: 2020-10-26 | End: 2020-11-25

## 2020-10-26 NOTE — TELEPHONE ENCOUNTER
Spoke with the patient and got her X-rays scheduled for tomorrow at 1pm at the [facility] location.       ----- Message from Nikole Ruiz MD sent at 10/26/2020  1:31 PM CDT -----  Please call and schedule xrays at [facility]

## 2020-10-26 NOTE — PROGRESS NOTES
"Subjective:      Patient ID: Kenia Alnozo is a 65 y.o. female.    Chief Complaint: No chief complaint on file.    The patient location is: home  The chief complaint leading to consultation is: flare of allergy symptoms    Visit type: audiovisual    Face to Face time with patient: 20 minutes  30 minutes of total time spent on the encounter, which includes face to face time and non-face to face time preparing to see the patient (eg, review of tests), Obtaining and/or reviewing separately obtained history, Documenting clinical information in the electronic or other health record, Independently interpreting results (not separately reported) and communicating results to the patient/family/caregiver, or Care coordination (not separately reported).         Each patient to whom he or she provides medical services by telemedicine is:  (1) informed of the relationship between the physician and patient and the respective role of any other health care provider with respect to management of the patient; and (2) notified that he or she may decline to receive medical services by telemedicine and may withdraw from such care at any time.    Notes:     Patient is a 65 year old female seen today via telemedicine for evaluation of recently increased allergy symptoms.  She was outside [facility], and then noted increased nasal congestion, headache, as well as subjective swelling of her "glands."  She is now having pain over her midface.  She was placed on a course of steroids from Sherry Solorzano ([facility] in E[facility]).  She took some Augmentin that she had at home, and then was given a course of Augmentin from PA with PCP office.  She is not having any thick green nasal congestion.  She has a sensation that it is "in her chest," but she is not coughing.  She is using Flonase, Astelin, and Xyzal daily.  She has been on Singulair in the past, and did find it to be helpful.  She has stopped smoking in January.  She is currently out of one of " her abortive migraine medications.        Review of Systems   Constitutional: Negative for chills, fatigue, fever and unexpected weight change.   HENT: Positive for congestion, postnasal drip and sinus pressure. Negative for dental problem, ear discharge, ear pain, facial swelling, hearing loss, nosebleeds, rhinorrhea, sneezing, sore throat, tinnitus, trouble swallowing and voice change.    Eyes: Negative for redness, itching and visual disturbance.   Respiratory: Positive for cough. Negative for choking, shortness of breath and wheezing.    Cardiovascular: Negative for chest pain and palpitations.   Gastrointestinal: Positive for nausea. Negative for abdominal pain.        No reflux.   Musculoskeletal: Negative for gait problem.   Skin: Negative for rash.   Allergic/Immunologic: Positive for environmental allergies.   Neurological: Positive for dizziness and headaches. Negative for light-headedness.       Objective:       Physical Exam  Constitutional:       Appearance: She is well-developed. She is not diaphoretic.   HENT:      Head: Normocephalic and atraumatic.      Right Ear: No decreased hearing noted.      Left Ear: No decreased hearing noted.      Nose: Nose normal.   Eyes:      General: Lids are normal.      Conjunctiva/sclera: Conjunctivae normal.   Pulmonary:      Effort: Pulmonary effort is normal. No respiratory distress.   Skin:     Findings: No rash.   Neurological:      Mental Status: She is alert.   Psychiatric:         Attention and Perception: Attention normal.         Speech: Speech normal.         Behavior: Behavior normal.         Thought Content: Thought content normal.         Assessment:       1. Seasonal allergic rhinitis, unspecified trigger    2. Acute non-recurrent maxillary sinusitis    3. Cough        Plan:     Seasonal allergic rhinitis, unspecified trigger  -     X-Ray Sinuses Min 3 Views; Future; Expected date: 10/26/2020  -     X-Ray Chest PA And Lateral; Future; Expected date:  10/26/2020    Acute non-recurrent maxillary sinusitis  -     X-Ray Sinuses Min 3 Views; Future; Expected date: 10/26/2020  -     X-Ray Chest PA And Lateral; Future; Expected date: 10/26/2020    Cough  -     X-Ray Sinuses Min 3 Views; Future; Expected date: 10/26/2020  -     X-Ray Chest PA And Lateral; Future; Expected date: 10/26/2020    Other orders  -     montelukast (SINGULAIR) 10 mg tablet; Take 1 tablet (10 mg total) by mouth every evening.  Dispense: 30 tablet; Refill: 12      Patient is continuing to have allergy symptoms, did improve with oral prednisone but have now return.  I would recommend she increase her Astelin Flonase to twice daily for each of those medications will also send in a prescription for Singulair for her to add to her current regimen.  She will please call with her progress in 2 weeks.  I would also recommend an x-ray of her chest as well as sinuses to ensure that there is no underlying infection.  At this point, she has no other symptoms of acute sinusitis, she is not having any purulent nasal drainage, no fevers, etc.  I would not recommend additional antibiotic treatment if xrays are normal.

## 2020-10-27 ENCOUNTER — HOSPITAL ENCOUNTER (OUTPATIENT)
Dept: RADIOLOGY | Facility: HOSPITAL | Age: 65
Discharge: HOME | End: 2020-10-27
Attending: ORTHOPAEDIC SURGERY
Payer: MEDICAID

## 2020-10-27 DIAGNOSIS — R05.9 COUGH: ICD-10-CM

## 2020-10-27 DIAGNOSIS — J30.2 SEASONAL ALLERGIC RHINITIS, UNSPECIFIED TRIGGER: ICD-10-CM

## 2020-10-27 DIAGNOSIS — J01.00 ACUTE NON-RECURRENT MAXILLARY SINUSITIS: ICD-10-CM

## 2020-10-27 PROCEDURE — 70220 X-RAY EXAM OF SINUSES: CPT | Mod: TC,PO

## 2020-10-27 PROCEDURE — 71046 XR CHEST PA AND LATERAL: ICD-10-PCS | Mod: 26,,, | Performed by: RADIOLOGY

## 2020-10-27 PROCEDURE — 70220 X-RAY EXAM OF SINUSES: CPT | Mod: 26,,, | Performed by: RADIOLOGY

## 2020-10-27 PROCEDURE — 70220 XR SINUSES MIN 3 VIEWS: ICD-10-PCS | Mod: 26,,, | Performed by: RADIOLOGY

## 2020-10-27 PROCEDURE — 71046 X-RAY EXAM CHEST 2 VIEWS: CPT | Mod: 26,,, | Performed by: RADIOLOGY

## 2020-10-27 PROCEDURE — 71046 X-RAY EXAM CHEST 2 VIEWS: CPT | Mod: TC,PO

## 2020-10-28 ENCOUNTER — PATIENT MESSAGE (OUTPATIENT)
Dept: OTOLARYNGOLOGY | Facility: CLINIC | Age: 65
End: 2020-10-28

## 2020-10-29 ENCOUNTER — PATIENT OUTREACH (OUTPATIENT)
Dept: ADMINISTRATIVE | Facility: OTHER | Age: 65
End: 2020-10-29

## 2020-10-29 NOTE — PROGRESS NOTES
Health Maintenance Due   Topic Date Due    HIV Screening  06/28/1970    Colorectal Cancer Screening  04/30/2020     Updates were requested from care everywhere.  Chart was reviewed for overdue Proactive [facility] Encounters (DANIELLE) topics (CRS, Breast Cancer Screening, Eye exam)  Health Maintenance has been updated.  LIN[place] immunization registry triggered.  Immunizations were reconciled.

## 2020-10-30 ENCOUNTER — PATIENT MESSAGE (OUTPATIENT)
Dept: NEUROLOGY | Facility: CLINIC | Age: 65
End: 2020-10-30

## 2020-10-30 ENCOUNTER — PATIENT MESSAGE (OUTPATIENT)
Dept: INTERNAL MEDICINE | Facility: CLINIC | Age: 65
End: 2020-10-30

## 2020-10-30 ENCOUNTER — OFFICE VISIT (OUTPATIENT)
Dept: NEUROLOGY | Facility: CLINIC | Age: 65
End: 2020-10-30
Payer: MEDICAID

## 2020-10-30 VITALS — BODY MASS INDEX: 22.78 KG/M2 | HEIGHT: 62 IN

## 2020-10-30 DIAGNOSIS — Z87.891 HISTORY OF TOBACCO ABUSE: ICD-10-CM

## 2020-10-30 DIAGNOSIS — J40 BRONCHITIS: ICD-10-CM

## 2020-10-30 DIAGNOSIS — J30.89 NON-SEASONAL ALLERGIC RHINITIS, UNSPECIFIED TRIGGER: ICD-10-CM

## 2020-10-30 DIAGNOSIS — J01.10 ACUTE NON-RECURRENT FRONTAL SINUSITIS: ICD-10-CM

## 2020-10-30 DIAGNOSIS — G43.809 VESTIBULAR MIGRAINE: Primary | ICD-10-CM

## 2020-10-30 DIAGNOSIS — E78.5 HYPERLIPIDEMIA, UNSPECIFIED HYPERLIPIDEMIA TYPE: ICD-10-CM

## 2020-10-30 DIAGNOSIS — Z12.11 SPECIAL SCREENING FOR MALIGNANT NEOPLASM OF COLON: Primary | ICD-10-CM

## 2020-10-30 DIAGNOSIS — G44.89 CHRONIC MIXED HEADACHE SYNDROME: ICD-10-CM

## 2020-10-30 DIAGNOSIS — K58.9 IRRITABLE BOWEL SYNDROME WITHOUT DIARRHEA: ICD-10-CM

## 2020-10-30 DIAGNOSIS — F32.A DEPRESSION, UNSPECIFIED DEPRESSION TYPE: ICD-10-CM

## 2020-10-30 DIAGNOSIS — H81.01 MENIERE'S DISEASE OF RIGHT EAR: ICD-10-CM

## 2020-10-30 DIAGNOSIS — M17.12 PRIMARY OSTEOARTHRITIS OF LEFT KNEE: ICD-10-CM

## 2020-10-30 DIAGNOSIS — L30.9 DERMATITIS OF FACE: ICD-10-CM

## 2020-10-30 DIAGNOSIS — M10.9 ACUTE GOUT OF RIGHT FOOT, UNSPECIFIED CAUSE: ICD-10-CM

## 2020-10-30 DIAGNOSIS — N90.3 VULVAR DYSPLASIA: ICD-10-CM

## 2020-10-30 DIAGNOSIS — D07.1 SEVERE DYSPLASIA OF VULVA: ICD-10-CM

## 2020-10-30 PROCEDURE — 99214 OFFICE O/P EST MOD 30 MIN: CPT | Mod: 95,,, | Performed by: NURSE PRACTITIONER

## 2020-10-30 PROCEDURE — 99214 PR OFFICE/OUTPT VISIT, EST, LEVL IV, 30-39 MIN: ICD-10-PCS | Mod: 95,,, | Performed by: NURSE PRACTITIONER

## 2020-10-30 RX ORDER — TOPIRAMATE 100 MG/1
1 CAPSULE, EXTENDED RELEASE ORAL DAILY
Qty: 30 CAPSULE | Refills: 11 | OUTPATIENT
Start: 2020-10-30 | End: 2020-10-30 | Stop reason: SDUPTHER

## 2020-10-30 RX ORDER — UBROGEPANT 100 MG/1
100 TABLET ORAL ONCE AS NEEDED
Qty: 10 TABLET | Refills: 0 | Status: SHIPPED | OUTPATIENT
Start: 2020-10-30 | End: 2020-10-30

## 2020-10-30 RX ORDER — ERENUMAB-AOOE 140 MG/ML
140 INJECTION, SOLUTION SUBCUTANEOUS
Qty: 1 ML | Refills: 11 | Status: SHIPPED | OUTPATIENT
Start: 2020-10-30 | End: 2021-11-08 | Stop reason: SDUPTHER

## 2020-10-30 RX ORDER — UBROGEPANT 100 MG/1
100 TABLET ORAL ONCE AS NEEDED
Qty: 10 TABLET | Refills: 0 | OUTPATIENT
Start: 2020-10-30 | End: 2020-10-30

## 2020-10-30 RX ORDER — TOPIRAMATE 100 MG/1
1 CAPSULE, EXTENDED RELEASE ORAL DAILY
Qty: 30 CAPSULE | Refills: 11 | Status: SHIPPED | OUTPATIENT
Start: 2020-10-30 | End: 2020-11-29

## 2020-10-30 NOTE — PROGRESS NOTES
Subjective:       Patient ID: Kenia Alonzo is a 65 y.o. female.    Chief Complaint: vestibular migraine    HPI       BACKGROUND HISTORY       The patient was referred by Dr. Ruiz for evaluation.    The patient is here for intractable headaches. The patient has had migraine headaches throughout her adult life and improved afte menopause but seem to recur. In the past she failed Elavil, VPA and Inderal. TPM helped significantly but caused memory loss. PRN Imitrex helped but caused palpitation and chest tightness.  The headaches are 2-3 times a week, 08-10/10, throbbing, holocephalic, last for several hours and associated with nausea, light and noise sensitivity. The patient has been under tremendous stress related to her son's health problems. In addition, she has a longstanding history of Ménière's Disease  S/P vestibular nerve section in the late 1990's.  The patient has also noted recurrence of Ménière's Disease's symptoms. Started her on Emgality 120 mg SQ monthly. Ordered Brain MRI.Emgality did help tremendously but unfortunately she broke the last 2 pens and the headaches have recurred. 05- Brain MRI Unremarkable. Ultimately changed Emgality to Aimovig 140 mg SQ monthly and prescribed her Ubrogepant (Ubrelvy) 100 mg PRN. Also changed TPM to  mg QD.   Aimovig 140 mg SQ monthly and TPM  mg QD with Ubrogepant (Ubrelvy) 100 mg PRN have helped tremendously.        INTERVAL HISTORY   Patient is a Patient of Dr. Matthews new to me. Patient reports due to the barometric pressure changes she has had an acute worsening of migraines this month but states she is much better now. The patient reports she only had 2 free headaches a days per week in Oct. Until she got additional medication to help break her migraine. The Patient was prescribed Ubrevely intially but her insurance denied coverage, the patient was then sent in lasmiditan (REYVOW) tablet 100 mg (Patient has not tried) and Ketorolac (TORADOL)  10 mg tablet prn max 3 times per week. The patient reports the Toradol was effective and it helped tremendously. The patient states it helped to break her intractable migraine. The patient states she is happy with the   Aimovig 140 mg SQ monthly and TPM  mg QD with Ubrogepant (Ubrelvy) 100 mg PRN have helped greatly and wish to continue regiment.       The originating site (patient location) is: Home.      The distant site (neurologist location) is: Neurology Clinic at [facility].      The chief complaint leading to consultation is: F/U Migraine       Visit type: Virtual visit with synchronous audio and video.      Total time spent with patient: 20 minutes       Special circumstances: This visit occurred during COVID-19 Pandemic Public Health Emergency.       Consent: The patient verbally consented to participating in the video visit and informed that may decline to receive medical services by telemedicine and may withdraw from such care at any time.      I discussed with the patient the nature of our telemedicine visits, that:      I  would evaluate the patient and recommend diagnostics and treatments based on my assessment.    Our sessions are not being recorded and that personal health information is protected.    Our team would provide follow up care in person if/when the patient needs it.    Virtual (video/telemedicine) visits have significant limitations. A telemedicine exam is primarily focused on the history and what I can observe. Several critical parts of the neurological exam cannot be performed.          Review of Systems   Constitutional: Negative for appetite change and fatigue.   HENT: Positive for hearing loss. Negative for tinnitus.    Eyes: Negative for photophobia and visual disturbance.   Respiratory: Negative for apnea and shortness of breath.    Cardiovascular: Negative for chest pain and palpitations.   Gastrointestinal: Negative for nausea and vomiting.   Endocrine: Negative  for cold intolerance and heat intolerance.   Genitourinary: Negative for difficulty urinating and urgency.   Musculoskeletal: Negative for arthralgias, back pain, gait problem, joint swelling, myalgias, neck pain and neck stiffness.   Skin: Negative for color change and rash.   Allergic/Immunologic: Negative for environmental allergies and immunocompromised state.   Neurological: Positive for dizziness and headaches. Negative for tremors, seizures, syncope, facial asymmetry, speech difficulty, weakness, light-headedness and numbness.   Hematological: Negative for adenopathy. Does not bruise/bleed easily.   Psychiatric/Behavioral: Negative for agitation, behavioral problems, confusion, decreased concentration, dysphoric mood, hallucinations, self-injury, sleep disturbance and suicidal ideas. The patient is not hyperactive.          Current Outpatient Medications:     acyclovir 5% (ZOVIRAX) 5 % ointment, Apply topically 5 (five) times daily., Disp: 30 g, Rfl: 1    azelastine (ASTELIN) 137 mcg (0.1 %) nasal spray, 1 spray (137 mcg total) by Nasal route 2 (two) times daily., Disp: 30 mL, Rfl: 3    bumetanide (BUMEX) 2 MG tablet, Take 1 tablet (2 mg total) by mouth once daily., Disp: 90 tablet, Rfl: 1    buPROPion (WELLBUTRIN XL) 150 MG TB24 tablet, Take 3 tablets (450 mg total) by mouth once daily., Disp: 90 tablet, Rfl: 2    busPIRone (BUSPAR) 15 MG tablet, Take 1 tablet (15 mg total) by mouth 2 (two) times daily., Disp: 60 tablet, Rfl: 2    C/sourcherry/celery/grape seed (TART CHERRY ORAL), Take 1 tablet by mouth daily as needed. , Disp: , Rfl:     diazePAM (VALIUM) 2 MG tablet, Take 1 tablet (2 mg total) by mouth every 6 (six) hours as needed (dizziness/Meniere's attack)., Disp: 60 tablet, Rfl: 2    dicyclomine (BENTYL) 10 MG capsule, Take 1 capsule (10 mg total) by mouth 3 (three) times daily., Disp: 90 capsule, Rfl: 1    erenumab-aooe (AIMOVIG AUTOINJECTOR) 140 mg/mL autoinjector, Inject 140 mg into the  skin every 28 days., Disp: 1 mL, Rfl: 11    fluticasone propionate (FLONASE) 50 mcg/actuation nasal spray, 2 sprays (100 mcg total) by Each Nostril route once daily., Disp: 16 g, Rfl: 3    levocetirizine (XYZAL) 5 MG tablet, TAKE ONE TABLET BY MOUTH EVERY MORNING (Patient taking differently: Take 5 mg by mouth every evening. ), Disp: 90 tablet, Rfl: 3    MELATONIN ORAL, Take by mouth nightly as needed. , Disp: , Rfl:     montelukast (SINGULAIR) 10 mg tablet, Take 1 tablet (10 mg total) by mouth every evening., Disp: 30 tablet, Rfl: 12    omega-3 fatty acids/fish oil (FISH OIL-OMEGA-3 FATTY ACIDS) 300-1,000 mg capsule, Take 1 capsule by mouth once daily., Disp: , Rfl:     ondansetron (ZOFRAN-ODT) 4 MG TbDL, Take 1 tablet (4 mg total) by mouth every 8 (eight) hours as needed., Disp: 60 tablet, Rfl: 1    simvastatin (ZOCOR) 5 MG tablet, Take 1 tablet (5 mg total) by mouth nightly., Disp: 90 tablet, Rfl: 3    topiramate (TROKENDI XR) 100 mg Cp24, Take 1 capsule (100 mg total) by mouth once daily., Disp: 30 capsule, Rfl: 11    vitamin B complex/folic acid (B COMPLEX 100 ORAL), Take by mouth nightly., Disp: , Rfl:     benzonatate (TESSALON) 100 MG capsule, Take 1 capsule (100 mg total) by mouth 3 (three) times daily as needed for Cough. (Patient not taking: Reported on 10/30/2020), Disp: 30 capsule, Rfl: 0    ubrogepant (UBRELVY)tablet 100 mg, Take 1 tablet (100 mg total) by mouth once as needed for Migraine., Disp: 10 tablet, Rfl: 0  Past Medical History:   Diagnosis Date    Arthritis     knee    Asthma     childhood     Depression     Encounter for blood transfusion     General anesthetics causing adverse effect in therapeutic use     severe headache after crainiotomy    GI problem     IBS    Gout     Headache     Hyperlipidemia     Meniere disease     MVP (mitral valve prolapse)     minor    Vertigo      Past Surgical History:   Procedure Laterality Date    BRAIN SURGERY  1997    vestibular  neurectomy    BREAST SURGERY      benign     SECTION      x 1    CHOLECYSTECTOMY      EXAMINATION UNDER ANESTHESIA N/A 2020    Procedure: EXAM UNDER ANESTHESIA;  Surgeon: Lucy Crane MD;  Location: [facility] OR;  Service: OB/GYN;  Laterality: N/A;    LASER ABLATION N/A 2020    Procedure: ABLATION, USING LASER;  Surgeon: Lucy Crane MD;  Location: [facility] OR;  Service: OB/GYN;  Laterality: N/A;    TONSILLECTOMY       Social History     Socioeconomic History    Marital status:      Spouse name: Not on file    Number of children: Not on file    Years of education: Not on file    Highest education level: Not on file   Occupational History    Not on file   Social Needs    Financial resource strain: Somewhat hard    Food insecurity     Worry: Sometimes true     Inability: Patient refused    Transportation needs     Medical: No     Non-medical: No   Tobacco Use    Smoking status: Former Smoker     Packs/day: 0.50     Years: 43.00     Pack years: 21.50     Types: Cigarettes     Start date: 1976     Quit date: 2020     Years since quittin.7    Smokeless tobacco: Never Used    Tobacco comment: Quit 20   Substance and Sexual Activity    Alcohol use: Not Currently     Alcohol/week: 1.0 - 3.0 standard drinks     Types: 1 - 3 Glasses of wine per week     Frequency: Monthly or less     Drinks per session: 1 or 2     Binge frequency: Never     Comment: social few times monthly.   No alcohol 72h prior to sx    Drug use: No    Sexual activity: Never     Birth control/protection: None, Post-menopausal   Lifestyle    Physical activity     Days per week: 0 days     Minutes per session: 0 min    Stress: Very much   Relationships    Social connections     Talks on phone: More than three times a week     Gets together: Twice a week     Attends Jewish service: Not on file     Active member of club or organization: No     Attends meetings of clubs or organizations: Never      Relationship status:    Other Topics Concern    Not on file   Social History Narrative    Not on file       Objective:         GENERAL APPEARANCE:     The patient looks comfortable.    No signs of medical or psychiatric distress.    Normal breathing pattern.    No dysmorphic features    Normal eye contact.     GENERAL MEDICAL EXAM:    HEENT:  Head is atraumatic normocephalic.     Neck and Axillae: No JVD.     Cardiopulmonary: No cyanosis. No tachypnea. Normal respiratory effort.    Gastrointestinal:  No stomas or lesions. No hernias.    Skin, Hair and Nails: No pathognonomic skin rash. No neurofibromatosis. No stigmata of autoimmune disease.     Limbs: No varicose veins. No edema.     Muskoskeletal: No deformities.No signs of longstanding neuropathy. No dislocations or fractures.        Neurologic Exam     Mental Status   Oriented to person, place, and time.   Registration: recalls 3 of 3 objects. Recall at 5 minutes: recalls 3 of 3 objects. Follows 3 step commands.   Attention: normal. Concentration: normal.   Speech: speech is normal   Level of consciousness: alert  Knowledge: good and consistent with education. Able to perform simple calculations.   Able to name object. Able to read. Able to repeat. Able to write. Normal comprehension.     Cranial Nerves     CN III, IV, VI   Extraocular motions are normal.   Right pupil: Shape: regular.   Left pupil: Shape: regular.   CN III: no CN III palsy  CN VI: no CN VI palsy  Nystagmus: none   Diplopia: none  Ophthalmoparesis: none  Upgaze: normal  Downgaze: normal  Conjugate gaze: present    CN VII   Facial expression full, symmetric.   Right facial weakness: none  Left facial weakness: none    CN VIII   Hearing: impaired    CN IX, X   CN IX normal.   CN X normal.   Palate: symmetric    CN XII   CN XII normal.   Tongue: not atrophic  Fasciculations: absent  Tongue deviation: none    Motor Exam   Muscle bulk: normal  Right arm tone: normal  Right arm pronator  drift: absent  Left arm pronator drift: absent    Strength   Strength 5/5 throughout.   Moves 4 extremities symmetrically      Sensory Exam     Sensory exam cannot be done.      Gait, Coordination, and Reflexes     Gait  Gait: normal    Coordination   Romberg: negative  Finger to nose coordination: normal  Heel to shin coordination: normal  Tandem walking coordination: normal    Tremor   Resting tremor: absent  Intention tremor: absent  Action tremor: absent  MSRs cannot be done       Lab Results   Component Value Date    WBC 7.43 08/17/2020    HGB 13.2 08/17/2020    HCT 41.8 08/17/2020    MCV 98 08/17/2020     08/17/2020     Sodium   Date Value Ref Range Status   08/17/2020 141 136 - 145 mmol/L Final     Potassium   Date Value Ref Range Status   08/17/2020 3.6 3.5 - 5.1 mmol/L Final     Chloride   Date Value Ref Range Status   08/17/2020 107 95 - 110 mmol/L Final     CO2   Date Value Ref Range Status   08/17/2020 22 (L) 23 - 29 mmol/L Final     Glucose   Date Value Ref Range Status   08/17/2020 75 70 - 110 mg/dL Final     BUN   Date Value Ref Range Status   08/17/2020 28 (H) 8 - 23 mg/dL Final     Creatinine   Date Value Ref Range Status   08/17/2020 1.6 (H) 0.5 - 1.4 mg/dL Final     Calcium   Date Value Ref Range Status   08/17/2020 9.7 8.7 - 10.5 mg/dL Final     Total Protein   Date Value Ref Range Status   08/11/2020 6.5 6.0 - 8.4 g/dL Final     Albumin   Date Value Ref Range Status   08/17/2020 4.0 3.5 - 5.2 g/dL Final     Total Bilirubin   Date Value Ref Range Status   08/11/2020 0.3 0.1 - 1.0 mg/dL Final     Comment:     For infants and newborns, interpretation of results should be based  on gestational age, weight and in agreement with clinical  observations.  Premature Infant recommended reference ranges:  Up to 24 hours.............<8.0 mg/dL  Up to 48 hours............<12.0 mg/dL  3-5 days..................<15.0 mg/dL  6-29 days.................<15.0 mg/dL       Alkaline Phosphatase   Date Value  Ref Range Status   08/11/2020 51 (L) 55 - 135 U/L Final     AST   Date Value Ref Range Status   08/11/2020 14 10 - 40 U/L Final     ALT   Date Value Ref Range Status   08/11/2020 15 10 - 44 U/L Final     Anion Gap   Date Value Ref Range Status   08/17/2020 12 8 - 16 mmol/L Final     eGFR if    Date Value Ref Range Status   08/17/2020 39.3 (A) >60 mL/min/1.73 m^2 Final     eGFR if non    Date Value Ref Range Status   08/17/2020 34.0 (A) >60 mL/min/1.73 m^2 Final     Comment:     Calculation used to obtain the estimated glomerular filtration  rate (eGFR) is the CKD-EPI equation.        Lab Results   Component Value Date    [id number] 502 09/02/2016 05-    Brain MRI Unremarkable     Reviewed the neuroimaging independently       Assessment:       1. Vestibular migraine    2. Meniere's disease of right ear    3. Chronic mixed headache syndrome    4. Depression, unspecified depression type    5. Non-seasonal allergic rhinitis, unspecified trigger    6. History of tobacco abuse    7. Acute gout of right foot, unspecified cause    8. Primary osteoarthritis of left knee    9. Irritable bowel syndrome without diarrhea    10. Severe dysplasia of vulva    11. Vulvar dysplasia    12. Hyperlipidemia, unspecified hyperlipidemia type    13. Bronchitis    14. Dermatitis of face    15. Acute non-recurrent frontal sinusitis        Plan:             VESTIBULAR MIGRAINE          Patient aware of  very close relationship between Ménière's Disease and Vestibular Migraine in terms of pathophysiology.       Migraine Diary       Lifestyle changes:    Good sleep hygienep  Avoid triggers like certain foods, TV and computer work   Minimize physical and emotional stress  Smoking avoidance and cessation  Tapering off caffeine   Good hydration   Small frequent meals   Moderate 30-minute-long aerobic exercises 3 times/week    Continue Aimovig 140 mg SQ monthly and TPM  mg QD with Ubrogepant  (Ubrelvy) 100 mg PRN  Refills sent     Abortive medications:    Continue Ubrogepant (Ubrelvy) 100 mg PRN.        Preventative medications:    Amitriptyline/Elavil failed    Topiramate/Topamax caused cognitive SEs.     Propranolol/Inderal failed    Valproic acid/Depakote failed     Continue Aimovig 140 mg SQ monthly.    Continue TPM  mg QD.      MEDICAL/SURGICAL COMORBIDITIES     All relevant medical comorbidities noted and managed by primary care physician and medical care team.        HEALTHY LIFESTYLE AND PREVENTATIVE CARE     Encouraged the patient to adhere to the age-appropriate health maintenance guidelines including screening tests and vaccinations.      Discussed the overall importance of healthy lifestyle, optimal weight, exercise, healthy diet, good sleep hygiene and avoiding drugs including smoking, alcohol and recreational drugs. The patient verbalized full understanding.         Advised the patient to follow COVID-19 prevention measures.         I spent 30 minutes face to face with the patient     More than 20 minutes of the time spent in counseling and coordination of care including discussions etiology of diagnosis (MIGRAINE), pathogenesis of diagnosis, prognosis of diagnosis,, diagnostic results, impression and recommendations, diagnostic studies, management, risks and benefits of treatment, instructions of disease self-management, treatment instructions, follow up requirements, patient and family counseling/involvement in care compliance with treatment regimen. All of the patient's questions were answered during this discussion.           RTC in 3 months            Britton Farrar, MSN NP      Collaborating Provider: Vipin Matthews MD, FAAN Neurologist/Epileptologist

## 2020-10-31 ENCOUNTER — PATIENT MESSAGE (OUTPATIENT)
Dept: NEUROLOGY | Facility: CLINIC | Age: 65
End: 2020-10-31

## 2020-11-02 NOTE — PROGRESS NOTES
Individual Psychotherapy Follow-up Visit Progress Note (PhD/LCSW)     Outpatient - Supportive psychotherapy 45 min - CPT Code 85485    Virtual visit with synchronous audio/video, Location of patient: home in [place]    Each patient provided medical services by telemedicine is: (1) informed of the relationship between the provider and patient and the respective role of any other health care provider with respect to management of the patient; and (2) notified that he or she may decline to receive medical services by telemedicine and may withdraw from such care at any time.    10/20/2020  MRN: [id number]  Primary Care Provider: Harish Hernandez DO    Kenia Alonzo is a 65 y.o. female who presents today for follow-up of depression and anxiety. Met with patient.        Subjective:       Patient report/content of session: Endorses some anxiety and worry about her son's health, as he is awaiting an organ transplant. She is also prepared to obtain guardianship of her granddaughter if needed. Her aunt, who has dementia and with whom she lives, has been more agitated recently. Pt is also working on being more assertive with a friend, who has been intrusive and judgmental in the past.    From previous visit on 10/1/2020: Struggling with negative self-talk and feelings of worthlessness. Endorses anxiety and worry about son's health. 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 Helped to to identify and challenge thought distortions that trigger feelings of low self-worth and depression.     Current symptoms:   · Depression: depressed mood, worthlessness  · Anxiety: excessive anxiety/worry, restlessness/keyed up  · Substance abuse: denied  · Cognitive functioning: denied  · Iram: none noted  · Psychosis: none noted    Psychosocial stressors and topics discussed: identifying feelings, symptom recognition/mgmt,  treatment progress, goals, coping strategies, interpersonal issues, boundaries, managing anxiety/panic/stress and challenging thought distortions      Objective:       Mental Status Evaluation  Appearance: unremarkable, age appropriate  Behavior: normal, cooperative  Speech: normal tone, normal rate, normal pitch, normal volume  Mood: anxious  Affect: congruent and appropriate  Thought Process: normal and logical  Thought Content: normal, no suicidality, no homicidality, delusions, or paranoia  Sensorium: grossly intact  Cognition: grossly intact  Insight: good  Judgment: adequate to circumstances    Risk parameters:  Patient reports no suicidal ideation  Patient reports no homicidal ideation  Patient reports no self-injurious behavior  Patient reports no violent behavior      Assessment & Plan:       Therapeutic interventions used: Assigned pt to practice relaxation on a daily basis  Helped pt to identify and accept situations in which she does not have complete control, has imperfection or needs to tolerate uncertainty and unpleasant emotions  Monitored the effectiveness and side effects of antidepressant medication prescribed by physician/NP/MP  Reinforced pt's successes in reframing cognitive distortions  Using role-playing, modeling and behavioral rehearsal, pt was taught implementation of conflict resolution skills  Assigned pt to keep a daily gratitude journal  Pt was assisted in accepting and openly experiencing depressive thoughts and feelings without being overly impacted by them  Taught pt about the variation in mood that is within the normal sphere        The patient's response to the interventions is accepting    The patient's progress toward treatment goals is good    Homework assigned: daily journaling and practice relaxation skills daily     Treatment plan:   A. Target symptoms: Depression, Anxiety and Poor Coping Skills   B. Therapeutic modalities: insight oriented psychotherapy, supportive  psychotherapy  C. Why chosen therapy is appropriate versus another modality: relevant to diagnosis, evidence based practice   D. Outcome monitoring methods: self-report, observation, feedback from clinical staff     Visit Diagnosis:   1. JOSIAH (generalized anxiety disorder)    2. Mild episode of recurrent major depressive disorder        Follow-up: individual psychotherapy and medication management by physician     Return to Clinic: 2 weeks    Length of Service (minutes): 45        CAROL Gasca, [facility]  Out[facility]iatry

## 2020-11-03 ENCOUNTER — TELEPHONE (OUTPATIENT)
Dept: ENDOSCOPY | Facility: HOSPITAL | Age: 65
End: 2020-11-03

## 2020-11-04 ENCOUNTER — OFFICE VISIT (OUTPATIENT)
Dept: OPHTHALMOLOGY | Facility: CLINIC | Age: 65
End: 2020-11-04
Payer: MEDICAID

## 2020-11-04 DIAGNOSIS — Z98.890 HX OF LASIK: ICD-10-CM

## 2020-11-04 DIAGNOSIS — H25.13 NUCLEAR SCLEROSIS, BILATERAL: Primary | ICD-10-CM

## 2020-11-04 DIAGNOSIS — H52.4 PRESBYOPIA: ICD-10-CM

## 2020-11-04 DIAGNOSIS — H25.013 CORTICAL AGE-RELATED CATARACT OF BOTH EYES: ICD-10-CM

## 2020-11-04 DIAGNOSIS — Z01.00 ROUTINE EYE EXAM: ICD-10-CM

## 2020-11-04 DIAGNOSIS — H52.223 REGULAR ASTIGMATISM OF BOTH EYES: ICD-10-CM

## 2020-11-04 PROCEDURE — 92015 PR REFRACTION: ICD-10-PCS | Mod: ,,, | Performed by: OPTOMETRIST

## 2020-11-04 PROCEDURE — 99999 PR PBB SHADOW E&M-EST. PATIENT-LVL III: ICD-10-PCS | Mod: PBBFAC,,, | Performed by: OPTOMETRIST

## 2020-11-04 PROCEDURE — 99999 PR PBB SHADOW E&M-EST. PATIENT-LVL III: CPT | Mod: PBBFAC,,, | Performed by: OPTOMETRIST

## 2020-11-04 PROCEDURE — 92004 COMPRE OPH EXAM NEW PT 1/>: CPT | Mod: S$PBB,,, | Performed by: OPTOMETRIST

## 2020-11-04 PROCEDURE — 92004 PR EYE EXAM, NEW PATIENT,COMPREHESV: ICD-10-PCS | Mod: S$PBB,,, | Performed by: OPTOMETRIST

## 2020-11-04 PROCEDURE — 99213 OFFICE O/P EST LOW 20 MIN: CPT | Mod: PBBFAC | Performed by: OPTOMETRIST

## 2020-11-04 PROCEDURE — 92015 DETERMINE REFRACTIVE STATE: CPT | Mod: ,,, | Performed by: OPTOMETRIST

## 2020-11-04 NOTE — LETTER
November 4, 2020      [place]  [address] [address] [address]           [facility]  [address] [facility] [address]  Phone: [phone]  Fax: [phone]          Patient: Kenia Alonzo   MR Number: [id number]   YOB: 1955   Date of Visit: 11/4/2020       Dear Almita Izaguirre:    Thank you for referring Kenia Alonzo to me for evaluation. Attached you will find relevant portions of my assessment and plan of care.    If you have questions, please do not hesitate to call me. I look forward to following Kenia Alonzo along with you.    Sincerely,    Jay Salomon, OD    Enclosure  CC:  No Recipients    If you would like to receive this communication electronically, please contact [email or web address] or [phone] to request more information on [employer] Link access.    For providers and/or their staff who would like to refer a patient to [facility], please contact us through our one-stop-shop provider referral line, [facility], at [phone].    If you feel you have received this communication in error or would no longer like to receive these types of communications, please e-mail [email or web address]

## 2020-11-04 NOTE — PROGRESS NOTES
HPI     Eye Exam     Comments: Yearly              Comments     NP to DNL  Patient here today for yearly eye exam  HPI    Any vision changes since last exam: Yes at near and distance Has trouble   focusing on TV and when driving at night  Has Lasik OU 2009  Eye pain: No  Other ocular symptoms: No    Do you wear currently wear glasses or contacts? OTC readers +2.00 or +2.50    Interested in contacts today? No    Do you plan on getting new glasses today? If needed                Last edited by Lillian Olson, PCT on 11/4/2020 11:04 AM. (History)              Assessment /Plan     For exam results, see Encounter Report.    Nuclear sclerosis, bilateral    Cortical age-related cataract of both eyes  Surgery is not indicated at this time.   Monitor 12 months.    Hx of LASIK    Regular astigmatism of both eyes    Presbyopia  Eyeglass Final Rx     Eyeglass Final Rx       Sphere Cylinder Axis    Right -0.50 +1.25 020    Left Lowry +1.25 012    Expiration Date: 11/5/2021    Comments: SV distance OK              Pt req sv  Ok to c/w otc readers PRN    RTC 1 yr for dilated eye exam or PRN if any problems.   Discussed above and answered questions.

## 2020-11-08 ENCOUNTER — PATIENT MESSAGE (OUTPATIENT)
Dept: PSYCHIATRY | Facility: CLINIC | Age: 65
End: 2020-11-08

## 2020-11-09 ENCOUNTER — PATIENT MESSAGE (OUTPATIENT)
Dept: ORTHOPEDICS | Facility: CLINIC | Age: 65
End: 2020-11-09

## 2020-11-10 ENCOUNTER — TELEPHONE (OUTPATIENT)
Dept: OTOLARYNGOLOGY | Facility: CLINIC | Age: 65
End: 2020-11-10

## 2020-11-10 ENCOUNTER — OFFICE VISIT (OUTPATIENT)
Dept: OTOLARYNGOLOGY | Facility: CLINIC | Age: 65
End: 2020-11-10
Payer: MEDICAID

## 2020-11-10 ENCOUNTER — CLINICAL SUPPORT (OUTPATIENT)
Dept: AUDIOLOGY | Facility: CLINIC | Age: 65
End: 2020-11-10
Payer: MEDICAID

## 2020-11-10 VITALS
TEMPERATURE: 98 F | SYSTOLIC BLOOD PRESSURE: 118 MMHG | HEART RATE: 86 BPM | DIASTOLIC BLOOD PRESSURE: 72 MMHG | WEIGHT: 129.19 LBS | BODY MASS INDEX: 23.63 KG/M2

## 2020-11-10 DIAGNOSIS — H91.93 BILATERAL HEARING LOSS, UNSPECIFIED HEARING LOSS TYPE: ICD-10-CM

## 2020-11-10 DIAGNOSIS — H90.3 SENSORY HEARING LOSS, BILATERAL: Primary | ICD-10-CM

## 2020-11-10 DIAGNOSIS — H81.01 MENIERE'S DISEASE OF RIGHT EAR: ICD-10-CM

## 2020-11-10 DIAGNOSIS — H81.01 MENIERE'S DISEASE OF RIGHT EAR: Primary | ICD-10-CM

## 2020-11-10 DIAGNOSIS — H91.93 BILATERAL HEARING LOSS, UNSPECIFIED HEARING LOSS TYPE: Primary | ICD-10-CM

## 2020-11-10 DIAGNOSIS — J30.89 NON-SEASONAL ALLERGIC RHINITIS, UNSPECIFIED TRIGGER: ICD-10-CM

## 2020-11-10 PROCEDURE — 99214 OFFICE O/P EST MOD 30 MIN: CPT | Mod: PBBFAC,25 | Performed by: ORTHOPAEDIC SURGERY

## 2020-11-10 PROCEDURE — 99213 PR OFFICE/OUTPT VISIT, EST, LEVL III, 20-29 MIN: ICD-10-PCS | Mod: S$PBB,,, | Performed by: ORTHOPAEDIC SURGERY

## 2020-11-10 PROCEDURE — 92567 TYMPANOMETRY: CPT | Mod: PBBFAC | Performed by: AUDIOLOGIST-HEARING AID FITTER

## 2020-11-10 PROCEDURE — 99999 PR PBB SHADOW E&M-EST. PATIENT-LVL IV: CPT | Mod: PBBFAC,,, | Performed by: ORTHOPAEDIC SURGERY

## 2020-11-10 PROCEDURE — 99999 PR PBB SHADOW E&M-EST. PATIENT-LVL IV: ICD-10-PCS | Mod: PBBFAC,,, | Performed by: ORTHOPAEDIC SURGERY

## 2020-11-10 PROCEDURE — 99213 OFFICE O/P EST LOW 20 MIN: CPT | Mod: S$PBB,,, | Performed by: ORTHOPAEDIC SURGERY

## 2020-11-10 PROCEDURE — 92557 COMPREHENSIVE HEARING TEST: CPT | Mod: PBBFAC | Performed by: AUDIOLOGIST-HEARING AID FITTER

## 2020-11-10 NOTE — LETTER
November 10, 2020      [place]  [address] [address] [address]           [facility]  [address]  [place] [address]  Phone: [phone]  Fax: [phone]          Patient: Kenia Alonzo   MR Number: [id number]   YOB: 1955   Date of Visit: 11/10/2020       Dear Almita Izaguirre:    Thank you for referring Kenia Alonzo to me for evaluation. Attached you will find relevant portions of my assessment and plan of care.    If you have questions, please do not hesitate to call me. I look forward to following Kenia Alonzo along with you.    Sincerely,    Nikole Ruiz MD    Enclosure  CC:  No Recipients    If you would like to receive this communication electronically, please contact [email or web address] or [phone] to request more information on [facility] access.    For providers and/or their staff who would like to refer a patient to [facility], please contact us through our one-stop-shop provider referral line, [facility], at [phone].    If you feel you have received this communication in error or would no longer like to receive these types of communications, please e-mail [email or web address]

## 2020-11-10 NOTE — TELEPHONE ENCOUNTER
Referral, demographics, audiology and Dr. Ruiz note from today was faxed to [facility].  Phone: [phone]  Fax:  [phone]

## 2020-11-10 NOTE — PROGRESS NOTES
Subjective:      Patient ID: Kenia Alonzo is a 65 y.o. female.    Chief Complaint: Dizziness (Review audiogram) and Headache    Patient is a 65 year old female seen today for evaluation of her Meniere's.  She feels that her hearing is worsening.  She has a hx of Ménière's Disease and a vestibular nerve section around [id number] per Dr. Raul Sebastian, in [place], [place]. The last time she was seen by ENT was in 2016 in [place], [place]. She reports significant relief and decrease in episodes after surgery. She now only experiences episodes of dizziness/ nausea/ HA's once every 2-3 months, she believes episodes are brought on by a flare-up in allergy symptoms.  She is feeling much better now that when I last saw her via telemedicine, she was having a significant flare of her allergies at that time.  She is using Astelin and Flonase once daily, can increase to twice daily.  She is on Singulair, and she is very happy with her progress.        Review of Systems   Constitutional: Negative for chills, fatigue, fever and unexpected weight change.   HENT: Positive for congestion, hearing loss and tinnitus. Negative for dental problem, ear discharge, ear pain, facial swelling, nosebleeds, postnasal drip, rhinorrhea, sinus pressure, sneezing, sore throat, trouble swallowing and voice change.    Eyes: Negative for redness, itching and visual disturbance.   Respiratory: Negative for cough, choking, shortness of breath and wheezing.    Cardiovascular: Negative for chest pain and palpitations.   Gastrointestinal: Negative for abdominal pain.        No reflux.   Musculoskeletal: Negative for gait problem.   Skin: Negative for rash.   Allergic/Immunologic: Positive for environmental allergies.   Neurological: Positive for dizziness and headaches. Negative for light-headedness.       Objective:       Physical Exam  Constitutional:       Appearance: She is well-developed. She is not diaphoretic.   HENT:      Head: Normocephalic and atraumatic.       Right Ear: No decreased hearing noted.      Left Ear: No decreased hearing noted.      Nose: Nose normal.   Eyes:      General: Lids are normal.      Conjunctiva/sclera: Conjunctivae normal.   Pulmonary:      Effort: Pulmonary effort is normal. No respiratory distress.   Skin:     Findings: No rash.   Neurological:      Mental Status: She is alert.   Psychiatric:         Attention and Perception: Attention normal.         Speech: Speech normal.         Behavior: Behavior normal.         Thought Content: Thought content normal.           AUDIOGRAM:  Results reveal a mild to moderate severe sensorineural hearing loss 250-8000 Hz for the right ear, and a normal to moderate sensorineural hearing loss 250-8000 Hz for the left ear.   Speech Reception Thresholds were  35 dBHL for the right ear and 20 dBHL for the left ear.   Word recognition scores were good for the right ear and excellent for the left ear.   Tympanograms were Type A for the right ear and Type A for the left ear.    Assessment:       1. Bilateral hearing loss, unspecified hearing loss type    2. Non-seasonal allergic rhinitis, unspecified trigger    3. Meniere's disease of right ear        Plan:     Bilateral hearing loss, unspecified hearing loss type:  She is interested in hearing aids, and is a candidate for hearing aids.  She will discuss with audiology what options are available to her.      Non-seasonal allergic rhinitis, unspecified trigger:  Doing well with addition of singulair, continue with current medication combination at this time.  -     Ambulatory referral/consult to ENT    Meniere's disease of right ear:  Well controlled, RTC one year sooner if needed.  -     Ambulatory referral/consult to ENT

## 2020-11-10 NOTE — PROGRESS NOTES
Referring Provider:    Kenia Alonzo was seen 11/10/2020 for an audiological evaluation.  Patient complains of longstanding hearing loss.     Results reveal a mild to moderate severe sensorineural hearing loss 250-8000 Hz for the right ear, and a normal to moderate sensorineural hearing loss 250-8000 Hz for the left ear.   Speech Reception Thresholds were  35 dBHL for the right ear and 20 dBHL for the left ear.   Word recognition scores were good for the right ear and excellent for the left ear.   Tympanograms were Type A for the right ear and Type A for the left ear.    Patient was counseled on the above findings.    Recommendations:  1. ENT  2. Annual Audiograms          Audiologists: Trini Merino, CCC-A & Trini Stone, CCC-A

## 2020-11-11 ENCOUNTER — OFFICE VISIT (OUTPATIENT)
Dept: GYNECOLOGIC ONCOLOGY | Facility: CLINIC | Age: 65
End: 2020-11-11
Payer: MEDICAID

## 2020-11-11 ENCOUNTER — CLINICAL SUPPORT (OUTPATIENT)
Dept: REHABILITATION | Facility: HOSPITAL | Age: 65
End: 2020-11-11
Attending: ORTHOPAEDIC SURGERY
Payer: MEDICAID

## 2020-11-11 VITALS
BODY MASS INDEX: 23.13 KG/M2 | SYSTOLIC BLOOD PRESSURE: 125 MMHG | HEART RATE: 95 BPM | DIASTOLIC BLOOD PRESSURE: 78 MMHG | WEIGHT: 125.69 LBS | HEIGHT: 62 IN

## 2020-11-11 DIAGNOSIS — M25.60 DECREASED RANGE OF MOTION: ICD-10-CM

## 2020-11-11 DIAGNOSIS — M17.0 PRIMARY OSTEOARTHRITIS OF BOTH KNEES: ICD-10-CM

## 2020-11-11 DIAGNOSIS — R26.89 BALANCE PROBLEM: ICD-10-CM

## 2020-11-11 DIAGNOSIS — R53.1 WEAKNESS: ICD-10-CM

## 2020-11-11 DIAGNOSIS — D07.1 SEVERE DYSPLASIA OF VULVA: Primary | ICD-10-CM

## 2020-11-11 PROCEDURE — 97161 PT EVAL LOW COMPLEX 20 MIN: CPT

## 2020-11-11 PROCEDURE — 99024 POSTOP FOLLOW-UP VISIT: CPT | Mod: ,,, | Performed by: OBSTETRICS & GYNECOLOGY

## 2020-11-11 PROCEDURE — 99999 PR PBB SHADOW E&M-EST. PATIENT-LVL III: CPT | Mod: PBBFAC,,, | Performed by: OBSTETRICS & GYNECOLOGY

## 2020-11-11 PROCEDURE — 99024 PR POST-OP FOLLOW-UP VISIT: ICD-10-PCS | Mod: ,,, | Performed by: OBSTETRICS & GYNECOLOGY

## 2020-11-11 PROCEDURE — 99999 PR PBB SHADOW E&M-EST. PATIENT-LVL III: ICD-10-PCS | Mod: PBBFAC,,, | Performed by: OBSTETRICS & GYNECOLOGY

## 2020-11-11 PROCEDURE — 99213 OFFICE O/P EST LOW 20 MIN: CPT | Mod: PBBFAC | Performed by: OBSTETRICS & GYNECOLOGY

## 2020-11-11 NOTE — PROGRESS NOTES
"Subjective:      Patient ID: Kenia Alonzo is a 65 y.o. female.    Chief Complaint: No chief complaint on file.      HPI  S/p vulvar laser ablation 8/12/2020  Findings/Key Components of Procedure: 3 raised white plaque lesions noted:  1) 2x1cm lesion in the mid left labia minora  2) 2cm horseshoe shaped lesion at the posterior fourchette  3) 3mm lesion in the mid right right labia minora   Laser ablation was performed with no visible lesions remaining at conclusion of procedure.     Presents today for post operative visit. Without complaints.     History:  Previously scheduled for vulvar excision in  on 9/25/19, but surgery was cancelled per request of the patient.      Pap and HPV 9/11/2019 normal.   Patient refused vulvar biopsy at previous visit.     Review of records shows vulvar biopsies from 2016  FINAL PATHOLOGIC DIAGNOSIS  1. VULVAR BIOPSY SHOWING HIGH-GRADE SQUAMOUS DYSPLASIA (FLORA 2-3)  2. VULVAR BIOPSY SHOWING SEVERE DYSPLASIA (FLORA 3) WITH MARKED PARAKERATOSIS, NO  DEFINITIVE INVASION IDENTIFIED.  3. VULVAR BIOPSY SHOWING MILD SQUAMOUS DYSPLASIA (FLORA 1)     Recommended treatment at that time. She says she got treatment in [place] in 2016 with "scraping". No records available for review.   Review of Systems   Constitutional: Negative for appetite change, chills, fatigue and fever.   HENT: Negative for mouth sores.    Respiratory: Negative for cough and shortness of breath.    Cardiovascular: Negative for leg swelling.   Gastrointestinal: Negative for abdominal pain, blood in stool, constipation and diarrhea.   Endocrine: Negative for cold intolerance.   Genitourinary: Negative for dysuria and vaginal bleeding.   Musculoskeletal: Negative for myalgias.   Skin: Negative for rash.   Allergic/Immunologic: Negative.    Neurological: Negative for weakness and numbness.   Hematological: Negative for adenopathy. Does not bruise/bleed easily.   Psychiatric/Behavioral: Negative for confusion.       Objective: "   Physical Exam:   Constitutional: She is oriented to person, place, and time. She appears well-developed and well-nourished.    HENT:   Head: Normocephalic and atraumatic.    Eyes: Pupils are equal, round, and reactive to light. EOM are normal.    Neck: Normal range of motion. Neck supple. No thyromegaly present.    Cardiovascular: Normal rate, regular rhythm and intact distal pulses.     Pulmonary/Chest: Effort normal and breath sounds normal. No respiratory distress. She has no wheezes.        Abdominal: Soft. Bowel sounds are normal. She exhibits no distension and no mass. There is no abdominal tenderness.     Genitourinary:    Pelvic exam was performed with patient supine.   There is no lesion on the right labia. There is no lesion on the left labia.    Genitourinary Comments: Vulvar laser ablation sites well healed. No disease on today's exam              Musculoskeletal: Normal range of motion and moves all extremeties.      Lymphadenopathy:     She has no cervical adenopathy.        Right: No supraclavicular adenopathy present.        Left: No supraclavicular adenopathy present.    Neurological: She is alert and oriented to person, place, and time.    Skin: Skin is warm and dry. No rash noted.    Psychiatric: She has a normal mood and affect.       Assessment:     1. Severe dysplasia of vulva        Plan:   No orders of the defined types were placed in this encounter.    S/p laser ablation  Well healed, no evidence of disease on today's exam.   Will return care to her primary ob/gyn provider for ongoing gynecologic health maintenance.   May return to clinic with me as needed.

## 2020-11-11 NOTE — PLAN OF CARE
[facility]  Physical Therapy Initial Evaluation    Name: Kenia Alonzo  Clinic Number: [id number]    Therapy Diagnosis:   Encounter Diagnoses   Name Primary?    Primary osteoarthritis of both knees     Weakness     Balance problem     Decreased range of motion      Physician: Yehuda Beck MD    Physician Orders: PT Eval and Treat PT - OA protocol, consider water treadmill  Medical Diagnosis from Referral: M17.0 (ICD-10-CM) - Primary osteoarthritis of both knees  Evaluation Date: 11/11/2020  Authorization Period Expiration: 12/5/2020  Plan of Care Expiration: 1/6/2020  Visit # / Visits authorized: 1/ 1    PROGRESS NOTE: 12/11/2020    Time In: 10:50 am  Time Out: 11:30 am  Total Appointment Time (timed & untimed codes): 30 minutes     Precautions: Standard and CHF    Subjective   Date of onset: 2014  History of current condition - Kenia reports: Pt reports she has had constant pain in knees since 2014.  She has previously had injections of cortisone every 3 months prior to seeing Dr Beck.  She has had Synvisc injections previously as well.  L worse than R, indicates pain over medial joint and spreads down into calf.  She reports that she just had pain in her R knee when she kneels on it in [place].  Pt reports that she had injection with Dr Beck which help with he pain.  No regular exercise other than walking dog 2x/day ~ 2 blocks (maybe 1/2 mile).  Patient reports that she recently had surgery and had to stop therapy.  She has been able to get out in her garden some as well since having injection.     Pain:  Current 0/10, worst 4/10, best 0/10; 9/10 prior to injections.  Location: bilateral knees   Description: Aching and Throbbing  Aggravating Factors: Standing, Laying and up/down stairs at times  Easing Factors: rest    Prior Therapy: Yes  Social History: Pt lives with an aunt, helping to care for her aunt.  Occupation: was laid off and has not been able to find work,  worked in oil industry  Prior Level of Function: pt reports that she used to have no difficulty with standing and enjoyed standing and cooking  Current Level of Function: Stand 4-5 hours per day, walking 1-2 hours.    Imaging, see in chart.    Medical History:   Past Medical History:   Diagnosis Date    Arthritis     knee    Asthma     childhood     Depression     Encounter for blood transfusion     General anesthetics causing adverse effect in therapeutic use     severe headache after crainiotomy    GI problem     IBS    Gout     Headache     Hyperlipidemia     Meniere disease     MVP (mitral valve prolapse)     minor    Vertigo        Surgical History:   Kenia Alonzo  has a past surgical history that includes  section; Breast surgery; Brain surgery (); Tonsillectomy; Cholecystectomy; Examination under anesthesia (N/A, 2020); and Laser ablation (N/A, 2020).    Medications:   Kenia has a current medication list which includes the following prescription(s): acyclovir 5%, azelastine, bumetanide, bupropion, buspirone, c/sourcherry/celery/grape seed, diazepam, dicyclomine, aimovig autoinjector, fluticasone propionate, levocetirizine, melatonin, montelukast, fish oil-omega-3 fatty acids, ondansetron, simvastatin, trokendi xr, and vitamin b complex/folic acid.    Allergies:   Review of patient's allergies indicates:   Allergen Reactions    Demerol [meperidine] Nausea And Vomiting     Pt refuses Demerol     Codeine Itching        Pts goals: Be better about exercising.    Objective         CMS Impairment/Limitation/Restriction for FOTO Knee Survey    Therapist reviewed FOTO scores for Kenia Alonzo on 2020.   FOTO documents entered into EPIC - see Media section.    Limitation Score: 62%          Gait: Varus deformity, small steps    Balance: R LE = 1s, L LE = 1s, No balance strategies, has vestibular issues from prior surgery.    30 second sit-to-stand test (without U/E  "support): 11   30" sit to stand Cutoff Scores:      Reflex/Sensation: Sensation intact to light touch B LE's . Reflexes at Achilles and patella tendon defer.        Knee AROM:     (R) (L)     Flexion   130 135     Extension  0 0     Extension (sitting) -7 -5      Strength:  Hip flexors  3+/5 3+/5  Quadriceps  4-/5 4-/5      Hamstrings  4-/5 4-/5     DF   4/5 4/5     Gluteus Medius 3/5 3/5     Gluteus Willie NT/5 NT/5    Joint Mobility: crepitus with flexion    Muscle Length: Pt presents with limited MLT in gastroc.    Special Test: Defer due to known arthritic joint    Palpation: No tenderness to palpation, increased mmm bulk at medial ADD insertion B.  Decreased mm tone B gluteal musculature.    TREATMENT   Treatment Time In: 11:20 am  Treatment Time Out: 11:30 am  Total Treatment time (time-based codes) separate from Evaluation: 5 minutes     Kenia received therapeutic exercises to develop strength, ROM and flexibility for 5 minutes including:  See HEP    Home Exercises and Patient Education Provided    Education provided:   -Education on condition, HEP, and importance of strengthening    Written Home Exercises Provided: yes.  Exercises were reviewed and Kenia was able to demonstrate them prior to the end of the session.  Kenia demonstrated good  understanding of the education provided.     See EMR under Patient Instructions for exercises provided 11/11/2020.    Assessment   Kenia is a 65 y.o. female referred to outpatient Physical Therapy with a medical diagnosis of Primary osteoarthritis of both knees. The patient presents with signs and symptoms consistent with diagnosis along with impairments which include decreased ROM, decreased strength, decreased muscle length, impaired balance, gait abnormalities and decreased overall function.  These impairments are limiting patient's ability to perform light activities around her home with any of her usual work, housework, or school activities; performing heavy activities " around her home; lifting an object, like a bag of groceries from the floor.     Pt prognosis is Good, if patient is consistent and compliant with PT and HEP .   Pt will benefit from skilled outpatient Physical Therapy to address the deficits stated above and in the chart below, provide pt/family education, and to maximize pt's level of independence.     Plan of care discussed with patient: Yes  Pt's spiritual, cultural and educational needs considered and patient is agreeable to the plan of care and goals as stated below:     Anticipated Barriers for therapy: Pt rescheduled evaluation, did not complete prior therapy, vestibular issues,      Medical Necessity is demonstrated by the following  History  Co-morbidities and personal factors that may impact the plan of care Co-morbidities:   depression, level of undertstanding of current condition and headache, vestibular issues/vertigo    Personal Factors:   coping style  lifestyle  attitudes     high   Examination  Body Structures and Functions, activity limitations and participation restrictions that may impact the plan of care Body Regions:   lower extremities  trunk    Body Systems:    ROM  strength  balance  gait  transfers    Participation Restrictions:   See current level of function listed above    Activity limitations:   Learning and applying knowledge  no deficits    General Tasks and Commands  no deficits    Communication  no deficits    Mobility  lifting and carrying objects  walking    Self care  no deficits    Domestic Life  shopping  cooking  doing house work (cleaning house, washing dishes, laundry)    Interactions/Relationships  no deficits    Life Areas  no deficits    Community and Social Life  community life  recreation and leisure         moderate   Clinical Presentation stable and uncomplicated low   Decision Making/ Complexity Score: low     Goals:  Short Term Goals: In 4 weeks:  1.Patient to be educated on HEP.  2.Patient to demo increased B knee  AROM to 0 degrees extension while sitting, to improve gait and functional activities.  3.Patient to increase strength B LE by 1/2 grade, to assist with improved ambulation and standing endurance.  4.Patient to have decreased pain to 1/10, at worst, to improve QOL.  5.Patient to increase B LE balance to 10s, to decrease fall risk.  6.Patient to improve score on the FOTO, to improve QOL.  7.Patient to improve sit to  30s score, to increase endurance and strength.    Long Term Goals: In 8 weeks  1. Patient to perform daily activities including performing light activities around her home with any of her usual work, housework, or school activities; performing heavy activities around her home; lifting an object, like a bag of groceries from the floor without increased symptoms.  2. Pt to improve sit to  30s test to 15 or greater.  3. Patient to demonstrate increased LE strength to 4+/5-5/5, to assist with improved ambulation and standing endurance.  4. Patient to increase B LE balance to 15s, to decrease fall risk.  5. Patient to improve score on the FOTO to 47%, or less, to improve QOL.      Plan   Plan of care Certification: 11/11/2020 to 1/6/2020.    Outpatient Physical Therapy 2 times weekly for 8 weeks to include the following interventions: Aquatic Therapy, Electrical Stimulation PRN, Gait Training, Manual Therapy, Moist Heat/ Ice, Neuromuscular Re-ed, Patient Education, Self Care, Therapeutic Activites and Therapeutic Exercise.     Jessica Scott, PT    Thank you for this referral.    These services are reasonable and necessary for the conditions set forth above while under my care.

## 2020-11-17 ENCOUNTER — SPECIALTY PHARMACY (OUTPATIENT)
Dept: PHARMACY | Facility: CLINIC | Age: 65
End: 2020-11-17

## 2020-11-19 ENCOUNTER — OFFICE VISIT (OUTPATIENT)
Dept: PSYCHIATRY | Facility: CLINIC | Age: 65
End: 2020-11-19
Payer: MEDICAID

## 2020-11-19 DIAGNOSIS — F33.0 MILD EPISODE OF RECURRENT MAJOR DEPRESSIVE DISORDER: ICD-10-CM

## 2020-11-19 DIAGNOSIS — F41.1 GAD (GENERALIZED ANXIETY DISORDER): Primary | ICD-10-CM

## 2020-11-19 PROCEDURE — 90834 PSYTX W PT 45 MINUTES: CPT | Mod: 95,AJ,HB, | Performed by: SOCIAL WORKER

## 2020-11-19 PROCEDURE — 90834 PR PSYCHOTHERAPY W/PATIENT, 45 MIN: ICD-10-PCS | Mod: 95,AJ,HB, | Performed by: SOCIAL WORKER
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
## 2020-11-20 ENCOUNTER — CLINICAL SUPPORT (OUTPATIENT)
Dept: REHABILITATION | Facility: HOSPITAL | Age: 65
End: 2020-11-20
Payer: MEDICAID

## 2020-11-20 DIAGNOSIS — R26.89 BALANCE PROBLEM: ICD-10-CM

## 2020-11-20 DIAGNOSIS — R53.1 WEAKNESS: ICD-10-CM

## 2020-11-20 DIAGNOSIS — M25.60 DECREASED RANGE OF MOTION: ICD-10-CM

## 2020-11-20 PROCEDURE — 97110 THERAPEUTIC EXERCISES: CPT | Mod: CQ

## 2020-11-20 NOTE — PROGRESS NOTES
Physical Therapy Treatment Note     Name: Kenia Alonzo  Clinic Number: [id number]    Therapy Diagnosis:   Encounter Diagnoses   Name Primary?    Weakness     Balance problem     Decreased range of motion      Physician: Yehuda Beck MD    Visit Date: 11/20/2020    Physician Orders: PT Eval and Treat PT - OA protocol, consider water treadmill  Medical Diagnosis from Referral: M17.0 (ICD-10-CM) - Primary osteoarthritis of both knees  Evaluation Date: 11/11/2020  Authorization Period Expiration: 1/6/2020  Plan of Care Expiration: 12/5/2020  Visit # / Visits authorized: 1/12 (2)     PROGRESS NOTE: 12/11/2020    Time In: 1:42 pm  Time Out: 2:20pm  Total Billable Time: 38 minutes    Precautions: Standard and CHF    Subjective     Pt reports: that she is feeling okay today. Her knee does not hurt as long as she isn't doing anything.   She was semi compliant with home exercise program.  Response to previous treatment: none noted  Functional change: none noted    Pain: 2/10  Location: bilateral knees    Objective     Kenia received therapeutic exercises to develop strength, endurance, ROM and flexibility for 38 minutes including:  Recumbent bike for range of motion 5 minutes  Seated ball squeezes 3 minutes  Long arc quads 3x10 bilateral  Tailgaters distraction 3# 3 minutes  Straight leg raises 3x7 bilateral  Clamshells 1 minute each bilateral  Sidelying hip abduction 10x each bilateral  Hip circles CW/CCW 10x each bilateral    Home Exercises Provided and Patient Education Provided     Education provided:     Written Home Exercises Provided: Patient instructed to cont prior HEP.  Exercises were reviewed and Kenia was able to demonstrate them prior to the end of the session.  Kenia demonstrated good  understanding of the education provided.     See EMR under Patient Instructions for exercises provided 11/11/2020.    Assessment     Patient tolerated treatment well today with minimal complaints of discomfort. Patient  had some discomfort with recumbent bike, adjustments made for decreased knee flexion and pain subsided. Patient also had discomfort with sidelying due to increased pressure on hip that she was laying on. Patient had good fatigue noted throughout session, reporting burning with hip activities. Patient left treatment with reports of decreased pain and increased soreness, stating that she feels like she did something and was satisfied today's session.   Kenia is progressing well towards her goals.   Pt prognosis is Good.     Pt will continue to benefit from skilled outpatient physical therapy to address the deficits listed in the problem list box on initial evaluation, provide pt/family education and to maximize pt's level of independence in the home and community environment.     Pt's spiritual, cultural and educational needs considered and pt agreeable to plan of care and goals.     Anticipated barriers to physical therapy:  Pt rescheduled evaluation, did not complete prior therapy, vestibular issues,      Goals:   Short Term Goals: In 4 weeks:  1.Patient to be educated on HEP.  2.Patient to demo increased B knee AROM to 0 degrees extension while sitting, to improve gait and functional activities.  3.Patient to increase strength B LE by 1/2 grade, to assist with improved ambulation and standing endurance.  4.Patient to have decreased pain to 1/10, at worst, to improve QOL.  5.Patient to increase B LE balance to 10s, to decrease fall risk.  6.Patient to improve score on the FOTO, to improve QOL.  7.Patient to improve sit to  30s score, to increase endurance and strength.     Long Term Goals: In 8 weeks  1. Patient to perform daily activities including performing light activities around her home with any of her usual work, housework, or school activities; performing heavy activities around her home; lifting an object, like a bag of groceries from the floor without increased symptoms.  2. Pt to improve sit to   30s test to 15 or greater.  3. Patient to demonstrate increased LE strength to 4+/5-5/5, to assist with improved ambulation and standing endurance.  4. Patient to increase B LE balance to 15s, to decrease fall risk.  5. Patient to improve score on the FOTO to 47%, or less, to improve QOL.    Plan   Plan of care Certification: 11/11/2020 to 1/6/2020.    Continue Plan of Care (POC) and progress per patient tolerance.    Odessa Canas, PTA

## 2020-11-20 NOTE — TELEPHONE ENCOUNTER
Prior authorization re-auth for AIMOVIG approved from 12/20/2019 to 12/20/2020. CASE ID: [id number]

## 2020-11-23 ENCOUNTER — OFFICE VISIT (OUTPATIENT)
Dept: ORTHOPEDICS | Facility: CLINIC | Age: 65
End: 2020-11-23
Payer: MEDICAID

## 2020-11-23 ENCOUNTER — SPECIALTY PHARMACY (OUTPATIENT)
Dept: PHARMACY | Facility: CLINIC | Age: 65
End: 2020-11-23

## 2020-11-23 VITALS — HEIGHT: 62 IN | WEIGHT: 125 LBS | BODY MASS INDEX: 23 KG/M2

## 2020-11-23 DIAGNOSIS — M17.12 PRIMARY OSTEOARTHRITIS OF LEFT KNEE: Primary | ICD-10-CM

## 2020-11-23 PROBLEM — J01.10 ACUTE NON-RECURRENT FRONTAL SINUSITIS: Status: RESOLVED | Noted: 2020-05-26 | Resolved: 2020-11-23

## 2020-11-23 PROCEDURE — 99999 PR PBB SHADOW E&M-EST. PATIENT-LVL III: ICD-10-PCS | Mod: PBBFAC,,, | Performed by: ORTHOPAEDIC SURGERY

## 2020-11-23 PROCEDURE — 99213 PR OFFICE/OUTPT VISIT, EST, LEVL III, 20-29 MIN: ICD-10-PCS | Mod: S$PBB,,, | Performed by: ORTHOPAEDIC SURGERY

## 2020-11-23 PROCEDURE — 99213 OFFICE O/P EST LOW 20 MIN: CPT | Mod: S$PBB,,, | Performed by: ORTHOPAEDIC SURGERY

## 2020-11-23 PROCEDURE — 99999 PR PBB SHADOW E&M-EST. PATIENT-LVL III: CPT | Mod: PBBFAC,,, | Performed by: ORTHOPAEDIC SURGERY

## 2020-11-23 PROCEDURE — 99213 OFFICE O/P EST LOW 20 MIN: CPT | Mod: PBBFAC,PO | Performed by: ORTHOPAEDIC SURGERY

## 2020-11-23 NOTE — TELEPHONE ENCOUNTER
Specialty Pharmacy - Refill Coordination    Specialty Medication Orders Linked to Encounter      Most Recent Value   Medication #1  erenumab-aooe (AIMOVIG AUTOINJECTOR) 140 mg/mL autoinjector (Order#[id number], Rx#[id number])          Refill Questions - Documented Responses      Most Recent Value   Relationship to patient of person spoken to?  Self   HIPAA/medical authority confirmed?  Yes   Any changes in contact preferences or allowed representatives?  No   Has the patient had any insurance changes?  No   Has the patient had any changes to specialty medication, dose, or instructions?  No   Has the patient started taking any new medications, herbals, or supplements?  No   Has the patient been diagnosed with any new medical conditions?  No   Does the patient have any new allergies to medications or foods?  No   Does the patient have any concerns about side effects?  No   Can the patient store medication/sharps container properly (at the correct temperature, away from children/pets, etc.)?  Yes   Can the patient call emergency services (911) in the event of an emergency?  Yes   Does the patient have any concerns or questions about taking or administering this medication as prescribed?  No   How many doses did the patient miss in the past 4 weeks or since the last fill?  0   How many doses does the patient have on hand?  0   How many days does the patient report on hand quantity will last?  0   Does the number of doses/days supply remaining match pharmacy expected amounts?  Yes   Does the patient feel that this medication is effective?  Yes   During the past 4 weeks, has patient missed any activities due to condition or medication?  No   During the past 4 weeks, did patient have any of the following urgent care visits?  None   How will the patient receive the medication?  Mail   When does the patient need to receive the medication?  12/01/20   Shipping Address  Home   Address in [facility] confirmed and updated  if neccessary?  Yes   Expected Copay ($)  0   Is the patient able to afford the medication copay?  Yes   Payment Method  zero copay   Days supply of Refill  28   Would patient like to speak to a pharmacist?  No   Do you want to trigger an intervention?  No   Do you want to trigger an additional referral task?  No   Refill activity completed?  Yes   Refill activity plan  Refill scheduled   Shipment/Pickup Date:  11/30/20          Current Outpatient Medications   Medication Sig    acyclovir 5% (ZOVIRAX) 5 % ointment Apply topically 5 (five) times daily.    azelastine (ASTELIN) 137 mcg (0.1 %) nasal spray 1 spray (137 mcg total) by Nasal route 2 (two) times daily.    bumetanide (BUMEX) 2 MG tablet Take 1 tablet (2 mg total) by mouth once daily.    buPROPion (WELLBUTRIN XL) 150 MG TB24 tablet Take 3 tablets (450 mg total) by mouth once daily.    busPIRone (BUSPAR) 15 MG tablet Take 1 tablet (15 mg total) by mouth 2 (two) times daily.    C/sourcherry/celery/grape seed (TART CHERRY ORAL) Take 1 tablet by mouth daily as needed.     diazePAM (VALIUM) 2 MG tablet Take 1 tablet (2 mg total) by mouth every 6 (six) hours as needed (dizziness/Meniere's attack).    dicyclomine (BENTYL) 10 MG capsule Take 1 capsule (10 mg total) by mouth 3 (three) times daily.    erenumab-aooe (AIMOVIG AUTOINJECTOR) 140 mg/mL autoinjector Inject 140 mg into the skin every 28 days.    fluticasone propionate (FLONASE) 50 mcg/actuation nasal spray 2 sprays (100 mcg total) by Each Nostril route once daily.    levocetirizine (XYZAL) 5 MG tablet TAKE ONE TABLET BY MOUTH EVERY MORNING (Patient taking differently: Take 5 mg by mouth every evening. )    MELATONIN ORAL Take by mouth nightly as needed.     montelukast (SINGULAIR) 10 mg tablet Take 1 tablet (10 mg total) by mouth every evening.    omega-3 fatty acids/fish oil (FISH OIL-OMEGA-3 FATTY ACIDS) 300-1,000 mg capsule Take 1 capsule by mouth once daily.    ondansetron (ZOFRAN-ODT) 4 MG  TbDL Take 1 tablet (4 mg total) by mouth every 8 (eight) hours as needed.    simvastatin (ZOCOR) 5 MG tablet Take 1 tablet (5 mg total) by mouth nightly.    topiramate (TROKENDI XR) 100 mg Cp24 Take 1 capsule (100 mg total) by mouth once daily.    vitamin B complex/folic acid (B COMPLEX 100 ORAL) Take by mouth nightly.   Last reviewed on 11/23/2020 12:11 PM by Fanny Doherty    Review of patient's allergies indicates:   Allergen Reactions    Demerol [meperidine] Nausea And Vomiting     Pt refuses Demerol     Codeine Itching    Last reviewed on  11/23/2020 12:11 PM by Fanny Doherty      Tasks added this encounter   12/21/2020 - Refill Call (Auto Added)   Tasks due within next 3 months   No tasks due.     Fanny Doherty  [facility]  [address] [place] [id number]  Phone: [phone]  Fax: [phone]

## 2020-11-23 NOTE — PROGRESS NOTES
Patient ID: Kenia Alonzo  YOB: 1955  MRN: [id number]    Chief Complaint: Pain of the Left Knee and Pain of the Right Knee    Referred By: Dr. Hernandez    History of Present Illness: Kenia Alonzo is a right-hand dominant 65 y.o. female here for a follow up of her bilateral knees.         Previous (10/12/2020) History of Present Illness: Kenia Alonzo is a 65 y.o. female follow-up left knee pain.    Knee Pain   The pain is present in the right knee and left knee. The current episode started more than 1 month ago. The problem occurs intermittently. The problem has been gradually worsening. The quality of the pain is described as aching, burning, sharp and shooting. The pain is at a severity of 7/10 (when hurting). Associated symptoms include joint swelling. Pertinent negatives include no fever or itching. The symptoms are aggravated by lying down, activity and exercise. She has tried injection treatment, brace/corset and OTC ointments for the symptoms. The treatment provided mild relief. Physical therapy was effective (PT @ [facility]).      Past Medical History:   Past Medical History:   Diagnosis Date    Arthritis     knee    Asthma     childhood     Depression     Encounter for blood transfusion     General anesthetics causing adverse effect in therapeutic use     severe headache after crainiotomy    GI problem     IBS    Gout     Headache     Hyperlipidemia     Meniere disease     MVP (mitral valve prolapse)     minor    Vertigo      Past Surgical History:   Procedure Laterality Date    BRAIN SURGERY      vestibular neurectomy    BREAST SURGERY      benign     SECTION      x 1    CHOLECYSTECTOMY      EXAMINATION UNDER ANESTHESIA N/A 2020    Procedure: EXAM UNDER ANESTHESIA;  Surgeon: Lucy Crane MD;  Location: [facility];  Service: OB/GYN;  Laterality: N/A;    LASER ABLATION N/A 2020    Procedure: ABLATION, USING LASER;  Surgeon: Lucy Crane  MD;  Location: [facility];  Service: OB/GYN;  Laterality: N/A;    TONSILLECTOMY       Family History   Problem Relation Age of Onset    Colon cancer Father     Diabetes Father     Diabetes Sister      Social History     Socioeconomic History    Marital status:      Spouse name: Not on file    Number of children: Not on file    Years of education: Not on file    Highest education level: Not on file   Occupational History    Not on file   Social Needs    Financial resource strain: Somewhat hard    Food insecurity     Worry: Sometimes true     Inability: Patient refused    Transportation needs     Medical: No     Non-medical: No   Tobacco Use    Smoking status: Former Smoker     Packs/day: 0.50     Years: 43.00     Pack years: 21.50     Types: Cigarettes     Start date: 1976     Quit date: 2020     Years since quittin.8    Smokeless tobacco: Never Used    Tobacco comment: Quit 20   Substance and Sexual Activity    Alcohol use: Not Currently     Alcohol/week: 1.0 - 3.0 standard drinks     Types: 1 - 3 Glasses of wine per week     Frequency: Monthly or less     Drinks per session: 1 or 2     Binge frequency: Never     Comment: social few times monthly.   No alcohol 72h prior to sx    Drug use: No    Sexual activity: Never     Birth control/protection: None, Post-menopausal   Lifestyle    Physical activity     Days per week: 0 days     Minutes per session: 0 min    Stress: Very much   Relationships    Social connections     Talks on phone: More than three times a week     Gets together: Twice a week     Attends Temple service: Not on file     Active member of club or organization: No     Attends meetings of clubs or organizations: Never     Relationship status:    Other Topics Concern    Not on file   Social History Narrative    Not on file     Medication List with Changes/Refills   Current Medications    ACYCLOVIR 5% (ZOVIRAX) 5 % OINTMENT    Apply topically 5  (five) times daily.    AZELASTINE (ASTELIN) 137 MCG (0.1 %) NASAL SPRAY    1 spray (137 mcg total) by Nasal route 2 (two) times daily.    BUMETANIDE (BUMEX) 2 MG TABLET    Take 1 tablet (2 mg total) by mouth once daily.    BUPROPION (WELLBUTRIN XL) 150 MG TB24 TABLET    Take 3 tablets (450 mg total) by mouth once daily.    BUSPIRONE (BUSPAR) 15 MG TABLET    Take 1 tablet (15 mg total) by mouth 2 (two) times daily.    C/SOURCHERRY/CELERY/GRAPE SEED (TART CHERRY ORAL)    Take 1 tablet by mouth daily as needed.     DIAZEPAM (VALIUM) 2 MG TABLET    Take 1 tablet (2 mg total) by mouth every 6 (six) hours as needed (dizziness/Meniere's attack).    DICYCLOMINE (BENTYL) 10 MG CAPSULE    Take 1 capsule (10 mg total) by mouth 3 (three) times daily.    ERENUMAB-AOOE (AIMOVIG AUTOINJECTOR) 140 MG/ML AUTOINJECTOR    Inject 140 mg into the skin every 28 days.    FLUTICASONE PROPIONATE (FLONASE) 50 MCG/ACTUATION NASAL SPRAY    2 sprays (100 mcg total) by Each Nostril route once daily.    LEVOCETIRIZINE (XYZAL) 5 MG TABLET    TAKE ONE TABLET BY MOUTH EVERY MORNING    MELATONIN ORAL    Take by mouth nightly as needed.     MONTELUKAST (SINGULAIR) 10 MG TABLET    Take 1 tablet (10 mg total) by mouth every evening.    OMEGA-3 FATTY ACIDS/FISH OIL (FISH OIL-OMEGA-3 FATTY ACIDS) 300-1,000 MG CAPSULE    Take 1 capsule by mouth once daily.    ONDANSETRON (ZOFRAN-ODT) 4 MG TBDL    Take 1 tablet (4 mg total) by mouth every 8 (eight) hours as needed.    SIMVASTATIN (ZOCOR) 5 MG TABLET    Take 1 tablet (5 mg total) by mouth nightly.    TOPIRAMATE (TROKENDI XR) 100 MG CP24    Take 1 capsule (100 mg total) by mouth once daily.    VITAMIN B COMPLEX/FOLIC ACID (B COMPLEX 100 ORAL)    Take by mouth nightly.     Review of patient's allergies indicates:   Allergen Reactions    Demerol [meperidine] Nausea And Vomiting     Pt refuses Demerol     Codeine Itching     Review of Systems   Constitution: Negative for fever.   HENT: Negative for sore  throat.    Eyes: Negative for blurred vision.   Cardiovascular: Negative for dyspnea on exertion.   Respiratory: Negative for shortness of breath.    Hematologic/Lymphatic: Does not bruise/bleed easily.   Skin: Negative for itching.   Musculoskeletal: Positive for joint pain.   Gastrointestinal: Negative for vomiting.   Genitourinary: Negative for dysuria.   Neurological: Negative for dizziness.   Psychiatric/Behavioral: The patient does not have insomnia.        Physical Exam:   Body mass index is 22.86 kg/m².  There were no vitals filed for this visit.   GENERAL: Well appearing, appropriate for stated age, no acute distress.  CARDIOVASCULAR: Pulses regular by peripheral palpation.  PULMONARY: Respirations are even and non-labored.  NEURO: Awake, alert, and oriented x 3.  PSYCH: Mood & affect are appropriate.  HEENT: Head is normocephalic and atraumatic.  Ortho/SPM Exam  Left knee: + effusion. Good active flex/ext. Localizes pain to med JL and ttp there. Calf soft and non tender. NVID.    Imaging:      X-ray Knee Ortho Bilateral with Flexion  Order date: 6/1/2020  Authorizing: Yehuda Beck MD  Ordered by Yehuda Beck MD on 6/1/2020.   Narrative & Impression    EXAMINATION:  XR KNEE ORTHO BILAT WITH FLEXION     CLINICAL HISTORY:  Pain in right knee     TECHNIQUE:  AP standing of both knees, PA flexion standing views of both knees, and Merchant views of both knees were performed.  Lateral views of both knees were also performed.     COMPARISON:  11/20/2019     FINDINGS:  Right knee: There is no radiographic evidence of acute osseous, articular, or soft tissue abnormality. There is mild-to-moderate joint space loss in the medial compartment with some small marginal osteophytes present.     Left knee:  Probable small joint effusion.  No acute fractures or dislocations visualized.  Moderate to severe joint space loss in the medial compartment with prominent marginal osteophytes present.  Probable small  ossified intra-articular body seen posterior to the knee.     Impression:     As above       Relevant imaging results reviewed and interpreted by me, discussed with the patient and / or family today.     Other Tests:     No other tests performed today.    Assessment:  Kenia Alonzo is a 65 y.o. female with left knee OA    Encounter Diagnosis   Name Primary?    Primary osteoarthritis of left knee Yes        Plan:   Refer to Dr. Pop   I had a long discussion with the patient about the causes, treatments, and prognosis for knee arthritis. We discussed that although there is not a cure for arthritis, there are effective ways to improve symptoms. I recommended low impact activities such as elliptical and bicycle. I talked about the fact that aquatic and pool therapy is often helpful. I advised the patient to consider good quality stability and cushioned shoes. We talked about the importance of knee motion in the health of the knee. The patient can also use a compression knee sleeve to help limit swelling and provide proprioceptive feedback. We recommend formal physical therapy or at minimum a home exercise program, which we discussed with the patient. I advised that over the counter solutions such as glucosamine and chondroitin may help with symptoms.   Treatment plan was developed with input from the patient/family, and they expressed understanding and agreement with the plan. All questions were answered today.    Follow-up: PRN or sooner if there are any problems between now and then.    Disclaimer: This note was prepared using a voice recognition system and is likely to have sound alike errors within the text.

## 2020-11-27 ENCOUNTER — TELEPHONE (OUTPATIENT)
Dept: RADIOLOGY | Facility: HOSPITAL | Age: 65
End: 2020-11-27

## 2020-11-29 ENCOUNTER — PATIENT MESSAGE (OUTPATIENT)
Dept: NEUROLOGY | Facility: CLINIC | Age: 65
End: 2020-11-29

## 2020-11-30 ENCOUNTER — HOSPITAL ENCOUNTER (OUTPATIENT)
Dept: RADIOLOGY | Facility: HOSPITAL | Age: 65
Discharge: HOME | End: 2020-11-30
Attending: INTERNAL MEDICINE
Payer: MEDICAID

## 2020-11-30 ENCOUNTER — OFFICE VISIT (OUTPATIENT)
Dept: ORTHOPEDICS | Facility: CLINIC | Age: 65
End: 2020-11-30
Payer: MEDICAID

## 2020-11-30 ENCOUNTER — CLINICAL SUPPORT (OUTPATIENT)
Dept: REHABILITATION | Facility: HOSPITAL | Age: 65
End: 2020-11-30
Payer: MEDICAID

## 2020-11-30 VITALS
WEIGHT: 125 LBS | HEIGHT: 62 IN | BODY MASS INDEX: 23 KG/M2 | DIASTOLIC BLOOD PRESSURE: 78 MMHG | SYSTOLIC BLOOD PRESSURE: 129 MMHG | HEART RATE: 94 BPM

## 2020-11-30 DIAGNOSIS — M21.162 ACQUIRED VARUS DEFORMITY KNEE, LEFT: ICD-10-CM

## 2020-11-30 DIAGNOSIS — M25.60 DECREASED RANGE OF MOTION: ICD-10-CM

## 2020-11-30 DIAGNOSIS — M17.12 ARTHRITIS OF KNEE, LEFT: Primary | ICD-10-CM

## 2020-11-30 DIAGNOSIS — R26.89 BALANCE PROBLEM: ICD-10-CM

## 2020-11-30 DIAGNOSIS — R53.1 WEAKNESS: ICD-10-CM

## 2020-11-30 DIAGNOSIS — N28.1 KIDNEY CYSTS: ICD-10-CM

## 2020-11-30 DIAGNOSIS — M17.0 PRIMARY OSTEOARTHRITIS OF BOTH KNEES: ICD-10-CM

## 2020-11-30 DIAGNOSIS — M17.11 ARTHRITIS OF KNEE, RIGHT: ICD-10-CM

## 2020-11-30 PROCEDURE — 99215 OFFICE O/P EST HI 40 MIN: CPT | Mod: PBBFAC,25 | Performed by: ORTHOPAEDIC SURGERY

## 2020-11-30 PROCEDURE — 76770 US EXAM ABDO BACK WALL COMP: CPT | Mod: 26,,, | Performed by: RADIOLOGY

## 2020-11-30 PROCEDURE — 99999 PR PBB SHADOW E&M-EST. PATIENT-LVL V: ICD-10-PCS | Mod: PBBFAC,,, | Performed by: ORTHOPAEDIC SURGERY

## 2020-11-30 PROCEDURE — 97110 THERAPEUTIC EXERCISES: CPT | Mod: CQ

## 2020-11-30 PROCEDURE — 99214 OFFICE O/P EST MOD 30 MIN: CPT | Mod: 25,S$PBB,, | Performed by: ORTHOPAEDIC SURGERY

## 2020-11-30 PROCEDURE — 99214 PR OFFICE/OUTPT VISIT, EST, LEVL IV, 30-39 MIN: ICD-10-PCS | Mod: 25,S$PBB,, | Performed by: ORTHOPAEDIC SURGERY

## 2020-11-30 PROCEDURE — 76770 US RETROPERITONEAL COMPLETE: ICD-10-PCS | Mod: 26,,, | Performed by: RADIOLOGY

## 2020-11-30 PROCEDURE — 76770 US EXAM ABDO BACK WALL COMP: CPT | Mod: TC,PO

## 2020-11-30 PROCEDURE — 20610 DRAIN/INJ JOINT/BURSA W/O US: CPT | Mod: PBBFAC | Performed by: ORTHOPAEDIC SURGERY

## 2020-11-30 PROCEDURE — 99999 PR PBB SHADOW E&M-EST. PATIENT-LVL V: CPT | Mod: PBBFAC,,, | Performed by: ORTHOPAEDIC SURGERY

## 2020-11-30 PROCEDURE — 20610 LARGE JOINT ASPIRATION/INJECTION: L KNEE: ICD-10-PCS | Mod: S$PBB,LT,, | Performed by: ORTHOPAEDIC SURGERY

## 2020-11-30 RX ORDER — METHYLPREDNISOLONE ACETATE 80 MG/ML
80 INJECTION, SUSPENSION INTRA-ARTICULAR; INTRALESIONAL; INTRAMUSCULAR; SOFT TISSUE
Status: DISCONTINUED | OUTPATIENT
Start: 2020-11-30 | End: 2020-11-30 | Stop reason: HOSPADM

## 2020-11-30 RX ADMIN — METHYLPREDNISOLONE ACETATE 80 MG: 80 INJECTION, SUSPENSION INTRALESIONAL; INTRAMUSCULAR; INTRASYNOVIAL; SOFT TISSUE at 03:11

## 2020-11-30 NOTE — LETTER
November 30, 2020      Yehuda Beck MD  [address]  [place] [address]           '[facility]  [address] [place] [address]  Phone: [phone]  Fax: [phone]          Patient: Kenia Alonzo   MR Number: [id number]   YOB: 1955   Date of Visit: 11/30/2020       Dear Dr. Yehuda Beck:    Thank you for referring Kenia Alonzo to me for evaluation. Attached you will find relevant portions of my assessment and plan of care.    If you have questions, please do not hesitate to call me. I look forward to following Kenia Alonzo along with you.    Sincerely,    Andrey Pop MD    Enclosure  CC:  No Recipients    If you would like to receive this communication electronically, please contact [email or web address] or [phone] to request more information on [employer] Link access.    For providers and/or their staff who would like to refer a patient to [facility], please contact us through our one-stop-shop provider referral line, [facility], at [phone].    If you feel you have received this communication in error or would no longer like to receive these types of communications, please e-mail [email or web address]

## 2020-11-30 NOTE — PROGRESS NOTES
Physical Therapy Treatment Note     Name: Kenia Alonzo  Clinic Number: [id number]    Therapy Diagnosis:   Encounter Diagnoses   Name Primary?    Weakness     Balance problem     Decreased range of motion      Physician: Yehuda Beck MD    Visit Date: 11/30/2020    Physician Orders: PT Eval and Treat PT - OA protocol, consider water treadmill  Medical Diagnosis from Referral: M17.0 (ICD-10-CM) - Primary osteoarthritis of both knees  Evaluation Date: 11/11/2020  Authorization Period Expiration: 1/6/2020  Plan of Care Expiration: 12/5/2020  Visit # / Visits authorized: 2/12 (3)     PROGRESS NOTE: 12/11/2020    Time In: 1:20 pm  Time Out: 2:05pm  Total Billable Time: 45 minutes    Precautions: Standard and CHF    Subjective     Pt reports: she is hurting more in her left knee today because of the cold weather.   She was semi compliant with home exercise program.  Response to previous treatment: none noted  Functional change: none noted    Pain: 7/10  Location: bilateral knees    Objective     Kenia received therapeutic exercises to develop strength, endurance, ROM and flexibility for 45 minutes including:  Recumbent bike for range of motion 5 minutes  Seated ball squeezes 3 minutes  Long arc quads alternating 3 minutes  Tailgaters distraction 3# 3 minutes  Straight leg raises 3x8 bilateral  Clamshells 3x8 minute each bilateral  Sidelying hip abduction 10x each bilateral  Hip circles CW/CCW 10x each bilateral  Soft tissue to left knee, pes anserine for 10 minutes    Home Exercises Provided and Patient Education Provided     Education provided:     Written Home Exercises Provided: Patient instructed to cont prior HEP.  Exercises were reviewed and Kenia was able to demonstrate them prior to the end of the session.  Kenia demonstrated good  understanding of the education provided.     See EMR under Patient Instructions for exercises provided 11/11/2020.    Assessment     Patient tolerated treatment well today with  minimal complaints of discomfort. Patient had some increased discomfort on medial portion of the knee while on recumbent bike. Heavy verbal and tactile cues necessary throughout treatment for proper performance of activities with decreased compensation. Patient had increased tightness and tenderness in left pes anserine which decreased significantly with manual therapy. Patient left treatment with reports of decreased pain to a 0/10 and decreased tightness, responded very well to soft tissue.   Kenia is progressing well towards her goals.   Pt prognosis is Good.     Pt will continue to benefit from skilled outpatient physical therapy to address the deficits listed in the problem list box on initial evaluation, provide pt/family education and to maximize pt's level of independence in the home and community environment.     Pt's spiritual, cultural and educational needs considered and pt agreeable to plan of care and goals.     Anticipated barriers to physical therapy:  Pt rescheduled evaluation, did not complete prior therapy, vestibular issues,      Goals:   Short Term Goals: In 4 weeks:  1.Patient to be educated on HEP.  2.Patient to demo increased B knee AROM to 0 degrees extension while sitting, to improve gait and functional activities.  3.Patient to increase strength B LE by 1/2 grade, to assist with improved ambulation and standing endurance.  4.Patient to have decreased pain to 1/10, at worst, to improve QOL.  5.Patient to increase B LE balance to 10s, to decrease fall risk.  6.Patient to improve score on the FOTO, to improve QOL.  7.Patient to improve sit to  30s score, to increase endurance and strength.     Long Term Goals: In 8 weeks  1. Patient to perform daily activities including performing light activities around her home with any of her usual work, housework, or school activities; performing heavy activities around her home; lifting an object, like a bag of groceries from the floor without  increased symptoms.  2. Pt to improve sit to  30s test to 15 or greater.  3. Patient to demonstrate increased LE strength to 4+/5-5/5, to assist with improved ambulation and standing endurance.  4. Patient to increase B LE balance to 15s, to decrease fall risk.  5. Patient to improve score on the FOTO to 47%, or less, to improve QOL.    Plan   Plan of care Certification: 11/11/2020 to 1/6/2020.    Continue Plan of Care (POC) and progress per patient tolerance.    Odessa Canas, PTA

## 2020-11-30 NOTE — PROCEDURES
Large Joint Aspiration/Injection: L knee    Date/Time: 11/30/2020 3:00 PM  Performed by: Andrey Pop MD  Authorized by: Andrey Pop MD     Consent Done?:  Yes (Verbal)  Site marked: the procedure site was marked    Timeout: prior to procedure the correct patient, procedure, and site was verified      Local anesthesia used?: Yes    Local anesthetic:  Lidocaine 1% without epinephrine    Details:  Needle Size:  22 G  Ultrasonic Guidance for needle placement?: No    Location:  Knee  Site:  L knee  Medications:  80 mg methylPREDNISolone acetate 80 mg/mL  Patient tolerance:  Patient tolerated the procedure well with no immediate complications

## 2020-12-01 NOTE — PROGRESS NOTES
Subjective:     Patient ID: Kenia Alonzo is a 65 y.o. female.    Chief Complaint: Pain of the Right Knee and Pain of the Left Knee   2020  HPI:  65-year-old had been having pain in both knees with the left worse than the right for more than 10 years now.  In  injured her left knee seen in [place] and recommended total knee replacement which she refused.  Over the years she received steroid injections with relief.  Synvisc injection into the left knee the series of 3 helped.  She was placed on ibuprofen large dose and that kidney issues and now she cannot take any anti-inflammatories.  Received Euflexxa 2020 series of 3 without relief and followed with steroid injection 10/12/2020 which seems to have worked.  The lasting around 6 weeks and most people will not give her more.  She says her knee is bone on bone and she wants to avoid surgery at this point in time until she is ready to proceed with it.  She is ambulating without any assistive devices.  She goes to physical [facility] at [facility] and when this morning.  Occasionally she wakes up with the knee severely painful and with therapy and exercise eases up.  She does take Tylenol on occasions.  Denies any fever or chills or shortness of breath or difficulty with chewing or swallowing loss of bowel bladder control blurry vision double vision or loss of sense smell or taste.  Past Medical History:   Diagnosis Date    Arthritis     knee    Asthma     childhood     Depression     Encounter for blood transfusion     General anesthetics causing adverse effect in therapeutic use     severe headache after crainiotomy    GI problem     IBS    Gout     Headache     Hyperlipidemia     Meniere disease     MVP (mitral valve prolapse)     minor    Vertigo      Past Surgical History:   Procedure Laterality Date    BRAIN SURGERY      vestibular neurectomy    BREAST SURGERY      benign     SECTION      x 1    CHOLECYSTECTOMY       EXAMINATION UNDER ANESTHESIA N/A 2020    Procedure: EXAM UNDER ANESTHESIA;  Surgeon: Lucy Crane MD;  Location: [facility] OR;  Service: OB/GYN;  Laterality: N/A;    LASER ABLATION N/A 2020    Procedure: ABLATION, USING LASER;  Surgeon: Lucy Crane MD;  Location: [facility] OR;  Service: OB/GYN;  Laterality: N/A;    TONSILLECTOMY       Family History   Problem Relation Age of Onset    Colon cancer Father     Diabetes Father     Diabetes Sister      Social History     Socioeconomic History    Marital status:      Spouse name: Not on file    Number of children: Not on file    Years of education: Not on file    Highest education level: Not on file   Occupational History    Not on file   Social Needs    Financial resource strain: Somewhat hard    Food insecurity     Worry: Sometimes true     Inability: Patient refused    Transportation needs     Medical: No     Non-medical: No   Tobacco Use    Smoking status: Former Smoker     Packs/day: 0.50     Years: 43.00     Pack years: 21.50     Types: Cigarettes     Start date: 1976     Quit date: 2020     Years since quittin.8    Smokeless tobacco: Never Used    Tobacco comment: Quit 20   Substance and Sexual Activity    Alcohol use: Not Currently     Alcohol/week: 1.0 - 3.0 standard drinks     Types: 1 - 3 Glasses of wine per week     Frequency: Monthly or less     Drinks per session: 1 or 2     Binge frequency: Never     Comment: social few times monthly.   No alcohol 72h prior to sx    Drug use: No    Sexual activity: Never     Birth control/protection: None, Post-menopausal   Lifestyle    Physical activity     Days per week: 0 days     Minutes per session: 0 min    Stress: Very much   Relationships    Social connections     Talks on phone: More than three times a week     Gets together: Twice a week     Attends Yazidi service: Not on file     Active member of club or organization: No     Attends meetings of clubs  or organizations: Never     Relationship status:    Other Topics Concern    Not on file   Social History Narrative    Not on file     Medication List with Changes/Refills   Current Medications    ACYCLOVIR 5% (ZOVIRAX) 5 % OINTMENT    Apply topically 5 (five) times daily.    AZELASTINE (ASTELIN) 137 MCG (0.1 %) NASAL SPRAY    1 spray (137 mcg total) by Nasal route 2 (two) times daily.    BUMETANIDE (BUMEX) 2 MG TABLET    Take 1 tablet (2 mg total) by mouth once daily.    BUPROPION (WELLBUTRIN XL) 150 MG TB24 TABLET    Take 3 tablets (450 mg total) by mouth once daily.    BUSPIRONE (BUSPAR) 15 MG TABLET    Take 1 tablet (15 mg total) by mouth 2 (two) times daily.    C/SOURCHERRY/CELERY/GRAPE SEED (TART CHERRY ORAL)    Take 1 tablet by mouth daily as needed.     DIAZEPAM (VALIUM) 2 MG TABLET    Take 1 tablet (2 mg total) by mouth every 6 (six) hours as needed (dizziness/Meniere's attack).    DICYCLOMINE (BENTYL) 10 MG CAPSULE    Take 1 capsule (10 mg total) by mouth 3 (three) times daily.    ERENUMAB-AOOE (AIMOVIG AUTOINJECTOR) 140 MG/ML AUTOINJECTOR    Inject 140 mg into the skin every 28 days.    FLUTICASONE PROPIONATE (FLONASE) 50 MCG/ACTUATION NASAL SPRAY    2 sprays (100 mcg total) by Each Nostril route once daily.    KETOROLAC (TORADOL) 10 MG TABLET    TAKE ONE TABLET BY MOUTH AS NEEDED FOR PAIN (MAX THREE TIMES A WEEK)    LEVOCETIRIZINE (XYZAL) 5 MG TABLET    TAKE ONE TABLET BY MOUTH EVERY MORNING    MELATONIN ORAL    Take by mouth nightly as needed.     OMEGA-3 FATTY ACIDS/FISH OIL (FISH OIL-OMEGA-3 FATTY ACIDS) 300-1,000 MG CAPSULE    Take 1 capsule by mouth once daily.    ONDANSETRON (ZOFRAN-ODT) 4 MG TBDL    Take 1 tablet (4 mg total) by mouth every 8 (eight) hours as needed.    SIMVASTATIN (ZOCOR) 5 MG TABLET    Take 1 tablet (5 mg total) by mouth nightly.    VITAMIN B COMPLEX/FOLIC ACID (B COMPLEX 100 ORAL)    Take by mouth nightly.     Review of patient's allergies indicates:   Allergen  Reactions    Demerol [meperidine] Nausea And Vomiting     Pt refuses Demerol     Codeine Itching     Review of Systems   Constitution: Negative for decreased appetite.   HENT: Negative for tinnitus.    Eyes: Negative for double vision.   Cardiovascular: Negative for chest pain.   Respiratory: Negative for wheezing.    Hematologic/Lymphatic: Negative for bleeding problem.   Skin: Negative for dry skin.   Musculoskeletal: Positive for joint pain, joint swelling and stiffness. Negative for arthritis, back pain, gout and neck pain.   Gastrointestinal: Negative for abdominal pain.   Genitourinary: Negative for bladder incontinence.   Neurological: Negative for numbness, paresthesias and sensory change.   Psychiatric/Behavioral: Negative for altered mental status.       Objective:   Body mass index is 22.86 kg/m².  Vitals:    11/30/20 1501   BP: 129/78   Pulse: 94          General    Constitutional: She is oriented to person, place, and time. She appears well-developed.   HENT:   Head: Atraumatic.   Eyes: EOM are normal.   Cardiovascular: Normal rate.    Pulmonary/Chest: Effort normal.   Neurological: She is alert and oriented to person, place, and time.   Psychiatric: Judgment normal.            Ambulating without any assistive devices with a very slight limp  Cervical rotation is very functional  Bilateral upper extremity neurovascularly intact.  Radial and ulnar pulses 2+.  Sensory intact to touch.  Strength is 5/5  Lumbar without too much discomfort  Negative straight leg raising  Pelvis is level  Bila hips without any pain in the groin and full motion.  No pain to palpation over the greater trochanters.  Hip flexors, abductors, adductors, quads, hamstrings, ankle extensors and flexors are slightly weak at 5-/5  Bilateral knee with varus deformity  Right knee with mild medial joint tenderness.  Mild crepitus to compression and mild swelling.  Range of motion 0-130 degrees.  Collaterals and cruciate stable.  Negative  Corazon  Left knee with moderate to severe medial joint pain.  Crepitus is mild to compression on the patella.  Mild to moderate swelling.  Range of motion 0-130 degrees.  Collaterals and cruciates stable.  Negative Corazon's.  No defect in the patella tendon or quadriceps tendon in both knees  Calves are soft nontender  Ankle motion intact  Skin is warm to touch no obvious lesions    Relevant imaging results reviewed and interpreted by me, discussed with the patient and / or family today   X-ray 06/01/2020 with complete loss of joint space on the left knee medially with bone spurs and sclerosis consistent with end-stage arthritis and varus deformity, right knee with moderate joint space narrowing medially with small osteophytic changes consistent with right knee arthritis with moderate varus deformed  Assessment:     Encounter Diagnoses   Name Primary?    Arthritis of knee, left Yes    Acquired varus deformity knee, left     Arthritis of knee, right     Primary osteoarthritis of both knees         Plan:   Arthritis of knee, left  -     Large Joint Aspiration/Injection: L knee    Acquired varus deformity knee, left    Arthritis of knee, right    Primary osteoarthritis of both knees  -     Ambulatory referral/consult to Orthopedics         Patient Instructions   Give a brochure about total knee replacement  Plan  Injection of steroid into the left knee today 80 mg Depo-Medrol mixed with 5 cc 1% lidocaine 11/30/2020  Return end of January 8 weeks for repeat injection and we will get approval for Synvisc-One at that point which she is due in the middle of February  Received Euflexxa last injection 08/17/2020 and steroid injection 10/30/2020 into the left knee  May take maximum 650 mg of Tylenol 3 times a day if needed      Patient is already bone on bone and the injections only helped for 6 weeks.  She is not ready to undergo surgical intervention.  She cannot take and will not take NSAIDs due to injury from  ibuprofen to her kidneys and she is followed by nephrology and have new blood testing to be performed.  I did tell her eventually she will need total knee replacement when she is ready.  Eventually the injections might not work as much.  We will do and get approval for Synvisc-One which she will be due for hyaluronic in middle of February and will standard approval next visit in January.  I did warn her about the side effects of too much cortisone and she except the risks.    Disclaimer: This note was prepared using a voice recognition system and is likely to have sound alike errors within the text.

## 2020-12-06 ENCOUNTER — PATIENT OUTREACH (OUTPATIENT)
Dept: ADMINISTRATIVE | Facility: OTHER | Age: 65
End: 2020-12-06

## 2020-12-07 ENCOUNTER — OFFICE VISIT (OUTPATIENT)
Dept: NEPHROLOGY | Facility: CLINIC | Age: 65
End: 2020-12-07
Payer: MEDICAID

## 2020-12-07 ENCOUNTER — PATIENT MESSAGE (OUTPATIENT)
Dept: ORTHOPEDICS | Facility: CLINIC | Age: 65
End: 2020-12-07

## 2020-12-07 DIAGNOSIS — N18.30 STAGE 3 CHRONIC KIDNEY DISEASE, UNSPECIFIED WHETHER STAGE 3A OR 3B CKD: Primary | ICD-10-CM

## 2020-12-07 DIAGNOSIS — N28.1 KIDNEY CYSTS: ICD-10-CM

## 2020-12-07 DIAGNOSIS — M10.9 GOUT, UNSPECIFIED CAUSE, UNSPECIFIED CHRONICITY, UNSPECIFIED SITE: ICD-10-CM

## 2020-12-07 DIAGNOSIS — N25.81 HYPERPARATHYROIDISM, SECONDARY RENAL: ICD-10-CM

## 2020-12-07 PROCEDURE — 99214 OFFICE O/P EST MOD 30 MIN: CPT | Mod: 95,,, | Performed by: INTERNAL MEDICINE

## 2020-12-07 PROCEDURE — 99214 PR OFFICE/OUTPT VISIT, EST, LEVL IV, 30-39 MIN: ICD-10-PCS | Mod: 95,,, | Performed by: INTERNAL MEDICINE

## 2020-12-07 NOTE — PROGRESS NOTES
NEPHROLOGY CONSULTATION    PHYSICIAN REQUESTING THE CONSULT:  Dr. Harish Hernandez    REASON FOR CONSULTATION: Renal insufficiency    Audio Only Telehealth Visit     The patient location is: patient's home  The chief complaint leading to consultation is: CKD  Visit type: Virtual visit with audio only (telephone)  Total time spent with patient: 20 minutes     The reason for the audio only service rather than synchronous audio and video virtual visit was related to technical difficulties or patient preference/necessity.     Each patient to whom I provide medical services by telemedicine is:  (1) informed of the relationship between the physician and patient and the respective role of any other health care provider with respect to management of the patient; and (2) notified that they may decline to receive medical services by telemedicine and may withdraw from such care at any time. Patient verbally consented to receive this service via voice-only telephone call.       HPI: See below     Assessment and plan:  See below       This service was not originating from a related E/M service provided within the previous 7 days nor will  to an E/M service or procedure within the next 24 hours or my soonest available appointment.  Prevailing standard of care was able to be met in this audio-only visit.          65 y.o. female with history of Meniere's disease, hyperlipidemia, gout, migraines, IBS, allergic rhinitis  presents to the renal clinic for evaluation of renal insufficiency. A consultation has been requested by the patient's PMD, Dr. Harish Hernandez. Patient today presents to the clinic complaining of intermittent headaches, intermittent LE swelling. She denies any congenital hearing loss, chest pain, SOB, hemoptysis, abdominal or flank pain, diarrhea, nausea/vomiting, hematuria, dysuria, rashes, hand/foot paresthesia, nasal congestion. Patient reports strong NSAID use history. She had been on daily ibuprofen for at  "least 6 months. Last use was about 2 weeks ago.   Patient reports history of gout (associated with right toe pain, currently using "cherry pills" only, no recent attacks), migraines (treated with NSAIDS in past, triggered by MSG), Meniere's disease (using valium intermittently for exacerbations). She denies any history of nephrolithiasis, HTN, DM2, heart disease.  Patient's son suffers from obstructive nephropathy. Laboratory review revealed that the patient's renal function has been fluctuating with creatinine at 2.1 (16), 2.5 (17) and 1.5 (18).     2020:  Patient is feeling fine. Creatinine stable at 1.6.    2020:  Creatinine at 1.4. Patient without complaints.             Past Medical History:   Diagnosis Date    Arthritis     knee    Asthma     childhood     Depression     Encounter for blood transfusion     General anesthetics causing adverse effect in therapeutic use     severe headache after crainiotomy    GI problem     IBS    Gout     Headache     Hyperlipidemia     Meniere disease     MVP (mitral valve prolapse)     minor    Vertigo        Past Surgical History:   Procedure Laterality Date    BRAIN SURGERY      vestibular neurectomy    BREAST SURGERY      benign     SECTION      x 1    CHOLECYSTECTOMY      EXAMINATION UNDER ANESTHESIA N/A 2020    Procedure: EXAM UNDER ANESTHESIA;  Surgeon: Lucy Crane MD;  Location: [facility] OR;  Service: OB/GYN;  Laterality: N/A;    LASER ABLATION N/A 2020    Procedure: ABLATION, USING LASER;  Surgeon: Luyc Crane MD;  Location: [facility] OR;  Service: OB/GYN;  Laterality: N/A;    TONSILLECTOMY         Review of patient's allergies indicates:   Allergen Reactions    Codeine Itching    Hydrocodone Itching       Current Outpatient Medications   Medication Sig Dispense Refill    acyclovir 5% (ZOVIRAX) 5 % ointment Apply topically 5 (five) times daily. 30 g 1    azelastine (ASTELIN) 137 mcg " (0.1 %) nasal spray 1 spray (137 mcg total) by Nasal route 2 (two) times daily. 30 mL 3    bumetanide (BUMEX) 2 MG tablet Take 1 tablet (2 mg total) by mouth once daily. 90 tablet 1    buPROPion (WELLBUTRIN XL) 150 MG TB24 tablet Take 3 tablets (450 mg total) by mouth once daily. 90 tablet 2    busPIRone (BUSPAR) 15 MG tablet Take 1 tablet (15 mg total) by mouth 2 (two) times daily. 60 tablet 2    C/sourcherry/celery/grape seed (TART CHERRY ORAL) Take 1 tablet by mouth daily as needed.       diazePAM (VALIUM) 2 MG tablet Take 1 tablet (2 mg total) by mouth every 6 (six) hours as needed (dizziness/Meniere's attack). 60 tablet 2    dicyclomine (BENTYL) 10 MG capsule Take 1 capsule (10 mg total) by mouth 3 (three) times daily. 90 capsule 1    erenumab-aooe (AIMOVIG AUTOINJECTOR) 140 mg/mL autoinjector Inject 140 mg into the skin every 28 days. 1 mL 11    fluticasone propionate (FLONASE) 50 mcg/actuation nasal spray 2 sprays (100 mcg total) by Each Nostril route once daily. 16 g 3    ketorolac (TORADOL) 10 mg tablet TAKE ONE TABLET BY MOUTH AS NEEDED FOR PAIN (MAX THREE TIMES A WEEK) 12 tablet 0    levocetirizine (XYZAL) 5 MG tablet TAKE ONE TABLET BY MOUTH EVERY MORNING (Patient taking differently: Take 5 mg by mouth every evening. ) 90 tablet 3    MELATONIN ORAL Take by mouth nightly as needed.       omega-3 fatty acids/fish oil (FISH OIL-OMEGA-3 FATTY ACIDS) 300-1,000 mg capsule Take 1 capsule by mouth once daily.      ondansetron (ZOFRAN-ODT) 4 MG TbDL Take 1 tablet (4 mg total) by mouth every 8 (eight) hours as needed. 60 tablet 1    simvastatin (ZOCOR) 5 MG tablet Take 1 tablet (5 mg total) by mouth nightly. 90 tablet 3    vitamin B complex/folic acid (B COMPLEX 100 ORAL) Take by mouth nightly.       No current facility-administered medications for this visit.        Family History   Problem Relation Age of Onset    Colon cancer Father     Diabetes Father     Diabetes Sister        Social History      Socioeconomic History    Marital status:      Spouse name: Not on file    Number of children: Not on file    Years of education: Not on file    Highest education level: Not on file   Occupational History    Not on file   Social Needs    Financial resource strain: Somewhat hard    Food insecurity     Worry: Sometimes true     Inability: Patient refused    Transportation needs     Medical: No     Non-medical: No   Tobacco Use    Smoking status: Former Smoker     Packs/day: 0.50     Years: 43.00     Pack years: 21.50     Types: Cigarettes     Start date: 1976     Quit date: 2020     Years since quittin.8    Smokeless tobacco: Never Used    Tobacco comment: Quit 20   Substance and Sexual Activity    Alcohol use: Not Currently     Alcohol/week: 1.0 - 3.0 standard drinks     Types: 1 - 3 Glasses of wine per week     Frequency: Monthly or less     Drinks per session: 1 or 2     Binge frequency: Never     Comment: social few times monthly.   No alcohol 72h prior to sx    Drug use: No    Sexual activity: Never     Birth control/protection: None, Post-menopausal   Lifestyle    Physical activity     Days per week: 0 days     Minutes per session: 0 min    Stress: Very much   Relationships    Social connections     Talks on phone: More than three times a week     Gets together: Twice a week     Attends Buddhism service: Not on file     Active member of club or organization: No     Attends meetings of clubs or organizations: Never     Relationship status:    Other Topics Concern    Not on file   Social History Narrative    Not on file       Review of Systems:  1. GENERAL: patient denies any fever, weight changes, generalized weakness, dizziness.  2. HEENT: patient denies headaches, no visual disturbances, swallowing problems, sinus pain, nasal congestion.  3. CARDIOVASCULAR: patient denies chest pain, palpitations.  4. PULMONARY: patient denies SOB, coughing, hemoptysis,  wheezing.  5. GASTROINTESTINAL: patient denies abdominal pain, nausea, vomiting, diarrhea.  6. GENITOURINARY: patient denies dysuria, hematuria, hesitancy, frequency.  7. EXTREMITIES: patient denies LE edema, no LE cramping.  8. DERMATOLOGY: patient denies rashes, ulcers.  9. NEURO: patient denies tremors, extremity weakness, extremity numbness/tingling.  10. MUSCULOSKELETAL: patient denies joint pain, joint swelling.  11. HEMATOLOGY: patient denies rectal or gum bleeding.  12: PSYCH: patient denies anxiety, depression.      PHYSICAL EXAM:  She reports home SBP in 120s.    Exam was done during previous visit.     GENERAL: Pleasant lady presents to clinic with non-labored breathing.  HEENT: PER, no nasal discharge, no icterus, no oral exudates, moist mucosal membranes.  NECK: no thyroid mass, no lymphadenopathy.  HEART: RRR S1/S2, no rubs, good peripheral pulses.  LUNGS: CTA bilaterally, no wheezing, breathing is nonlabored.  ABDOMEN: soft, mild epigastric tenderness, not distended, bowel sounds are present, no abdominal hernia.  EXTREM: no LE edema.  SKIN: no rashes, skin is warm and dry.  NEURO: A & O x 3, no obvious focal signs.    LABORATORY RESULTS:    Lab Results   Component Value Date    CREATININE 1.4 11/30/2020    BUN 27 (H) 11/30/2020     11/30/2020    K 3.6 11/30/2020     11/30/2020    CO2 23 11/30/2020      Lab Results   Component Value Date    PTH 84.0 (H) 04/23/2019    CALCIUM 9.4 11/30/2020    PHOS 3.0 11/30/2020     Lab Results   Component Value Date    ALBUMIN 4.0 11/30/2020     Lab Results   Component Value Date    WBC 6.32 11/30/2020    HGB 12.9 11/30/2020    HCT 39.5 11/30/2020    MCV 95 11/30/2020     11/30/2020       Renal US from 11/3/20:  FINDINGS:  Right kidney: The right kidney measures 8.1 cm. Echotexture is increased.  Complex upper pole cyst measuring 2.5 cm, previous 1.9 cm.  Midpole septated cyst measuring 1.8 cm.  Septated lower pole 2.3 cm cyst.  5 and 3 mm lower  pole calcifications present.  No hydronephrosis.   Left kidney: The left kidney measures 9.4 cm. Echotexture increased.  Mildly complex 3.5 cm upper pole cyst.  Complex 2 cm midpole and 1.8 cm lower pole cysts.  No nephrolithiasis.  No hydronephrosis.   The bladder is unremarkable for distension.     Impression:   Echogenic kidneys consistent medical renal disease without hydronephrosis.   Multiple bilateral cysts, mostly complicated which have increased in size.  Consider CT or MRI renal mass imaging for definitive characterization.      ASSESSMENT AND PLAN:  65 y.o. female with history of Meniere's disease, hyperlipidemia, gout, migraines, IBS, allergic rhinitis  presents to the renal clinic for evaluation of renal insufficiency.     1. Renal insufficiency: Patient presents with mild renal insufficiency, consistent with CKD stage 3. Likely cause of her renal insufficiency is his her previous NSAID use. Last creatinine was 1.4. Patient's renal function will be monitored closely and she will return to the clinic in 3-4 months for follow up. Patient was advised to avoid NSAID pain medications such as advil, aleve, motrin, ibuprofen, naprosyn, meloxicam, diclofenac, mobic. Patient was advised to hydrate with 60-80 ounces of water per day.     2. Electrolytes: Within normal limits.    3. Acid base status: borderline acidosis has resolved.    4. Volume: Euvolemic.    5. Hypertension: Good BP control.     6. Medications: Reviewed. Patient was advised to avoid NSAID pain medications such as advil, aleve, motrin, ibuprofen, naprosyn, meloxicam, diclofenac, mobic.     7. Complex renal cysts: Urology referral.

## 2020-12-08 ENCOUNTER — PATIENT MESSAGE (OUTPATIENT)
Dept: REHABILITATION | Facility: HOSPITAL | Age: 65
End: 2020-12-08

## 2020-12-08 DIAGNOSIS — M25.511 RIGHT SHOULDER PAIN, UNSPECIFIED CHRONICITY: Primary | ICD-10-CM

## 2020-12-10 ENCOUNTER — PATIENT MESSAGE (OUTPATIENT)
Dept: REHABILITATION | Facility: HOSPITAL | Age: 65
End: 2020-12-10

## 2020-12-12 ENCOUNTER — PATIENT MESSAGE (OUTPATIENT)
Dept: REHABILITATION | Facility: HOSPITAL | Age: 65
End: 2020-12-12

## 2020-12-12 ENCOUNTER — PATIENT MESSAGE (OUTPATIENT)
Dept: PSYCHIATRY | Facility: CLINIC | Age: 65
End: 2020-12-12

## 2020-12-14 ENCOUNTER — HOSPITAL ENCOUNTER (OUTPATIENT)
Dept: RADIOLOGY | Facility: HOSPITAL | Age: 65
Discharge: HOME | End: 2020-12-14
Attending: ORTHOPAEDIC SURGERY
Payer: MEDICAID

## 2020-12-14 ENCOUNTER — OFFICE VISIT (OUTPATIENT)
Dept: ORTHOPEDICS | Facility: CLINIC | Age: 65
End: 2020-12-14
Payer: MEDICAID

## 2020-12-14 ENCOUNTER — PATIENT MESSAGE (OUTPATIENT)
Dept: REHABILITATION | Facility: HOSPITAL | Age: 65
End: 2020-12-14

## 2020-12-14 ENCOUNTER — CLINICAL SUPPORT (OUTPATIENT)
Dept: REHABILITATION | Facility: HOSPITAL | Age: 65
End: 2020-12-14
Payer: MEDICAID

## 2020-12-14 VITALS — WEIGHT: 125 LBS | HEIGHT: 62 IN | BODY MASS INDEX: 23 KG/M2

## 2020-12-14 DIAGNOSIS — M25.60 DECREASED RANGE OF MOTION: ICD-10-CM

## 2020-12-14 DIAGNOSIS — R53.1 WEAKNESS: ICD-10-CM

## 2020-12-14 DIAGNOSIS — M75.81 ROTATOR CUFF TENDONITIS, RIGHT: Primary | ICD-10-CM

## 2020-12-14 DIAGNOSIS — M25.511 RIGHT SHOULDER PAIN, UNSPECIFIED CHRONICITY: ICD-10-CM

## 2020-12-14 DIAGNOSIS — R26.89 BALANCE PROBLEM: ICD-10-CM

## 2020-12-14 DIAGNOSIS — M75.41 INTERNAL IMPINGEMENT OF RIGHT SHOULDER: ICD-10-CM

## 2020-12-14 DIAGNOSIS — M19.011 PRIMARY OSTEOARTHRITIS OF RIGHT SHOULDER: ICD-10-CM

## 2020-12-14 PROCEDURE — 97110 THERAPEUTIC EXERCISES: CPT

## 2020-12-14 PROCEDURE — 73030 X-RAY EXAM OF SHOULDER: CPT | Mod: 26,RT,, | Performed by: RADIOLOGY

## 2020-12-14 PROCEDURE — 99214 OFFICE O/P EST MOD 30 MIN: CPT | Mod: PBBFAC,25,PO | Performed by: ORTHOPAEDIC SURGERY

## 2020-12-14 PROCEDURE — 99999 PR PBB SHADOW E&M-EST. PATIENT-LVL IV: CPT | Mod: PBBFAC,,, | Performed by: ORTHOPAEDIC SURGERY

## 2020-12-14 PROCEDURE — 99213 PR OFFICE/OUTPT VISIT, EST, LEVL III, 20-29 MIN: ICD-10-PCS | Mod: S$PBB,,, | Performed by: ORTHOPAEDIC SURGERY

## 2020-12-14 PROCEDURE — 99213 OFFICE O/P EST LOW 20 MIN: CPT | Mod: S$PBB,,, | Performed by: ORTHOPAEDIC SURGERY

## 2020-12-14 PROCEDURE — 73030 X-RAY EXAM OF SHOULDER: CPT | Mod: TC,PO,RT

## 2020-12-14 PROCEDURE — 99999 PR PBB SHADOW E&M-EST. PATIENT-LVL IV: ICD-10-PCS | Mod: PBBFAC,,, | Performed by: ORTHOPAEDIC SURGERY

## 2020-12-14 PROCEDURE — 97140 MANUAL THERAPY 1/> REGIONS: CPT

## 2020-12-14 PROCEDURE — 73030 XR SHOULDER COMPLETE 2 OR MORE VIEWS RIGHT: ICD-10-PCS | Mod: 26,RT,, | Performed by: RADIOLOGY

## 2020-12-14 NOTE — PATIENT INSTRUCTIONS
Assessment:  Kenia Alonzo is a 65 y.o. female previous patient presents today for right shoulder pain, that has been going on 4 months, no known trauma.    Right shoulder rotator cuff tendonitis    Right shoulder impingement    Right shoulder possible rotator cuff tear   Right shoulder OA       Encounter Diagnoses   Name Primary?    Rotator cuff tendonitis, right Yes    Internal impingement of right shoulder     Primary osteoarthritis of right shoulder         Plan:   Referral to PT [facility]  - Right shoulder   Compound Cream- #2   MRI right shoulder    Follow up with Dr. Beck after MRI     Follow-up: After MRI or sooner if there are any problems between now and then.    Thank you for choosing [facility] and Dr. Yehuda Beck for your orthopedic & sports medicine care. It is our goal to provide you with exceptional care that will help keep you healthy, active, and get you back in the game.    If you felt that you received exemplary care today, please consider leaving us feedback on [employer] at [email or web address]     Please do not hesitate to reach out to us via email, phone, or MyChart with any questions, concerns, or feedback.    If you are experiencing pain/discomfort ,or have questions after 5pm and would like to be connected to the [facility] on-call team, please call this number and specify which Sports Medicine provider is treating you: [phone]

## 2020-12-14 NOTE — PROGRESS NOTES
Physical Therapy Treatment Note     Name: Kenia Alonzo  Clinic Number: [id number]    Therapy Diagnosis:   Encounter Diagnoses   Name Primary?    Weakness     Balance problem     Decreased range of motion      Physician: Yehuda Beck MD    Visit Date: 12/14/2020    Physician Orders: PT Eval and Treat PT - OA protocol, consider water treadmill  Medical Diagnosis from Referral: M17.0 (ICD-10-CM) - Primary osteoarthritis of both knees  Evaluation Date: 11/11/2020  Authorization Period Expiration: 1/6/2020  Plan of Care Expiration: 12/5/2020  Visit # / Visits authorized: 3/12 (4)     PROGRESS NOTE: 12/11/2020    Time In: 11:45 am  Time Out: 12:30 pm  Total Billable Time: 45 minutes    Precautions: Standard and CHF    Subjective     Pt reports: She had a cortisone shot injection with Dr Pop 11/30, has been feeling better ever since.  Rates her pain at 2/10 today.    She was semi compliant with home exercise program.  Response to previous treatment: none noted  Functional change: none noted    Pain: 2/10  Location: bilateral knees    Objective     Kenia received therapeutic exercises to develop strength, endurance, ROM and flexibility for 37 minutes including:    Recumbent bike for range of motion, strength, and endurance: 5 minutes  Seated ball squeezes 3 minutes  Long arc quads alternating 3/10x  Tailgaters distraction 3 minutes  Straight leg raises 3x8 bilateral  S/L Clamshells 3x8 minute each bilateral  S/ L Sidelying hip abduction 10x/2 each bilateral  S/L Hip circles CW/CCW 10x each bilateral    Kenia received the following manual therapy techniques: Myofacial release and Soft tissue Mobilization were applied to the: Soft tissue to left knee, pes anserine for 8 minutes    Home Exercises Provided and Patient Education Provided     Education provided:     Written Home Exercises Provided: Patient instructed to cont prior HEP.  Exercises were reviewed and Kenia was able to demonstrate them prior to the end  of the session.  Kenia demonstrated good  understanding of the education provided.     See EMR under Patient Instructions for exercises provided 11/11/2020.    Assessment     Patient tolerated treatment well today.  She reports relief since cortisone injection by Dr Pop.  We discussed importance of strengthening her LE's, she does require cues to maintain correct technique with therex. She is also reporting some shoulder pain as well.      Kenia is progressing well towards her goals.   Pt prognosis is Good.     Pt will continue to benefit from skilled outpatient physical therapy to address the deficits listed in the problem list box on initial evaluation, provide pt/family education and to maximize pt's level of independence in the home and community environment.     Pt's spiritual, cultural and educational needs considered and pt agreeable to plan of care and goals.     Anticipated barriers to physical therapy:  Pt rescheduled evaluation, did not complete prior therapy, vestibular issues,      Goals:   Short Term Goals: In 4 weeks:  1.Patient to be educated on HEP.  2.Patient to demo increased B knee AROM to 0 degrees extension while sitting, to improve gait and functional activities.  3.Patient to increase strength B LE by 1/2 grade, to assist with improved ambulation and standing endurance.  4.Patient to have decreased pain to 1/10, at worst, to improve QOL.  5.Patient to increase B LE balance to 10s, to decrease fall risk.  6.Patient to improve score on the FOTO, to improve QOL.  7.Patient to improve sit to  30s score, to increase endurance and strength.     Long Term Goals: In 8 weeks  1. Patient to perform daily activities including performing light activities around her home with any of her usual work, housework, or school activities; performing heavy activities around her home; lifting an object, like a bag of groceries from the floor without increased symptoms.  2. Pt to improve sit to   30s test to 15 or greater.  3. Patient to demonstrate increased LE strength to 4+/5-5/5, to assist with improved ambulation and standing endurance.  4. Patient to increase B LE balance to 15s, to decrease fall risk.  5. Patient to improve score on the FOTO to 47%, or less, to improve QOL.    Plan   Plan of care Certification: 11/11/2020 to 1/6/2020.    Continue Plan of Care (POC) and progress per patient tolerance.    Jessica Scott, PT

## 2020-12-14 NOTE — PROGRESS NOTES
Patient ID: Kenia Alonzo  YOB: 1955  MRN: [id number]    Chief Complaint: Pain of the Right Shoulder    Referred By: current patient    History of Present Illness: Kenia Alonzo is a right-hand dominant 65 y.o. female who does not work, previous patient of Dr. Beck. She is here today for her right shoulder pain. Patient states that her shoulder pain is 4/10 today. She has had shoulder pain for about 4 months. She has pain and difficulty with lifting, pushing and pulling. States that she recently saw Dr. Cross and was given a steroid injection in her knee which seems to have helped her shoulder. States that the pain is worse at night when trying to sleep, especially when she rolls on the right side. Currently seeing physical therapy at [facility]. Denies any fevers, chills, night sweats, numbness and tingling.     Shoulder Pain   The pain is present in the right shoulder. This is a chronic problem. The current episode started more than 1 month ago. The problem occurs constantly. The problem has been unchanged. The quality of the pain is described as aching, burning, sharp and shooting. The pain is at a severity of 4/10. Associated symptoms include joint locking and a limited range of motion. Pertinent negatives include no fever or itching. The symptoms are aggravated by activity, lifting and lying down. She has tried cold, heat and OTC pain meds for the symptoms. The treatment provided no relief. Physical therapy was not tried.      Past Medical History:   Past Medical History:   Diagnosis Date    Arthritis     knee    Asthma     childhood     Depression     Encounter for blood transfusion     General anesthetics causing adverse effect in therapeutic use     severe headache after crainiotomy    GI problem     IBS    Gout     Headache     Hyperlipidemia     Meniere disease     MVP (mitral valve prolapse)     minor    Vertigo      Past Surgical History:   Procedure Laterality Date     BRAIN SURGERY      vestibular neurectomy    BREAST SURGERY      benign     SECTION      x 1    CHOLECYSTECTOMY      EXAMINATION UNDER ANESTHESIA N/A 2020    Procedure: EXAM UNDER ANESTHESIA;  Surgeon: Lucy Crane MD;  Location: [facility] OR;  Service: OB/GYN;  Laterality: N/A;    LASER ABLATION N/A 2020    Procedure: ABLATION, USING LASER;  Surgeon: Lucy Crane MD;  Location: [facility] OR;  Service: OB/GYN;  Laterality: N/A;    TONSILLECTOMY       Family History   Problem Relation Age of Onset    Colon cancer Father     Diabetes Father     Diabetes Sister      Social History     Socioeconomic History    Marital status:      Spouse name: Not on file    Number of children: Not on file    Years of education: Not on file    Highest education level: Not on file   Occupational History    Not on file   Social Needs    Financial resource strain: Somewhat hard    Food insecurity     Worry: Sometimes true     Inability: Patient refused    Transportation needs     Medical: No     Non-medical: No   Tobacco Use    Smoking status: Former Smoker     Packs/day: 0.50     Years: 43.00     Pack years: 21.50     Types: Cigarettes     Start date: 1976     Quit date: 2020     Years since quittin.8    Smokeless tobacco: Never Used    Tobacco comment: Quit 20   Substance and Sexual Activity    Alcohol use: Not Currently     Alcohol/week: 1.0 - 3.0 standard drinks     Types: 1 - 3 Glasses of wine per week     Frequency: Monthly or less     Drinks per session: 1 or 2     Binge frequency: Never     Comment: social few times monthly.   No alcohol 72h prior to sx    Drug use: No    Sexual activity: Never     Birth control/protection: None, Post-menopausal   Lifestyle    Physical activity     Days per week: 0 days     Minutes per session: 0 min    Stress: Very much   Relationships    Social connections     Talks on phone: More than three times a week     Gets together:  Twice a week     Attends Yarsanism service: Not on file     Active member of club or organization: No     Attends meetings of clubs or organizations: Never     Relationship status:    Other Topics Concern    Not on file   Social History Narrative    Not on file     Medication List with Changes/Refills   Current Medications    ACYCLOVIR 5% (ZOVIRAX) 5 % OINTMENT    Apply topically 5 (five) times daily.    AZELASTINE (ASTELIN) 137 MCG (0.1 %) NASAL SPRAY    1 spray (137 mcg total) by Nasal route 2 (two) times daily.    BUMETANIDE (BUMEX) 2 MG TABLET    Take 1 tablet (2 mg total) by mouth once daily.    BUPROPION (WELLBUTRIN XL) 150 MG TB24 TABLET    Take 3 tablets (450 mg total) by mouth once daily.    BUSPIRONE (BUSPAR) 15 MG TABLET    Take 1 tablet (15 mg total) by mouth 2 (two) times daily.    C/SOURCHERRY/CELERY/GRAPE SEED (TART CHERRY ORAL)    Take 1 tablet by mouth daily as needed.     DIAZEPAM (VALIUM) 2 MG TABLET    Take 1 tablet (2 mg total) by mouth every 6 (six) hours as needed (dizziness/Meniere's attack).    DICYCLOMINE (BENTYL) 10 MG CAPSULE    Take 1 capsule (10 mg total) by mouth 3 (three) times daily.    ERENUMAB-AOOE (AIMOVIG AUTOINJECTOR) 140 MG/ML AUTOINJECTOR    Inject 140 mg into the skin every 28 days.    FLUTICASONE PROPIONATE (FLONASE) 50 MCG/ACTUATION NASAL SPRAY    2 sprays (100 mcg total) by Each Nostril route once daily.    KETOROLAC (TORADOL) 10 MG TABLET    TAKE ONE TABLET BY MOUTH AS NEEDED FOR PAIN (MAX THREE TIMES A WEEK)    LEVOCETIRIZINE (XYZAL) 5 MG TABLET    TAKE ONE TABLET BY MOUTH EVERY MORNING    MELATONIN ORAL    Take by mouth nightly as needed.     OMEGA-3 FATTY ACIDS/FISH OIL (FISH OIL-OMEGA-3 FATTY ACIDS) 300-1,000 MG CAPSULE    Take 1 capsule by mouth once daily.    ONDANSETRON (ZOFRAN-ODT) 4 MG TBDL    Take 1 tablet (4 mg total) by mouth every 8 (eight) hours as needed.    SIMVASTATIN (ZOCOR) 5 MG TABLET    Take 1 tablet (5 mg total) by mouth nightly.    VITAMIN  B COMPLEX/FOLIC ACID (B COMPLEX 100 ORAL)    Take by mouth nightly.     Review of patient's allergies indicates:   Allergen Reactions    Demerol [meperidine] Nausea And Vomiting     Pt refuses Demerol     Codeine Itching     Review of Systems   Constitution: Negative for fever.   HENT: Negative for sore throat.    Eyes: Negative for blurred vision.   Cardiovascular: Negative for dyspnea on exertion.   Respiratory: Negative for shortness of breath.    Hematologic/Lymphatic: Does not bruise/bleed easily.   Skin: Negative for itching.   Musculoskeletal: Positive for joint pain, muscle cramps and muscle weakness.   Gastrointestinal: Negative for vomiting.   Genitourinary: Negative for dysuria.   Neurological: Negative for dizziness and loss of balance.   Psychiatric/Behavioral: The patient does not have insomnia.        Physical Exam:   Body mass index is 22.86 kg/m².  There were no vitals filed for this visit.   GENERAL: Well appearing, appropriate for stated age, no acute distress.  CARDIOVASCULAR: Pulses regular by peripheral palpation.  PULMONARY: Respirations are even and non-labored.  NEURO: Awake, alert, and oriented x 3.  PSYCH: Mood & affect are appropriate.  HEENT: Head is normocephalic and atraumatic.  General    Nursing note and vitals reviewed.        Right Shoulder Exam     Tenderness   The patient is tender to palpation of the acromioclavicular joint and supraspinatus (rotator cuff insertion).    Range of Motion   Active abduction: 110   Forward Flexion: 140   External Rotation 0 degrees: 40   Internal rotation 0 degrees: Lumbar     Tests & Signs   Foster test: positive  Impingement: positive  Rotator Cuff Painful Arc/Range: moderate  Active Compression Test ([place]'s Sign): positive  Speed's Test: negative    Other   Sensation: normal    Left Shoulder Exam     Range of Motion   Active abduction: 120   Forward Flexion: 160   External Rotation 0 degrees: 50   Internal rotation 0 degrees: Lumbar      Other   Sensation: normal       Muscle Strength   Right Upper Extremity   Shoulder Abduction: 5/5   Shoulder Internal Rotation: 5/5   Shoulder External Rotation: 5/5   Supraspinatus: 4/5   Subscapularis: 4/5   Biceps: 5/5     Vascular Exam     Right Pulses      Radial:                    2+      Left Pulses      Radial:                    2+      Capillary Refill  Right Hand: normal capillary refill  Left Hand: normal capillary refill    Intact extensor pollicis longus, flexor pollicis longus, finger flexion, finger extension, finger abduction and adduction.   Sensation intact to radial, median, ulnar, and axillary nerve distributions.   Hand warm and well perfused with capillary refill of less than 2 seconds, and palpable distal radial pulses.    Imaging:    X-ray Shoulder 2 or More Views Right  Narrative: EXAMINATION:  XR SHOULDER COMPLETE 2 OR MORE VIEWS RIGHT    CLINICAL HISTORY:  Pain in right shoulder    TECHNIQUE:  Two or three views of the right shoulder were performed.    COMPARISON:  None    FINDINGS:  There is no right shoulder fracture.  Apparent mild superior subluxation of the humeral head suggesting possible underlying rotator cuff tear.  Moderate right acromioclavicular joint osteoarthritis.  Mild right glenohumeral joint osteoarthritis.  No suspicious osseous lesion.  Visualized lungs are clear.  Impression: As above.    Electronically signed by: Yuri Ragsdale  Date:    12/14/2020  Time:    13:55    Radiographic images show high riding humeral head. Will get MRI to better assess RTC  Relevant imaging results reviewed and interpreted by me, discussed with the patient and / or family today.     Other Tests:     No other tests performed today.  Will send referral for physical therapy, however patients physical exam and radiographic images indicate that she possibly has a RTC tear. Will get MRI to better assess.     Assessment:  Kenia Alonzo is a 65 y.o. female  previous patient presents today  for right shoulder pain, that has been going on 4 months, no known trauma.    Right shoulder rotator cuff tendonitis    Right shoulder impingement    Right shoulder possible rotator cuff tear   Right shoulder OA     Encounter Diagnoses   Name Primary?    Rotator cuff tendonitis, right Yes    Internal impingement of right shoulder     Primary osteoarthritis of right shoulder     Right shoulder pain, unspecified chronicity         Plan:   Referral to PT [facility] - Right shoulder   Compound Cream- #2   MRI right shoulder    Follow up with Dr. Beck after MRI    I discussed worrisome and red flag signs and symptoms with the patient. The patient expressed understanding and agreed to alert me immediately or to go to the emergency room if they experience any of these.    Treatment plan was developed with input from the patient/family, and they expressed understanding and agreement with the plan. All questions were answered today.    Follow-up: After imaging for results with Dr. Yehuda Beck or sooner if there are any problems between now and then.    Patient was seen by Jen Interiano PA-C  Disclaimer: This note was prepared using a voice recognition system and is likely to have sound alike errors within the text.

## 2020-12-16 ENCOUNTER — OFFICE VISIT (OUTPATIENT)
Dept: PSYCHIATRY | Facility: CLINIC | Age: 65
End: 2020-12-16
Payer: MEDICAID

## 2020-12-16 DIAGNOSIS — F33.0 MILD EPISODE OF RECURRENT MAJOR DEPRESSIVE DISORDER: ICD-10-CM

## 2020-12-16 DIAGNOSIS — F41.1 GAD (GENERALIZED ANXIETY DISORDER): Primary | ICD-10-CM

## 2020-12-16 PROCEDURE — 90837 PR PSYCHOTHERAPY W/PATIENT, 60 MIN: ICD-10-PCS | Mod: 95,AJ,HB, | Performed by: SOCIAL WORKER

## 2020-12-16 PROCEDURE — 90837 PSYTX W PT 60 MINUTES: CPT | Mod: 95,AJ,HB, | Performed by: SOCIAL WORKER
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
## 2020-12-17 ENCOUNTER — CLINICAL SUPPORT (OUTPATIENT)
Dept: REHABILITATION | Facility: HOSPITAL | Age: 65
End: 2020-12-17
Payer: MEDICAID

## 2020-12-17 DIAGNOSIS — R53.1 WEAKNESS: ICD-10-CM

## 2020-12-17 DIAGNOSIS — R26.89 BALANCE PROBLEM: ICD-10-CM

## 2020-12-17 DIAGNOSIS — M25.60 DECREASED RANGE OF MOTION: ICD-10-CM

## 2020-12-17 PROCEDURE — 97110 THERAPEUTIC EXERCISES: CPT

## 2020-12-17 PROCEDURE — 97140 MANUAL THERAPY 1/> REGIONS: CPT

## 2020-12-17 NOTE — PROGRESS NOTES
"  Physical Therapy Treatment Note     Name: Kenia Alonzo  Clinic Number: [id number]    Therapy Diagnosis:   Encounter Diagnoses   Name Primary?    Weakness     Balance problem     Decreased range of motion      Physician: Yehuda Beck MD    Visit Date: 12/17/2020    Physician Orders: PT Eval and Treat PT - OA protocol, consider water treadmill  Medical Diagnosis from Referral: M17.0 (ICD-10-CM) - Primary osteoarthritis of both knees  Evaluation Date: 11/11/2020  Authorization Period Expiration: 1/31/2020  Plan of Care Expiration: 1/6/2020  Visit # / Visits authorized: 4/12 (5)     PROGRESS NOTE: 1/6/2020 (POC update)    Time In: 10:15 am  Time Out: 11:10 am  Total Billable Time: 46 minutes    Precautions: Standard and CHF    Subjective     Pt reports: She had a cortisone shot injection with Dr Pop 11/30, has been feeling better ever since.  Rates her pain at 2/10 today.    She was semi compliant with home exercise program.  Response to previous treatment: none noted  Functional change: none noted    Pain: 2/10  Location: bilateral knees    Objective     Measurements:    Balance: R LE = 5s, L LE = 8s, No balance strategies, has vestibular issues from prior surgery.     30 second sit-to-stand test (without U/E support): 12   30" sit to stand Cutoff Scores:          Knee AROM:                      EVAL   12/17/2020        (R)       (L)  (R)       (L)                                      Flexion                         130      135  NT NT                                      Extension                    0          0  NT NT                                      Extension (sitting)       -7         -5  -2 0        Strength:                    Hip flexors                   3+/5     3+/5  4/5 4/5   Quadriceps                  4-/5      4-/5       4/5 4/5                                      Hamstrings                  4-/5      4-/5  4/5 4/5                                      DF                              "  4/5       4/5  4/5 4/5                                      Gluteus Medius           3/5       3/5  4-/5 4-/5                                      Gluteus Willie        NT/5    NT/5  NT NT    FOTO:      Kenia received therapeutic exercises to develop strength, endurance, ROM and flexibility for 35 minutes including:    Recumbent bike for range of motion, strength, and endurance: 5 minutes  Seated ball squeezes 3 minutes  Long arc quads alternating 2/10x 2#  Tailgaters distraction 3 minutes, 2#  Straight leg raises 3x8 bilateral  S/L Clamshells 15x each bilateral  S/ L Sidelying hip abduction 10x/2 each bilateral  S/L Hip circles CW/CCW 10x each bilateral      Plan for Next Visit: SL balance, bridges, lateral/monster walking       Kenia received the following manual therapy techniques: Myofacial release and Soft tissue Mobilization were applied to the: Soft tissue to left knee, pes anserine for 8 minutes    Kenia participated in neuromuscular re-education activities to improve: Coordination and Proprioception for 3 minutes. The following activities were included:    Step ups 4in, 10x/ea LE      Home Exercises Provided and Patient Education Provided     Education provided:     Written Home Exercises Provided: Patient instructed to cont prior HEP.  Exercises were reviewed and Kenia was able to demonstrate them prior to the end of the session.  Kenia demonstrated good  understanding of the education provided.     See EMR under Patient Instructions for exercises provided 11/11/2020.    Assessment     Patient tolerated treatment well today.  She has made gains in ROM and strength since her initial evaluation, and rates herself improved with FOTO.  She was able to progress with strengthening today.      Kenia is progressing well towards her goals.   Pt prognosis is Good.     Pt will continue to benefit from skilled outpatient physical therapy to address the deficits listed in the problem list box on initial evaluation, provide  pt/family education and to maximize pt's level of independence in the home and community environment.     Pt's spiritual, cultural and educational needs considered and pt agreeable to plan of care and goals.     Anticipated barriers to physical therapy:  Pt rescheduled evaluation, did not complete prior therapy, vestibular issues,      Goals: Reviewed 12/17/2020  Short Term Goals: In 4 weeks:  1.Patient to be educated on HEP. MET, 12/17/2020  2.Patient to demo increased B knee AROM to 0 degrees extension while sitting, to improve gait and functional activities.   Progressing not yet MET, 12/17/2020  3.Patient to increase strength B LE by 1/2 grade, to assist with improved ambulation and standing endurance.  MET, 12/17/2020  4.Patient to have decreased pain to 1/10, at worst, to improve QOL. Progressing not yet MET, 12/17/2020  5.Patient to increase B LE balance to 10s, to decrease fall risk. Progressing not yet MET, 12/17/2020  6.Patient to improve score on the FOTO, to improve QOL.  MET, 12/17/2020  7.Patient to improve sit to  30s score, to increase endurance and strength. MET, 12/17/2020     Long Term Goals: In 8 weeks  1. Patient to perform daily activities including performing light activities around her home with any of her usual work, housework, or school activities; performing heavy activities around her home; lifting an object, like a bag of groceries from the floor without increased symptoms. Progressing not yet MET, 12/17/2020  2. Pt to improve sit to  30s test to 15 or greater. Progressing not yet MET, 12/17/2020  3. Patient to demonstrate increased LE strength to 4+/5-5/5, to assist with improved ambulation and standing endurance. Progressing not yet MET, 12/17/2020  4. Patient to increase B LE balance to 15s, to decrease fall risk. Progressing not yet MET, 12/17/2020  5. Patient to improve score on the FOTO to 47%, or less, to improve QOL.  MET, 12/17/2020    Plan   Plan of care  Certification: 11/11/2020 to 1/6/2020.    Continue Plan of Care (POC) and progress per patient tolerance.    Jessica Scott, PT

## 2020-12-18 ENCOUNTER — HOSPITAL ENCOUNTER (OUTPATIENT)
Dept: RADIOLOGY | Facility: HOSPITAL | Age: 65
Discharge: HOME | End: 2020-12-18
Attending: PHYSICIAN ASSISTANT
Payer: MEDICAID

## 2020-12-18 DIAGNOSIS — M25.511 RIGHT SHOULDER PAIN, UNSPECIFIED CHRONICITY: ICD-10-CM

## 2020-12-18 PROCEDURE — 73221 MRI JOINT UPR EXTREM W/O DYE: CPT | Mod: TC,PO,RT

## 2020-12-18 PROCEDURE — 73221 MRI JOINT UPR EXTREM W/O DYE: CPT | Mod: 26,RT,, | Performed by: RADIOLOGY

## 2020-12-18 PROCEDURE — 73221 MRI SHOULDER WITHOUT CONTRAST RIGHT: ICD-10-PCS | Mod: 26,RT,, | Performed by: RADIOLOGY

## 2020-12-21 ENCOUNTER — OFFICE VISIT (OUTPATIENT)
Dept: PSYCHIATRY | Facility: CLINIC | Age: 65
End: 2020-12-21
Payer: MEDICAID

## 2020-12-21 ENCOUNTER — PATIENT MESSAGE (OUTPATIENT)
Dept: PSYCHIATRY | Facility: CLINIC | Age: 65
End: 2020-12-21

## 2020-12-21 DIAGNOSIS — F43.21 COMPLICATED GRIEF: ICD-10-CM

## 2020-12-21 PROCEDURE — 99213 OFFICE O/P EST LOW 20 MIN: CPT | Mod: 95,AF,HB, | Performed by: PSYCHIATRY & NEUROLOGY

## 2020-12-21 PROCEDURE — 99213 PR OFFICE/OUTPT VISIT, EST, LEVL III, 20-29 MIN: ICD-10-PCS | Mod: 95,AF,HB, | Performed by: PSYCHIATRY & NEUROLOGY

## 2020-12-21 RX ORDER — BUSPIRONE HYDROCHLORIDE 15 MG/1
15 TABLET ORAL 2 TIMES DAILY
Qty: 60 TABLET | Refills: 2 | Status: CANCELLED | OUTPATIENT
Start: 2020-12-21

## 2020-12-21 RX ORDER — DIAZEPAM 2 MG/1
2 TABLET ORAL EVERY 6 HOURS PRN
Qty: 60 TABLET | Refills: 1 | Status: CANCELLED | OUTPATIENT
Start: 2020-12-21

## 2020-12-21 RX ORDER — BUPROPION HYDROCHLORIDE 150 MG/1
450 TABLET ORAL DAILY
Qty: 90 TABLET | Refills: 2 | Status: CANCELLED | OUTPATIENT
Start: 2020-12-21

## 2020-12-22 ENCOUNTER — CLINICAL SUPPORT (OUTPATIENT)
Dept: REHABILITATION | Facility: HOSPITAL | Age: 65
End: 2020-12-22
Payer: MEDICAID

## 2020-12-22 ENCOUNTER — PATIENT MESSAGE (OUTPATIENT)
Dept: REHABILITATION | Facility: HOSPITAL | Age: 65
End: 2020-12-22

## 2020-12-22 DIAGNOSIS — R53.1 WEAKNESS: ICD-10-CM

## 2020-12-22 DIAGNOSIS — R53.1 DECREASED RANGE OF MOTION WITH DECREASED STRENGTH: ICD-10-CM

## 2020-12-22 DIAGNOSIS — M75.41 INTERNAL IMPINGEMENT OF RIGHT SHOULDER: ICD-10-CM

## 2020-12-22 DIAGNOSIS — M25.60 DECREASED RANGE OF MOTION: ICD-10-CM

## 2020-12-22 DIAGNOSIS — R26.89 BALANCE PROBLEM: ICD-10-CM

## 2020-12-22 DIAGNOSIS — M75.81 ROTATOR CUFF TENDONITIS, RIGHT: ICD-10-CM

## 2020-12-22 DIAGNOSIS — M25.60 DECREASED RANGE OF MOTION WITH DECREASED STRENGTH: ICD-10-CM

## 2020-12-22 PROCEDURE — 97164 PT RE-EVAL EST PLAN CARE: CPT

## 2020-12-22 RX ORDER — DIAZEPAM 2 MG/1
2 TABLET ORAL EVERY 6 HOURS PRN
Qty: 60 TABLET | Refills: 2 | OUTPATIENT
Start: 2020-12-22

## 2020-12-22 NOTE — PLAN OF CARE
[facility]  Physical Therapy Initial Evaluation    Name: Kenia Alonzo  Clinic Number: [id number]    Therapy Diagnosis:   Encounter Diagnoses   Name Primary?    Rotator cuff tendonitis, right     Internal impingement of right shoulder     Weakness     Balance problem     Decreased range of motion     Decreased range of motion with decreased strength      Physician: Yehuda Beck MD    Physician Orders: PT Eval and Treat   Medical Diagnosis from Referral:   M75.81 (ICD-10-CM) - Rotator cuff tendonitis, right   M75.41 (ICD-10-CM) - Internal impingement of right shoulder   Evaluation Date: 12/22/2020  Authorization Period Expiration: 12/14/2021  Plan of Care Expiration: 2/16/2021  Visit # / Visits authorized: 1/ 1    Time In: 1:28  Time Out: 2:00  Total Appointment Time (timed & untimed codes): 30 minutes     Precautions: Fall and vertigo    Subjective   Date of onset: ~2 months  History of current condition - Kenia reports: Pt had a fall about 2 months ago and then another really bad fall about 4 years ago.  She reports that she followed up with MD due to shoulder pain and that she just had a MRI which she reports showed a tear of her some kind, she has not yet followed up with ortho regarding her MRI results.  She reports that she does want to avoid surgery if possible.  She reports that she is having pain in her R shoulder and indicates over her AC joint and anterior shoulder joint.  She reports that she is having difficulty lifting anything heavy.     Pain:  Current 0/10, worst 8/10, best 0/10   Location: right shoulder   Description: Sharp  Aggravating Factors: Lifting, at night  Easing Factors: heating pad    Prior Therapy: Yes, currently having therapy for knees  Social History: Pt  lives with their family, stays with her aunt  Occupation: Not working  Prior Level of Function: Prior to most recent fall did still have some pain, but no difficulty lifting.  Current Level of  Function: Difficulty lifting, and reaching out/up this past month.    Imaging, MRI studies:   Impression:  1.  Full-thickness tear of the right rotator cuff involving nearly the entirety of the supraspinatus tendon and part of the infraspinatus tendon measuring 3 x 2.7 cm.  There is mild retraction of the torn tendon which is positioned between the acromion and superior aspect of the femoral head.  2.  Thinning of the right subscapularis tendon likely representing a partial thickness tear.  3.  Chronic fatty atrophy of the right supraspinatus and subscapularis muscles.   4.  Long head biceps tendon is not definitely visualized possibly indicating presence of tear.  5.  Moderate right acromioclavicular joint osteoarthritis.    Medical History:   Past Medical History:   Diagnosis Date    Arthritis     knee    Asthma     childhood     Depression     Encounter for blood transfusion     General anesthetics causing adverse effect in therapeutic use     severe headache after crainiotomy    GI problem     IBS    Gout     Headache     Hyperlipidemia     Meniere disease     MVP (mitral valve prolapse)     minor    Vertigo        Surgical History:   Kenia Alonzo  has a past surgical history that includes  section; Breast surgery; Brain surgery (); Tonsillectomy; Cholecystectomy; Examination under anesthesia (N/A, 2020); and Laser ablation (N/A, 2020).    Medications:   Kenia has a current medication list which includes the following prescription(s): acyclovir 5%, azelastine, bumetanide, bupropion, buspirone, c/sourcherry/celery/grape seed, diazepam, dicyclomine, aimovig autoinjector, fluticasone propionate, ketorolac, levocetirizine, melatonin, fish oil-omega-3 fatty acids, ondansetron, simvastatin, and vitamin b complex/folic acid.    Allergies:   Review of patient's allergies indicates:   Allergen Reactions    Demerol [meperidine] Nausea And Vomiting     Pt refuses Demerol     Codeine  Itching        Pts goals: Full use of R arm.    Objective     CMS Impairment/Limitation/Restriction for FOTO shoulder Survey    Therapist reviewed FOTO scores for Kenia Alonzo on 12/22/2020.   FOTO documents entered into [employer] - see Media section.    Limitation Score: 44%           Posture: Pt noted to present with forward head/rounded shoulder posture.  R scapula elevated and protracted when compared B.    Scapular AROM standing: increased scapular substitution on R side     Shoulder ROM:   Active/Passive Joint Range Right Left   Flexion 140 p!/170 150/180   ABDuction 140 p!/150 155/180   External Rotation C/T/80 T2/90   Internal Rotation Waist/80 T/L junction/80   Extension 50/NT 40/NT     Strength:  Muscle (Myotome) Right Left   Shoulder Flex NT NT   Shoulder Abduction 4+/5 4+/5   Biceps (C5) 4/5 4+/5   Triceps (C7) 4/5 4+/5   Shoulder ER 4/5 4+/5   Shoulder IR 4/5 4+/5   Serratus Ant defer defer   Supraspinatus defer defer     Sensation: Intact  Reflexes: defer    Special Test:  Foster +  Neers  +     Empty Can defer  Drop Arm -    Joint Mobility:  Decreased scapular mobility with scapular tilting.    Palpation:  Patient tender with increased tone over B UT, levator scap, lats, teres minor, infraspinatus, subscap, ant chest mm, post cervical mm, SCM, suboccipital mm, med/lateral scapular mm's.     TREATMENT   Treatment Time In: 1:50  Treatment Time Out: 2:00  Total Treatment time (time-based codes) separate from Evaluation: 7 minutes     Kenia received therapeutic exercises to develop strength and posture for 2 minutes including:  See HEP    Kenia received the following manual therapy techniques: Myofacial release and Soft tissue Mobilization were applied to the: B subscapular releases and scapular tilting for 5 minutes  Home Exercises and Patient Education Provided    Education provided:   -Education on condition, HEP, and education on posture and strengthening    Written Home Exercises Provided:  yes.  Exercises were reviewed and Kenia was able to demonstrate them prior to the end of the session.  Kenia demonstrated good  understanding of the education provided.     See EMR under Patient Instructions for exercises provided 12/22/2020.    Assessment   Kenia is a 65 y.o. female referred to outpatient Physical Therapy with a medical diagnosis of   M75.81 (ICD-10-CM) - Rotator cuff tendonitis, right   M75.41 (ICD-10-CM) - Internal impingement of right shoulder   . The patient presents with signs and symptoms consistent with diagnosis along with  impairments which include decreased ROM, decreased strength, postural abnormalities and decreased overall function.  These impairments are limiting patient's ability to: Pull a medium weight object (5-10 lbs.) from under a bed; pull something out of her back pocket; reach a shelf that is at shoulder height; place a can of soup (1 lb.) on a shelf at shoulder height.     Pt prognosis is Good, if patient is consistent and compliant with PT and HEP .   Pt will benefit from skilled outpatient Physical Therapy to address the deficits stated above and in the chart below, provide pt/family education, and to maximize pt's level of independence.     Plan of care discussed with patient: Yes  Pt's spiritual, cultural and educational needs considered and patient is agreeable to the plan of care and goals as stated below:     Anticipated Barriers for therapy: + tear on MRI, long history of shoulder pain, depression    Medical Necessity is demonstrated by the following  History  Co-morbidities and personal factors that may impact the plan of care Co-morbidities:   depression and level of undertstanding of current condition    Personal Factors:   coping style  lifestyle  attitudes     high   Examination  Body Structures and Functions, activity limitations and participation restrictions that may impact the plan of care Body Regions:   neck  upper extremities  trunk    Body Systems:     ROM  strength  posture    Participation Restrictions:   See current level of function listed above     Activity limitations:   Learning and applying knowledge  no deficits    General Tasks and Commands  no deficits    Communication  no deficits    Mobility  lifting and carrying objects    Self care  no deficits    Domestic Life  doing house work (cleaning house, washing dishes, laundry)    Interactions/Relationships  no deficits    Life Areas  no deficits    Community and Social Life  community life  recreation and leisure         moderate   Clinical Presentation stable and uncomplicated low   Decision Making/ Complexity Score: low     Goals:  Short Term Goals: In 4 weeks   1.Patient to be educated on HEP.  2.Patient to increase R shoulder PROM to equal L side, in order to increase ability of patient to perform functional tasks with R UE.   3.Patient to increase strength by 1/2 grade, in order to improve use of R UE for lifting and activity.  4.Patient to have pain less than 5/10 at worst, to improve QOL.  5.Patient to improve score on the FOTO.  6.Patient to be educated on postural and body mechanics awareness.    Long Term Goals: In 8 weeks  1. Patient to improve score on FOTO to 36% or less, to improve QOL.  2. Patient to demo increase in UE strength to 5/5 to  improve use of R UE for lifting and activity.  3. Patient to have decreased pain to 2/10 at worst, to improve QOL.  4. Patient to demo increase R shoulder AROM to equal L side, in order to increase ability of patient to perform functional tasks with R UE.  5. Patient to perform daily activities including  Pull a medium weight object (5-10 lbs.) from under a bed; pull something out of her back pocket; reach a shelf that is at shoulder height; place a can of soup (1 lb.) on a shelf at shoulder height; all without increased symptoms.      Plan   Plan of care Certification: 12/22/2020 to 2/16/2021.    Outpatient Physical Therapy 2 times weekly for 8 weeks to  include the following interventions: Electrical Stimulation PRN, FDN/PRN, Manual Therapy, Moist Heat/ Ice, Neuromuscular Re-ed, Patient Education, Self Care, Therapeutic Activites and Therapeutic Exercise.     Jessica Scott PT    Thank you for this referral.    These services are reasonable and necessary for the conditions set forth above while under my care.

## 2020-12-23 ENCOUNTER — NURSE TRIAGE (OUTPATIENT)
Dept: ADMINISTRATIVE | Facility: CLINIC | Age: 65
End: 2020-12-23

## 2020-12-23 ENCOUNTER — OFFICE VISIT (OUTPATIENT)
Dept: NEUROLOGY | Facility: CLINIC | Age: 65
End: 2020-12-23
Payer: MEDICAID

## 2020-12-23 ENCOUNTER — PATIENT MESSAGE (OUTPATIENT)
Dept: PSYCHIATRY | Facility: CLINIC | Age: 65
End: 2020-12-23

## 2020-12-23 ENCOUNTER — TELEPHONE (OUTPATIENT)
Dept: NEUROLOGY | Facility: CLINIC | Age: 65
End: 2020-12-23

## 2020-12-23 DIAGNOSIS — G43.809 VESTIBULAR MIGRAINE: ICD-10-CM

## 2020-12-23 DIAGNOSIS — N90.3 VULVAR DYSPLASIA: ICD-10-CM

## 2020-12-23 DIAGNOSIS — H81.01 MENIERE'S DISEASE OF RIGHT EAR: ICD-10-CM

## 2020-12-23 DIAGNOSIS — M10.9 ACUTE GOUT OF RIGHT FOOT, UNSPECIFIED CAUSE: ICD-10-CM

## 2020-12-23 DIAGNOSIS — N18.30 STAGE 3 CHRONIC KIDNEY DISEASE, UNSPECIFIED WHETHER STAGE 3A OR 3B CKD: ICD-10-CM

## 2020-12-23 DIAGNOSIS — M17.12 PRIMARY OSTEOARTHRITIS OF LEFT KNEE: ICD-10-CM

## 2020-12-23 DIAGNOSIS — L30.9 DERMATITIS OF FACE: ICD-10-CM

## 2020-12-23 DIAGNOSIS — J30.89 NON-SEASONAL ALLERGIC RHINITIS, UNSPECIFIED TRIGGER: ICD-10-CM

## 2020-12-23 DIAGNOSIS — R53.1 WEAKNESS: ICD-10-CM

## 2020-12-23 DIAGNOSIS — E78.5 HYPERLIPIDEMIA, UNSPECIFIED HYPERLIPIDEMIA TYPE: ICD-10-CM

## 2020-12-23 DIAGNOSIS — R26.89 BALANCE PROBLEM: ICD-10-CM

## 2020-12-23 DIAGNOSIS — J40 BRONCHITIS: ICD-10-CM

## 2020-12-23 DIAGNOSIS — H10.31 ACUTE CONJUNCTIVITIS OF RIGHT EYE, UNSPECIFIED ACUTE CONJUNCTIVITIS TYPE: ICD-10-CM

## 2020-12-23 DIAGNOSIS — D07.1 SEVERE DYSPLASIA OF VULVA: ICD-10-CM

## 2020-12-23 DIAGNOSIS — K58.9 IRRITABLE BOWEL SYNDROME WITHOUT DIARRHEA: ICD-10-CM

## 2020-12-23 DIAGNOSIS — R25.9 ABNORMAL INVOLUNTARY MOVEMENTS: ICD-10-CM

## 2020-12-23 DIAGNOSIS — F32.A DEPRESSION, UNSPECIFIED DEPRESSION TYPE: ICD-10-CM

## 2020-12-23 DIAGNOSIS — G44.89 CHRONIC MIXED HEADACHE SYNDROME: Primary | ICD-10-CM

## 2020-12-23 DIAGNOSIS — F43.21 COMPLICATED GRIEF: ICD-10-CM

## 2020-12-23 DIAGNOSIS — M25.60 DECREASED RANGE OF MOTION: ICD-10-CM

## 2020-12-23 DIAGNOSIS — Z87.891 HISTORY OF TOBACCO ABUSE: ICD-10-CM

## 2020-12-23 PROCEDURE — 99213 PR OFFICE/OUTPT VISIT, EST, LEVL III, 20-29 MIN: ICD-10-PCS | Mod: 95,,, | Performed by: PSYCHIATRY & NEUROLOGY

## 2020-12-23 PROCEDURE — 99213 OFFICE O/P EST LOW 20 MIN: CPT | Mod: 95,,, | Performed by: PSYCHIATRY & NEUROLOGY

## 2020-12-23 NOTE — TELEPHONE ENCOUNTER
----- Message from Vipin Matthews MD sent at 12/23/2020  4:28 PM CST -----    RTC in 11 months with Britton.

## 2020-12-23 NOTE — TELEPHONE ENCOUNTER
Distant exposure to someone who has become positive with covid-19, exposure does not meet criteria set in place by the CDC.  Advised she monitor for symptoms, and practice techniques that would decrease her chances for cherie covid-19  Pt verbalized understanding.  Reason for Disposition   [1] Caller concerned that exposure to COVID-19 occurred BUT [2] does not meet COVID-19 EXPOSURE criteria from CDC    Additional Information   Negative: [1] No COVID-19 EXPOSURE BUT [2] living with someone who was exposed and who has no symptoms of COVID-19   Negative: [1] Travel from area with community spread (identified by CDC) AND [2] within last 14 days BUT [3] NO symptoms   Negative: [1] Living in area with community spread (identified by local PHD) BUT [2] NO symptoms   Negative: [1] COVID-19 EXPOSURE AND [2] 15 or more days ago AND [3] NO symptoms   Negative: [1] COVID-19 EXPOSURE (Close Contact) AND [2] within last 14 days BUT [2] NO symptoms   Negative: [1] COVID-19 EXPOSURE (Close Contact) within last 14 days AND [2] needs COVID-19 lab test to return to work AND [3] NO symptoms   Negative: [1] COVID-19 EXPOSURE (Close Contact) within last 14 days AND [2] exposed person is a healthcare worker who was NOT using all recommended personal protective equipment (i.e., a respirator-N95 mask, eye protection, gloves, and gown) AND [3] NO symptoms   Negative: COVID-19 has been diagnosed by a healthcare provider (HCP)   Negative: COVID-19 lab test positive   Negative: [1] Symptoms of COVID-19 (e.g., cough, fever, SOB, or others) AND [2] lives in an area with community spread   Negative: [1] Symptoms of COVID-19 (e.g., cough, fever, SOB, or others) AND [2] within 14 days of EXPOSURE (close contact) with diagnosed or suspected COVID-19 patient   Negative: [1] Symptoms of COVID-19 (e.g., cough, fever, SOB, or others) AND [2] within 14 days of travel from high-risk area for COVID-19 community spread (identified by  CDC)   Negative: [1] Difficulty breathing (shortness of breath) occurs AND [2] onset > 14 days after COVID-19 EXPOSURE (Close Contact) AND [3] no community spread   Negative: [1] Cough occurs AND [2] onset > 14 days after COVID-19 EXPOSURE AND [3] no community spread   Negative: [1] Common cold symptoms AND [2] onset > 14 days after COVID-19 EXPOSURE AND [3] no community spread    Protocols used: CORONAVIRUS (COVID-19) EXPOSURE-A-[place]

## 2020-12-23 NOTE — PROGRESS NOTES
Subjective:       Patient ID: Kenia Alonzo is a 65 y.o. female.    Chief Complaint: No chief complaint on file.    HPI       BACKGROUND HISTORY       The patient was referred by Dr. Ruiz for evaluation.    The patient is here for intractable headaches. The patient has had migraine headaches throughout her adult life and improved afte menopause but seem to recur. In the past she failed Elavil, VPA and Inderal. TPM helped significantly but caused memory loss. PRN Imitrex helped but caused palpitation and chest tightness.  The headaches are 2-3 times a week, 08-10/10, throbbing, holocephalic, last for several hours and associated with nausea, light and noise sensitivity. The patient has been under tremendous stress related to her son's health problems. In addition, she has a longstanding history of Ménière's Disease  S/P vestibular nerve section in the late 1990's.  The patient has also noted recurrence of Ménière's Disease's symptoms. Started her on Emgality 120 mg SQ monthly. Ordered Brain MRI.Emgality did help tremendously but unfortunately she broke the last 2 pens and the headaches have recurred. 05- Brain MRI Unremarkable. Ultimately changed Emgality to Aimovig 140 mg SQ monthly and prescribed her Ubrogepant (Ubrelvy) 100 mg PRN. Also changed TPM to  mg QD.       INTERVAL HISTORY       Aimovig 140 mg SQ monthly and TPM  mg QD with Ubrogepant (Ubrelvy) 100 mg PRN have helped tremendously and continue to help. The patient is very happy with the headache control.           The originating site (patient location) is: Home.      The distant site (neurologist location) is: Neurology Clinic at [facility].      The chief complaint leading to consultation is: F/U Migraine       Visit type: Virtual visit with synchronous audio and video.      Total time spent with patient: 15 minutes       Special circumstances: This visit occurred during COVID-19 Pandemic Public Health Emergency.        Consent: The patient verbally consented to participating in the video visit and informed that may decline to receive medical services by telemedicine and may withdraw from such care at any time.      I discussed with the patient the nature of our telemedicine visits, that:      I  would evaluate the patient and recommend diagnostics and treatments based on my assessment.    Our sessions are not being recorded and that personal health information is protected.    Our team would provide follow up care in person if/when the patient needs it.    Virtual (video/telemedicine) visits have significant limitations. A telemedicine exam is primarily focused on the history and what I can observe. Several critical parts of the neurological exam cannot be performed.          Review of Systems   Constitutional: Negative for appetite change and fatigue.   HENT: Positive for hearing loss. Negative for tinnitus.    Eyes: Negative for photophobia and visual disturbance.   Respiratory: Negative for apnea and shortness of breath.    Cardiovascular: Negative for chest pain and palpitations.   Gastrointestinal: Negative for nausea and vomiting.   Endocrine: Negative for cold intolerance and heat intolerance.   Genitourinary: Negative for difficulty urinating and urgency.   Musculoskeletal: Negative for arthralgias, back pain, gait problem, joint swelling, myalgias, neck pain and neck stiffness.   Skin: Negative for color change and rash.   Allergic/Immunologic: Negative for environmental allergies and immunocompromised state.   Neurological: Positive for dizziness and headaches. Negative for tremors, seizures, syncope, facial asymmetry, speech difficulty, weakness, light-headedness and numbness.   Hematological: Negative for adenopathy. Does not bruise/bleed easily.   Psychiatric/Behavioral: Negative for agitation, behavioral problems, confusion, decreased concentration, dysphoric mood, hallucinations, self-injury, sleep disturbance  and suicidal ideas. The patient is not hyperactive.          Current Outpatient Medications:     acyclovir 5% (ZOVIRAX) 5 % ointment, Apply topically 5 (five) times daily., Disp: 30 g, Rfl: 1    azelastine (ASTELIN) 137 mcg (0.1 %) nasal spray, 1 spray (137 mcg total) by Nasal route 2 (two) times daily., Disp: 30 mL, Rfl: 3    bumetanide (BUMEX) 2 MG tablet, Take 1 tablet (2 mg total) by mouth once daily., Disp: 90 tablet, Rfl: 1    buPROPion (WELLBUTRIN XL) 150 MG TB24 tablet, Take 3 tablets (450 mg total) by mouth once daily., Disp: 90 tablet, Rfl: 2    busPIRone (BUSPAR) 15 MG tablet, TAKE ONE TABLET BY MOUTH TWICE A DAY, Disp: 60 tablet, Rfl: 1    C/sourcherry/celery/grape seed (TART CHERRY ORAL), Take 1 tablet by mouth daily as needed. , Disp: , Rfl:     diazePAM (VALIUM) 2 MG tablet, Take 1 tablet (2 mg total) by mouth every 6 (six) hours as needed (dizziness/Meniere's attack)., Disp: 60 tablet, Rfl: 2    dicyclomine (BENTYL) 10 MG capsule, TAKE 1 CAPSULE BY MOUTH THREE TIMES DAILY, Disp: 90 capsule, Rfl: 1    erenumab-aooe (AIMOVIG AUTOINJECTOR) 140 mg/mL autoinjector, Inject 1 pen (140 mg total) into the skin every 28 days., Disp: 1 mL, Rfl: 11    fluticasone propionate (FLONASE) 50 mcg/actuation nasal spray, 2 sprays (100 mcg total) by Each Nostril route once daily., Disp: 16 g, Rfl: 3    ketorolac (TORADOL) 10 mg tablet, TAKE ONE TABLET BY MOUTH AS NEEDED FOR PAIN (MAX THREE TIMES A WEEK), Disp: 12 tablet, Rfl: 0    levocetirizine (XYZAL) 5 MG tablet, TAKE ONE TABLET BY MOUTH EVERY MORNING (Patient taking differently: Take 5 mg by mouth every evening. ), Disp: 90 tablet, Rfl: 3    MELATONIN ORAL, Take by mouth nightly as needed. , Disp: , Rfl:     omega-3 fatty acids/fish oil (FISH OIL-OMEGA-3 FATTY ACIDS) 300-1,000 mg capsule, Take 1 capsule by mouth once daily., Disp: , Rfl:     ondansetron (ZOFRAN-ODT) 4 MG TbDL, Take 1 tablet (4 mg total) by mouth every 8 (eight) hours as needed., Disp:  60 tablet, Rfl: 1    simvastatin (ZOCOR) 5 MG tablet, Take 1 tablet (5 mg total) by mouth nightly., Disp: 90 tablet, Rfl: 3    vitamin B complex/folic acid (B COMPLEX 100 ORAL), Take by mouth nightly., Disp: , Rfl:   Past Medical History:   Diagnosis Date    Arthritis     knee    Asthma     childhood     Depression     Encounter for blood transfusion     General anesthetics causing adverse effect in therapeutic use     severe headache after crainiotomy    GI problem     IBS    Gout     Headache     Hyperlipidemia     Meniere disease     MVP (mitral valve prolapse)     minor    Vertigo      Past Surgical History:   Procedure Laterality Date    BRAIN SURGERY      vestibular neurectomy    BREAST SURGERY      benign     SECTION      x 1    CHOLECYSTECTOMY      EXAMINATION UNDER ANESTHESIA N/A 2020    Procedure: EXAM UNDER ANESTHESIA;  Surgeon: Lucy Crane MD;  Location: [facility] OR;  Service: OB/GYN;  Laterality: N/A;    LASER ABLATION N/A 2020    Procedure: ABLATION, USING LASER;  Surgeon: Lucy Crane MD;  Location: [facility] OR;  Service: OB/GYN;  Laterality: N/A;    TONSILLECTOMY       Social History     Socioeconomic History    Marital status:      Spouse name: Not on file    Number of children: Not on file    Years of education: Not on file    Highest education level: Not on file   Occupational History    Not on file   Social Needs    Financial resource strain: Somewhat hard    Food insecurity     Worry: Sometimes true     Inability: Patient refused    Transportation needs     Medical: No     Non-medical: No   Tobacco Use    Smoking status: Former Smoker     Packs/day: 0.50     Years: 43.00     Pack years: 21.50     Types: Cigarettes     Start date: 1976     Quit date: 2020     Years since quittin.9    Smokeless tobacco: Never Used    Tobacco comment: Quit 20   Substance and Sexual Activity    Alcohol use: Not Currently      Alcohol/week: 1.0 - 3.0 standard drinks     Types: 1 - 3 Glasses of wine per week     Frequency: Monthly or less     Drinks per session: 1 or 2     Binge frequency: Never     Comment: social few times monthly.   No alcohol 72h prior to sx    Drug use: No    Sexual activity: Never     Birth control/protection: None, Post-menopausal   Lifestyle    Physical activity     Days per week: 0 days     Minutes per session: 0 min    Stress: Very much   Relationships    Social connections     Talks on phone: More than three times a week     Gets together: Twice a week     Attends Evangelical service: Not on file     Active member of club or organization: No     Attends meetings of clubs or organizations: Never     Relationship status:    Other Topics Concern    Not on file   Social History Narrative    Not on file       Objective:         GENERAL APPEARANCE:     The patient looks comfortable.    No signs of medical or psychiatric distress.    Normal breathing pattern.    No dysmorphic features    Normal eye contact.     GENERAL MEDICAL EXAM:    HEENT:  Head is atraumatic normocephalic.     Neck and Axillae: No JVD.     Cardiopulmonary: No cyanosis. No tachypnea. Normal respiratory effort.    Gastrointestinal:  No stomas or lesions. No hernias.    Skin, Hair and Nails: No pathognonomic skin rash. No neurofibromatosis. No stigmata of autoimmune disease.     Limbs: No varicose veins. No edema.     Muskoskeletal: No deformities.No signs of longstanding neuropathy. No dislocations or fractures.        Neurologic Exam     Mental Status   Oriented to person, place, and time.   Registration: recalls 3 of 3 objects. Recall at 5 minutes: recalls 3 of 3 objects. Follows 3 step commands.   Attention: normal. Concentration: normal.   Speech: speech is normal   Level of consciousness: alert  Knowledge: good and consistent with education. Able to perform simple calculations.   Able to name object. Able to read. Able to repeat.  Able to write. Normal comprehension.     Cranial Nerves     CN III, IV, VI   Extraocular motions are normal.   Right pupil: Shape: regular.   Left pupil: Shape: regular.   CN III: no CN III palsy  CN VI: no CN VI palsy  Nystagmus: none   Diplopia: none  Ophthalmoparesis: none  Upgaze: normal  Downgaze: normal  Conjugate gaze: present    CN VII   Facial expression full, symmetric.   Right facial weakness: none  Left facial weakness: none    CN VIII   Hearing: impaired    CN IX, X   CN IX normal.   CN X normal.   Palate: symmetric    CN XII   CN XII normal.   Tongue: not atrophic  Fasciculations: absent  Tongue deviation: none    Motor Exam   Muscle bulk: normal  Right arm tone: normal  Right arm pronator drift: absent  Left arm pronator drift: absent    Strength   Strength 5/5 throughout.   Moves 4 extremities symmetrically      Sensory Exam     Sensory exam cannot be done.      Gait, Coordination, and Reflexes     Gait  Gait: normal    Coordination   Romberg: negative  Finger to nose coordination: normal  Heel to shin coordination: normal  Tandem walking coordination: normal    Tremor   Resting tremor: absent  Intention tremor: absent  Action tremor: absent  MSRs cannot be done       Lab Results   Component Value Date    WBC 6.32 11/30/2020    HGB 12.9 11/30/2020    HCT 39.5 11/30/2020    MCV 95 11/30/2020     11/30/2020     Sodium   Date Value Ref Range Status   11/30/2020 140 136 - 145 mmol/L Final     Potassium   Date Value Ref Range Status   11/30/2020 3.6 3.5 - 5.1 mmol/L Final     Chloride   Date Value Ref Range Status   11/30/2020 106 95 - 110 mmol/L Final     CO2   Date Value Ref Range Status   11/30/2020 23 23 - 29 mmol/L Final     Glucose   Date Value Ref Range Status   11/30/2020 99 70 - 110 mg/dL Final     BUN   Date Value Ref Range Status   11/30/2020 27 (H) 8 - 23 mg/dL Final     Creatinine   Date Value Ref Range Status   11/30/2020 1.4 0.5 - 1.4 mg/dL Final     Calcium   Date Value Ref Range  Status   11/30/2020 9.4 8.7 - 10.5 mg/dL Final     Total Protein   Date Value Ref Range Status   08/11/2020 6.5 6.0 - 8.4 g/dL Final     Albumin   Date Value Ref Range Status   11/30/2020 4.0 3.5 - 5.2 g/dL Final     Total Bilirubin   Date Value Ref Range Status   08/11/2020 0.3 0.1 - 1.0 mg/dL Final     Comment:     For infants and newborns, interpretation of results should be based  on gestational age, weight and in agreement with clinical  observations.  Premature Infant recommended reference ranges:  Up to 24 hours.............<8.0 mg/dL  Up to 48 hours............<12.0 mg/dL  3-5 days..................<15.0 mg/dL  6-29 days.................<15.0 mg/dL       Alkaline Phosphatase   Date Value Ref Range Status   08/11/2020 51 (L) 55 - 135 U/L Final     AST   Date Value Ref Range Status   08/11/2020 14 10 - 40 U/L Final     ALT   Date Value Ref Range Status   08/11/2020 15 10 - 44 U/L Final     Anion Gap   Date Value Ref Range Status   11/30/2020 11 8 - 16 mmol/L Final     eGFR if    Date Value Ref Range Status   11/30/2020 46.1 (A) >60 mL/min/1.73 m^2 Final     eGFR if non    Date Value Ref Range Status   11/30/2020 40.0 (A) >60 mL/min/1.73 m^2 Final     Comment:     Calculation used to obtain the estimated glomerular filtration  rate (eGFR) is the CKD-EPI equation.        Lab Results   Component Value Date    [id number] 502 09/02/2016 05-    Brain MRI Unremarkable     Reviewed the neuroimaging independently       Assessment:       1. Chronic mixed headache syndrome    2. Depression, unspecified depression type    3. Meniere's disease of right ear    4. Vestibular migraine    5. Abnormal involuntary movements    6. Irritable bowel syndrome without diarrhea    7. Hyperlipidemia, unspecified hyperlipidemia type    8. History of tobacco abuse    9. Stage 3 chronic kidney disease, unspecified whether stage 3a or 3b CKD    10. Non-seasonal allergic rhinitis, unspecified  trigger    11. Bronchitis    12. Severe dysplasia of vulva    13. Complicated grief    14. Primary osteoarthritis of left knee    15. Acute conjunctivitis of right eye, unspecified acute conjunctivitis type    16. Dermatitis of face    17. Acute gout of right foot, unspecified cause    18. Vulvar dysplasia    19. Weakness    20. Balance problem    21. Decreased range of motion        Plan:             VESTIBULAR MIGRAINE          The patient is aware of the very close relationship between Ménière's Disease and Vestibular Migraine in terms of pathophysiology.       Migraine Diary       Lifestyle changes:    Good sleep hygiene  Avoid triggers like certain foods, TV and computer work   Minimize physical and emotional stress  Smoking avoidance and cessation  Tapering off caffeine   Good hydration   Small frequent meals   Moderate 30-minute-long aerobic exercises 3 times/week         Abortive medications:    Continue Ubrogepant (Ubrelvy) 100 mg PRN.        Preventative medications:    Continue Aimovig 140 mg SQ monthly.    Continue TPM  mg QD.      Amitriptyline/Elavil failed    Topiramate/Topamax caused cognitive SEs.     Propranolol/Inderal failed    Valproic acid/Depakote failed           MEDICAL/SURGICAL COMORBIDITIES     All relevant medical comorbidities noted and managed by primary care physician and medical care team.        HEALTHY LIFESTYLE AND PREVENTATIVE CARE     Encouraged the patient to adhere to the age-appropriate health maintenance guidelines including screening tests and vaccinations.      Discussed the overall importance of healthy lifestyle, optimal weight, exercise, healthy diet, good sleep hygiene and avoiding drugs including smoking, alcohol and recreational drugs. The patient verbalized full understanding.         Advised the patient to follow COVID-19 prevention measures.         I spent 15 minutes face to face with the patient     More than 10 minutes of the time spent in counseling and  coordination of care including discussions etiology of diagnosis (MIGRAINE), pathogenesis of diagnosis, prognosis of diagnosis,, diagnostic results, impression and recommendations, diagnostic studies, management, risks and benefits of treatment, instructions of disease self-management, treatment instructions, follow up requirements, patient and family counseling/involvement in care compliance with treatment regimen. All of the patient's questions were answered during this discussion.           RTC annually and PRN           Vipin Matthews MD, FAAN    Attending Neurologist/Epileptologist         Diplomate, American Board-Psychiatry and Neurology (Neurology)    Diplomate, American Board-Clinical Neurophysiology (Epilpesy-Neuromuscular)     Fellow, American Academy of Neurology

## 2020-12-24 ENCOUNTER — PATIENT MESSAGE (OUTPATIENT)
Dept: PSYCHIATRY | Facility: CLINIC | Age: 65
End: 2020-12-24

## 2020-12-25 ENCOUNTER — PATIENT MESSAGE (OUTPATIENT)
Dept: PSYCHIATRY | Facility: CLINIC | Age: 65
End: 2020-12-25

## 2020-12-27 ENCOUNTER — PATIENT MESSAGE (OUTPATIENT)
Dept: NEPHROLOGY | Facility: CLINIC | Age: 65
End: 2020-12-27

## 2020-12-28 ENCOUNTER — CLINICAL SUPPORT (OUTPATIENT)
Dept: REHABILITATION | Facility: HOSPITAL | Age: 65
End: 2020-12-28
Payer: MEDICAID

## 2020-12-28 DIAGNOSIS — R53.1 DECREASED RANGE OF MOTION WITH DECREASED STRENGTH: ICD-10-CM

## 2020-12-28 DIAGNOSIS — R26.89 BALANCE PROBLEM: ICD-10-CM

## 2020-12-28 DIAGNOSIS — M25.60 DECREASED RANGE OF MOTION WITH DECREASED STRENGTH: ICD-10-CM

## 2020-12-28 DIAGNOSIS — R53.1 WEAKNESS: ICD-10-CM

## 2020-12-28 DIAGNOSIS — M25.60 DECREASED RANGE OF MOTION: ICD-10-CM

## 2020-12-28 PROCEDURE — 97140 MANUAL THERAPY 1/> REGIONS: CPT

## 2020-12-28 PROCEDURE — 97110 THERAPEUTIC EXERCISES: CPT

## 2020-12-28 RX ORDER — BUMETANIDE 2 MG/1
2 TABLET ORAL DAILY
Qty: 90 TABLET | Refills: 1 | Status: SHIPPED | OUTPATIENT
Start: 2020-12-28 | End: 2021-05-26 | Stop reason: SDUPTHER

## 2020-12-28 NOTE — PROGRESS NOTES
Physical Therapy Treatment Note     Name: Kenia Alonzo  Clinic Number: [id number]    Therapy Diagnosis:   Encounter Diagnoses   Name Primary?    Decreased range of motion with decreased strength     Weakness     Balance problem     Decreased range of motion      Physician: Yehuda Beck MD    Visit Date: 12/28/2020    Physician Orders: PT Eval and Treat PT - OA protocol, consider water treadmill  Medical Diagnosis from Referral: M17.0 (ICD-10-CM) - Primary osteoarthritis of both knees   M75.81 (ICD-10-CM) - Rotator cuff tendonitis, right   M75.41 (ICD-10-CM) - Internal impingement of right shoulder   Evaluation Date: 11/11/2020; 12/22/2020  Authorization Period Expiration: 1/31/2020, 12/14/2021  Plan of Care Expiration: 1/6/2020, 2/16/2021  Visit # / Visits authorized: 6/12 (7 total)     PROGRESS NOTE: 1/6/2020 (POC update for knee)    Time In: 11:50 am  Time Out: 12:30 pm  Total Billable Time: 38 minutes    Precautions: Standard and CHF    Subjective     Pt reports: She is doing well today.  She reports knee pain at 1-2/10 in shoulder, no pain in her knee.    She was semi compliant with home exercise program.  Response to previous treatment: none noted  Functional change: none noted    Pain: 2/10  Location: bilateral knees    Objective     Kenia received therapeutic exercises to develop strength, endurance, ROM and flexibility for 30 minutes including:    Recumbent bike for range of motion, strength, and endurance: 5 minutes, L3 for last 2.5 min  Long arc quads alternating 10x 3#, hold 5s  Tailgaters distraction 3 minutes, 3#  S/L Clamshells 10x each bilateral  S/ L Sidelying hip abduction 10x each bilateral  S/L Hip circles CW/CCW 10x each bilateral  Bridges 12x10s  Standing ER with band yellow theraband, at mirror 8x/2  Novelty V flex, ext, IR, ER 8x/ea R shoulder  Prone shoulder extension 10x  Prone W's 8x/2      Plan for Next Visit: SL balance, lateral/monster walking, progress with shoulder and  scapular strengthening as tolerated       Kenia received the following manual therapy techniques: Myofacial release and Soft tissue Mobilization were applied to the: B subscapularis releases, upper trapezial and levator scap,  for 8 minutes    Kenia participated in neuromuscular re-education activities to improve: Coordination and Proprioception for 0 minutes. The following activities were included:    Step ups 4in, 10x/ea LE      Home Exercises Provided and Patient Education Provided     Education provided:     Written Home Exercises Provided: Patient instructed to cont prior HEP.  Exercises were reviewed and Kenia was able to demonstrate them prior to the end of the session.  Kenia demonstrated good  understanding of the education provided.     See EMR under Patient Instructions for exercises provided 11/11/2020.    Assessment     Treatment today limited by time due to patients tardiness.  She was able to tolerate treatment today but required supervision and cues to perform all therex without substitution. She does have some substitution at shoulder with strengthening exercises and cued for resting if she has a loss of good technique.  Some substitution allowed due to known tear.    Kenia is progressing well towards her goals.   Pt prognosis is Good.     Pt will continue to benefit from skilled outpatient physical therapy to address the deficits listed in the problem list box on initial evaluation, provide pt/family education and to maximize pt's level of independence in the home and community environment.     Pt's spiritual, cultural and educational needs considered and pt agreeable to plan of care and goals.     Anticipated barriers to physical therapy:  Pt rescheduled evaluation, did not complete prior therapy, vestibular issues,      Goals: Reviewed 12/17/2020  Short Term Goals for knee: In 4 weeks:  1.Patient to be educated on HEP. MET, 12/17/2020  2.Patient to demo increased B knee AROM to 0 degrees extension  while sitting, to improve gait and functional activities.   Progressing not yet MET, 12/17/2020  3.Patient to increase strength B LE by 1/2 grade, to assist with improved ambulation and standing endurance.  MET, 12/17/2020  4.Patient to have decreased pain to 1/10, at worst, to improve QOL. Progressing not yet MET, 12/17/2020  5.Patient to increase B LE balance to 10s, to decrease fall risk. Progressing not yet MET, 12/17/2020  6.Patient to improve score on the FOTO, to improve QOL.  MET, 12/17/2020  7.Patient to improve sit to  30s score, to increase endurance and strength. MET, 12/17/2020    Short Term Goals for shoulder: In 4 weeks   1.Patient to be educated on HEP.  2.Patient to increase R shoulder PROM to equal L side, in order to increase ability of patient to perform functional tasks with R UE.   3.Patient to increase strength by 1/2 grade, in order to improve use of R UE for lifting and activity.  4.Patient to have pain less than 5/10 at worst, to improve QOL.  5.Patient to improve score on the FOTO.  6.Patient to be educated on postural and body mechanics awareness.     Long Term Goals for knee: In 8 weeks  1. Patient to perform daily activities including performing light activities around her home with any of her usual work, housework, or school activities; performing heavy activities around her home; lifting an object, like a bag of groceries from the floor without increased symptoms. Progressing not yet MET, 12/17/2020  2. Pt to improve sit to  30s test to 15 or greater. Progressing not yet MET, 12/17/2020  3. Patient to demonstrate increased LE strength to 4+/5-5/5, to assist with improved ambulation and standing endurance. Progressing not yet MET, 12/17/2020  4. Patient to increase B LE balance to 15s, to decrease fall risk. Progressing not yet MET, 12/17/2020  5. Patient to improve score on the FOTO to 47%, or less, to improve QOL.  MET, 12/17/2020    Long Term Goals for shoulder: In 8  weeks  1. Patient to improve score on FOTO to 36% or less, to improve QOL.  2. Patient to demo increase in UE strength to 5/5 to  improve use of R UE for lifting and activity.  3. Patient to have decreased pain to 2/10 at worst, to improve QOL.  4. Patient to demo increase R shoulder AROM to equal L side, in order to increase ability of patient to perform functional tasks with R UE.  5. Patient to perform daily activities including  Pull a medium weight object (5-10 lbs.) from under a bed; pull something out of her back pocket; reach a shelf that is at shoulder height; place a can of soup (1 lb.) on a shelf at shoulder height; all without increased symptoms.       Plan   Plan of care Certification: 11/11/2020 to 1/6/2020.    Continue Plan of Care (POC) and progress per patient tolerance.    Jessica Scott, PT

## 2020-12-29 ENCOUNTER — PATIENT MESSAGE (OUTPATIENT)
Dept: INTERNAL MEDICINE | Facility: CLINIC | Age: 65
End: 2020-12-29

## 2021-01-04 ENCOUNTER — TELEPHONE (OUTPATIENT)
Dept: ADMINISTRATIVE | Facility: HOSPITAL | Age: 66
End: 2021-01-04

## 2021-01-04 ENCOUNTER — PATIENT MESSAGE (OUTPATIENT)
Dept: ORTHOPEDICS | Facility: CLINIC | Age: 66
End: 2021-01-04

## 2021-01-04 ENCOUNTER — TELEPHONE (OUTPATIENT)
Dept: ORTHOPEDICS | Facility: CLINIC | Age: 66
End: 2021-01-04

## 2021-01-05 ENCOUNTER — TELEPHONE (OUTPATIENT)
Dept: ORTHOPEDICS | Facility: CLINIC | Age: 66
End: 2021-01-05

## 2021-01-07 ENCOUNTER — PATIENT MESSAGE (OUTPATIENT)
Dept: PSYCHIATRY | Facility: CLINIC | Age: 66
End: 2021-01-07

## 2021-01-07 ENCOUNTER — CLINICAL SUPPORT (OUTPATIENT)
Dept: REHABILITATION | Facility: HOSPITAL | Age: 66
End: 2021-01-07
Payer: MEDICAID

## 2021-01-07 DIAGNOSIS — M25.60 DECREASED RANGE OF MOTION WITH DECREASED STRENGTH: ICD-10-CM

## 2021-01-07 DIAGNOSIS — M25.60 DECREASED RANGE OF MOTION: ICD-10-CM

## 2021-01-07 DIAGNOSIS — F43.21 COMPLICATED GRIEF: ICD-10-CM

## 2021-01-07 DIAGNOSIS — R26.89 BALANCE PROBLEM: ICD-10-CM

## 2021-01-07 DIAGNOSIS — R53.1 WEAKNESS: ICD-10-CM

## 2021-01-07 DIAGNOSIS — R53.1 DECREASED RANGE OF MOTION WITH DECREASED STRENGTH: ICD-10-CM

## 2021-01-07 PROCEDURE — 97110 THERAPEUTIC EXERCISES: CPT | Mod: CQ

## 2021-01-07 RX ORDER — DIAZEPAM 2 MG/1
2 TABLET ORAL EVERY 6 HOURS PRN
Qty: 60 TABLET | Refills: 2 | Status: SHIPPED | OUTPATIENT
Start: 2021-01-07 | End: 2021-02-19 | Stop reason: SDUPTHER

## 2021-01-08 ENCOUNTER — OFFICE VISIT (OUTPATIENT)
Dept: ORTHOPEDICS | Facility: CLINIC | Age: 66
End: 2021-01-08
Payer: MEDICAID

## 2021-01-08 VITALS
WEIGHT: 125 LBS | SYSTOLIC BLOOD PRESSURE: 115 MMHG | BODY MASS INDEX: 23 KG/M2 | HEIGHT: 62 IN | DIASTOLIC BLOOD PRESSURE: 75 MMHG | HEART RATE: 79 BPM

## 2021-01-08 DIAGNOSIS — M12.811 ROTATOR CUFF TEAR ARTHROPATHY OF RIGHT SHOULDER: Primary | ICD-10-CM

## 2021-01-08 DIAGNOSIS — Z12.31 OTHER SCREENING MAMMOGRAM: ICD-10-CM

## 2021-01-08 DIAGNOSIS — Z12.11 SPECIAL SCREENING FOR MALIGNANT NEOPLASMS, COLON: Primary | ICD-10-CM

## 2021-01-08 DIAGNOSIS — M75.101 ROTATOR CUFF TEAR ARTHROPATHY OF RIGHT SHOULDER: Primary | ICD-10-CM

## 2021-01-08 PROCEDURE — 99214 OFFICE O/P EST MOD 30 MIN: CPT | Mod: PBBFAC | Performed by: STUDENT IN AN ORGANIZED HEALTH CARE EDUCATION/TRAINING PROGRAM

## 2021-01-08 PROCEDURE — 20610 DRAIN/INJ JOINT/BURSA W/O US: CPT | Mod: PBBFAC,RT | Performed by: STUDENT IN AN ORGANIZED HEALTH CARE EDUCATION/TRAINING PROGRAM

## 2021-01-08 PROCEDURE — 20610 LARGE JOINT ASPIRATION/INJECTION: R SUBACROMIAL BURSA: ICD-10-PCS | Mod: S$PBB,RT,, | Performed by: STUDENT IN AN ORGANIZED HEALTH CARE EDUCATION/TRAINING PROGRAM

## 2021-01-08 PROCEDURE — 99999 PR PBB SHADOW E&M-EST. PATIENT-LVL IV: ICD-10-PCS | Mod: PBBFAC,,, | Performed by: STUDENT IN AN ORGANIZED HEALTH CARE EDUCATION/TRAINING PROGRAM

## 2021-01-08 PROCEDURE — 99999 PR PBB SHADOW E&M-EST. PATIENT-LVL IV: CPT | Mod: PBBFAC,,, | Performed by: STUDENT IN AN ORGANIZED HEALTH CARE EDUCATION/TRAINING PROGRAM

## 2021-01-08 PROCEDURE — 99214 PR OFFICE/OUTPT VISIT, EST, LEVL IV, 30-39 MIN: ICD-10-PCS | Mod: S$PBB,25,, | Performed by: STUDENT IN AN ORGANIZED HEALTH CARE EDUCATION/TRAINING PROGRAM

## 2021-01-08 PROCEDURE — 99214 OFFICE O/P EST MOD 30 MIN: CPT | Mod: S$PBB,25,, | Performed by: STUDENT IN AN ORGANIZED HEALTH CARE EDUCATION/TRAINING PROGRAM

## 2021-01-08 RX ORDER — UBROGEPANT 100 MG/1
TABLET ORAL
COMMUNITY
Start: 2020-12-29 | End: 2021-02-25 | Stop reason: SDUPTHER

## 2021-01-08 RX ORDER — MONTELUKAST SODIUM 10 MG/1
10 TABLET ORAL NIGHTLY
COMMUNITY
Start: 2020-12-19 | End: 2021-10-28

## 2021-01-08 RX ORDER — TOPIRAMATE 100 MG/1
1 CAPSULE, EXTENDED RELEASE ORAL DAILY
COMMUNITY
Start: 2020-12-19 | End: 2021-06-21 | Stop reason: SDUPTHER

## 2021-01-08 RX ADMIN — TRIAMCINOLONE ACETONIDE 80 MG: 200 INJECTION, SUSPENSION INTRA-ARTICULAR; INTRAMUSCULAR at 10:01

## 2021-01-11 ENCOUNTER — CLINICAL SUPPORT (OUTPATIENT)
Dept: REHABILITATION | Facility: HOSPITAL | Age: 66
End: 2021-01-11
Payer: MEDICAID

## 2021-01-11 DIAGNOSIS — R53.1 DECREASED RANGE OF MOTION WITH DECREASED STRENGTH: ICD-10-CM

## 2021-01-11 DIAGNOSIS — R53.1 WEAKNESS: ICD-10-CM

## 2021-01-11 DIAGNOSIS — M25.60 DECREASED RANGE OF MOTION: ICD-10-CM

## 2021-01-11 DIAGNOSIS — M25.60 DECREASED RANGE OF MOTION WITH DECREASED STRENGTH: ICD-10-CM

## 2021-01-11 DIAGNOSIS — R26.89 BALANCE PROBLEM: ICD-10-CM

## 2021-01-11 PROCEDURE — 97110 THERAPEUTIC EXERCISES: CPT | Mod: CQ

## 2021-01-15 ENCOUNTER — CLINICAL SUPPORT (OUTPATIENT)
Dept: REHABILITATION | Facility: HOSPITAL | Age: 66
End: 2021-01-15
Payer: MEDICAID

## 2021-01-15 DIAGNOSIS — M25.60 DECREASED RANGE OF MOTION: ICD-10-CM

## 2021-01-15 DIAGNOSIS — M25.60 DECREASED RANGE OF MOTION WITH DECREASED STRENGTH: ICD-10-CM

## 2021-01-15 DIAGNOSIS — R53.1 WEAKNESS: ICD-10-CM

## 2021-01-15 DIAGNOSIS — R26.89 BALANCE PROBLEM: ICD-10-CM

## 2021-01-15 DIAGNOSIS — R53.1 DECREASED RANGE OF MOTION WITH DECREASED STRENGTH: ICD-10-CM

## 2021-01-15 PROCEDURE — 97110 THERAPEUTIC EXERCISES: CPT

## 2021-01-15 RX ORDER — TRIAMCINOLONE ACETONIDE 40 MG/ML
80 INJECTION, SUSPENSION INTRA-ARTICULAR; INTRAMUSCULAR
Status: DISCONTINUED | OUTPATIENT
Start: 2021-01-08 | End: 2021-01-15 | Stop reason: HOSPADM

## 2021-01-20 ENCOUNTER — CLINICAL SUPPORT (OUTPATIENT)
Dept: REHABILITATION | Facility: HOSPITAL | Age: 66
End: 2021-01-20
Payer: MEDICAID

## 2021-01-20 DIAGNOSIS — R53.1 WEAKNESS: ICD-10-CM

## 2021-01-20 DIAGNOSIS — M25.60 DECREASED RANGE OF MOTION WITH DECREASED STRENGTH: ICD-10-CM

## 2021-01-20 DIAGNOSIS — R53.1 DECREASED RANGE OF MOTION WITH DECREASED STRENGTH: ICD-10-CM

## 2021-01-20 DIAGNOSIS — R26.89 BALANCE PROBLEM: ICD-10-CM

## 2021-01-20 DIAGNOSIS — M25.60 DECREASED RANGE OF MOTION: ICD-10-CM

## 2021-01-20 PROCEDURE — 97110 THERAPEUTIC EXERCISES: CPT

## 2021-01-21 ENCOUNTER — PATIENT MESSAGE (OUTPATIENT)
Dept: REHABILITATION | Facility: HOSPITAL | Age: 66
End: 2021-01-21

## 2021-01-22 ENCOUNTER — PATIENT MESSAGE (OUTPATIENT)
Dept: PSYCHIATRY | Facility: CLINIC | Age: 66
End: 2021-01-22

## 2021-01-22 ENCOUNTER — TELEPHONE (OUTPATIENT)
Dept: ENDOSCOPY | Facility: HOSPITAL | Age: 66
End: 2021-01-22

## 2021-01-22 ENCOUNTER — PATIENT MESSAGE (OUTPATIENT)
Dept: INTERNAL MEDICINE | Facility: CLINIC | Age: 66
End: 2021-01-22

## 2021-01-22 DIAGNOSIS — Z13.220 LIPID SCREENING: Primary | ICD-10-CM

## 2021-01-25 ENCOUNTER — PATIENT MESSAGE (OUTPATIENT)
Dept: PSYCHIATRY | Facility: CLINIC | Age: 66
End: 2021-01-25

## 2021-01-25 ENCOUNTER — PATIENT MESSAGE (OUTPATIENT)
Dept: INTERNAL MEDICINE | Facility: CLINIC | Age: 66
End: 2021-01-25

## 2021-01-29 ENCOUNTER — CLINICAL SUPPORT (OUTPATIENT)
Dept: REHABILITATION | Facility: HOSPITAL | Age: 66
End: 2021-01-29
Payer: MEDICAID

## 2021-01-29 ENCOUNTER — OFFICE VISIT (OUTPATIENT)
Dept: INTERNAL MEDICINE | Facility: CLINIC | Age: 66
End: 2021-01-29
Payer: MEDICAID

## 2021-01-29 VITALS
SYSTOLIC BLOOD PRESSURE: 138 MMHG | HEIGHT: 62 IN | WEIGHT: 128.5 LBS | BODY MASS INDEX: 23.65 KG/M2 | OXYGEN SATURATION: 98 % | HEART RATE: 99 BPM | TEMPERATURE: 98 F | DIASTOLIC BLOOD PRESSURE: 82 MMHG

## 2021-01-29 DIAGNOSIS — R53.1 WEAKNESS: ICD-10-CM

## 2021-01-29 DIAGNOSIS — Z23 NEED FOR SHINGLES VACCINE: Primary | ICD-10-CM

## 2021-01-29 DIAGNOSIS — L84 FOOT CALLUS: ICD-10-CM

## 2021-01-29 DIAGNOSIS — M25.60 DECREASED RANGE OF MOTION WITH DECREASED STRENGTH: ICD-10-CM

## 2021-01-29 DIAGNOSIS — R53.1 DECREASED RANGE OF MOTION WITH DECREASED STRENGTH: ICD-10-CM

## 2021-01-29 DIAGNOSIS — M25.60 DECREASED RANGE OF MOTION: ICD-10-CM

## 2021-01-29 DIAGNOSIS — L60.0 INGROWN TOENAIL OF LEFT FOOT: ICD-10-CM

## 2021-01-29 DIAGNOSIS — R26.89 BALANCE PROBLEM: ICD-10-CM

## 2021-01-29 PROCEDURE — 99213 OFFICE O/P EST LOW 20 MIN: CPT | Mod: S$PBB,,, | Performed by: NURSE PRACTITIONER

## 2021-01-29 PROCEDURE — 99215 OFFICE O/P EST HI 40 MIN: CPT | Mod: PBBFAC,PO,25 | Performed by: NURSE PRACTITIONER

## 2021-01-29 PROCEDURE — 99999 PR PBB SHADOW E&M-EST. PATIENT-LVL V: CPT | Mod: PBBFAC,,, | Performed by: NURSE PRACTITIONER

## 2021-01-29 PROCEDURE — 90471 IMMUNIZATION ADMIN: CPT | Mod: PBBFAC,PO

## 2021-01-29 PROCEDURE — 99999 PR PBB SHADOW E&M-EST. PATIENT-LVL V: ICD-10-PCS | Mod: PBBFAC,,, | Performed by: NURSE PRACTITIONER

## 2021-01-29 PROCEDURE — 97140 MANUAL THERAPY 1/> REGIONS: CPT

## 2021-01-29 PROCEDURE — 90750 HZV VACC RECOMBINANT IM: CPT | Mod: PBBFAC,PO

## 2021-01-29 PROCEDURE — 97110 THERAPEUTIC EXERCISES: CPT

## 2021-01-29 PROCEDURE — 99213 PR OFFICE/OUTPT VISIT, EST, LEVL III, 20-29 MIN: ICD-10-PCS | Mod: S$PBB,,, | Performed by: NURSE PRACTITIONER

## 2021-01-29 RX ORDER — MUPIROCIN 20 MG/G
OINTMENT TOPICAL 3 TIMES DAILY
Qty: 22 G | Refills: 0 | Status: SHIPPED | OUTPATIENT
Start: 2021-01-29 | End: 2022-10-28 | Stop reason: ALTCHOICE

## 2021-02-01 ENCOUNTER — PATIENT MESSAGE (OUTPATIENT)
Dept: UROLOGY | Facility: CLINIC | Age: 66
End: 2021-02-01

## 2021-02-01 ENCOUNTER — OFFICE VISIT (OUTPATIENT)
Dept: ORTHOPEDICS | Facility: CLINIC | Age: 66
End: 2021-02-01
Payer: MEDICAID

## 2021-02-01 VITALS
SYSTOLIC BLOOD PRESSURE: 116 MMHG | HEART RATE: 88 BPM | HEIGHT: 62 IN | WEIGHT: 128 LBS | BODY MASS INDEX: 23.55 KG/M2 | DIASTOLIC BLOOD PRESSURE: 67 MMHG

## 2021-02-01 DIAGNOSIS — M17.11 ARTHRITIS OF KNEE, RIGHT: ICD-10-CM

## 2021-02-01 DIAGNOSIS — M21.162 ACQUIRED VARUS DEFORMITY KNEE, LEFT: ICD-10-CM

## 2021-02-01 DIAGNOSIS — M17.12 ARTHRITIS OF KNEE, LEFT: Primary | ICD-10-CM

## 2021-02-01 DIAGNOSIS — M17.12 PRIMARY OSTEOARTHRITIS OF LEFT KNEE: Primary | ICD-10-CM

## 2021-02-01 PROCEDURE — 99999 PR PBB SHADOW E&M-EST. PATIENT-LVL IV: ICD-10-PCS | Mod: PBBFAC,,, | Performed by: ORTHOPAEDIC SURGERY

## 2021-02-01 PROCEDURE — 20610 LARGE JOINT ASPIRATION/INJECTION: L KNEE: ICD-10-PCS | Mod: S$PBB,LT,, | Performed by: ORTHOPAEDIC SURGERY

## 2021-02-01 PROCEDURE — 20610 DRAIN/INJ JOINT/BURSA W/O US: CPT | Mod: PBBFAC | Performed by: ORTHOPAEDIC SURGERY

## 2021-02-01 PROCEDURE — 99214 OFFICE O/P EST MOD 30 MIN: CPT | Mod: PBBFAC | Performed by: ORTHOPAEDIC SURGERY

## 2021-02-01 PROCEDURE — 99999 PR PBB SHADOW E&M-EST. PATIENT-LVL IV: CPT | Mod: PBBFAC,,, | Performed by: ORTHOPAEDIC SURGERY

## 2021-02-01 PROCEDURE — 99213 PR OFFICE/OUTPT VISIT, EST, LEVL III, 20-29 MIN: ICD-10-PCS | Mod: 25,S$PBB,, | Performed by: ORTHOPAEDIC SURGERY

## 2021-02-01 PROCEDURE — 99213 OFFICE O/P EST LOW 20 MIN: CPT | Mod: 25,S$PBB,, | Performed by: ORTHOPAEDIC SURGERY

## 2021-02-01 RX ORDER — METHYLPREDNISOLONE ACETATE 80 MG/ML
80 INJECTION, SUSPENSION INTRA-ARTICULAR; INTRALESIONAL; INTRAMUSCULAR; SOFT TISSUE
Status: DISCONTINUED | OUTPATIENT
Start: 2021-02-01 | End: 2021-02-01 | Stop reason: HOSPADM

## 2021-02-01 RX ADMIN — METHYLPREDNISOLONE ACETATE 80 MG: 80 INJECTION, SUSPENSION INTRALESIONAL; INTRAMUSCULAR; INTRASYNOVIAL; SOFT TISSUE at 01:02

## 2021-02-02 ENCOUNTER — OFFICE VISIT (OUTPATIENT)
Dept: PSYCHIATRY | Facility: CLINIC | Age: 66
End: 2021-02-02
Payer: MEDICAID

## 2021-02-02 ENCOUNTER — CLINICAL SUPPORT (OUTPATIENT)
Dept: REHABILITATION | Facility: HOSPITAL | Age: 66
End: 2021-02-02
Payer: MEDICAID

## 2021-02-02 DIAGNOSIS — R53.1 DECREASED RANGE OF MOTION WITH DECREASED STRENGTH: ICD-10-CM

## 2021-02-02 DIAGNOSIS — F33.0 MILD EPISODE OF RECURRENT MAJOR DEPRESSIVE DISORDER: ICD-10-CM

## 2021-02-02 DIAGNOSIS — F41.1 GAD (GENERALIZED ANXIETY DISORDER): Primary | ICD-10-CM

## 2021-02-02 DIAGNOSIS — M25.60 DECREASED RANGE OF MOTION WITH DECREASED STRENGTH: ICD-10-CM

## 2021-02-02 DIAGNOSIS — R53.1 WEAKNESS: ICD-10-CM

## 2021-02-02 DIAGNOSIS — R26.89 BALANCE PROBLEM: ICD-10-CM

## 2021-02-02 DIAGNOSIS — M25.60 DECREASED RANGE OF MOTION: ICD-10-CM

## 2021-02-02 PROCEDURE — 90834 PSYTX W PT 45 MINUTES: CPT | Mod: 95,AJ,HB, | Performed by: SOCIAL WORKER

## 2021-02-02 PROCEDURE — 97110 THERAPEUTIC EXERCISES: CPT

## 2021-02-02 PROCEDURE — 90834 PR PSYCHOTHERAPY W/PATIENT, 45 MIN: ICD-10-PCS | Mod: 95,AJ,HB, | Performed by: SOCIAL WORKER

## 2021-02-04 ENCOUNTER — PATIENT MESSAGE (OUTPATIENT)
Dept: REHABILITATION | Facility: HOSPITAL | Age: 66
End: 2021-02-04

## 2021-02-05 ENCOUNTER — TELEPHONE (OUTPATIENT)
Dept: PHARMACY | Facility: CLINIC | Age: 66
End: 2021-02-05

## 2021-02-09 ENCOUNTER — CLINICAL SUPPORT (OUTPATIENT)
Dept: REHABILITATION | Facility: HOSPITAL | Age: 66
End: 2021-02-09
Payer: MEDICAID

## 2021-02-09 DIAGNOSIS — M25.60 DECREASED RANGE OF MOTION: ICD-10-CM

## 2021-02-09 DIAGNOSIS — R53.1 DECREASED RANGE OF MOTION WITH DECREASED STRENGTH: ICD-10-CM

## 2021-02-09 DIAGNOSIS — M25.60 DECREASED RANGE OF MOTION WITH DECREASED STRENGTH: ICD-10-CM

## 2021-02-09 DIAGNOSIS — R53.1 WEAKNESS: ICD-10-CM

## 2021-02-09 DIAGNOSIS — R26.89 BALANCE PROBLEM: ICD-10-CM

## 2021-02-09 PROCEDURE — 97110 THERAPEUTIC EXERCISES: CPT | Mod: CQ

## 2021-02-11 ENCOUNTER — CLINICAL SUPPORT (OUTPATIENT)
Dept: REHABILITATION | Facility: HOSPITAL | Age: 66
End: 2021-02-11
Payer: MEDICAID

## 2021-02-11 ENCOUNTER — TELEPHONE (OUTPATIENT)
Dept: ENDOSCOPY | Facility: HOSPITAL | Age: 66
End: 2021-02-11

## 2021-02-11 DIAGNOSIS — M25.60 DECREASED RANGE OF MOTION WITH DECREASED STRENGTH: ICD-10-CM

## 2021-02-11 DIAGNOSIS — M25.60 DECREASED RANGE OF MOTION: ICD-10-CM

## 2021-02-11 DIAGNOSIS — R53.1 DECREASED RANGE OF MOTION WITH DECREASED STRENGTH: ICD-10-CM

## 2021-02-11 DIAGNOSIS — R53.1 WEAKNESS: ICD-10-CM

## 2021-02-11 DIAGNOSIS — R26.89 BALANCE PROBLEM: ICD-10-CM

## 2021-02-11 PROCEDURE — 97110 THERAPEUTIC EXERCISES: CPT | Mod: CQ

## 2021-02-13 ENCOUNTER — PATIENT MESSAGE (OUTPATIENT)
Dept: ADMINISTRATIVE | Facility: HOSPITAL | Age: 66
End: 2021-02-13

## 2021-02-15 NOTE — Clinical Note
;      [facility] Emergency Department  [address], [place] [address]  [phone]     Clinical Summary     PERSON INFORMATION   Name BRANDON BENITES Age  89 Years  1926 12:00 AM   Acct# [id number] Sex Female Phone [phone]   Dispo Type Still a patient - 30 Arrival 2019 10:17 AM Checkout 2019 1:19 PM    Address: [address] [place] [address]      Visit Reason SOB  [facility]     ED Physician Note     Patient:   BRANDON BENITES            MRN: [id number]            FIN: [id number]               Age:   89 years     Sex:  Female     :  1926   Associated Diagnoses:   None   Author:   Destini SUH, Héctor SCHMIDT      Basic Information   Time seen: Date & time 2019 10:34:00.   History source: Patient.   Arrival mode: Ambulance.   History limitation: None.   Additional information: CC: SOB.      History of Present Illness   89 year old female with a history of CABG with stent placement on Coumadin presents to the Emergency Department via EMS for evaluation of shortness of breath. The patient states that yesterday she began to feel increasingly short of breath while she wa working in the the yard.. She reports exertion as an exacerbating factor, does not feel sob at rest. She notes that her symptoms have been worsening since onset. Even would feel SOB c/ exertion if in air-conditioned environment.  at presentation, she denies any coughing, chest pain, weight gain, leg swelling, nausea, vomiting, or fevers. No orthopnea or PND.  Otherwise feels llike her usual self.  PCP: Dr. Damon  Cardio: Dr. Lombardi      Review of Systems   Constitutional symptoms:  No fever,    Skin symptoms:  No rash,    Eye symptoms:  No recent vision problems,    ENMT symptoms:  No sore throat,    Respiratory symptoms:  Shortness of breath.   Cardiovascular symptoms:  No chest pain,    Gastrointestinal symptoms:  No abdominal pain,    Genitourinary symptoms:    Patient remains tobacco free as of 1/26/20. Congratulated her on he continued accomplishment. Patient proud that she has been quit for 6 months. Patient remains smoking cessation medication regimen of  0.5 mg Chantix without any negative side effects noted at this time. The patient will continue with group therapy/ individula sessions and medication monitoring by CTTS. Prescribed medication management will be by physician.The patient denies any abnormal behavioral or mental changes at this time.    No dysuria,    Musculoskeletal symptoms:  No Joint pain,    Neurologic symptoms:  No headache,       Health Status   Allergies:    Allergic Reactions (Selected)  NKA.   Medications:  (Selected)   Documented Medications  Documented  Coumadin: 2.5 mg, 1 tab, PO, qDay, Tue, Wed, Fri, Sat, Sun, 30 tab  Crestor 10 mg oral tablet: 10 mg, 1 tab(s), PO, qHS, 30 tab(s)  NIFEdipine 30 mg ER oral tablet: 30 mg, 1 tab, PO, BID  allopurinol 100 mg oral tablet: 1 tab(s), PO, qPM, 180 tab(s)  carvedilol 20 mg ER oral capsule: 20 mg, 1 cap(s), PO, qDay, 90 cap(s)  clopidogrel 75 mg oral tablet: 75 mg, 1 tab(s), PO, qAM, 30 tab(s)  furosemide 40 mg oral tablet: 40 mg, 1 tab(s), PO, qDay, 30 tab(s)  potassium chloride 10 mEq oral capsule, extended release: 10 mEq, 1 cap, PO, BID, 60 cap  warfarin 5 mg oral tablet: 5 mg, 1 tab, PO, qDay, Mon and Thu..      Past Medical/ Family/ Social History   Arthritis   Atrial fibrillation   CABG - Coronary artery bypass graft   CAD - Coronary artery disease   CHF - Congestive heart failure   Hearing loss   Heartburn   Hypercholesterolemia   Hypertension   Incontinence of urine   Renal insufficiency   Shortness of breath   Skin cancer   Swelling   .   PMH: HTN, HL  PSH: No appendectomy. CABG. BYPASS  PFH: No hx of MI  Social: No Etoh, No Smoking, No Drug use.         Physical Examination               Vital Signs   Vital Signs   7/5/2019 10:25           Temperature Oral          97.8 F                             Peripheral Pulse Rate     71 bpm                             Respiratory Rate          18 br/min                             Systolic Blood Pressure   168 mmHg  HI                             Diastolic Blood Pressure  74 mmHg                             Mean Arterial Pressure    105 mmHg                             Oxygen Saturation         94 %  .               Per nurse's notes.              Oxygen saturation:  96 %.   Vital Signs were reviewed.   General:  Alert, no acute distress.     Skin:  Warm, dry.    Head:  Normocephalic, atraumatic.    Neck:  Supple, trachea midline.    Eye:  Pupils are equal, round and reactive to light, extraocular movements are intact.    Ears, nose, mouth and throat:  Oral mucosa moist.   Cardiovascular:  Regular rate and rhythm, S1, S2.    Respiratory:  Respirations are non-labored, Symmetrical chest wall expansion, Faint rhonchi right lung base.    Gastrointestinal:  Soft, Nontender, Non distended, Normal bowel sounds.    Musculoskeletal:  Normal ROM, no swelling, no deformity.    Neurological:  Alert and oriented to person, place, time, and situation, No focal neurological deficit observed.    Psychiatric:  Appropriate mood & affect.      Medical Decision Making   Differential Diagnosis:  multiple dx where considered.   Cardiac monitor:  Normal sinus rhythm.   Electrocardiogram:  Normal sinus rhythm, No ST-T changes, no ectopy, normal TN & QRS intervals, EP Interp.    Results review:  Lab results : Lab View   7/5/2019 10:30           Glucose Lvl               163 mg/dL  HI                             BUN                       26 mg/dL  HI                             Creatinine                1.14 mg/dL                             eGFR AfrAmer              54 mL/min/1.73m2  NA                             eGFR NonAfrAmer           45 mL/min/1.73m2  NA                             Sodium                    141 mEq/L                             Potassium                 3.4 mEq/L  LOW                             Chloride                  105 mEq/L                             TCO2                      26 mEq/L                             AGAP                      13 mEq/L                             Calcium                   9.9 mg/dL                             Alk Phos                  83 unit/L                             Bili Total                1.2 mg/dL  HI                             Total Protein             7.2 g/dL                             Albumin                    3.5 g/dL                             Globulin_                 3.7 g/dL                             A/G Ratio_                0.9  NA                             AST/GOT                   25 unit/L                             ALT/GPT                   14 unit/L                             Troponin I                0.11 ng/mL  HI                             WBC                       14.6 K/cumm  HI                             RBC                       4.02 M/cumm                             Hgb                       12.8 g/dL                             Hct                       39 %                             MCV                       96 FL                             MCH                       32 pg                             MCHC                      33 g/dL                             RDW                       15.0 %  HI                             Platelet                  276 K/cumm                             NRBC                      0.0 %                             Abs Neutro                12.6 K/cumm  HI                             Neutrophil                87 %  NA                             Abs Lymph                 0.8 K/cumm  LOW                             Lymphocyte                6 %  NA                             Abs Mono                  1.0 K/cumm  HI                             Monocyte                  7 %  NA                             Immature Gran             0.5 %  HI                             Eosinophil                0 %                             Basophil                  0 %                             RBC Morph                 Anisocytosis                             Plt Estimate              Adequate                             PT                        27.7 seconds  HI                             INR                       3.0  HI  .   Chest X-Ray:  Result type: XR Chest Portable  Result date: July 05, 2019 10:45   Verified by: Augusto SUH, Mary WALTER on July 05, 2019  10:50    FINDINGS: Heart size is normal.  There is evidence of previous cardiac  surgery.  The aorta is ectatic.  Aortic calcification is noted.  The  pulmonary vascularity is normal.  No active infiltrates are seen.    IMPRESSION: No acute pathology.  .   Notes:  Patient was seen and evaluated multiple diagnoses were considered.  She presents complaining of dyspnea with exertion, no other associated symptoms.  She is asymptomatic here in the emergency room while at rest, vitals are stable, EKG does not show any ischemic ST changes, BNP is elevated however chest x-ray does not show any evidence of fluid overload/CHF, and she also does not have any other symptoms or clinical exam findings suggest CHF exacerbation as a cause of her dyspnea on exertion, she does have valvular disease and coronary artery hypertension and these may be the reasons for her elevated BNP.  Patient's troponin did come back elevated at 0.11, therefore patient potentially could have had a non-ST elevation MI and her dyspnea on exertion is secondary to acute coronary syndrome.  She is already anticoagulated on Coumadin and is therapeutic with an INR of 3.0, she does not have any chest pain, is not having leg pain or swelling, and although considered clinically not suspicious for PE.  Discussed patient with cardiology, Dr. Torey Mills and c/ admitting and will admit to tele floor for further work-up..      Impression and Plan   Diagnosis   Dyspnea (exertional), NSTEMI   Plan   Condition: Stable.    Disposition: Admit to Inpatient Telemetry Unit.    Counseled: Patient, Regarding diagnosis, Regarding diagnostic results, Regarding treatment plan, Patient indicated understanding of instructions.    Notes: This document was scribed by Jess Nixon for Dr. Georges. , \"All medical record entries made by the scribe were at my direction. I have reviewed the chart and agree that the record accurately reflects my personal performance of the history, physical  exam, medical decision making, and the emergency course for this patient. I have also personally directed, reviewed, and agree with the discharge instruction and disposition.\".        ED Time Seen By Provider Entered On:  7/5/2019 10:23     Performed On:  7/5/2019 10:23  by Héctor Georges MD               Time Seen By Provider   Time Seen by Provider :   7/5/2019 10:23    Héctor Georges MD. - 7/5/2019 10:23           VITALS INFORMATION  Vitals/Ht/Wt  Temperature Oral:  97.8 F  Peripheral Pulse Rate:  71 bpm  Respiratory Rate:  18 br/min  Oxygen Saturation:  94 %  Oxygen Therapy:  Room air  Systolic Blood Pressure:  168 mmHg  Diastolic Blood Pressure:  74 mmHg  Mean Arterial Pressure:  105 mmHg  Height:  163 cm  Weight:  64 kg  ED Hand-Off Communication  ED Hand-Off Reason:  In Hospital Transfer  ED Hand-Off Reason / In Hospital:  SBAR reviewed during report  Patient on Cardiac Monitor:  Yes  Sitter Present:  No  Cardiac Monitor Box:  070  Potential Core Measure:  N/A  Hand-off Report Given to:  Kathryn Rich       MEDICAL INFORMATION   Allergy Info:          NKA             Prescriptions:            DISCHARGE INFORMATION   Discharge Disposition: Still a patient - 30     PATIENT EDUCATION INFORMATION   Instructions:          Follow up:            DIAGNOSIS

## 2021-02-17 RX ORDER — BUSPIRONE HYDROCHLORIDE 15 MG/1
15 TABLET ORAL 2 TIMES DAILY
Qty: 60 TABLET | Refills: 1 | Status: SHIPPED | OUTPATIENT
Start: 2021-02-17 | End: 2021-02-19 | Stop reason: SDUPTHER

## 2021-02-18 ENCOUNTER — TELEPHONE (OUTPATIENT)
Dept: REHABILITATION | Facility: HOSPITAL | Age: 66
End: 2021-02-18

## 2021-02-18 ENCOUNTER — DOCUMENTATION ONLY (OUTPATIENT)
Dept: REHABILITATION | Facility: HOSPITAL | Age: 66
End: 2021-02-18

## 2021-02-18 ENCOUNTER — TELEPHONE (OUTPATIENT)
Dept: ENDOSCOPY | Facility: HOSPITAL | Age: 66
End: 2021-02-18

## 2021-02-18 DIAGNOSIS — Z13.9 SCREENING PROCEDURE: Primary | ICD-10-CM

## 2021-02-18 RX ORDER — SODIUM, POTASSIUM,MAG SULFATES 17.5-3.13G
1 SOLUTION, RECONSTITUTED, ORAL ORAL DAILY
Qty: 1 KIT | Refills: 0 | Status: SHIPPED | OUTPATIENT
Start: 2021-02-18 | End: 2021-02-20

## 2021-02-19 ENCOUNTER — OFFICE VISIT (OUTPATIENT)
Dept: PSYCHIATRY | Facility: CLINIC | Age: 66
End: 2021-02-19
Payer: MEDICAID

## 2021-02-19 DIAGNOSIS — F43.21 COMPLICATED GRIEF: ICD-10-CM

## 2021-02-19 PROCEDURE — 99213 PR OFFICE/OUTPT VISIT, EST, LEVL III, 20-29 MIN: ICD-10-PCS | Mod: 95,AF,HB, | Performed by: PSYCHIATRY & NEUROLOGY

## 2021-02-19 PROCEDURE — 99213 OFFICE O/P EST LOW 20 MIN: CPT | Mod: 95,AF,HB, | Performed by: PSYCHIATRY & NEUROLOGY

## 2021-02-19 RX ORDER — DIAZEPAM 2 MG/1
2 TABLET ORAL EVERY 6 HOURS PRN
Qty: 60 TABLET | Refills: 2 | Status: SHIPPED | OUTPATIENT
Start: 2021-02-19 | End: 2021-05-12 | Stop reason: SDUPTHER

## 2021-02-19 RX ORDER — BUPROPION HYDROCHLORIDE 150 MG/1
450 TABLET ORAL DAILY
Qty: 90 TABLET | Refills: 2 | Status: SHIPPED | OUTPATIENT
Start: 2021-02-19 | End: 2021-05-17 | Stop reason: SDUPTHER

## 2021-02-19 RX ORDER — BUSPIRONE HYDROCHLORIDE 15 MG/1
15 TABLET ORAL 2 TIMES DAILY
Qty: 60 TABLET | Refills: 2 | Status: SHIPPED | OUTPATIENT
Start: 2021-02-19 | End: 2021-03-29

## 2021-02-24 ENCOUNTER — OFFICE VISIT (OUTPATIENT)
Dept: OTOLARYNGOLOGY | Facility: CLINIC | Age: 66
End: 2021-02-24
Payer: MEDICAID

## 2021-02-24 ENCOUNTER — PATIENT MESSAGE (OUTPATIENT)
Dept: OTOLARYNGOLOGY | Facility: CLINIC | Age: 66
End: 2021-02-24

## 2021-02-24 DIAGNOSIS — R68.83 CHILLS: ICD-10-CM

## 2021-02-24 DIAGNOSIS — R05.9 COUGH: ICD-10-CM

## 2021-02-24 DIAGNOSIS — J30.2 SEASONAL ALLERGIC RHINITIS, UNSPECIFIED TRIGGER: Primary | ICD-10-CM

## 2021-02-24 DIAGNOSIS — R51.9 HEAD ACHE: ICD-10-CM

## 2021-02-24 PROCEDURE — 99213 OFFICE O/P EST LOW 20 MIN: CPT | Mod: 95,,, | Performed by: PHYSICIAN ASSISTANT

## 2021-02-24 PROCEDURE — 99213 PR OFFICE/OUTPT VISIT, EST, LEVL III, 20-29 MIN: ICD-10-PCS | Mod: 95,,, | Performed by: PHYSICIAN ASSISTANT

## 2021-02-24 RX ORDER — PREDNISONE 20 MG/1
20 TABLET ORAL DAILY
Qty: 5 TABLET | Refills: 0 | Status: SHIPPED | OUTPATIENT
Start: 2021-02-24 | End: 2021-03-01

## 2021-02-25 ENCOUNTER — CLINICAL SUPPORT (OUTPATIENT)
Dept: INTERNAL MEDICINE | Facility: CLINIC | Age: 66
End: 2021-02-25
Payer: MEDICAID

## 2021-02-25 ENCOUNTER — PATIENT MESSAGE (OUTPATIENT)
Dept: NEUROLOGY | Facility: CLINIC | Age: 66
End: 2021-02-25

## 2021-02-25 ENCOUNTER — PATIENT MESSAGE (OUTPATIENT)
Dept: OTOLARYNGOLOGY | Facility: CLINIC | Age: 66
End: 2021-02-25

## 2021-02-25 DIAGNOSIS — R05.9 COUGH: ICD-10-CM

## 2021-02-25 DIAGNOSIS — R51.9 HEAD ACHE: ICD-10-CM

## 2021-02-25 DIAGNOSIS — R68.83 CHILLS: ICD-10-CM

## 2021-02-25 PROCEDURE — U0003 INFECTIOUS AGENT DETECTION BY NUCLEIC ACID (DNA OR RNA); SEVERE ACUTE RESPIRATORY SYNDROME CORONAVIRUS 2 (SARS-COV-2) (CORONAVIRUS DISEASE [COVID-19]), AMPLIFIED PROBE TECHNIQUE, MAKING USE OF HIGH THROUGHPUT TECHNOLOGIES AS DESCRIBED BY CMS-2020-01-R: HCPCS

## 2021-02-25 PROCEDURE — U0005 INFEC AGEN DETEC AMPLI PROBE: HCPCS

## 2021-02-25 RX ORDER — UBROGEPANT 100 MG/1
100 TABLET ORAL ONCE AS NEEDED
Qty: 12 TABLET | Refills: 3 | Status: SHIPPED | OUTPATIENT
Start: 2021-02-25 | End: 2021-02-25

## 2021-02-26 LAB — SARS-COV-2 RNA RESP QL NAA+PROBE: NOT DETECTED

## 2021-02-28 ENCOUNTER — PATIENT MESSAGE (OUTPATIENT)
Dept: PSYCHIATRY | Facility: CLINIC | Age: 66
End: 2021-02-28

## 2021-03-01 ENCOUNTER — PATIENT MESSAGE (OUTPATIENT)
Dept: REHABILITATION | Facility: HOSPITAL | Age: 66
End: 2021-03-01

## 2021-03-03 ENCOUNTER — PATIENT OUTREACH (OUTPATIENT)
Dept: ADMINISTRATIVE | Facility: OTHER | Age: 66
End: 2021-03-03

## 2021-03-04 ENCOUNTER — PATIENT MESSAGE (OUTPATIENT)
Dept: PSYCHIATRY | Facility: CLINIC | Age: 66
End: 2021-03-04

## 2021-03-04 ENCOUNTER — PATIENT MESSAGE (OUTPATIENT)
Dept: ADMINISTRATIVE | Facility: OTHER | Age: 66
End: 2021-03-04

## 2021-03-04 ENCOUNTER — PATIENT MESSAGE (OUTPATIENT)
Dept: ORTHOPEDICS | Facility: CLINIC | Age: 66
End: 2021-03-04

## 2021-03-08 ENCOUNTER — TELEPHONE (OUTPATIENT)
Dept: PREADMISSION TESTING | Facility: HOSPITAL | Age: 66
End: 2021-03-08

## 2021-03-09 ENCOUNTER — TELEPHONE (OUTPATIENT)
Dept: UROLOGY | Facility: CLINIC | Age: 66
End: 2021-03-09

## 2021-03-11 ENCOUNTER — ANESTHESIA EVENT (OUTPATIENT)
Dept: ENDOSCOPY | Facility: HOSPITAL | Age: 66
End: 2021-03-11
Payer: MEDICAID

## 2021-03-12 ENCOUNTER — TELEPHONE (OUTPATIENT)
Dept: ORTHOPEDICS | Facility: CLINIC | Age: 66
End: 2021-03-12

## 2021-03-12 ENCOUNTER — OFFICE VISIT (OUTPATIENT)
Dept: ORTHOPEDICS | Facility: CLINIC | Age: 66
End: 2021-03-12
Payer: MEDICAID

## 2021-03-12 ENCOUNTER — CLINICAL SUPPORT (OUTPATIENT)
Dept: INTERNAL MEDICINE | Facility: CLINIC | Age: 66
End: 2021-03-12
Attending: FAMILY MEDICINE
Payer: MEDICAID

## 2021-03-12 VITALS — WEIGHT: 125 LBS | HEIGHT: 62 IN | BODY MASS INDEX: 23 KG/M2

## 2021-03-12 DIAGNOSIS — M12.811 ROTATOR CUFF TEAR ARTHROPATHY OF RIGHT SHOULDER: Primary | ICD-10-CM

## 2021-03-12 DIAGNOSIS — Z13.9 SCREENING PROCEDURE: ICD-10-CM

## 2021-03-12 DIAGNOSIS — M75.101 ROTATOR CUFF TEAR ARTHROPATHY OF RIGHT SHOULDER: Primary | ICD-10-CM

## 2021-03-12 PROCEDURE — 99999 PR PBB SHADOW E&M-EST. PATIENT-LVL IV: ICD-10-PCS | Mod: PBBFAC,,, | Performed by: STUDENT IN AN ORGANIZED HEALTH CARE EDUCATION/TRAINING PROGRAM

## 2021-03-12 PROCEDURE — 99999 PR PBB SHADOW E&M-EST. PATIENT-LVL IV: CPT | Mod: PBBFAC,,, | Performed by: STUDENT IN AN ORGANIZED HEALTH CARE EDUCATION/TRAINING PROGRAM

## 2021-03-12 PROCEDURE — 20610 LARGE JOINT ASPIRATION/INJECTION: R SUBACROMIAL BURSA: ICD-10-PCS | Mod: S$PBB,RT,, | Performed by: STUDENT IN AN ORGANIZED HEALTH CARE EDUCATION/TRAINING PROGRAM

## 2021-03-12 PROCEDURE — 99212 OFFICE O/P EST SF 10 MIN: CPT | Mod: PBBFAC,27,PO,25

## 2021-03-12 PROCEDURE — 99214 OFFICE O/P EST MOD 30 MIN: CPT | Mod: PBBFAC,25 | Performed by: STUDENT IN AN ORGANIZED HEALTH CARE EDUCATION/TRAINING PROGRAM

## 2021-03-12 PROCEDURE — 20610 DRAIN/INJ JOINT/BURSA W/O US: CPT | Mod: PBBFAC | Performed by: STUDENT IN AN ORGANIZED HEALTH CARE EDUCATION/TRAINING PROGRAM

## 2021-03-12 PROCEDURE — 99214 PR OFFICE/OUTPT VISIT, EST, LEVL IV, 30-39 MIN: ICD-10-PCS | Mod: 25,S$PBB,, | Performed by: STUDENT IN AN ORGANIZED HEALTH CARE EDUCATION/TRAINING PROGRAM

## 2021-03-12 PROCEDURE — U0005 INFEC AGEN DETEC AMPLI PROBE: HCPCS | Performed by: INTERNAL MEDICINE

## 2021-03-12 PROCEDURE — U0003 INFECTIOUS AGENT DETECTION BY NUCLEIC ACID (DNA OR RNA); SEVERE ACUTE RESPIRATORY SYNDROME CORONAVIRUS 2 (SARS-COV-2) (CORONAVIRUS DISEASE [COVID-19]), AMPLIFIED PROBE TECHNIQUE, MAKING USE OF HIGH THROUGHPUT TECHNOLOGIES AS DESCRIBED BY CMS-2020-01-R: HCPCS | Performed by: INTERNAL MEDICINE

## 2021-03-12 PROCEDURE — 99999 PR PBB SHADOW E&M-EST. PATIENT-LVL II: ICD-10-PCS | Mod: PBBFAC,,,

## 2021-03-12 PROCEDURE — 99214 OFFICE O/P EST MOD 30 MIN: CPT | Mod: 25,S$PBB,, | Performed by: STUDENT IN AN ORGANIZED HEALTH CARE EDUCATION/TRAINING PROGRAM

## 2021-03-12 PROCEDURE — 99999 PR PBB SHADOW E&M-EST. PATIENT-LVL II: CPT | Mod: PBBFAC,,,

## 2021-03-12 RX ORDER — AMOXICILLIN AND CLAVULANATE POTASSIUM 875; 125 MG/1; MG/1
TABLET, FILM COATED ORAL
COMMUNITY
Start: 2021-03-04 | End: 2021-04-05

## 2021-03-12 RX ORDER — TRIAMCINOLONE ACETONIDE 40 MG/ML
80 INJECTION, SUSPENSION INTRA-ARTICULAR; INTRAMUSCULAR
Status: DISCONTINUED | OUTPATIENT
Start: 2021-03-12 | End: 2021-03-12 | Stop reason: HOSPADM

## 2021-03-12 RX ORDER — KETOROLAC TROMETHAMINE 10 MG/1
10 TABLET, FILM COATED ORAL DAILY PRN
COMMUNITY
Start: 2021-03-09 | End: 2021-05-27

## 2021-03-12 RX ORDER — BENZONATATE 200 MG/1
200 CAPSULE ORAL 3 TIMES DAILY PRN
COMMUNITY
Start: 2021-03-04 | End: 2021-07-02

## 2021-03-12 RX ORDER — LIDOCAINE AND PRILOCAINE 25; 25 MG/G; MG/G
CREAM TOPICAL
COMMUNITY
Start: 2021-02-17

## 2021-03-12 RX ORDER — UBROGEPANT 100 MG/1
TABLET ORAL
COMMUNITY
Start: 2021-02-25 | End: 2021-07-12 | Stop reason: SDUPTHER

## 2021-03-12 RX ADMIN — TRIAMCINOLONE ACETONIDE 80 MG: 200 INJECTION, SUSPENSION INTRA-ARTICULAR; INTRAMUSCULAR at 10:03

## 2021-03-13 LAB — SARS-COV-2 RNA RESP QL NAA+PROBE: NOT DETECTED

## 2021-03-14 ENCOUNTER — PATIENT MESSAGE (OUTPATIENT)
Dept: INTERNAL MEDICINE | Facility: CLINIC | Age: 66
End: 2021-03-14

## 2021-03-15 ENCOUNTER — ANESTHESIA (OUTPATIENT)
Dept: ENDOSCOPY | Facility: HOSPITAL | Age: 66
End: 2021-03-15
Payer: MEDICAID

## 2021-03-15 ENCOUNTER — HOSPITAL ENCOUNTER (OUTPATIENT)
Facility: HOSPITAL | Age: 66
Discharge: HOME | End: 2021-03-15
Attending: INTERNAL MEDICINE | Admitting: INTERNAL MEDICINE
Payer: MEDICAID

## 2021-03-15 VITALS
RESPIRATION RATE: 16 BRPM | WEIGHT: 127.19 LBS | HEIGHT: 62 IN | HEART RATE: 61 BPM | OXYGEN SATURATION: 100 % | SYSTOLIC BLOOD PRESSURE: 133 MMHG | TEMPERATURE: 97 F | BODY MASS INDEX: 23.4 KG/M2 | DIASTOLIC BLOOD PRESSURE: 64 MMHG

## 2021-03-15 DIAGNOSIS — Z12.11 ENCOUNTER FOR SCREENING COLONOSCOPY: ICD-10-CM

## 2021-03-15 DIAGNOSIS — Z80.0 FAMILY HISTORY OF COLON CANCER: Primary | ICD-10-CM

## 2021-03-15 DIAGNOSIS — M17.12 PRIMARY OSTEOARTHRITIS OF LEFT KNEE: ICD-10-CM

## 2021-03-15 PROCEDURE — 45380 PR COLONOSCOPY,BIOPSY: ICD-10-PCS | Mod: 59,,, | Performed by: INTERNAL MEDICINE

## 2021-03-15 PROCEDURE — D9220A PRA ANESTHESIA: ICD-10-PCS | Mod: CRNA,,, | Performed by: NURSE ANESTHETIST, CERTIFIED REGISTERED

## 2021-03-15 PROCEDURE — D9220A PRA ANESTHESIA: Mod: ANES,,, | Performed by: ANESTHESIOLOGY

## 2021-03-15 PROCEDURE — 88305 TISSUE EXAM BY PATHOLOGIST: CPT | Mod: 59 | Performed by: STUDENT IN AN ORGANIZED HEALTH CARE EDUCATION/TRAINING PROGRAM

## 2021-03-15 PROCEDURE — 63600175 PHARM REV CODE 636 W HCPCS: Performed by: NURSE ANESTHETIST, CERTIFIED REGISTERED

## 2021-03-15 PROCEDURE — D9220A PRA ANESTHESIA: Mod: CRNA,,, | Performed by: NURSE ANESTHETIST, CERTIFIED REGISTERED

## 2021-03-15 PROCEDURE — 45385 COLONOSCOPY W/LESION REMOVAL: CPT | Mod: ,,, | Performed by: INTERNAL MEDICINE

## 2021-03-15 PROCEDURE — 88305 TISSUE EXAM BY PATHOLOGIST: CPT | Mod: 26,,, | Performed by: STUDENT IN AN ORGANIZED HEALTH CARE EDUCATION/TRAINING PROGRAM

## 2021-03-15 PROCEDURE — 27201089 HC SNARE, DISP (ANY): Performed by: INTERNAL MEDICINE

## 2021-03-15 PROCEDURE — 27201012 HC FORCEPS, HOT/COLD, DISP: Performed by: INTERNAL MEDICINE

## 2021-03-15 PROCEDURE — 45385 COLONOSCOPY W/LESION REMOVAL: CPT | Performed by: INTERNAL MEDICINE

## 2021-03-15 PROCEDURE — 63600175 PHARM REV CODE 636 W HCPCS: Performed by: INTERNAL MEDICINE

## 2021-03-15 PROCEDURE — 37000008 HC ANESTHESIA 1ST 15 MINUTES: Performed by: INTERNAL MEDICINE

## 2021-03-15 PROCEDURE — 45385 PR COLONOSCOPY,REMV LESN,SNARE: ICD-10-PCS | Mod: ,,, | Performed by: INTERNAL MEDICINE

## 2021-03-15 PROCEDURE — 37000009 HC ANESTHESIA EA ADD 15 MINS: Performed by: INTERNAL MEDICINE

## 2021-03-15 PROCEDURE — 00813 ANES UPR LWR GI NDSC PX: CPT | Performed by: INTERNAL MEDICINE

## 2021-03-15 PROCEDURE — 45380 COLONOSCOPY AND BIOPSY: CPT | Mod: 59,,, | Performed by: INTERNAL MEDICINE

## 2021-03-15 PROCEDURE — D9220A PRA ANESTHESIA: ICD-10-PCS | Mod: ANES,,, | Performed by: ANESTHESIOLOGY

## 2021-03-15 PROCEDURE — 88305 TISSUE EXAM BY PATHOLOGIST: ICD-10-PCS | Mod: 26,,, | Performed by: STUDENT IN AN ORGANIZED HEALTH CARE EDUCATION/TRAINING PROGRAM

## 2021-03-15 PROCEDURE — 45380 COLONOSCOPY AND BIOPSY: CPT | Performed by: INTERNAL MEDICINE

## 2021-03-15 RX ORDER — PROPOFOL 10 MG/ML
INJECTION, EMULSION INTRAVENOUS
Status: DISCONTINUED | OUTPATIENT
Start: 2021-03-15 | End: 2021-03-15

## 2021-03-15 RX ORDER — LIDOCAINE HCL/PF 100 MG/5ML
SYRINGE (ML) INTRAVENOUS
Status: DISCONTINUED | OUTPATIENT
Start: 2021-03-15 | End: 2021-03-15

## 2021-03-15 RX ORDER — SODIUM CHLORIDE 0.9 % (FLUSH) 0.9 %
10 SYRINGE (ML) INJECTION
Status: DISCONTINUED | OUTPATIENT
Start: 2021-03-15 | End: 2021-03-15 | Stop reason: HOSPADM

## 2021-03-15 RX ORDER — SODIUM CHLORIDE, SODIUM LACTATE, POTASSIUM CHLORIDE, CALCIUM CHLORIDE 600; 310; 30; 20 MG/100ML; MG/100ML; MG/100ML; MG/100ML
INJECTION, SOLUTION INTRAVENOUS CONTINUOUS
Status: DISCONTINUED | OUTPATIENT
Start: 2021-03-15 | End: 2021-03-15 | Stop reason: HOSPADM

## 2021-03-15 RX ADMIN — PROPOFOL 50 MG: 10 INJECTION, EMULSION INTRAVENOUS at 11:03

## 2021-03-15 RX ADMIN — SODIUM CHLORIDE, SODIUM LACTATE, POTASSIUM CHLORIDE, AND CALCIUM CHLORIDE: 600; 310; 30; 20 INJECTION, SOLUTION INTRAVENOUS at 10:03

## 2021-03-15 RX ADMIN — Medication 50 MG: at 10:03

## 2021-03-15 RX ADMIN — PROPOFOL 50 MG: 10 INJECTION, EMULSION INTRAVENOUS at 10:03

## 2021-03-17 ENCOUNTER — OFFICE VISIT (OUTPATIENT)
Dept: PSYCHIATRY | Facility: CLINIC | Age: 66
End: 2021-03-17
Payer: MEDICAID

## 2021-03-17 DIAGNOSIS — F33.0 MILD EPISODE OF RECURRENT MAJOR DEPRESSIVE DISORDER: ICD-10-CM

## 2021-03-17 DIAGNOSIS — F41.1 GAD (GENERALIZED ANXIETY DISORDER): Primary | ICD-10-CM

## 2021-03-17 LAB
FINAL PATHOLOGIC DIAGNOSIS: NORMAL
GROSS: NORMAL
Lab: NORMAL

## 2021-03-17 PROCEDURE — 90832 PR PSYCHOTHERAPY W/PATIENT, 30 MIN: ICD-10-PCS | Mod: 95,AJ,HB, | Performed by: SOCIAL WORKER

## 2021-03-17 PROCEDURE — 90832 PSYTX W PT 30 MINUTES: CPT | Mod: 95,AJ,HB, | Performed by: SOCIAL WORKER

## 2021-03-18 ENCOUNTER — OFFICE VISIT (OUTPATIENT)
Dept: UROLOGY | Facility: CLINIC | Age: 66
End: 2021-03-18
Payer: MEDICAID

## 2021-03-18 VITALS
WEIGHT: 131.38 LBS | DIASTOLIC BLOOD PRESSURE: 72 MMHG | SYSTOLIC BLOOD PRESSURE: 128 MMHG | BODY MASS INDEX: 24.03 KG/M2

## 2021-03-18 DIAGNOSIS — N28.1 BILATERAL RENAL CYSTS: Primary | ICD-10-CM

## 2021-03-18 PROCEDURE — 99999 PR PBB SHADOW E&M-EST. PATIENT-LVL IV: CPT | Mod: PBBFAC,,, | Performed by: UROLOGY

## 2021-03-18 PROCEDURE — 99214 OFFICE O/P EST MOD 30 MIN: CPT | Mod: PBBFAC | Performed by: UROLOGY

## 2021-03-18 PROCEDURE — 99203 OFFICE O/P NEW LOW 30 MIN: CPT | Mod: S$PBB,,, | Performed by: UROLOGY

## 2021-03-18 PROCEDURE — 99203 PR OFFICE/OUTPT VISIT, NEW, LEVL III, 30-44 MIN: ICD-10-PCS | Mod: S$PBB,,, | Performed by: UROLOGY

## 2021-03-18 PROCEDURE — 99999 PR PBB SHADOW E&M-EST. PATIENT-LVL IV: ICD-10-PCS | Mod: PBBFAC,,, | Performed by: UROLOGY

## 2021-03-19 ENCOUNTER — LAB VISIT (OUTPATIENT)
Dept: LAB | Facility: HOSPITAL | Age: 66
End: 2021-03-19
Attending: UROLOGY
Payer: MEDICAID

## 2021-03-19 ENCOUNTER — CLINICAL SUPPORT (OUTPATIENT)
Dept: REHABILITATION | Facility: HOSPITAL | Age: 66
End: 2021-03-19
Payer: MEDICAID

## 2021-03-19 DIAGNOSIS — R53.1 WEAKNESS: ICD-10-CM

## 2021-03-19 DIAGNOSIS — M25.60 DECREASED RANGE OF MOTION: ICD-10-CM

## 2021-03-19 DIAGNOSIS — N28.1 BILATERAL RENAL CYSTS: ICD-10-CM

## 2021-03-19 DIAGNOSIS — R53.1 DECREASED RANGE OF MOTION WITH DECREASED STRENGTH: ICD-10-CM

## 2021-03-19 DIAGNOSIS — M25.60 DECREASED RANGE OF MOTION WITH DECREASED STRENGTH: ICD-10-CM

## 2021-03-19 DIAGNOSIS — R26.89 BALANCE PROBLEM: ICD-10-CM

## 2021-03-19 LAB
CREAT SERPL-MCNC: 1.7 MG/DL (ref 0.5–1.4)
EST. GFR  (AFRICAN AMERICAN): 36.5 ML/MIN/1.73 M^2
EST. GFR  (NON AFRICAN AMERICAN): 31.6 ML/MIN/1.73 M^2

## 2021-03-19 PROCEDURE — 36415 COLL VENOUS BLD VENIPUNCTURE: CPT | Mod: PO | Performed by: UROLOGY

## 2021-03-19 PROCEDURE — 97140 MANUAL THERAPY 1/> REGIONS: CPT

## 2021-03-19 PROCEDURE — 97110 THERAPEUTIC EXERCISES: CPT

## 2021-03-19 PROCEDURE — 82565 ASSAY OF CREATININE: CPT | Mod: PO | Performed by: UROLOGY

## 2021-03-25 ENCOUNTER — PATIENT MESSAGE (OUTPATIENT)
Dept: REHABILITATION | Facility: HOSPITAL | Age: 66
End: 2021-03-25

## 2021-03-28 ENCOUNTER — PATIENT MESSAGE (OUTPATIENT)
Dept: PSYCHIATRY | Facility: CLINIC | Age: 66
End: 2021-03-28

## 2021-03-29 ENCOUNTER — PATIENT MESSAGE (OUTPATIENT)
Dept: PSYCHIATRY | Facility: CLINIC | Age: 66
End: 2021-03-29

## 2021-03-29 ENCOUNTER — DOCUMENTATION ONLY (OUTPATIENT)
Dept: REHABILITATION | Facility: HOSPITAL | Age: 66
End: 2021-03-29

## 2021-03-29 ENCOUNTER — OFFICE VISIT (OUTPATIENT)
Dept: PSYCHIATRY | Facility: CLINIC | Age: 66
End: 2021-03-29
Payer: MEDICAID

## 2021-03-29 DIAGNOSIS — R53.1 WEAKNESS: Primary | ICD-10-CM

## 2021-03-29 DIAGNOSIS — M25.60 DECREASED RANGE OF MOTION: ICD-10-CM

## 2021-03-29 DIAGNOSIS — M25.60 DECREASED RANGE OF MOTION WITH DECREASED STRENGTH: ICD-10-CM

## 2021-03-29 DIAGNOSIS — R53.1 DECREASED RANGE OF MOTION WITH DECREASED STRENGTH: ICD-10-CM

## 2021-03-29 DIAGNOSIS — F33.1 MODERATE EPISODE OF RECURRENT MAJOR DEPRESSIVE DISORDER: Primary | ICD-10-CM

## 2021-03-29 DIAGNOSIS — R26.89 BALANCE PROBLEM: ICD-10-CM

## 2021-03-29 DIAGNOSIS — F41.1 GAD (GENERALIZED ANXIETY DISORDER): ICD-10-CM

## 2021-03-29 PROCEDURE — 99214 PR OFFICE/OUTPT VISIT, EST, LEVL IV, 30-39 MIN: ICD-10-PCS | Mod: 95,AF,HB, | Performed by: PSYCHIATRY & NEUROLOGY

## 2021-03-29 PROCEDURE — 99214 OFFICE O/P EST MOD 30 MIN: CPT | Mod: 95,AF,HB, | Performed by: PSYCHIATRY & NEUROLOGY

## 2021-03-29 RX ORDER — BUSPIRONE HYDROCHLORIDE 30 MG/1
30 TABLET ORAL 2 TIMES DAILY
Qty: 60 TABLET | Refills: 2 | Status: SHIPPED | OUTPATIENT
Start: 2021-03-29 | End: 2021-05-17 | Stop reason: SDUPTHER

## 2021-03-31 ENCOUNTER — PATIENT MESSAGE (OUTPATIENT)
Dept: PSYCHIATRY | Facility: CLINIC | Age: 66
End: 2021-03-31

## 2021-03-31 DIAGNOSIS — N18.30 STAGE 3 CHRONIC KIDNEY DISEASE, UNSPECIFIED WHETHER STAGE 3A OR 3B CKD: Primary | ICD-10-CM

## 2021-04-01 ENCOUNTER — HOSPITAL ENCOUNTER (OUTPATIENT)
Dept: RADIOLOGY | Facility: HOSPITAL | Age: 66
Discharge: HOME | End: 2021-04-01
Attending: UROLOGY
Payer: MEDICAID

## 2021-04-01 DIAGNOSIS — N28.1 BILATERAL RENAL CYSTS: ICD-10-CM

## 2021-04-01 PROCEDURE — 25500020 PHARM REV CODE 255: Mod: PO | Performed by: UROLOGY

## 2021-04-01 PROCEDURE — 74170 CT ABD WO CNTRST FLWD CNTRST: CPT | Mod: 26,,, | Performed by: RADIOLOGY

## 2021-04-01 PROCEDURE — 74170 CT ABD WO CNTRST FLWD CNTRST: CPT | Mod: TC,PO

## 2021-04-01 PROCEDURE — 74170 CT ABDOMEN W WO CONTRAST: ICD-10-PCS | Mod: 26,,, | Performed by: RADIOLOGY

## 2021-04-01 RX ADMIN — IOHEXOL 75 ML: 350 INJECTION, SOLUTION INTRAVENOUS at 12:04

## 2021-04-05 ENCOUNTER — OFFICE VISIT (OUTPATIENT)
Dept: NEPHROLOGY | Facility: CLINIC | Age: 66
End: 2021-04-05
Payer: MEDICAID

## 2021-04-05 ENCOUNTER — HOSPITAL ENCOUNTER (OUTPATIENT)
Dept: RADIOLOGY | Facility: HOSPITAL | Age: 66
Discharge: HOME | End: 2021-04-05
Attending: FAMILY MEDICINE
Payer: MEDICAID

## 2021-04-05 ENCOUNTER — TELEPHONE (OUTPATIENT)
Dept: NEPHROLOGY | Facility: CLINIC | Age: 66
End: 2021-04-05

## 2021-04-05 VITALS — WEIGHT: 131.38 LBS | BODY MASS INDEX: 24.18 KG/M2 | HEIGHT: 62 IN

## 2021-04-05 DIAGNOSIS — Z12.31 OTHER SCREENING MAMMOGRAM: ICD-10-CM

## 2021-04-05 DIAGNOSIS — E87.6 HYPOKALEMIA: ICD-10-CM

## 2021-04-05 DIAGNOSIS — N28.1 COMPLEX RENAL CYST: ICD-10-CM

## 2021-04-05 DIAGNOSIS — R60.9 EDEMA, UNSPECIFIED TYPE: ICD-10-CM

## 2021-04-05 DIAGNOSIS — M10.9 GOUT, UNSPECIFIED CAUSE, UNSPECIFIED CHRONICITY, UNSPECIFIED SITE: ICD-10-CM

## 2021-04-05 DIAGNOSIS — N18.32 STAGE 3B CHRONIC KIDNEY DISEASE: Primary | ICD-10-CM

## 2021-04-05 PROCEDURE — 77067 MAMMO DIGITAL SCREENING BILAT WITH TOMO: ICD-10-PCS | Mod: 26,,, | Performed by: RADIOLOGY

## 2021-04-05 PROCEDURE — 77063 BREAST TOMOSYNTHESIS BI: CPT | Mod: 26,,, | Performed by: RADIOLOGY

## 2021-04-05 PROCEDURE — 77063 MAMMO DIGITAL SCREENING BILAT WITH TOMO: ICD-10-PCS | Mod: 26,,, | Performed by: RADIOLOGY

## 2021-04-05 PROCEDURE — 99215 PR OFFICE/OUTPT VISIT, EST, LEVL V, 40-54 MIN: ICD-10-PCS | Mod: 95,,, | Performed by: NURSE PRACTITIONER

## 2021-04-05 PROCEDURE — 77067 SCR MAMMO BI INCL CAD: CPT | Mod: TC,PO

## 2021-04-05 PROCEDURE — 99215 OFFICE O/P EST HI 40 MIN: CPT | Mod: 95,,, | Performed by: NURSE PRACTITIONER

## 2021-04-05 PROCEDURE — 77067 SCR MAMMO BI INCL CAD: CPT | Mod: 26,,, | Performed by: RADIOLOGY

## 2021-04-05 RX ORDER — POTASSIUM CHLORIDE 20 MEQ/1
20 TABLET, EXTENDED RELEASE ORAL ONCE
Qty: 90 TABLET | Refills: 3 | Status: SHIPPED | OUTPATIENT
Start: 2021-04-05 | End: 2021-04-05

## 2021-04-12 ENCOUNTER — OFFICE VISIT (OUTPATIENT)
Dept: ORTHOPEDICS | Facility: CLINIC | Age: 66
End: 2021-04-12
Payer: MEDICAID

## 2021-04-12 VITALS
BODY MASS INDEX: 24.11 KG/M2 | HEART RATE: 92 BPM | WEIGHT: 131 LBS | HEIGHT: 62 IN | SYSTOLIC BLOOD PRESSURE: 120 MMHG | DIASTOLIC BLOOD PRESSURE: 71 MMHG

## 2021-04-12 DIAGNOSIS — M17.11 ARTHRITIS OF KNEE, RIGHT: ICD-10-CM

## 2021-04-12 DIAGNOSIS — M21.162 ACQUIRED VARUS DEFORMITY KNEE, LEFT: ICD-10-CM

## 2021-04-12 DIAGNOSIS — M17.12 ARTHRITIS OF KNEE, LEFT: Primary | ICD-10-CM

## 2021-04-12 PROCEDURE — 99999 PR PBB SHADOW E&M-EST. PATIENT-LVL IV: ICD-10-PCS | Mod: PBBFAC,,, | Performed by: ORTHOPAEDIC SURGERY

## 2021-04-12 PROCEDURE — 20610 DRAIN/INJ JOINT/BURSA W/O US: CPT | Mod: PBBFAC | Performed by: ORTHOPAEDIC SURGERY

## 2021-04-12 PROCEDURE — 99214 OFFICE O/P EST MOD 30 MIN: CPT | Mod: PBBFAC,25 | Performed by: ORTHOPAEDIC SURGERY

## 2021-04-12 PROCEDURE — 99213 OFFICE O/P EST LOW 20 MIN: CPT | Mod: 25,S$PBB,, | Performed by: ORTHOPAEDIC SURGERY

## 2021-04-12 PROCEDURE — 99213 PR OFFICE/OUTPT VISIT, EST, LEVL III, 20-29 MIN: ICD-10-PCS | Mod: 25,S$PBB,, | Performed by: ORTHOPAEDIC SURGERY

## 2021-04-12 PROCEDURE — 20610 LARGE JOINT ASPIRATION/INJECTION: L KNEE: ICD-10-PCS | Mod: S$PBB,LT,, | Performed by: ORTHOPAEDIC SURGERY

## 2021-04-12 PROCEDURE — 99999 PR PBB SHADOW E&M-EST. PATIENT-LVL IV: CPT | Mod: PBBFAC,,, | Performed by: ORTHOPAEDIC SURGERY

## 2021-04-12 RX ADMIN — Medication 48 MG: at 04:04

## 2021-04-13 ENCOUNTER — OFFICE VISIT (OUTPATIENT)
Dept: OTOLARYNGOLOGY | Facility: CLINIC | Age: 66
End: 2021-04-13
Payer: MEDICAID

## 2021-04-13 ENCOUNTER — CLINICAL SUPPORT (OUTPATIENT)
Dept: REHABILITATION | Facility: HOSPITAL | Age: 66
End: 2021-04-13
Payer: MEDICAID

## 2021-04-13 VITALS
SYSTOLIC BLOOD PRESSURE: 111 MMHG | TEMPERATURE: 98 F | HEART RATE: 71 BPM | BODY MASS INDEX: 23.27 KG/M2 | DIASTOLIC BLOOD PRESSURE: 70 MMHG | WEIGHT: 127.19 LBS

## 2021-04-13 DIAGNOSIS — R53.1 DECREASED RANGE OF MOTION WITH DECREASED STRENGTH: ICD-10-CM

## 2021-04-13 DIAGNOSIS — H81.01 MENIERE'S DISEASE OF RIGHT EAR: ICD-10-CM

## 2021-04-13 DIAGNOSIS — R26.89 IMBALANCE: Primary | ICD-10-CM

## 2021-04-13 DIAGNOSIS — G43.809 OTHER MIGRAINE WITHOUT STATUS MIGRAINOSUS, NOT INTRACTABLE: ICD-10-CM

## 2021-04-13 DIAGNOSIS — R42 DIZZINESS: ICD-10-CM

## 2021-04-13 DIAGNOSIS — R26.89 BALANCE PROBLEM: ICD-10-CM

## 2021-04-13 DIAGNOSIS — R53.1 WEAKNESS: ICD-10-CM

## 2021-04-13 DIAGNOSIS — M25.60 DECREASED RANGE OF MOTION WITH DECREASED STRENGTH: ICD-10-CM

## 2021-04-13 DIAGNOSIS — Z82.0 FAMILY HISTORY OF PARKINSON'S DISEASE: ICD-10-CM

## 2021-04-13 DIAGNOSIS — M25.60 DECREASED RANGE OF MOTION: ICD-10-CM

## 2021-04-13 PROCEDURE — 99999 PR PBB SHADOW E&M-EST. PATIENT-LVL IV: ICD-10-PCS | Mod: PBBFAC,,, | Performed by: ORTHOPAEDIC SURGERY

## 2021-04-13 PROCEDURE — 99214 OFFICE O/P EST MOD 30 MIN: CPT | Mod: S$PBB,,, | Performed by: ORTHOPAEDIC SURGERY

## 2021-04-13 PROCEDURE — 99214 PR OFFICE/OUTPT VISIT, EST, LEVL IV, 30-39 MIN: ICD-10-PCS | Mod: S$PBB,,, | Performed by: ORTHOPAEDIC SURGERY

## 2021-04-13 PROCEDURE — 99214 OFFICE O/P EST MOD 30 MIN: CPT | Mod: PBBFAC | Performed by: ORTHOPAEDIC SURGERY

## 2021-04-13 PROCEDURE — 99999 PR PBB SHADOW E&M-EST. PATIENT-LVL IV: CPT | Mod: PBBFAC,,, | Performed by: ORTHOPAEDIC SURGERY

## 2021-04-13 PROCEDURE — 97110 THERAPEUTIC EXERCISES: CPT | Mod: CQ

## 2021-04-14 ENCOUNTER — PATIENT MESSAGE (OUTPATIENT)
Dept: PSYCHIATRY | Facility: CLINIC | Age: 66
End: 2021-04-14

## 2021-04-15 ENCOUNTER — TELEPHONE (OUTPATIENT)
Dept: UROLOGY | Facility: CLINIC | Age: 66
End: 2021-04-15

## 2021-04-15 ENCOUNTER — PATIENT MESSAGE (OUTPATIENT)
Dept: NEUROLOGY | Facility: CLINIC | Age: 66
End: 2021-04-15

## 2021-04-15 ENCOUNTER — PATIENT MESSAGE (OUTPATIENT)
Dept: PSYCHIATRY | Facility: CLINIC | Age: 66
End: 2021-04-15

## 2021-04-19 ENCOUNTER — OFFICE VISIT (OUTPATIENT)
Dept: UROLOGY | Facility: CLINIC | Age: 66
End: 2021-04-19
Payer: MEDICAID

## 2021-04-19 ENCOUNTER — PATIENT MESSAGE (OUTPATIENT)
Dept: NEPHROLOGY | Facility: CLINIC | Age: 66
End: 2021-04-19

## 2021-04-19 ENCOUNTER — CLINICAL SUPPORT (OUTPATIENT)
Dept: REHABILITATION | Facility: HOSPITAL | Age: 66
End: 2021-04-19
Payer: MEDICAID

## 2021-04-19 ENCOUNTER — LAB VISIT (OUTPATIENT)
Dept: LAB | Facility: HOSPITAL | Age: 66
End: 2021-04-19
Attending: INTERNAL MEDICINE
Payer: MEDICAID

## 2021-04-19 VITALS
DIASTOLIC BLOOD PRESSURE: 68 MMHG | WEIGHT: 127.19 LBS | BODY MASS INDEX: 23.27 KG/M2 | SYSTOLIC BLOOD PRESSURE: 126 MMHG

## 2021-04-19 DIAGNOSIS — R53.1 WEAKNESS: ICD-10-CM

## 2021-04-19 DIAGNOSIS — N28.1 BILATERAL RENAL CYSTS: Primary | ICD-10-CM

## 2021-04-19 DIAGNOSIS — M25.60 DECREASED RANGE OF MOTION WITH DECREASED STRENGTH: ICD-10-CM

## 2021-04-19 DIAGNOSIS — M25.60 DECREASED RANGE OF MOTION: ICD-10-CM

## 2021-04-19 DIAGNOSIS — R26.89 BALANCE PROBLEM: ICD-10-CM

## 2021-04-19 DIAGNOSIS — E87.6 HYPOKALEMIA: ICD-10-CM

## 2021-04-19 DIAGNOSIS — R53.1 DECREASED RANGE OF MOTION WITH DECREASED STRENGTH: ICD-10-CM

## 2021-04-19 LAB — POTASSIUM SERPL-SCNC: 3.5 MMOL/L (ref 3.5–5.1)

## 2021-04-19 PROCEDURE — 36415 COLL VENOUS BLD VENIPUNCTURE: CPT | Mod: PO | Performed by: NURSE PRACTITIONER

## 2021-04-19 PROCEDURE — 97110 THERAPEUTIC EXERCISES: CPT

## 2021-04-19 PROCEDURE — 99214 OFFICE O/P EST MOD 30 MIN: CPT | Mod: PBBFAC | Performed by: UROLOGY

## 2021-04-19 PROCEDURE — 99213 OFFICE O/P EST LOW 20 MIN: CPT | Mod: S$PBB,,, | Performed by: UROLOGY

## 2021-04-19 PROCEDURE — 99999 PR PBB SHADOW E&M-EST. PATIENT-LVL IV: ICD-10-PCS | Mod: PBBFAC,,, | Performed by: UROLOGY

## 2021-04-19 PROCEDURE — 99999 PR PBB SHADOW E&M-EST. PATIENT-LVL IV: CPT | Mod: PBBFAC,,, | Performed by: UROLOGY

## 2021-04-19 PROCEDURE — 99213 PR OFFICE/OUTPT VISIT, EST, LEVL III, 20-29 MIN: ICD-10-PCS | Mod: S$PBB,,, | Performed by: UROLOGY

## 2021-04-19 PROCEDURE — 84132 ASSAY OF SERUM POTASSIUM: CPT | Mod: PO | Performed by: NURSE PRACTITIONER

## 2021-04-19 RX ORDER — POTASSIUM CHLORIDE 20 MEQ/1
TABLET, EXTENDED RELEASE ORAL
COMMUNITY
Start: 2021-04-05 | End: 2021-08-24

## 2021-04-20 ENCOUNTER — PATIENT MESSAGE (OUTPATIENT)
Dept: REHABILITATION | Facility: HOSPITAL | Age: 66
End: 2021-04-20

## 2021-04-22 ENCOUNTER — PATIENT MESSAGE (OUTPATIENT)
Dept: INTERNAL MEDICINE | Facility: CLINIC | Age: 66
End: 2021-04-22

## 2021-04-22 DIAGNOSIS — E34.9 HORMONE IMBALANCE: Primary | ICD-10-CM

## 2021-04-26 ENCOUNTER — TELEPHONE (OUTPATIENT)
Dept: INTERNAL MEDICINE | Facility: CLINIC | Age: 66
End: 2021-04-26

## 2021-04-26 ENCOUNTER — OFFICE VISIT (OUTPATIENT)
Dept: NEUROLOGY | Facility: CLINIC | Age: 66
End: 2021-04-26
Payer: MEDICAID

## 2021-04-26 VITALS — BODY MASS INDEX: 23.27 KG/M2 | HEIGHT: 62 IN

## 2021-04-26 DIAGNOSIS — H81.01 MENIERE'S DISEASE OF RIGHT EAR: ICD-10-CM

## 2021-04-26 DIAGNOSIS — R25.9 ABNORMAL INVOLUNTARY MOVEMENTS: ICD-10-CM

## 2021-04-26 DIAGNOSIS — J40 BRONCHITIS: ICD-10-CM

## 2021-04-26 DIAGNOSIS — M17.12 PRIMARY OSTEOARTHRITIS OF LEFT KNEE: ICD-10-CM

## 2021-04-26 DIAGNOSIS — N18.30 STAGE 3 CHRONIC KIDNEY DISEASE, UNSPECIFIED WHETHER STAGE 3A OR 3B CKD: ICD-10-CM

## 2021-04-26 DIAGNOSIS — F32.A DEPRESSION, UNSPECIFIED DEPRESSION TYPE: ICD-10-CM

## 2021-04-26 DIAGNOSIS — F43.21 COMPLICATED GRIEF: ICD-10-CM

## 2021-04-26 DIAGNOSIS — M25.60 DECREASED RANGE OF MOTION: ICD-10-CM

## 2021-04-26 DIAGNOSIS — G43.809 VESTIBULAR MIGRAINE: Primary | ICD-10-CM

## 2021-04-26 DIAGNOSIS — N90.3 VULVAR DYSPLASIA: ICD-10-CM

## 2021-04-26 DIAGNOSIS — Z87.891 HISTORY OF TOBACCO ABUSE: ICD-10-CM

## 2021-04-26 DIAGNOSIS — M25.60 DECREASED RANGE OF MOTION WITH DECREASED STRENGTH: ICD-10-CM

## 2021-04-26 DIAGNOSIS — L60.0 INGROWN TOENAIL OF LEFT FOOT: ICD-10-CM

## 2021-04-26 DIAGNOSIS — J01.10 ACUTE NON-RECURRENT FRONTAL SINUSITIS: ICD-10-CM

## 2021-04-26 DIAGNOSIS — M10.9 ACUTE GOUT OF RIGHT FOOT, UNSPECIFIED CAUSE: ICD-10-CM

## 2021-04-26 DIAGNOSIS — J30.89 NON-SEASONAL ALLERGIC RHINITIS, UNSPECIFIED TRIGGER: ICD-10-CM

## 2021-04-26 DIAGNOSIS — D07.1 SEVERE DYSPLASIA OF VULVA: ICD-10-CM

## 2021-04-26 DIAGNOSIS — R53.1 DECREASED RANGE OF MOTION WITH DECREASED STRENGTH: ICD-10-CM

## 2021-04-26 DIAGNOSIS — R26.89 BALANCE PROBLEM: ICD-10-CM

## 2021-04-26 DIAGNOSIS — L84 FOOT CALLUS: ICD-10-CM

## 2021-04-26 DIAGNOSIS — H10.31 ACUTE CONJUNCTIVITIS OF RIGHT EYE, UNSPECIFIED ACUTE CONJUNCTIVITIS TYPE: ICD-10-CM

## 2021-04-26 DIAGNOSIS — E78.5 HYPERLIPIDEMIA, UNSPECIFIED HYPERLIPIDEMIA TYPE: ICD-10-CM

## 2021-04-26 DIAGNOSIS — G44.89 CHRONIC MIXED HEADACHE SYNDROME: ICD-10-CM

## 2021-04-26 DIAGNOSIS — Z72.0 TOBACCO USE: ICD-10-CM

## 2021-04-26 DIAGNOSIS — G43.709 CHRONIC MIGRAINE WITHOUT AURA WITHOUT STATUS MIGRAINOSUS, NOT INTRACTABLE: ICD-10-CM

## 2021-04-26 DIAGNOSIS — L30.9 DERMATITIS OF FACE: ICD-10-CM

## 2021-04-26 DIAGNOSIS — R53.1 WEAKNESS: ICD-10-CM

## 2021-04-26 DIAGNOSIS — K58.9 IRRITABLE BOWEL SYNDROME WITHOUT DIARRHEA: ICD-10-CM

## 2021-04-26 DIAGNOSIS — Z80.0 FAMILY HISTORY OF COLON CANCER: ICD-10-CM

## 2021-04-26 DIAGNOSIS — Z12.11 ENCOUNTER FOR SCREENING COLONOSCOPY: ICD-10-CM

## 2021-04-26 DIAGNOSIS — N28.1 BILATERAL RENAL CYSTS: ICD-10-CM

## 2021-04-26 PROCEDURE — 99214 OFFICE O/P EST MOD 30 MIN: CPT | Mod: 95,,, | Performed by: NURSE PRACTITIONER

## 2021-04-26 PROCEDURE — 99214 PR OFFICE/OUTPT VISIT, EST, LEVL IV, 30-39 MIN: ICD-10-PCS | Mod: 95,,, | Performed by: NURSE PRACTITIONER

## 2021-04-28 ENCOUNTER — TELEPHONE (OUTPATIENT)
Dept: OBSTETRICS AND GYNECOLOGY | Facility: CLINIC | Age: 66
End: 2021-04-28

## 2021-04-28 ENCOUNTER — OFFICE VISIT (OUTPATIENT)
Dept: PSYCHIATRY | Facility: CLINIC | Age: 66
End: 2021-04-28
Payer: MEDICAID

## 2021-04-28 ENCOUNTER — PATIENT MESSAGE (OUTPATIENT)
Dept: PSYCHIATRY | Facility: CLINIC | Age: 66
End: 2021-04-28

## 2021-04-28 ENCOUNTER — TELEPHONE (OUTPATIENT)
Dept: INTERNAL MEDICINE | Facility: CLINIC | Age: 66
End: 2021-04-28

## 2021-04-28 DIAGNOSIS — F33.1 MODERATE EPISODE OF RECURRENT MAJOR DEPRESSIVE DISORDER: Primary | ICD-10-CM

## 2021-04-28 DIAGNOSIS — F41.1 GAD (GENERALIZED ANXIETY DISORDER): ICD-10-CM

## 2021-04-28 PROCEDURE — 90834 PR PSYCHOTHERAPY W/PATIENT, 45 MIN: ICD-10-PCS | Mod: AJ,HB,95, | Performed by: SOCIAL WORKER

## 2021-04-28 PROCEDURE — 90834 PSYTX W PT 45 MINUTES: CPT | Mod: AJ,HB,95, | Performed by: SOCIAL WORKER

## 2021-04-29 ENCOUNTER — PATIENT MESSAGE (OUTPATIENT)
Dept: REHABILITATION | Facility: HOSPITAL | Age: 66
End: 2021-04-29

## 2021-04-29 ENCOUNTER — PATIENT MESSAGE (OUTPATIENT)
Dept: NEUROLOGY | Facility: CLINIC | Age: 66
End: 2021-04-29

## 2021-05-03 ENCOUNTER — PATIENT MESSAGE (OUTPATIENT)
Dept: PSYCHIATRY | Facility: CLINIC | Age: 66
End: 2021-05-03

## 2021-05-03 ENCOUNTER — CLINICAL SUPPORT (OUTPATIENT)
Dept: REHABILITATION | Facility: HOSPITAL | Age: 66
End: 2021-05-03
Payer: MEDICAID

## 2021-05-03 DIAGNOSIS — R53.1 DECREASED RANGE OF MOTION WITH DECREASED STRENGTH: ICD-10-CM

## 2021-05-03 DIAGNOSIS — R53.1 WEAKNESS: ICD-10-CM

## 2021-05-03 DIAGNOSIS — R26.89 BALANCE PROBLEM: ICD-10-CM

## 2021-05-03 DIAGNOSIS — M25.60 DECREASED RANGE OF MOTION: ICD-10-CM

## 2021-05-03 DIAGNOSIS — M25.60 DECREASED RANGE OF MOTION WITH DECREASED STRENGTH: ICD-10-CM

## 2021-05-03 PROCEDURE — 97140 MANUAL THERAPY 1/> REGIONS: CPT

## 2021-05-03 PROCEDURE — 97110 THERAPEUTIC EXERCISES: CPT

## 2021-05-05 ENCOUNTER — PATIENT MESSAGE (OUTPATIENT)
Dept: PSYCHIATRY | Facility: CLINIC | Age: 66
End: 2021-05-05

## 2021-05-05 ENCOUNTER — CLINICAL SUPPORT (OUTPATIENT)
Dept: REHABILITATION | Facility: HOSPITAL | Age: 66
End: 2021-05-05
Payer: MEDICAID

## 2021-05-05 DIAGNOSIS — M25.60 DECREASED RANGE OF MOTION WITH DECREASED STRENGTH: ICD-10-CM

## 2021-05-05 DIAGNOSIS — M25.60 DECREASED RANGE OF MOTION: ICD-10-CM

## 2021-05-05 DIAGNOSIS — R53.1 WEAKNESS: ICD-10-CM

## 2021-05-05 DIAGNOSIS — R26.89 BALANCE PROBLEM: ICD-10-CM

## 2021-05-05 DIAGNOSIS — R53.1 DECREASED RANGE OF MOTION WITH DECREASED STRENGTH: ICD-10-CM

## 2021-05-05 PROCEDURE — 97110 THERAPEUTIC EXERCISES: CPT

## 2021-05-05 RX ORDER — ZOLPIDEM TARTRATE 5 MG/1
TABLET ORAL
Qty: 30 TABLET | Refills: 1 | Status: SHIPPED | OUTPATIENT
Start: 2021-05-05 | End: 2021-05-17 | Stop reason: SDUPTHER

## 2021-05-10 ENCOUNTER — CLINICAL SUPPORT (OUTPATIENT)
Dept: REHABILITATION | Facility: HOSPITAL | Age: 66
End: 2021-05-10
Payer: MEDICAID

## 2021-05-10 ENCOUNTER — OFFICE VISIT (OUTPATIENT)
Dept: OBSTETRICS AND GYNECOLOGY | Facility: CLINIC | Age: 66
End: 2021-05-10
Payer: MEDICAID

## 2021-05-10 VITALS
SYSTOLIC BLOOD PRESSURE: 94 MMHG | WEIGHT: 128.5 LBS | DIASTOLIC BLOOD PRESSURE: 66 MMHG | BODY MASS INDEX: 23.65 KG/M2 | HEIGHT: 62 IN

## 2021-05-10 DIAGNOSIS — N95.1 MENOPAUSE SYNDROME: Primary | ICD-10-CM

## 2021-05-10 DIAGNOSIS — M25.60 DECREASED RANGE OF MOTION WITH DECREASED STRENGTH: ICD-10-CM

## 2021-05-10 DIAGNOSIS — R26.89 BALANCE PROBLEM: ICD-10-CM

## 2021-05-10 DIAGNOSIS — R53.1 DECREASED RANGE OF MOTION WITH DECREASED STRENGTH: ICD-10-CM

## 2021-05-10 DIAGNOSIS — M25.60 DECREASED RANGE OF MOTION: ICD-10-CM

## 2021-05-10 DIAGNOSIS — N95.2 ATROPHIC VAGINITIS: ICD-10-CM

## 2021-05-10 DIAGNOSIS — R53.1 WEAKNESS: ICD-10-CM

## 2021-05-10 DIAGNOSIS — E34.9 HORMONE IMBALANCE: ICD-10-CM

## 2021-05-10 PROCEDURE — 99214 PR OFFICE/OUTPT VISIT, EST, LEVL IV, 30-39 MIN: ICD-10-PCS | Mod: S$PBB,,, | Performed by: OBSTETRICS & GYNECOLOGY

## 2021-05-10 PROCEDURE — 99999 PR PBB SHADOW E&M-EST. PATIENT-LVL V: ICD-10-PCS | Mod: PBBFAC,,, | Performed by: OBSTETRICS & GYNECOLOGY

## 2021-05-10 PROCEDURE — 99215 OFFICE O/P EST HI 40 MIN: CPT | Mod: PBBFAC | Performed by: OBSTETRICS & GYNECOLOGY

## 2021-05-10 PROCEDURE — 99214 OFFICE O/P EST MOD 30 MIN: CPT | Mod: S$PBB,,, | Performed by: OBSTETRICS & GYNECOLOGY

## 2021-05-10 PROCEDURE — 97110 THERAPEUTIC EXERCISES: CPT

## 2021-05-10 PROCEDURE — 99999 PR PBB SHADOW E&M-EST. PATIENT-LVL V: CPT | Mod: PBBFAC,,, | Performed by: OBSTETRICS & GYNECOLOGY

## 2021-05-10 RX ORDER — MAGNESIUM 30 MG
TABLET ORAL ONCE
COMMUNITY

## 2021-05-10 RX ORDER — ZINC GLUCONATE 50 MG
50 TABLET ORAL DAILY
COMMUNITY

## 2021-05-10 RX ORDER — UBIDECARENONE 30 MG
30 CAPSULE ORAL 3 TIMES DAILY
COMMUNITY

## 2021-05-12 ENCOUNTER — PATIENT MESSAGE (OUTPATIENT)
Dept: REHABILITATION | Facility: HOSPITAL | Age: 66
End: 2021-05-12

## 2021-05-12 DIAGNOSIS — F43.21 COMPLICATED GRIEF: ICD-10-CM

## 2021-05-13 RX ORDER — DIAZEPAM 2 MG/1
2 TABLET ORAL EVERY 6 HOURS PRN
Qty: 60 TABLET | Refills: 0 | Status: SHIPPED | OUTPATIENT
Start: 2021-05-13 | End: 2021-05-17 | Stop reason: SDUPTHER

## 2021-05-16 ENCOUNTER — PATIENT MESSAGE (OUTPATIENT)
Dept: REHABILITATION | Facility: HOSPITAL | Age: 66
End: 2021-05-16

## 2021-05-16 ENCOUNTER — PATIENT MESSAGE (OUTPATIENT)
Dept: PSYCHIATRY | Facility: CLINIC | Age: 66
End: 2021-05-16

## 2021-05-17 ENCOUNTER — OFFICE VISIT (OUTPATIENT)
Dept: PSYCHIATRY | Facility: CLINIC | Age: 66
End: 2021-05-17
Payer: MEDICAID

## 2021-05-17 ENCOUNTER — PATIENT MESSAGE (OUTPATIENT)
Dept: REHABILITATION | Facility: HOSPITAL | Age: 66
End: 2021-05-17

## 2021-05-17 ENCOUNTER — CLINICAL SUPPORT (OUTPATIENT)
Dept: REHABILITATION | Facility: HOSPITAL | Age: 66
End: 2021-05-17
Payer: MEDICAID

## 2021-05-17 DIAGNOSIS — M25.60 DECREASED RANGE OF MOTION: ICD-10-CM

## 2021-05-17 DIAGNOSIS — R53.1 DECREASED RANGE OF MOTION WITH DECREASED STRENGTH: ICD-10-CM

## 2021-05-17 DIAGNOSIS — M25.60 DECREASED RANGE OF MOTION WITH DECREASED STRENGTH: ICD-10-CM

## 2021-05-17 DIAGNOSIS — R53.1 WEAKNESS: ICD-10-CM

## 2021-05-17 DIAGNOSIS — R26.89 BALANCE PROBLEM: ICD-10-CM

## 2021-05-17 DIAGNOSIS — F43.21 COMPLICATED GRIEF: ICD-10-CM

## 2021-05-17 PROCEDURE — 97110 THERAPEUTIC EXERCISES: CPT

## 2021-05-17 PROCEDURE — 99213 PR OFFICE/OUTPT VISIT, EST, LEVL III, 20-29 MIN: ICD-10-PCS | Mod: 95,AF,HB, | Performed by: PSYCHIATRY & NEUROLOGY

## 2021-05-17 PROCEDURE — 99213 OFFICE O/P EST LOW 20 MIN: CPT | Mod: 95,AF,HB, | Performed by: PSYCHIATRY & NEUROLOGY

## 2021-05-17 RX ORDER — BUSPIRONE HYDROCHLORIDE 30 MG/1
30 TABLET ORAL 2 TIMES DAILY
Qty: 60 TABLET | Refills: 2 | Status: SHIPPED | OUTPATIENT
Start: 2021-05-17 | End: 2021-07-14 | Stop reason: SDUPTHER

## 2021-05-17 RX ORDER — DIAZEPAM 2 MG/1
2 TABLET ORAL EVERY 6 HOURS PRN
Qty: 30 TABLET | Refills: 2 | Status: SHIPPED | OUTPATIENT
Start: 2021-05-17 | End: 2021-08-19 | Stop reason: SDUPTHER

## 2021-05-17 RX ORDER — ZOLPIDEM TARTRATE 5 MG/1
TABLET ORAL
Qty: 30 TABLET | Refills: 2 | Status: SHIPPED | OUTPATIENT
Start: 2021-05-17 | End: 2021-08-19 | Stop reason: SDUPTHER

## 2021-05-17 RX ORDER — BUPROPION HYDROCHLORIDE 150 MG/1
450 TABLET ORAL DAILY
Qty: 90 TABLET | Refills: 2 | Status: SHIPPED | OUTPATIENT
Start: 2021-05-17 | End: 2021-06-19 | Stop reason: SDUPTHER

## 2021-05-18 ENCOUNTER — TELEPHONE (OUTPATIENT)
Dept: ORTHOPEDICS | Facility: CLINIC | Age: 66
End: 2021-05-18

## 2021-05-18 ENCOUNTER — OFFICE VISIT (OUTPATIENT)
Dept: ORTHOPEDICS | Facility: CLINIC | Age: 66
End: 2021-05-18
Payer: MEDICAID

## 2021-05-18 VITALS
BODY MASS INDEX: 23.55 KG/M2 | DIASTOLIC BLOOD PRESSURE: 67 MMHG | WEIGHT: 128 LBS | HEIGHT: 62 IN | SYSTOLIC BLOOD PRESSURE: 119 MMHG | HEART RATE: 90 BPM

## 2021-05-18 DIAGNOSIS — M21.162 ACQUIRED VARUS DEFORMITY KNEE, LEFT: ICD-10-CM

## 2021-05-18 DIAGNOSIS — G89.29 CHRONIC PAIN OF LEFT KNEE: ICD-10-CM

## 2021-05-18 DIAGNOSIS — M17.12 ARTHRITIS OF KNEE, LEFT: Primary | ICD-10-CM

## 2021-05-18 DIAGNOSIS — M25.562 CHRONIC PAIN OF LEFT KNEE: ICD-10-CM

## 2021-05-18 PROCEDURE — 20610 LARGE JOINT ASPIRATION/INJECTION: L KNEE: ICD-10-PCS | Mod: S$PBB,LT,, | Performed by: PHYSICIAN ASSISTANT

## 2021-05-18 PROCEDURE — 99214 PR OFFICE/OUTPT VISIT, EST, LEVL IV, 30-39 MIN: ICD-10-PCS | Mod: 25,S$PBB,, | Performed by: PHYSICIAN ASSISTANT

## 2021-05-18 PROCEDURE — 20610 DRAIN/INJ JOINT/BURSA W/O US: CPT | Mod: S$PBB,LT,, | Performed by: PHYSICIAN ASSISTANT

## 2021-05-18 PROCEDURE — 20610 DRAIN/INJ JOINT/BURSA W/O US: CPT | Mod: PBBFAC,LT | Performed by: PHYSICIAN ASSISTANT

## 2021-05-18 PROCEDURE — 99999 PR PBB SHADOW E&M-EST. PATIENT-LVL V: CPT | Mod: PBBFAC,,, | Performed by: PHYSICIAN ASSISTANT

## 2021-05-18 PROCEDURE — 99215 OFFICE O/P EST HI 40 MIN: CPT | Mod: PBBFAC | Performed by: PHYSICIAN ASSISTANT

## 2021-05-18 PROCEDURE — 99999 PR PBB SHADOW E&M-EST. PATIENT-LVL V: ICD-10-PCS | Mod: PBBFAC,,, | Performed by: PHYSICIAN ASSISTANT

## 2021-05-18 PROCEDURE — 99214 OFFICE O/P EST MOD 30 MIN: CPT | Mod: 25,S$PBB,, | Performed by: PHYSICIAN ASSISTANT

## 2021-05-18 RX ORDER — METHYLPREDNISOLONE ACETATE 80 MG/ML
80 INJECTION, SUSPENSION INTRA-ARTICULAR; INTRALESIONAL; INTRAMUSCULAR; SOFT TISSUE
Status: DISCONTINUED | OUTPATIENT
Start: 2021-05-18 | End: 2021-05-18 | Stop reason: HOSPADM

## 2021-05-18 RX ADMIN — METHYLPREDNISOLONE ACETATE 80 MG: 80 INJECTION, SUSPENSION INTRALESIONAL; INTRAMUSCULAR; INTRASYNOVIAL; SOFT TISSUE at 11:05

## 2021-05-20 ENCOUNTER — OFFICE VISIT (OUTPATIENT)
Dept: PSYCHIATRY | Facility: CLINIC | Age: 66
End: 2021-05-20
Payer: MEDICAID

## 2021-05-20 DIAGNOSIS — F41.1 GAD (GENERALIZED ANXIETY DISORDER): ICD-10-CM

## 2021-05-20 DIAGNOSIS — F33.1 MODERATE EPISODE OF RECURRENT MAJOR DEPRESSIVE DISORDER: Primary | ICD-10-CM

## 2021-05-20 PROCEDURE — 90834 PR PSYCHOTHERAPY W/PATIENT, 45 MIN: ICD-10-PCS | Mod: 95,AJ,HB, | Performed by: SOCIAL WORKER

## 2021-05-20 PROCEDURE — 90834 PSYTX W PT 45 MINUTES: CPT | Mod: 95,AJ,HB, | Performed by: SOCIAL WORKER

## 2021-05-21 ENCOUNTER — PATIENT MESSAGE (OUTPATIENT)
Dept: REHABILITATION | Facility: HOSPITAL | Age: 66
End: 2021-05-21

## 2021-05-25 ENCOUNTER — CLINICAL SUPPORT (OUTPATIENT)
Dept: REHABILITATION | Facility: HOSPITAL | Age: 66
End: 2021-05-25
Payer: MEDICAID

## 2021-05-25 ENCOUNTER — PATIENT MESSAGE (OUTPATIENT)
Dept: OBSTETRICS AND GYNECOLOGY | Facility: CLINIC | Age: 66
End: 2021-05-25

## 2021-05-25 DIAGNOSIS — M25.60 DECREASED RANGE OF MOTION: ICD-10-CM

## 2021-05-25 DIAGNOSIS — B37.31 VULVOVAGINAL CANDIDIASIS: Primary | ICD-10-CM

## 2021-05-25 DIAGNOSIS — R53.1 DECREASED RANGE OF MOTION WITH DECREASED STRENGTH: ICD-10-CM

## 2021-05-25 DIAGNOSIS — R26.89 BALANCE PROBLEM: ICD-10-CM

## 2021-05-25 DIAGNOSIS — R53.1 WEAKNESS: ICD-10-CM

## 2021-05-25 DIAGNOSIS — M25.60 DECREASED RANGE OF MOTION WITH DECREASED STRENGTH: ICD-10-CM

## 2021-05-25 PROCEDURE — 97110 THERAPEUTIC EXERCISES: CPT

## 2021-05-25 RX ORDER — FLUCONAZOLE 150 MG/1
150 TABLET ORAL ONCE
Qty: 1 TABLET | Refills: 0 | Status: SHIPPED | OUTPATIENT
Start: 2021-05-25 | End: 2021-05-26 | Stop reason: ALTCHOICE

## 2021-05-26 ENCOUNTER — PATIENT MESSAGE (OUTPATIENT)
Dept: REHABILITATION | Facility: HOSPITAL | Age: 66
End: 2021-05-26

## 2021-05-26 ENCOUNTER — PATIENT MESSAGE (OUTPATIENT)
Dept: ORTHOPEDICS | Facility: CLINIC | Age: 66
End: 2021-05-26

## 2021-05-26 ENCOUNTER — OFFICE VISIT (OUTPATIENT)
Dept: INTERNAL MEDICINE | Facility: CLINIC | Age: 66
End: 2021-05-26
Payer: MEDICAID

## 2021-05-26 ENCOUNTER — TELEPHONE (OUTPATIENT)
Dept: INTERNAL MEDICINE | Facility: CLINIC | Age: 66
End: 2021-05-26

## 2021-05-26 VITALS
BODY MASS INDEX: 23.53 KG/M2 | TEMPERATURE: 97 F | HEART RATE: 84 BPM | DIASTOLIC BLOOD PRESSURE: 70 MMHG | SYSTOLIC BLOOD PRESSURE: 112 MMHG | HEIGHT: 62 IN | WEIGHT: 127.88 LBS | OXYGEN SATURATION: 98 %

## 2021-05-26 DIAGNOSIS — G44.89 CHRONIC MIXED HEADACHE SYNDROME: ICD-10-CM

## 2021-05-26 DIAGNOSIS — G43.809 VESTIBULAR MIGRAINE: ICD-10-CM

## 2021-05-26 DIAGNOSIS — J01.10 ACUTE NON-RECURRENT FRONTAL SINUSITIS: Primary | ICD-10-CM

## 2021-05-26 PROCEDURE — 99213 PR OFFICE/OUTPT VISIT, EST, LEVL III, 20-29 MIN: ICD-10-PCS | Mod: S$PBB,,, | Performed by: NURSE PRACTITIONER

## 2021-05-26 PROCEDURE — 99215 OFFICE O/P EST HI 40 MIN: CPT | Mod: PBBFAC,PO | Performed by: NURSE PRACTITIONER

## 2021-05-26 PROCEDURE — 99999 PR PBB SHADOW E&M-EST. PATIENT-LVL V: ICD-10-PCS | Mod: PBBFAC,,, | Performed by: NURSE PRACTITIONER

## 2021-05-26 PROCEDURE — 96372 THER/PROPH/DIAG INJ SC/IM: CPT | Mod: PBBFAC,PO

## 2021-05-26 PROCEDURE — 99213 OFFICE O/P EST LOW 20 MIN: CPT | Mod: S$PBB,,, | Performed by: NURSE PRACTITIONER

## 2021-05-26 PROCEDURE — 99999 PR PBB SHADOW E&M-EST. PATIENT-LVL V: CPT | Mod: PBBFAC,,, | Performed by: NURSE PRACTITIONER

## 2021-05-26 RX ORDER — KETOROLAC TROMETHAMINE 30 MG/ML
30 INJECTION, SOLUTION INTRAMUSCULAR; INTRAVENOUS
Status: COMPLETED | OUTPATIENT
Start: 2021-05-26 | End: 2021-05-26

## 2021-05-26 RX ORDER — PREDNISONE 20 MG/1
TABLET ORAL
Qty: 9 TABLET | Refills: 0 | Status: SHIPPED | OUTPATIENT
Start: 2021-05-26 | End: 2021-06-02

## 2021-05-26 RX ADMIN — KETOROLAC TROMETHAMINE 30 MG: 30 INJECTION, SOLUTION INTRAMUSCULAR at 05:05

## 2021-05-30 ENCOUNTER — PATIENT MESSAGE (OUTPATIENT)
Dept: INTERNAL MEDICINE | Facility: CLINIC | Age: 66
End: 2021-05-30

## 2021-05-30 ENCOUNTER — PATIENT MESSAGE (OUTPATIENT)
Dept: REHABILITATION | Facility: HOSPITAL | Age: 66
End: 2021-05-30

## 2021-06-01 ENCOUNTER — PATIENT MESSAGE (OUTPATIENT)
Dept: INTERNAL MEDICINE | Facility: CLINIC | Age: 66
End: 2021-06-01

## 2021-06-02 ENCOUNTER — PATIENT MESSAGE (OUTPATIENT)
Dept: INTERNAL MEDICINE | Facility: CLINIC | Age: 66
End: 2021-06-02

## 2021-06-02 ENCOUNTER — OFFICE VISIT (OUTPATIENT)
Dept: OTOLARYNGOLOGY | Facility: CLINIC | Age: 66
End: 2021-06-02
Payer: MEDICAID

## 2021-06-02 ENCOUNTER — CLINICAL SUPPORT (OUTPATIENT)
Dept: REHABILITATION | Facility: HOSPITAL | Age: 66
End: 2021-06-02
Payer: MEDICAID

## 2021-06-02 VITALS — BODY MASS INDEX: 23.77 KG/M2 | HEIGHT: 62 IN | TEMPERATURE: 98 F | WEIGHT: 129.19 LBS

## 2021-06-02 DIAGNOSIS — R53.1 WEAKNESS: ICD-10-CM

## 2021-06-02 DIAGNOSIS — R26.89 BALANCE PROBLEM: ICD-10-CM

## 2021-06-02 DIAGNOSIS — M25.60 DECREASED RANGE OF MOTION WITH DECREASED STRENGTH: ICD-10-CM

## 2021-06-02 DIAGNOSIS — M25.60 DECREASED RANGE OF MOTION: ICD-10-CM

## 2021-06-02 DIAGNOSIS — R53.1 DECREASED RANGE OF MOTION WITH DECREASED STRENGTH: ICD-10-CM

## 2021-06-02 DIAGNOSIS — H61.23 BILATERAL IMPACTED CERUMEN: Primary | ICD-10-CM

## 2021-06-02 DIAGNOSIS — J32.9 SINUSITIS, UNSPECIFIED CHRONICITY, UNSPECIFIED LOCATION: ICD-10-CM

## 2021-06-02 DIAGNOSIS — J30.89 NON-SEASONAL ALLERGIC RHINITIS, UNSPECIFIED TRIGGER: ICD-10-CM

## 2021-06-02 PROCEDURE — 99999 PR PBB SHADOW E&M-EST. PATIENT-LVL IV: CPT | Mod: PBBFAC,,, | Performed by: PHYSICIAN ASSISTANT

## 2021-06-02 PROCEDURE — 69210 REMOVE IMPACTED EAR WAX UNI: CPT | Mod: S$PBB,,, | Performed by: PHYSICIAN ASSISTANT

## 2021-06-02 PROCEDURE — 99999 PR PBB SHADOW E&M-EST. PATIENT-LVL IV: ICD-10-PCS | Mod: PBBFAC,,, | Performed by: PHYSICIAN ASSISTANT

## 2021-06-02 PROCEDURE — 99214 OFFICE O/P EST MOD 30 MIN: CPT | Mod: PBBFAC | Performed by: PHYSICIAN ASSISTANT

## 2021-06-02 PROCEDURE — 69210 PR REMOVAL IMPACTED CERUMEN REQUIRING INSTRUMENTATION, UNILATERAL: ICD-10-PCS | Mod: S$PBB,,, | Performed by: PHYSICIAN ASSISTANT

## 2021-06-02 PROCEDURE — 69210 REMOVE IMPACTED EAR WAX UNI: CPT | Mod: PBBFAC | Performed by: PHYSICIAN ASSISTANT

## 2021-06-02 PROCEDURE — 99214 PR OFFICE/OUTPT VISIT, EST, LEVL IV, 30-39 MIN: ICD-10-PCS | Mod: 25,S$PBB,, | Performed by: PHYSICIAN ASSISTANT

## 2021-06-02 PROCEDURE — 99214 OFFICE O/P EST MOD 30 MIN: CPT | Mod: 25,S$PBB,, | Performed by: PHYSICIAN ASSISTANT

## 2021-06-02 PROCEDURE — 97110 THERAPEUTIC EXERCISES: CPT | Mod: CQ

## 2021-06-02 RX ORDER — LEVOFLOXACIN 500 MG/1
500 TABLET, FILM COATED ORAL DAILY
Qty: 10 TABLET | Refills: 0 | Status: SHIPPED | OUTPATIENT
Start: 2021-06-02 | End: 2021-06-12

## 2021-06-02 RX ORDER — PREDNISONE 20 MG/1
TABLET ORAL
Qty: 20 TABLET | Refills: 0 | Status: SHIPPED | OUTPATIENT
Start: 2021-06-02 | End: 2021-07-02

## 2021-06-02 RX ORDER — FEXOFENADINE HCL 60 MG
60 TABLET ORAL DAILY
Qty: 30 TABLET | Refills: 11 | Status: SHIPPED | OUTPATIENT
Start: 2021-06-02 | End: 2021-10-25 | Stop reason: ALTCHOICE

## 2021-06-02 RX ORDER — FLUTICASONE PROPIONATE 50 MCG
2 SPRAY, SUSPENSION (ML) NASAL 2 TIMES DAILY
Qty: 9.9 ML | Refills: 11 | Status: SHIPPED | OUTPATIENT
Start: 2021-06-02 | End: 2023-05-01 | Stop reason: SDUPTHER

## 2021-06-02 RX ORDER — AZELASTINE 1 MG/ML
1 SPRAY, METERED NASAL 2 TIMES DAILY
Qty: 30 ML | Refills: 11 | Status: SHIPPED | OUTPATIENT
Start: 2021-06-02 | End: 2023-05-01 | Stop reason: SDUPTHER

## 2021-06-07 ENCOUNTER — PATIENT MESSAGE (OUTPATIENT)
Dept: NEUROLOGY | Facility: CLINIC | Age: 66
End: 2021-06-07

## 2021-06-07 DIAGNOSIS — R11.0 NAUSEA: ICD-10-CM

## 2021-06-07 DIAGNOSIS — G44.89 CHRONIC MIXED HEADACHE SYNDROME: ICD-10-CM

## 2021-06-07 DIAGNOSIS — G43.809 VESTIBULAR MIGRAINE: Primary | ICD-10-CM

## 2021-06-07 RX ORDER — PROMETHAZINE HYDROCHLORIDE 25 MG/1
25 TABLET ORAL DAILY PRN
Qty: 30 TABLET | Refills: 0 | Status: SHIPPED | OUTPATIENT
Start: 2021-06-07 | End: 2023-01-18 | Stop reason: SDUPTHER

## 2021-06-08 ENCOUNTER — PATIENT MESSAGE (OUTPATIENT)
Dept: AUDIOLOGY | Facility: CLINIC | Age: 66
End: 2021-06-08

## 2021-06-08 DIAGNOSIS — H90.3 SENSORY HEARING LOSS, BILATERAL: Primary | ICD-10-CM

## 2021-06-09 ENCOUNTER — PATIENT MESSAGE (OUTPATIENT)
Dept: PSYCHIATRY | Facility: CLINIC | Age: 66
End: 2021-06-09

## 2021-06-10 ENCOUNTER — OFFICE VISIT (OUTPATIENT)
Dept: OTOLARYNGOLOGY | Facility: CLINIC | Age: 66
End: 2021-06-10
Payer: MEDICAID

## 2021-06-10 ENCOUNTER — PATIENT MESSAGE (OUTPATIENT)
Dept: REHABILITATION | Facility: HOSPITAL | Age: 66
End: 2021-06-10

## 2021-06-10 ENCOUNTER — PATIENT MESSAGE (OUTPATIENT)
Dept: OTOLARYNGOLOGY | Facility: CLINIC | Age: 66
End: 2021-06-10

## 2021-06-10 ENCOUNTER — HOSPITAL ENCOUNTER (OUTPATIENT)
Dept: RADIOLOGY | Facility: HOSPITAL | Age: 66
Discharge: HOME | End: 2021-06-10
Attending: PHYSICIAN ASSISTANT
Payer: MEDICAID

## 2021-06-10 ENCOUNTER — PATIENT MESSAGE (OUTPATIENT)
Dept: AUDIOLOGY | Facility: CLINIC | Age: 66
End: 2021-06-10

## 2021-06-10 ENCOUNTER — TELEPHONE (OUTPATIENT)
Dept: OTOLARYNGOLOGY | Facility: CLINIC | Age: 66
End: 2021-06-10

## 2021-06-10 DIAGNOSIS — R05.9 COUGH: ICD-10-CM

## 2021-06-10 DIAGNOSIS — R05.9 COUGH: Primary | ICD-10-CM

## 2021-06-10 DIAGNOSIS — R42 DIZZINESS: ICD-10-CM

## 2021-06-10 DIAGNOSIS — J32.9 SINUSITIS, UNSPECIFIED CHRONICITY, UNSPECIFIED LOCATION: ICD-10-CM

## 2021-06-10 DIAGNOSIS — J30.89 NON-SEASONAL ALLERGIC RHINITIS, UNSPECIFIED TRIGGER: ICD-10-CM

## 2021-06-10 DIAGNOSIS — J32.9 SINUSITIS, UNSPECIFIED CHRONICITY, UNSPECIFIED LOCATION: Primary | ICD-10-CM

## 2021-06-10 PROCEDURE — 71046 X-RAY EXAM CHEST 2 VIEWS: CPT | Mod: 26,,, | Performed by: RADIOLOGY

## 2021-06-10 PROCEDURE — 70220 X-RAY EXAM OF SINUSES: CPT | Mod: TC,PO

## 2021-06-10 PROCEDURE — 71046 XR CHEST PA AND LATERAL: ICD-10-PCS | Mod: 26,,, | Performed by: RADIOLOGY

## 2021-06-10 PROCEDURE — 99213 OFFICE O/P EST LOW 20 MIN: CPT | Mod: 95,,, | Performed by: PHYSICIAN ASSISTANT

## 2021-06-10 PROCEDURE — 70220 XR SINUSES MIN 3 VIEWS: ICD-10-PCS | Mod: 26,,, | Performed by: RADIOLOGY

## 2021-06-10 PROCEDURE — 70220 X-RAY EXAM OF SINUSES: CPT | Mod: 26,,, | Performed by: RADIOLOGY

## 2021-06-10 PROCEDURE — 99213 PR OFFICE/OUTPT VISIT, EST, LEVL III, 20-29 MIN: ICD-10-PCS | Mod: 95,,, | Performed by: PHYSICIAN ASSISTANT

## 2021-06-10 PROCEDURE — 71046 X-RAY EXAM CHEST 2 VIEWS: CPT | Mod: TC,PO

## 2021-06-11 DIAGNOSIS — J30.89 NON-SEASONAL ALLERGIC RHINITIS, UNSPECIFIED TRIGGER: Primary | ICD-10-CM

## 2021-06-14 ENCOUNTER — PATIENT MESSAGE (OUTPATIENT)
Dept: OTOLARYNGOLOGY | Facility: CLINIC | Age: 66
End: 2021-06-14

## 2021-06-16 ENCOUNTER — TELEPHONE (OUTPATIENT)
Dept: ALLERGY | Facility: CLINIC | Age: 66
End: 2021-06-16

## 2021-06-17 ENCOUNTER — TELEPHONE (OUTPATIENT)
Dept: REHABILITATION | Facility: HOSPITAL | Age: 66
End: 2021-06-17

## 2021-06-18 DIAGNOSIS — N18.30 STAGE 3 CHRONIC KIDNEY DISEASE, UNSPECIFIED WHETHER STAGE 3A OR 3B CKD: Primary | ICD-10-CM

## 2021-06-19 ENCOUNTER — PATIENT MESSAGE (OUTPATIENT)
Dept: NEUROLOGY | Facility: CLINIC | Age: 66
End: 2021-06-19

## 2021-06-19 ENCOUNTER — PATIENT MESSAGE (OUTPATIENT)
Dept: PSYCHIATRY | Facility: CLINIC | Age: 66
End: 2021-06-19

## 2021-06-19 DIAGNOSIS — K58.9 IRRITABLE BOWEL SYNDROME WITHOUT DIARRHEA: ICD-10-CM

## 2021-06-21 RX ORDER — DICYCLOMINE HYDROCHLORIDE 10 MG/1
10 CAPSULE ORAL 3 TIMES DAILY
Qty: 90 CAPSULE | Refills: 1 | Status: SHIPPED | OUTPATIENT
Start: 2021-06-21 | End: 2021-10-28

## 2021-06-21 RX ORDER — BUPROPION HYDROCHLORIDE 150 MG/1
450 TABLET ORAL DAILY
Qty: 90 TABLET | Refills: 2 | Status: SHIPPED | OUTPATIENT
Start: 2021-06-21 | End: 2021-08-19 | Stop reason: SDUPTHER

## 2021-06-21 RX ORDER — TOPIRAMATE 100 MG/1
1 CAPSULE, EXTENDED RELEASE ORAL DAILY
Qty: 30 CAPSULE | Refills: 11 | Status: SHIPPED | OUTPATIENT
Start: 2021-06-21 | End: 2022-02-28 | Stop reason: SDUPTHER

## 2021-06-29 ENCOUNTER — PATIENT MESSAGE (OUTPATIENT)
Dept: REHABILITATION | Facility: HOSPITAL | Age: 66
End: 2021-06-29

## 2021-06-29 ENCOUNTER — PATIENT MESSAGE (OUTPATIENT)
Dept: AUDIOLOGY | Facility: CLINIC | Age: 66
End: 2021-06-29

## 2021-06-30 ENCOUNTER — PATIENT MESSAGE (OUTPATIENT)
Dept: INTERNAL MEDICINE | Facility: CLINIC | Age: 66
End: 2021-06-30

## 2021-07-01 ENCOUNTER — OFFICE VISIT (OUTPATIENT)
Dept: PSYCHIATRY | Facility: CLINIC | Age: 66
End: 2021-07-01
Payer: MEDICAID

## 2021-07-01 ENCOUNTER — PATIENT MESSAGE (OUTPATIENT)
Dept: REHABILITATION | Facility: HOSPITAL | Age: 66
End: 2021-07-01

## 2021-07-01 DIAGNOSIS — F33.1 MODERATE EPISODE OF RECURRENT MAJOR DEPRESSIVE DISORDER: Primary | ICD-10-CM

## 2021-07-01 DIAGNOSIS — F41.1 GAD (GENERALIZED ANXIETY DISORDER): ICD-10-CM

## 2021-07-01 PROCEDURE — 90834 PSYTX W PT 45 MINUTES: CPT | Mod: 95,AJ,HB, | Performed by: SOCIAL WORKER

## 2021-07-01 PROCEDURE — 90834 PR PSYCHOTHERAPY W/PATIENT, 45 MIN: ICD-10-PCS | Mod: 95,AJ,HB, | Performed by: SOCIAL WORKER

## 2021-07-04 ENCOUNTER — PATIENT MESSAGE (OUTPATIENT)
Dept: ORTHOPEDICS | Facility: CLINIC | Age: 66
End: 2021-07-04

## 2021-07-11 ENCOUNTER — PATIENT MESSAGE (OUTPATIENT)
Dept: NEUROLOGY | Facility: CLINIC | Age: 66
End: 2021-07-11

## 2021-07-12 ENCOUNTER — PATIENT MESSAGE (OUTPATIENT)
Dept: OBSTETRICS AND GYNECOLOGY | Facility: CLINIC | Age: 66
End: 2021-07-12

## 2021-07-12 ENCOUNTER — PATIENT MESSAGE (OUTPATIENT)
Dept: REHABILITATION | Facility: HOSPITAL | Age: 66
End: 2021-07-12

## 2021-07-12 RX ORDER — UBROGEPANT 100 MG/1
TABLET ORAL
Qty: 8 TABLET | Refills: 2 | Status: SHIPPED | OUTPATIENT
Start: 2021-07-12 | End: 2021-10-07

## 2021-07-14 ENCOUNTER — PATIENT MESSAGE (OUTPATIENT)
Dept: REHABILITATION | Facility: HOSPITAL | Age: 66
End: 2021-07-14

## 2021-07-19 ENCOUNTER — TELEPHONE (OUTPATIENT)
Dept: REHABILITATION | Facility: HOSPITAL | Age: 66
End: 2021-07-19

## 2021-07-21 ENCOUNTER — PATIENT MESSAGE (OUTPATIENT)
Dept: PSYCHIATRY | Facility: CLINIC | Age: 66
End: 2021-07-21

## 2021-07-27 ENCOUNTER — PATIENT MESSAGE (OUTPATIENT)
Dept: PSYCHIATRY | Facility: CLINIC | Age: 66
End: 2021-07-27

## 2021-08-01 ENCOUNTER — PATIENT MESSAGE (OUTPATIENT)
Dept: ALLERGY | Facility: CLINIC | Age: 66
End: 2021-08-01

## 2021-08-01 ENCOUNTER — PATIENT MESSAGE (OUTPATIENT)
Dept: NEPHROLOGY | Facility: CLINIC | Age: 66
End: 2021-08-01

## 2021-08-03 ENCOUNTER — PATIENT MESSAGE (OUTPATIENT)
Dept: OBSTETRICS AND GYNECOLOGY | Facility: CLINIC | Age: 66
End: 2021-08-03

## 2021-08-06 ENCOUNTER — OFFICE VISIT (OUTPATIENT)
Dept: PSYCHIATRY | Facility: CLINIC | Age: 66
End: 2021-08-06
Payer: MEDICAID

## 2021-08-06 ENCOUNTER — TELEPHONE (OUTPATIENT)
Dept: ORTHOPEDICS | Facility: CLINIC | Age: 66
End: 2021-08-06

## 2021-08-06 ENCOUNTER — PATIENT MESSAGE (OUTPATIENT)
Dept: ORTHOPEDICS | Facility: CLINIC | Age: 66
End: 2021-08-06

## 2021-08-06 DIAGNOSIS — F41.1 GAD (GENERALIZED ANXIETY DISORDER): ICD-10-CM

## 2021-08-06 DIAGNOSIS — F33.1 MODERATE EPISODE OF RECURRENT MAJOR DEPRESSIVE DISORDER: Primary | ICD-10-CM

## 2021-08-06 PROCEDURE — 90834 PSYTX W PT 45 MINUTES: CPT | Mod: 95,AJ,HB, | Performed by: SOCIAL WORKER

## 2021-08-06 PROCEDURE — 90834 PR PSYCHOTHERAPY W/PATIENT, 45 MIN: ICD-10-PCS | Mod: 95,AJ,HB, | Performed by: SOCIAL WORKER

## 2021-08-09 NOTE — TELEPHONE ENCOUNTER
-- DO NOT REPLY / DO NOT REPLY ALL --  -- Message is from the [facility]--    General Patient Message      Reason for Call: Patient is calling to request a call back regarding hair loss , she wants to know if its regarding medication.    Caller Information       Type Contact Phone    08/09/2021 11:05 AM CDT Phone (Incoming) RashawnMaurice (Self) [phone] (M)          Alternative phone number: none    Turnaround time given to caller:   \"This message will be sent to [state Provider's name]. The clinical team will fulfill your request as soon as they review your message.\"     Number sent is to [place] [employer] not correct info.

## 2021-08-10 ENCOUNTER — TELEPHONE (OUTPATIENT)
Dept: ADMINISTRATIVE | Facility: HOSPITAL | Age: 66
End: 2021-08-10

## 2021-08-10 ENCOUNTER — PATIENT MESSAGE (OUTPATIENT)
Dept: PSYCHIATRY | Facility: CLINIC | Age: 66
End: 2021-08-10

## 2021-08-10 ENCOUNTER — PATIENT MESSAGE (OUTPATIENT)
Dept: NEUROLOGY | Facility: CLINIC | Age: 66
End: 2021-08-10

## 2021-08-13 ENCOUNTER — OFFICE VISIT (OUTPATIENT)
Dept: ORTHOPEDICS | Facility: CLINIC | Age: 66
End: 2021-08-13
Payer: MEDICAID

## 2021-08-13 DIAGNOSIS — M12.811 ROTATOR CUFF TEAR ARTHROPATHY OF RIGHT SHOULDER: Primary | ICD-10-CM

## 2021-08-13 DIAGNOSIS — M75.101 ROTATOR CUFF TEAR ARTHROPATHY OF RIGHT SHOULDER: Primary | ICD-10-CM

## 2021-08-13 PROCEDURE — 99999 PR PBB SHADOW E&M-EST. PATIENT-LVL IV: CPT | Mod: PBBFAC,,, | Performed by: STUDENT IN AN ORGANIZED HEALTH CARE EDUCATION/TRAINING PROGRAM

## 2021-08-13 PROCEDURE — 99213 PR OFFICE/OUTPT VISIT, EST, LEVL III, 20-29 MIN: ICD-10-PCS | Mod: S$PBB,25,, | Performed by: STUDENT IN AN ORGANIZED HEALTH CARE EDUCATION/TRAINING PROGRAM

## 2021-08-13 PROCEDURE — 99214 OFFICE O/P EST MOD 30 MIN: CPT | Mod: PBBFAC | Performed by: STUDENT IN AN ORGANIZED HEALTH CARE EDUCATION/TRAINING PROGRAM

## 2021-08-13 PROCEDURE — 99999 PR PBB SHADOW E&M-EST. PATIENT-LVL IV: ICD-10-PCS | Mod: PBBFAC,,, | Performed by: STUDENT IN AN ORGANIZED HEALTH CARE EDUCATION/TRAINING PROGRAM

## 2021-08-13 PROCEDURE — 99213 OFFICE O/P EST LOW 20 MIN: CPT | Mod: S$PBB,25,, | Performed by: STUDENT IN AN ORGANIZED HEALTH CARE EDUCATION/TRAINING PROGRAM

## 2021-08-13 PROCEDURE — 20610 DRAIN/INJ JOINT/BURSA W/O US: CPT | Mod: PBBFAC | Performed by: STUDENT IN AN ORGANIZED HEALTH CARE EDUCATION/TRAINING PROGRAM

## 2021-08-13 PROCEDURE — 20610 LARGE JOINT ASPIRATION/INJECTION: R SUBACROMIAL BURSA: ICD-10-PCS | Mod: S$PBB,RT,, | Performed by: STUDENT IN AN ORGANIZED HEALTH CARE EDUCATION/TRAINING PROGRAM

## 2021-08-13 RX ADMIN — TRIAMCINOLONE ACETONIDE 80 MG: 200 INJECTION, SUSPENSION INTRA-ARTICULAR; INTRAMUSCULAR at 10:08

## 2021-08-16 ENCOUNTER — PATIENT MESSAGE (OUTPATIENT)
Dept: NEUROLOGY | Facility: CLINIC | Age: 66
End: 2021-08-16

## 2021-08-16 RX ORDER — TRIAMCINOLONE ACETONIDE 40 MG/ML
80 INJECTION, SUSPENSION INTRA-ARTICULAR; INTRAMUSCULAR
Status: DISCONTINUED | OUTPATIENT
Start: 2021-08-13 | End: 2021-08-16 | Stop reason: HOSPADM

## 2021-08-17 ENCOUNTER — OFFICE VISIT (OUTPATIENT)
Dept: NEUROLOGY | Facility: CLINIC | Age: 66
End: 2021-08-17
Payer: MEDICAID

## 2021-08-17 ENCOUNTER — PATIENT MESSAGE (OUTPATIENT)
Dept: NEUROLOGY | Facility: CLINIC | Age: 66
End: 2021-08-17

## 2021-08-17 DIAGNOSIS — R53.1 DECREASED RANGE OF MOTION WITH DECREASED STRENGTH: ICD-10-CM

## 2021-08-17 DIAGNOSIS — D07.1 SEVERE DYSPLASIA OF VULVA: ICD-10-CM

## 2021-08-17 DIAGNOSIS — L60.0 INGROWN TOENAIL OF LEFT FOOT: ICD-10-CM

## 2021-08-17 DIAGNOSIS — Z87.891 HISTORY OF TOBACCO ABUSE: ICD-10-CM

## 2021-08-17 DIAGNOSIS — R25.9 ABNORMAL INVOLUNTARY MOVEMENTS: ICD-10-CM

## 2021-08-17 DIAGNOSIS — J01.10 ACUTE NON-RECURRENT FRONTAL SINUSITIS: ICD-10-CM

## 2021-08-17 DIAGNOSIS — J40 BRONCHITIS: ICD-10-CM

## 2021-08-17 DIAGNOSIS — Z80.0 FAMILY HISTORY OF COLON CANCER: ICD-10-CM

## 2021-08-17 DIAGNOSIS — N18.30 STAGE 3 CHRONIC KIDNEY DISEASE, UNSPECIFIED WHETHER STAGE 3A OR 3B CKD: ICD-10-CM

## 2021-08-17 DIAGNOSIS — R11.0 NAUSEA: ICD-10-CM

## 2021-08-17 DIAGNOSIS — M10.9 ACUTE GOUT OF RIGHT FOOT, UNSPECIFIED CAUSE: ICD-10-CM

## 2021-08-17 DIAGNOSIS — N95.2 ATROPHIC VAGINITIS: ICD-10-CM

## 2021-08-17 DIAGNOSIS — E78.5 HYPERLIPIDEMIA, UNSPECIFIED HYPERLIPIDEMIA TYPE: ICD-10-CM

## 2021-08-17 DIAGNOSIS — M25.60 DECREASED RANGE OF MOTION: ICD-10-CM

## 2021-08-17 DIAGNOSIS — G43.709 CHRONIC MIGRAINE WITHOUT AURA WITHOUT STATUS MIGRAINOSUS, NOT INTRACTABLE: ICD-10-CM

## 2021-08-17 DIAGNOSIS — M17.12 PRIMARY OSTEOARTHRITIS OF LEFT KNEE: ICD-10-CM

## 2021-08-17 DIAGNOSIS — N28.1 BILATERAL RENAL CYSTS: ICD-10-CM

## 2021-08-17 DIAGNOSIS — H81.01 MENIERE'S DISEASE OF RIGHT EAR: ICD-10-CM

## 2021-08-17 DIAGNOSIS — F43.21 COMPLICATED GRIEF: ICD-10-CM

## 2021-08-17 DIAGNOSIS — F32.A DEPRESSION, UNSPECIFIED DEPRESSION TYPE: ICD-10-CM

## 2021-08-17 DIAGNOSIS — R53.1 WEAKNESS: ICD-10-CM

## 2021-08-17 DIAGNOSIS — J30.89 NON-SEASONAL ALLERGIC RHINITIS, UNSPECIFIED TRIGGER: ICD-10-CM

## 2021-08-17 DIAGNOSIS — G43.809 VESTIBULAR MIGRAINE: Primary | ICD-10-CM

## 2021-08-17 DIAGNOSIS — M25.60 DECREASED RANGE OF MOTION WITH DECREASED STRENGTH: ICD-10-CM

## 2021-08-17 DIAGNOSIS — G44.89 CHRONIC MIXED HEADACHE SYNDROME: ICD-10-CM

## 2021-08-17 DIAGNOSIS — Z12.11 ENCOUNTER FOR SCREENING COLONOSCOPY: ICD-10-CM

## 2021-08-17 DIAGNOSIS — L30.9 DERMATITIS OF FACE: ICD-10-CM

## 2021-08-17 DIAGNOSIS — R26.89 BALANCE PROBLEM: ICD-10-CM

## 2021-08-17 DIAGNOSIS — L84 FOOT CALLUS: ICD-10-CM

## 2021-08-17 DIAGNOSIS — H10.31 ACUTE CONJUNCTIVITIS OF RIGHT EYE, UNSPECIFIED ACUTE CONJUNCTIVITIS TYPE: ICD-10-CM

## 2021-08-17 DIAGNOSIS — K58.9 IRRITABLE BOWEL SYNDROME WITHOUT DIARRHEA: ICD-10-CM

## 2021-08-17 DIAGNOSIS — Z72.0 TOBACCO USE: ICD-10-CM

## 2021-08-17 DIAGNOSIS — N90.3 VULVAR DYSPLASIA: ICD-10-CM

## 2021-08-17 DIAGNOSIS — N95.1 MENOPAUSE SYNDROME: ICD-10-CM

## 2021-08-17 PROCEDURE — 99214 OFFICE O/P EST MOD 30 MIN: CPT | Mod: 95,,, | Performed by: NURSE PRACTITIONER

## 2021-08-17 PROCEDURE — 99214 PR OFFICE/OUTPT VISIT, EST, LEVL IV, 30-39 MIN: ICD-10-PCS | Mod: 95,,, | Performed by: NURSE PRACTITIONER

## 2021-08-18 ENCOUNTER — PATIENT MESSAGE (OUTPATIENT)
Dept: ORTHOPEDICS | Facility: CLINIC | Age: 66
End: 2021-08-18

## 2021-08-18 ENCOUNTER — OFFICE VISIT (OUTPATIENT)
Dept: OTOLARYNGOLOGY | Facility: CLINIC | Age: 66
End: 2021-08-18
Payer: MEDICAID

## 2021-08-18 ENCOUNTER — TELEPHONE (OUTPATIENT)
Dept: ORTHOPEDICS | Facility: CLINIC | Age: 66
End: 2021-08-18

## 2021-08-18 VITALS
HEART RATE: 70 BPM | DIASTOLIC BLOOD PRESSURE: 81 MMHG | TEMPERATURE: 98 F | BODY MASS INDEX: 23.67 KG/M2 | WEIGHT: 129.44 LBS | SYSTOLIC BLOOD PRESSURE: 126 MMHG

## 2021-08-18 DIAGNOSIS — H90.3 SENSORINEURAL HEARING LOSS (SNHL) OF BOTH EARS: ICD-10-CM

## 2021-08-18 DIAGNOSIS — G43.809 OTHER MIGRAINE WITHOUT STATUS MIGRAINOSUS, NOT INTRACTABLE: ICD-10-CM

## 2021-08-18 DIAGNOSIS — G43.809 VESTIBULAR MIGRAINE: Primary | ICD-10-CM

## 2021-08-18 DIAGNOSIS — H81.01 MENIERE'S DISEASE OF RIGHT EAR: Primary | ICD-10-CM

## 2021-08-18 DIAGNOSIS — G44.89 CHRONIC MIXED HEADACHE SYNDROME: ICD-10-CM

## 2021-08-18 PROCEDURE — 99213 PR OFFICE/OUTPT VISIT, EST, LEVL III, 20-29 MIN: ICD-10-PCS | Mod: S$PBB,,, | Performed by: ORTHOPAEDIC SURGERY

## 2021-08-18 PROCEDURE — 99999 PR PBB SHADOW E&M-EST. PATIENT-LVL IV: CPT | Mod: PBBFAC,,, | Performed by: ORTHOPAEDIC SURGERY

## 2021-08-18 PROCEDURE — 99214 OFFICE O/P EST MOD 30 MIN: CPT | Mod: PBBFAC | Performed by: ORTHOPAEDIC SURGERY

## 2021-08-18 PROCEDURE — 99999 PR PBB SHADOW E&M-EST. PATIENT-LVL IV: ICD-10-PCS | Mod: PBBFAC,,, | Performed by: ORTHOPAEDIC SURGERY

## 2021-08-18 PROCEDURE — 99213 OFFICE O/P EST LOW 20 MIN: CPT | Mod: S$PBB,,, | Performed by: ORTHOPAEDIC SURGERY

## 2021-08-19 ENCOUNTER — OFFICE VISIT (OUTPATIENT)
Dept: PSYCHIATRY | Facility: CLINIC | Age: 66
End: 2021-08-19
Payer: MEDICAID

## 2021-08-19 DIAGNOSIS — F43.21 COMPLICATED GRIEF: ICD-10-CM

## 2021-08-19 PROCEDURE — 99213 PR OFFICE/OUTPT VISIT, EST, LEVL III, 20-29 MIN: ICD-10-PCS | Mod: 95,AF,HB, | Performed by: PSYCHIATRY & NEUROLOGY

## 2021-08-19 PROCEDURE — 99213 OFFICE O/P EST LOW 20 MIN: CPT | Mod: 95,AF,HB, | Performed by: PSYCHIATRY & NEUROLOGY

## 2021-08-19 RX ORDER — DIAZEPAM 2 MG/1
2 TABLET ORAL EVERY 6 HOURS PRN
Qty: 30 TABLET | Refills: 2 | Status: SHIPPED | OUTPATIENT
Start: 2021-08-19 | End: 2022-01-13 | Stop reason: SDUPTHER

## 2021-08-19 RX ORDER — ZOLPIDEM TARTRATE 5 MG/1
TABLET ORAL
Qty: 30 TABLET | Refills: 2 | Status: SHIPPED | OUTPATIENT
Start: 2021-08-19 | End: 2022-01-13 | Stop reason: SDUPTHER

## 2021-08-19 RX ORDER — BUSPIRONE HYDROCHLORIDE 30 MG/1
30 TABLET ORAL 2 TIMES DAILY
Qty: 60 TABLET | Refills: 2 | Status: SHIPPED | OUTPATIENT
Start: 2021-08-19 | End: 2021-11-27 | Stop reason: SDUPTHER

## 2021-08-19 RX ORDER — BUPROPION HYDROCHLORIDE 150 MG/1
450 TABLET ORAL DAILY
Qty: 90 TABLET | Refills: 2 | Status: SHIPPED | OUTPATIENT
Start: 2021-08-19 | End: 2021-11-27 | Stop reason: SDUPTHER

## 2021-08-20 ENCOUNTER — PATIENT MESSAGE (OUTPATIENT)
Dept: PSYCHIATRY | Facility: CLINIC | Age: 66
End: 2021-08-20

## 2021-08-20 ENCOUNTER — PATIENT MESSAGE (OUTPATIENT)
Dept: OBSTETRICS AND GYNECOLOGY | Facility: CLINIC | Age: 66
End: 2021-08-20

## 2021-08-23 ENCOUNTER — TELEPHONE (OUTPATIENT)
Dept: OBSTETRICS AND GYNECOLOGY | Facility: CLINIC | Age: 66
End: 2021-08-23

## 2021-08-24 ENCOUNTER — OFFICE VISIT (OUTPATIENT)
Dept: OBSTETRICS AND GYNECOLOGY | Facility: CLINIC | Age: 66
End: 2021-08-24
Payer: MEDICAID

## 2021-08-24 ENCOUNTER — OFFICE VISIT (OUTPATIENT)
Dept: PSYCHIATRY | Facility: CLINIC | Age: 66
End: 2021-08-24
Payer: MEDICAID

## 2021-08-24 ENCOUNTER — OFFICE VISIT (OUTPATIENT)
Dept: NEPHROLOGY | Facility: CLINIC | Age: 66
End: 2021-08-24
Payer: MEDICAID

## 2021-08-24 DIAGNOSIS — E87.6 HYPOKALEMIA: ICD-10-CM

## 2021-08-24 DIAGNOSIS — M10.9 GOUT, UNSPECIFIED CAUSE, UNSPECIFIED CHRONICITY, UNSPECIFIED SITE: ICD-10-CM

## 2021-08-24 DIAGNOSIS — R60.9 EDEMA, UNSPECIFIED TYPE: ICD-10-CM

## 2021-08-24 DIAGNOSIS — F33.1 MODERATE EPISODE OF RECURRENT MAJOR DEPRESSIVE DISORDER: Primary | ICD-10-CM

## 2021-08-24 DIAGNOSIS — F41.1 GENERALIZED ANXIETY DISORDER: ICD-10-CM

## 2021-08-24 DIAGNOSIS — N95.1 MENOPAUSE SYNDROME: Primary | ICD-10-CM

## 2021-08-24 DIAGNOSIS — N18.32 STAGE 3B CHRONIC KIDNEY DISEASE: Primary | ICD-10-CM

## 2021-08-24 PROCEDURE — 99214 PR OFFICE/OUTPT VISIT, EST, LEVL IV, 30-39 MIN: ICD-10-PCS | Mod: 95,,, | Performed by: NURSE PRACTITIONER

## 2021-08-24 PROCEDURE — 99214 OFFICE O/P EST MOD 30 MIN: CPT | Mod: 95,,, | Performed by: NURSE PRACTITIONER

## 2021-08-24 PROCEDURE — 99212 PR OFFICE/OUTPT VISIT, EST, LEVL II, 10-19 MIN: ICD-10-PCS | Mod: 95,,, | Performed by: OBSTETRICS & GYNECOLOGY

## 2021-08-24 PROCEDURE — 99212 OFFICE O/P EST SF 10 MIN: CPT | Mod: 95,,, | Performed by: OBSTETRICS & GYNECOLOGY

## 2021-08-24 PROCEDURE — 90837 PR PSYCHOTHERAPY W/PATIENT, 60 MIN: ICD-10-PCS | Mod: 95,AJ,HB, | Performed by: SOCIAL WORKER

## 2021-08-24 PROCEDURE — 90837 PSYTX W PT 60 MINUTES: CPT | Mod: 95,AJ,HB, | Performed by: SOCIAL WORKER

## 2021-08-24 RX ORDER — POTASSIUM CHLORIDE 750 MG/1
20 TABLET, EXTENDED RELEASE ORAL 2 TIMES DAILY
Qty: 120 TABLET | Refills: 2 | Status: SHIPPED | OUTPATIENT
Start: 2021-08-24 | End: 2021-11-22

## 2021-08-30 PROBLEM — J01.10 ACUTE NON-RECURRENT FRONTAL SINUSITIS: Status: RESOLVED | Noted: 2020-05-26 | Resolved: 2021-08-30

## 2021-08-31 ENCOUNTER — PATIENT MESSAGE (OUTPATIENT)
Dept: OBSTETRICS AND GYNECOLOGY | Facility: CLINIC | Age: 66
End: 2021-08-31

## 2021-08-31 ENCOUNTER — PATIENT MESSAGE (OUTPATIENT)
Dept: NEUROLOGY | Facility: CLINIC | Age: 66
End: 2021-08-31

## 2021-09-08 ENCOUNTER — PATIENT MESSAGE (OUTPATIENT)
Dept: OTOLARYNGOLOGY | Facility: CLINIC | Age: 66
End: 2021-09-08

## 2021-09-08 ENCOUNTER — PATIENT MESSAGE (OUTPATIENT)
Dept: PSYCHIATRY | Facility: CLINIC | Age: 66
End: 2021-09-08

## 2021-09-08 ENCOUNTER — PATIENT MESSAGE (OUTPATIENT)
Dept: INTERNAL MEDICINE | Facility: CLINIC | Age: 66
End: 2021-09-08

## 2021-09-08 RX ORDER — ALBUTEROL SULFATE 90 UG/1
2 AEROSOL, METERED RESPIRATORY (INHALATION) EVERY 6 HOURS PRN
Qty: 18 G | Refills: 0 | Status: SHIPPED | OUTPATIENT
Start: 2021-09-08 | End: 2023-11-08

## 2021-09-17 ENCOUNTER — PATIENT MESSAGE (OUTPATIENT)
Dept: PSYCHIATRY | Facility: CLINIC | Age: 66
End: 2021-09-17

## 2021-09-23 ENCOUNTER — PATIENT MESSAGE (OUTPATIENT)
Dept: PSYCHIATRY | Facility: CLINIC | Age: 66
End: 2021-09-23

## 2021-09-24 ENCOUNTER — PATIENT MESSAGE (OUTPATIENT)
Dept: PSYCHIATRY | Facility: CLINIC | Age: 66
End: 2021-09-24

## 2021-10-01 ENCOUNTER — LAB VISIT (OUTPATIENT)
Dept: LAB | Facility: HOSPITAL | Age: 66
End: 2021-10-01
Attending: INTERNAL MEDICINE
Payer: MEDICAID

## 2021-10-01 ENCOUNTER — PATIENT MESSAGE (OUTPATIENT)
Dept: INTERNAL MEDICINE | Facility: CLINIC | Age: 66
End: 2021-10-01

## 2021-10-01 ENCOUNTER — TELEPHONE (OUTPATIENT)
Dept: FAMILY MEDICINE | Facility: CLINIC | Age: 66
End: 2021-10-01

## 2021-10-01 DIAGNOSIS — N18.30 STAGE 3 CHRONIC KIDNEY DISEASE, UNSPECIFIED WHETHER STAGE 3A OR 3B CKD: ICD-10-CM

## 2021-10-01 LAB
ALBUMIN SERPL BCP-MCNC: 4.5 G/DL (ref 3.5–5.2)
ANION GAP SERPL CALC-SCNC: 12 MMOL/L (ref 8–16)
BASOPHILS # BLD AUTO: 0.09 K/UL (ref 0–0.2)
BASOPHILS NFR BLD: 0.9 % (ref 0–1.9)
BUN SERPL-MCNC: 24 MG/DL (ref 8–23)
CALCIUM SERPL-MCNC: 9.9 MG/DL (ref 8.7–10.5)
CHLORIDE SERPL-SCNC: 109 MMOL/L (ref 95–110)
CO2 SERPL-SCNC: 21 MMOL/L (ref 23–29)
CREAT SERPL-MCNC: 1.4 MG/DL (ref 0.5–1.4)
DIFFERENTIAL METHOD: ABNORMAL
EOSINOPHIL # BLD AUTO: 0.1 K/UL (ref 0–0.5)
EOSINOPHIL NFR BLD: 0.6 % (ref 0–8)
ERYTHROCYTE [DISTWIDTH] IN BLOOD BY AUTOMATED COUNT: 13.2 % (ref 11.5–14.5)
EST. GFR  (AFRICAN AMERICAN): 45.8 ML/MIN/1.73 M^2
EST. GFR  (NON AFRICAN AMERICAN): 39.7 ML/MIN/1.73 M^2
GLUCOSE SERPL-MCNC: 98 MG/DL (ref 70–110)
HCT VFR BLD AUTO: 45.4 % (ref 37–48.5)
HGB BLD-MCNC: 14.7 G/DL (ref 12–16)
IMM GRANULOCYTES # BLD AUTO: 0.06 K/UL (ref 0–0.04)
IMM GRANULOCYTES NFR BLD AUTO: 0.6 % (ref 0–0.5)
LYMPHOCYTES # BLD AUTO: 3.3 K/UL (ref 1–4.8)
LYMPHOCYTES NFR BLD: 32.6 % (ref 18–48)
MCH RBC QN AUTO: 31.1 PG (ref 27–31)
MCHC RBC AUTO-ENTMCNC: 32.4 G/DL (ref 32–36)
MCV RBC AUTO: 96 FL (ref 82–98)
MONOCYTES # BLD AUTO: 0.5 K/UL (ref 0.3–1)
MONOCYTES NFR BLD: 5 % (ref 4–15)
NEUTROPHILS # BLD AUTO: 6 K/UL (ref 1.8–7.7)
NEUTROPHILS NFR BLD: 60.3 % (ref 38–73)
NRBC BLD-RTO: 0 /100 WBC
PHOSPHATE SERPL-MCNC: 2.5 MG/DL (ref 2.7–4.5)
PLATELET # BLD AUTO: 349 K/UL (ref 150–450)
PMV BLD AUTO: 9.5 FL (ref 9.2–12.9)
POTASSIUM SERPL-SCNC: 3.8 MMOL/L (ref 3.5–5.1)
PTH-INTACT SERPL-MCNC: 91.9 PG/ML (ref 9–77)
RBC # BLD AUTO: 4.73 M/UL (ref 4–5.4)
SODIUM SERPL-SCNC: 142 MMOL/L (ref 136–145)
WBC # BLD AUTO: 10.02 K/UL (ref 3.9–12.7)

## 2021-10-01 PROCEDURE — 85025 COMPLETE CBC W/AUTO DIFF WBC: CPT | Mod: PO | Performed by: INTERNAL MEDICINE

## 2021-10-01 PROCEDURE — 83970 ASSAY OF PARATHORMONE: CPT | Performed by: INTERNAL MEDICINE

## 2021-10-01 PROCEDURE — 80069 RENAL FUNCTION PANEL: CPT | Mod: PO | Performed by: INTERNAL MEDICINE

## 2021-10-01 PROCEDURE — 36415 COLL VENOUS BLD VENIPUNCTURE: CPT | Mod: PO | Performed by: INTERNAL MEDICINE

## 2021-10-04 ENCOUNTER — PATIENT MESSAGE (OUTPATIENT)
Dept: INTERNAL MEDICINE | Facility: CLINIC | Age: 66
End: 2021-10-04

## 2021-10-04 ENCOUNTER — PATIENT MESSAGE (OUTPATIENT)
Dept: REHABILITATION | Facility: HOSPITAL | Age: 66
End: 2021-10-04

## 2021-10-04 ENCOUNTER — PATIENT MESSAGE (OUTPATIENT)
Dept: NEPHROLOGY | Facility: CLINIC | Age: 66
End: 2021-10-04

## 2021-10-04 ENCOUNTER — OFFICE VISIT (OUTPATIENT)
Dept: PSYCHIATRY | Facility: CLINIC | Age: 66
End: 2021-10-04
Payer: MEDICAID

## 2021-10-04 DIAGNOSIS — F41.1 GENERALIZED ANXIETY DISORDER: ICD-10-CM

## 2021-10-04 DIAGNOSIS — F33.1 MODERATE EPISODE OF RECURRENT MAJOR DEPRESSIVE DISORDER: Primary | ICD-10-CM

## 2021-10-04 PROCEDURE — 90834 PR PSYCHOTHERAPY W/PATIENT, 45 MIN: ICD-10-PCS | Mod: AJ,HB,95, | Performed by: SOCIAL WORKER

## 2021-10-04 PROCEDURE — 90834 PSYTX W PT 45 MINUTES: CPT | Mod: AJ,HB,95, | Performed by: SOCIAL WORKER

## 2021-10-05 ENCOUNTER — PATIENT OUTREACH (OUTPATIENT)
Dept: ADMINISTRATIVE | Facility: OTHER | Age: 66
End: 2021-10-05

## 2021-10-05 ENCOUNTER — OFFICE VISIT (OUTPATIENT)
Dept: NEPHROLOGY | Facility: CLINIC | Age: 66
End: 2021-10-05
Payer: MEDICAID

## 2021-10-05 DIAGNOSIS — N18.30 STAGE 3 CHRONIC KIDNEY DISEASE, UNSPECIFIED WHETHER STAGE 3A OR 3B CKD: Primary | ICD-10-CM

## 2021-10-05 PROCEDURE — 99214 PR OFFICE/OUTPT VISIT, EST, LEVL IV, 30-39 MIN: ICD-10-PCS | Mod: 95,,, | Performed by: INTERNAL MEDICINE

## 2021-10-05 PROCEDURE — 99214 OFFICE O/P EST MOD 30 MIN: CPT | Mod: 95,,, | Performed by: INTERNAL MEDICINE

## 2021-10-06 ENCOUNTER — CLINICAL SUPPORT (OUTPATIENT)
Dept: REHABILITATION | Facility: HOSPITAL | Age: 66
End: 2021-10-06
Payer: MEDICAID

## 2021-10-06 DIAGNOSIS — R53.1 DECREASED STRENGTH: ICD-10-CM

## 2021-10-06 DIAGNOSIS — G43.809 VESTIBULAR MIGRAINE: ICD-10-CM

## 2021-10-06 DIAGNOSIS — M25.60 DECREASED RANGE OF MOTION: ICD-10-CM

## 2021-10-06 PROCEDURE — 97162 PT EVAL MOD COMPLEX 30 MIN: CPT

## 2021-10-06 PROCEDURE — 97140 MANUAL THERAPY 1/> REGIONS: CPT

## 2021-10-07 ENCOUNTER — DOCUMENTATION ONLY (OUTPATIENT)
Dept: REHABILITATION | Facility: HOSPITAL | Age: 66
End: 2021-10-07

## 2021-10-07 DIAGNOSIS — M25.60 DECREASED RANGE OF MOTION: ICD-10-CM

## 2021-10-07 DIAGNOSIS — R53.1 DECREASED RANGE OF MOTION WITH DECREASED STRENGTH: ICD-10-CM

## 2021-10-07 DIAGNOSIS — R53.1 WEAKNESS: Primary | ICD-10-CM

## 2021-10-07 DIAGNOSIS — R26.89 BALANCE PROBLEM: ICD-10-CM

## 2021-10-07 DIAGNOSIS — M25.60 DECREASED RANGE OF MOTION WITH DECREASED STRENGTH: ICD-10-CM

## 2021-10-07 PROBLEM — R51.9 HEADACHE: Status: ACTIVE | Noted: 2021-10-07

## 2021-10-12 ENCOUNTER — CLINICAL SUPPORT (OUTPATIENT)
Dept: REHABILITATION | Facility: HOSPITAL | Age: 66
End: 2021-10-12
Payer: MEDICAID

## 2021-10-12 DIAGNOSIS — R51.9 CHRONIC INTRACTABLE HEADACHE, UNSPECIFIED HEADACHE TYPE: ICD-10-CM

## 2021-10-12 DIAGNOSIS — M25.60 DECREASED RANGE OF MOTION: ICD-10-CM

## 2021-10-12 DIAGNOSIS — G89.29 CHRONIC INTRACTABLE HEADACHE, UNSPECIFIED HEADACHE TYPE: ICD-10-CM

## 2021-10-12 DIAGNOSIS — R53.1 DECREASED STRENGTH: ICD-10-CM

## 2021-10-12 PROCEDURE — 97140 MANUAL THERAPY 1/> REGIONS: CPT | Performed by: PHYSICAL THERAPIST

## 2021-10-12 PROCEDURE — 97112 NEUROMUSCULAR REEDUCATION: CPT | Performed by: PHYSICAL THERAPIST

## 2021-10-13 ENCOUNTER — PATIENT MESSAGE (OUTPATIENT)
Dept: NEUROLOGY | Facility: CLINIC | Age: 66
End: 2021-10-13

## 2021-10-13 ENCOUNTER — PATIENT MESSAGE (OUTPATIENT)
Dept: NEUROLOGY | Facility: CLINIC | Age: 66
End: 2021-10-13
Payer: MEDICAID

## 2021-10-13 ENCOUNTER — PATIENT MESSAGE (OUTPATIENT)
Dept: ORTHOPEDICS | Facility: CLINIC | Age: 66
End: 2021-10-13
Payer: MEDICAID

## 2021-10-13 ENCOUNTER — CLINICAL SUPPORT (OUTPATIENT)
Dept: REHABILITATION | Facility: HOSPITAL | Age: 66
End: 2021-10-13
Payer: MEDICAID

## 2021-10-13 DIAGNOSIS — R51.9 CHRONIC INTRACTABLE HEADACHE, UNSPECIFIED HEADACHE TYPE: ICD-10-CM

## 2021-10-13 DIAGNOSIS — G89.29 CHRONIC INTRACTABLE HEADACHE, UNSPECIFIED HEADACHE TYPE: ICD-10-CM

## 2021-10-13 DIAGNOSIS — R53.1 DECREASED STRENGTH: ICD-10-CM

## 2021-10-13 DIAGNOSIS — M25.60 DECREASED RANGE OF MOTION: ICD-10-CM

## 2021-10-13 PROCEDURE — 97110 THERAPEUTIC EXERCISES: CPT | Performed by: PHYSICAL THERAPIST

## 2021-10-15 ENCOUNTER — OFFICE VISIT (OUTPATIENT)
Dept: PSYCHIATRY | Facility: CLINIC | Age: 66
End: 2021-10-15
Payer: MEDICAID

## 2021-10-15 DIAGNOSIS — F41.1 GENERALIZED ANXIETY DISORDER: ICD-10-CM

## 2021-10-15 DIAGNOSIS — F33.1 MODERATE EPISODE OF RECURRENT MAJOR DEPRESSIVE DISORDER: Primary | ICD-10-CM

## 2021-10-15 PROCEDURE — 90834 PR PSYCHOTHERAPY W/PATIENT, 45 MIN: ICD-10-PCS | Mod: AJ,HB,95, | Performed by: SOCIAL WORKER

## 2021-10-15 PROCEDURE — 90834 PSYTX W PT 45 MINUTES: CPT | Mod: AJ,HB,95, | Performed by: SOCIAL WORKER

## 2021-10-18 ENCOUNTER — PATIENT MESSAGE (OUTPATIENT)
Dept: NEUROLOGY | Facility: CLINIC | Age: 66
End: 2021-10-18
Payer: MEDICAID

## 2021-10-18 ENCOUNTER — PATIENT MESSAGE (OUTPATIENT)
Dept: PSYCHIATRY | Facility: CLINIC | Age: 66
End: 2021-10-18
Payer: MEDICAID

## 2021-10-19 ENCOUNTER — PATIENT MESSAGE (OUTPATIENT)
Dept: ORTHOPEDICS | Facility: CLINIC | Age: 66
End: 2021-10-19
Payer: MEDICAID

## 2021-10-19 ENCOUNTER — OFFICE VISIT (OUTPATIENT)
Dept: ALLERGY | Facility: CLINIC | Age: 66
End: 2021-10-19
Payer: MEDICAID

## 2021-10-19 ENCOUNTER — LAB VISIT (OUTPATIENT)
Dept: LAB | Facility: HOSPITAL | Age: 66
End: 2021-10-19
Attending: ALLERGY & IMMUNOLOGY
Payer: MEDICAID

## 2021-10-19 ENCOUNTER — CLINICAL SUPPORT (OUTPATIENT)
Dept: REHABILITATION | Facility: HOSPITAL | Age: 66
End: 2021-10-19
Payer: MEDICAID

## 2021-10-19 VITALS
DIASTOLIC BLOOD PRESSURE: 72 MMHG | SYSTOLIC BLOOD PRESSURE: 115 MMHG | HEIGHT: 62 IN | BODY MASS INDEX: 22.68 KG/M2 | WEIGHT: 123.25 LBS | TEMPERATURE: 97 F | HEART RATE: 77 BPM

## 2021-10-19 DIAGNOSIS — G89.29 CHRONIC INTRACTABLE HEADACHE, UNSPECIFIED HEADACHE TYPE: ICD-10-CM

## 2021-10-19 DIAGNOSIS — M25.60 DECREASED RANGE OF MOTION: ICD-10-CM

## 2021-10-19 DIAGNOSIS — R51.9 CHRONIC INTRACTABLE HEADACHE, UNSPECIFIED HEADACHE TYPE: ICD-10-CM

## 2021-10-19 DIAGNOSIS — J30.89 NON-SEASONAL ALLERGIC RHINITIS, UNSPECIFIED TRIGGER: Primary | ICD-10-CM

## 2021-10-19 DIAGNOSIS — R53.1 DECREASED STRENGTH: ICD-10-CM

## 2021-10-19 DIAGNOSIS — Z87.891 EX-SMOKER: ICD-10-CM

## 2021-10-19 DIAGNOSIS — J30.89 NON-SEASONAL ALLERGIC RHINITIS, UNSPECIFIED TRIGGER: ICD-10-CM

## 2021-10-19 LAB — IGE SERPL-ACNC: 102 IU/ML (ref 0–100)

## 2021-10-19 PROCEDURE — 99204 PR OFFICE/OUTPT VISIT, NEW, LEVL IV, 45-59 MIN: ICD-10-PCS | Mod: S$PBB,,, | Performed by: ALLERGY & IMMUNOLOGY

## 2021-10-19 PROCEDURE — 99215 OFFICE O/P EST HI 40 MIN: CPT | Mod: PBBFAC | Performed by: ALLERGY & IMMUNOLOGY

## 2021-10-19 PROCEDURE — 36415 COLL VENOUS BLD VENIPUNCTURE: CPT | Performed by: ALLERGY & IMMUNOLOGY

## 2021-10-19 PROCEDURE — 97110 THERAPEUTIC EXERCISES: CPT

## 2021-10-19 PROCEDURE — 99999 PR PBB SHADOW E&M-EST. PATIENT-LVL V: CPT | Mod: PBBFAC,,, | Performed by: ALLERGY & IMMUNOLOGY

## 2021-10-19 PROCEDURE — 86003 ALLG SPEC IGE CRUDE XTRC EA: CPT | Mod: 59 | Performed by: ALLERGY & IMMUNOLOGY

## 2021-10-19 PROCEDURE — 82785 ASSAY OF IGE: CPT | Performed by: ALLERGY & IMMUNOLOGY

## 2021-10-19 PROCEDURE — 99204 OFFICE O/P NEW MOD 45 MIN: CPT | Mod: S$PBB,,, | Performed by: ALLERGY & IMMUNOLOGY

## 2021-10-19 PROCEDURE — 86003 ALLG SPEC IGE CRUDE XTRC EA: CPT | Performed by: ALLERGY & IMMUNOLOGY

## 2021-10-19 PROCEDURE — 99999 PR PBB SHADOW E&M-EST. PATIENT-LVL V: ICD-10-PCS | Mod: PBBFAC,,, | Performed by: ALLERGY & IMMUNOLOGY

## 2021-10-20 DIAGNOSIS — G89.29 CHRONIC PAIN OF LEFT KNEE: ICD-10-CM

## 2021-10-20 DIAGNOSIS — M25.562 CHRONIC PAIN OF LEFT KNEE: ICD-10-CM

## 2021-10-20 DIAGNOSIS — M17.12 ARTHRITIS OF KNEE, LEFT: Primary | ICD-10-CM

## 2021-10-21 ENCOUNTER — PATIENT MESSAGE (OUTPATIENT)
Dept: ALLERGY | Facility: CLINIC | Age: 66
End: 2021-10-21
Payer: MEDICAID

## 2021-10-21 ENCOUNTER — CLINICAL SUPPORT (OUTPATIENT)
Dept: REHABILITATION | Facility: HOSPITAL | Age: 66
End: 2021-10-21
Payer: MEDICAID

## 2021-10-21 DIAGNOSIS — M25.562 PAIN IN BOTH KNEES, UNSPECIFIED CHRONICITY: Primary | ICD-10-CM

## 2021-10-21 DIAGNOSIS — R51.9 CHRONIC INTRACTABLE HEADACHE, UNSPECIFIED HEADACHE TYPE: ICD-10-CM

## 2021-10-21 DIAGNOSIS — M25.561 PAIN IN BOTH KNEES, UNSPECIFIED CHRONICITY: Primary | ICD-10-CM

## 2021-10-21 DIAGNOSIS — G89.29 CHRONIC INTRACTABLE HEADACHE, UNSPECIFIED HEADACHE TYPE: ICD-10-CM

## 2021-10-21 DIAGNOSIS — R53.1 DECREASED STRENGTH: ICD-10-CM

## 2021-10-21 DIAGNOSIS — M25.60 DECREASED RANGE OF MOTION: ICD-10-CM

## 2021-10-21 LAB — AMER SYCAMORE IGE QN: <0.35 KU/L

## 2021-10-21 PROCEDURE — 97110 THERAPEUTIC EXERCISES: CPT | Performed by: PHYSICAL THERAPIST

## 2021-10-22 ENCOUNTER — PATIENT MESSAGE (OUTPATIENT)
Dept: ORTHOPEDICS | Facility: CLINIC | Age: 66
End: 2021-10-22

## 2021-10-22 ENCOUNTER — OFFICE VISIT (OUTPATIENT)
Dept: ORTHOPEDICS | Facility: CLINIC | Age: 66
End: 2021-10-22
Payer: MEDICAID

## 2021-10-22 ENCOUNTER — HOSPITAL ENCOUNTER (OUTPATIENT)
Dept: RADIOLOGY | Facility: HOSPITAL | Age: 66
Discharge: HOME | End: 2021-10-22
Attending: ORTHOPAEDIC SURGERY
Payer: MEDICAID

## 2021-10-22 VITALS — SYSTOLIC BLOOD PRESSURE: 133 MMHG | HEART RATE: 85 BPM | DIASTOLIC BLOOD PRESSURE: 72 MMHG

## 2021-10-22 DIAGNOSIS — M21.162 ACQUIRED VARUS DEFORMITY KNEE, LEFT: ICD-10-CM

## 2021-10-22 DIAGNOSIS — M25.562 PAIN IN BOTH KNEES, UNSPECIFIED CHRONICITY: ICD-10-CM

## 2021-10-22 DIAGNOSIS — M17.12 PRIMARY OSTEOARTHRITIS OF LEFT KNEE: Primary | ICD-10-CM

## 2021-10-22 DIAGNOSIS — M25.562 CHRONIC PAIN OF LEFT KNEE: ICD-10-CM

## 2021-10-22 DIAGNOSIS — M25.561 PAIN IN BOTH KNEES, UNSPECIFIED CHRONICITY: ICD-10-CM

## 2021-10-22 DIAGNOSIS — G89.29 CHRONIC PAIN OF LEFT KNEE: ICD-10-CM

## 2021-10-22 DIAGNOSIS — M17.12 ARTHRITIS OF KNEE, LEFT: Primary | ICD-10-CM

## 2021-10-22 LAB
A ALTERNATA IGE QN: <0.1 KU/L
A FUMIGATUS IGE QN: <0.1 KU/L
ALLERGEN BOXELDER MAPLE TREE IGE: <0.1 KU/L
ALLERGEN CHAETOMIUM GLOBOSUM IGE: <0.1 KU/L
ALLERGEN MAPLE (BOX ELDER) CLASS: NORMAL
ALLERGEN MULBERRY CLASS: NORMAL
ALLERGEN MULBERRY TREE IGE: <0.1 KU/L
ALLERGEN WHITE ASH TREE IGE: <0.1 KU/L
ALLERGEN WHITE PINE TREE IGE: <0.1 KU/L
BAHIA GRASS IGE QN: 0.54 KU/L
BALD CYPRESS IGE QN: <0.1 KU/L
C HERBARUM IGE QN: <0.1 KU/L
C LUNATA IGE QN: <0.1 KU/L
CAT DANDER IGE QN: <0.1 KU/L
CHAETOMIUM GLOB. CLASS: NORMAL
COCKLEBUR IGE QN: <0.1 KU/L
COCKSFOOT IGE QN: 0.13 KU/L
COMMON RAGWEED IGE QN: <0.1 KU/L
COTTONWOOD IGE QN: <0.1 KU/L
D FARINAE IGE QN: 0.14 KU/L
D PTERONYSS IGE QN: 0.18 KU/L
DEPRECATED A ALTERNATA IGE RAST QL: NORMAL
DEPRECATED A FUMIGATUS IGE RAST QL: NORMAL
DEPRECATED BAHIA GRASS IGE RAST QL: ABNORMAL
DEPRECATED BALD CYPRESS IGE RAST QL: NORMAL
DEPRECATED C HERBARUM IGE RAST QL: NORMAL
DEPRECATED C LUNATA IGE RAST QL: NORMAL
DEPRECATED CAT DANDER IGE RAST QL: NORMAL
DEPRECATED COCKLEBUR IGE RAST QL: NORMAL
DEPRECATED COCKSFOOT IGE RAST QL: ABNORMAL
DEPRECATED COMMON RAGWEED IGE RAST QL: NORMAL
DEPRECATED COTTONWOOD IGE RAST QL: NORMAL
DEPRECATED D FARINAE IGE RAST QL: ABNORMAL
DEPRECATED D PTERONYSS IGE RAST QL: ABNORMAL
DEPRECATED DOG DANDER IGE RAST QL: NORMAL
DEPRECATED ELDER IGE RAST QL: ABNORMAL
DEPRECATED ENGL PLANTAIN IGE RAST QL: NORMAL
DEPRECATED GUM-TREE IGE RAST QL: NORMAL
DEPRECATED HACKBERRY TREE IGE RAST QL: ABNORMAL
DEPRECATED JOHNSON GRASS IGE RAST QL: ABNORMAL
DEPRECATED KENT BLUE GRASS IGE RAST QL: NORMAL
DEPRECATED LONDON PLANE IGE RAST QL: ABNORMAL
DEPRECATED MUGWORT IGE RAST QL: ABNORMAL
DEPRECATED NETTLE IGE RAST QL: NORMAL
DEPRECATED PECAN/HICK TREE IGE RAST QL: NORMAL
DEPRECATED PER RYE GRASS IGE RAST QL: NORMAL
DEPRECATED PRIVET IGE RAST QL: NORMAL
DEPRECATED RED TOP GRASS IGE RAST QL: NORMAL
DEPRECATED ROACH IGE RAST QL: NORMAL
DEPRECATED SALTWORT IGE RAST QL: NORMAL
DEPRECATED SHEEP SORREL IGE RAST QL: NORMAL
DEPRECATED SILVER BIRCH IGE RAST QL: NORMAL
DEPRECATED TIMOTHY IGE RAST QL: ABNORMAL
DEPRECATED WHITE OAK IGE RAST QL: NORMAL
DEPRECATED WILLOW IGE RAST QL: NORMAL
DOG DANDER IGE QN: <0.1 KU/L
ELDER IGE QN: 0.21 KU/L
ELM CEDAR CLASS: ABNORMAL
ELM CEDAR, IGE: 0.12 KU/L
ENGL PLANTAIN IGE QN: <0.1 KU/L
GUM-TREE IGE QN: <0.1 KU/L
HACKBERRY TREE IGE QN: 0.15 KU/L
JOHNSON GRASS IGE QN: 0.15 KU/L
KENT BLUE GRASS IGE QN: <0.1 KU/L
LONDON PLANE IGE QN: 0.18 KU/L
MUGWORT IGE QN: 0.19 KU/L
NETTLE IGE QN: <0.1 KU/L
PECAN/HICK TREE IGE QN: <0.1 KU/L
PER RYE GRASS IGE QN: <0.1 KU/L
PRIVET IGE QN: <0.1 KU/L
RED TOP GRASS IGE QN: <0.1 KU/L
ROACH IGE QN: <0.1 KU/L
SALTWORT IGE QN: <0.1 KU/L
SHEEP SORREL IGE QN: <0.1 KU/L
SILVER BIRCH IGE QN: <0.1 KU/L
STEMPHYLIUM HERBARUM CLASS: NORMAL
STEMPHYLLIUM, IGE: <0.1 KU/L
TIMOTHY IGE QN: 0.16 KU/L
WHITE ASH CLASS: NORMAL
WHITE OAK IGE QN: <0.1 KU/L
WHITE PINE CLASS: NORMAL
WILLOW IGE QN: <0.1 KU/L

## 2021-10-22 PROCEDURE — 99999 PR PBB SHADOW E&M-EST. PATIENT-LVL IV: CPT | Mod: PBBFAC,,, | Performed by: PHYSICIAN ASSISTANT

## 2021-10-22 PROCEDURE — 99999 PR PBB SHADOW E&M-EST. PATIENT-LVL IV: ICD-10-PCS | Mod: PBBFAC,,, | Performed by: PHYSICIAN ASSISTANT

## 2021-10-22 PROCEDURE — 73564 X-RAY EXAM KNEE 4 OR MORE: CPT | Mod: TC,50

## 2021-10-22 PROCEDURE — 99214 PR OFFICE/OUTPT VISIT, EST, LEVL IV, 30-39 MIN: ICD-10-PCS | Mod: S$PBB,,, | Performed by: PHYSICIAN ASSISTANT

## 2021-10-22 PROCEDURE — 73564 XR KNEE ORTHO BILAT WITH FLEXION: ICD-10-PCS | Mod: 26,50,, | Performed by: RADIOLOGY

## 2021-10-22 PROCEDURE — 99214 OFFICE O/P EST MOD 30 MIN: CPT | Mod: S$PBB,,, | Performed by: PHYSICIAN ASSISTANT

## 2021-10-22 PROCEDURE — 73564 X-RAY EXAM KNEE 4 OR MORE: CPT | Mod: 26,50,, | Performed by: RADIOLOGY

## 2021-10-22 PROCEDURE — 99214 OFFICE O/P EST MOD 30 MIN: CPT | Mod: PBBFAC | Performed by: PHYSICIAN ASSISTANT

## 2021-10-22 RX ORDER — LIDOCAINE HYDROCHLORIDE 10 MG/ML
5 INJECTION INFILTRATION; PERINEURAL
Status: DISCONTINUED | OUTPATIENT
Start: 2021-10-22 | End: 2021-10-22 | Stop reason: HOSPADM

## 2021-10-22 RX ORDER — METHYLPREDNISOLONE ACETATE 80 MG/ML
40 INJECTION, SUSPENSION INTRA-ARTICULAR; INTRALESIONAL; INTRAMUSCULAR; SOFT TISSUE
Status: DISCONTINUED | OUTPATIENT
Start: 2021-10-22 | End: 2021-10-22 | Stop reason: HOSPADM

## 2021-10-22 RX ADMIN — LIDOCAINE HYDROCHLORIDE 5 ML: 10 INJECTION, SOLUTION INFILTRATION; PERINEURAL at 02:10

## 2021-10-22 RX ADMIN — METHYLPREDNISOLONE ACETATE 40 MG: 80 INJECTION, SUSPENSION INTRALESIONAL; INTRAMUSCULAR; INTRASYNOVIAL; SOFT TISSUE at 02:10

## 2021-10-25 ENCOUNTER — LAB VISIT (OUTPATIENT)
Dept: LAB | Facility: HOSPITAL | Age: 66
End: 2021-10-25
Attending: FAMILY MEDICINE
Payer: MEDICAID

## 2021-10-25 ENCOUNTER — OFFICE VISIT (OUTPATIENT)
Dept: INTERNAL MEDICINE | Facility: CLINIC | Age: 66
End: 2021-10-25
Payer: MEDICAID

## 2021-10-25 ENCOUNTER — PATIENT MESSAGE (OUTPATIENT)
Dept: ALLERGY | Facility: CLINIC | Age: 66
End: 2021-10-25
Payer: MEDICAID

## 2021-10-25 VITALS
OXYGEN SATURATION: 99 % | WEIGHT: 121.69 LBS | RESPIRATION RATE: 16 BRPM | HEIGHT: 62 IN | HEART RATE: 100 BPM | DIASTOLIC BLOOD PRESSURE: 62 MMHG | SYSTOLIC BLOOD PRESSURE: 130 MMHG | TEMPERATURE: 98 F | BODY MASS INDEX: 22.39 KG/M2

## 2021-10-25 DIAGNOSIS — N18.30 STAGE 3 CHRONIC KIDNEY DISEASE, UNSPECIFIED WHETHER STAGE 3A OR 3B CKD: ICD-10-CM

## 2021-10-25 DIAGNOSIS — Z87.891 HISTORY OF TOBACCO ABUSE: ICD-10-CM

## 2021-10-25 DIAGNOSIS — Z00.00 ROUTINE HEALTH MAINTENANCE: Primary | ICD-10-CM

## 2021-10-25 DIAGNOSIS — Z00.00 ROUTINE HEALTH MAINTENANCE: ICD-10-CM

## 2021-10-25 PROBLEM — Z12.11 ENCOUNTER FOR SCREENING COLONOSCOPY: Status: RESOLVED | Noted: 2021-03-15 | Resolved: 2021-10-25

## 2021-10-25 PROCEDURE — 80061 LIPID PANEL: CPT | Performed by: FAMILY MEDICINE

## 2021-10-25 PROCEDURE — 99999 PR PBB SHADOW E&M-EST. PATIENT-LVL V: CPT | Mod: PBBFAC,,, | Performed by: FAMILY MEDICINE

## 2021-10-25 PROCEDURE — 99999 PR PBB SHADOW E&M-EST. PATIENT-LVL V: ICD-10-PCS | Mod: PBBFAC,,, | Performed by: FAMILY MEDICINE

## 2021-10-25 PROCEDURE — 99396 PREV VISIT EST AGE 40-64: CPT | Mod: S$PBB,,, | Performed by: FAMILY MEDICINE

## 2021-10-25 PROCEDURE — 36415 COLL VENOUS BLD VENIPUNCTURE: CPT | Mod: PO | Performed by: FAMILY MEDICINE

## 2021-10-25 PROCEDURE — 90471 IMMUNIZATION ADMIN: CPT | Mod: PBBFAC,PO

## 2021-10-25 PROCEDURE — 99215 OFFICE O/P EST HI 40 MIN: CPT | Mod: PBBFAC,PO | Performed by: FAMILY MEDICINE

## 2021-10-25 PROCEDURE — 99396 PR PREVENTIVE VISIT,EST,40-64: ICD-10-PCS | Mod: S$PBB,,, | Performed by: FAMILY MEDICINE

## 2021-10-26 ENCOUNTER — PATIENT MESSAGE (OUTPATIENT)
Dept: NEUROLOGY | Facility: CLINIC | Age: 66
End: 2021-10-26
Payer: MEDICAID

## 2021-10-26 ENCOUNTER — PATIENT MESSAGE (OUTPATIENT)
Dept: REHABILITATION | Facility: HOSPITAL | Age: 66
End: 2021-10-26
Payer: MEDICAID

## 2021-10-26 DIAGNOSIS — G44.89 CHRONIC MIXED HEADACHE SYNDROME: Primary | ICD-10-CM

## 2021-10-26 LAB
CHOLEST SERPL-MCNC: 183 MG/DL (ref 120–199)
CHOLEST/HDLC SERPL: 4.7 {RATIO} (ref 2–5)
HDLC SERPL-MCNC: 39 MG/DL (ref 40–75)
HDLC SERPL: 21.3 % (ref 20–50)
LDLC SERPL CALC-MCNC: 110 MG/DL (ref 63–159)
NONHDLC SERPL-MCNC: 144 MG/DL
TRIGL SERPL-MCNC: 170 MG/DL (ref 30–150)

## 2021-10-27 ENCOUNTER — PATIENT MESSAGE (OUTPATIENT)
Dept: PSYCHIATRY | Facility: CLINIC | Age: 66
End: 2021-10-27
Payer: MEDICAID

## 2021-10-27 ENCOUNTER — TELEPHONE (OUTPATIENT)
Dept: PHARMACY | Facility: CLINIC | Age: 66
End: 2021-10-27
Payer: MEDICAID

## 2021-10-27 ENCOUNTER — TELEPHONE (OUTPATIENT)
Dept: PSYCHIATRY | Facility: CLINIC | Age: 66
End: 2021-10-27
Payer: MEDICAID

## 2021-10-28 ENCOUNTER — PATIENT MESSAGE (OUTPATIENT)
Dept: REHABILITATION | Facility: HOSPITAL | Age: 66
End: 2021-10-28
Payer: MEDICAID

## 2021-11-04 ENCOUNTER — PATIENT MESSAGE (OUTPATIENT)
Dept: PSYCHIATRY | Facility: CLINIC | Age: 66
End: 2021-11-04
Payer: MEDICAID

## 2021-11-05 ENCOUNTER — PATIENT MESSAGE (OUTPATIENT)
Dept: PSYCHIATRY | Facility: CLINIC | Age: 66
End: 2021-11-05
Payer: MEDICAID

## 2021-11-08 ENCOUNTER — CLINICAL SUPPORT (OUTPATIENT)
Dept: REHABILITATION | Facility: HOSPITAL | Age: 66
End: 2021-11-08
Payer: MEDICAID

## 2021-11-08 ENCOUNTER — PATIENT MESSAGE (OUTPATIENT)
Dept: REHABILITATION | Facility: HOSPITAL | Age: 66
End: 2021-11-08

## 2021-11-08 DIAGNOSIS — R51.9 CHRONIC INTRACTABLE HEADACHE, UNSPECIFIED HEADACHE TYPE: ICD-10-CM

## 2021-11-08 DIAGNOSIS — M25.60 DECREASED RANGE OF MOTION: ICD-10-CM

## 2021-11-08 DIAGNOSIS — G44.89 CHRONIC MIXED HEADACHE SYNDROME: ICD-10-CM

## 2021-11-08 DIAGNOSIS — R53.1 DECREASED STRENGTH: ICD-10-CM

## 2021-11-08 DIAGNOSIS — G89.29 CHRONIC INTRACTABLE HEADACHE, UNSPECIFIED HEADACHE TYPE: ICD-10-CM

## 2021-11-08 PROCEDURE — 97110 THERAPEUTIC EXERCISES: CPT

## 2021-11-08 RX ORDER — ERENUMAB-AOOE 140 MG/ML
140 INJECTION, SOLUTION SUBCUTANEOUS
Qty: 1 ML | Refills: 11 | Status: SHIPPED | OUTPATIENT
Start: 2021-11-08 | End: 2022-09-23 | Stop reason: SDUPTHER

## 2021-11-10 ENCOUNTER — CLINICAL SUPPORT (OUTPATIENT)
Dept: REHABILITATION | Facility: HOSPITAL | Age: 66
End: 2021-11-10
Payer: MEDICAID

## 2021-11-10 ENCOUNTER — PATIENT MESSAGE (OUTPATIENT)
Dept: ORTHOPEDICS | Facility: CLINIC | Age: 66
End: 2021-11-10
Payer: MEDICAID

## 2021-11-10 DIAGNOSIS — R51.9 CHRONIC INTRACTABLE HEADACHE, UNSPECIFIED HEADACHE TYPE: ICD-10-CM

## 2021-11-10 DIAGNOSIS — G89.29 CHRONIC INTRACTABLE HEADACHE, UNSPECIFIED HEADACHE TYPE: ICD-10-CM

## 2021-11-10 DIAGNOSIS — M25.60 DECREASED RANGE OF MOTION: ICD-10-CM

## 2021-11-10 DIAGNOSIS — R53.1 DECREASED STRENGTH: ICD-10-CM

## 2021-11-10 PROCEDURE — 97110 THERAPEUTIC EXERCISES: CPT | Performed by: PHYSICAL THERAPIST

## 2021-11-12 ENCOUNTER — OFFICE VISIT (OUTPATIENT)
Dept: PSYCHIATRY | Facility: CLINIC | Age: 66
End: 2021-11-12
Payer: MEDICAID

## 2021-11-12 DIAGNOSIS — F41.1 GENERALIZED ANXIETY DISORDER: ICD-10-CM

## 2021-11-12 DIAGNOSIS — F33.1 MODERATE EPISODE OF RECURRENT MAJOR DEPRESSIVE DISORDER: Primary | ICD-10-CM

## 2021-11-12 PROCEDURE — 90834 PSYTX W PT 45 MINUTES: CPT | Mod: AJ,HB,95, | Performed by: SOCIAL WORKER

## 2021-11-12 PROCEDURE — 90834 PR PSYCHOTHERAPY W/PATIENT, 45 MIN: ICD-10-PCS | Mod: AJ,HB,95, | Performed by: SOCIAL WORKER

## 2021-11-15 ENCOUNTER — CLINICAL SUPPORT (OUTPATIENT)
Dept: REHABILITATION | Facility: HOSPITAL | Age: 66
End: 2021-11-15
Payer: MEDICAID

## 2021-11-15 ENCOUNTER — PROCEDURE VISIT (OUTPATIENT)
Dept: NEUROLOGY | Facility: CLINIC | Age: 66
End: 2021-11-15
Payer: MEDICAID

## 2021-11-15 VITALS
BODY MASS INDEX: 23.13 KG/M2 | DIASTOLIC BLOOD PRESSURE: 78 MMHG | SYSTOLIC BLOOD PRESSURE: 140 MMHG | RESPIRATION RATE: 16 BRPM | HEIGHT: 62 IN | OXYGEN SATURATION: 99 % | WEIGHT: 125.69 LBS | HEART RATE: 62 BPM

## 2021-11-15 DIAGNOSIS — R53.1 DECREASED STRENGTH: ICD-10-CM

## 2021-11-15 DIAGNOSIS — R51.9 CHRONIC INTRACTABLE HEADACHE, UNSPECIFIED HEADACHE TYPE: ICD-10-CM

## 2021-11-15 DIAGNOSIS — M25.60 DECREASED RANGE OF MOTION: ICD-10-CM

## 2021-11-15 DIAGNOSIS — G44.89 CHRONIC MIXED HEADACHE SYNDROME: Primary | ICD-10-CM

## 2021-11-15 DIAGNOSIS — G89.29 CHRONIC INTRACTABLE HEADACHE, UNSPECIFIED HEADACHE TYPE: ICD-10-CM

## 2021-11-15 PROCEDURE — 64615 CHEMODENERV MUSC MIGRAINE: CPT | Mod: PBBFAC | Performed by: NURSE PRACTITIONER

## 2021-11-15 PROCEDURE — 64615 CHEMODENERV MUSC MIGRAINE: CPT | Mod: PBBFAC | Performed by: PSYCHIATRY & NEUROLOGY

## 2021-11-15 PROCEDURE — 64615 PR CHEMODENERVATION OF MUSCLE FOR CHRONIC MIGRAINE: ICD-10-PCS | Mod: S$PBB,,, | Performed by: PSYCHIATRY & NEUROLOGY

## 2021-11-15 PROCEDURE — 97110 THERAPEUTIC EXERCISES: CPT

## 2021-11-15 PROCEDURE — 64615 CHEMODENERV MUSC MIGRAINE: CPT | Mod: S$PBB,,, | Performed by: PSYCHIATRY & NEUROLOGY

## 2021-11-15 PROCEDURE — 64615 CHEMODENERV MUSC MIGRAINE: CPT | Mod: S$PBB,,, | Performed by: NURSE PRACTITIONER

## 2021-11-15 PROCEDURE — 64615 PR CHEMODENERVATION OF MUSCLE FOR CHRONIC MIGRAINE: ICD-10-PCS | Mod: S$PBB,,, | Performed by: NURSE PRACTITIONER

## 2021-11-15 RX ADMIN — ONABOTULINUMTOXINA 200 UNITS: 100 INJECTION, POWDER, LYOPHILIZED, FOR SOLUTION INTRADERMAL; INTRAMUSCULAR at 02:11

## 2021-11-17 ENCOUNTER — CLINICAL SUPPORT (OUTPATIENT)
Dept: REHABILITATION | Facility: HOSPITAL | Age: 66
End: 2021-11-17
Payer: MEDICAID

## 2021-11-17 DIAGNOSIS — R51.9 CHRONIC INTRACTABLE HEADACHE, UNSPECIFIED HEADACHE TYPE: ICD-10-CM

## 2021-11-17 DIAGNOSIS — M25.60 DECREASED RANGE OF MOTION: ICD-10-CM

## 2021-11-17 DIAGNOSIS — G89.29 CHRONIC INTRACTABLE HEADACHE, UNSPECIFIED HEADACHE TYPE: ICD-10-CM

## 2021-11-17 DIAGNOSIS — R53.1 DECREASED STRENGTH: ICD-10-CM

## 2021-11-17 PROCEDURE — 97110 THERAPEUTIC EXERCISES: CPT

## 2021-11-18 ENCOUNTER — TELEPHONE (OUTPATIENT)
Dept: INTERNAL MEDICINE | Facility: CLINIC | Age: 66
End: 2021-11-18
Payer: MEDICAID

## 2021-11-19 ENCOUNTER — OFFICE VISIT (OUTPATIENT)
Dept: ORTHOPEDICS | Facility: CLINIC | Age: 66
End: 2021-11-19
Payer: MEDICAID

## 2021-11-19 DIAGNOSIS — M12.811 ROTATOR CUFF TEAR ARTHROPATHY OF RIGHT SHOULDER: Primary | ICD-10-CM

## 2021-11-19 DIAGNOSIS — M75.101 ROTATOR CUFF TEAR ARTHROPATHY OF RIGHT SHOULDER: Primary | ICD-10-CM

## 2021-11-19 PROCEDURE — 20610 LARGE JOINT ASPIRATION/INJECTION: R SUBACROMIAL BURSA: ICD-10-PCS | Mod: S$PBB,RT,, | Performed by: STUDENT IN AN ORGANIZED HEALTH CARE EDUCATION/TRAINING PROGRAM

## 2021-11-19 PROCEDURE — 99999 PR PBB SHADOW E&M-EST. PATIENT-LVL IV: CPT | Mod: PBBFAC,,, | Performed by: STUDENT IN AN ORGANIZED HEALTH CARE EDUCATION/TRAINING PROGRAM

## 2021-11-19 PROCEDURE — 99214 OFFICE O/P EST MOD 30 MIN: CPT | Mod: PBBFAC,25 | Performed by: STUDENT IN AN ORGANIZED HEALTH CARE EDUCATION/TRAINING PROGRAM

## 2021-11-19 PROCEDURE — 99213 OFFICE O/P EST LOW 20 MIN: CPT | Mod: S$PBB,25,, | Performed by: STUDENT IN AN ORGANIZED HEALTH CARE EDUCATION/TRAINING PROGRAM

## 2021-11-19 PROCEDURE — 99999 PR PBB SHADOW E&M-EST. PATIENT-LVL IV: ICD-10-PCS | Mod: PBBFAC,,, | Performed by: STUDENT IN AN ORGANIZED HEALTH CARE EDUCATION/TRAINING PROGRAM

## 2021-11-19 PROCEDURE — 99213 PR OFFICE/OUTPT VISIT, EST, LEVL III, 20-29 MIN: ICD-10-PCS | Mod: S$PBB,25,, | Performed by: STUDENT IN AN ORGANIZED HEALTH CARE EDUCATION/TRAINING PROGRAM

## 2021-11-19 PROCEDURE — 20610 DRAIN/INJ JOINT/BURSA W/O US: CPT | Mod: PBBFAC,RT | Performed by: STUDENT IN AN ORGANIZED HEALTH CARE EDUCATION/TRAINING PROGRAM

## 2021-11-19 RX ADMIN — TRIAMCINOLONE ACETONIDE 40 MG: 200 INJECTION, SUSPENSION INTRA-ARTICULAR; INTRAMUSCULAR at 11:11

## 2021-11-21 ENCOUNTER — PATIENT MESSAGE (OUTPATIENT)
Dept: REHABILITATION | Facility: HOSPITAL | Age: 66
End: 2021-11-21
Payer: MEDICAID

## 2021-11-22 RX ORDER — TRIAMCINOLONE ACETONIDE 40 MG/ML
40 INJECTION, SUSPENSION INTRA-ARTICULAR; INTRAMUSCULAR
Status: DISCONTINUED | OUTPATIENT
Start: 2021-11-19 | End: 2021-11-22 | Stop reason: HOSPADM

## 2021-11-23 ENCOUNTER — OFFICE VISIT (OUTPATIENT)
Dept: FAMILY MEDICINE | Facility: CLINIC | Age: 66
End: 2021-11-23
Payer: MEDICAID

## 2021-11-23 ENCOUNTER — TELEPHONE (OUTPATIENT)
Dept: INTERNAL MEDICINE | Facility: CLINIC | Age: 66
End: 2021-11-23
Payer: MEDICAID

## 2021-11-23 DIAGNOSIS — J32.9 SINOBRONCHITIS: Primary | ICD-10-CM

## 2021-11-23 DIAGNOSIS — J40 SINOBRONCHITIS: Primary | ICD-10-CM

## 2021-11-23 PROCEDURE — 99213 PR OFFICE/OUTPT VISIT, EST, LEVL III, 20-29 MIN: ICD-10-PCS | Mod: 95,,, | Performed by: NURSE PRACTITIONER

## 2021-11-23 PROCEDURE — 99213 OFFICE O/P EST LOW 20 MIN: CPT | Mod: 95,,, | Performed by: NURSE PRACTITIONER

## 2021-11-23 RX ORDER — METHYLPREDNISOLONE 4 MG/1
TABLET ORAL
Qty: 1 EACH | Refills: 0 | Status: SHIPPED | OUTPATIENT
Start: 2021-11-23 | End: 2021-11-30

## 2021-11-23 RX ORDER — AMOXICILLIN AND CLAVULANATE POTASSIUM 875; 125 MG/1; MG/1
1 TABLET, FILM COATED ORAL EVERY 12 HOURS
Qty: 20 TABLET | Refills: 0 | Status: SHIPPED | OUTPATIENT
Start: 2021-11-23 | End: 2021-12-08 | Stop reason: ALTCHOICE

## 2021-11-24 ENCOUNTER — PATIENT MESSAGE (OUTPATIENT)
Dept: PSYCHIATRY | Facility: CLINIC | Age: 66
End: 2021-11-24
Payer: MEDICAID

## 2021-11-26 ENCOUNTER — TELEPHONE (OUTPATIENT)
Dept: ORTHOPEDICS | Facility: CLINIC | Age: 66
End: 2021-11-26
Payer: MEDICAID

## 2021-11-27 ENCOUNTER — PATIENT MESSAGE (OUTPATIENT)
Dept: REHABILITATION | Facility: HOSPITAL | Age: 66
End: 2021-11-27
Payer: MEDICAID

## 2021-11-27 ENCOUNTER — PATIENT MESSAGE (OUTPATIENT)
Dept: INTERNAL MEDICINE | Facility: CLINIC | Age: 66
End: 2021-11-27
Payer: MEDICAID

## 2021-11-27 ENCOUNTER — PATIENT MESSAGE (OUTPATIENT)
Dept: ORTHOPEDICS | Facility: CLINIC | Age: 66
End: 2021-11-27
Payer: MEDICAID

## 2021-11-29 RX ORDER — BUPROPION HYDROCHLORIDE 150 MG/1
450 TABLET ORAL DAILY
Qty: 90 TABLET | Refills: 1 | Status: SHIPPED | OUTPATIENT
Start: 2021-11-29 | End: 2022-01-13 | Stop reason: SDUPTHER

## 2021-11-29 RX ORDER — BUSPIRONE HYDROCHLORIDE 30 MG/1
30 TABLET ORAL 2 TIMES DAILY
Qty: 60 TABLET | Refills: 1 | Status: SHIPPED | OUTPATIENT
Start: 2021-11-29 | End: 2022-01-13 | Stop reason: SDUPTHER

## 2021-11-30 ENCOUNTER — TELEPHONE (OUTPATIENT)
Dept: INTERNAL MEDICINE | Facility: CLINIC | Age: 66
End: 2021-11-30
Payer: MEDICAID

## 2021-11-30 ENCOUNTER — OFFICE VISIT (OUTPATIENT)
Dept: INTERNAL MEDICINE | Facility: CLINIC | Age: 66
End: 2021-11-30
Payer: MEDICAID

## 2021-11-30 DIAGNOSIS — R05.9 COUGH: ICD-10-CM

## 2021-11-30 DIAGNOSIS — J32.9 CHRONIC SINUSITIS, UNSPECIFIED LOCATION: Primary | ICD-10-CM

## 2021-11-30 PROCEDURE — 99213 OFFICE O/P EST LOW 20 MIN: CPT | Mod: 95,,, | Performed by: NURSE PRACTITIONER

## 2021-11-30 PROCEDURE — 99213 PR OFFICE/OUTPT VISIT, EST, LEVL III, 20-29 MIN: ICD-10-PCS | Mod: 95,,, | Performed by: NURSE PRACTITIONER

## 2021-11-30 PROCEDURE — 3066F PR DOCUMENTATION OF TREATMENT FOR NEPHROPATHY: ICD-10-PCS | Mod: CPTII,95,, | Performed by: NURSE PRACTITIONER

## 2021-11-30 PROCEDURE — 3066F NEPHROPATHY DOC TX: CPT | Mod: CPTII,95,, | Performed by: NURSE PRACTITIONER

## 2021-11-30 RX ORDER — FLUCONAZOLE 150 MG/1
150 TABLET ORAL DAILY
Qty: 2 TABLET | Refills: 0 | Status: SHIPPED | OUTPATIENT
Start: 2021-11-30 | End: 2021-12-03

## 2021-11-30 RX ORDER — BENZONATATE 200 MG/1
200 CAPSULE ORAL 3 TIMES DAILY PRN
Qty: 30 CAPSULE | Refills: 0 | Status: SHIPPED | OUTPATIENT
Start: 2021-11-30 | End: 2021-12-08 | Stop reason: SDUPTHER

## 2021-11-30 RX ORDER — PREDNISONE 10 MG/1
TABLET ORAL
Qty: 14 TABLET | Refills: 0 | Status: SHIPPED | OUTPATIENT
Start: 2021-11-30 | End: 2021-12-08 | Stop reason: ALTCHOICE

## 2021-12-02 ENCOUNTER — OFFICE VISIT (OUTPATIENT)
Dept: PSYCHIATRY | Facility: CLINIC | Age: 66
End: 2021-12-02
Payer: MEDICAID

## 2021-12-02 DIAGNOSIS — F41.1 GENERALIZED ANXIETY DISORDER: ICD-10-CM

## 2021-12-02 DIAGNOSIS — F33.1 MODERATE EPISODE OF RECURRENT MAJOR DEPRESSIVE DISORDER: Primary | ICD-10-CM

## 2021-12-02 PROCEDURE — 90834 PSYTX W PT 45 MINUTES: CPT | Mod: AJ,HB,95, | Performed by: SOCIAL WORKER

## 2021-12-02 PROCEDURE — 90834 PR PSYCHOTHERAPY W/PATIENT, 45 MIN: ICD-10-PCS | Mod: AJ,HB,95, | Performed by: SOCIAL WORKER

## 2021-12-05 ENCOUNTER — PATIENT MESSAGE (OUTPATIENT)
Dept: INTERNAL MEDICINE | Facility: CLINIC | Age: 66
End: 2021-12-05
Payer: MEDICAID

## 2021-12-06 ENCOUNTER — PATIENT MESSAGE (OUTPATIENT)
Dept: INTERNAL MEDICINE | Facility: CLINIC | Age: 66
End: 2021-12-06
Payer: MEDICAID

## 2021-12-07 ENCOUNTER — PATIENT MESSAGE (OUTPATIENT)
Dept: INTERNAL MEDICINE | Facility: CLINIC | Age: 66
End: 2021-12-07
Payer: MEDICAID

## 2021-12-07 ENCOUNTER — TELEPHONE (OUTPATIENT)
Dept: INTERNAL MEDICINE | Facility: CLINIC | Age: 66
End: 2021-12-07
Payer: MEDICAID

## 2021-12-08 ENCOUNTER — OFFICE VISIT (OUTPATIENT)
Dept: INTERNAL MEDICINE | Facility: CLINIC | Age: 66
End: 2021-12-08
Payer: MEDICAID

## 2021-12-08 VITALS
WEIGHT: 125.25 LBS | DIASTOLIC BLOOD PRESSURE: 72 MMHG | OXYGEN SATURATION: 99 % | HEIGHT: 62 IN | SYSTOLIC BLOOD PRESSURE: 132 MMHG | BODY MASS INDEX: 23.05 KG/M2 | TEMPERATURE: 98 F | HEART RATE: 94 BPM

## 2021-12-08 DIAGNOSIS — J01.90 SUBACUTE SINUSITIS, UNSPECIFIED LOCATION: Primary | ICD-10-CM

## 2021-12-08 DIAGNOSIS — R05.9 COUGH: ICD-10-CM

## 2021-12-08 PROBLEM — Z00.00 ROUTINE HEALTH MAINTENANCE: Status: RESOLVED | Noted: 2021-10-25 | Resolved: 2021-12-08

## 2021-12-08 PROCEDURE — 99215 OFFICE O/P EST HI 40 MIN: CPT | Mod: PBBFAC,PO | Performed by: NURSE PRACTITIONER

## 2021-12-08 PROCEDURE — 99213 OFFICE O/P EST LOW 20 MIN: CPT | Mod: S$PBB,,, | Performed by: NURSE PRACTITIONER

## 2021-12-08 PROCEDURE — 99999 PR PBB SHADOW E&M-EST. PATIENT-LVL V: CPT | Mod: PBBFAC,,, | Performed by: NURSE PRACTITIONER

## 2021-12-08 PROCEDURE — 3066F NEPHROPATHY DOC TX: CPT | Mod: CPTII,,, | Performed by: NURSE PRACTITIONER

## 2021-12-08 PROCEDURE — 99999 PR PBB SHADOW E&M-EST. PATIENT-LVL V: ICD-10-PCS | Mod: PBBFAC,,, | Performed by: NURSE PRACTITIONER

## 2021-12-08 PROCEDURE — 99213 PR OFFICE/OUTPT VISIT, EST, LEVL III, 20-29 MIN: ICD-10-PCS | Mod: S$PBB,,, | Performed by: NURSE PRACTITIONER

## 2021-12-08 PROCEDURE — 3066F PR DOCUMENTATION OF TREATMENT FOR NEPHROPATHY: ICD-10-PCS | Mod: CPTII,,, | Performed by: NURSE PRACTITIONER

## 2021-12-08 RX ORDER — BENZONATATE 200 MG/1
200 CAPSULE ORAL 3 TIMES DAILY PRN
Qty: 30 CAPSULE | Refills: 0 | Status: SHIPPED | OUTPATIENT
Start: 2021-12-08 | End: 2021-12-18

## 2021-12-08 RX ORDER — POTASSIUM CHLORIDE 750 MG/1
TABLET, EXTENDED RELEASE ORAL
COMMUNITY
Start: 2021-12-01 | End: 2022-05-23 | Stop reason: SDUPTHER

## 2021-12-08 RX ORDER — DOXYCYCLINE 100 MG/1
100 CAPSULE ORAL 2 TIMES DAILY
Qty: 10 CAPSULE | Refills: 0 | Status: SHIPPED | OUTPATIENT
Start: 2021-12-08 | End: 2021-12-13

## 2021-12-17 ENCOUNTER — OFFICE VISIT (OUTPATIENT)
Dept: PSYCHIATRY | Facility: CLINIC | Age: 66
End: 2021-12-17
Payer: MEDICAID

## 2021-12-17 DIAGNOSIS — F41.1 GENERALIZED ANXIETY DISORDER: ICD-10-CM

## 2021-12-17 DIAGNOSIS — F33.1 MODERATE EPISODE OF RECURRENT MAJOR DEPRESSIVE DISORDER: Primary | ICD-10-CM

## 2021-12-17 PROCEDURE — 1159F MED LIST DOCD IN RCRD: CPT | Mod: AJ,HB,CPTII,95 | Performed by: SOCIAL WORKER

## 2021-12-17 PROCEDURE — 90834 PR PSYCHOTHERAPY W/PATIENT, 45 MIN: ICD-10-PCS | Mod: AJ,HB,95, | Performed by: SOCIAL WORKER

## 2021-12-17 PROCEDURE — 3066F PR DOCUMENTATION OF TREATMENT FOR NEPHROPATHY: ICD-10-PCS | Mod: AJ,HB,CPTII,95 | Performed by: SOCIAL WORKER

## 2021-12-17 PROCEDURE — 90834 PSYTX W PT 45 MINUTES: CPT | Mod: AJ,HB,95, | Performed by: SOCIAL WORKER

## 2021-12-17 PROCEDURE — 3066F NEPHROPATHY DOC TX: CPT | Mod: AJ,HB,CPTII,95 | Performed by: SOCIAL WORKER

## 2021-12-17 PROCEDURE — 1159F PR MEDICATION LIST DOCUMENTED IN MEDICAL RECORD: ICD-10-PCS | Mod: AJ,HB,CPTII,95 | Performed by: SOCIAL WORKER

## 2021-12-20 ENCOUNTER — PATIENT MESSAGE (OUTPATIENT)
Dept: ALLERGY | Facility: CLINIC | Age: 66
End: 2021-12-20
Payer: MEDICAID

## 2021-12-20 ENCOUNTER — TELEPHONE (OUTPATIENT)
Dept: ORTHOPEDICS | Facility: CLINIC | Age: 66
End: 2021-12-20
Payer: MEDICAID

## 2021-12-27 ENCOUNTER — DOCUMENTATION ONLY (OUTPATIENT)
Dept: REHABILITATION | Facility: HOSPITAL | Age: 66
End: 2021-12-27
Payer: MEDICAID

## 2021-12-27 ENCOUNTER — PATIENT MESSAGE (OUTPATIENT)
Dept: ORTHOPEDICS | Facility: CLINIC | Age: 66
End: 2021-12-27
Payer: MEDICAID

## 2021-12-27 ENCOUNTER — PATIENT MESSAGE (OUTPATIENT)
Dept: REHABILITATION | Facility: HOSPITAL | Age: 66
End: 2021-12-27
Payer: MEDICAID

## 2021-12-27 ENCOUNTER — TELEPHONE (OUTPATIENT)
Dept: INTERNAL MEDICINE | Facility: CLINIC | Age: 66
End: 2021-12-27
Payer: MEDICAID

## 2021-12-27 ENCOUNTER — PATIENT MESSAGE (OUTPATIENT)
Dept: PSYCHIATRY | Facility: CLINIC | Age: 66
End: 2021-12-27
Payer: MEDICAID

## 2021-12-27 DIAGNOSIS — R53.1 DECREASED STRENGTH: ICD-10-CM

## 2021-12-27 DIAGNOSIS — M25.60 DECREASED RANGE OF MOTION: ICD-10-CM

## 2021-12-27 DIAGNOSIS — R51.9 INTRACTABLE HEADACHE, UNSPECIFIED CHRONICITY PATTERN, UNSPECIFIED HEADACHE TYPE: Primary | ICD-10-CM

## 2021-12-30 ENCOUNTER — PATIENT MESSAGE (OUTPATIENT)
Dept: INTERNAL MEDICINE | Facility: CLINIC | Age: 66
End: 2021-12-30
Payer: MEDICAID

## 2022-01-05 ENCOUNTER — TELEPHONE (OUTPATIENT)
Dept: PSYCHIATRY | Facility: CLINIC | Age: 67
End: 2022-01-05
Payer: MEDICAID

## 2022-01-05 ENCOUNTER — TELEPHONE (OUTPATIENT)
Dept: ORTHOPEDICS | Facility: CLINIC | Age: 67
End: 2022-01-05
Payer: MEDICAID

## 2022-01-06 ENCOUNTER — HOSPITAL ENCOUNTER (OUTPATIENT)
Dept: RADIOLOGY | Facility: HOSPITAL | Age: 67
Discharge: HOME | End: 2022-01-06
Attending: FAMILY MEDICINE
Payer: MEDICAID

## 2022-01-06 ENCOUNTER — PATIENT MESSAGE (OUTPATIENT)
Dept: PSYCHIATRY | Facility: CLINIC | Age: 67
End: 2022-01-06
Payer: MEDICAID

## 2022-01-06 ENCOUNTER — TELEPHONE (OUTPATIENT)
Dept: ORTHOPEDICS | Facility: CLINIC | Age: 67
End: 2022-01-06
Payer: MEDICAID

## 2022-01-06 ENCOUNTER — OFFICE VISIT (OUTPATIENT)
Dept: PSYCHIATRY | Facility: CLINIC | Age: 67
End: 2022-01-06
Payer: MEDICAID

## 2022-01-06 DIAGNOSIS — F41.1 GENERALIZED ANXIETY DISORDER: ICD-10-CM

## 2022-01-06 DIAGNOSIS — Z87.891 HISTORY OF TOBACCO ABUSE: ICD-10-CM

## 2022-01-06 DIAGNOSIS — F33.1 MODERATE EPISODE OF RECURRENT MAJOR DEPRESSIVE DISORDER: Primary | ICD-10-CM

## 2022-01-06 DIAGNOSIS — Z00.00 ROUTINE HEALTH MAINTENANCE: ICD-10-CM

## 2022-01-06 PROCEDURE — 90834 PR PSYCHOTHERAPY W/PATIENT, 45 MIN: ICD-10-PCS | Mod: AJ,HB,95, | Performed by: SOCIAL WORKER

## 2022-01-06 PROCEDURE — 1159F PR MEDICATION LIST DOCUMENTED IN MEDICAL RECORD: ICD-10-PCS | Mod: AJ,HB,CPTII,95 | Performed by: SOCIAL WORKER

## 2022-01-06 PROCEDURE — 90834 PSYTX W PT 45 MINUTES: CPT | Mod: AJ,HB,95, | Performed by: SOCIAL WORKER

## 2022-01-06 PROCEDURE — 71271 CT THORAX LUNG CANCER SCR C-: CPT | Mod: 26,,, | Performed by: RADIOLOGY

## 2022-01-06 PROCEDURE — 1159F MED LIST DOCD IN RCRD: CPT | Mod: AJ,HB,CPTII,95 | Performed by: SOCIAL WORKER

## 2022-01-06 PROCEDURE — 71271 CT CHEST LUNG SCREENING LOW DOSE: ICD-10-PCS | Mod: 26,,, | Performed by: RADIOLOGY

## 2022-01-06 PROCEDURE — 71271 CT THORAX LUNG CANCER SCR C-: CPT | Mod: TC,PO

## 2022-01-06 NOTE — PROGRESS NOTES
"  Individual Psychotherapy Follow-up Visit Progress Note (PhD/LCSW)     Outpatient - Supportive psychotherapy 45 min - CPT Code 27264     Virtual visit with synchronous audio/video, Location of patient: her home in [place]    Each patient provided medical services by telemedicine is: (1) informed of the relationship between the provider and patient and the respective role of any other health care provider with respect to management of the patient; and (2) notified that he or she may decline to receive medical services by telemedicine and may withdraw from such care at any time.    1/6/2022  MRN: [id number]  Primary Care Provider: DO Kenia Schneider Kim Alonzo is a 66 y.o. female who presents today for follow-up of depression and anxiety. Met with patient.    ""    Subjective:       Patient report/content of session: Last seen 12/17/2021. Pt states "things are better" with her mother. No recent arguments or conflicts with mother. Pt is waiting to move out of her mother's home until her disability is approved. She continues to have conflict with her sister and brother-in-law, who come over daily to the home pt shares with her mother and stay until late in the evening. Pt has tried to set boundaries but sister responded, "it's everybody's house." Discussed boundaries, assertive communication and conflict resolution.     Current symptoms:   · Depression: depression, diminished interest, negativistic thinking  · Anxiety: excessive anxiety/worry, restlessness, irritability  · Substance abuse: denied  · Cognitive functioning: denied  · Iram: none noted  · Psychosis: none noted    Psychosocial stressors and topics discussed: identifying feelings, symptom recognition/mgmt, treatment progress, coping strategies, interpersonal issues, financial issues, family of origin dynamics, managing anxiety/panic/stress and challenging thought distortions      Objective:       Mental Status Evaluation  Appearance: " unremarkable, age appropriate  Behavior: normal, cooperative  Speech: normal tone, normal rate, normal pitch, normal volume  Mood: anxious  Affect: congruent and appropriate, blunted  Thought Process: normal and logical  Thought Content: normal, no suicidality, no homicidality, delusions, or paranoia  Sensorium: grossly intact  Cognition: grossly intact  Insight: good  Judgment: adequate to circumstances    Risk parameters:  Patient reports no suicidal ideation  Patient reports no homicidal ideation  Patient reports no self-injurious behavior  Patient reports no violent behavior      Assessment & Plan:       Therapeutic interventions used: Educated pt re: mindfulness strategies to manage anxious thoughts and feelings  Assigned pt to practice relaxation on a daily basis  Assessed pt's level of insight toward the presenting problems  Monitored the effectiveness and side effects of antidepressant medication prescribed by physician/NP/MP  Engaged pt in behavioral activation by scheduling activities that have a high likelihood for pleasure and mastery  Taught pt conflict resolution skills such as practicing empathy, active listening, respectful communication, assertiveness and compromise  Encouraged pt to commit time and effort to activities that are consistent with personally meaningful values  Taught pt the possible connection between unexpressed feelings of anger and helplessness and the current depressed state   Reviewed treatment progress and coping strategies     The patient's response to the interventions is accepting    The patient's progress toward treatment goals is good    Homework assigned: daily journaling and practice relaxation skills daily, increase use of available supports    Treatment plan:   A. Target symptoms: Depression, Anxiety and Poor Coping Skills   B. Therapeutic modalities: insight oriented psychotherapy, supportive psychotherapy  C. Why chosen therapy is appropriate versus another modality:  relevant to diagnosis, evidence based practice   D. Outcome monitoring methods: self-report, observation, feedback from clinical staff     Visit Diagnosis:   1. Moderate episode of recurrent major depressive disorder    2. Generalized anxiety disorder        Follow-up: individual psychotherapy and medication management by physician     Return to Clinic: 3 weeks    Length of Service (minutes): 45        Beatriz Holloway LCSW  Outpatient Psychiatry

## 2022-01-08 ENCOUNTER — PATIENT MESSAGE (OUTPATIENT)
Dept: INTERNAL MEDICINE | Facility: CLINIC | Age: 67
End: 2022-01-08
Payer: MEDICAID

## 2022-01-08 ENCOUNTER — PATIENT MESSAGE (OUTPATIENT)
Dept: NEUROLOGY | Facility: CLINIC | Age: 67
End: 2022-01-08
Payer: MEDICAID

## 2022-01-09 ENCOUNTER — PATIENT MESSAGE (OUTPATIENT)
Dept: ORTHOPEDICS | Facility: CLINIC | Age: 67
End: 2022-01-09
Payer: MEDICAID

## 2022-01-13 ENCOUNTER — OFFICE VISIT (OUTPATIENT)
Dept: PSYCHIATRY | Facility: CLINIC | Age: 67
End: 2022-01-13
Payer: MEDICAID

## 2022-01-13 DIAGNOSIS — F43.21 COMPLICATED GRIEF: ICD-10-CM

## 2022-01-13 PROCEDURE — 99213 PR OFFICE/OUTPT VISIT, EST, LEVL III, 20-29 MIN: ICD-10-PCS | Mod: AF,HB,95, | Performed by: PSYCHIATRY & NEUROLOGY

## 2022-01-13 PROCEDURE — 99213 OFFICE O/P EST LOW 20 MIN: CPT | Mod: AF,HB,95, | Performed by: PSYCHIATRY & NEUROLOGY

## 2022-01-13 PROCEDURE — 99499 RISK ADDL DX/OHS AUDIT: ICD-10-PCS | Mod: AF,HB,95, | Performed by: PSYCHIATRY & NEUROLOGY

## 2022-01-13 PROCEDURE — 99499 UNLISTED E&M SERVICE: CPT | Mod: AF,HB,95, | Performed by: PSYCHIATRY & NEUROLOGY
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
## 2022-01-17 RX ORDER — ZOLPIDEM TARTRATE 5 MG/1
TABLET ORAL
Qty: 30 TABLET | Refills: 2 | Status: SHIPPED | OUTPATIENT
Start: 2022-01-17 | End: 2022-04-01 | Stop reason: SDUPTHER

## 2022-01-17 RX ORDER — BUPROPION HYDROCHLORIDE 150 MG/1
450 TABLET ORAL DAILY
Qty: 90 TABLET | Refills: 3 | Status: SHIPPED | OUTPATIENT
Start: 2022-01-17 | End: 2022-04-01 | Stop reason: SDUPTHER

## 2022-01-17 RX ORDER — DIAZEPAM 2 MG/1
2 TABLET ORAL EVERY 6 HOURS PRN
Qty: 30 TABLET | Refills: 2 | Status: SHIPPED | OUTPATIENT
Start: 2022-01-17 | End: 2022-04-01 | Stop reason: SDUPTHER

## 2022-01-17 RX ORDER — BUSPIRONE HYDROCHLORIDE 30 MG/1
30 TABLET ORAL 2 TIMES DAILY
Qty: 60 TABLET | Refills: 3 | Status: SHIPPED | OUTPATIENT
Start: 2022-01-17 | End: 2022-04-01 | Stop reason: SDUPTHER

## 2022-01-20 ENCOUNTER — PATIENT MESSAGE (OUTPATIENT)
Dept: PSYCHIATRY | Facility: CLINIC | Age: 67
End: 2022-01-20
Payer: MEDICAID

## 2022-01-21 ENCOUNTER — TELEPHONE (OUTPATIENT)
Dept: ORTHOPEDICS | Facility: CLINIC | Age: 67
End: 2022-01-21
Payer: MEDICAID

## 2022-01-21 NOTE — TELEPHONE ENCOUNTER
Offered next available appt. Patient accepted-DD      ----- Message from Shanika Beach sent at 1/21/2022  3:01 PM CST -----  Contact: Self [phone]  No blue slot available to schedule an appointment for the patient.    Patient is established with which PCP:     Reason for the visit: shoulder pain    Would the patient like a call back, or a response through their [facility] portal?:call back or portal

## 2022-01-23 ENCOUNTER — PATIENT MESSAGE (OUTPATIENT)
Dept: NEUROLOGY | Facility: CLINIC | Age: 67
End: 2022-01-23
Payer: MEDICAID

## 2022-01-25 ENCOUNTER — PATIENT MESSAGE (OUTPATIENT)
Dept: OTOLARYNGOLOGY | Facility: CLINIC | Age: 67
End: 2022-01-25
Payer: MEDICAID

## 2022-01-25 RX ORDER — PROMETHAZINE HYDROCHLORIDE 12.5 MG/1
12.5 TABLET ORAL ONCE AS NEEDED
Qty: 9 TABLET | Refills: 3 | Status: SHIPPED | OUTPATIENT
Start: 2022-01-25 | End: 2022-01-25

## 2022-01-26 ENCOUNTER — PATIENT MESSAGE (OUTPATIENT)
Dept: OTOLARYNGOLOGY | Facility: CLINIC | Age: 67
End: 2022-01-26
Payer: MEDICAID

## 2022-01-26 RX ORDER — METHYLPREDNISOLONE 4 MG/1
TABLET ORAL
Qty: 1 EACH | Refills: 0 | Status: SHIPPED | OUTPATIENT
Start: 2022-01-26 | End: 2022-03-30 | Stop reason: SDUPTHER

## 2022-02-02 ENCOUNTER — TELEPHONE (OUTPATIENT)
Dept: PSYCHIATRY | Facility: CLINIC | Age: 67
End: 2022-02-02
Payer: MEDICAID

## 2022-02-03 ENCOUNTER — OFFICE VISIT (OUTPATIENT)
Dept: PSYCHIATRY | Facility: CLINIC | Age: 67
End: 2022-02-03
Payer: MEDICAID

## 2022-02-03 DIAGNOSIS — F33.1 MODERATE EPISODE OF RECURRENT MAJOR DEPRESSIVE DISORDER: Primary | ICD-10-CM

## 2022-02-03 DIAGNOSIS — F41.1 GENERALIZED ANXIETY DISORDER: ICD-10-CM

## 2022-02-03 PROCEDURE — 90834 PSYTX W PT 45 MINUTES: CPT | Mod: AJ,HB,95, | Performed by: SOCIAL WORKER

## 2022-02-03 PROCEDURE — 90834 PR PSYCHOTHERAPY W/PATIENT, 45 MIN: ICD-10-PCS | Mod: AJ,HB,95, | Performed by: SOCIAL WORKER

## 2022-02-03 NOTE — PROGRESS NOTES
"  Individual Psychotherapy Follow-up Visit Progress Note (PhD/LCSW)     Outpatient - Supportive psychotherapy 45 min - CPT Code 01201     Virtual visit with synchronous audio/video, Location of patient: her home in [place]    Each patient provided medical services by telemedicine is: (1) informed of the relationship between the provider and patient and the respective role of any other health care provider with respect to management of the patient; and (2) notified that he or she may decline to receive medical services by telemedicine and may withdraw from such care at any time.    2/3/2022  MRN: [id number]  Primary Care Provider: DO Kenia Schneider Kim Alonzo is a 66 y.o. female who presents today for follow-up of depression and anxiety. Met with patient.    ""    Subjective:       Patient report/content of session: Last seen 1/6/2021. Pt states "I'm highly aggravated." Her 40 y/o son Tad had an appointment with pt's friend, Anastasia to test for kidney match. He did not show up and reportedly blamed pt for not reminding him about the appt. Helped pt to process her feelings and provided support. Explored pt's relationship dynamics with her son. She discussed having a codependent relationship with him for many years. She has tried to set healthier boundaries with him but feels she is still too involved in managing his life and his healthcare vs allowing him to assume responsibility.     Current symptoms:   · Depression: depression, diminished interest, negativistic thinking  · Anxiety: excessive anxiety/worry, restlessness, irritability  · Substance abuse: denied  · Cognitive functioning: denied  · Iram: none noted  · Psychosis: none noted    Psychosocial stressors and topics discussed: identifying feelings, symptom recognition/mgmt, treatment progress, coping strategies, interpersonal issues, financial issues, family of origin dynamics, managing anxiety/panic/stress and challenging thought " distortions      Objective:       Mental Status Evaluation  Appearance: unremarkable, age appropriate  Behavior: normal, cooperative  Speech: normal tone, normal rate, normal pitch, normal volume  Mood: irritable  Affect: congruent and appropriate  Thought Process: normal and logical  Thought Content: normal, no suicidality, no homicidality, delusions, or paranoia  Sensorium: grossly intact  Cognition: grossly intact  Insight: good  Judgment: adequate to circumstances    Risk parameters:  Patient reports no suicidal ideation  Patient reports no homicidal ideation  Patient reports no self-injurious behavior  Patient reports no violent behavior      Assessment & Plan:       Therapeutic interventions used: Educated pt re: mindfulness strategies to manage anxious thoughts and feelings  Pt was taught how to apply relaxation skills to specific situations  Assigned pt to practice relaxation on a daily basis  Analyzed pt's fears by examining the probability of a negative expectation becoming a reality, the consequences of the expectation if it occurred, her ability to control the outcome, the worst possible result, and her ability to cope if the expectation occurred  Examined pt's ability to control the outcome of circumstances and the effectiveness of her worry on that outcome  Utilized acceptance and commitment therapy (ACT) to help pt accept uncomfortable realities  Assessed pt's level of insight toward the presenting problems  Monitored the effectiveness and side effects of antidepressant medication prescribed by physician/NP/MP   Educated pt re: healthy boundaries     The patient's response to the interventions is accepting    The patient's progress toward treatment goals is good    Homework assigned: daily journaling and practice relaxation skills daily, increase use of available supports    Treatment plan:   A. Target symptoms: Depression, Anxiety and Poor Coping Skills   B. Therapeutic modalities: insight oriented  psychotherapy, supportive psychotherapy  C. Why chosen therapy is appropriate versus another modality: relevant to diagnosis, evidence based practice   D. Outcome monitoring methods: self-report, observation, feedback from clinical staff     Visit Diagnosis:   1. Moderate episode of recurrent major depressive disorder    2. Generalized anxiety disorder        Follow-up: individual psychotherapy and medication management by physician     Return to Clinic: 3 weeks    Length of Service (minutes): 45        Beatriz Holloway LCSW  Outpatient Psychiatry

## 2022-02-07 ENCOUNTER — OFFICE VISIT (OUTPATIENT)
Dept: ORTHOPEDICS | Facility: CLINIC | Age: 67
End: 2022-02-07
Payer: MEDICAID

## 2022-02-07 VITALS — BODY MASS INDEX: 23 KG/M2 | HEIGHT: 62 IN | WEIGHT: 125 LBS

## 2022-02-07 DIAGNOSIS — M17.12 ARTHRITIS OF KNEE, LEFT: Primary | ICD-10-CM

## 2022-02-07 DIAGNOSIS — M17.12 PRIMARY OSTEOARTHRITIS OF LEFT KNEE: Primary | ICD-10-CM

## 2022-02-07 DIAGNOSIS — M21.162 ACQUIRED VARUS DEFORMITY KNEE, LEFT: ICD-10-CM

## 2022-02-07 DIAGNOSIS — M17.11 ARTHRITIS OF KNEE, RIGHT: ICD-10-CM

## 2022-02-07 PROCEDURE — 99213 PR OFFICE/OUTPT VISIT, EST, LEVL III, 20-29 MIN: ICD-10-PCS | Mod: 25,S$PBB,, | Performed by: ORTHOPAEDIC SURGERY

## 2022-02-07 PROCEDURE — 3008F BODY MASS INDEX DOCD: CPT | Mod: CPTII,,, | Performed by: ORTHOPAEDIC SURGERY

## 2022-02-07 PROCEDURE — 20610 LARGE JOINT ASPIRATION/INJECTION: L KNEE: ICD-10-PCS | Mod: S$PBB,LT,, | Performed by: ORTHOPAEDIC SURGERY

## 2022-02-07 PROCEDURE — 99999 PR PBB SHADOW E&M-EST. PATIENT-LVL IV: ICD-10-PCS | Mod: PBBFAC,,, | Performed by: ORTHOPAEDIC SURGERY

## 2022-02-07 PROCEDURE — 1159F PR MEDICATION LIST DOCUMENTED IN MEDICAL RECORD: ICD-10-PCS | Mod: CPTII,,, | Performed by: ORTHOPAEDIC SURGERY

## 2022-02-07 PROCEDURE — 99999 PR PBB SHADOW E&M-EST. PATIENT-LVL IV: CPT | Mod: PBBFAC,,, | Performed by: ORTHOPAEDIC SURGERY

## 2022-02-07 PROCEDURE — 99213 OFFICE O/P EST LOW 20 MIN: CPT | Mod: 25,S$PBB,, | Performed by: ORTHOPAEDIC SURGERY

## 2022-02-07 PROCEDURE — 3008F PR BODY MASS INDEX (BMI) DOCUMENTED: ICD-10-PCS | Mod: CPTII,,, | Performed by: ORTHOPAEDIC SURGERY

## 2022-02-07 PROCEDURE — 20610 DRAIN/INJ JOINT/BURSA W/O US: CPT | Mod: PBBFAC | Performed by: ORTHOPAEDIC SURGERY

## 2022-02-07 PROCEDURE — 1159F MED LIST DOCD IN RCRD: CPT | Mod: CPTII,,, | Performed by: ORTHOPAEDIC SURGERY

## 2022-02-07 PROCEDURE — 99214 OFFICE O/P EST MOD 30 MIN: CPT | Mod: PBBFAC | Performed by: ORTHOPAEDIC SURGERY

## 2022-02-07 RX ORDER — TRIAMCINOLONE ACETONIDE 40 MG/ML
60 INJECTION, SUSPENSION INTRA-ARTICULAR; INTRAMUSCULAR
Status: DISCONTINUED | OUTPATIENT
Start: 2022-02-07 | End: 2022-02-07 | Stop reason: HOSPADM

## 2022-02-07 RX ADMIN — TRIAMCINOLONE ACETONIDE 60 MG: 200 INJECTION, SUSPENSION INTRA-ARTICULAR; INTRAMUSCULAR at 02:02

## 2022-02-07 NOTE — PROGRESS NOTES
Subjective:     Patient ID: Kenia Alonzo is a 66 y.o. female.    Chief Complaint: Pain of the Left Knee   11/30/2020  HPI:  65-year-old had been having pain in both knees with the left worse than the right for more than 10 years now.  In 2013 injured her left knee seen in [place] and recommended total knee replacement which she refused.  Over the years she received steroid injections with relief.  Synvisc injection into the left knee the series of 3 helped.  She was placed on ibuprofen large dose and that kidney issues and now she cannot take any anti-inflammatories.  Received Euflexxa 08/17/2020 series of 3 without relief and followed with steroid injection 10/12/2020 which seems to have worked.  The lasting around 6 weeks and most people will not give her more.  She says her knee is bone on bone and she wants to avoid surgery at this point in time until she is ready to proceed with it.  She is ambulating without any assistive devices.  She goes to physical therapy at [facility] and when this morning.  Occasionally she wakes up with the knee severely painful and with therapy and exercise eases up.  She does take Tylenol on occasions.  Denies any fever or chills or shortness of breath or difficulty with chewing or swallowing loss of bowel bladder control blurry vision double vision or loss of sense smell or taste.    -2/1/21  Bilateral knee pain and left worse than the right.  We started on physical therapy to the legs at [facility] and he says the right knee pain is completely gone.  As far as the left knee it is down at 4/10.  The steroid injection given 11/30/2020 into the left knee helped significantly.  Now it is starting to ache again and we had discussed doing Synvisc-One injection.  Unable to take NSAIDs.  Recently had shoulder injection by another provider and she was told eventuality she would need shoulder replacement.  But the injection did help quite severe in effect and he.  We did  review with her today her x-rays again and showed her in details the findings.  Is she is ambulating without any assistive devices.  She stated now she is looking for a job and she might have to move again.  Denies any fever or chills or shortness of breath or difficulty with chewing or swallowing loss of bowel bladder control or blurry vision or double vision loss sense smell or taste    04/12/2021    Bilateral knee arthritis.  The steroid injection given on 02/01/2021 into the left knee helped but it wore off within 4 weeks.  She was supposed to be going to PT [place] [facility] but she got depressed and had to see psychiatrist.  He encouraged her to go to physical therapy as well as I did today.  She is doing much better and she is here with her so daughter in law who is driving her today because hyaluronic injection might be severely painful.  She is 7/10 in pain.  She does take Tylenol.  She still ambulating without any assistive devices.  As far as the right knee is concerned her pain is around 3/10.  She requested an injection in the near future because she feels previous injection is wore off.  She is now wearing any assistive devices or ambulating with any  Denies any fever or chills or shortness of breath or difficulty with chewing swallowing or loss of bowel bladder control blurry vision or double vision or loss sense smell or taste      02/07/2022  Bilateral knee arthritis with the left worse than the right.  Patient is having shoulder problem and now unable to lift her arm up to comb her hair or do activities of daily living.  She does have an appointment with Dr. Mike newman to be seen tomorrow.  Most likely I told her she ruptured her rotator cuff.  She said she cannot function to well and she just moved out living now on her own.  She did receive steroid injection in the left knee by my PA in October 2021 and I did give her Visco supplement the patient/Synvisc-One in April 2021 with good relief.   She would like to have that repeated also.  She is ambulating without any assistive devices.  Pain in the left knee 9/10  Past Medical History:   Diagnosis Date    Arthritis     knee    Asthma     childhood     Depression     Digestive disorder     Encounter for blood transfusion     General anesthetics causing adverse effect in therapeutic use     severe headache after crainiotomy    GI problem     IBS    Gout     Headache     Hyperlipidemia     Meniere disease     MVP (mitral valve prolapse)     minor    Renal disorder     Vertigo      Past Surgical History:   Procedure Laterality Date    BRAIN SURGERY      vestibular neurectomy    BREAST BIOPSY      15 years ago?  no visible scar    BREAST SURGERY      benign     SECTION      x 1    CHOLECYSTECTOMY      COLONOSCOPY N/A 3/15/2021    Procedure: COLONOSCOPY;  Surgeon: Mita Stoddard MD;  Location: [facility];  Service: Endoscopy;  Laterality: N/A;    EXAMINATION UNDER ANESTHESIA N/A 2020    Procedure: EXAM UNDER ANESTHESIA;  Surgeon: Lucy Crane MD;  Location: [facility] OR;  Service: OB/GYN;  Laterality: N/A;    LASER ABLATION N/A 2020    Procedure: ABLATION, USING LASER;  Surgeon: Lucy Crane MD;  Location: [facility] OR;  Service: OB/GYN;  Laterality: N/A;    TONSILLECTOMY       Family History   Problem Relation Age of Onset    Colon cancer Father     Diabetes Father     Diabetes Sister      Social History     Socioeconomic History    Marital status:    Tobacco Use    Smoking status: Former Smoker     Packs/day: 0.50     Years: 43.00     Pack years: 21.50     Types: Cigarettes     Start date: 1976     Quit date: 2020     Years since quittin.0    Smokeless tobacco: Never Used    Tobacco comment: Quit 20; quit again mid 2021   Substance and Sexual Activity    Alcohol use: Not Currently     Alcohol/week: 1.0 - 3.0 standard drink     Types: 1 - 3 Glasses of wine per week      Comment: social few times monthly.   No alcohol 72h prior to sx    Drug use: No    Sexual activity: Never     Birth control/protection: None, Post-menopausal     Social Determinants of Health     Financial Resource Strain: Low Risk     Difficulty of Paying Living Expenses: Not hard at all   Food Insecurity: No Food Insecurity    Worried About Running Out of Food in the Last Year: Never true    Ran Out of Food in the Last Year: Never true   Transportation Needs: Unmet Transportation Needs    Lack of Transportation (Medical): Yes    Lack of Transportation (Non-Medical): Yes   Physical Activity: Insufficiently Active    Days of Exercise per Week: 4 days    Minutes of Exercise per Session: 20 min   Stress: Stress Concern Present    Feeling of Stress : To some extent   Social Connections: Unknown    Frequency of Communication with Friends and Family: More than three times a week    Frequency of Social Gatherings with Friends and Family: More than three times a week    Active Member of Clubs or Organizations: Yes    Attends Club or Organization Meetings: 1 to 4 times per year    Marital Status:    Housing Stability: Low Risk     Unable to Pay for Housing in the Last Year: No    Number of Places Lived in the Last Year: 2    Unstable Housing in the Last Year: No     Medication List with Changes/Refills   Current Medications    ACYCLOVIR 5% (ZOVIRAX) 5 % OINTMENT    Apply topically 5 (five) times daily.    ALBUTEROL (VENTOLIN HFA) 90 MCG/ACTUATION INHALER    Inhale 2 puffs into the lungs every 6 (six) hours as needed for Wheezing. Rescue    AZELASTINE (ASTELIN) 137 MCG (0.1 %) NASAL SPRAY    1 spray (137 mcg total) by Nasal route 2 (two) times daily.    BUMETANIDE (BUMEX) 2 MG TABLET    Take 1 tablet (2 mg total) by mouth once daily.    BUPROPION (WELLBUTRIN XL) 150 MG TB24 TABLET    Take 3 tablets (450 mg total) by mouth once daily.    BUSPIRONE (BUSPAR) 30 MG TAB    Take 1 tablet (30 mg total) by  mouth 2 (two) times daily.    C/SOURCHERRY/CELERY/GRAPE SEED (TART CHERRY ORAL)    Take 1 tablet by mouth daily as needed.     CO-ENZYME Q-10 30 MG CAPSULE    Take 30 mg by mouth 3 (three) times daily.    CONJUGATED ESTROGENS (PREMARIN) VAGINAL CREAM    Place 1 g vaginally twice a week.    DIAZEPAM (VALIUM) 2 MG TABLET    Take 1 tablet (2 mg total) by mouth every 6 (six) hours as needed (dizziness/Meniere's attack).    DICYCLOMINE (BENTYL) 10 MG CAPSULE    TAKE 1 CAPSULE BY MOUTH THREE TIMES DAILY    ERENUMAB-AOOE (AIMOVIG AUTOINJECTOR) 140 MG/ML AUTOINJECTOR    Inject 1 pen (140 mg total) into the skin every 28 days.    ESTRADIOL-NORETHINDRONE (COMBIPATCH) 0.05-0.14 MG/24 HR    Place 1 patch onto the skin twice a week.    FLUTICASONE PROPIONATE (FLONASE) 50 MCG/ACTUATION NASAL SPRAY    2 sprays (100 mcg total) by Each Nostril route 2 (two) times daily.    KETOROLAC (TORADOL) 10 MG TABLET    TAKE ONE TABLET BY MOUTH ONCE DAILY AS NEEDED FOR PAIN (MAX TWO TIMES A WEEK)    LEVOCETIRIZINE (XYZAL) 5 MG TABLET    TAKE ONE TABLET BY MOUTH EVERY MORNING    LIDOCAINE-PRILOCAINE (EMLA) CREAM        MAGNESIUM 30 MG TAB    Take by mouth once.    MELATONIN ORAL    Take by mouth nightly as needed.     METHYLPREDNISOLONE (MEDROL DOSEPACK) 4 MG TABLET    use as directed    MONTELUKAST (SINGULAIR) 10 MG TABLET    TAKE ONE TABLET BY MOUTH EVERY EVENING    MUPIROCIN (BACTROBAN) 2 % OINTMENT    Apply topically 3 (three) times daily.    OMEGA-3 FATTY ACIDS/FISH OIL (FISH OIL-OMEGA-3 FATTY ACIDS) 300-1,000 MG CAPSULE    Take 1 capsule by mouth once daily.    ONDANSETRON (ZOFRAN-ODT) 4 MG TBDL    DISSOLVE 1 TABLET IN MOUTH EVERY 8 HOURS AS NEEDED    POTASSIUM CHLORIDE SA (K-DUR,KLOR-CON) 10 MEQ TABLET    Take by mouth.    SIMVASTATIN (ZOCOR) 5 MG TABLET    TAKE ONE TABLET BY MOUTH NIGHTLY    TROKENDI  MG CP24    Take 1 capsule (100 mg total) by mouth once daily.    UBROGEPANT 100 MG TABLET    Take 1 tablet (100 mg total) by mouth  every 72 hours as needed for Migraine (2 to 3 times max per week as needed).    VITAMIN B COMPLEX/FOLIC ACID (B COMPLEX 100 ORAL)    Take by mouth nightly.    ZINC GLUCONATE 50 MG TABLET    Take 50 mg by mouth once daily.    ZOLPIDEM (AMBIEN) 5 MG TAB    Take 1/2 to 1 tablet at bedtime as needed for sleep     Review of patient's allergies indicates:   Allergen Reactions    Demerol [meperidine] Nausea And Vomiting     Pt refuses Demerol     Codeine Itching     Review of Systems   Constitutional: Negative for decreased appetite.   HENT: Negative for tinnitus.    Eyes: Negative for double vision.   Cardiovascular: Negative for chest pain.   Respiratory: Negative for wheezing.    Hematologic/Lymphatic: Negative for bleeding problem.   Skin: Negative for dry skin.   Musculoskeletal: Positive for joint pain, joint swelling and stiffness. Negative for arthritis, back pain, gout and neck pain.   Gastrointestinal: Negative for abdominal pain.   Genitourinary: Negative for bladder incontinence.   Neurological: Negative for numbness, paresthesias and sensory change.   Psychiatric/Behavioral: Negative for altered mental status.       Objective:   Body mass index is 22.86 kg/m².  There were no vitals filed for this visit.       General    Constitutional: She is oriented to person, place, and time. She appears well-developed.   HENT:   Head: Atraumatic.   Eyes: EOM are normal.   Cardiovascular: Normal rate.    Pulmonary/Chest: Effort normal.   Neurological: She is alert and oriented to person, place, and time.   Psychiatric: Judgment normal.            Ambulating without any assistive devices with a very slight limp  Cervical rotation is very functional  Bilateral upper extremity neurovascularly intact.  Radial and ulnar pulses 2+.  Sensory intact to touch.  Right shoulder unable to actively flex past 80° or abduct past 60° she has some positive drop sign to any resistance to abduction  Lumbar without too much  discomfort  Negative straight leg raising  Pelvis is level  Bila hips without any pain in the groin and full motion.  No pain to palpation over the greater trochanters.  Hip flexors, abductors, adductors, quads, hamstrings, ankle extensors and flexors are slightly weak at 5-/5  Bilateral knee with varus deformity  Right knee with very mild medial joint tenderness.  Mild crepitus to compression and mild swelling.  Range of motion 0-130 degrees.  Collaterals and cruciate stable.  Negative Corazon  Left knee with moderate  medial joint pain.  Crepitus is mild to compression on the patella.  Mild to moderate swelling.  Range of motion 0-130 degrees.  Collaterals and cruciates stable.  Negative Corazon's.  No defect in the patella tendon or quadriceps tendon in both knees  Calves are soft nontender with an area of ecchymosis on the lateral aspect of the mid calf  Ankle motion intact  Skin is warm to touch no obvious lesions    Relevant imaging results reviewed and interpreted by me, discussed with the patient and / or family today   X-ray 06/01/2020 with complete loss of joint space on the left knee medially with bone spurs and sclerosis consistent with end-stage arthritis and varus deformity, right knee with moderate joint space narrowing medially with small osteophytic changes consistent with right knee arthritis with moderate varus deformed  Assessment:     Encounter Diagnoses   Name Primary?    Arthritis of knee, left Yes    Acquired varus deformity knee, left     Arthritis of knee, right         Plan:   Arthritis of knee, left  -     Large Joint Aspiration/Injection: L knee    Acquired varus deformity knee, left    Arthritis of knee, right         Patient Instructions   Left knee injected with Kenalog 40 mg and lidocaine 02/07/2022  Will get you approved for Synvisc-One injection since last time you have it in April 2021 and it helped  Will avoid total knee replacement at this time since you really need to fix  the right shoulder which I think now you have a complete tear since you are unable to lift the arm  You seeing Dr. Mike newman in the morning hopefully can decide on what to do for that right shoulder  I will see you as soon as we get Synvisc-One approved  Ice the knee next several days in might burn or swell up a little bit more from the steroid      Patient is already bone on bone and the injections only helped for 6 weeks.  She is not ready to undergo surgical intervention.  She cannot take and will not take NSAIDs due to injury from ibuprofen to her kidneys and she is followed by nephrology and have new blood testing to be performed.  I did tell her eventually she will need total knee replacement only when all treatments fail and when she is ready.  Eventually the injections might not work as much.  . I did warn her about the side effects of too much cortisone and she except the risks.  Her right shoulder has complete rotator cuff tear by exam.  She is seeing Dr. Mike newman/shoulder surgery tomorrow.  I did tell her she needs to fix that 1st prior to having any knee replacement.    Disclaimer: This note was prepared using a voice recognition system and is likely to have sound alike errors within the text.

## 2022-02-07 NOTE — PROCEDURES
Large Joint Aspiration/Injection: L knee    Date/Time: 2/7/2022 2:00 PM  Performed by: Andrey Pop MD  Authorized by: Andrey Pop MD     Consent Done?:  Yes (Verbal)  Site marked: the procedure site was marked    Timeout: prior to procedure the correct patient, procedure, and site was verified      Local anesthesia used?: Yes    Local anesthetic:  Lidocaine 1% without epinephrine    Details:  Needle Size:  22 G  Ultrasonic Guidance for needle placement?: No    Approach:  Anterolateral  Location:  Knee  Site:  L knee  Medications:  60 mg triamcinolone acetonide 40 mg/mL  Patient tolerance:  Patient tolerated the procedure well with no immediate complications

## 2022-02-07 NOTE — PATIENT INSTRUCTIONS
Left knee injected with Kenalog 40 mg and lidocaine 02/07/2022  Will get you approved for Synvisc-One injection since last time you have it in April 2021 and it helped  Will avoid total knee replacement at this time since you really need to fix the right shoulder which I think now you have a complete tear since you are unable to lift the arm  You seeing Dr. Mike newman in the morning hopefully can decide on what to do for that right shoulder  I will see you as soon as we get Synvisc-One approved  Ice the knee next several days in might burn or swell up a little bit more from the steroid

## 2022-02-08 ENCOUNTER — OFFICE VISIT (OUTPATIENT)
Dept: ORTHOPEDICS | Facility: CLINIC | Age: 67
End: 2022-02-08
Payer: MEDICAID

## 2022-02-08 VITALS — BODY MASS INDEX: 23 KG/M2 | WEIGHT: 125 LBS | HEIGHT: 62 IN

## 2022-02-08 DIAGNOSIS — Z96.619 STATUS POST REVERSE TOTAL SHOULDER REPLACEMENT, UNSPECIFIED LATERALITY: Primary | ICD-10-CM

## 2022-02-08 DIAGNOSIS — M12.811 ROTATOR CUFF TEAR ARTHROPATHY OF RIGHT SHOULDER: Primary | ICD-10-CM

## 2022-02-08 DIAGNOSIS — M75.101 ROTATOR CUFF TEAR ARTHROPATHY OF RIGHT SHOULDER: Primary | ICD-10-CM

## 2022-02-08 PROCEDURE — 1160F RVW MEDS BY RX/DR IN RCRD: CPT | Mod: CPTII,,, | Performed by: STUDENT IN AN ORGANIZED HEALTH CARE EDUCATION/TRAINING PROGRAM

## 2022-02-08 PROCEDURE — 3008F BODY MASS INDEX DOCD: CPT | Mod: CPTII,,, | Performed by: STUDENT IN AN ORGANIZED HEALTH CARE EDUCATION/TRAINING PROGRAM

## 2022-02-08 PROCEDURE — 99214 OFFICE O/P EST MOD 30 MIN: CPT | Mod: 25,S$PBB,, | Performed by: STUDENT IN AN ORGANIZED HEALTH CARE EDUCATION/TRAINING PROGRAM

## 2022-02-08 PROCEDURE — 20610 LARGE JOINT ASPIRATION/INJECTION: R SUBACROMIAL BURSA: ICD-10-PCS | Mod: S$PBB,RT,, | Performed by: STUDENT IN AN ORGANIZED HEALTH CARE EDUCATION/TRAINING PROGRAM

## 2022-02-08 PROCEDURE — 99215 OFFICE O/P EST HI 40 MIN: CPT | Mod: PBBFAC,25 | Performed by: STUDENT IN AN ORGANIZED HEALTH CARE EDUCATION/TRAINING PROGRAM

## 2022-02-08 PROCEDURE — 1159F PR MEDICATION LIST DOCUMENTED IN MEDICAL RECORD: ICD-10-PCS | Mod: CPTII,,, | Performed by: STUDENT IN AN ORGANIZED HEALTH CARE EDUCATION/TRAINING PROGRAM

## 2022-02-08 PROCEDURE — 1159F MED LIST DOCD IN RCRD: CPT | Mod: CPTII,,, | Performed by: STUDENT IN AN ORGANIZED HEALTH CARE EDUCATION/TRAINING PROGRAM

## 2022-02-08 PROCEDURE — 1160F PR REVIEW ALL MEDS BY PRESCRIBER/CLIN PHARMACIST DOCUMENTED: ICD-10-PCS | Mod: CPTII,,, | Performed by: STUDENT IN AN ORGANIZED HEALTH CARE EDUCATION/TRAINING PROGRAM

## 2022-02-08 PROCEDURE — 99999 PR PBB SHADOW E&M-EST. PATIENT-LVL V: ICD-10-PCS | Mod: PBBFAC,,, | Performed by: STUDENT IN AN ORGANIZED HEALTH CARE EDUCATION/TRAINING PROGRAM

## 2022-02-08 PROCEDURE — 20610 DRAIN/INJ JOINT/BURSA W/O US: CPT | Mod: PBBFAC | Performed by: STUDENT IN AN ORGANIZED HEALTH CARE EDUCATION/TRAINING PROGRAM

## 2022-02-08 PROCEDURE — 99214 PR OFFICE/OUTPT VISIT, EST, LEVL IV, 30-39 MIN: ICD-10-PCS | Mod: 25,S$PBB,, | Performed by: STUDENT IN AN ORGANIZED HEALTH CARE EDUCATION/TRAINING PROGRAM

## 2022-02-08 PROCEDURE — 3008F PR BODY MASS INDEX (BMI) DOCUMENTED: ICD-10-PCS | Mod: CPTII,,, | Performed by: STUDENT IN AN ORGANIZED HEALTH CARE EDUCATION/TRAINING PROGRAM

## 2022-02-08 PROCEDURE — 99999 PR PBB SHADOW E&M-EST. PATIENT-LVL V: CPT | Mod: PBBFAC,,, | Performed by: STUDENT IN AN ORGANIZED HEALTH CARE EDUCATION/TRAINING PROGRAM

## 2022-02-08 RX ORDER — TRIAMCINOLONE ACETONIDE 40 MG/ML
40 INJECTION, SUSPENSION INTRA-ARTICULAR; INTRAMUSCULAR
Status: DISCONTINUED | OUTPATIENT
Start: 2022-02-08 | End: 2022-02-08 | Stop reason: HOSPADM

## 2022-02-08 RX ADMIN — TRIAMCINOLONE ACETONIDE 40 MG: 200 INJECTION, SUSPENSION INTRA-ARTICULAR; INTRAMUSCULAR at 10:02

## 2022-02-08 NOTE — PROCEDURES
Large Joint Aspiration/Injection: R subacromial bursa    Date/Time: 2/8/2022 10:00 AM  Performed by: Reza Matute MD  Authorized by: Reza Matute MD     Consent Done?:  Yes (Verbal)  Indications:  Pain and arthritis  Site marked: the procedure site was marked    Timeout: prior to procedure the correct patient, procedure, and site was verified    Prep: patient was prepped and draped in usual sterile fashion      Local anesthesia used?: Yes    Anesthesia:  Local infiltration  Local anesthetic:  Lidocaine 1% without epinephrine    Details:  Needle Size:  22 G  Ultrasonic Guidance for needle placement?: No    Approach:  Posterior  Location:  Shoulder  Site:  R subacromial bursa  Medications:  40 mg triamcinolone acetonide 40 mg/mL  Patient tolerance:  Patient tolerated the procedure well with no immediate complications

## 2022-02-13 ENCOUNTER — PATIENT MESSAGE (OUTPATIENT)
Dept: ALLERGY | Facility: CLINIC | Age: 67
End: 2022-02-13
Payer: MEDICAID

## 2022-02-22 ENCOUNTER — PATIENT MESSAGE (OUTPATIENT)
Dept: ORTHOPEDICS | Facility: CLINIC | Age: 67
End: 2022-02-22
Payer: MEDICAID

## 2022-02-28 RX ORDER — KETOROLAC TROMETHAMINE 10 MG/1
TABLET, FILM COATED ORAL
Qty: 8 TABLET | Refills: 3 | OUTPATIENT
Start: 2022-02-28

## 2022-02-28 RX ORDER — TOPIRAMATE 100 MG/1
1 CAPSULE, EXTENDED RELEASE ORAL DAILY
Qty: 30 CAPSULE | Refills: 11 | Status: SHIPPED | OUTPATIENT
Start: 2022-02-28 | End: 2022-08-26 | Stop reason: SDUPTHER

## 2022-03-01 ENCOUNTER — TELEPHONE (OUTPATIENT)
Dept: NEUROLOGY | Facility: CLINIC | Age: 67
End: 2022-03-01
Payer: MEDICARE

## 2022-03-01 ENCOUNTER — TELEPHONE (OUTPATIENT)
Dept: PSYCHIATRY | Facility: CLINIC | Age: 67
End: 2022-03-01
Payer: MEDICARE

## 2022-03-01 NOTE — TELEPHONE ENCOUNTER
Spoke with pt in regards to rescheduling appt, patient was rescheduled to 3/15/22 with LEXIS Clay.

## 2022-03-01 NOTE — TELEPHONE ENCOUNTER
----- Message from Jaswant Guaman sent at 3/1/2022 10:52 AM CST -----  Contact: PT  PT is calling to reschedule her appt for today at 3. Call back at [phone].

## 2022-03-02 ENCOUNTER — TELEPHONE (OUTPATIENT)
Dept: PSYCHIATRY | Facility: CLINIC | Age: 67
End: 2022-03-02
Payer: MEDICARE

## 2022-03-02 NOTE — TELEPHONE ENCOUNTER
Called and spoke with pt to let her know her appt today is can due to b/o for Jewel and I will put her next appt in 2 wks on the cancellation waitlist

## 2022-03-08 ENCOUNTER — TELEPHONE (OUTPATIENT)
Dept: PHARMACY | Facility: CLINIC | Age: 67
End: 2022-03-08
Payer: MEDICARE

## 2022-03-13 ENCOUNTER — PATIENT MESSAGE (OUTPATIENT)
Dept: ORTHOPEDICS | Facility: CLINIC | Age: 67
End: 2022-03-13
Payer: MEDICARE

## 2022-03-14 PROBLEM — J01.90 SUBACUTE SINUSITIS: Status: RESOLVED | Noted: 2021-12-08 | Resolved: 2022-03-14

## 2022-03-15 ENCOUNTER — OFFICE VISIT (OUTPATIENT)
Dept: NEUROLOGY | Facility: CLINIC | Age: 67
End: 2022-03-15
Payer: MEDICARE

## 2022-03-15 VITALS
BODY MASS INDEX: 23.45 KG/M2 | RESPIRATION RATE: 16 BRPM | OXYGEN SATURATION: 97 % | DIASTOLIC BLOOD PRESSURE: 84 MMHG | SYSTOLIC BLOOD PRESSURE: 132 MMHG | HEIGHT: 62 IN | HEART RATE: 110 BPM | WEIGHT: 127.44 LBS

## 2022-03-15 DIAGNOSIS — G44.89 CHRONIC MIXED HEADACHE SYNDROME: ICD-10-CM

## 2022-03-15 DIAGNOSIS — G43.809 VESTIBULAR MIGRAINE: ICD-10-CM

## 2022-03-15 DIAGNOSIS — M17.12 PRIMARY OSTEOARTHRITIS OF LEFT KNEE: Primary | ICD-10-CM

## 2022-03-15 DIAGNOSIS — R53.1 WEAKNESS: ICD-10-CM

## 2022-03-15 PROCEDURE — 64615 PR CHEMODENERVATION OF MUSCLE FOR CHRONIC MIGRAINE: ICD-10-PCS | Mod: S$PBB,,, | Performed by: NURSE PRACTITIONER

## 2022-03-15 PROCEDURE — 64615 CHEMODENERV MUSC MIGRAINE: CPT | Mod: S$PBB,,, | Performed by: NURSE PRACTITIONER

## 2022-03-15 PROCEDURE — 64615 CHEMODENERV MUSC MIGRAINE: CPT | Mod: PBBFAC | Performed by: NURSE PRACTITIONER

## 2022-03-15 PROCEDURE — 99215 OFFICE O/P EST HI 40 MIN: CPT | Mod: PBBFAC | Performed by: NURSE PRACTITIONER

## 2022-03-15 RX ORDER — KETOROLAC TROMETHAMINE 10 MG/1
10 TABLET, FILM COATED ORAL ONCE
Qty: 1 TABLET | Refills: 0 | Status: SHIPPED | OUTPATIENT
Start: 2022-03-15 | End: 2022-03-16

## 2022-03-15 RX ADMIN — ONABOTULINUMTOXINA 200 UNITS: 100 INJECTION, POWDER, LYOPHILIZED, FOR SOLUTION INTRADERMAL; INTRAMUSCULAR at 03:03

## 2022-03-15 NOTE — PROGRESS NOTES
1. PROCEDURE: Botox injections     2. INDICATION: Intractable Migraine     3. TIME OUT: The procedure was discussed in details with the patient and the consent form was signed. The patient verbalized understanding of the procedure . I discussed the risks that may include serious immune reaction and distant spread that could results in loss of breathing. Other side effects may include droopy eyelids, trouble swallowing and cardiac arrhythmias. It was also stressed that it is contraindicated in pregnancy.     3. COURSE:   The standard protocol was followed. the procedure was very smooth.     4. COMPLICATIONS: None. The patient tolerated the procedure very well.     5. AFTERCARE: I encouraged the patient to use ice if the sites of injection get tender and call me with any problems or complications.           As per the standard protocol, a total 155 units were injected in 31 sites.         RT  5 units in 1 site    LT  5 units in 1 site     Procerus 5 units in 1 site     RT Frontalis 10 units in 2 sites     LT Frontalis 10 units in 2 site     RT Temporalis 20 units in 4 sites    LT Temporalis 20 units in 4 sites    RT Occipitalis 15 units in 3 sites    LT Occipitalis 15  units in 3 sites     RT Cervical Paraspinals 10 units in 2 sites    LT Cervical Paraspinals 10 units in 2 sites    RT Trapezius 15 units in 3 sites    LT Trapezius 15 units in 3 sites       Per the standard of care protocol we use 155 units and waste 45 units. I gave the patient the option of following the standard protocol vs.using the extra 45 units to target areas where the pain is maximum which could potentially provide extra help with headache control. I explained to the patient that this will be an off-label use, not the standard protocol, not evidence-based and could result in more pronounced side effects. The patient verbalized full understanding of all the facts and the risks and benefits and elected to use the  extra 45 units for the following sites:          Procerus 5 units in 1 site     RT Temporalis 20 units in 4 sites    LT Temporalis 20 units in 4 sites

## 2022-03-21 ENCOUNTER — PATIENT MESSAGE (OUTPATIENT)
Dept: PSYCHIATRY | Facility: CLINIC | Age: 67
End: 2022-03-21
Payer: MEDICARE

## 2022-03-21 DIAGNOSIS — K58.9 IRRITABLE BOWEL SYNDROME WITHOUT DIARRHEA: ICD-10-CM

## 2022-03-22 ENCOUNTER — PATIENT MESSAGE (OUTPATIENT)
Dept: NEUROLOGY | Facility: CLINIC | Age: 67
End: 2022-03-22
Payer: MEDICARE

## 2022-03-22 RX ORDER — DICYCLOMINE HYDROCHLORIDE 10 MG/1
10 CAPSULE ORAL 3 TIMES DAILY
Qty: 90 CAPSULE | Refills: 1 | Status: SHIPPED | OUTPATIENT
Start: 2022-03-22 | End: 2022-06-21 | Stop reason: SDUPTHER

## 2022-03-24 ENCOUNTER — OFFICE VISIT (OUTPATIENT)
Dept: OPHTHALMOLOGY | Facility: CLINIC | Age: 67
End: 2022-03-24
Payer: MEDICARE

## 2022-03-24 DIAGNOSIS — H25.13 NUCLEAR SCLEROSIS, BILATERAL: Primary | ICD-10-CM

## 2022-03-24 DIAGNOSIS — H52.203 ASTIGMATISM WITH PRESBYOPIA, BILATERAL: ICD-10-CM

## 2022-03-24 DIAGNOSIS — D31.01 NEVUS OF CONJUNCTIVA, RIGHT: ICD-10-CM

## 2022-03-24 DIAGNOSIS — H10.10 SEASONAL ALLERGIC CONJUNCTIVITIS: ICD-10-CM

## 2022-03-24 DIAGNOSIS — H52.4 ASTIGMATISM WITH PRESBYOPIA, BILATERAL: ICD-10-CM

## 2022-03-24 DIAGNOSIS — Z98.890 HX OF LASIK: ICD-10-CM

## 2022-03-24 DIAGNOSIS — H25.013 CORTICAL AGE-RELATED CATARACT OF BOTH EYES: ICD-10-CM

## 2022-03-24 PROCEDURE — 92014 COMPRE OPH EXAM EST PT 1/>: CPT | Mod: S$PBB,,, | Performed by: OPTOMETRIST

## 2022-03-24 PROCEDURE — 99212 OFFICE O/P EST SF 10 MIN: CPT | Mod: PBBFAC | Performed by: OPTOMETRIST

## 2022-03-24 PROCEDURE — 99999 PR PBB SHADOW E&M-EST. PATIENT-LVL II: CPT | Mod: PBBFAC,,, | Performed by: OPTOMETRIST

## 2022-03-24 PROCEDURE — 92015 PR REFRACTION: ICD-10-PCS | Mod: ,,, | Performed by: OPTOMETRIST

## 2022-03-24 PROCEDURE — 99999 PR PBB SHADOW E&M-EST. PATIENT-LVL II: ICD-10-PCS | Mod: PBBFAC,,, | Performed by: OPTOMETRIST

## 2022-03-24 PROCEDURE — 92015 DETERMINE REFRACTIVE STATE: CPT | Mod: ,,, | Performed by: OPTOMETRIST

## 2022-03-24 PROCEDURE — 92014 PR EYE EXAM, EST PATIENT,COMPREHESV: ICD-10-PCS | Mod: S$PBB,,, | Performed by: OPTOMETRIST

## 2022-03-24 NOTE — PROGRESS NOTES
HPI     Annual Exam     Comments: Vision changes since last eye exam?: yes, blurrier   Any eye pain today: no  Other ocular symptoms: no  Interested in contact lens fitting today? no            Last edited by Christie Sheldon MA on 3/24/2022  1:32 PM. (History)            Assessment /Plan     For exam results, see Encounter Report.    Nuclear sclerosis, bilateral  Cortical age-related cataract of both eyes  Cataracts not significantly affecting activities of daily living and therefore surgery is not indicated at this time.   Will continue to monitor over the next 12 months. Pt to call or RTC with any significant change in vision prior to next visit.     Seasonal allergic conjunctivitis  Recommended Pataday qd OU    Nevus of conjunctiva, right  Baseline photos and measurements taken today  Will condiser biopsy or removal if any changes to size or appearance in the future  Monitor 3 months    Hx of LASIK  Astigmatism with presbyopia, bilateral  Eyeglass Final Rx     Eyeglass Final Rx       Sphere Cylinder Axis    Right -0.50 +1.00 025    Left Adrian +2.00 178    Type: SVL distance    Expiration Date: 3/24/2023          Eyeglass Final Rx #2       Sphere Cylinder Axis    Right +2.00 +1.00 025    Left +2.50 +2.00 178    Type: SVL reading    Expiration Date: 3/24/2023                RTC 3 months for nevus follow up OD or PRN  Discussed above and all questions were answered.

## 2022-03-25 ENCOUNTER — PATIENT MESSAGE (OUTPATIENT)
Dept: SURGERY | Facility: HOSPITAL | Age: 67
End: 2022-03-25
Payer: MEDICARE

## 2022-03-28 NOTE — TELEPHONE ENCOUNTER
Pt had steroid injection in her knee -- she is scheduled to have a monovisc injection on 5/16 -- patient asking if there is anything else we can give her for pain relief

## 2022-03-29 ENCOUNTER — PATIENT MESSAGE (OUTPATIENT)
Dept: ORTHOPEDICS | Facility: CLINIC | Age: 67
End: 2022-03-29
Payer: MEDICARE

## 2022-03-29 ENCOUNTER — OFFICE VISIT (OUTPATIENT)
Dept: PSYCHIATRY | Facility: CLINIC | Age: 67
End: 2022-03-29
Payer: MEDICARE

## 2022-03-29 ENCOUNTER — PATIENT MESSAGE (OUTPATIENT)
Dept: GYNECOLOGIC ONCOLOGY | Facility: CLINIC | Age: 67
End: 2022-03-29
Payer: MEDICARE

## 2022-03-29 DIAGNOSIS — F41.1 GENERALIZED ANXIETY DISORDER: ICD-10-CM

## 2022-03-29 DIAGNOSIS — F33.1 MODERATE EPISODE OF RECURRENT MAJOR DEPRESSIVE DISORDER: Primary | ICD-10-CM

## 2022-03-29 PROCEDURE — 90834 PSYTX W PT 45 MINUTES: CPT | Mod: 95,,, | Performed by: SOCIAL WORKER

## 2022-03-29 PROCEDURE — 90834 PR PSYCHOTHERAPY W/PATIENT, 45 MIN: ICD-10-PCS | Mod: 95,,, | Performed by: SOCIAL WORKER

## 2022-03-29 RX ORDER — MELOXICAM 15 MG/1
15 TABLET ORAL DAILY
OUTPATIENT
Start: 2022-03-29
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
## 2022-03-30 ENCOUNTER — TELEPHONE (OUTPATIENT)
Dept: PSYCHIATRY | Facility: CLINIC | Age: 67
End: 2022-03-30
Payer: MEDICARE

## 2022-03-30 RX ORDER — METHYLPREDNISOLONE 4 MG/1
TABLET ORAL
Qty: 1 EACH | Refills: 0 | Status: SHIPPED | OUTPATIENT
Start: 2022-03-30 | End: 2022-06-08 | Stop reason: ALTCHOICE

## 2022-04-01 ENCOUNTER — OFFICE VISIT (OUTPATIENT)
Dept: PSYCHIATRY | Facility: CLINIC | Age: 67
End: 2022-04-01
Payer: MEDICAID

## 2022-04-01 DIAGNOSIS — M17.12 PRIMARY OSTEOARTHRITIS OF LEFT KNEE: Primary | ICD-10-CM

## 2022-04-01 DIAGNOSIS — F43.21 COMPLICATED GRIEF: ICD-10-CM

## 2022-04-01 DIAGNOSIS — F33.0 MILD EPISODE OF RECURRENT MAJOR DEPRESSIVE DISORDER: ICD-10-CM

## 2022-04-01 DIAGNOSIS — F41.1 GAD (GENERALIZED ANXIETY DISORDER): Primary | ICD-10-CM

## 2022-04-01 PROCEDURE — 99213 PR OFFICE/OUTPT VISIT, EST, LEVL III, 20-29 MIN: ICD-10-PCS | Mod: 95,,, | Performed by: PSYCHIATRY & NEUROLOGY

## 2022-04-01 PROCEDURE — 99499 RISK ADDL DX/OHS AUDIT: ICD-10-PCS | Mod: 95,,, | Performed by: PSYCHIATRY & NEUROLOGY

## 2022-04-01 PROCEDURE — 90833 PR PSYCHOTHERAPY W/PATIENT W/E&M, 30 MIN (ADD ON): ICD-10-PCS | Mod: 95,,, | Performed by: PSYCHIATRY & NEUROLOGY

## 2022-04-01 PROCEDURE — 99213 OFFICE O/P EST LOW 20 MIN: CPT | Mod: 95,,, | Performed by: PSYCHIATRY & NEUROLOGY

## 2022-04-01 PROCEDURE — 99499 UNLISTED E&M SERVICE: CPT | Mod: 95,,, | Performed by: PSYCHIATRY & NEUROLOGY

## 2022-04-01 PROCEDURE — 90833 PSYTX W PT W E/M 30 MIN: CPT | Mod: 95,,, | Performed by: PSYCHIATRY & NEUROLOGY

## 2022-04-01 RX ORDER — BUSPIRONE HYDROCHLORIDE 30 MG/1
30 TABLET ORAL 2 TIMES DAILY
Qty: 60 TABLET | Refills: 3 | Status: SHIPPED | OUTPATIENT
Start: 2022-04-01 | End: 2022-06-21

## 2022-04-01 RX ORDER — BUPROPION HYDROCHLORIDE 150 MG/1
450 TABLET ORAL DAILY
Qty: 90 TABLET | Refills: 3 | Status: SHIPPED | OUTPATIENT
Start: 2022-04-01 | End: 2022-06-21

## 2022-04-01 RX ORDER — ZOLPIDEM TARTRATE 5 MG/1
TABLET ORAL
Qty: 30 TABLET | Refills: 2 | Status: SHIPPED | OUTPATIENT
Start: 2022-04-01 | End: 2022-08-13 | Stop reason: SDUPTHER

## 2022-04-01 RX ORDER — DIAZEPAM 2 MG/1
2 TABLET ORAL EVERY 6 HOURS PRN
Qty: 30 TABLET | Refills: 2 | Status: SHIPPED | OUTPATIENT
Start: 2022-04-01 | End: 2022-06-27 | Stop reason: SDUPTHER
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
## 2022-04-04 ENCOUNTER — PATIENT OUTREACH (OUTPATIENT)
Dept: ADMINISTRATIVE | Facility: OTHER | Age: 67
End: 2022-04-04
Payer: MEDICARE

## 2022-04-04 DIAGNOSIS — Z12.31 ENCOUNTER FOR SCREENING MAMMOGRAM FOR BREAST CANCER: Primary | ICD-10-CM

## 2022-04-05 ENCOUNTER — PATIENT MESSAGE (OUTPATIENT)
Dept: ALLERGY | Facility: CLINIC | Age: 67
End: 2022-04-05
Payer: MEDICARE

## 2022-04-05 ENCOUNTER — PATIENT MESSAGE (OUTPATIENT)
Dept: PSYCHIATRY | Facility: CLINIC | Age: 67
End: 2022-04-05
Payer: MEDICARE

## 2022-04-12 ENCOUNTER — PATIENT MESSAGE (OUTPATIENT)
Dept: ORTHOPEDICS | Facility: CLINIC | Age: 67
End: 2022-04-12
Payer: MEDICARE

## 2022-04-12 ENCOUNTER — OFFICE VISIT (OUTPATIENT)
Dept: PSYCHIATRY | Facility: CLINIC | Age: 67
End: 2022-04-12
Payer: MEDICAID

## 2022-04-12 DIAGNOSIS — F41.1 GAD (GENERALIZED ANXIETY DISORDER): Primary | ICD-10-CM

## 2022-04-12 DIAGNOSIS — F33.0 MILD EPISODE OF RECURRENT MAJOR DEPRESSIVE DISORDER: ICD-10-CM

## 2022-04-12 PROCEDURE — 90834 PSYTX W PT 45 MINUTES: CPT | Mod: 95,,, | Performed by: SOCIAL WORKER

## 2022-04-12 PROCEDURE — 90834 PR PSYCHOTHERAPY W/PATIENT, 45 MIN: ICD-10-PCS | Mod: 95,,, | Performed by: SOCIAL WORKER

## 2022-04-12 NOTE — PROGRESS NOTES
"  Individual Psychotherapy Follow-up Visit Progress Note (PhD/LCSW)     Outpatient - Supportive psychotherapy 45 min - CPT Code 87532     Virtual visit with synchronous audio/video, Location of patient: her home in [place]    Each patient provided medical services by telemedicine is: (1) informed of the relationship between the provider and patient and the respective role of any other health care provider with respect to management of the patient; and (2) notified that he or she may decline to receive medical services by telemedicine and may withdraw from such care at any time.    4/12/2022  MRN: [id number]  Primary Care Provider: Almita Izaguirre, NP    Kenia Alonzo is a 66 y.o. female who presents today for follow-up of depression and anxiety. Met with patient.    ""    Subjective:       Patient report/content of session: Last seen 3/29/2022. Pt continues to endorse stress due to family conflict, sister and others coming and going from her mother's house, where she currently lives. States she has had difficulty getting out of bed, is wanting to sleep, and feeling depressed. She is anxiously awaiting her disability hearing and hopes to be able to move out of her mother's home shortly thereafter. Helped pt to process her feelings and provided support. Discussed choosing her battles and focusing on the things she can control. Pt recently stayed with one of her sisters to get a break from all of the chaos at her mother's house and plans to do so again. She also plans to go to her friend's condo in [place], [place] for a few days. Reviewed coping strategies and affirmed pt's efforts to take space for herself and engage in activities she enjoys.       Current symptoms:   · Depression: depression, diminished interest, sleep disturbance, fatigue, negativistic thinking  · Anxiety: excessive anxiety/worry, restlessness, irritability  · Substance abuse: denied  · Cognitive functioning: denied  · Iram: none " noted  · Psychosis: none noted      Objective:       Mental Status Evaluation  Appearance: unremarkable, age appropriate  Behavior: normal, cooperative  Speech: normal tone, normal rate, normal pitch, normal volume  Mood: depressed, irritable  Affect: congruent and appropriate  Thought Process: normal and logical  Thought Content: normal, no suicidality, no homicidality, delusions, or paranoia  Sensorium: grossly intact  Cognition: grossly intact  Insight: good  Judgment: adequate to circumstances    Risk parameters:  Patient reports no suicidal ideation  Patient reports no homicidal ideation  Patient reports no self-injurious behavior  Patient reports no violent behavior      Assessment & Plan:       Therapeutic interventions used: Assigned pt to practice relaxation on a daily basis  Pt was assigned to engage in behavioral activation by scheduling activities that have a high likelihood for pleasure and mastery  Utilized acceptance and commitment therapy (ACT) to help pt accept uncomfortable realities  Helped pt to identify and accept situations in which she does not have complete control, has imperfection or needs to tolerate uncertainty and unpleasant emotions  Monitored the effectiveness and side effects of antidepressant medication prescribed by physician/NP/MP  Encouraged pt to commit time and effort to activities that are consistent with personally meaningful values        The patient's response to the interventions is accepting    The patient's progress toward treatment goals is good    Homework assigned: daily journaling and practice relaxation skills daily, increase use of available supports    Treatment plan:   A. Target symptoms: Depression, Anxiety and Poor Coping Skills   B. Therapeutic modalities: insight oriented psychotherapy, supportive psychotherapy  C. Why chosen therapy is appropriate versus another modality: relevant to diagnosis, evidence based practice   D. Outcome monitoring methods:  self-report, observation, feedback from clinical staff     Visit Diagnosis:   1. JOSIAH (generalized anxiety disorder)    2. Mild episode of recurrent major depressive disorder        Follow-up: individual psychotherapy and medication management by physician     Return to Clinic: 3 weeks    Length of Service (minutes): 45        Beatriz Holloway LCSW  Outpatient Psychiatry

## 2022-04-13 ENCOUNTER — PATIENT MESSAGE (OUTPATIENT)
Dept: OBSTETRICS AND GYNECOLOGY | Facility: CLINIC | Age: 67
End: 2022-04-13
Payer: MEDICARE

## 2022-04-13 ENCOUNTER — HOSPITAL ENCOUNTER (OUTPATIENT)
Dept: RADIOLOGY | Facility: HOSPITAL | Age: 67
Discharge: HOME | End: 2022-04-13
Attending: NURSE PRACTITIONER
Payer: MEDICARE

## 2022-04-13 ENCOUNTER — HOSPITAL ENCOUNTER (OUTPATIENT)
Dept: RADIOLOGY | Facility: HOSPITAL | Age: 67
Discharge: HOME | End: 2022-04-13
Attending: UROLOGY
Payer: MEDICARE

## 2022-04-13 ENCOUNTER — TELEPHONE (OUTPATIENT)
Dept: UROLOGY | Facility: CLINIC | Age: 67
End: 2022-04-13
Payer: MEDICARE

## 2022-04-13 ENCOUNTER — PATIENT MESSAGE (OUTPATIENT)
Dept: ORTHOPEDICS | Facility: CLINIC | Age: 67
End: 2022-04-13
Payer: MEDICARE

## 2022-04-13 VITALS — HEIGHT: 62 IN | WEIGHT: 127.44 LBS | BODY MASS INDEX: 23.45 KG/M2

## 2022-04-13 DIAGNOSIS — N28.1 BILATERAL RENAL CYSTS: ICD-10-CM

## 2022-04-13 DIAGNOSIS — Z13.820 OSTEOPOROSIS SCREENING: Primary | ICD-10-CM

## 2022-04-13 DIAGNOSIS — Z12.31 ENCOUNTER FOR SCREENING MAMMOGRAM FOR BREAST CANCER: ICD-10-CM

## 2022-04-13 PROCEDURE — 76770 US RETROPERITONEAL COMPLETE: ICD-10-PCS | Mod: 26,,, | Performed by: RADIOLOGY

## 2022-04-13 PROCEDURE — 77063 MAMMO DIGITAL SCREENING BILAT WITH TOMO: ICD-10-PCS | Mod: 26,,, | Performed by: RADIOLOGY

## 2022-04-13 PROCEDURE — 77067 SCR MAMMO BI INCL CAD: CPT | Mod: TC,PO

## 2022-04-13 PROCEDURE — 76770 US EXAM ABDO BACK WALL COMP: CPT | Mod: TC,PO

## 2022-04-13 PROCEDURE — 77067 MAMMO DIGITAL SCREENING BILAT WITH TOMO: ICD-10-PCS | Mod: 26,,, | Performed by: RADIOLOGY

## 2022-04-13 PROCEDURE — 77063 BREAST TOMOSYNTHESIS BI: CPT | Mod: 26,,, | Performed by: RADIOLOGY

## 2022-04-13 PROCEDURE — 77063 BREAST TOMOSYNTHESIS BI: CPT | Mod: TC,PO

## 2022-04-13 PROCEDURE — 77067 SCR MAMMO BI INCL CAD: CPT | Mod: 26,,, | Performed by: RADIOLOGY

## 2022-04-13 PROCEDURE — 76770 US EXAM ABDO BACK WALL COMP: CPT | Mod: 26,,, | Performed by: RADIOLOGY

## 2022-04-13 NOTE — TELEPHONE ENCOUNTER
Contacted patient to reschedule 4-18-22 1115am Urology appointment to 145pm same date. Provider will be in a meeting at time of pts appt on this date.   PT DEENA

## 2022-04-14 ENCOUNTER — PATIENT MESSAGE (OUTPATIENT)
Dept: PSYCHIATRY | Facility: CLINIC | Age: 67
End: 2022-04-14
Payer: MEDICARE

## 2022-04-19 ENCOUNTER — PATIENT MESSAGE (OUTPATIENT)
Dept: ORTHOPEDICS | Facility: CLINIC | Age: 67
End: 2022-04-19
Payer: MEDICARE

## 2022-04-19 ENCOUNTER — TELEPHONE (OUTPATIENT)
Dept: ORTHOPEDICS | Facility: CLINIC | Age: 67
End: 2022-04-19
Payer: MEDICARE

## 2022-04-19 NOTE — TELEPHONE ENCOUNTER
----- Message from Pearl Ellis sent at 4/19/2022 11:00 AM CDT -----  Contact: pt  The pt request a return call, no additional info given and can be reached at [phone]///[id number]

## 2022-04-21 ENCOUNTER — TELEPHONE (OUTPATIENT)
Dept: UROLOGY | Facility: CLINIC | Age: 67
End: 2022-04-21
Payer: MEDICARE

## 2022-04-21 ENCOUNTER — TELEPHONE (OUTPATIENT)
Dept: INTERNAL MEDICINE | Facility: CLINIC | Age: 67
End: 2022-04-21
Payer: MEDICARE

## 2022-04-21 ENCOUNTER — PATIENT MESSAGE (OUTPATIENT)
Dept: PSYCHIATRY | Facility: CLINIC | Age: 67
End: 2022-04-21
Payer: MEDICARE

## 2022-04-21 NOTE — TELEPHONE ENCOUNTER
----- Message from Jigna Clemons sent at 4/21/2022  9:34 AM CDT -----  Contact: pt  Type:  Same Day Appointment Request    Caller is requesting a same day appointment.  Caller declined first available appointment listed below.    Name of Caller: pt   When is the first available appointment?  Symptoms: sinus / allergy issues  Best Call Back Number: Pt portal and [phone]  Additional Information:

## 2022-04-21 NOTE — TELEPHONE ENCOUNTER
Returned call to patient , unable to help no availability for today. Patient states will go to urgent care.

## 2022-04-21 NOTE — TELEPHONE ENCOUNTER
Left message to let patient know provider will be in a meeting at time of 4-25-22 appt. Informed patient she could come at 230pm same day or reschedule.   Appt changed to 230pm, if time is not convenient for patient please reschedule to another day.

## 2022-04-25 ENCOUNTER — OFFICE VISIT (OUTPATIENT)
Dept: UROLOGY | Facility: CLINIC | Age: 67
End: 2022-04-25
Payer: MEDICARE

## 2022-04-25 VITALS
BODY MASS INDEX: 23.91 KG/M2 | SYSTOLIC BLOOD PRESSURE: 128 MMHG | WEIGHT: 130.75 LBS | DIASTOLIC BLOOD PRESSURE: 72 MMHG | TEMPERATURE: 98 F | HEART RATE: 97 BPM

## 2022-04-25 DIAGNOSIS — N28.1 BILATERAL RENAL CYSTS: Primary | ICD-10-CM

## 2022-04-25 PROCEDURE — 99213 PR OFFICE/OUTPT VISIT, EST, LEVL III, 20-29 MIN: ICD-10-PCS | Mod: S$PBB,,, | Performed by: UROLOGY

## 2022-04-25 PROCEDURE — 99999 PR PBB SHADOW E&M-EST. PATIENT-LVL V: CPT | Mod: PBBFAC,,, | Performed by: UROLOGY

## 2022-04-25 PROCEDURE — 99213 OFFICE O/P EST LOW 20 MIN: CPT | Mod: S$PBB,,, | Performed by: UROLOGY

## 2022-04-25 PROCEDURE — 99215 OFFICE O/P EST HI 40 MIN: CPT | Mod: PBBFAC | Performed by: UROLOGY

## 2022-04-25 PROCEDURE — 99999 PR PBB SHADOW E&M-EST. PATIENT-LVL V: ICD-10-PCS | Mod: PBBFAC,,, | Performed by: UROLOGY

## 2022-04-25 NOTE — PROGRESS NOTES
Chief Complaint:   Encounter Diagnosis   Name Primary?    Bilateral renal cysts Yes       HPI:    4/25/22- patient is doing well with no complaints, here today to discuss her ultrasound.  65-year-old female who comes in with a complicated bilateral renal cysts which appear to benign.  She has been followed by Nephrology up to this point, she is asymptomatic in regards to these.  No gross hematuria, no microscopic hematuria, she is an active smoker.  She has renal insufficiency secondary to prolonged ibuprofen use in 2015.  No other urological gynecological history.  She has no evidence of incontinence.  Her sister has bladder cancer, no other family history of urological cancers or stones.    Allergies:  Demerol [meperidine] and Codeine    Medications:  has a current medication list which includes the following prescription(s): acyclovir 5%, albuterol, azelastine, bumetanide, bupropion, buspirone, c/sourcherry/celery/grape seed, co-enzyme q-10, conjugated estrogens, diazepam, dicyclomine, aimovig autoinjector, estradiol-norethindrone, fluticasone propionate, ketorolac, levocetirizine, lidocaine-prilocaine, magnesium, melatonin, methylprednisolone, montelukast, mupirocin, fish oil-omega-3 fatty acids, ondansetron, potassium chloride sa, simvastatin, trokendi xr, ubrogepant, vitamin b complex/folic acid, zinc gluconate, and zolpidem, and the following Facility-Administered Medications: hylan g-f 20 and onabotulinumtoxina.    Review of Systems:  General: No fever, chills, fatigability, or weight loss.  Skin: No rashes, itching, or changes in color or texture of skin.  Chest: Denies GLEASON, cyanosis, wheezing, cough, and sputum production.  Abdomen: Appetite fine. No weight loss. Denies diarrhea, abdominal pain, hematemesis, or blood in stool.  Musculoskeletal: No joint stiffness or swelling. Denies back pain.  : As above.  All other review of systems negative.    PMH:   has a past medical history of Arthritis, Asthma,  Depression, Digestive disorder, Encounter for blood transfusion, General anesthetics causing adverse effect in therapeutic use, GI problem, Gout, Headache, Hyperlipidemia, Meniere disease, MVP (mitral valve prolapse), Renal disorder, and Vertigo.    PSH:   has a past surgical history that includes  section; Breast surgery; Brain surgery (); Tonsillectomy; Cholecystectomy; Examination under anesthesia (N/A, 2020); Laser ablation (N/A, 2020); Colonoscopy (N/A, 3/15/2021); and Breast biopsy.    FamHx: family history includes Colon cancer in her father; Diabetes in her father and sister.    SocHx:  reports that she quit smoking about 2 years ago. Her smoking use included cigarettes. She started smoking about 46 years ago. She has a 21.50 pack-year smoking history. She has never used smokeless tobacco. She reports previous alcohol use of about 1.0 - 3.0 standard drink of alcohol per week. She reports that she does not use drugs.      Physical Exam:  There were no vitals filed for this visit.  General: A&Ox3, no apparent distress, no deformities  Neck: No masses, normal ROM  Lungs: normal inspiration, no use of accessory muscles  Heart: normal pulse, no arrhythmias  Abdomen: Soft, NT, ND, no masses, no hernias, no hepatosplenomegaly  Skin: The skin is warm and dry. No jaundice.  Ext: No c/c/e.    Labs/Studies:   YING stable bilateral complex renal cysts, possible right renal stones, medical renal disease   CT urogram bilateral complex renal cysts   Renal ultrasound increased growth of bilateral complicated renal cysts   Creatinine 1.4 10/21    Impression/Plan:       Bilateral complicated renal cysts- ultrasound appears to be stable, currently asymptomatic.  Will have her return in 1 year with a renal ultrasound and a BMP.  Call with any complaints prior to the next appointment.

## 2022-04-27 ENCOUNTER — PATIENT MESSAGE (OUTPATIENT)
Dept: INTERNAL MEDICINE | Facility: CLINIC | Age: 67
End: 2022-04-27
Payer: MEDICARE

## 2022-04-27 RX ORDER — SIMVASTATIN 5 MG/1
5 TABLET, FILM COATED ORAL NIGHTLY
Qty: 90 TABLET | Refills: 3 | OUTPATIENT
Start: 2022-04-27

## 2022-05-05 ENCOUNTER — PATIENT MESSAGE (OUTPATIENT)
Dept: PSYCHIATRY | Facility: CLINIC | Age: 67
End: 2022-05-05
Payer: MEDICARE

## 2022-05-06 ENCOUNTER — OFFICE VISIT (OUTPATIENT)
Dept: PSYCHIATRY | Facility: CLINIC | Age: 67
End: 2022-05-06
Payer: MEDICAID

## 2022-05-06 DIAGNOSIS — F33.0 MILD EPISODE OF RECURRENT MAJOR DEPRESSIVE DISORDER: ICD-10-CM

## 2022-05-06 DIAGNOSIS — F41.1 GAD (GENERALIZED ANXIETY DISORDER): Primary | ICD-10-CM

## 2022-05-06 PROCEDURE — 90834 PSYTX W PT 45 MINUTES: CPT | Mod: 95,,, | Performed by: SOCIAL WORKER

## 2022-05-06 PROCEDURE — 90834 PR PSYCHOTHERAPY W/PATIENT, 45 MIN: ICD-10-PCS | Mod: 95,,, | Performed by: SOCIAL WORKER

## 2022-05-06 NOTE — PROGRESS NOTES
"  Individual Psychotherapy Follow-up Visit Progress Note (PhD/LCSW)     Outpatient - Supportive psychotherapy 45 min - CPT Code 51060     Virtual visit with synchronous audio/video, Location of patient: her home in [place]    Each patient provided medical services by telemedicine is: (1) informed of the relationship between the provider and patient and the respective role of any other health care provider with respect to management of the patient; and (2) notified that he or she may decline to receive medical services by telemedicine and may withdraw from such care at any time.    5/6/2022  MRN: [id number]  Primary Care Provider: Almita Izaguirre, NP    Kenia Alonzo is a 66 y.o. female who presents today for follow-up of depression and anxiety. Met with patient.    ""    Subjective:       Patient report/content of session: Last seen 4/12/2022. Pt states she spent all day with her son Tad yesterday, and she feels "emotionally drained." She transported him to and from a medical appointment in [place]. He was reportedly very rude and verbally aggressive toward her. Helped pt to process her feelings of hurt and anger and explored pt's responses to son's behavior. Identified ways to set boundaries and resolve conflict.    Current symptoms:   · Depression: depression, diminished interest, sleep disturbance, fatigue, negativistic thinking  · Anxiety: excessive anxiety/worry, restlessness, irritability  · Substance abuse: denied  · Cognitive functioning: denied  · Iram: none noted  · Psychosis: none noted      Objective:       Mental Status Evaluation  Appearance: unremarkable, age appropriate  Behavior: normal, cooperative  Speech: normal tone, normal rate, normal pitch, normal volume  Mood: depressed, irritable  Affect: congruent and appropriate  Thought Process: normal and logical  Thought Content: normal, no suicidality, no homicidality, delusions, or paranoia  Sensorium: grossly intact  Cognition: " grossly intact  Insight: good  Judgment: adequate to circumstances    Risk parameters:  Patient reports no suicidal ideation  Patient reports no homicidal ideation  Patient reports no self-injurious behavior  Patient reports no violent behavior      Assessment & Plan:       Therapeutic interventions used: Pt was taught how to apply relaxation skills to specific situations  Assigned pt to practice relaxation on a daily basis  Pt was assigned to engage in behavioral activation by scheduling activities that have a high likelihood for pleasure and mastery  Assessed pt's level of insight toward the presenting problems  Monitored the effectiveness and side effects of antidepressant medication prescribed by physician/NP/MP  Assisted pt in developing an awareness of automatic thoughts that reflect depressogenic schemas  Taught pt conflict resolution skills such as practicing empathy, active listening, respectful communication, assertiveness and compromise  Encouraged pt to commit time and effort to activities that are consistent with personally meaningful values        The patient's response to the interventions is accepting    The patient's progress toward treatment goals is good    Homework assigned: daily journaling and practice relaxation skills daily, increase use of available supports    Treatment plan:   A. Target symptoms: Depression, Anxiety and Poor Coping Skills   B. Therapeutic modalities: insight oriented psychotherapy, supportive psychotherapy  C. Why chosen therapy is appropriate versus another modality: relevant to diagnosis, evidence based practice   D. Outcome monitoring methods: self-report, observation, feedback from clinical staff     Visit Diagnosis:   1. JOSIAH (generalized anxiety disorder)    2. Mild episode of recurrent major depressive disorder        Follow-up: individual psychotherapy and medication management by physician     Return to Clinic: 3 weeks    Length of Service (minutes):  45        Beatriz Holloway, [facility]  Outpatient Psychiatry

## 2022-05-12 ENCOUNTER — PATIENT OUTREACH (OUTPATIENT)
Dept: ADMINISTRATIVE | Facility: OTHER | Age: 67
End: 2022-05-12
Payer: MEDICARE

## 2022-05-13 ENCOUNTER — PATIENT MESSAGE (OUTPATIENT)
Dept: PSYCHIATRY | Facility: CLINIC | Age: 67
End: 2022-05-13
Payer: MEDICARE

## 2022-05-13 ENCOUNTER — TELEPHONE (OUTPATIENT)
Dept: PSYCHIATRY | Facility: CLINIC | Age: 67
End: 2022-05-13
Payer: MEDICARE

## 2022-05-13 NOTE — TELEPHONE ENCOUNTER
Called pt to reschedule her visit with HEMA Holloway left  for pt to return call, also will send [employer] message.

## 2022-05-13 NOTE — PROGRESS NOTES
Health Maintenance Due   Topic Date Due    Colorectal Cancer Screening  03/15/2022    COVID-19 Vaccine (4 - Booster for Moderna series) 04/20/2022     Updates were requested from care everywhere.  Chart was reviewed for overdue Proactive [facility] Encounters (DANIELLE) topics (CRS, Breast Cancer Screening, Eye exam)  Health Maintenance has been updated.  [place] immunization registry triggered.  Immunizations were reconciled.

## 2022-05-16 ENCOUNTER — OFFICE VISIT (OUTPATIENT)
Dept: ORTHOPEDICS | Facility: CLINIC | Age: 67
End: 2022-05-16
Payer: MEDICARE

## 2022-05-16 VITALS — BODY MASS INDEX: 24.06 KG/M2 | HEIGHT: 62 IN | WEIGHT: 130.75 LBS

## 2022-05-16 DIAGNOSIS — M17.12 ARTHRITIS OF KNEE, LEFT: Primary | ICD-10-CM

## 2022-05-16 DIAGNOSIS — M17.12 PRIMARY OSTEOARTHRITIS OF LEFT KNEE: Primary | ICD-10-CM

## 2022-05-16 DIAGNOSIS — M21.162 ACQUIRED VARUS DEFORMITY KNEE, LEFT: ICD-10-CM

## 2022-05-16 DIAGNOSIS — Z96.619 STATUS POST REVERSE TOTAL SHOULDER REPLACEMENT, UNSPECIFIED LATERALITY: ICD-10-CM

## 2022-05-16 DIAGNOSIS — M17.11 ARTHRITIS OF KNEE, RIGHT: ICD-10-CM

## 2022-05-16 PROCEDURE — 20610 LARGE JOINT ASPIRATION/INJECTION: L KNEE: ICD-10-PCS | Mod: S$PBB,LT,, | Performed by: ORTHOPAEDIC SURGERY

## 2022-05-16 PROCEDURE — 20610 DRAIN/INJ JOINT/BURSA W/O US: CPT | Mod: PBBFAC,LT | Performed by: ORTHOPAEDIC SURGERY

## 2022-05-16 PROCEDURE — 99999 PR PBB SHADOW E&M-EST. PATIENT-LVL IV: ICD-10-PCS | Mod: PBBFAC,,, | Performed by: ORTHOPAEDIC SURGERY

## 2022-05-16 PROCEDURE — 99999 PR PBB SHADOW E&M-EST. PATIENT-LVL IV: CPT | Mod: PBBFAC,,, | Performed by: ORTHOPAEDIC SURGERY

## 2022-05-16 PROCEDURE — 99213 PR OFFICE/OUTPT VISIT, EST, LEVL III, 20-29 MIN: ICD-10-PCS | Mod: 25,S$PBB,, | Performed by: ORTHOPAEDIC SURGERY

## 2022-05-16 PROCEDURE — 99213 OFFICE O/P EST LOW 20 MIN: CPT | Mod: 25,S$PBB,, | Performed by: ORTHOPAEDIC SURGERY

## 2022-05-16 PROCEDURE — 99214 OFFICE O/P EST MOD 30 MIN: CPT | Mod: PBBFAC,25 | Performed by: ORTHOPAEDIC SURGERY

## 2022-05-16 RX ADMIN — TRIAMCINOLONE ACETONIDE EXTENDED-RELEASE INJECTABLE SUSPENSION 32 MG: KIT INTRA-ARTICULAR at 02:05

## 2022-05-16 NOTE — PROGRESS NOTES
Subjective:     Patient ID: Kenia Alonzo is a 66 y.o. female.    Chief Complaint: No chief complaint on file.   11/30/2020  HPI:  65-year-old had been having pain in both knees with the left worse than the right for more than 10 years now.  In 2013 injured her left knee seen in [place] and recommended total knee replacement which she refused.  Over the years she received steroid injections with relief.  Synvisc injection into the left knee the series of 3 helped.  She was placed on ibuprofen large dose and that kidney issues and now she cannot take any anti-inflammatories.  Received Euflexxa 08/17/2020 series of 3 without relief and followed with steroid injection 10/12/2020 which seems to have worked.  The lasting around 6 weeks and most people will not give her more.  She says her knee is bone on bone and she wants to avoid surgery at this point in time until she is ready to proceed with it.  She is ambulating without any assistive devices.  She goes to physical therapy at [facility] and when this morning.  Occasionally she wakes up with the knee severely painful and with therapy and exercise eases up.  She does take Tylenol on occasions.  Denies any fever or chills or shortness of breath or difficulty with chewing or swallowing loss of bowel bladder control blurry vision double vision or loss of sense smell or taste.    -2/1/21  Bilateral knee pain and left worse than the right.  We started on physical therapy to the legs at [facility] and he says the right knee pain is completely gone.  As far as the left knee it is down at 4/10.  The steroid injection given 11/30/2020 into the left knee helped significantly.  Now it is starting to ache again and we had discussed doing Synvisc-One injection.  Unable to take NSAIDs.  Recently had shoulder injection by another provider and she was told eventuality she would need shoulder replacement.  But the injection did help quite severe in effect and he.   We did review with her today her x-rays again and showed her in details the findings.  Is she is ambulating without any assistive devices.  She stated now she is looking for a job and she might have to move again.  Denies any fever or chills or shortness of breath or difficulty with chewing or swallowing loss of bowel bladder control or blurry vision or double vision loss sense smell or taste    04/12/2021    Bilateral knee arthritis.  The steroid injection given on 02/01/2021 into the left knee helped but it wore off within 4 weeks.  She was supposed to be going to PT [place] [facility] [place] but she got depressed and had to see psychiatrist.  He encouraged her to go to physical therapy as well as I did today.  She is doing much better and she is here with her so daughter in law who is driving her today because hyaluronic injection might be severely painful.  She is 7/10 in pain.  She does take Tylenol.  She still ambulating without any assistive devices.  As far as the right knee is concerned her pain is around 3/10.  She requested an injection in the near future because she feels previous injection is wore off.  She is now wearing any assistive devices or ambulating with any  Denies any fever or chills or shortness of breath or difficulty with chewing swallowing or loss of bowel bladder control blurry vision or double vision or loss sense smell or taste      02/07/2022  Bilateral knee arthritis with the left worse than the right.  Patient is having shoulder problem and now unable to lift her arm up to comb her hair or do activities of daily living.  She does have an appointment with Dr. Mike newman to be seen tomorrow.  Most likely I told her she ruptured her rotator cuff.  She said she cannot function to well and she just moved out living now on her own.  She did receive steroid injection in the left knee by my PA in October 2021 and I did give her Visco supplement the patient/Synvisc-One in April 2021 with good  relief.  She would like to have that repeated also.  She is ambulating without any assistive devices.  Pain in the left knee 9/10      2022  Patient states he she saw Dr. Mike newman and he is contemplating doing surgery on her shoulder/replacement.  He was going to discuss it in the future  She is going to physical therapy at [facility] and working on her knees hips and shoulder.  Steroid injection/Kenalog given to her 2022 seems to have helped for 6-8 weeks in the knee but not long enough.  Her pain now is 8/10.  She is ambulating without any assistive devices.  She cannot take NSAIDs.  Her insurance will not approve her to get viscosupplementation.  We were able to get her approved for long-acting steroid injection.  She is ambulating without assistive devices.  No fever no chills no loss of bowel bladder control    New problem  Patient stated her right knee locked on her could not straighten out when she was in the top and also 1 time when she is asleep it was extremely painful to get the leg to straighten out and pop back up.  Now is doing okay not as painful as it was when lock.  It I did tell her if that still persists next time she comes we need to evaluate further  Past Medical History:   Diagnosis Date    Arthritis     knee    Asthma     childhood     Depression     Digestive disorder     Encounter for blood transfusion     General anesthetics causing adverse effect in therapeutic use     severe headache after crainiotomy    GI problem     IBS    Gout     Headache     Hyperlipidemia     Meniere disease     MVP (mitral valve prolapse)     minor    Renal disorder     Vertigo      Past Surgical History:   Procedure Laterality Date    BRAIN SURGERY      vestibular neurectomy    BREAST BIOPSY      15 years ago?  no visible scar    BREAST SURGERY      benign     SECTION      x 1    CHOLECYSTECTOMY      COLONOSCOPY N/A 3/15/2021    Procedure: COLONOSCOPY;  Surgeon: Mita  SABINE Stoddard MD;  Location: [facility];  Service: Endoscopy;  Laterality: N/A;    EXAMINATION UNDER ANESTHESIA N/A 2020    Procedure: EXAM UNDER ANESTHESIA;  Surgeon: Lucy Crane MD;  Location[facility];  Service: OB/GYN;  Laterality: N/A;    LASER ABLATION N/A 2020    Procedure: ABLATION, USING LASER;  Surgeon: Lucy Crane MD;  Location: [facility] OR;  Service: OB/GYN;  Laterality: N/A;    TONSILLECTOMY       Family History   Problem Relation Age of Onset    Colon cancer Father     Diabetes Father     Diabetes Sister      Social History     Socioeconomic History    Marital status:    Tobacco Use    Smoking status: Former Smoker     Packs/day: 0.50     Years: 43.00     Pack years: 21.50     Types: Cigarettes     Start date: 1976     Quit date: 2020     Years since quittin.3    Smokeless tobacco: Never Used    Tobacco comment: Quit 20; quit again mid 2021   Substance and Sexual Activity    Alcohol use: Not Currently     Alcohol/week: 1.0 - 3.0 standard drink     Types: 1 - 3 Glasses of wine per week     Comment: social few times monthly.   No alcohol 72h prior to sx    Drug use: No    Sexual activity: Never     Birth control/protection: None, Post-menopausal     Social Determinants of Health     Financial Resource Strain: Low Risk     Difficulty of Paying Living Expenses: Not hard at all   Food Insecurity: No Food Insecurity    Worried About Running Out of Food in the Last Year: Never true    Ran Out of Food in the Last Year: Never true   Transportation Needs: Unmet Transportation Needs    Lack of Transportation (Medical): Yes    Lack of Transportation (Non-Medical): Yes   Physical Activity: Insufficiently Active    Days of Exercise per Week: 4 days    Minutes of Exercise per Session: 20 min   Stress: Stress Concern Present    Feeling of Stress : To some extent   Social Connections: Unknown    Frequency of Communication with Friends and Family: More  than three times a week    Frequency of Social Gatherings with Friends and Family: More than three times a week    Active Member of Clubs or Organizations: Yes    Attends Club or Organization Meetings: 1 to 4 times per year    Marital Status:    Housing Stability: Low Risk     Unable to Pay for Housing in the Last Year: No    Number of Places Lived in the Last Year: 2    Unstable Housing in the Last Year: No     Medication List with Changes/Refills   Current Medications    ACYCLOVIR 5% (ZOVIRAX) 5 % OINTMENT    Apply topically 5 (five) times daily.    ALBUTEROL (VENTOLIN HFA) 90 MCG/ACTUATION INHALER    Inhale 2 puffs into the lungs every 6 (six) hours as needed for Wheezing. Rescue    AZELASTINE (ASTELIN) 137 MCG (0.1 %) NASAL SPRAY    1 spray (137 mcg total) by Nasal route 2 (two) times daily.    BUMETANIDE (BUMEX) 2 MG TABLET    Take 1 tablet (2 mg total) by mouth once daily.    BUPROPION (WELLBUTRIN XL) 150 MG TB24 TABLET    Take 3 tablets (450 mg total) by mouth once daily.    BUSPIRONE (BUSPAR) 30 MG TAB    Take 1 tablet (30 mg total) by mouth 2 (two) times daily.    C/SOURCHERRY/CELERY/GRAPE SEED (TART CHERRY ORAL)    Take 1 tablet by mouth daily as needed.     CO-ENZYME Q-10 30 MG CAPSULE    Take 30 mg by mouth 3 (three) times daily.    CONJUGATED ESTROGENS (PREMARIN) VAGINAL CREAM    Place 1 g vaginally twice a week.    DIAZEPAM (VALIUM) 2 MG TABLET    Take 1 tablet (2 mg total) by mouth every 6 (six) hours as needed (dizziness/Meniere's attack).    DICYCLOMINE (BENTYL) 10 MG CAPSULE    Take 1 capsule (10 mg total) by mouth 3 (three) times daily.    ERENUMAB-AOOE (AIMOVIG AUTOINJECTOR) 140 MG/ML AUTOINJECTOR    Inject 1 pen (140 mg total) into the skin every 28 days.    ESTRADIOL-NORETHINDRONE (COMBIPATCH) 0.05-0.14 MG/24 HR    Place 1 patch onto the skin twice a week.    FLUTICASONE PROPIONATE (FLONASE) 50 MCG/ACTUATION NASAL SPRAY    2 sprays (100 mcg total) by Each Nostril route 2 (two)  times daily.    KETOROLAC (TORADOL) 10 MG TABLET    TAKE ONE TABLET BY MOUTH ONCE DAILY AS NEEDED FOR PAIN (MAX TWO TIMES A WEEK)    LEVOCETIRIZINE (XYZAL) 5 MG TABLET    TAKE ONE TABLET BY MOUTH EVERY MORNING    LIDOCAINE-PRILOCAINE (EMLA) CREAM        MAGNESIUM 30 MG TAB    Take by mouth once.    MELATONIN ORAL    Take by mouth nightly as needed.     METHYLPREDNISOLONE (MEDROL DOSEPACK) 4 MG TABLET    use as directed    MONTELUKAST (SINGULAIR) 10 MG TABLET    TAKE ONE TABLET BY MOUTH EVERY EVENING    MUPIROCIN (BACTROBAN) 2 % OINTMENT    Apply topically 3 (three) times daily.    OMEGA-3 FATTY ACIDS/FISH OIL (FISH OIL-OMEGA-3 FATTY ACIDS) 300-1,000 MG CAPSULE    Take 1 capsule by mouth once daily.    ONDANSETRON (ZOFRAN-ODT) 4 MG TBDL    DISSOLVE 1 TABLET IN MOUTH EVERY 8 HOURS AS NEEDED    POTASSIUM CHLORIDE SA (K-DUR,KLOR-CON) 10 MEQ TABLET    Take by mouth.    SIMVASTATIN (ZOCOR) 5 MG TABLET    TAKE ONE TABLET BY MOUTH NIGHTLY    TROKENDI  MG CP24    Take 1 capsule (100 mg total) by mouth once daily.    UBROGEPANT 100 MG TABLET    Take 1 tablet (100 mg total) by mouth every 72 hours as needed for Migraine (2 to 3 times max per week as needed).    VITAMIN B COMPLEX/FOLIC ACID (B COMPLEX 100 ORAL)    Take by mouth nightly.    ZINC GLUCONATE 50 MG TABLET    Take 50 mg by mouth once daily.    ZOLPIDEM (AMBIEN) 5 MG TAB    Take 1/2 to 1 tablet at bedtime as needed for sleep     Review of patient's allergies indicates:   Allergen Reactions    Demerol [meperidine] Nausea And Vomiting     Pt refuses Demerol     Codeine Itching     Review of Systems   Constitutional: Negative for decreased appetite.   HENT: Negative for tinnitus.    Eyes: Negative for double vision.   Cardiovascular: Negative for chest pain.   Respiratory: Negative for wheezing.    Hematologic/Lymphatic: Negative for bleeding problem.   Skin: Negative for dry skin.   Musculoskeletal: Positive for joint pain, joint swelling and stiffness. Negative  for arthritis, back pain, gout and neck pain.   Gastrointestinal: Negative for abdominal pain.   Genitourinary: Negative for bladder incontinence.   Neurological: Negative for numbness, paresthesias and sensory change.   Psychiatric/Behavioral: Negative for altered mental status.       Objective:   Body mass index is 23.91 kg/m².  There were no vitals filed for this visit.       General    Constitutional: She is oriented to person, place, and time. She appears well-developed.   HENT:   Head: Atraumatic.   Eyes: EOM are normal.   Cardiovascular: Normal rate.    Pulmonary/Chest: Effort normal.   Neurological: She is alert and oriented to person, place, and time.   Psychiatric: Judgment normal.            Ambulating without any assistive devices with a very slight limp  Cervical rotation is very functional  Bilateral upper extremity neurovascularly intact.  Radial and ulnar pulses 2+.  Sensory intact to touch.  Right shoulder unable to actively flex past 80° or abduct past 60° she has some positive drop sign to any resistance to abduction  Lumbar without too much discomfort  Negative straight leg raising  Pelvis is level  Bila hips without any pain in the groin and full motion.  No pain to palpation over the greater trochanters.  Hip flexors, abductors, adductors, quads, hamstrings, ankle extensors and flexors are slightly weak at 5-/5  Bilateral knee with varus deformity  Right knee with very mild medial joint tenderness.  Mild crepitus to compression and mild swelling.  Range of motion 0-130 degrees.  Collaterals and cruciate stable.  But Corazon  Left knee with moderate  medial joint pain.  Crepitus is mild to compression on the patella.  Mild to moderate swelling.  Range of motion 0-130 degrees.  Collaterals and cruciates stable.  Negative Corazon's.  No defect in the patella tendon or quadriceps tendon in both knees  Calves are soft nontender with an area of ecchymosis on the lateral aspect of the mid  calf  Ankle motion intact  Skin is warm to touch no obvious lesions    Relevant imaging results reviewed and interpreted by me, discussed with the patient and / or family today   X-ray 06/01/2020 with complete loss of joint space on the left knee medially with bone spurs and sclerosis consistent with end-stage arthritis and varus deformity, right knee with moderate joint space narrowing medially with small osteophytic changes consistent with right knee arthritis with moderate varus deformed  Assessment:     Encounter Diagnoses   Name Primary?    Arthritis of knee, left Yes    Acquired varus deformity knee, left     Arthritis of knee, right     Status post reverse total shoulder replacement, unspecified laterality         Plan:   Arthritis of knee, left  -     Large Joint Aspiration/Injection: L knee    Acquired varus deformity knee, left    Arthritis of knee, right    Status post reverse total shoulder replacement, unspecified laterality         Patient Instructions   Injected left knee with Zilretta/ long-acting steroid injection 05/16/2022  In might take 7-10 days for it to work because it slow-release  Ice the knee next few days if it bothers you  Once you get new insurance then we can get approval for Synvisc-One injections  As far as physical therapy [facility] should be continued for you knees and shoulders and your hip  Return to see me in 3 months    Right knee catching locking it to cure while to straighten the leg out getting out of the tub and happen also during sleep.  If that is persistent most likely in the future we need to obtain MRI on the right knee this is a high indication of meniscus tear    Patient seen Dr. Mike newman who is planning on doing shoulder replacement.  Patient worried about going to therapy she has no family members the could  across the bridge that often.  I did tell her she can discuss her total shoulder with Dr. Mike newman in the future in the [place] where he wants her to  go  Patient is already bone on bone and the injections only helped for 6 weeks.  She is not ready to undergo surgical intervention.  She cannot take and will not take NSAIDs due to injury from ibuprofen to her kidneys and she is followed by nephrology and have new blood testing to be performed.  I did tell her eventually she will need total knee replacement only when all treatments fail and when she is ready.  Eventually the injections might not work as much.  . I did warn her about the side effects of too much cortisone and she except the risks.  Her right shoulder has complete rotator cuff tear by exam.  She is seeing Dr. Mckeon head/shoulder surgery tomorrow.  I did tell her she needs to fix that 1st prior to having any knee replacement.    Disclaimer: This note was prepared using a voice recognition system and is likely to have sound alike errors within the text.

## 2022-05-16 NOTE — PROCEDURES
Large Joint Aspiration/Injection: L knee    Date/Time: 5/16/2022 2:00 PM  Performed by: Andrey Pop MD  Authorized by: Andrey Pop MD     Consent Done?:  Yes (Verbal)  Indications:  Arthritis and pain  Site marked: the procedure site was marked    Timeout: prior to procedure the correct patient, procedure, and site was verified    Prep: patient was prepped and draped in usual sterile fashion    Local anesthesia used?: No    Local anesthetic:  Topical anesthetic    Details:  Needle Size:  22 G  Ultrasonic Guidance for needle placement?: No    Approach:  Anterolateral  Location:  Knee  Site:  L knee  Medications:  32 mg triamcinolone acetonide 32 mg  Patient tolerance:  Patient tolerated the procedure well with no immediate complications     Left knee Zilretta

## 2022-05-16 NOTE — PATIENT INSTRUCTIONS
Injected left knee with Zilretta/ long-acting steroid injection 05/16/2022  In might take 7-10 days for it to work because it slow-release  Ice the knee next few days if it bothers you  Once you get new insurance then we can get approval for Synvisc-One injections  As far as physical therapy [facility] should be continued for you knees and shoulders and your hip  Return to see me in 3 months    Right knee catching locking it to cure while to straighten the leg out getting out of the tub and happen also during sleep.  If that is persistent most likely in the future we need to obtain MRI on the right knee this is a high indication of meniscus tear

## 2022-05-20 ENCOUNTER — PATIENT MESSAGE (OUTPATIENT)
Dept: INTERNAL MEDICINE | Facility: CLINIC | Age: 67
End: 2022-05-20
Payer: MEDICARE

## 2022-05-22 DIAGNOSIS — N95.1 MENOPAUSE SYNDROME: ICD-10-CM

## 2022-05-23 ENCOUNTER — PATIENT MESSAGE (OUTPATIENT)
Dept: ORTHOPEDICS | Facility: CLINIC | Age: 67
End: 2022-05-23
Payer: MEDICARE

## 2022-05-23 ENCOUNTER — PATIENT MESSAGE (OUTPATIENT)
Dept: PSYCHIATRY | Facility: CLINIC | Age: 67
End: 2022-05-23
Payer: MEDICARE

## 2022-05-23 RX ORDER — POTASSIUM CHLORIDE 750 MG/1
10 TABLET, EXTENDED RELEASE ORAL ONCE
Qty: 90 TABLET | Refills: 0 | Status: SHIPPED | OUTPATIENT
Start: 2022-05-23 | End: 2022-05-23

## 2022-05-27 ENCOUNTER — TELEPHONE (OUTPATIENT)
Dept: INTERNAL MEDICINE | Facility: CLINIC | Age: 67
End: 2022-05-27
Payer: MEDICARE

## 2022-05-27 NOTE — TELEPHONE ENCOUNTER
Pt call returned, pt informed appt scheduled was sooner appt available. Pt request to keep appt but will see if she can get sooner appt with another outside clinic.

## 2022-05-27 NOTE — TELEPHONE ENCOUNTER
----- Message from Leah Hyman sent at 5/27/2022  9:29 AM CDT -----  .Type:  Patient Returning Call    Who Called:NOE JOSE [[id number]]  Who Left Message for Patient:  Does the patient know what this is regarding?:  Would the patient rather a call back or a response via [facility]? Call  Best Call Back Number:.[phone]  Additional Information:

## 2022-06-06 ENCOUNTER — PATIENT MESSAGE (OUTPATIENT)
Dept: ORTHOPEDICS | Facility: CLINIC | Age: 67
End: 2022-06-06
Payer: MEDICARE

## 2022-06-06 ENCOUNTER — PATIENT MESSAGE (OUTPATIENT)
Dept: OBSTETRICS AND GYNECOLOGY | Facility: CLINIC | Age: 67
End: 2022-06-06
Payer: MEDICARE

## 2022-06-06 ENCOUNTER — OFFICE VISIT (OUTPATIENT)
Dept: PSYCHIATRY | Facility: CLINIC | Age: 67
End: 2022-06-06
Payer: MEDICAID

## 2022-06-06 DIAGNOSIS — F33.0 MILD EPISODE OF RECURRENT MAJOR DEPRESSIVE DISORDER: ICD-10-CM

## 2022-06-06 DIAGNOSIS — B37.31 CANDIDIASIS OF VULVA AND VAGINA: Primary | ICD-10-CM

## 2022-06-06 DIAGNOSIS — F41.1 GAD (GENERALIZED ANXIETY DISORDER): Primary | ICD-10-CM

## 2022-06-06 PROCEDURE — 1159F PR MEDICATION LIST DOCUMENTED IN MEDICAL RECORD: ICD-10-PCS | Mod: CPTII,95,, | Performed by: SOCIAL WORKER

## 2022-06-06 PROCEDURE — 90834 PR PSYCHOTHERAPY W/PATIENT, 45 MIN: ICD-10-PCS | Mod: 95,,, | Performed by: SOCIAL WORKER

## 2022-06-06 PROCEDURE — 90834 PSYTX W PT 45 MINUTES: CPT | Mod: 95,,, | Performed by: SOCIAL WORKER

## 2022-06-06 PROCEDURE — 1159F MED LIST DOCD IN RCRD: CPT | Mod: CPTII,95,, | Performed by: SOCIAL WORKER

## 2022-06-06 NOTE — PROGRESS NOTES
"  Individual Psychotherapy Follow-up Visit Progress Note (PhD/LCSW)     Outpatient - Supportive psychotherapy 45 min - CPT Code 25829     Virtual visit with synchronous audio/video, Location of patient: her home in [place]    Each patient provided medical services by telemedicine is: (1) informed of the relationship between the provider and patient and the respective role of any other health care provider with respect to management of the patient; and (2) notified that he or she may decline to receive medical services by telemedicine and may withdraw from such care at any time.    6/6/2022  MRN: [id number]  Primary Care Provider: Alimta Izaguirre, NP    Kenia Alonzo is a 66 y.o. female who presents today for follow-up of depression and anxiety. Met with patient.    ""    Subjective:       Patient report/content of session: Last seen 5/6/2022. Pt states that her disability claim was approved. Her close friend has offered to buy a home in [place] for pt to rent. She verbalized feeling tremendous relief as well as feeling hopeful. Provided support and reinforced progress in treatment. Discussed stress management, setting reasonable goals. She denies sleep disturbance or problems with her medication.    Current symptoms:   · Depression: denied  · Anxiety: excessive anxiety/worry, restlessness  · Substance abuse: denied  · Cognitive functioning: denied  · Iram: none noted  · Psychosis: none noted      Objective:       Mental Status Evaluation  Appearance: unremarkable, age appropriate  Behavior: normal, cooperative  Speech: normal tone, normal rate, normal pitch, normal volume  Mood: euthymic  Affect: congruent and appropriate  Thought Process: normal and logical  Thought Content: normal, no suicidality, no homicidality, delusions, or paranoia  Sensorium: grossly intact  Cognition: grossly intact  Insight: good  Judgment: adequate to circumstances    Risk parameters:  Patient reports no suicidal " ideation  Patient reports no homicidal ideation  Patient reports no self-injurious behavior  Patient reports no violent behavior      Assessment & Plan:       Therapeutic interventions used: Pt was taught how to apply relaxation skills to specific situations  Assigned pt to practice relaxation on a daily basis  Pt was assigned to engage in behavioral activation by scheduling activities that have a high likelihood for pleasure and mastery  Assigned pt to create a coping card on which specific coping strategies are listed  Assessed pt's level of insight toward the presenting problems  Monitored the effectiveness and side effects of antidepressant medication prescribed by physician/NP/MP  Reinforced pt's successes in reframing cognitive distortions  Assigned pt to keep a daily gratitude journal  Encouraged pt to commit time and effort to activities that are consistent with personally meaningful values        The patient's response to the interventions is accepting    The patient's progress toward treatment goals is good    Homework assigned: daily journaling and practice relaxation skills daily, increase use of available supports    Treatment plan:   A. Target symptoms: Depression, Anxiety and Poor Coping Skills   B. Therapeutic modalities: insight oriented psychotherapy, supportive psychotherapy  C. Why chosen therapy is appropriate versus another modality: relevant to diagnosis, evidence based practice   D. Outcome monitoring methods: self-report, observation, feedback from clinical staff     Visit Diagnosis:   1. JOSIAH (generalized anxiety disorder)    2. Mild episode of recurrent major depressive disorder        Follow-up: individual psychotherapy and medication management by physician     Return to Clinic: 3 weeks    Length of Service (minutes): 45        Beatriz Holloway LCSW  Outpatient Psychiatry

## 2022-06-07 ENCOUNTER — PATIENT MESSAGE (OUTPATIENT)
Dept: OBSTETRICS AND GYNECOLOGY | Facility: CLINIC | Age: 67
End: 2022-06-07
Payer: MEDICARE

## 2022-06-07 PROBLEM — B37.31 CANDIDIASIS OF VULVA AND VAGINA: Status: ACTIVE | Noted: 2022-06-07

## 2022-06-07 RX ORDER — CLOTRIMAZOLE AND BETAMETHASONE DIPROPIONATE 10; .64 MG/G; MG/G
CREAM TOPICAL
Qty: 15 G | Refills: 1 | Status: SHIPPED | OUTPATIENT
Start: 2022-06-07 | End: 2023-06-07

## 2022-06-07 RX ORDER — FLUCONAZOLE 150 MG/1
150 TABLET ORAL ONCE
Qty: 1 TABLET | Refills: 0 | Status: SHIPPED | OUTPATIENT
Start: 2022-06-07 | End: 2022-06-07

## 2022-06-08 ENCOUNTER — OFFICE VISIT (OUTPATIENT)
Dept: INTERNAL MEDICINE | Facility: CLINIC | Age: 67
End: 2022-06-08
Payer: MEDICARE

## 2022-06-08 VITALS
TEMPERATURE: 97 F | DIASTOLIC BLOOD PRESSURE: 64 MMHG | WEIGHT: 134.94 LBS | SYSTOLIC BLOOD PRESSURE: 112 MMHG | BODY MASS INDEX: 24.83 KG/M2 | HEART RATE: 91 BPM | HEIGHT: 62 IN | OXYGEN SATURATION: 98 %

## 2022-06-08 DIAGNOSIS — R68.84 PAIN IN LOWER JAW: ICD-10-CM

## 2022-06-08 DIAGNOSIS — K04.7 DENTAL INFECTION: Primary | ICD-10-CM

## 2022-06-08 DIAGNOSIS — J32.9 CHRONIC SINUSITIS, UNSPECIFIED LOCATION: ICD-10-CM

## 2022-06-08 PROBLEM — H10.31 ACUTE CONJUNCTIVITIS OF RIGHT EYE: Status: RESOLVED | Noted: 2020-01-16 | Resolved: 2022-06-08

## 2022-06-08 PROCEDURE — 3074F SYST BP LT 130 MM HG: CPT | Mod: CPTII,S$GLB,, | Performed by: NURSE PRACTITIONER

## 2022-06-08 PROCEDURE — 3078F PR MOST RECENT DIASTOLIC BLOOD PRESSURE < 80 MM HG: ICD-10-PCS | Mod: CPTII,S$GLB,, | Performed by: NURSE PRACTITIONER

## 2022-06-08 PROCEDURE — 3008F BODY MASS INDEX DOCD: CPT | Mod: CPTII,S$GLB,, | Performed by: NURSE PRACTITIONER

## 2022-06-08 PROCEDURE — 99213 OFFICE O/P EST LOW 20 MIN: CPT | Mod: S$GLB,,, | Performed by: NURSE PRACTITIONER

## 2022-06-08 PROCEDURE — 99999 PR PBB SHADOW E&M-EST. PATIENT-LVL V: CPT | Mod: PBBFAC,,, | Performed by: NURSE PRACTITIONER

## 2022-06-08 PROCEDURE — 99999 PR PBB SHADOW E&M-EST. PATIENT-LVL V: ICD-10-PCS | Mod: PBBFAC,,, | Performed by: NURSE PRACTITIONER

## 2022-06-08 PROCEDURE — 99215 OFFICE O/P EST HI 40 MIN: CPT | Mod: PBBFAC,PO | Performed by: NURSE PRACTITIONER

## 2022-06-08 PROCEDURE — 99213 PR OFFICE/OUTPT VISIT, EST, LEVL III, 20-29 MIN: ICD-10-PCS | Mod: S$GLB,,, | Performed by: NURSE PRACTITIONER

## 2022-06-08 PROCEDURE — 3078F DIAST BP <80 MM HG: CPT | Mod: CPTII,S$GLB,, | Performed by: NURSE PRACTITIONER

## 2022-06-08 PROCEDURE — 3008F PR BODY MASS INDEX (BMI) DOCUMENTED: ICD-10-PCS | Mod: CPTII,S$GLB,, | Performed by: NURSE PRACTITIONER

## 2022-06-08 PROCEDURE — 3074F PR MOST RECENT SYSTOLIC BLOOD PRESSURE < 130 MM HG: ICD-10-PCS | Mod: CPTII,S$GLB,, | Performed by: NURSE PRACTITIONER

## 2022-06-08 RX ORDER — CLINDAMYCIN HYDROCHLORIDE 300 MG/1
300 CAPSULE ORAL EVERY 8 HOURS
Qty: 21 CAPSULE | Refills: 0 | Status: SHIPPED | OUTPATIENT
Start: 2022-06-08 | End: 2022-06-15

## 2022-06-08 RX ORDER — POTASSIUM CHLORIDE 750 MG/1
10 TABLET, EXTENDED RELEASE ORAL DAILY
COMMUNITY
Start: 2022-05-24 | End: 2022-06-21

## 2022-06-08 NOTE — PROGRESS NOTES
Subjective:       Patient ID: Kenia Alonzo is a 66 y.o. female.    Chief Complaint: Lymphadenopathy (Swollen in neck under ear)    Mrs. Alonzo presents to clinic with c/o enlarged Left gland that began approximately 1 month ago with sinus congestion and pressure. Reports being treated at local urgent care with a 4 day/20mg oral steroid pack and then again with Amoxicillin 875 mg BID x 10 days, last dose 2 days ago, reports being ineffective for relief of symptoms. Patient reports taking daily seasonal allergy medication; zyrtec, xyzol, Flonase, and reports no relief of recent sinus symptoms. Reports having mild jaw pain and potentially could be related to left sided dental issues that currently needs a dental bridge performed. Pain has improved some, especially L jaw discomfort. She was previously unable to open her mouth.       Patient Active Problem List   Diagnosis    Depression    Meniere's disease of right ear    Vestibular migraine    Abnormal involuntary movements    Irritable bowel syndrome without diarrhea    Hyperlipidemia    History of tobacco abuse    CKD (chronic kidney disease) stage 3, GFR 30-59 ml/min    Non-seasonal allergic rhinitis    Bronchitis    Chronic mixed headache syndrome    Severe dysplasia of vulva    Complicated grief    Primary osteoarthritis of left knee    Dermatitis of face    Acute gout of right foot    Vulvar dysplasia    Ingrown toenail of left foot    Foot callus    Family history of colon cancer    Bilateral renal cysts    Menopause syndrome    Atrophic vaginitis    Candidiasis of vulva and vagina    Pain in lower jaw       Family History   Problem Relation Age of Onset    Colon cancer Father     Diabetes Father     Diabetes Sister      Past Surgical History:   Procedure Laterality Date    BRAIN SURGERY      vestibular neurectomy    BREAST BIOPSY      15 years ago?  no visible scar    BREAST SURGERY      benign     SECTION      x 1     CHOLECYSTECTOMY      COLONOSCOPY N/A 3/15/2021    Procedure: COLONOSCOPY;  Surgeon: Mita Stoddard MD;  Location: [facility];  Service: Endoscopy;  Laterality: N/A;    EXAMINATION UNDER ANESTHESIA N/A 8/12/2020    Procedure: EXAM UNDER ANESTHESIA;  Surgeon: Lucy Crane MD;  Location: [facility] OR;  Service: OB/GYN;  Laterality: N/A;    LASER ABLATION N/A 8/12/2020    Procedure: ABLATION, USING LASER;  Surgeon: Lucy Crane MD;  Location: [facility] OR;  Service: OB/GYN;  Laterality: N/A;    TONSILLECTOMY           Current Outpatient Medications:     acyclovir 5% (ZOVIRAX) 5 % ointment, Apply topically 5 (five) times daily., Disp: 30 g, Rfl: 1    C/sourcherry/celery/grape seed (TART CHERRY ORAL), Take 1 tablet by mouth daily as needed. , Disp: , Rfl:     clotrimazole-betamethasone 1-0.05% (LOTRISONE) cream, Apply to affected area 2 times daily, Disp: 15 g, Rfl: 1    co-enzyme Q-10 30 mg capsule, Take 30 mg by mouth 3 (three) times daily., Disp: , Rfl:     conjugated estrogens (PREMARIN) vaginal cream, Place 1 g vaginally twice a week., Disp: 1 applicator, Rfl: 3    diazePAM (VALIUM) 2 MG tablet, Take 1 tablet (2 mg total) by mouth every 6 (six) hours as needed (dizziness/Meniere's attack)., Disp: 30 tablet, Rfl: 2    erenumab-aooe (AIMOVIG AUTOINJECTOR) 140 mg/mL autoinjector, Inject 1 pen (140 mg total) into the skin every 28 days., Disp: 1 mL, Rfl: 11    estradiol-norethindrone (COMBIPATCH) 0.05-0.14 mg/24 hr, Place 1 patch onto the skin twice a week., Disp: 8 patch, Rfl: 11    fluticasone propionate (FLONASE) 50 mcg/actuation nasal spray, 2 sprays (100 mcg total) by Each Nostril route 2 (two) times daily., Disp: 9.9 mL, Rfl: 11    ketorolac (TORADOL) 10 mg tablet, TAKE ONE TABLET BY MOUTH ONCE DAILY AS NEEDED FOR PAIN (MAX TWO TIMES A WEEK), Disp: 8 tablet, Rfl: 3    levocetirizine (XYZAL) 5 MG tablet, TAKE ONE TABLET BY MOUTH EVERY MORNING, Disp: 30 tablet, Rfl: 3    LIDOcaine-prilocaine  (EMLA) cream, , Disp: , Rfl:     magnesium 30 mg Tab, Take by mouth once., Disp: , Rfl:     MELATONIN ORAL, Take by mouth nightly as needed. , Disp: , Rfl:     montelukast (SINGULAIR) 10 mg tablet, TAKE ONE TABLET BY MOUTH EVERY EVENING, Disp: 30 tablet, Rfl: 12    mupirocin (BACTROBAN) 2 % ointment, Apply topically 3 (three) times daily., Disp: 22 g, Rfl: 0    omega-3 fatty acids/fish oil (FISH OIL-OMEGA-3 FATTY ACIDS) 300-1,000 mg capsule, Take 1 capsule by mouth once daily., Disp: , Rfl:     ondansetron (ZOFRAN-ODT) 4 MG TbDL, DISSOLVE 1 TABLET IN MOUTH EVERY 8 HOURS AS NEEDED, Disp: 60 tablet, Rfl: 1    simvastatin (ZOCOR) 5 MG tablet, TAKE ONE TABLET BY MOUTH NIGHTLY, Disp: 90 tablet, Rfl: 3    TROKENDI  mg Cp24, Take 1 capsule (100 mg total) by mouth once daily., Disp: 30 capsule, Rfl: 11    UBROGEPANT 100 mg tablet, Take 1 tablet (100 mg total) by mouth every 72 hours as needed for Migraine (2 to 3 times max per week as needed)., Disp: 10 tablet, Rfl: 3    vitamin B complex/folic acid (B COMPLEX 100 ORAL), Take by mouth nightly., Disp: , Rfl:     zinc gluconate 50 mg tablet, Take 50 mg by mouth once daily., Disp: , Rfl:     zolpidem (AMBIEN) 5 MG Tab, Take 1/2 to 1 tablet at bedtime as needed for sleep, Disp: 30 tablet, Rfl: 2    albuterol (VENTOLIN HFA) 90 mcg/actuation inhaler, Inhale 2 puffs into the lungs every 6 (six) hours as needed for Wheezing. Rescue, Disp: 18 g, Rfl: 0    azelastine (ASTELIN) 137 mcg (0.1 %) nasal spray, 1 spray (137 mcg total) by Nasal route 2 (two) times daily., Disp: 30 mL, Rfl: 11    bumetanide (BUMEX) 2 MG tablet, TAKE ONE TABLET BY MOUTH ONCE DAILY, Disp: 90 tablet, Rfl: 1    buPROPion (WELLBUTRIN XL) 150 MG TB24 tablet, TAKE THREE TABLETS BY MOUTH ONCE DAILY, Disp: 90 tablet, Rfl: 0    busPIRone (BUSPAR) 30 MG Tab, TAKE ONE TABLET BY MOUTH TWICE A DAY, Disp: 60 tablet, Rfl: 0    dicyclomine (BENTYL) 10 MG capsule, Take 1 capsule (10 mg total) by mouth  3 (three) times daily., Disp: 90 capsule, Rfl: 1    potassium chloride SA (K-DUR,KLOR-CON) 10 MEQ tablet, TAKE ONE TABLET BY MOUTH once DAILY, Disp: 90 tablet, Rfl: 0    Current Facility-Administered Medications:     onabotulinumtoxina injection 200 Units, 200 Units, Intramuscular, Q90 Days, Vipin Matthews MD, 200 Units at 03/15/22 1519    Facility-Administered Medications Ordered in Other Visits:     hylan g-f 20 (SYNVISC ONE) 48 mg/6 mL injection 48 mg, 48 mg, Intra-articular, , Andrey Pop MD, 48 mg at 04/12/21 1620    Review of Systems   Constitutional: Negative for activity change, chills, fatigue and fever.   HENT: Positive for congestion, dental problem, ear pain, hearing loss, sinus pressure, sinus pain and tinnitus. Negative for postnasal drip, sneezing, trouble swallowing and voice change.    Eyes: Negative for discharge and visual disturbance.   Respiratory: Positive for cough. Negative for apnea, chest tightness, shortness of breath and wheezing.    Cardiovascular: Negative for chest pain, palpitations and leg swelling.   Gastrointestinal: Negative for abdominal distention, abdominal pain, blood in stool, constipation, diarrhea, nausea and vomiting.   Endocrine: Negative for cold intolerance, heat intolerance, polydipsia, polyphagia and polyuria.   Genitourinary: Negative for decreased urine volume, difficulty urinating, dysuria, flank pain, hematuria and urgency.   Musculoskeletal: Negative for arthralgias, neck pain and neck stiffness.   Skin: Negative for color change.   Allergic/Immunologic: Positive for environmental allergies.   Neurological: Positive for headaches. Negative for dizziness, tremors, syncope and speech difficulty.   Hematological: Negative for adenopathy. Does not bruise/bleed easily.   Psychiatric/Behavioral: Negative for agitation, self-injury, sleep disturbance and suicidal ideas. The patient is not nervous/anxious.        Objective:   /64 (BP Location: Left arm,  "Patient Position: Sitting, BP Method: Medium (Manual))   Pulse 91   Temp 96.6 °F (35.9 °C) (Temporal)   Ht 5' 2" (1.575 m)   Wt 61.2 kg (134 lb 14.7 oz)   SpO2 98%   BMI 24.68 kg/m²      Physical Exam  Vitals reviewed.   Constitutional:       Appearance: Normal appearance. She is normal weight. She is not ill-appearing.   HENT:      Head: Normocephalic and atraumatic.      Right Ear: Tympanic membrane normal.      Left Ear: Tympanic membrane normal.      Nose: Rhinorrhea present.      Mouth/Throat:      Mouth: Mucous membranes are moist.      Pharynx: Oropharynx is clear. No oropharyngeal exudate or posterior oropharyngeal erythema.      Comments: TTP with L lower gumline.   Eyes:      Extraocular Movements: Extraocular movements intact.      Pupils: Pupils are equal, round, and reactive to light.   Cardiovascular:      Rate and Rhythm: Normal rate and regular rhythm.      Pulses: Normal pulses.      Heart sounds: Normal heart sounds.   Pulmonary:      Effort: Pulmonary effort is normal. No respiratory distress.      Breath sounds: Normal breath sounds. No wheezing.   Musculoskeletal:         General: Normal range of motion.      Cervical back: Normal range of motion.   Skin:     General: Skin is warm and dry.   Neurological:      Mental Status: She is alert and oriented to person, place, and time.   Psychiatric:         Mood and Affect: Mood normal.         Behavior: Behavior normal.         Assessment & Plan     Problem List Items Addressed This Visit        ENT    Pain in lower jaw    Current Assessment & Plan     Trial of different abx for dental infection. Follow up with dentist.              Other Visit Diagnoses     Dental infection    -  Primary    Chronic sinusitis, unspecified location            Follow up if symptoms worsen or fail to improve.            Portions of this note may have been created with voice recognition software. Occasional "wrong-word" or "sound-a-like" substitutions may have " occurred due to the inherent limitations of voice recognition software. Please, read the note carefully and recognize, using context, where substitutions have occurred.

## 2022-06-08 NOTE — PROGRESS NOTES
Subjective:       Patient ID: Kenia Alonzo is a 66 y.o. female.    Chief Complaint: Lymphadenopathy (Swollen in neck under ear)    HPI    Patient Active Problem List   Diagnosis    Depression    Meniere's disease of right ear    Vestibular migraine    Abnormal involuntary movements    Irritable bowel syndrome without diarrhea    Hyperlipidemia    History of tobacco abuse    CKD (chronic kidney disease) stage 3, GFR 30-59 ml/min    Non-seasonal allergic rhinitis    Bronchitis    Chronic mixed headache syndrome    Severe dysplasia of vulva    Complicated grief    Primary osteoarthritis of left knee    Dermatitis of face    Acute gout of right foot    Vulvar dysplasia    Ingrown toenail of left foot    Foot callus    Family history of colon cancer    Bilateral renal cysts    Menopause syndrome    Atrophic vaginitis    Candidiasis of vulva and vagina    Pain in lower jaw       Family History   Problem Relation Age of Onset    Colon cancer Father     Diabetes Father     Diabetes Sister      Past Surgical History:   Procedure Laterality Date    BRAIN SURGERY      vestibular neurectomy    BREAST BIOPSY      15 years ago?  no visible scar    BREAST SURGERY      benign     SECTION      x 1    CHOLECYSTECTOMY      COLONOSCOPY N/A 3/15/2021    Procedure: COLONOSCOPY;  Surgeon: Mita Stoddard MD;  Location: [facility];  Service: Endoscopy;  Laterality: N/A;    EXAMINATION UNDER ANESTHESIA N/A 2020    Procedure: EXAM UNDER ANESTHESIA;  Surgeon: Lucy Crane MD;  Location: [facility] OR;  Service: OB/GYN;  Laterality: N/A;    LASER ABLATION N/A 2020    Procedure: ABLATION, USING LASER;  Surgeon: Lucy Crane MD;  Location: [facility] OR;  Service: OB/GYN;  Laterality: N/A;    TONSILLECTOMY           Current Outpatient Medications:     acyclovir 5% (ZOVIRAX) 5 % ointment, Apply topically 5 (five) times daily., Disp: 30 g, Rfl: 1    bumetanide (BUMEX) 2 MG tablet, Take  1 tablet (2 mg total) by mouth once daily., Disp: 30 tablet, Rfl: 0    buPROPion (WELLBUTRIN XL) 150 MG TB24 tablet, Take 3 tablets (450 mg total) by mouth once daily., Disp: 90 tablet, Rfl: 3    busPIRone (BUSPAR) 30 MG Tab, Take 1 tablet (30 mg total) by mouth 2 (two) times daily., Disp: 60 tablet, Rfl: 3    C/sourcherry/celery/grape seed (TART CHERRY ORAL), Take 1 tablet by mouth daily as needed. , Disp: , Rfl:     clotrimazole-betamethasone 1-0.05% (LOTRISONE) cream, Apply to affected area 2 times daily, Disp: 15 g, Rfl: 1    co-enzyme Q-10 30 mg capsule, Take 30 mg by mouth 3 (three) times daily., Disp: , Rfl:     conjugated estrogens (PREMARIN) vaginal cream, Place 1 g vaginally twice a week., Disp: 1 applicator, Rfl: 3    diazePAM (VALIUM) 2 MG tablet, Take 1 tablet (2 mg total) by mouth every 6 (six) hours as needed (dizziness/Meniere's attack)., Disp: 30 tablet, Rfl: 2    dicyclomine (BENTYL) 10 MG capsule, Take 1 capsule (10 mg total) by mouth 3 (three) times daily., Disp: 90 capsule, Rfl: 1    erenumab-aooe (AIMOVIG AUTOINJECTOR) 140 mg/mL autoinjector, Inject 1 pen (140 mg total) into the skin every 28 days., Disp: 1 mL, Rfl: 11    estradiol-norethindrone (COMBIPATCH) 0.05-0.14 mg/24 hr, Place 1 patch onto the skin twice a week., Disp: 8 patch, Rfl: 11    fluticasone propionate (FLONASE) 50 mcg/actuation nasal spray, 2 sprays (100 mcg total) by Each Nostril route 2 (two) times daily., Disp: 9.9 mL, Rfl: 11    ketorolac (TORADOL) 10 mg tablet, TAKE ONE TABLET BY MOUTH ONCE DAILY AS NEEDED FOR PAIN (MAX TWO TIMES A WEEK), Disp: 8 tablet, Rfl: 3    levocetirizine (XYZAL) 5 MG tablet, TAKE ONE TABLET BY MOUTH EVERY MORNING, Disp: 30 tablet, Rfl: 3    LIDOcaine-prilocaine (EMLA) cream, , Disp: , Rfl:     magnesium 30 mg Tab, Take by mouth once., Disp: , Rfl:     MELATONIN ORAL, Take by mouth nightly as needed. , Disp: , Rfl:     montelukast (SINGULAIR) 10 mg tablet, TAKE ONE TABLET BY MOUTH  EVERY EVENING, Disp: 30 tablet, Rfl: 12    mupirocin (BACTROBAN) 2 % ointment, Apply topically 3 (three) times daily., Disp: 22 g, Rfl: 0    omega-3 fatty acids/fish oil (FISH OIL-OMEGA-3 FATTY ACIDS) 300-1,000 mg capsule, Take 1 capsule by mouth once daily., Disp: , Rfl:     ondansetron (ZOFRAN-ODT) 4 MG TbDL, DISSOLVE 1 TABLET IN MOUTH EVERY 8 HOURS AS NEEDED, Disp: 60 tablet, Rfl: 1    simvastatin (ZOCOR) 5 MG tablet, TAKE ONE TABLET BY MOUTH NIGHTLY, Disp: 90 tablet, Rfl: 3    TROKENDI  mg Cp24, Take 1 capsule (100 mg total) by mouth once daily., Disp: 30 capsule, Rfl: 11    UBROGEPANT 100 mg tablet, Take 1 tablet (100 mg total) by mouth every 72 hours as needed for Migraine (2 to 3 times max per week as needed)., Disp: 10 tablet, Rfl: 3    vitamin B complex/folic acid (B COMPLEX 100 ORAL), Take by mouth nightly., Disp: , Rfl:     zinc gluconate 50 mg tablet, Take 50 mg by mouth once daily., Disp: , Rfl:     zolpidem (AMBIEN) 5 MG Tab, Take 1/2 to 1 tablet at bedtime as needed for sleep, Disp: 30 tablet, Rfl: 2    albuterol (VENTOLIN HFA) 90 mcg/actuation inhaler, Inhale 2 puffs into the lungs every 6 (six) hours as needed for Wheezing. Rescue (Patient not taking: Reported on 6/8/2022), Disp: 18 g, Rfl: 0    azelastine (ASTELIN) 137 mcg (0.1 %) nasal spray, 1 spray (137 mcg total) by Nasal route 2 (two) times daily., Disp: 30 mL, Rfl: 11    potassium chloride SA (K-DUR,KLOR-CON) 10 MEQ tablet, Take 10 mEq by mouth once daily., Disp: , Rfl:     Current Facility-Administered Medications:     onabotulinumtoxina injection 200 Units, 200 Units, Intramuscular, Q90 Days, Vipin Matthews MD, 200 Units at 03/15/22 1519    Facility-Administered Medications Ordered in Other Visits:     hylan g-f 20 (SYNVISC ONE) 48 mg/6 mL injection 48 mg, 48 mg, Intra-articular, , Andrey Pop MD, 48 mg at 04/12/21 1620    Review of Systems   Constitutional: Negative for appetite change, chills, fatigue and  "fever.   HENT: Positive for postnasal drip (improving) and rhinorrhea. Negative for congestion, ear pain, sinus pressure (resolved), sinus pain, tinnitus and trouble swallowing.         L side jaw pain   Eyes: Negative for visual disturbance.   Respiratory: Negative for cough, chest tightness and shortness of breath.    Gastrointestinal: Negative for abdominal pain, diarrhea, nausea and vomiting.   Neurological: Negative for dizziness, light-headedness and headaches.       Objective:   /64 (BP Location: Left arm, Patient Position: Sitting, BP Method: Medium (Manual))   Pulse 91   Temp 96.6 °F (35.9 °C) (Temporal)   Ht 5' 2" (1.575 m)   Wt 61.2 kg (134 lb 14.7 oz)   SpO2 98%   BMI 24.68 kg/m²      Physical Exam    Assessment & Plan     Problem List Items Addressed This Visit        ENT    Pain in lower jaw - Primary      Other Visit Diagnoses     Chronic sinusitis, unspecified location            No follow-ups on file.            Portions of this note may have been created with voice recognition software. Occasional "wrong-word" or "sound-a-like" substitutions may have occurred due to the inherent limitations of voice recognition software. Please, read the note carefully and recognize, using context, where substitutions have occurred.       "

## 2022-06-15 ENCOUNTER — PATIENT MESSAGE (OUTPATIENT)
Dept: INTERNAL MEDICINE | Facility: CLINIC | Age: 67
End: 2022-06-15
Payer: MEDICARE

## 2022-06-16 ENCOUNTER — PATIENT MESSAGE (OUTPATIENT)
Dept: INTERNAL MEDICINE | Facility: CLINIC | Age: 67
End: 2022-06-16
Payer: MEDICARE

## 2022-06-16 ENCOUNTER — PATIENT MESSAGE (OUTPATIENT)
Dept: NEUROLOGY | Facility: CLINIC | Age: 67
End: 2022-06-16
Payer: MEDICARE

## 2022-06-21 ENCOUNTER — TELEPHONE (OUTPATIENT)
Dept: OTOLARYNGOLOGY | Facility: CLINIC | Age: 67
End: 2022-06-21
Payer: MEDICARE

## 2022-06-21 NOTE — TELEPHONE ENCOUNTER
----- Message from Leah Hyman sent at 6/21/2022  4:38 PM CDT -----  Pt is needing to make an appt for ear cleaning with the provider or one of her assistants. Please call .[phone]

## 2022-06-22 ENCOUNTER — PATIENT MESSAGE (OUTPATIENT)
Dept: OTOLARYNGOLOGY | Facility: CLINIC | Age: 67
End: 2022-06-22
Payer: MEDICARE

## 2022-06-23 ENCOUNTER — PROCEDURE VISIT (OUTPATIENT)
Dept: NEUROLOGY | Facility: CLINIC | Age: 67
End: 2022-06-23
Payer: MEDICARE

## 2022-06-23 VITALS
RESPIRATION RATE: 16 BRPM | DIASTOLIC BLOOD PRESSURE: 74 MMHG | WEIGHT: 129.88 LBS | HEIGHT: 62 IN | HEART RATE: 77 BPM | SYSTOLIC BLOOD PRESSURE: 108 MMHG | OXYGEN SATURATION: 99 % | BODY MASS INDEX: 23.9 KG/M2

## 2022-06-23 DIAGNOSIS — G44.89 CHRONIC MIXED HEADACHE SYNDROME: Primary | ICD-10-CM

## 2022-06-23 PROCEDURE — 64615 CHEMODENERV MUSC MIGRAINE: CPT | Mod: PBBFAC | Performed by: NURSE PRACTITIONER

## 2022-06-23 PROCEDURE — 64615 CHEMODENERV MUSC MIGRAINE: CPT | Mod: FS,PBBFAC | Performed by: NURSE PRACTITIONER

## 2022-06-23 PROCEDURE — 64615 CHEMODENERV MUSC MIGRAINE: CPT | Mod: S$GLB,,, | Performed by: NURSE PRACTITIONER

## 2022-06-23 PROCEDURE — 64615 PR CHEMODENERVATION OF MUSCLE FOR CHRONIC MIGRAINE: ICD-10-PCS | Mod: S$GLB,,, | Performed by: NURSE PRACTITIONER

## 2022-06-23 RX ADMIN — ONABOTULINUMTOXINA 200 UNITS: 100 INJECTION, POWDER, LYOPHILIZED, FOR SOLUTION INTRADERMAL; INTRAMUSCULAR at 03:06

## 2022-06-23 RX ADMIN — ONABOTULINUMTOXINA 200 UNITS: 100 INJECTION, POWDER, LYOPHILIZED, FOR SOLUTION INTRADERMAL; INTRAMUSCULAR at 06:06

## 2022-06-23 NOTE — PROCEDURES
Procedures          1. PROCEDURE: Botox injections     2. INDICATION: Intractable Migraine     3. TIME OUT: The procedure was discussed in details with the patient and the consent form was signed. The patient verbalized understanding of the procedure . I discussed the risks that may include serious immune reaction and distant spread that could results in loss of breathing. Other side effects may include droopy eyelids, trouble swallowing and cardiac arrhythmias. It was also stressed that it is contraindicated in pregnancy.     3. COURSE:   The standard protocol was followed. the procedure was very smooth.     4. COMPLICATIONS: None. The patient tolerated the procedure very well.     5. AFTERCARE: I encouraged the patient to use ice if the sites of injection get tender and call me with any problems or complications.           As per the standard protocol, a total 155 units were injected in 31 sites.         RT  5 units in 1 site    LT  5 units in 1 site     Procerus 5 units in 1 site     RT Frontalis 10 units in 2 sites     LT Frontalis 10 units in 2 site     RT Temporalis 20 units in 4 sites    LT Temporalis 20 units in 4 sites    RT Occipitalis 15 units in 3 sites    LT Occipitalis 15  units in 3 sites     RT Cervical Paraspinals 10 units in 2 sites    LT Cervical Paraspinals 10 units in 2 sites    RT Trapezius 15 units in 3 sites    LT Trapezius 15 units in 3 sites       Per the standard of care protocol we use 155 units and waste 45 units. I gave the patient the option of following the standard protocol vs.using the extra 45 units to target areas where the pain is maximum which could potentially provide extra help with headache control. I explained to the patient that this will be an off-label use, not the standard protocol, not evidence-based and could result in more pronounced side effects. The patient verbalized full understanding of all the facts and the risks and benefits and elected to  use the extra 45 units for the following sites:          Procerus 5 units in 1 site     RT Frontalis 10 units in 4 sites    LT Frontalis  10 units in 4 sites

## 2022-06-27 ENCOUNTER — OFFICE VISIT (OUTPATIENT)
Dept: PSYCHIATRY | Facility: CLINIC | Age: 67
End: 2022-06-27
Payer: MEDICARE

## 2022-06-27 DIAGNOSIS — F43.21 COMPLICATED GRIEF: ICD-10-CM

## 2022-06-27 DIAGNOSIS — G47.00 INSOMNIA, UNSPECIFIED TYPE: ICD-10-CM

## 2022-06-27 DIAGNOSIS — F41.1 GAD (GENERALIZED ANXIETY DISORDER): Primary | ICD-10-CM

## 2022-06-27 PROCEDURE — 99213 PR OFFICE/OUTPT VISIT, EST, LEVL III, 20-29 MIN: ICD-10-PCS | Mod: 95,,, | Performed by: PSYCHIATRY & NEUROLOGY

## 2022-06-27 PROCEDURE — 99499 UNLISTED E&M SERVICE: CPT | Mod: 95,,, | Performed by: PSYCHIATRY & NEUROLOGY

## 2022-06-27 PROCEDURE — 99499 RISK ADDL DX/OHS AUDIT: ICD-10-PCS | Mod: 95,,, | Performed by: PSYCHIATRY & NEUROLOGY

## 2022-06-27 PROCEDURE — 99213 OFFICE O/P EST LOW 20 MIN: CPT | Mod: 95,,, | Performed by: PSYCHIATRY & NEUROLOGY

## 2022-06-27 RX ORDER — BUPROPION HYDROCHLORIDE 150 MG/1
450 TABLET ORAL DAILY
Qty: 90 TABLET | Refills: 0 | Status: SHIPPED | OUTPATIENT
Start: 2022-06-27 | End: 2022-08-20 | Stop reason: SDUPTHER

## 2022-06-27 RX ORDER — DIAZEPAM 2 MG/1
2 TABLET ORAL EVERY 6 HOURS PRN
Qty: 30 TABLET | Refills: 2 | Status: SHIPPED | OUTPATIENT
Start: 2022-06-27 | End: 2022-09-23 | Stop reason: SDUPTHER

## 2022-06-27 RX ORDER — BUSPIRONE HYDROCHLORIDE 30 MG/1
30 TABLET ORAL 2 TIMES DAILY
Qty: 180 TABLET | Refills: 0 | Status: SHIPPED | OUTPATIENT
Start: 2022-06-27 | End: 2022-09-23 | Stop reason: SDUPTHER
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
## 2022-07-05 ENCOUNTER — PATIENT MESSAGE (OUTPATIENT)
Dept: PSYCHIATRY | Facility: CLINIC | Age: 67
End: 2022-07-05
Payer: MEDICARE

## 2022-07-06 ENCOUNTER — PATIENT MESSAGE (OUTPATIENT)
Dept: ORTHOPEDICS | Facility: CLINIC | Age: 67
End: 2022-07-06
Payer: MEDICARE

## 2022-07-06 ENCOUNTER — TELEPHONE (OUTPATIENT)
Dept: ORTHOPEDICS | Facility: CLINIC | Age: 67
End: 2022-07-06
Payer: MEDICARE

## 2022-07-06 DIAGNOSIS — M17.12 PRIMARY OSTEOARTHRITIS OF LEFT KNEE: Primary | ICD-10-CM

## 2022-07-06 NOTE — TELEPHONE ENCOUNTER
Spoke with patient in regards to picking surgery date and scheduling her for a follow-up visit to re-discus her surgery. All patients questions were answered and she was grateful for the call. AHMET

## 2022-07-11 ENCOUNTER — PATIENT MESSAGE (OUTPATIENT)
Dept: INTERNAL MEDICINE | Facility: CLINIC | Age: 67
End: 2022-07-11
Payer: MEDICARE

## 2022-07-14 NOTE — Clinical Note
Patient was seen today as a new intake for tobacco cessation. Patient states she is smoking 3-5  cigarettes per day. Patient will be participating in biweekly tobacco cessation meetings and will begin the prescribed tobacco cessation medication Chantix starter pack has been successful in the past without any side effects. FTND score of 1 indicates a low level of nicotine dependence. NEFTALI- D score of 28 is perceived as have some degree of mental distress / probable depression at this time. Patient stated that she is seeing a conselor in regards to her depression  and stress at this time.
none

## 2022-07-19 DIAGNOSIS — M75.101 ROTATOR CUFF TEAR ARTHROPATHY OF RIGHT SHOULDER: ICD-10-CM

## 2022-07-19 DIAGNOSIS — M12.811 ROTATOR CUFF TEAR ARTHROPATHY OF RIGHT SHOULDER: ICD-10-CM

## 2022-07-19 DIAGNOSIS — Z98.890 POST-OPERATIVE STATE: Primary | ICD-10-CM

## 2022-07-20 ENCOUNTER — TELEPHONE (OUTPATIENT)
Dept: ORTHOPEDICS | Facility: CLINIC | Age: 67
End: 2022-07-20
Payer: MEDICARE

## 2022-07-20 ENCOUNTER — PATIENT MESSAGE (OUTPATIENT)
Dept: ORTHOPEDICS | Facility: CLINIC | Age: 67
End: 2022-07-20
Payer: MEDICARE

## 2022-07-20 DIAGNOSIS — Z98.890 POST-OPERATIVE STATE: Primary | ICD-10-CM

## 2022-07-20 DIAGNOSIS — M75.101 ROTATOR CUFF TEAR ARTHROPATHY OF RIGHT SHOULDER: ICD-10-CM

## 2022-07-20 DIAGNOSIS — M12.811 ROTATOR CUFF TEAR ARTHROPATHY OF RIGHT SHOULDER: ICD-10-CM

## 2022-07-20 NOTE — TELEPHONE ENCOUNTER
Spoke with patient to inform her that the location she wanted to schedule her post-operative PT with did not accept her insurance. Patient informed me that she had something come up and would like to reschedule her surgery. Surgery was rescheduled and all necessary follow-ups rescheduled at her convenience. All questions were answered and the patient was grateful for the call. AHMET

## 2022-07-20 NOTE — TELEPHONE ENCOUNTER
Spoke with PT facility in regards to patients insurance not being accepted by the facility and told them I would contact patient to see where she would like to be referred to. AHMET     ----- Message from Jigna Clemons sent at 7/20/2022  1:31 PM CDT -----  Contact: inna from [facility]  Calling rg the order nthat was sent over on pt to have therapy and has questions can be reached at [phone]//thanks/dbw

## 2022-07-25 ENCOUNTER — PATIENT MESSAGE (OUTPATIENT)
Dept: OPHTHALMOLOGY | Facility: CLINIC | Age: 67
End: 2022-07-25

## 2022-07-25 ENCOUNTER — OFFICE VISIT (OUTPATIENT)
Dept: OPHTHALMOLOGY | Facility: CLINIC | Age: 67
End: 2022-07-25
Payer: MEDICARE

## 2022-07-25 ENCOUNTER — PATIENT MESSAGE (OUTPATIENT)
Dept: NEUROLOGY | Facility: CLINIC | Age: 67
End: 2022-07-25
Payer: MEDICARE

## 2022-07-25 DIAGNOSIS — H10.13 ALLERGIC CONJUNCTIVITIS OF BOTH EYES: Primary | ICD-10-CM

## 2022-07-25 DIAGNOSIS — D31.01 NEVUS OF CONJUNCTIVA, RIGHT: ICD-10-CM

## 2022-07-25 PROCEDURE — 99999 PR PBB SHADOW E&M-EST. PATIENT-LVL III: CPT | Mod: PBBFAC,,, | Performed by: OPTOMETRIST

## 2022-07-25 PROCEDURE — 99999 PR PBB SHADOW E&M-EST. PATIENT-LVL III: ICD-10-PCS | Mod: PBBFAC,,, | Performed by: OPTOMETRIST

## 2022-07-25 PROCEDURE — 1159F MED LIST DOCD IN RCRD: CPT | Mod: CPTII,S$GLB,, | Performed by: OPTOMETRIST

## 2022-07-25 PROCEDURE — 1160F PR REVIEW ALL MEDS BY PRESCRIBER/CLIN PHARMACIST DOCUMENTED: ICD-10-PCS | Mod: CPTII,S$GLB,, | Performed by: OPTOMETRIST

## 2022-07-25 PROCEDURE — 92012 PR EYE EXAM, EST PATIENT,INTERMED: ICD-10-PCS | Mod: S$GLB,,, | Performed by: OPTOMETRIST

## 2022-07-25 PROCEDURE — 1160F RVW MEDS BY RX/DR IN RCRD: CPT | Mod: CPTII,S$GLB,, | Performed by: OPTOMETRIST

## 2022-07-25 PROCEDURE — 92012 INTRM OPH EXAM EST PATIENT: CPT | Mod: S$GLB,,, | Performed by: OPTOMETRIST

## 2022-07-25 PROCEDURE — 1159F PR MEDICATION LIST DOCUMENTED IN MEDICAL RECORD: ICD-10-PCS | Mod: CPTII,S$GLB,, | Performed by: OPTOMETRIST

## 2022-07-25 RX ORDER — NEOMYCIN SULFATE, POLYMYXIN B SULFATE AND DEXAMETHASONE 3.5; 10000; 1 MG/ML; [USP'U]/ML; MG/ML
1 SUSPENSION/ DROPS OPHTHALMIC 3 TIMES DAILY
Qty: 5 ML | Refills: 0 | Status: SHIPPED | OUTPATIENT
Start: 2022-07-25 | End: 2022-07-29 | Stop reason: ALTCHOICE

## 2022-07-25 NOTE — PROGRESS NOTES
HPI     NP to DKT  Patient here today for swelling, tearing and itching OU  States feels like tear ducts are itchy   Patient states symptoms have been present for about 2 weeks  Has been using Pataday       1. NSC OU  2. Nevus OD  3. HX of Lasik    Last edited by Lillian Olson, PCT on 7/25/2022  1:53 PM. (History)              Assessment /Plan     For exam results, see Encounter Report.    Allergic conjunctivitis of both eyes  -     neomycin-polymyxin-dexamethasone (MAXITROL) 3.5mg/mL-10,000 unit/mL-0.1 % DrpS; Place 1 drop into both eyes 3 (three) times daily. for 7 days  Dispense: 5 mL; Refill: 0    2+ Lid thickening, full EOM start maxitrol drops qid x 7 days and cool compresses to affected area with pataday once daily as needed. RTC in 7 days with Dr ARTEAGA for follow up, sooner if any changes.     Nevus of conjunctiva, right  Previously seen Dr ARTEAGA for baseline evaluation and photography but lost to follow up, patient request f/u with Dr ARTEAGA in 1 week RTC with Dr ARTEAGA in 1 week.      Nuclear sclerosis, bilateral  Cortical age-related cataract of both eyes  Cataracts not significantly affecting activities of daily living and therefore surgery is not indicated at this time.   Will continue to monitor over the next 12 months. Pt to call or RTC with any significant change in vision prior to next visit.     RTC in 7 days for f/u with Dr ARTEAGA for allergic conjunctivitus and conjunctival nevus recheck OD sooner if any changes to vision or worsening symptoms.

## 2022-07-25 NOTE — Clinical Note
Rhonda Bishop, I saw Ms Rodriguez yesterday for itching OD and a bit of lid edema for which she was concerned about. I started her an Maxitrol gtt and she requests to f/u with you in your clinic. Please let me know if you have any questions.   Thanks, Jason

## 2022-07-26 ENCOUNTER — PATIENT MESSAGE (OUTPATIENT)
Dept: PSYCHIATRY | Facility: CLINIC | Age: 67
End: 2022-07-26
Payer: MEDICARE

## 2022-07-26 ENCOUNTER — PATIENT MESSAGE (OUTPATIENT)
Dept: ORTHOPEDICS | Facility: CLINIC | Age: 67
End: 2022-07-26
Payer: MEDICARE

## 2022-07-28 ENCOUNTER — PATIENT MESSAGE (OUTPATIENT)
Dept: OPHTHALMOLOGY | Facility: CLINIC | Age: 67
End: 2022-07-28
Payer: MEDICARE

## 2022-07-29 ENCOUNTER — OFFICE VISIT (OUTPATIENT)
Dept: OPHTHALMOLOGY | Facility: CLINIC | Age: 67
End: 2022-07-29
Payer: MEDICARE

## 2022-07-29 DIAGNOSIS — H10.33 ACUTE BACTERIAL CONJUNCTIVITIS OF BOTH EYES: Primary | ICD-10-CM

## 2022-07-29 PROCEDURE — 1160F RVW MEDS BY RX/DR IN RCRD: CPT | Mod: CPTII,S$GLB,, | Performed by: OPTOMETRIST

## 2022-07-29 PROCEDURE — 1159F MED LIST DOCD IN RCRD: CPT | Mod: CPTII,S$GLB,, | Performed by: OPTOMETRIST

## 2022-07-29 PROCEDURE — 1159F PR MEDICATION LIST DOCUMENTED IN MEDICAL RECORD: ICD-10-PCS | Mod: CPTII,S$GLB,, | Performed by: OPTOMETRIST

## 2022-07-29 PROCEDURE — 99999 PR PBB SHADOW E&M-EST. PATIENT-LVL III: ICD-10-PCS | Mod: PBBFAC,,, | Performed by: OPTOMETRIST

## 2022-07-29 PROCEDURE — 1160F PR REVIEW ALL MEDS BY PRESCRIBER/CLIN PHARMACIST DOCUMENTED: ICD-10-PCS | Mod: CPTII,S$GLB,, | Performed by: OPTOMETRIST

## 2022-07-29 PROCEDURE — 92012 PR EYE EXAM, EST PATIENT,INTERMED: ICD-10-PCS | Mod: S$GLB,,, | Performed by: OPTOMETRIST

## 2022-07-29 PROCEDURE — 92012 INTRM OPH EXAM EST PATIENT: CPT | Mod: S$GLB,,, | Performed by: OPTOMETRIST

## 2022-07-29 PROCEDURE — 99999 PR PBB SHADOW E&M-EST. PATIENT-LVL III: CPT | Mod: PBBFAC,,, | Performed by: OPTOMETRIST

## 2022-07-29 NOTE — PROGRESS NOTES
HPI     Itchy Eye     Comments: Pain Scale:  0  Onset:   2 weeks  OD, OS, OU:   OU, OD worse  Discharge:   no  A.M. Matting:  no  Itch:   yes  Redness:   yes  Photophobia:  yes a little  Foreign body sensation:  yes, sand  Deep pain:   no  Previous occurrence:   yes  Drops:   Tobradex, stopped Maxitrol last night  Tears burning, skin hurting around eye             Last edited by Christie Sheldon MA on 7/29/2022  1:13 PM. (History)    Tobradex tim        Assessment /Plan     For exam results, see Encounter Report.    Acute bacterial conjunctivitis of both eyes  -     besifloxacin (BESIVANCE) 0.6 % DrpS; Place 1 drop into both eyes 3 (three) times daily. for 7 days  Dispense: 5 mL; Refill: 0  -     tobramycin-dexamethasone 0.3-0.1% (TOBRADEX) 0.3-0.1 % Oint; Apply to lids 1 to 2 times daily  Dispense: 3.5 g; Refill: 0    Start Besivance tid OU x 7 days as instructed  Tobradex qd-bid OU  D/c maxitrol      RTC PRN if symptoms worsen or not resolved x 1 week  Discussed above and all questions were answered.

## 2022-08-02 ENCOUNTER — PATIENT MESSAGE (OUTPATIENT)
Dept: OPHTHALMOLOGY | Facility: CLINIC | Age: 67
End: 2022-08-02
Payer: MEDICARE

## 2022-08-02 ENCOUNTER — TELEPHONE (OUTPATIENT)
Dept: OPHTHALMOLOGY | Facility: CLINIC | Age: 67
End: 2022-08-02
Payer: MEDICARE

## 2022-08-02 RX ORDER — NEOMYCIN SULFATE, POLYMYXIN B SULFATE, AND DEXAMETHASONE 3.5; 10000; 1 MG/G; [USP'U]/G; MG/G
OINTMENT OPHTHALMIC
Qty: 5 G | Refills: 0 | Status: SHIPPED | OUTPATIENT
Start: 2022-08-02 | End: 2022-08-18

## 2022-08-02 NOTE — TELEPHONE ENCOUNTER
Spoke to pt  Started Besivance on Sat  Still itchy  tobradex tim not covered  Denies dylon allergy  Will send in dylon poly dex tim to [facility] per pt req  She will f/u Thurs as scheduled, PRN sooner if worse

## 2022-08-07 ENCOUNTER — PATIENT MESSAGE (OUTPATIENT)
Dept: OPHTHALMOLOGY | Facility: CLINIC | Age: 67
End: 2022-08-07
Payer: MEDICARE

## 2022-08-11 ENCOUNTER — PATIENT MESSAGE (OUTPATIENT)
Dept: OPHTHALMOLOGY | Facility: CLINIC | Age: 67
End: 2022-08-11
Payer: MEDICARE

## 2022-08-15 RX ORDER — POTASSIUM CHLORIDE 750 MG/1
10 TABLET, EXTENDED RELEASE ORAL DAILY
Qty: 90 TABLET | Refills: 0 | Status: SHIPPED | OUTPATIENT
Start: 2022-08-15 | End: 2022-09-16 | Stop reason: SDUPTHER

## 2022-08-15 RX ORDER — ZOLPIDEM TARTRATE 5 MG/1
TABLET ORAL
Qty: 30 TABLET | Refills: 1 | Status: SHIPPED | OUTPATIENT
Start: 2022-08-15 | End: 2022-09-29 | Stop reason: SDUPTHER

## 2022-08-18 ENCOUNTER — OFFICE VISIT (OUTPATIENT)
Dept: OTOLARYNGOLOGY | Facility: CLINIC | Age: 67
End: 2022-08-18
Payer: MEDICARE

## 2022-08-18 ENCOUNTER — OFFICE VISIT (OUTPATIENT)
Dept: OPHTHALMOLOGY | Facility: CLINIC | Age: 67
End: 2022-08-18
Payer: MEDICARE

## 2022-08-18 ENCOUNTER — PATIENT MESSAGE (OUTPATIENT)
Dept: ORTHOPEDICS | Facility: CLINIC | Age: 67
End: 2022-08-18

## 2022-08-18 VITALS — TEMPERATURE: 97 F | BODY MASS INDEX: 22.98 KG/M2 | WEIGHT: 125.69 LBS

## 2022-08-18 DIAGNOSIS — J01.00 ACUTE NON-RECURRENT MAXILLARY SINUSITIS: ICD-10-CM

## 2022-08-18 DIAGNOSIS — J30.89 NON-SEASONAL ALLERGIC RHINITIS, UNSPECIFIED TRIGGER: Primary | ICD-10-CM

## 2022-08-18 DIAGNOSIS — H10.33 ACUTE BACTERIAL CONJUNCTIVITIS OF BOTH EYES: Primary | ICD-10-CM

## 2022-08-18 PROCEDURE — 1160F PR REVIEW ALL MEDS BY PRESCRIBER/CLIN PHARMACIST DOCUMENTED: ICD-10-PCS | Mod: CPTII,S$GLB,, | Performed by: OPTOMETRIST

## 2022-08-18 PROCEDURE — 1159F MED LIST DOCD IN RCRD: CPT | Mod: CPTII,S$GLB,, | Performed by: OPTOMETRIST

## 2022-08-18 PROCEDURE — 99214 OFFICE O/P EST MOD 30 MIN: CPT | Mod: S$GLB,,, | Performed by: PHYSICIAN ASSISTANT

## 2022-08-18 PROCEDURE — 3288F PR FALLS RISK ASSESSMENT DOCUMENTED: ICD-10-PCS | Mod: CPTII,S$GLB,, | Performed by: PHYSICIAN ASSISTANT

## 2022-08-18 PROCEDURE — 3008F PR BODY MASS INDEX (BMI) DOCUMENTED: ICD-10-PCS | Mod: CPTII,S$GLB,, | Performed by: PHYSICIAN ASSISTANT

## 2022-08-18 PROCEDURE — 92012 PR EYE EXAM, EST PATIENT,INTERMED: ICD-10-PCS | Mod: S$GLB,,, | Performed by: OPTOMETRIST

## 2022-08-18 PROCEDURE — 99214 PR OFFICE/OUTPT VISIT, EST, LEVL IV, 30-39 MIN: ICD-10-PCS | Mod: S$GLB,,, | Performed by: PHYSICIAN ASSISTANT

## 2022-08-18 PROCEDURE — 1101F PR PT FALLS ASSESS DOC 0-1 FALLS W/OUT INJ PAST YR: ICD-10-PCS | Mod: CPTII,S$GLB,, | Performed by: PHYSICIAN ASSISTANT

## 2022-08-18 PROCEDURE — 92012 INTRM OPH EXAM EST PATIENT: CPT | Mod: S$GLB,,, | Performed by: OPTOMETRIST

## 2022-08-18 PROCEDURE — 1101F PT FALLS ASSESS-DOCD LE1/YR: CPT | Mod: CPTII,S$GLB,, | Performed by: PHYSICIAN ASSISTANT

## 2022-08-18 PROCEDURE — 99999 PR PBB SHADOW E&M-EST. PATIENT-LVL IV: ICD-10-PCS | Mod: PBBFAC,,, | Performed by: PHYSICIAN ASSISTANT

## 2022-08-18 PROCEDURE — 1159F PR MEDICATION LIST DOCUMENTED IN MEDICAL RECORD: ICD-10-PCS | Mod: CPTII,S$GLB,, | Performed by: OPTOMETRIST

## 2022-08-18 PROCEDURE — 3288F FALL RISK ASSESSMENT DOCD: CPT | Mod: CPTII,S$GLB,, | Performed by: PHYSICIAN ASSISTANT

## 2022-08-18 PROCEDURE — 3008F BODY MASS INDEX DOCD: CPT | Mod: CPTII,S$GLB,, | Performed by: PHYSICIAN ASSISTANT

## 2022-08-18 PROCEDURE — 99999 PR PBB SHADOW E&M-EST. PATIENT-LVL IV: CPT | Mod: PBBFAC,,, | Performed by: PHYSICIAN ASSISTANT

## 2022-08-18 PROCEDURE — 99999 PR PBB SHADOW E&M-EST. PATIENT-LVL III: ICD-10-PCS | Mod: PBBFAC,,, | Performed by: OPTOMETRIST

## 2022-08-18 PROCEDURE — 99999 PR PBB SHADOW E&M-EST. PATIENT-LVL III: CPT | Mod: PBBFAC,,, | Performed by: OPTOMETRIST

## 2022-08-18 PROCEDURE — 1160F RVW MEDS BY RX/DR IN RCRD: CPT | Mod: CPTII,S$GLB,, | Performed by: OPTOMETRIST

## 2022-08-18 RX ORDER — BENZONATATE 100 MG/1
CAPSULE ORAL
COMMUNITY
Start: 2022-04-21 | End: 2022-11-28

## 2022-08-18 RX ORDER — METHYLPREDNISOLONE 4 MG/1
TABLET ORAL
Qty: 21 EACH | Refills: 0 | Status: SHIPPED | OUTPATIENT
Start: 2022-08-18 | End: 2022-09-08

## 2022-08-18 RX ORDER — AMOXICILLIN AND CLAVULANATE POTASSIUM 875; 125 MG/1; MG/1
1 TABLET, FILM COATED ORAL 2 TIMES DAILY
Qty: 20 TABLET | Refills: 0 | Status: SHIPPED | OUTPATIENT
Start: 2022-08-18 | End: 2022-08-28

## 2022-08-18 RX ORDER — POLYMYXIN B SULFATE AND TRIMETHOPRIM 1; 10000 MG/ML; [USP'U]/ML
1 SOLUTION OPHTHALMIC 4 TIMES DAILY
Qty: 10 ML | Refills: 0 | Status: SHIPPED | OUTPATIENT
Start: 2022-08-18 | End: 2022-09-17

## 2022-08-18 NOTE — PROGRESS NOTES
HPI     Eye Problem     Comments: Pt in today with complaints of her lids being raw. Pt states   her lids are dry. Pt states her eyes still hurt              Comments     Pain Scale:  0  Onset:   since last visit  OD, OS, OU:   OU  Discharge:   no  A.M. Matting:  no  Itch:   yes  Redness:   yes  Photophobia:   no  Foreign body sensation:   no  Deep pain:   no  Previous occurrence:   yes  Drops:   no, stopped using after last visit.           Last edited by Rachel Longo on 8/18/2022  3:30 PM. (History)        Pt states using Pataday qd OU  Tobradex bid OU  Systane gel OU     Assessment /Plan     For exam results, see Encounter Report.    Acute bacterial conjunctivitis of both eyes    Start Polytrim QID OU  Stop Tobradex    Discussed using Lid Scrubs everyday for a week then every other day   Coupon for SteriLid given to patient     RTC prn if symptoms worsen or not resolved x 1 week

## 2022-08-18 NOTE — PROGRESS NOTES
"Subjective:       Patient ID: Kenia Alonzo is a 67 y.o. female.    Chief Complaint: Sinus Problem (Potential sinus/ allergy problem. Patient complains of generalized headache, sinus pressure in cheeks, eyes and ears. No pain other than headache pain. )    Patient is a 67 year old female seen today for evaluation of worsening sinus and allergy symptoms.  She reports packing, moving, unpacking over past 2 weeks with lots of dust exposure.  She has since had increased nasal congestion, headache, as well as subjective swelling of her "glands."  She is now having pain and pressure over her midface.  She feels generalized malaise.  She had some pressure in her ears last night.  She has known migraines and Meniere's.  Reports Meniere's attack last week that lasted 2 days.  She has been coughing and blowing thick yellow mucus from her nose.  She has not had recent COVID test.  She no longer smokes.  She also mentions swollen and peeling eyelids and has another appt with Ophtho today.  No fever.    She has known allergies and has been on SCIT in the past.  She is using Flonase, Astelin, and Xyzal daily.  She is on Singulair daily as well.  She has abortive migraine medications and took it last week but says this headache is different than her typical migraines.      Review of Systems   Constitutional: Positive for fatigue. Negative for fever.   HENT: Positive for nasal congestion, ear pain (pressure), postnasal drip, sinus pressure/congestion, sneezing and voice change. Negative for ear discharge and rhinorrhea.    Eyes: Positive for photophobia and itching.   Respiratory: Positive for cough and wheezing.    Cardiovascular: Negative.    Gastrointestinal: Negative.    Endocrine: Negative.    Genitourinary: Negative.    Integumentary:  Positive for rash.   Neurological: Positive for dizziness, light-headedness and headaches.   Psychiatric/Behavioral: Positive for sleep disturbance. The patient is nervous/anxious.        "   Objective:      Physical Exam  Vitals reviewed.   Constitutional:       Appearance: She is well-developed. She is not diaphoretic.   HENT:      Head: Normocephalic and atraumatic.      Right Ear: Tympanic membrane, ear canal and external ear normal. No decreased hearing noted. No middle ear effusion. Tympanic membrane is not injected or erythematous.      Left Ear: Tympanic membrane, ear canal and external ear normal. No decreased hearing noted.  No middle ear effusion. Tympanic membrane is not injected or erythematous.      Nose: Mucosal edema and congestion present. No rhinorrhea.      Right Sinus: Maxillary sinus tenderness and frontal sinus tenderness present.      Left Sinus: Maxillary sinus tenderness and frontal sinus tenderness present.      Mouth/Throat:      Mouth: Mucous membranes are moist.   Eyes:      General: Lids are normal.      Conjunctiva/sclera: Conjunctivae normal.   Neck:      Thyroid: No thyromegaly.   Pulmonary:      Effort: Pulmonary effort is normal. No respiratory distress.   Skin:     Findings: No rash.   Neurological:      Mental Status: She is alert.   Psychiatric:         Attention and Perception: Attention normal.         Speech: Speech normal.         Behavior: Behavior normal. Behavior is cooperative.         Thought Content: Thought content normal.         Assessment:       Problem List Items Addressed This Visit        ENT    Non-seasonal allergic rhinitis - Primary      Other Visit Diagnoses     Acute non-recurrent maxillary sinusitis              Plan:         She has had greater than 10 days of worsening allergy symptoms with pain/pressure in midface and discolored postnasal drainage.  Discussed that COVID and sinusitis symptoms are similar.  I would recommend COVID test (reports that she has one at mother's house she can take).  If negative, I would recommend course of Augmentin x 10 days and Medrol Dose Pack.  I would recommend she increase her Astelin and Flonase to twice  daily while more symptomatic and continue daily Xyzal and Singulair as well.  Recommend she call or email with her progress in next 2 weeks.  If any worsening, would recommend imaging.  She has appt with Ophthalmology later today for persistent conjunctivitis with peeling and swelling of her eyelids; will defer to them on treatment.    Answers for HPI/ROS submitted by the patient on 8/18/2022  Fatigue (Tiredness)?: Yes  Ear infection(s)?: Yes  Sinus infection(s)?: Yes  Eye Drainage?: Yes  Muscle aches / pain?: Yes  Seasonal Allergies?: Yes  swollen glands: Yes

## 2022-08-18 NOTE — PROGRESS NOTES
Answers for HPI/ROS submitted by the patient on 8/18/2022  Fatigue (Tiredness)?: Yes  Ear infection(s)?: Yes  sinus pressure : Yes  Sinus infection(s)?: Yes  Voice Change?: Yes  Eye Drainage?: Yes  eye itching: Yes  Light sensitivity / Light hurts the eyes?: Yes  wheezing: Yes  cough: Yes  None of these : Yes  None of these: Yes  None of these: Yes  Muscle aches / pain?: Yes  rash: Yes  Seasonal Allergies?: Yes  None of these : Yes  dizziness: Yes  headaches: Yes  Light-headedness: Yes  swollen glands: Yes  nervous/ anxious: Yes  sleep disturbance: Yes

## 2022-08-22 RX ORDER — BUPROPION HYDROCHLORIDE 150 MG/1
450 TABLET ORAL DAILY
Qty: 90 TABLET | Refills: 1 | Status: SHIPPED | OUTPATIENT
Start: 2022-08-22 | End: 2022-09-23 | Stop reason: SDUPTHER

## 2022-08-23 ENCOUNTER — PATIENT MESSAGE (OUTPATIENT)
Dept: ORTHOPEDICS | Facility: CLINIC | Age: 67
End: 2022-08-23
Payer: MEDICARE

## 2022-08-24 ENCOUNTER — PATIENT MESSAGE (OUTPATIENT)
Dept: PSYCHIATRY | Facility: CLINIC | Age: 67
End: 2022-08-24
Payer: MEDICARE

## 2022-08-26 ENCOUNTER — PATIENT MESSAGE (OUTPATIENT)
Dept: OPHTHALMOLOGY | Facility: CLINIC | Age: 67
End: 2022-08-26
Payer: MEDICARE

## 2022-08-28 ENCOUNTER — PATIENT MESSAGE (OUTPATIENT)
Dept: OTOLARYNGOLOGY | Facility: CLINIC | Age: 67
End: 2022-08-28
Payer: MEDICARE

## 2022-08-29 ENCOUNTER — PATIENT MESSAGE (OUTPATIENT)
Dept: INTERNAL MEDICINE | Facility: CLINIC | Age: 67
End: 2022-08-29
Payer: MEDICARE

## 2022-08-29 DIAGNOSIS — L85.3 DRY SKIN DERMATITIS: Primary | ICD-10-CM

## 2022-08-30 ENCOUNTER — PATIENT MESSAGE (OUTPATIENT)
Dept: ORTHOPEDICS | Facility: CLINIC | Age: 67
End: 2022-08-30
Payer: MEDICARE

## 2022-08-31 ENCOUNTER — OFFICE VISIT (OUTPATIENT)
Dept: OTOLARYNGOLOGY | Facility: CLINIC | Age: 67
End: 2022-08-31
Payer: MEDICARE

## 2022-08-31 VITALS — SYSTOLIC BLOOD PRESSURE: 102 MMHG | DIASTOLIC BLOOD PRESSURE: 63 MMHG | TEMPERATURE: 98 F | HEART RATE: 70 BPM

## 2022-08-31 DIAGNOSIS — J30.2 SEASONAL ALLERGIC RHINITIS, UNSPECIFIED TRIGGER: ICD-10-CM

## 2022-08-31 DIAGNOSIS — H92.03 OTALGIA OF BOTH EARS: ICD-10-CM

## 2022-08-31 DIAGNOSIS — J32.0 CHRONIC MAXILLARY SINUSITIS: Primary | ICD-10-CM

## 2022-08-31 PROCEDURE — 1159F MED LIST DOCD IN RCRD: CPT | Mod: CPTII,S$GLB,, | Performed by: PHYSICIAN ASSISTANT

## 2022-08-31 PROCEDURE — 3074F PR MOST RECENT SYSTOLIC BLOOD PRESSURE < 130 MM HG: ICD-10-PCS | Mod: CPTII,S$GLB,, | Performed by: PHYSICIAN ASSISTANT

## 2022-08-31 PROCEDURE — 3078F DIAST BP <80 MM HG: CPT | Mod: CPTII,S$GLB,, | Performed by: PHYSICIAN ASSISTANT

## 2022-08-31 PROCEDURE — 1159F PR MEDICATION LIST DOCUMENTED IN MEDICAL RECORD: ICD-10-PCS | Mod: CPTII,S$GLB,, | Performed by: PHYSICIAN ASSISTANT

## 2022-08-31 PROCEDURE — 3074F SYST BP LT 130 MM HG: CPT | Mod: CPTII,S$GLB,, | Performed by: PHYSICIAN ASSISTANT

## 2022-08-31 PROCEDURE — 99214 PR OFFICE/OUTPT VISIT, EST, LEVL IV, 30-39 MIN: ICD-10-PCS | Mod: S$GLB,,, | Performed by: PHYSICIAN ASSISTANT

## 2022-08-31 PROCEDURE — 3078F PR MOST RECENT DIASTOLIC BLOOD PRESSURE < 80 MM HG: ICD-10-PCS | Mod: CPTII,S$GLB,, | Performed by: PHYSICIAN ASSISTANT

## 2022-08-31 PROCEDURE — 3288F FALL RISK ASSESSMENT DOCD: CPT | Mod: CPTII,S$GLB,, | Performed by: PHYSICIAN ASSISTANT

## 2022-08-31 PROCEDURE — 99999 PR PBB SHADOW E&M-EST. PATIENT-LVL IV: CPT | Mod: PBBFAC,,, | Performed by: PHYSICIAN ASSISTANT

## 2022-08-31 PROCEDURE — 1101F PT FALLS ASSESS-DOCD LE1/YR: CPT | Mod: CPTII,S$GLB,, | Performed by: PHYSICIAN ASSISTANT

## 2022-08-31 PROCEDURE — 99999 PR PBB SHADOW E&M-EST. PATIENT-LVL IV: ICD-10-PCS | Mod: PBBFAC,,, | Performed by: PHYSICIAN ASSISTANT

## 2022-08-31 PROCEDURE — 99214 OFFICE O/P EST MOD 30 MIN: CPT | Mod: S$GLB,,, | Performed by: PHYSICIAN ASSISTANT

## 2022-08-31 PROCEDURE — 3288F PR FALLS RISK ASSESSMENT DOCUMENTED: ICD-10-PCS | Mod: CPTII,S$GLB,, | Performed by: PHYSICIAN ASSISTANT

## 2022-08-31 PROCEDURE — 1101F PR PT FALLS ASSESS DOC 0-1 FALLS W/OUT INJ PAST YR: ICD-10-PCS | Mod: CPTII,S$GLB,, | Performed by: PHYSICIAN ASSISTANT

## 2022-08-31 NOTE — PROGRESS NOTES
Subjective:       Patient ID: eKnia Alonzo is a 67 y.o. female.    Chief Complaint: Sinus Problem (Onset 1 month ago )    Patient is a 67 year old female here to see me today for evaluation of persistent right maxillary sinus pain and pressure.  She was last here with me on 8/18/22 with worsening sinus and allergy symptoms that started after moving and unpacking.  She was treated with course of Augmentin and Medrol Dose Pack which she's now completed.  She states she's not feeling any better.   She continues to have nasal congestion, headache, and pain and pressure over her midface (R>L cheeks).  She feels generalized malaise.  She has has occasional otalgia, mostly at night.  No ear drainage.  She has known migraines and Meniere's.  She has been coughing but slightly improved from last visit.  No longer blowing thick yellow mucus from her nose, states it's mostly clear.  She no longer smokes.  She also continued issues with her eyes (swollen and peeling eyelids) and has an appt with Derm today for another opinion.  No fever.    She has known allergies and has been on SCIT in the past.  She is using Flonase, Astelin, and Xyzal daily.  She is on Singulair daily as well.  She also mentions an issue with needing a crown repaired (left lower).  She reports grinding and clenching her teeth as well.  Review of Systems   Constitutional:  Negative for fever.   HENT:  Positive for nasal congestion, ear pain, postnasal drip, rhinorrhea (clear), sinus pressure/congestion and sneezing. Negative for ear discharge.    Eyes:  Positive for redness, itching and eye dryness.   Allergic/Immunologic: Positive for environmental allergies.   Neurological:  Negative for dizziness.       Objective:      Physical Exam  Vitals reviewed.   Constitutional:       Appearance: She is well-developed. She is not diaphoretic.   HENT:      Head: Normocephalic and atraumatic.      Jaw: No trismus or swelling.      Right Ear: Tympanic membrane, ear  canal and external ear normal. No decreased hearing noted. No middle ear effusion. Tympanic membrane is not injected or erythematous.      Left Ear: Tympanic membrane, ear canal and external ear normal. No decreased hearing noted.  No middle ear effusion. Tympanic membrane is not injected or erythematous.      Nose: Mucosal edema present. No congestion or rhinorrhea.      Right Sinus: Maxillary sinus tenderness present. No frontal sinus tenderness.      Left Sinus: Maxillary sinus tenderness present. No frontal sinus tenderness.      Mouth/Throat:      Mouth: Mucous membranes are moist.      Pharynx: Oropharynx is clear.   Eyes:      Conjunctiva/sclera: Conjunctivae normal.      Comments: Mild erythema and flakiness of upper eyelids   Neck:      Thyroid: No thyromegaly.   Pulmonary:      Effort: Pulmonary effort is normal. No respiratory distress.   Lymphadenopathy:      Cervical: No cervical adenopathy.   Skin:     Findings: No rash.   Neurological:      Mental Status: She is alert.   Psychiatric:         Attention and Perception: Attention normal.         Speech: Speech normal.         Behavior: Behavior normal. Behavior is cooperative.         Thought Content: Thought content normal.       Assessment:       Problem List Items Addressed This Visit    None  Visit Diagnoses       Chronic maxillary sinusitis    -  Primary    Relevant Orders    CT Sinuses without Contrast    Seasonal allergic rhinitis, unspecified trigger        Otalgia of both ears                  Plan:         At this point, the patient has been on multiple rounds of antibiotics including Amoxicillin, Augmentin, and steroids  without significant or sustained improvement in their symptoms.  I would recommend a CT of the sinuses for further evaluation.  If the scan shows persistent sinus disease, she will then need a longer course of antibiotics, likely three to four weeks of Augmentin or Levaquin.  If the scan does not show persistent infection, the  patient's symptoms are likely due to underlying allergies and would then maximize allergy therapy.  Also recommend she continue with recommendations from Ophtho and Dermatology regarding conjunctivitis, eyelid flakiness, erythema.  The patient understands this treatment plan, and will call with results when available.      Reassured patient that her ears today are unremarkable.  No signs of OE or BEBE in either ear.  We had a long discussion regarding the underlying pathology of temporomandibular joint dysfunction (TMD) as the cause of ear pain.  We further discussed conservative measures to treat TMD including avoiding gum and other foods that require lots of chewing, warm compresses, and scheduled antinflammatories.  If the pain persists, the patient will then schedule an appointment with a dentist for further evaluation.

## 2022-09-01 ENCOUNTER — TELEPHONE (OUTPATIENT)
Dept: PHARMACY | Facility: CLINIC | Age: 67
End: 2022-09-01
Payer: MEDICARE

## 2022-09-06 ENCOUNTER — PATIENT MESSAGE (OUTPATIENT)
Dept: PHARMACY | Facility: CLINIC | Age: 67
End: 2022-09-06
Payer: MEDICARE

## 2022-09-06 ENCOUNTER — PATIENT MESSAGE (OUTPATIENT)
Dept: ORTHOPEDICS | Facility: CLINIC | Age: 67
End: 2022-09-06
Payer: MEDICARE

## 2022-09-12 ENCOUNTER — OFFICE VISIT (OUTPATIENT)
Dept: ORTHOPEDICS | Facility: CLINIC | Age: 67
End: 2022-09-12
Payer: MEDICARE

## 2022-09-12 VITALS
HEIGHT: 62 IN | WEIGHT: 126 LBS | HEART RATE: 89 BPM | BODY MASS INDEX: 23.19 KG/M2 | SYSTOLIC BLOOD PRESSURE: 110 MMHG | DIASTOLIC BLOOD PRESSURE: 62 MMHG

## 2022-09-12 DIAGNOSIS — M21.162 ACQUIRED VARUS DEFORMITY KNEE, LEFT: ICD-10-CM

## 2022-09-12 DIAGNOSIS — M17.10 ARTHRITIS OF KNEE: Primary | ICD-10-CM

## 2022-09-12 DIAGNOSIS — M17.0 PRIMARY OSTEOARTHRITIS OF BOTH KNEES: ICD-10-CM

## 2022-09-12 PROCEDURE — 1160F PR REVIEW ALL MEDS BY PRESCRIBER/CLIN PHARMACIST DOCUMENTED: ICD-10-PCS | Mod: CPTII,S$GLB,, | Performed by: PHYSICIAN ASSISTANT

## 2022-09-12 PROCEDURE — 1160F RVW MEDS BY RX/DR IN RCRD: CPT | Mod: CPTII,S$GLB,, | Performed by: PHYSICIAN ASSISTANT

## 2022-09-12 PROCEDURE — 1101F PR PT FALLS ASSESS DOC 0-1 FALLS W/OUT INJ PAST YR: ICD-10-PCS | Mod: CPTII,S$GLB,, | Performed by: PHYSICIAN ASSISTANT

## 2022-09-12 PROCEDURE — 99213 PR OFFICE/OUTPT VISIT, EST, LEVL III, 20-29 MIN: ICD-10-PCS | Mod: 25,S$GLB,, | Performed by: PHYSICIAN ASSISTANT

## 2022-09-12 PROCEDURE — 3008F BODY MASS INDEX DOCD: CPT | Mod: CPTII,S$GLB,, | Performed by: PHYSICIAN ASSISTANT

## 2022-09-12 PROCEDURE — 3288F PR FALLS RISK ASSESSMENT DOCUMENTED: ICD-10-PCS | Mod: CPTII,S$GLB,, | Performed by: PHYSICIAN ASSISTANT

## 2022-09-12 PROCEDURE — 3288F FALL RISK ASSESSMENT DOCD: CPT | Mod: CPTII,S$GLB,, | Performed by: PHYSICIAN ASSISTANT

## 2022-09-12 PROCEDURE — 3008F PR BODY MASS INDEX (BMI) DOCUMENTED: ICD-10-PCS | Mod: CPTII,S$GLB,, | Performed by: PHYSICIAN ASSISTANT

## 2022-09-12 PROCEDURE — 3078F DIAST BP <80 MM HG: CPT | Mod: CPTII,S$GLB,, | Performed by: PHYSICIAN ASSISTANT

## 2022-09-12 PROCEDURE — 1159F MED LIST DOCD IN RCRD: CPT | Mod: CPTII,S$GLB,, | Performed by: PHYSICIAN ASSISTANT

## 2022-09-12 PROCEDURE — 20610 DRAIN/INJ JOINT/BURSA W/O US: CPT | Mod: 50,S$GLB,, | Performed by: PHYSICIAN ASSISTANT

## 2022-09-12 PROCEDURE — 3074F SYST BP LT 130 MM HG: CPT | Mod: CPTII,S$GLB,, | Performed by: PHYSICIAN ASSISTANT

## 2022-09-12 PROCEDURE — 1159F PR MEDICATION LIST DOCUMENTED IN MEDICAL RECORD: ICD-10-PCS | Mod: CPTII,S$GLB,, | Performed by: PHYSICIAN ASSISTANT

## 2022-09-12 PROCEDURE — 3078F PR MOST RECENT DIASTOLIC BLOOD PRESSURE < 80 MM HG: ICD-10-PCS | Mod: CPTII,S$GLB,, | Performed by: PHYSICIAN ASSISTANT

## 2022-09-12 PROCEDURE — 99999 PR PBB SHADOW E&M-EST. PATIENT-LVL V: CPT | Mod: PBBFAC,,, | Performed by: PHYSICIAN ASSISTANT

## 2022-09-12 PROCEDURE — 99213 OFFICE O/P EST LOW 20 MIN: CPT | Mod: 25,S$GLB,, | Performed by: PHYSICIAN ASSISTANT

## 2022-09-12 PROCEDURE — 1101F PT FALLS ASSESS-DOCD LE1/YR: CPT | Mod: CPTII,S$GLB,, | Performed by: PHYSICIAN ASSISTANT

## 2022-09-12 PROCEDURE — 3074F PR MOST RECENT SYSTOLIC BLOOD PRESSURE < 130 MM HG: ICD-10-PCS | Mod: CPTII,S$GLB,, | Performed by: PHYSICIAN ASSISTANT

## 2022-09-12 PROCEDURE — 20610 LARGE JOINT ASPIRATION/INJECTION: R KNEE: ICD-10-PCS | Mod: 50,S$GLB,, | Performed by: PHYSICIAN ASSISTANT

## 2022-09-12 PROCEDURE — 99999 PR PBB SHADOW E&M-EST. PATIENT-LVL V: ICD-10-PCS | Mod: PBBFAC,,, | Performed by: PHYSICIAN ASSISTANT

## 2022-09-12 RX ORDER — METHYLPREDNISOLONE ACETATE 80 MG/ML
80 INJECTION, SUSPENSION INTRA-ARTICULAR; INTRALESIONAL; INTRAMUSCULAR; SOFT TISSUE
Status: DISCONTINUED | OUTPATIENT
Start: 2022-09-12 | End: 2022-09-12 | Stop reason: HOSPADM

## 2022-09-12 RX ORDER — LIDOCAINE HYDROCHLORIDE 10 MG/ML
5 INJECTION INFILTRATION; PERINEURAL
Status: DISCONTINUED | OUTPATIENT
Start: 2022-09-12 | End: 2022-09-12 | Stop reason: HOSPADM

## 2022-09-12 RX ADMIN — METHYLPREDNISOLONE ACETATE 80 MG: 80 INJECTION, SUSPENSION INTRA-ARTICULAR; INTRALESIONAL; INTRAMUSCULAR; SOFT TISSUE at 10:09

## 2022-09-12 RX ADMIN — LIDOCAINE HYDROCHLORIDE 5 ML: 10 INJECTION INFILTRATION; PERINEURAL at 10:09

## 2022-09-12 NOTE — PROCEDURES
Large Joint Aspiration/Injection: L knee    Date/Time: 9/12/2022 10:00 AM  Performed by: STEPHEN Aldana  Authorized by: STEPHEN Aldana     Consent Done?:  Yes (Verbal)  Indications:  Arthritis and pain  Site marked: the procedure site was marked      Local anesthesia used?: Yes    Local anesthetic:  Topical anesthetic    Details:  Needle Size:  22 G  Approach:  Anterolateral  Location:  Knee  Site:  L knee  Medications:  88 mg hyaluronate sodium, stabilized 88 mg/4 mL  Patient tolerance:  Patient tolerated the procedure well with no immediate complications     Verbal consent was obtained  The patient's ID, site, side was verified  The site was sterile prepped in standard fashion  The injection was performed in the violetta-lateral side without complication  A sterile Band-Aid was applied    Patient was directed to apply ice today at roughly 15 minutes at a time as needed.  It was discussed that they may be sore for the next few days or so.  Please avoid strenuous activity over the next 24 hours.  It was also discussed that the patient may have a increase in glucose if diabetic and should monitor levels.  Patient was instructed to call as needed.

## 2022-09-12 NOTE — PROGRESS NOTES
Subjective:     Patient ID: Kenia Alonzo is a 67 y.o. female.    Chief Complaint: Pain of the Right Knee and Pain of the Left Knee   11/30/2020  HPI:  65-year-old had been having pain in both knees with the left worse than the right for more than 10 years now.  In 2013 injured her left knee seen in [place] and recommended total knee replacement which she refused.  Over the years she received steroid injections with relief.  Synvisc injection into the left knee the series of 3 helped.  She was placed on ibuprofen large dose and that kidney issues and now she cannot take any anti-inflammatories.  Received Euflexxa 08/17/2020 series of 3 without relief and followed with steroid injection 10/12/2020 which seems to have worked.  The lasting around 6 weeks and most people will not give her more.  She says her knee is bone on bone and she wants to avoid surgery at this point in time until she is ready to proceed with it.  She is ambulating without any assistive devices.  She goes to physical therapy at [facility] and when this morning.  Occasionally she wakes up with the knee severely painful and with therapy and exercise eases up.  She does take Tylenol on occasions.  Denies any fever or chills or shortness of breath or difficulty with chewing or swallowing loss of bowel bladder control blurry vision double vision or loss of sense smell or taste.    -2/1/21  Bilateral knee pain and left worse than the right.  We started on physical therapy to the legs at [facility] the [place] and he says the right knee pain is completely gone.  As far as the left knee it is down at 4/10.  The steroid injection given 11/30/2020 into the left knee helped significantly.  Now it is starting to ache again and we had discussed doing Synvisc-One injection.  Unable to take NSAIDs.  Recently had shoulder injection by another provider and she was told eventuality she would need shoulder replacement.  But the injection did help quite severe  in effect and he.  We did review with her today her x-rays again and showed her in details the findings.  Is she is ambulating without any assistive devices.  She stated now she is looking for a job and she might have to move again.  Denies any fever or chills or shortness of breath or difficulty with chewing or swallowing loss of bowel bladder control or blurry vision or double vision loss sense smell or taste    04/12/2021    Bilateral knee arthritis.  The steroid injection given on 02/01/2021 into the left knee helped but it wore off within 4 weeks.  She was supposed to be going to PT at [facility] [place] but she got depressed and had to see psychiatrist.  He encouraged her to go to physical therapy as well as I did today.  She is doing much better and she is here with her so daughter in law who is driving her today because hyaluronic injection might be severely painful.  She is 7/10 in pain.  She does take Tylenol.  She still ambulating without any assistive devices.  As far as the right knee is concerned her pain is around 3/10.  She requested an injection in the near future because she feels previous injection is wore off.  She is now wearing any assistive devices or ambulating with any  Denies any fever or chills or shortness of breath or difficulty with chewing swallowing or loss of bowel bladder control blurry vision or double vision or loss sense smell or taste      02/07/2022  Bilateral knee arthritis with the left worse than the right.  Patient is having shoulder problem and now unable to lift her arm up to comb her hair or do activities of daily living.  She does have an appointment with Dr. Mike newman to be seen tomorrow.  Most likely I told her she ruptured her rotator cuff.  She said she cannot function to well and she just moved out living now on her own.  She did receive steroid injection in the left knee by my PA in October 2021 and I did give her Visco supplement the patient/Synvisc-One in  April 2021 with good relief.  She would like to have that repeated also.  She is ambulating without any assistive devices.  Pain in the left knee 9/10      05/16/2022  Patient states he she saw Dr. Mike newman and he is contemplating doing surgery on her shoulder/replacement.  He was going to discuss it in the future  She is going to [facility] at [facility] and working on her knees hips and shoulder.  Steroid injection/Kenalog given to her 02/07/2022 seems to have helped for 6-8 weeks in the knee but not long enough.  Her pain now is 8/10.  She is ambulating without any assistive devices.  She cannot take NSAIDs.  Her insurance will not approve her to get viscosupplementation.  We were able to get her approved for long-acting steroid injection.  She is ambulating without assistive devices.  No fever no chills no loss of bowel bladder control    New problem  Patient stated her right knee locked on her could not straighten out when she was in the top and also 1 time when she is asleep it was extremely painful to get the leg to straighten out and pop back up.  Now is doing okay not as painful as it was when lock.  It I did tell her if that still persists next time she comes we need to evaluate further    9/12/2022  Patient presents today as a new patient to me with chief complaint of bilateral knee pain.  Patient states she has been doing okay and did note some improvement from this around a for a few months although not as effective as she was hoping for.  She notes pain is at worst a 7/10 affecting activity of daily living and thus quality of life.  The patient has a hard time going from a sitting to standing standing to sitting position, walking long distances, unlevel terrain/stairs.  She has tried short-acting steroid injections as well as PT.  She is ambulating without any assistant devices today.  She has been approved received Visco supplement injection into the left knee.  Patient is presently denying any  shortness of breath, chest pain, fever/chills, nausea/vomiting, loss of taste or smell, numbness/tingling or sensation changes, loss of bladder or bowel function, loss of taste/smell.       Past Medical History:   Diagnosis Date    Arthritis     knee    Asthma     childhood     Depression     Digestive disorder     Encounter for blood transfusion     General anesthetics causing adverse effect in therapeutic use     severe headache after crainiotomy    GI problem     IBS    Gout     Headache     Hyperlipidemia     Meniere disease     MVP (mitral valve prolapse)     minor    Renal disorder     Vertigo      Past Surgical History:   Procedure Laterality Date    BRAIN SURGERY      vestibular neurectomy    BREAST BIOPSY      15 years ago?  no visible scar    BREAST SURGERY      benign     SECTION      x 1    CHOLECYSTECTOMY      COLONOSCOPY N/A 3/15/2021    Procedure: COLONOSCOPY;  Surgeon: Mita Stoddard MD;  Location: [facility];  Service: Endoscopy;  Laterality: N/A;    EXAMINATION UNDER ANESTHESIA N/A 2020    Procedure: EXAM UNDER ANESTHESIA;  Surgeon: Lucy Crane MD;  Location: [facility] OR;  Service: OB/GYN;  Laterality: N/A;    LASER ABLATION N/A 2020    Procedure: ABLATION, USING LASER;  Surgeon: Lucy Crane MD;  Location: [facility] OR;  Service: OB/GYN;  Laterality: N/A;    TONSILLECTOMY       Family History   Problem Relation Age of Onset    Colon cancer Father     Diabetes Father     Cancer Sister     Diabetes Sister      Social History     Socioeconomic History    Marital status:    Tobacco Use    Smoking status: Former     Packs/day: 0.50     Years: 43.00     Pack years: 21.50     Types: Cigarettes     Start date: 1976     Quit date: 2020     Years since quittin.6    Smokeless tobacco: Never    Tobacco comments:     Quit 20; quit again mid 2021   Substance and Sexual Activity    Alcohol use: Not Currently     Alcohol/week: 1.0 - 3.0 standard drink      Types: 1 - 3 Glasses of wine per week     Comment: social few times monthly.   No alcohol 72h prior to sx    Drug use: No    Sexual activity: Never     Birth control/protection: None, Post-menopausal     Social Determinants of Health     Financial Resource Strain: Medium Risk    Difficulty of Paying Living Expenses: Somewhat hard   Food Insecurity: No Food Insecurity    Worried About Running Out of Food in the Last Year: Never true    Ran Out of Food in the Last Year: Never true   Transportation Needs: No Transportation Needs    Lack of Transportation (Medical): No    Lack of Transportation (Non-Medical): No   Physical Activity: Insufficiently Active    Days of Exercise per Week: 3 days    Minutes of Exercise per Session: 20 min   Stress: No Stress Concern Present    Feeling of Stress : Only a little   Social Connections: Unknown    Frequency of Communication with Friends and Family: More than three times a week    Frequency of Social Gatherings with Friends and Family: More than three times a week    Active Member of Clubs or Organizations: No    Attends Club or Organization Meetings: Patient refused    Marital Status:    Housing Stability: Low Risk     Unable to Pay for Housing in the Last Year: No    Number of Places Lived in the Last Year: 2    Unstable Housing in the Last Year: No     Medication List with Changes/Refills   Current Medications    ACYCLOVIR 5% (ZOVIRAX) 5 % OINTMENT    Apply topically 5 (five) times daily.    ALBUTEROL (VENTOLIN HFA) 90 MCG/ACTUATION INHALER    Inhale 2 puffs into the lungs every 6 (six) hours as needed for Wheezing. Rescue    AZELASTINE (ASTELIN) 137 MCG (0.1 %) NASAL SPRAY    1 spray (137 mcg total) by Nasal route 2 (two) times daily.    BENZONATATE (TESSALON) 100 MG CAPSULE    benzonatate Take 1-2 Capsule (oral) 3 times per day PRN - Cough for 5 days [id number] capsule 3 times per day oral 5 days active 100 mg    BUMETANIDE (BUMEX) 2 MG TABLET    TAKE ONE TABLET BY MOUTH  ONCE DAILY    BUPROPION (WELLBUTRIN XL) 150 MG TB24 TABLET    Take 3 tablets (450 mg total) by mouth once daily.    BUSPIRONE (BUSPAR) 30 MG TAB    Take 1 tablet (30 mg total) by mouth 2 (two) times daily.    C/SOURCHERRY/CELERY/GRAPE SEED (TART CHERRY ORAL)    Take 1 tablet by mouth daily as needed.     CLOTRIMAZOLE-BETAMETHASONE 1-0.05% (LOTRISONE) CREAM    Apply to affected area 2 times daily    CO-ENZYME Q-10 30 MG CAPSULE    Take 30 mg by mouth 3 (three) times daily.    DIAZEPAM (VALIUM) 2 MG TABLET    Take 1 tablet (2 mg total) by mouth every 6 (six) hours as needed (dizziness/Meniere's attack).    DICYCLOMINE (BENTYL) 10 MG CAPSULE    Take 1 capsule (10 mg total) by mouth 3 (three) times daily.    ERENUMAB-AOOE (AIMOVIG AUTOINJECTOR) 140 MG/ML AUTOINJECTOR    Inject 1 pen (140 mg total) into the skin every 28 days.    ESTRADIOL-NORETHINDRONE (COMBIPATCH) 0.05-0.14 MG/24 HR    Place 1 patch onto the skin twice a week.    FLUTICASONE PROPIONATE (FLONASE) 50 MCG/ACTUATION NASAL SPRAY    2 sprays (100 mcg total) by Each Nostril route 2 (two) times daily.    KETOROLAC (TORADOL) 10 MG TABLET    TAKE ONE TABLET BY MOUTH ONCE DAILY AS NEEDED FOR PAIN (MAX TWO TIMES A WEEK)    LEVOCETIRIZINE (XYZAL) 5 MG TABLET    TAKE ONE TABLET BY MOUTH EVERY MORNING    LIDOCAINE-PRILOCAINE (EMLA) CREAM        MAGNESIUM 30 MG TAB    Take by mouth once.    MELATONIN ORAL    Take by mouth nightly as needed.     MONTELUKAST (SINGULAIR) 10 MG TABLET    TAKE ONE TABLET BY MOUTH EVERY EVENING    MUPIROCIN (BACTROBAN) 2 % OINTMENT    Apply topically 3 (three) times daily.    OMEGA-3 FATTY ACIDS/FISH OIL (FISH OIL-OMEGA-3 FATTY ACIDS) 300-1,000 MG CAPSULE    Take 1 capsule by mouth once daily.    ONDANSETRON (ZOFRAN-ODT) 4 MG TBDL    DISSOLVE 1 TABLET IN MOUTH EVERY 8 HOURS AS NEEDED    POLYMYXIN B SULF-TRIMETHOPRIM (POLYTRIM) 10,000 UNIT- 1 MG/ML DROP    Place 1 drop into both eyes 4 (four) times daily. for 7 days    POTASSIUM CHLORIDE SA  (K-DUR,KLOR-CON M) 10 MEQ TABLET    Take 1 tablet (10 mEq total) by mouth once daily.    PREMARIN VAGINAL CREAM    PLACE 1 GRAM VAGINALLY TWICE A WEEK    SIMVASTATIN (ZOCOR) 5 MG TABLET    TAKE ONE TABLET BY MOUTH NIGHTLY    TROKENDI  MG CP24    Take 1 capsule (100 mg total) by mouth once daily.    UBRELVY 100 MG TABLET    Take 1 tablet (100 mg total) by mouth every 72 hours as needed for Migraine (2 to 3 times max per week as needed).    VITAMIN B COMPLEX/FOLIC ACID (B COMPLEX 100 ORAL)    Take by mouth nightly.    ZINC GLUCONATE 50 MG TABLET    Take 50 mg by mouth once daily.    ZOLPIDEM (AMBIEN) 5 MG TAB    Take 1/2 to 1 tablet at bedtime as needed for sleep     Review of patient's allergies indicates:   Allergen Reactions    Demerol [meperidine] Nausea And Vomiting     Pt refuses Demerol     Codeine Itching     Review of Systems   Constitutional: Negative for decreased appetite.   HENT:  Negative for tinnitus.    Eyes:  Negative for double vision.   Cardiovascular:  Negative for chest pain.   Respiratory:  Negative for wheezing.    Hematologic/Lymphatic: Negative for bleeding problem.   Skin:  Negative for dry skin.   Musculoskeletal:  Positive for joint pain, joint swelling and stiffness. Negative for arthritis, back pain, gout and neck pain.   Gastrointestinal:  Negative for abdominal pain.   Genitourinary:  Negative for bladder incontinence.   Neurological:  Negative for numbness, paresthesias and sensory change.   Psychiatric/Behavioral:  Negative for altered mental status.      Objective:   Body mass index is 23.05 kg/m².  Vitals:    09/12/22 1026   BP: 110/62   Pulse: 89          General    Constitutional: She is oriented to person, place, and time. She appears well-developed.   HENT:   Head: Atraumatic.   Eyes: EOM are normal.   Cardiovascular:  Normal rate.            Pulmonary/Chest: Effort normal.   Neurological: She is alert and oriented to person, place, and time.   Psychiatric: Judgment  normal.           Pelvis is level  Bilateral knee with varus deformity  Right knee with very mild medial joint tenderness.  Mild crepitus to compression and mild swelling.  Range of motion 0-130 degrees.  Collaterals and cruciate stable.    Left knee with moderate medial joint pain.  Crepitus is mild to compression on the patella.  Mild to moderate swelling.  Range of motion 0-130 degrees.  Collaterals and cruciates stable.  Negative Corazon's.    No defect in the patella tendon or quadriceps tendon in both knees  Calves are soft nontender   Ankle motion intact  Skin is warm to touch no obvious lesions    Relevant imaging results reviewed and interpreted by me, discussed with the patient and / or family today   X-ray 06/01/2020 with complete loss of joint space on the left knee medially with bone spurs and sclerosis consistent with end-stage arthritis and varus deformity, right knee with moderate joint space narrowing medially with small osteophytic changes consistent with right knee arthritis with moderate varus deformed  No acute fractures     Assessment:     Encounter Diagnosis   Name Primary?    Arthritis of knee Yes        Plan:   Arthritis of knee  -     Large Joint Aspiration/Injection: R knee  -     Large Joint Aspiration/Injection: L knee       There are no Patient Instructions on file for this visit.  Patient presents as a new patient to me today with chief complaint of bilateral knee pain.  We had a long discussion today regarding degenerative arthritis in the knees. The patient understands that arthritis is chronic and will worsen over time.  The patient also understands that arthritis may cause episodic flare-ups in pain. Management or if arthritis is achieved through a multi-modal approach including weight loss in obese individuals, activity modification, NSAIDs (topical vs oral) where appropriate, periodic intra-articular steroid injections, viscosupplementation, physical therapy, knee bracing,  ambulatory aids, as well as geniculate nerve blocks.  She elected to proceed with a left Monovisc injection.  Additionally she elected to proceed with right Depo-Medrol injection today.  She was instructed to elevate and ice the extremities with light activity.  She will follow up in roughly 3 months for further evaluation.  She may call with any questions or concerns in the interim.      Disclaimer: This note was prepared using a voice recognition system and is likely to have sound alike errors within the text.

## 2022-09-12 NOTE — PROCEDURES
Large Joint Aspiration/Injection: R knee    Date/Time: 9/12/2022 10:00 AM  Performed by: STEPHEN Aldana  Authorized by: STEPHEN Aldana     Consent Done?:  Yes (Verbal)  Indications:  Arthritis and pain  Site marked: the procedure site was marked      Local anesthesia used?: Yes    Local anesthetic:  Topical anesthetic    Details:  Needle Size:  22 G  Approach:  Anterolateral  Location:  Knee  Site:  R knee  Medications:  5 mL LIDOcaine HCL 10 mg/ml (1%) 10 mg/mL (1 %); 80 mg methylPREDNISolone acetate 80 mg/mL  Patient tolerance:  Patient tolerated the procedure well with no immediate complications     Verbal consent was obtained  The patient's ID, site, side was verified  The site was sterile prepped in standard fashion  The injection was performed in the violetta-lateral side without complication  A sterile Band-Aid was applied    Patient was directed to apply ice today at roughly 15 minutes at a time as needed.  It was discussed that they may be sore for the next few days or so.  Please avoid strenuous activity over the next 24 hours.  It was also discussed that the patient may have a increase in glucose if diabetic and should monitor levels.  Patient was instructed to call as needed.

## 2022-09-13 ENCOUNTER — TELEPHONE (OUTPATIENT)
Dept: PHARMACY | Facility: CLINIC | Age: 67
End: 2022-09-13
Payer: MEDICARE

## 2022-09-14 ENCOUNTER — HOSPITAL ENCOUNTER (OUTPATIENT)
Dept: RADIOLOGY | Facility: HOSPITAL | Age: 67
Discharge: HOME | End: 2022-09-14
Attending: PHYSICIAN ASSISTANT
Payer: MEDICARE

## 2022-09-14 DIAGNOSIS — J32.0 CHRONIC MAXILLARY SINUSITIS: ICD-10-CM

## 2022-09-14 PROCEDURE — 70486 CT SINUSES WITHOUT CONTRAST: ICD-10-PCS | Mod: 26,,, | Performed by: RADIOLOGY

## 2022-09-14 PROCEDURE — 70486 CT MAXILLOFACIAL W/O DYE: CPT | Mod: 26,,, | Performed by: RADIOLOGY

## 2022-09-14 PROCEDURE — 70486 CT MAXILLOFACIAL W/O DYE: CPT | Mod: TC,PO

## 2022-09-15 ENCOUNTER — PATIENT MESSAGE (OUTPATIENT)
Dept: OTOLARYNGOLOGY | Facility: CLINIC | Age: 67
End: 2022-09-15
Payer: MEDICARE

## 2022-09-16 DIAGNOSIS — K58.9 IRRITABLE BOWEL SYNDROME WITHOUT DIARRHEA: ICD-10-CM

## 2022-09-16 RX ORDER — SIMVASTATIN 5 MG/1
5 TABLET, FILM COATED ORAL NIGHTLY
Qty: 90 TABLET | Refills: 3 | Status: SHIPPED | OUTPATIENT
Start: 2022-09-16 | End: 2022-09-23 | Stop reason: SDUPTHER

## 2022-09-16 RX ORDER — DICYCLOMINE HYDROCHLORIDE 10 MG/1
10 CAPSULE ORAL 3 TIMES DAILY
Qty: 90 CAPSULE | Refills: 1 | Status: SHIPPED | OUTPATIENT
Start: 2022-09-16 | End: 2022-09-23 | Stop reason: SDUPTHER

## 2022-09-16 RX ORDER — POTASSIUM CHLORIDE 750 MG/1
10 TABLET, EXTENDED RELEASE ORAL DAILY
Qty: 90 TABLET | Refills: 0 | Status: SHIPPED | OUTPATIENT
Start: 2022-09-16 | End: 2022-09-23 | Stop reason: SDUPTHER

## 2022-09-16 RX ORDER — BUMETANIDE 2 MG/1
2 TABLET ORAL DAILY
Qty: 90 TABLET | Refills: 1 | Status: SHIPPED | OUTPATIENT
Start: 2022-09-16 | End: 2022-09-23 | Stop reason: SDUPTHER

## 2022-09-20 ENCOUNTER — TELEPHONE (OUTPATIENT)
Dept: NEUROLOGY | Facility: CLINIC | Age: 67
End: 2022-09-20
Payer: MEDICARE

## 2022-09-20 DIAGNOSIS — K58.9 IRRITABLE BOWEL SYNDROME WITHOUT DIARRHEA: ICD-10-CM

## 2022-09-20 RX ORDER — MONTELUKAST SODIUM 10 MG/1
10 TABLET ORAL NIGHTLY
Qty: 90 TABLET | Refills: 3 | Status: SHIPPED | OUTPATIENT
Start: 2022-09-20 | End: 2023-07-06 | Stop reason: SDUPTHER

## 2022-09-20 RX ORDER — BUMETANIDE 2 MG/1
2 TABLET ORAL DAILY
Qty: 90 TABLET | Refills: 1 | OUTPATIENT
Start: 2022-09-20

## 2022-09-20 RX ORDER — SIMVASTATIN 5 MG/1
5 TABLET, FILM COATED ORAL NIGHTLY
Qty: 90 TABLET | Refills: 3 | OUTPATIENT
Start: 2022-09-20

## 2022-09-20 RX ORDER — DICYCLOMINE HYDROCHLORIDE 10 MG/1
10 CAPSULE ORAL 3 TIMES DAILY
Qty: 90 CAPSULE | Refills: 1 | OUTPATIENT
Start: 2022-09-20

## 2022-09-20 RX ORDER — POTASSIUM CHLORIDE 750 MG/1
10 TABLET, EXTENDED RELEASE ORAL DAILY
Qty: 90 TABLET | Refills: 0 | OUTPATIENT
Start: 2022-09-20

## 2022-09-20 NOTE — TELEPHONE ENCOUNTER
----- Message from Rich Cleaning MA sent at 9/16/2022 10:46 AM CDT -----  Hey can you assist with this matter, please.   ----- Message -----  From: Leah Hyman  Sent: 9/16/2022  10:42 AM CDT  To: Charan Clay Staff    [facility] is needing a call regarding a PA needed for the pt. Please call [phone]

## 2022-09-23 ENCOUNTER — PROCEDURE VISIT (OUTPATIENT)
Dept: NEUROLOGY | Facility: CLINIC | Age: 67
End: 2022-09-23
Payer: MEDICARE

## 2022-09-23 VITALS
OXYGEN SATURATION: 98 % | BODY MASS INDEX: 23.85 KG/M2 | WEIGHT: 129.63 LBS | RESPIRATION RATE: 16 BRPM | HEART RATE: 92 BPM | HEIGHT: 62 IN | SYSTOLIC BLOOD PRESSURE: 115 MMHG | DIASTOLIC BLOOD PRESSURE: 77 MMHG

## 2022-09-23 DIAGNOSIS — G43.711 CHRONIC MIGRAINE WITHOUT AURA, INTRACTABLE, WITH STATUS MIGRAINOSUS: ICD-10-CM

## 2022-09-23 DIAGNOSIS — G44.89 CHRONIC MIXED HEADACHE SYNDROME: ICD-10-CM

## 2022-09-23 DIAGNOSIS — G43.809 VESTIBULAR MIGRAINE: ICD-10-CM

## 2022-09-23 DIAGNOSIS — F43.21 COMPLICATED GRIEF: ICD-10-CM

## 2022-09-23 DIAGNOSIS — G44.89 CHRONIC MIXED HEADACHE SYNDROME: Primary | ICD-10-CM

## 2022-09-23 DIAGNOSIS — K58.9 IRRITABLE BOWEL SYNDROME WITHOUT DIARRHEA: ICD-10-CM

## 2022-09-23 DIAGNOSIS — G43.E09 CHRONIC MIGRAINE WITH AURA: ICD-10-CM

## 2022-09-23 PROCEDURE — 64615 CHEMODENERV MUSC MIGRAINE: CPT | Mod: S$GLB,,, | Performed by: NURSE PRACTITIONER

## 2022-09-23 PROCEDURE — 64615 PR CHEMODENERVATION OF MUSCLE FOR CHRONIC MIGRAINE: ICD-10-PCS | Mod: S$GLB,,, | Performed by: NURSE PRACTITIONER

## 2022-09-23 RX ORDER — DIAZEPAM 2 MG/1
2 TABLET ORAL EVERY 6 HOURS PRN
Qty: 30 TABLET | Refills: 0 | Status: SHIPPED | OUTPATIENT
Start: 2022-09-23 | End: 2022-09-27 | Stop reason: SDUPTHER

## 2022-09-23 RX ORDER — KETOROLAC TROMETHAMINE 10 MG/1
10 TABLET, FILM COATED ORAL ONCE
Qty: 1 TABLET | Refills: 0 | Status: SHIPPED | OUTPATIENT
Start: 2022-09-23 | End: 2022-09-24

## 2022-09-23 RX ORDER — BUPROPION HYDROCHLORIDE 150 MG/1
450 TABLET ORAL DAILY
Qty: 90 TABLET | Refills: 0 | Status: SHIPPED | OUTPATIENT
Start: 2022-09-23 | End: 2022-09-29 | Stop reason: SDUPTHER

## 2022-09-23 RX ORDER — ERENUMAB-AOOE 140 MG/ML
140 INJECTION, SOLUTION SUBCUTANEOUS
Qty: 1 ML | Refills: 11 | Status: SHIPPED | OUTPATIENT
Start: 2022-09-23 | End: 2023-03-28 | Stop reason: SDUPTHER

## 2022-09-23 RX ORDER — METOCLOPRAMIDE 10 MG/1
10 TABLET ORAL ONCE
Qty: 1 TABLET | Refills: 0 | Status: SHIPPED | OUTPATIENT
Start: 2022-09-23 | End: 2022-09-24

## 2022-09-23 RX ORDER — BUSPIRONE HYDROCHLORIDE 30 MG/1
30 TABLET ORAL 2 TIMES DAILY
Qty: 180 TABLET | Refills: 0 | Status: SHIPPED | OUTPATIENT
Start: 2022-09-23 | End: 2022-09-29 | Stop reason: SDUPTHER

## 2022-09-23 NOTE — PROCEDURES
Procedures      1. PROCEDURE: Botox injections     2. INDICATION: Intractable Migraine     3. TIME OUT: The procedure was discussed in details with the patient and the consent form was signed. The patient verbalized understanding of the procedure . I discussed the risks that may include serious immune reaction and distant spread that could results in loss of breathing. Other side effects may include droopy eyelids, trouble swallowing and cardiac arrhythmias. It was also stressed that it is contraindicated in pregnancy.     3. COURSE:   The standard protocol was followed. the procedure was very smooth.     4. COMPLICATIONS: None. The patient tolerated the procedure very well.     5. AFTERCARE: I encouraged the patient to use ice if the sites of injection get tender and call me with any problems or complications.           As per the standard protocol, a total 155 units were injected in 31 sites.         RT  5 units in 1 site    LT  5 units in 1 site     Procerus 5 units in 1 site     RT Frontalis 10 units in 2 sites     LT Frontalis 10 units in 2 site     RT Temporalis 20 units in 4 sites    LT Temporalis 20 units in 4 sites    RT Occipitalis 15 units in 3 sites    LT Occipitalis 15  units in 3 sites     RT Cervical Paraspinals 10 units in 2 sites    LT Cervical Paraspinals 10 units in 2 sites    RT Trapezius 15 units in 3 sites    LT Trapezius 15 units in 3 sites       Per the standard of care protocol we use 155 units and waste 45 units. I gave the patient the option of following the standard protocol vs.using the extra 45 units to target areas where the pain is maximum which could potentially provide extra help with headache control. I explained to the patient that this will be an off-label use, not the standard protocol, not evidence-based and could result in more pronounced side effects. The patient verbalized full understanding of all the facts and the risks and benefits and elected to use  the extra 45 units for the following sites:            Procerus 5 units in 1 site      RT Temporalis 20 units in 4 sites     LT Temporalis 20 units in 4 sites

## 2022-09-23 NOTE — TELEPHONE ENCOUNTER
----- Message from Felicia Richardson sent at 9/23/2022  3:48 PM CDT -----  Oly with [facility] stated the pt is requesting a refill of Bumetinide 2mg, Dicyclomine 10mg, Simvastatin 5mg and Potassium. Call back number is [phone]. Thx. EL

## 2022-09-23 NOTE — TELEPHONE ENCOUNTER
----- Message from Felicia Richardson sent at 9/23/2022  3:45 PM CDT -----  Oly with [facility] called regarding a refill request for Aimovig. Call back number is [phone]. Thx .EL

## 2022-09-26 ENCOUNTER — TELEPHONE (OUTPATIENT)
Dept: ORTHOPEDICS | Facility: CLINIC | Age: 67
End: 2022-09-26
Payer: MEDICARE

## 2022-09-26 RX ORDER — DICYCLOMINE HYDROCHLORIDE 10 MG/1
10 CAPSULE ORAL 3 TIMES DAILY
Qty: 90 CAPSULE | Refills: 1 | Status: SHIPPED | OUTPATIENT
Start: 2022-09-26 | End: 2023-02-14 | Stop reason: SDUPTHER

## 2022-09-26 RX ORDER — POTASSIUM CHLORIDE 750 MG/1
10 TABLET, EXTENDED RELEASE ORAL DAILY
Qty: 90 TABLET | Refills: 0 | Status: SHIPPED | OUTPATIENT
Start: 2022-09-26 | End: 2023-01-03 | Stop reason: SDUPTHER

## 2022-09-26 RX ORDER — BUMETANIDE 2 MG/1
2 TABLET ORAL DAILY
Qty: 90 TABLET | Refills: 1 | Status: SHIPPED | OUTPATIENT
Start: 2022-09-26 | End: 2023-02-28

## 2022-09-26 RX ORDER — SIMVASTATIN 5 MG/1
5 TABLET, FILM COATED ORAL NIGHTLY
Qty: 90 TABLET | Refills: 3 | Status: SHIPPED | OUTPATIENT
Start: 2022-09-26 | End: 2023-07-16 | Stop reason: SDUPTHER

## 2022-09-26 NOTE — TELEPHONE ENCOUNTER
Called patient to let her know that we have scheduled right shoulder x-rays on 9/28 at 2:15pm prior to her appointment with Dr. Matute at 2:45pm to discuss surgery. Patient verbalized understanding and was grateful for the call.

## 2022-09-27 ENCOUNTER — PATIENT MESSAGE (OUTPATIENT)
Dept: PSYCHIATRY | Facility: CLINIC | Age: 67
End: 2022-09-27
Payer: MEDICARE

## 2022-09-27 ENCOUNTER — OFFICE VISIT (OUTPATIENT)
Dept: PSYCHIATRY | Facility: CLINIC | Age: 67
End: 2022-09-27
Payer: MEDICARE

## 2022-09-27 ENCOUNTER — PATIENT MESSAGE (OUTPATIENT)
Dept: NEUROLOGY | Facility: CLINIC | Age: 67
End: 2022-09-27
Payer: MEDICARE

## 2022-09-27 DIAGNOSIS — F43.21 COMPLICATED GRIEF: ICD-10-CM

## 2022-09-27 DIAGNOSIS — F41.1 GAD (GENERALIZED ANXIETY DISORDER): Primary | ICD-10-CM

## 2022-09-27 DIAGNOSIS — F33.0 MILD EPISODE OF RECURRENT MAJOR DEPRESSIVE DISORDER: ICD-10-CM

## 2022-09-27 PROCEDURE — 99499 RISK ADDL DX/OHS AUDIT: ICD-10-PCS | Mod: HCNC,95,, | Performed by: SOCIAL WORKER

## 2022-09-27 PROCEDURE — 90834 PR PSYCHOTHERAPY W/PATIENT, 45 MIN: ICD-10-PCS | Mod: 95,,, | Performed by: SOCIAL WORKER

## 2022-09-27 PROCEDURE — 90834 PSYTX W PT 45 MINUTES: CPT | Mod: 95,,, | Performed by: SOCIAL WORKER

## 2022-09-27 PROCEDURE — 99499 UNLISTED E&M SERVICE: CPT | Mod: HCNC,95,, | Performed by: SOCIAL WORKER

## 2022-09-27 RX ORDER — DIAZEPAM 2 MG/1
2 TABLET ORAL EVERY 6 HOURS PRN
Qty: 30 TABLET | Refills: 0 | Status: SHIPPED | OUTPATIENT
Start: 2022-09-27 | End: 2022-09-29 | Stop reason: SDUPTHER
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
## 2022-09-28 ENCOUNTER — HOSPITAL ENCOUNTER (OUTPATIENT)
Dept: RADIOLOGY | Facility: HOSPITAL | Age: 67
Discharge: HOME | End: 2022-09-28
Attending: STUDENT IN AN ORGANIZED HEALTH CARE EDUCATION/TRAINING PROGRAM
Payer: MEDICARE

## 2022-09-28 ENCOUNTER — OFFICE VISIT (OUTPATIENT)
Dept: ORTHOPEDICS | Facility: CLINIC | Age: 67
End: 2022-09-28
Payer: MEDICARE

## 2022-09-28 VITALS — HEIGHT: 62 IN | BODY MASS INDEX: 23.74 KG/M2 | WEIGHT: 129 LBS

## 2022-09-28 DIAGNOSIS — G44.89 CHRONIC MIXED HEADACHE SYNDROME: ICD-10-CM

## 2022-09-28 DIAGNOSIS — Z96.619 STATUS POST REVERSE TOTAL SHOULDER REPLACEMENT, UNSPECIFIED LATERALITY: ICD-10-CM

## 2022-09-28 DIAGNOSIS — G43.809 VESTIBULAR MIGRAINE: ICD-10-CM

## 2022-09-28 DIAGNOSIS — K58.9 IRRITABLE BOWEL SYNDROME WITHOUT DIARRHEA: ICD-10-CM

## 2022-09-28 DIAGNOSIS — M12.811 ROTATOR CUFF TEAR ARTHROPATHY OF RIGHT SHOULDER: Primary | ICD-10-CM

## 2022-09-28 DIAGNOSIS — M75.101 ROTATOR CUFF TEAR ARTHROPATHY OF RIGHT SHOULDER: Primary | ICD-10-CM

## 2022-09-28 PROCEDURE — 3008F PR BODY MASS INDEX (BMI) DOCUMENTED: ICD-10-PCS | Mod: CPTII,S$GLB,, | Performed by: STUDENT IN AN ORGANIZED HEALTH CARE EDUCATION/TRAINING PROGRAM

## 2022-09-28 PROCEDURE — 99213 PR OFFICE/OUTPT VISIT, EST, LEVL III, 20-29 MIN: ICD-10-PCS | Mod: S$GLB,,, | Performed by: STUDENT IN AN ORGANIZED HEALTH CARE EDUCATION/TRAINING PROGRAM

## 2022-09-28 PROCEDURE — 1159F PR MEDICATION LIST DOCUMENTED IN MEDICAL RECORD: ICD-10-PCS | Mod: CPTII,S$GLB,, | Performed by: STUDENT IN AN ORGANIZED HEALTH CARE EDUCATION/TRAINING PROGRAM

## 2022-09-28 PROCEDURE — 1159F MED LIST DOCD IN RCRD: CPT | Mod: CPTII,S$GLB,, | Performed by: STUDENT IN AN ORGANIZED HEALTH CARE EDUCATION/TRAINING PROGRAM

## 2022-09-28 PROCEDURE — 99213 OFFICE O/P EST LOW 20 MIN: CPT | Mod: S$GLB,,, | Performed by: STUDENT IN AN ORGANIZED HEALTH CARE EDUCATION/TRAINING PROGRAM

## 2022-09-28 PROCEDURE — 3008F BODY MASS INDEX DOCD: CPT | Mod: CPTII,S$GLB,, | Performed by: STUDENT IN AN ORGANIZED HEALTH CARE EDUCATION/TRAINING PROGRAM

## 2022-09-28 PROCEDURE — 99999 PR PBB SHADOW E&M-EST. PATIENT-LVL IV: ICD-10-PCS | Mod: PBBFAC,,, | Performed by: STUDENT IN AN ORGANIZED HEALTH CARE EDUCATION/TRAINING PROGRAM

## 2022-09-28 PROCEDURE — 1101F PR PT FALLS ASSESS DOC 0-1 FALLS W/OUT INJ PAST YR: ICD-10-PCS | Mod: CPTII,S$GLB,, | Performed by: STUDENT IN AN ORGANIZED HEALTH CARE EDUCATION/TRAINING PROGRAM

## 2022-09-28 PROCEDURE — 1101F PT FALLS ASSESS-DOCD LE1/YR: CPT | Mod: CPTII,S$GLB,, | Performed by: STUDENT IN AN ORGANIZED HEALTH CARE EDUCATION/TRAINING PROGRAM

## 2022-09-28 PROCEDURE — 1160F PR REVIEW ALL MEDS BY PRESCRIBER/CLIN PHARMACIST DOCUMENTED: ICD-10-PCS | Mod: CPTII,S$GLB,, | Performed by: STUDENT IN AN ORGANIZED HEALTH CARE EDUCATION/TRAINING PROGRAM

## 2022-09-28 PROCEDURE — 3288F PR FALLS RISK ASSESSMENT DOCUMENTED: ICD-10-PCS | Mod: CPTII,S$GLB,, | Performed by: STUDENT IN AN ORGANIZED HEALTH CARE EDUCATION/TRAINING PROGRAM

## 2022-09-28 PROCEDURE — 3288F FALL RISK ASSESSMENT DOCD: CPT | Mod: CPTII,S$GLB,, | Performed by: STUDENT IN AN ORGANIZED HEALTH CARE EDUCATION/TRAINING PROGRAM

## 2022-09-28 PROCEDURE — 73030 X-RAY EXAM OF SHOULDER: CPT | Mod: 26,RT,, | Performed by: RADIOLOGY

## 2022-09-28 PROCEDURE — 99999 PR PBB SHADOW E&M-EST. PATIENT-LVL IV: CPT | Mod: PBBFAC,,, | Performed by: STUDENT IN AN ORGANIZED HEALTH CARE EDUCATION/TRAINING PROGRAM

## 2022-09-28 PROCEDURE — 73030 X-RAY EXAM OF SHOULDER: CPT | Mod: TC,RT

## 2022-09-28 PROCEDURE — 73030 XR SHOULDER COMPLETE 2 OR MORE VIEWS RIGHT: ICD-10-PCS | Mod: 26,RT,, | Performed by: RADIOLOGY

## 2022-09-28 PROCEDURE — 1160F RVW MEDS BY RX/DR IN RCRD: CPT | Mod: CPTII,S$GLB,, | Performed by: STUDENT IN AN ORGANIZED HEALTH CARE EDUCATION/TRAINING PROGRAM

## 2022-09-28 NOTE — TELEPHONE ENCOUNTER
She said that's fine since you already ordered it. Says she called them and said she wants everything done by mail except her migrane med. She wants to keep migrane med local.

## 2022-09-28 NOTE — PROGRESS NOTES
Orthopaedic Follow-Up Visit    Last Appointment: 2022  Diagnosis: Rotator cuff tear arthropathy of right shoulder  Prior Procedure: injections (2021)    Kenia Alonzo is a 67 y.o. female who is here for f/u evaluation of right shoulder. The patient was last seen here by me on 2022 at which point we decided to recommend her for right reverse TSA. The patient returns today reporting that the pain has resolved but she continues to have limited range of motion in the shoulder.  She would like to delay shoulder replacement and proceed with knee replacement.     To review her history, she has exhausted all nonoperative treatment options and continues to have symptoms on a daily basis that limit her activities and diminish her quality of life.    Patient's medications, allergies, past medical, surgical, social and family histories were reviewed and updated as appropriate.    Review of Systems   All systems reviewed were negative.  Specifically, the patient denies fever, chills, weight loss, chest pain, shortness of breath, or dyspnea on exertion.      Past Medical History:   Diagnosis Date    Arthritis     knee    Asthma     childhood     Depression     Digestive disorder     Encounter for blood transfusion     General anesthetics causing adverse effect in therapeutic use     severe headache after crainiotomy    GI problem     IBS    Gout     Headache     Hyperlipidemia     Meniere disease     MVP (mitral valve prolapse)     minor    Renal disorder     Vertigo        Past Surgical History:   Procedure Laterality Date    BRAIN SURGERY      vestibular neurectomy    BREAST BIOPSY      15 years ago?  no visible scar    BREAST SURGERY      benign     SECTION      x 1    CHOLECYSTECTOMY      COLONOSCOPY N/A 3/15/2021    Procedure: COLONOSCOPY;  Surgeon: Mita Stoddard MD;  Location: [facility];  Service: Endoscopy;  Laterality: N/A;    EXAMINATION UNDER ANESTHESIA N/A 2020    Procedure:  EXAM UNDER ANESTHESIA;  Surgeon: Lucy Crane MD;  Location: [facility] OR;  Service: OB/GYN;  Laterality: N/A;    LASER ABLATION N/A 8/12/2020    Procedure: ABLATION, USING LASER;  Surgeon: Lucy Crane MD;  Location: [facility] OR;  Service: OB/GYN;  Laterality: N/A;    TONSILLECTOMY         Patient's Medications   New Prescriptions    No medications on file   Previous Medications    ACYCLOVIR 5% (ZOVIRAX) 5 % OINTMENT    Apply topically 5 (five) times daily.    ALBUTEROL (VENTOLIN HFA) 90 MCG/ACTUATION INHALER    Inhale 2 puffs into the lungs every 6 (six) hours as needed for Wheezing. Rescue    AZELASTINE (ASTELIN) 137 MCG (0.1 %) NASAL SPRAY    1 spray (137 mcg total) by Nasal route 2 (two) times daily.    BENZONATATE (TESSALON) 100 MG CAPSULE    benzonatate Take 1-2 Capsule (oral) 3 times per day PRN - Cough for 5 days [id number] capsule 3 times per day oral 5 days active 100 mg    BUMETANIDE (BUMEX) 2 MG TABLET    Take 1 tablet (2 mg total) by mouth once daily.    BUPROPION (WELLBUTRIN XL) 150 MG TB24 TABLET    Take 3 tablets (450 mg total) by mouth once daily.    BUSPIRONE (BUSPAR) 30 MG TAB    Take 1 tablet (30 mg total) by mouth 2 (two) times daily.    C/SOURCHERRY/CELERY/GRAPE SEED (TART CHERRY ORAL)    Take 1 tablet by mouth daily as needed.     CLOTRIMAZOLE-BETAMETHASONE 1-0.05% (LOTRISONE) CREAM    Apply to affected area 2 times daily    CO-ENZYME Q-10 30 MG CAPSULE    Take 30 mg by mouth 3 (three) times daily.    CONJUGATED ESTROGENS (PREMARIN) VAGINAL CREAM    Place 1 g vaginally twice a week.    DIAZEPAM (VALIUM) 2 MG TABLET    Take 1 tablet (2 mg total) by mouth every 6 (six) hours as needed (dizziness/Meniere's attack).    DICYCLOMINE (BENTYL) 10 MG CAPSULE    Take 1 capsule (10 mg total) by mouth 3 (three) times daily.    ERENUMAB-AOOE (AIMOVIG AUTOINJECTOR) 140 MG/ML AUTOINJECTOR    Inject 1 pen (140 mg total) into the skin every 28 days.    ESTRADIOL-NORETHINDRONE (COMBIPATCH) 0.05-0.14 MG/24 HR     Place 1 patch onto the skin twice a week.    FLUTICASONE PROPIONATE (FLONASE) 50 MCG/ACTUATION NASAL SPRAY    2 sprays (100 mcg total) by Each Nostril route 2 (two) times daily.    KETOROLAC (TORADOL) 10 MG TABLET    TAKE ONE TABLET BY MOUTH ONCE DAILY AS NEEDED FOR PAIN (MAX TWO TIMES A WEEK)    LEVOCETIRIZINE (XYZAL) 5 MG TABLET    TAKE ONE TABLET BY MOUTH EVERY MORNING    LIDOCAINE-PRILOCAINE (EMLA) CREAM        MAGNESIUM 30 MG TAB    Take by mouth once.    MELATONIN ORAL    Take by mouth nightly as needed.     MONTELUKAST (SINGULAIR) 10 MG TABLET    Take 1 tablet (10 mg total) by mouth every evening.    MUPIROCIN (BACTROBAN) 2 % OINTMENT    Apply topically 3 (three) times daily.    OMEGA-3 FATTY ACIDS/FISH OIL (FISH OIL-OMEGA-3 FATTY ACIDS) 300-1,000 MG CAPSULE    Take 1 capsule by mouth once daily.    ONDANSETRON (ZOFRAN-ODT) 4 MG TBDL    DISSOLVE 1 TABLET IN MOUTH EVERY 8 HOURS AS NEEDED    POTASSIUM CHLORIDE SA (K-DUR,KLOR-CON M) 10 MEQ TABLET    Take 1 tablet (10 mEq total) by mouth once daily.    SIMVASTATIN (ZOCOR) 5 MG TABLET    Take 1 tablet (5 mg total) by mouth every evening.    TOPIRAMATE (TROKENDI XR) 100 MG CP24    Take 1 capsule (100 mg total) by mouth once daily.    UBROGEPANT (UBRELVY) 100 MG TABLET    Take 1 tablet (100 mg total) by mouth every 72 hours as needed for Migraine (3 times per week prn).    VITAMIN B COMPLEX/FOLIC ACID (B COMPLEX 100 ORAL)    Take by mouth nightly.    ZINC GLUCONATE 50 MG TABLET    Take 50 mg by mouth once daily.    ZOLPIDEM (AMBIEN) 5 MG TAB    Take 1/2 to 1 tablet at bedtime as needed for sleep   Modified Medications    No medications on file   Discontinued Medications    No medications on file       Family History   Problem Relation Age of Onset    Colon cancer Father     Diabetes Father     Cancer Sister     Diabetes Sister        Review of patient's allergies indicates:   Allergen Reactions    Demerol [meperidine] Nausea And Vomiting     Pt refuses Demerol      Codeine Itching         Objective:      Physical Exam  Patient is alert and oriented, no distress. Skin is intact. Neuro is normal with no focal motor or sensory findings.    Cervical exam is unremarkable. Intact cervical ROM. Negative Spurling's test    Physical Exam:  RIGHT    LEFT    Scap Dyskinesis/Winging +    (-)    Tenderness:          Greater Tuberosity  (-)    (-)  Bicipital Groove  (-)    (-)  AC joint   (-)    (-)  Other:     ROM    Pseudo-paralytic   Forward Elevation  30    180  Abduction   30    120  ER (at side)   30    80  IR    Ilium    T8    Strength:   Supraspinatus  2/5    5/5  Infraspinatus  2/5    5/5  Subscap / IR  3/5    5/5     Special Tests:   Neer:    (-)    (-)   Foster:   +    (-)   SS Stress:   +    (-)   Bear Hug:   (-)    (-)   Tupelo's:   +    (-)   Resisted Thrower's:   +    (-)   Cross Arm Abduction:  (-)    (-)    Imaging:    X-ray Shoulder 2 or More Views Right  Narrative: EXAMINATION:  XR SHOULDER COMPLETE 2 OR MORE VIEWS RIGHT    CLINICAL HISTORY:  Presence of unspecified artificial shoulder joint    TECHNIQUE:  Two or three views of the right shoulder were performed.    COMPARISON:  12/14/2020    FINDINGS:  No acute fracture.  Glenohumeral joint space maintained.  AC joint degenerative findings present including undersurface spurring.  There is loss of the subacromial space consistent with chronic rotator cuff disease.  Lung parenchyma clear.  Impression: As above    Electronically signed by: Jordan Romero MD  Date:    09/28/2022  Time:    14:48       Physician read: I agree with the above impression.    Assessment/Plan:   Kenia Alonzo is a 67 y.o. female with right shoulder rotator cuff tear arthropathy    Plan:    Discussed diagnosis and treatment options with her today.  She has right shoulder rotator cuff tear arthropathy with significant limitations in function of the shoulder.  She has very minimal pain in the shoulder currently, but does have difficulty with  activities of daily living such as washing her hair and reaching above her head to her cabinets.  That being said, currently, her chief complain is her left knee pain.  She has end-stage osteoarthritis there and is planning on proceeding with knee replacement.  She would like to proceed with knee replacement 1st and then address the shoulder later if it becomes more painful.  I am in agreement with this plan.  Follow up with me as needed          Reza Matute MD

## 2022-09-29 ENCOUNTER — OFFICE VISIT (OUTPATIENT)
Dept: PSYCHIATRY | Facility: CLINIC | Age: 67
End: 2022-09-29
Payer: MEDICARE

## 2022-09-29 DIAGNOSIS — G47.00 INSOMNIA, UNSPECIFIED TYPE: ICD-10-CM

## 2022-09-29 DIAGNOSIS — F41.1 GAD (GENERALIZED ANXIETY DISORDER): Primary | ICD-10-CM

## 2022-09-29 DIAGNOSIS — F43.21 COMPLICATED GRIEF: ICD-10-CM

## 2022-09-29 PROCEDURE — 99214 OFFICE O/P EST MOD 30 MIN: CPT | Mod: 95,,, | Performed by: PSYCHIATRY & NEUROLOGY

## 2022-09-29 PROCEDURE — 99499 RISK ADDL DX/OHS AUDIT: ICD-10-PCS | Mod: HCNC,95,, | Performed by: PSYCHIATRY & NEUROLOGY

## 2022-09-29 PROCEDURE — 99499 UNLISTED E&M SERVICE: CPT | Mod: HCNC,95,, | Performed by: PSYCHIATRY & NEUROLOGY

## 2022-09-29 PROCEDURE — 99214 PR OFFICE/OUTPT VISIT, EST, LEVL IV, 30-39 MIN: ICD-10-PCS | Mod: 95,,, | Performed by: PSYCHIATRY & NEUROLOGY

## 2022-09-29 RX ORDER — BUPROPION HYDROCHLORIDE 150 MG/1
450 TABLET ORAL DAILY
Qty: 90 TABLET | Refills: 3 | Status: SHIPPED | OUTPATIENT
Start: 2022-09-29 | End: 2023-01-31 | Stop reason: SDUPTHER

## 2022-09-29 RX ORDER — ZOLPIDEM TARTRATE 5 MG/1
TABLET ORAL
Qty: 30 TABLET | Refills: 3 | Status: SHIPPED | OUTPATIENT
Start: 2022-09-29 | End: 2023-01-31 | Stop reason: SDUPTHER

## 2022-09-29 RX ORDER — DICYCLOMINE HYDROCHLORIDE 10 MG/1
10 CAPSULE ORAL 3 TIMES DAILY
Qty: 90 CAPSULE | Refills: 1 | OUTPATIENT
Start: 2022-09-29

## 2022-09-29 RX ORDER — BUMETANIDE 2 MG/1
2 TABLET ORAL DAILY
Qty: 90 TABLET | Refills: 1 | OUTPATIENT
Start: 2022-09-29

## 2022-09-29 RX ORDER — POTASSIUM CHLORIDE 750 MG/1
10 TABLET, EXTENDED RELEASE ORAL DAILY
Qty: 90 TABLET | Refills: 0 | OUTPATIENT
Start: 2022-09-29

## 2022-09-29 RX ORDER — UBROGEPANT 100 MG/1
100 TABLET ORAL
Qty: 21 TABLET | Refills: 2 | Status: CANCELLED | OUTPATIENT
Start: 2022-09-29 | End: 2023-03-28

## 2022-09-29 RX ORDER — UBROGEPANT 100 MG/1
100 TABLET ORAL
Qty: 8 TABLET | Refills: 2 | Status: SHIPPED | OUTPATIENT
Start: 2022-09-29 | End: 2022-11-28 | Stop reason: SDUPTHER

## 2022-09-29 RX ORDER — BUSPIRONE HYDROCHLORIDE 30 MG/1
30 TABLET ORAL 2 TIMES DAILY
Qty: 180 TABLET | Refills: 3 | Status: SHIPPED | OUTPATIENT
Start: 2022-09-29 | End: 2023-01-31 | Stop reason: SDUPTHER

## 2022-09-29 RX ORDER — UBROGEPANT 100 MG/1
100 TABLET ORAL
Qty: 21 TABLET | Refills: 2 | OUTPATIENT
Start: 2022-09-29 | End: 2023-03-28

## 2022-09-29 RX ORDER — TOPIRAMATE 100 MG/1
100 CAPSULE, EXTENDED RELEASE ORAL DAILY
Qty: 90 CAPSULE | Refills: 1 | Status: SHIPPED | OUTPATIENT
Start: 2022-09-29 | End: 2022-11-03 | Stop reason: SDUPTHER

## 2022-09-29 RX ORDER — SIMVASTATIN 5 MG/1
5 TABLET, FILM COATED ORAL NIGHTLY
Qty: 90 TABLET | Refills: 3 | OUTPATIENT
Start: 2022-09-29

## 2022-09-29 RX ORDER — DIAZEPAM 2 MG/1
2 TABLET ORAL EVERY 6 HOURS PRN
Qty: 30 TABLET | Refills: 3 | Status: SHIPPED | OUTPATIENT
Start: 2022-09-29 | End: 2023-01-31 | Stop reason: SDUPTHER

## 2022-09-29 NOTE — TELEPHONE ENCOUNTER
Can you send her rx to the [facility] so they can work on the PA . We keep getting a denial and pt has been on the RX .
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
## 2022-10-02 ENCOUNTER — PATIENT MESSAGE (OUTPATIENT)
Dept: INTERNAL MEDICINE | Facility: CLINIC | Age: 67
End: 2022-10-02
Payer: MEDICARE

## 2022-10-03 ENCOUNTER — PATIENT MESSAGE (OUTPATIENT)
Dept: PSYCHIATRY | Facility: CLINIC | Age: 67
End: 2022-10-03
Payer: MEDICARE

## 2022-10-04 ENCOUNTER — TELEPHONE (OUTPATIENT)
Dept: INTERNAL MEDICINE | Facility: CLINIC | Age: 67
End: 2022-10-04
Payer: MEDICARE

## 2022-10-05 ENCOUNTER — TELEPHONE (OUTPATIENT)
Dept: PHARMACY | Facility: CLINIC | Age: 67
End: 2022-10-05
Payer: MEDICARE

## 2022-10-14 ENCOUNTER — PATIENT MESSAGE (OUTPATIENT)
Dept: INTERNAL MEDICINE | Facility: CLINIC | Age: 67
End: 2022-10-14
Payer: MEDICARE

## 2022-10-20 ENCOUNTER — IMMUNIZATION (OUTPATIENT)
Dept: INTERNAL MEDICINE | Facility: CLINIC | Age: 67
End: 2022-10-20
Payer: MEDICARE

## 2022-10-20 PROCEDURE — 90694 VACC AIIV4 NO PRSRV 0.5ML IM: CPT | Mod: S$GLB,,, | Performed by: NURSE PRACTITIONER

## 2022-10-20 PROCEDURE — G0008 ADMIN INFLUENZA VIRUS VAC: HCPCS | Mod: S$GLB,,, | Performed by: NURSE PRACTITIONER

## 2022-10-20 PROCEDURE — G0008 FLU VACCINE - QUADRIVALENT - ADJUVANTED: ICD-10-PCS | Mod: S$GLB,,, | Performed by: NURSE PRACTITIONER

## 2022-10-20 PROCEDURE — 90694 FLU VACCINE - QUADRIVALENT - ADJUVANTED: ICD-10-PCS | Mod: S$GLB,,, | Performed by: NURSE PRACTITIONER

## 2022-10-26 ENCOUNTER — PATIENT MESSAGE (OUTPATIENT)
Dept: OBSTETRICS AND GYNECOLOGY | Facility: CLINIC | Age: 67
End: 2022-10-26
Payer: MEDICARE

## 2022-10-26 DIAGNOSIS — N95.1 MENOPAUSE SYNDROME: ICD-10-CM

## 2022-10-26 DIAGNOSIS — N95.2 ATROPHIC VAGINITIS: ICD-10-CM

## 2022-10-26 RX ORDER — CONJUGATED ESTROGENS 0.62 MG/G
1 CREAM VAGINAL
Qty: 30 G | Refills: 3 | Status: SHIPPED | OUTPATIENT
Start: 2022-10-27 | End: 2023-10-05 | Stop reason: SDUPTHER

## 2022-10-26 RX ORDER — ESTRADIOL/NORETHINDRONE ACETATE TRANSDERMAL SYSTEM .05; .14 MG/D; MG/D
1 PATCH, EXTENDED RELEASE TRANSDERMAL
Qty: 24 PATCH | Refills: 4 | Status: SHIPPED | OUTPATIENT
Start: 2022-10-27 | End: 2024-01-23 | Stop reason: SDUPTHER

## 2022-10-27 ENCOUNTER — PATIENT MESSAGE (OUTPATIENT)
Dept: INTERNAL MEDICINE | Facility: CLINIC | Age: 67
End: 2022-10-27

## 2022-10-28 ENCOUNTER — OFFICE VISIT (OUTPATIENT)
Dept: INTERNAL MEDICINE | Facility: CLINIC | Age: 67
End: 2022-10-28
Payer: MEDICARE

## 2022-10-28 ENCOUNTER — LAB VISIT (OUTPATIENT)
Dept: LAB | Facility: HOSPITAL | Age: 67
End: 2022-10-28
Attending: NURSE PRACTITIONER
Payer: MEDICARE

## 2022-10-28 VITALS
DIASTOLIC BLOOD PRESSURE: 68 MMHG | TEMPERATURE: 98 F | HEART RATE: 78 BPM | BODY MASS INDEX: 24.14 KG/M2 | SYSTOLIC BLOOD PRESSURE: 122 MMHG | OXYGEN SATURATION: 98 % | WEIGHT: 131.19 LBS | HEIGHT: 62 IN

## 2022-10-28 DIAGNOSIS — K58.9 IRRITABLE BOWEL SYNDROME WITHOUT DIARRHEA: ICD-10-CM

## 2022-10-28 DIAGNOSIS — E78.5 HYPERLIPIDEMIA, UNSPECIFIED HYPERLIPIDEMIA TYPE: Primary | ICD-10-CM

## 2022-10-28 DIAGNOSIS — Z23 NEED FOR PNEUMOCOCCAL VACCINATION: ICD-10-CM

## 2022-10-28 DIAGNOSIS — Z12.11 COLON CANCER SCREENING: ICD-10-CM

## 2022-10-28 DIAGNOSIS — N18.30 STAGE 3 CHRONIC KIDNEY DISEASE, UNSPECIFIED WHETHER STAGE 3A OR 3B CKD: ICD-10-CM

## 2022-10-28 DIAGNOSIS — E78.5 HYPERLIPIDEMIA, UNSPECIFIED HYPERLIPIDEMIA TYPE: ICD-10-CM

## 2022-10-28 DIAGNOSIS — F32.A DEPRESSION, UNSPECIFIED DEPRESSION TYPE: ICD-10-CM

## 2022-10-28 LAB
ALBUMIN SERPL BCP-MCNC: 4.1 G/DL (ref 3.5–5.2)
ALP SERPL-CCNC: 53 U/L (ref 55–135)
ALT SERPL W/O P-5'-P-CCNC: 16 U/L (ref 10–44)
ANION GAP SERPL CALC-SCNC: 13 MMOL/L (ref 8–16)
AST SERPL-CCNC: 16 U/L (ref 10–40)
BILIRUB SERPL-MCNC: 0.3 MG/DL (ref 0.1–1)
BUN SERPL-MCNC: 44 MG/DL (ref 8–23)
CALCIUM SERPL-MCNC: 9.7 MG/DL (ref 8.7–10.5)
CHLORIDE SERPL-SCNC: 105 MMOL/L (ref 95–110)
CO2 SERPL-SCNC: 23 MMOL/L (ref 23–29)
CREAT SERPL-MCNC: 1.8 MG/DL (ref 0.5–1.4)
EST. GFR  (NO RACE VARIABLE): 30.9 ML/MIN/1.73 M^2
GLUCOSE SERPL-MCNC: 75 MG/DL (ref 70–110)
POTASSIUM SERPL-SCNC: 3.4 MMOL/L (ref 3.5–5.1)
PROT SERPL-MCNC: 7.4 G/DL (ref 6–8.4)
SODIUM SERPL-SCNC: 141 MMOL/L (ref 136–145)

## 2022-10-28 PROCEDURE — 3288F FALL RISK ASSESSMENT DOCD: CPT | Mod: CPTII,S$GLB,, | Performed by: NURSE PRACTITIONER

## 2022-10-28 PROCEDURE — 3074F SYST BP LT 130 MM HG: CPT | Mod: CPTII,S$GLB,, | Performed by: NURSE PRACTITIONER

## 2022-10-28 PROCEDURE — 99214 OFFICE O/P EST MOD 30 MIN: CPT | Mod: S$GLB,,, | Performed by: NURSE PRACTITIONER

## 2022-10-28 PROCEDURE — G0009 PNEUMOCOCCAL CONJUGATE VACCINE 20-VALENT: ICD-10-PCS | Mod: S$GLB,,, | Performed by: NURSE PRACTITIONER

## 2022-10-28 PROCEDURE — 3008F BODY MASS INDEX DOCD: CPT | Mod: CPTII,S$GLB,, | Performed by: NURSE PRACTITIONER

## 2022-10-28 PROCEDURE — 90677 PNEUMOCOCCAL CONJUGATE VACCINE 20-VALENT: ICD-10-PCS | Mod: S$GLB,,, | Performed by: NURSE PRACTITIONER

## 2022-10-28 PROCEDURE — G0009 ADMIN PNEUMOCOCCAL VACCINE: HCPCS | Mod: S$GLB,,, | Performed by: NURSE PRACTITIONER

## 2022-10-28 PROCEDURE — 99499 UNLISTED E&M SERVICE: CPT | Mod: HCNC,S$GLB,, | Performed by: NURSE PRACTITIONER

## 2022-10-28 PROCEDURE — 3078F PR MOST RECENT DIASTOLIC BLOOD PRESSURE < 80 MM HG: ICD-10-PCS | Mod: CPTII,S$GLB,, | Performed by: NURSE PRACTITIONER

## 2022-10-28 PROCEDURE — 3288F PR FALLS RISK ASSESSMENT DOCUMENTED: ICD-10-PCS | Mod: CPTII,S$GLB,, | Performed by: NURSE PRACTITIONER

## 2022-10-28 PROCEDURE — 99499 RISK ADDL DX/OHS AUDIT: ICD-10-PCS | Mod: HCNC,S$GLB,, | Performed by: NURSE PRACTITIONER

## 2022-10-28 PROCEDURE — 80053 COMPREHEN METABOLIC PANEL: CPT | Mod: PO | Performed by: NURSE PRACTITIONER

## 2022-10-28 PROCEDURE — 80061 LIPID PANEL: CPT | Performed by: NURSE PRACTITIONER

## 2022-10-28 PROCEDURE — 90677 PCV20 VACCINE IM: CPT | Mod: S$GLB,,, | Performed by: NURSE PRACTITIONER

## 2022-10-28 PROCEDURE — 1101F PT FALLS ASSESS-DOCD LE1/YR: CPT | Mod: CPTII,S$GLB,, | Performed by: NURSE PRACTITIONER

## 2022-10-28 PROCEDURE — 3008F PR BODY MASS INDEX (BMI) DOCUMENTED: ICD-10-PCS | Mod: CPTII,S$GLB,, | Performed by: NURSE PRACTITIONER

## 2022-10-28 PROCEDURE — 1159F MED LIST DOCD IN RCRD: CPT | Mod: CPTII,S$GLB,, | Performed by: NURSE PRACTITIONER

## 2022-10-28 PROCEDURE — 99999 PR PBB SHADOW E&M-EST. PATIENT-LVL V: ICD-10-PCS | Mod: PBBFAC,,, | Performed by: NURSE PRACTITIONER

## 2022-10-28 PROCEDURE — 1159F PR MEDICATION LIST DOCUMENTED IN MEDICAL RECORD: ICD-10-PCS | Mod: CPTII,S$GLB,, | Performed by: NURSE PRACTITIONER

## 2022-10-28 PROCEDURE — 3074F PR MOST RECENT SYSTOLIC BLOOD PRESSURE < 130 MM HG: ICD-10-PCS | Mod: CPTII,S$GLB,, | Performed by: NURSE PRACTITIONER

## 2022-10-28 PROCEDURE — 3078F DIAST BP <80 MM HG: CPT | Mod: CPTII,S$GLB,, | Performed by: NURSE PRACTITIONER

## 2022-10-28 PROCEDURE — 1101F PR PT FALLS ASSESS DOC 0-1 FALLS W/OUT INJ PAST YR: ICD-10-PCS | Mod: CPTII,S$GLB,, | Performed by: NURSE PRACTITIONER

## 2022-10-28 PROCEDURE — 99214 PR OFFICE/OUTPT VISIT, EST, LEVL IV, 30-39 MIN: ICD-10-PCS | Mod: S$GLB,,, | Performed by: NURSE PRACTITIONER

## 2022-10-28 PROCEDURE — 99999 PR PBB SHADOW E&M-EST. PATIENT-LVL V: CPT | Mod: PBBFAC,,, | Performed by: NURSE PRACTITIONER

## 2022-10-28 PROCEDURE — 36415 COLL VENOUS BLD VENIPUNCTURE: CPT | Mod: PO | Performed by: NURSE PRACTITIONER

## 2022-10-28 NOTE — PROGRESS NOTES
Subjective:       Patient ID: Kenia Alonzo is a 67 y.o. female.    Chief Complaint: Annual Exam    Mrs. Alonzo presents to visit for complaint of bloating and abdominal discomfort. Hx of IBS-C. Has tried OTC laxative and stool softeners, but continues to have days without bowel movements.      Has complaint of L great toe pain, rated 5/10. Has been going on for several days. Thought it was her gout flare up, but seems to be improving. No erythema. Was having difficulty with ambulation.       Also due for routine/annual labs, and pneumococcal vaccination.       Patient Active Problem List   Diagnosis    Depression    Meniere's disease of right ear    Vestibular migraine    Abnormal involuntary movements    Irritable bowel syndrome without diarrhea    Hyperlipidemia    History of tobacco abuse    CKD (chronic kidney disease) stage 3, GFR 30-59 ml/min    Non-seasonal allergic rhinitis    Bronchitis    Chronic mixed headache syndrome    Severe dysplasia of vulva    Complicated grief    Primary osteoarthritis of left knee    Dermatitis of face    Acute gout of right foot    Vulvar dysplasia    Ingrown toenail of left foot    Foot callus    Family history of colon cancer    Bilateral renal cysts    Menopause syndrome    Atrophic vaginitis    Candidiasis of vulva and vagina    Pain in lower jaw    Headache    Decreased ROM of neck    Neck muscle weakness       Family History   Problem Relation Age of Onset    Colon cancer Father     Diabetes Father     Cancer Sister     Diabetes Sister      Past Surgical History:   Procedure Laterality Date    BRAIN SURGERY      vestibular neurectomy    BREAST BIOPSY      15 years ago?  no visible scar    BREAST SURGERY      benign     SECTION      x 1    CHOLECYSTECTOMY      COLONOSCOPY N/A 3/15/2021    Procedure: COLONOSCOPY;  Surgeon: Mita Stoddard MD;  Location: [facility];  Service: Endoscopy;  Laterality: N/A;    EXAMINATION UNDER ANESTHESIA N/A 2020     Procedure: EXAM UNDER ANESTHESIA;  Surgeon: Lucy Crane MD;  Location: [facility];  Service: OB/GYN;  Laterality: N/A;    LASER ABLATION N/A 8/12/2020    Procedure: ABLATION, USING LASER;  Surgeon: Lucy Crane MD;  Location: [facility] OR;  Service: OB/GYN;  Laterality: N/A;    TONSILLECTOMY           Current Outpatient Medications:     acyclovir 5% (ZOVIRAX) 5 % ointment, Apply topically 5 (five) times daily., Disp: 30 g, Rfl: 1    bumetanide (BUMEX) 2 MG tablet, Take 1 tablet (2 mg total) by mouth once daily., Disp: 90 tablet, Rfl: 1    buPROPion (WELLBUTRIN XL) 150 MG TB24 tablet, Take 3 tablets (450 mg total) by mouth once daily., Disp: 90 tablet, Rfl: 3    busPIRone (BUSPAR) 30 MG Tab, Take 1 tablet (30 mg total) by mouth 2 (two) times daily., Disp: 180 tablet, Rfl: 3    C/sourcherry/celery/grape seed (TART CHERRY ORAL), Take 1 tablet by mouth daily as needed. , Disp: , Rfl:     clotrimazole-betamethasone 1-0.05% (LOTRISONE) cream, Apply to affected area 2 times daily, Disp: 15 g, Rfl: 1    co-enzyme Q-10 30 mg capsule, Take 30 mg by mouth 3 (three) times daily., Disp: , Rfl:     conjugated estrogens (PREMARIN) vaginal cream, Place 1 g vaginally twice a week., Disp: 30 g, Rfl: 3    diazePAM (VALIUM) 2 MG tablet, Take 1 tablet (2 mg total) by mouth every 6 (six) hours as needed (dizziness/Meniere's attack)., Disp: 30 tablet, Rfl: 3    dicyclomine (BENTYL) 10 MG capsule, Take 1 capsule (10 mg total) by mouth 3 (three) times daily., Disp: 90 capsule, Rfl: 1    erenumab-aooe (AIMOVIG AUTOINJECTOR) 140 mg/mL autoinjector, Inject 1 pen (140 mg total) into the skin every 28 days., Disp: 1 mL, Rfl: 11    estradiol-norethindrone (COMBIPATCH) 0.05-0.14 mg/24 hr, Place 1 patch onto the skin twice a week., Disp: 24 patch, Rfl: 4    fluticasone propionate (FLONASE) 50 mcg/actuation nasal spray, 2 sprays (100 mcg total) by Each Nostril route 2 (two) times daily., Disp: 9.9 mL, Rfl: 11    ketorolac (TORADOL) 10 mg tablet,  TAKE ONE TABLET BY MOUTH ONCE DAILY AS NEEDED FOR PAIN (MAX TWO TIMES A WEEK), Disp: 8 tablet, Rfl: 3    levocetirizine (XYZAL) 5 MG tablet, Take 1 tablet (5 mg total) by mouth every morning., Disp: 30 tablet, Rfl: 3    LIDOcaine-prilocaine (EMLA) cream, , Disp: , Rfl:     magnesium 30 mg Tab, Take by mouth once., Disp: , Rfl:     MELATONIN ORAL, Take by mouth nightly as needed. , Disp: , Rfl:     montelukast (SINGULAIR) 10 mg tablet, Take 1 tablet (10 mg total) by mouth every evening., Disp: 90 tablet, Rfl: 3    omega-3 fatty acids/fish oil (FISH OIL-OMEGA-3 FATTY ACIDS) 300-1,000 mg capsule, Take 1 capsule by mouth once daily., Disp: , Rfl:     ondansetron (ZOFRAN-ODT) 4 MG TbDL, DISSOLVE 1 TABLET IN MOUTH EVERY 8 HOURS AS NEEDED, Disp: 60 tablet, Rfl: 1    potassium chloride SA (K-DUR,KLOR-CON M) 10 MEQ tablet, Take 1 tablet (10 mEq total) by mouth once daily., Disp: 90 tablet, Rfl: 0    simvastatin (ZOCOR) 5 MG tablet, Take 1 tablet (5 mg total) by mouth every evening., Disp: 90 tablet, Rfl: 3    ubrogepant (UBRELVY) 100 mg tablet, Take 1 tablet (100 mg total) by mouth every 72 hours as needed for Migraine (3 times per week prn)., Disp: 8 tablet, Rfl: 2    vitamin B complex/folic acid (B COMPLEX 100 ORAL), Take by mouth nightly., Disp: , Rfl:     zinc gluconate 50 mg tablet, Take 50 mg by mouth once daily., Disp: , Rfl:     zolpidem (AMBIEN) 5 MG Tab, Take 1/2 to 1 tablet at bedtime as needed for sleep, Disp: 30 tablet, Rfl: 3    albuterol (VENTOLIN HFA) 90 mcg/actuation inhaler, Inhale 2 puffs into the lungs every 6 (six) hours as needed for Wheezing. Rescue, Disp: 18 g, Rfl: 0    azelastine (ASTELIN) 137 mcg (0.1 %) nasal spray, 1 spray (137 mcg total) by Nasal route 2 (two) times daily., Disp: 30 mL, Rfl: 11    benzonatate (TESSALON) 100 MG capsule, benzonatate Take 1-2 Capsule (oral) 3 times per day PRN - Cough for 5 days [id number] capsule 3 times per day oral 5 days active 100 mg, Disp: , Rfl:      "linaCLOtide (LINZESS) 145 mcg Cap capsule, Take 1 capsule (145 mcg total) by mouth before breakfast., Disp: 30 capsule, Rfl: 5    topiramate (TROKENDI XR) 100 mg Cp24, Take 1 capsule (100 mg total) by mouth once daily., Disp: 90 capsule, Rfl: 1    Current Facility-Administered Medications:     onabotulinumtoxina injection 200 Units, 200 Units, Intramuscular, Q90 Days, Vipin Matthews MD, 200 Units at 06/23/22 1515    onabotulinumtoxina injection 200 Units, 200 Units, Intramuscular, 1 time in Clinic/HOD, Danna Farrar NP    Review of Systems   Constitutional:  Negative for appetite change, chills, fatigue and fever.   Respiratory:  Negative for cough and shortness of breath.    Cardiovascular:  Negative for chest pain and palpitations.   Gastrointestinal:  Positive for abdominal distention, abdominal pain and constipation. Negative for diarrhea, nausea and vomiting.   Musculoskeletal:  Positive for arthralgias.   Neurological:  Negative for dizziness, light-headedness and headaches.     Objective:   /68 (BP Location: Left arm, Patient Position: Sitting, BP Method: Medium (Manual))   Pulse 78   Temp 98.1 °F (36.7 °C) (Temporal)   Ht 5' 2" (1.575 m)   Wt 59.5 kg (131 lb 2.8 oz)   SpO2 98%   BMI 23.99 kg/m²      Physical Exam  Vitals reviewed.   Constitutional:       General: She is not in acute distress.     Appearance: Normal appearance. She is well-developed. She is not ill-appearing or diaphoretic.   HENT:      Head: Normocephalic.   Cardiovascular:      Rate and Rhythm: Normal rate and regular rhythm.      Pulses: Normal pulses.      Heart sounds: Normal heart sounds. No murmur heard.    No friction rub. No gallop.   Pulmonary:      Effort: Pulmonary effort is normal. No respiratory distress.      Breath sounds: Normal breath sounds. No rales.   Abdominal:      Palpations: Abdomen is soft.      Tenderness: There is no abdominal tenderness. There is no guarding.   Musculoskeletal:      Cervical back: " "Normal range of motion.   Skin:     General: Skin is warm and dry.      Coloration: Skin is not pale.      Findings: No erythema.   Neurological:      Mental Status: She is alert and oriented to person, place, and time.       Assessment & Plan     Problem List Items Addressed This Visit          Psychiatric    Depression    Current Assessment & Plan     Continues to see counselor. Overall reports being happy. Recently moved into new house.             Cardiac/Vascular    Hyperlipidemia - Primary    Current Assessment & Plan     On statin. Lipid panel ordered.             Renal/    CKD (chronic kidney disease) stage 3, GFR 30-59 ml/min    Current Assessment & Plan     Labs today.             GI    Irritable bowel syndrome without diarrhea    Current Assessment & Plan     [place] of linzess.          Relevant Medications    linaCLOtide (LINZESS) 145 mcg Cap capsule     Other Visit Diagnoses       Need for pneumococcal vaccination        Colon cancer screening        Relevant Orders    Ambulatory referral/consult to Endo Procedure         PCV 20 today.   Labs: CMP, Lipid    Follow up in about 6 months (around 4/28/2023), or if symptoms worsen or fail to improve.          Portions of this note may have been created with voice recognition software. Occasional "wrong-word" or "sound-a-like" substitutions may have occurred due to the inherent limitations of voice recognition software. Please, read the note carefully and recognize, using context, where substitutions have occurred.         "

## 2022-10-29 ENCOUNTER — PATIENT MESSAGE (OUTPATIENT)
Dept: INTERNAL MEDICINE | Facility: CLINIC | Age: 67
End: 2022-10-29
Payer: MEDICARE

## 2022-10-29 LAB
CHOLEST SERPL-MCNC: 192 MG/DL (ref 120–199)
CHOLEST/HDLC SERPL: 3.7 {RATIO} (ref 2–5)
HDLC SERPL-MCNC: 52 MG/DL (ref 40–75)
HDLC SERPL: 27.1 % (ref 20–50)
LDLC SERPL CALC-MCNC: 112.8 MG/DL (ref 63–159)
NONHDLC SERPL-MCNC: 140 MG/DL
TRIGL SERPL-MCNC: 136 MG/DL (ref 30–150)

## 2022-10-31 ENCOUNTER — PATIENT MESSAGE (OUTPATIENT)
Dept: ORTHOPEDICS | Facility: CLINIC | Age: 67
End: 2022-10-31
Payer: MEDICARE

## 2022-11-01 DIAGNOSIS — M17.11 ARTHRITIS OF KNEE, RIGHT: ICD-10-CM

## 2022-11-01 DIAGNOSIS — M17.12 ARTHRITIS OF KNEE, LEFT: ICD-10-CM

## 2022-11-01 DIAGNOSIS — M17.10 ARTHRITIS OF KNEE: Primary | ICD-10-CM

## 2022-11-02 ENCOUNTER — PATIENT MESSAGE (OUTPATIENT)
Dept: NEUROLOGY | Facility: CLINIC | Age: 67
End: 2022-11-02
Payer: MEDICARE

## 2022-11-02 ENCOUNTER — CLINICAL SUPPORT (OUTPATIENT)
Dept: REHABILITATION | Facility: HOSPITAL | Age: 67
End: 2022-11-02
Payer: MEDICARE

## 2022-11-02 DIAGNOSIS — G44.86 CERVICOGENIC HEADACHE: Primary | ICD-10-CM

## 2022-11-02 DIAGNOSIS — G44.89 CHRONIC MIXED HEADACHE SYNDROME: ICD-10-CM

## 2022-11-02 DIAGNOSIS — G43.809 VESTIBULAR MIGRAINE: ICD-10-CM

## 2022-11-02 DIAGNOSIS — R29.898 DECREASED ROM OF NECK: ICD-10-CM

## 2022-11-02 DIAGNOSIS — G43.711 CHRONIC MIGRAINE WITHOUT AURA, INTRACTABLE, WITH STATUS MIGRAINOSUS: ICD-10-CM

## 2022-11-02 DIAGNOSIS — G43.E09 CHRONIC MIGRAINE WITH AURA: ICD-10-CM

## 2022-11-02 DIAGNOSIS — M53.82 NECK MUSCLE WEAKNESS: ICD-10-CM

## 2022-11-02 PROCEDURE — 97162 PT EVAL MOD COMPLEX 30 MIN: CPT

## 2022-11-02 NOTE — PLAN OF CARE
[facility]  Physical Therapy Initial Evaluation    Date: 2022   Name: Kenia Alonzo  Clinic Number: [id number]    Therapy Diagnosis:   Encounter Diagnoses   Name Primary?    Chronic mixed headache syndrome     Chronic migraine with aura     Chronic migraine without aura, intractable, with status migrainosus     Cervicogenic headache Yes    Decreased ROM of neck     Neck muscle weakness      Physician: Danna Farrar NP    Physician Orders: PT Eval and Treat   Medical Diagnosis from Referral: chronic mixed headache syndrome; chronic migraine with aura; chronic migraine without aura, intractable, with status migrainosus.   Evaluation Date: 2022  Authorization Period Expiration: 23  Plan of Care Expiration: 22  Progress Note Due: 22  Visit # / Visits authorized:     Time In: 2:25 pm  Time Out: 2:50 pm  Total Appointment Time (timed & untimed codes): 25 minutes    Precautions: Standard    Subjective   Date of onset: over a year ago.   History of current condition - Kenia reports: a history of chronic headaches/migraines. Patient states her migraines are so bad that she had to stop working. 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 Patient states her headaches have been worse recently due to the weather changing. Patient states she is also sensitive to odors. Patient also suffers from Meniere's Disease.      Medical History:   Past Medical History:   Diagnosis Date    Arthritis     knee    Asthma     childhood     Depression     Digestive disorder     Encounter for blood transfusion     General anesthetics causing adverse effect in therapeutic use     severe headache after crainiotomy    GI problem     IBS    Gout     Headache     Hyperlipidemia     Meniere disease     MVP (mitral valve prolapse)     minor    Renal disorder     Vertigo        Surgical History:   Kenia Alonzo  has a past surgical history that includes   section; Breast surgery; Brain surgery (1997); Tonsillectomy; Cholecystectomy; Examination under anesthesia (N/A, 8/12/2020); Laser ablation (N/A, 8/12/2020); Colonoscopy (N/A, 3/15/2021); and Breast biopsy.    Medications:   Kenia has a current medication list which includes the following prescription(s): acyclovir 5%, albuterol, azelastine, benzonatate, bumetanide, bupropion, buspirone, c/sourcherry/celery/grape seed, clotrimazole-betamethasone 1-0.05%, co-enzyme q-10, premarin, diazepam, dicyclomine, aimovig autoinjector, combipatch, fluticasone propionate, ketorolac, levocetirizine, lidocaine-prilocaine, linaclotide, magnesium, melatonin, montelukast, fish oil-omega-3 fatty acids, ondansetron, potassium chloride sa, simvastatin, trokendi xr, ubrelvy, vitamin b complex/folic acid, zinc gluconate, and zolpidem, and the following Facility-Administered Medications: onabotulinumtoxina and onabotulinumtoxina.    Allergies:   Review of patient's allergies indicates:   Allergen Reactions    Demerol [meperidine] Nausea And Vomiting     Pt refuses Demerol     Codeine Itching        Imaging: none.    Prior Therapy: none  Social History:  lives alone  Occupation: patient is on disability.   Prior Level of Function: Patient was independent with ADL's.   Current Level of Function: Patient experiences quite a bit of difficulty with her typical ADL's.     Pain:  Current 0/10, worst 10/10, best 0/10   Location: head  Description: Aching and Throbbing  Aggravating Factors: scents  Easing Factors:  none    Pts goals: to minimize headaches to <1 per week.     Objective       ROM  *denotes pain     Cervical ROM  (degrees) Right Left Goal   Cervical Flexion 60 -   Cervical Extension 45 60   Cervical Rotation  60 70 70     Thoracic ROM  (Quality) Right Left Goal   Rotation Dysfunctional Nonpainful  Dysfunctional Nonpainful  Funcitonal Nonpainful          Strength  *denotes pain     MMT Right    Left Goal   Deep Neck Flexor Endurance  Test NT seconds 20 seconds   Shoulder Flexion NT/5 NT/5 5/5   Shoulder Abduction NT/5 NT/5 5/5   Elbow Flexion  NT/5 NT/5 5/5   Elbow Extension  NT/5 NT/5 5/5   Wrist Flexion  NT/5 NT/5 5/5   Wrist Extension  NT/5 NT/5 5/5   Thumb Extension NT/5 NT/5 5/5   *unable to test R shoulder strength due to a torn rotator cuff         CMS Impairment/Limitation/Restriction for FOTO Neck/Headache Survey    Therapist reviewed FOTO scores for Kenia Alonzo on 11/2/2022.   FOTO documents entered into [employer] - see Media section.    Limitation Score: 69%       TREATMENT       Kenia received Manual Therapy to improve pain and ROM for (0) minutes, including:  Manual Intervention Performed Today    Cervical Distraction     Cervical  Mobilizations      Seated Thoracic Manipulation     Seated C-T Manipulation      Active Release Technique      Functional Dry Needling            Kenia received Therapeutic Exercises to improve strength, endurance, and ROM for (0) minutes including:  Intervention Performed Today    UBE     Chin Tuck     Rows     Lat Pulldowns          Home Exercises and Patient Education Provided    Education provided:   - issue HEP next visit.     Written Home Exercises Provided: Patient instructed to cont prior HEP.  Exercises were reviewed and Kenia was able to demonstrate them prior to the end of the session.  Kenia demonstrated good  understanding of the education provided.     See EMR under Patient Instructions for exercises provided 11/2/2022.    Assessment   Kenia is a 67 y.o. female referred to outpatient Physical Therapy with a medical diagnosis of chronic mixed headache syndrome; chronic migraine with aura; chronic migraine without aura, intractable, with status migrainosus. The patient presents with impairments which include decreased ROM, decreased strength, and decreased overall function. Pt prognosis is Excellent due to personal factors and co-morbidities listed below. Pt will benefit from skilled outpatient  Physical Therapy to address the deficits stated above and in the chart below, provide pt/family education, and to maximize pt's level of independence.     Plan of care discussed with patient: Yes  Pt's spiritual, cultural and educational needs considered and patient is agreeable to the plan of care and goals as stated below:     Anticipated Barriers for therapy: depression, torn rotator cuff on R shoulder.    Medical Necessity is demonstrated by the following  History  Co-morbidities and personal factors that may impact the plan of care Co-morbidities:   advanced age, depression, and torn rotator cuff     Personal Factors:   no deficits     high   Examination  Body Structures and Functions, activity limitations and participation restrictions that may impact the plan of care Body Regions:   head    Body Systems:    ROM  strength    Participation Restrictions:   none    Activity limitations:   Learning and applying knowledge  no deficits    General Tasks and Commands  no deficits    Communication  no deficits    Mobility  no deficits    Self care  no deficits    Domestic Life  no deficits    Interactions/Relationships  no deficits    Life Areas  no deficits    Community and Social Life  no deficits         moderate   Clinical Presentation evolving clinical presentation with changing clinical characteristics moderate   Decision Making/ Complexity Score: moderate     GOALS:    Short Term Goals:  4 weeks Progress   11/2/2022   Patient will report decreased pain to <6/10 at worst on VAS to progress toward Prior Level of Function.    Patient will report improved function by a functional limitation score of <50 out of 100 on FOTO.    Patient will improve AROM to 50% of stated goals in order to progress towards Prior Level of Function.    Patient will improve strength to 50% of stated goals in order to progress towards Prior Level of Function.      Long Term Goals:  8 weeks Progress  11/2/2022   Patient will report decreased  pain to <3/10 at worst on VAS to progress toward Prior Level of Function.      Patient will report improved function by a functional limitation score of <30 out of 100 on FOTO.    Patient will improve ROM to stated goals to return to patient to Prior Level of Function.    Patient will improve strength to stated goals in order to return patient  to Prior Level of Function.          Plan   Plan of care Certification: 11/2/2022 to 12/28/22.  Outpatient Physical Therapy 2 times weekly for 8 weeks to include the following interventions: Cervical/Lumbar Traction, Electrical Stimulation  , Manual Therapy, Moist Heat/ Ice, Neuromuscular Re-ed, Patient Education, Therapeutic Activities, and Therapeutic Exercise.       Senthil Jefferson, PT

## 2022-11-03 PROBLEM — R29.898 DECREASED ROM OF NECK: Status: ACTIVE | Noted: 2022-11-03

## 2022-11-03 PROBLEM — M53.82 NECK MUSCLE WEAKNESS: Status: ACTIVE | Noted: 2022-11-03

## 2022-11-03 PROBLEM — R51.9 HEADACHE: Status: ACTIVE | Noted: 2022-11-03

## 2022-11-03 RX ORDER — TOPIRAMATE 100 MG/1
100 CAPSULE, EXTENDED RELEASE ORAL DAILY
Qty: 90 CAPSULE | Refills: 1 | Status: SHIPPED | OUTPATIENT
Start: 2022-11-03 | End: 2023-05-19 | Stop reason: SDUPTHER

## 2022-11-06 ENCOUNTER — PATIENT MESSAGE (OUTPATIENT)
Dept: PSYCHIATRY | Facility: CLINIC | Age: 67
End: 2022-11-06
Payer: MEDICARE

## 2022-11-10 ENCOUNTER — TELEPHONE (OUTPATIENT)
Dept: PAIN MEDICINE | Facility: CLINIC | Age: 67
End: 2022-11-10
Payer: MEDICARE

## 2022-11-10 ENCOUNTER — PATIENT MESSAGE (OUTPATIENT)
Dept: ORTHOPEDICS | Facility: CLINIC | Age: 67
End: 2022-11-10
Payer: MEDICARE

## 2022-11-10 ENCOUNTER — CLINICAL SUPPORT (OUTPATIENT)
Dept: REHABILITATION | Facility: HOSPITAL | Age: 67
End: 2022-11-10
Payer: MEDICARE

## 2022-11-10 DIAGNOSIS — M17.0 PRIMARY OSTEOARTHRITIS OF BOTH KNEES: Primary | ICD-10-CM

## 2022-11-10 DIAGNOSIS — M53.82 NECK MUSCLE WEAKNESS: ICD-10-CM

## 2022-11-10 DIAGNOSIS — R29.898 DECREASED ROM OF NECK: ICD-10-CM

## 2022-11-10 DIAGNOSIS — G44.86 CERVICOGENIC HEADACHE: Primary | ICD-10-CM

## 2022-11-10 PROCEDURE — 97110 THERAPEUTIC EXERCISES: CPT

## 2022-11-10 PROCEDURE — 97140 MANUAL THERAPY 1/> REGIONS: CPT

## 2022-11-10 PROCEDURE — 97112 NEUROMUSCULAR REEDUCATION: CPT

## 2022-11-10 NOTE — PROGRESS NOTES
[facility]   Physical Therapy Treatment Note     Name: Kenia Alonzo  Clinic Number: [id number]    Therapy Diagnosis:   Encounter Diagnoses   Name Primary?    Cervicogenic headache Yes    Decreased ROM of neck     Neck muscle weakness      Physician: Danna Farrar NP    Visit Date: 11/10/2022    Physician Orders: PT Eval and Treat   Medical Diagnosis from Referral: chronic mixed headache syndrome; chronic migraine with aura; chronic migraine without aura, intractable, with status migrainosus.   Evaluation Date: 11/2/2022  Authorization Period Expiration: 12/31/22  Plan of Care Expiration: 12/28/22  Progress Note Due: 12/2/22  Visit # / Visits authorized: 1/12 (+1 for authorization)    Precautions: Standard    PTA Visit #: 0/5     Time In: 1:30 pm  Time Out: 2:30 pm  Total Billable Time: 60 minutes    SUBJECTIVE     Pt reports: mild to moderate heacache today.   She was compliant with home exercise program.  Response to previous treatment: no significant change.   Functional change: no significant change.     Pain:  Current: 4/10, worst 10/10  Location: head    OBJECTIVE     Objective Measures updated at progress report unless specified.     Comparable Signs  Cervical extension ROM (60): 45  Cervical R rotation ROM (70): 60  Thoracic rotation ROM (Funcitonal Nonpainful): Dysfunctional Nonpainful     Treatment     Kenia received the treatments listed below:        Kenia received Manual Therapy to improve pain and ROM for (30) minutes, including:  Manual Intervention Performed Today    Cervical Distraction x  3 minutes    Cervical  Mobilizations  x  Bilateral opening mobilizations    Seated Thoracic Manipulation     Seated C-T Manipulation      Active Release Technique  x  Upper Trapezius, Levator Scapulae, Cervical Erectors    Suboccipital Release  x  3 repititions    Functional Dry Needling            Kenia received Therapeutic Exercises to improve strength, endurance, and ROM for (15)  "minutes including:  Intervention Performed Today    UBE     Seated Thoracic Extension Over 1/2 Foam - arms across chest x  10"x5   Cervical Ball on Wall: rotation  x  2 minutes    Open Book Stretch  x  10"x5, bilateral          NEUROMUSCULAR RE-EDUCATION ACTIVITIES to improve motor control/activation for (10) minutes including:   Intervention Performed Today    Chin Tuck x  3" hold, 1x10   Cervical Retraction  x  3" hold, 1x10   Scapular Retraction: AROM x  3" hold, 2x10         Patient Education and Home Exercises     Home Exercises Provided and Patient Education Provided     Education provided:   - issued HEP for cervical and thoracic mobilization.     Written Home Exercises Provided: Patient instructed to cont prior HEP. Exercises were reviewed and Kenia was able to demonstrate them prior to the end of the session.  Kenia demonstrated good  understanding of the education provided. See EMR under Patient Instructions for exercises provided during therapy sessions    ASSESSMENT   Response to Manual Therapy: patient reported improved cervical mobility.   Response to Therapeutic Exercise: added multiple exercises per flowsheet to improve cervical and thoracic ROM.   Response to Neuromuscular Re-education: added multiple exercises per flowsheet to improve motor control of deep neck flexors and scapular stabilizers.     Kenia Is progressing well towards her goals.   Pt prognosis is Excellent.     Pt will continue to benefit from skilled outpatient physical therapy to address the deficits listed in the problem list box on initial evaluation, provide pt/family education and to maximize pt's level of independence in the home and community environment.     Pt's spiritual, cultural and educational needs considered and pt agreeable to plan of care and goals.     Anticipated Barriers for therapy: depression, torn rotator cuff on R shoulder.    GOALS:    Short Term Goals:  4 weeks Progress   11/2/2022   Patient will report " decreased pain to <6/10 at worst on VAS to progress toward Prior Level of Function. Progressing   Patient will report improved function by a functional limitation score of <50 out of 100 on FOTO.    Patient will improve AROM to 50% of stated goals in order to progress towards Prior Level of Function.    Patient will improve strength to 50% of stated goals in order to progress towards Prior Level of Function.      Long Term Goals:  8 weeks Progress  11/2/2022   Patient will report decreased pain to <3/10 at worst on VAS to progress toward Prior Level of Function.      Patient will report improved function by a functional limitation score of <30 out of 100 on FOTO.    Patient will improve ROM to stated goals to return to patient to Prior Level of Function.    Patient will improve strength to stated goals in order to return patient  to Prior Level of Function.        PLAN   Continue Plan of Care (POC) and progress per patient tolerance. See treatment section for details on planned progressions next session.  Progress Note Due: 12/2/22      Senthil Jefferson, PT, DPT, SCS

## 2022-11-10 NOTE — TELEPHONE ENCOUNTER
Reached out to patient to schedule appointment from messages or a injection. Apt has been made.   Pt understand. All questions answered.     Ifeanyi Christensen  Medical Assistant

## 2022-11-10 NOTE — TELEPHONE ENCOUNTER
----- Message from Ioana Ponce MA sent at 11/10/2022  9:40 AM CST -----  Contact: self/[phone]    ----- Message -----  From: Sallie Edmondson LPN  Sent: 11/10/2022   9:27 AM CST  To: [facility]      ----- Message -----  From: Angela Foster  Sent: 11/10/2022   9:20 AM CST  To: Rahul Carter Staff    Type:  Patient Returning Call    Who Called:Kenia Alonzo  Who Left Message for Patient:Ifeanyi  Does the patient know what this is regarding?:no  Would the patient rather a call back or a response via [facility]? Call back   Best Call Back Number:[phone]  Additional Information:

## 2022-11-10 NOTE — TELEPHONE ENCOUNTER
----- Message from Brenda Faulkner sent at 11/10/2022  9:52 AM CST -----  Contact: Patient  Type:  Patient Returning Call    Who Called:Kenia Alonzo   Who Left Message for Patient: Ifeanyi   Does the patient know what this is regarding?: Appointment   Would the patient rather a call back or a response via [facility]?  Call back   Best Call Back Number: [phone]   Additional Information:  pt states that she tried to answer the phone but it only rung twice before e it hung up.

## 2022-11-10 NOTE — TELEPHONE ENCOUNTER
Reached out to patient to schedule appointment from messages for a injection with any provider. Pt did not answer. Left bin Christensen  Medical Assistant

## 2022-11-10 NOTE — TELEPHONE ENCOUNTER
Pt was called and scheduled a appointment in [place] for 11/22/22 for 2:00 for knee pain.  .Scot ARORA

## 2022-11-11 ENCOUNTER — TELEPHONE (OUTPATIENT)
Dept: PAIN MEDICINE | Facility: CLINIC | Age: 67
End: 2022-11-11
Payer: MEDICARE

## 2022-11-11 NOTE — TELEPHONE ENCOUNTER
Pt was scheduled with Dr Yousif for 11/25 for a direct procedure from Dr Cheng. Pt was contacted a aware of appointment.  .Scot ARORA

## 2022-11-11 NOTE — TELEPHONE ENCOUNTER
----- Message from Rocio Dangelo PA-C sent at 11/11/2022  8:09 AM CST -----  Regarding: FW: please call patient  Looks like she has orders for genicular nerve block (d2p from Dr. Pop) - orders put in by Dr. Yousif. But, she also has an appointment with Dr. Quintana.   ----- Message -----  From: Sallie Edmondson LPN  Sent: 11/9/2022   3:25 PM CST  To: Harish Casillas MD, #  Subject: please call patient                              Patient has been waiting to hear back from your office in regards to getting a nerve block but nobody has reached out to her     Can someone please call and get her set up     Thank you   Sallie

## 2022-11-14 ENCOUNTER — PATIENT MESSAGE (OUTPATIENT)
Dept: PAIN MEDICINE | Facility: HOSPITAL | Age: 67
End: 2022-11-14
Payer: MEDICARE

## 2022-11-17 ENCOUNTER — CLINICAL SUPPORT (OUTPATIENT)
Dept: REHABILITATION | Facility: HOSPITAL | Age: 67
End: 2022-11-17
Payer: MEDICARE

## 2022-11-17 ENCOUNTER — TELEPHONE (OUTPATIENT)
Dept: PHARMACY | Facility: CLINIC | Age: 67
End: 2022-11-17
Payer: MEDICARE

## 2022-11-17 DIAGNOSIS — R29.898 DECREASED ROM OF NECK: ICD-10-CM

## 2022-11-17 DIAGNOSIS — M53.82 NECK MUSCLE WEAKNESS: ICD-10-CM

## 2022-11-17 DIAGNOSIS — G44.86 CERVICOGENIC HEADACHE: Primary | ICD-10-CM

## 2022-11-17 PROCEDURE — 97112 NEUROMUSCULAR REEDUCATION: CPT

## 2022-11-17 PROCEDURE — 97140 MANUAL THERAPY 1/> REGIONS: CPT

## 2022-11-17 PROCEDURE — 97110 THERAPEUTIC EXERCISES: CPT

## 2022-11-17 NOTE — PROGRESS NOTES
[facility]   Physical Therapy Treatment Note     Name: Kenia Alonzo  Clinic Number: [id number]    Therapy Diagnosis:   Encounter Diagnoses   Name Primary?    Cervicogenic headache Yes    Decreased ROM of neck     Neck muscle weakness      Physician: Danna Farrar NP    Visit Date: 11/17/2022    Physician Orders: PT Eval and Treat   Medical Diagnosis from Referral: chronic mixed headache syndrome; chronic migraine with aura; chronic migraine without aura, intractable, with status migrainosus.   Evaluation Date: 11/2/2022  Authorization Period Expiration: 12/31/22  Plan of Care Expiration: 12/28/22  Progress Note Due: 12/2/22  Visit # / Visits authorized: 2/12 (+1 for authorization)    Precautions: Standard    PTA Visit #: 0/5     Time In: 12:30 pm  Time Out: 1:10 pm  Total Billable Time: 40 minutes    SUBJECTIVE     Pt reports: mild heacache today.   She was compliant with home exercise program.  Response to previous treatment: improved neck mobility and decreased headache.   Functional change: improved cervical and shoulder mobility with ADL's.     Pain:  Current: 3/10, worst 10/10  Location: head    OBJECTIVE     Objective Measures updated at progress report unless specified.     Comparable Signs  Cervical extension ROM (60): 50  Cervical R rotation ROM (70): 70  Thoracic rotation ROM (Funcitonal Nonpainful): Dysfunctional Nonpainful     Treatment     Kenia received the treatments listed below:        Kenia received Manual Therapy to improve pain and ROM for (15) minutes, including:  Manual Intervention Performed Today    Cervical Distraction x  3 minutes    Cervical  Mobilizations  x  Bilateral opening mobilizations    Seated Thoracic Manipulation     Seated C-T Manipulation      Active Release Technique  x  Upper Trapezius, Levator Scapulae, Cervical Erectors    Suboccipital Release  x  3 repititions    Functional Dry Needling            Kenia received Therapeutic Exercises to improve  "strength, endurance, and ROM for (13) minutes including:  Intervention Performed Today    UBE x    Cervical Ball on Wall: rotation  x  2 minutes    Open Book Stretch  x  10"x5, bilateral    Rows  x  Red Band, 2x10 **increase sets next visit          NEUROMUSCULAR RE-EDUCATION ACTIVITIES to improve motor control/activation for (10) minutes including:   Intervention Performed Today    Chin Tuck with neck retraction x  3" hold, 1x10   Chin Tuck with head lift x  3" hold, 1x10   Shrugs x  2x10         Patient Education and Home Exercises     Home Exercises Provided and Patient Education Provided     Education provided:   - issued HEP for cervical and thoracic mobilization.     Written Home Exercises Provided: Patient instructed to cont prior HEP. Exercises were reviewed and Kenia was able to demonstrate them prior to the end of the session.  Kenia demonstrated good  understanding of the education provided. See EMR under Patient Instructions for exercises provided during therapy sessions    ASSESSMENT   Response to Manual Therapy:   Response to Therapeutic Exercise: added resistance to Rows to improve strength of scapular stabilizers.   Response to Neuromuscular Re-education: progressed Chin Tuck and Neck Retraction to Chin tuck with Neck Retraction and Chin Tuck with Head Lift to motor control of deep neck flexors.     Kenia Is progressing well towards her goals.   Pt prognosis is Excellent.     Pt will continue to benefit from skilled outpatient physical therapy to address the deficits listed in the problem list box on initial evaluation, provide pt/family education and to maximize pt's level of independence in the home and community environment.     Pt's spiritual, cultural and educational needs considered and pt agreeable to plan of care and goals.     Anticipated Barriers for therapy: depression, torn rotator cuff on R shoulder.    GOALS:    Short Term Goals:  4 weeks Progress   11/2/2022   Patient will report " decreased pain to <6/10 at worst on VAS to progress toward Prior Level of Function. Progressing   Patient will report improved function by a functional limitation score of <50 out of 100 on FOTO.    Patient will improve AROM to 50% of stated goals in order to progress towards Prior Level of Function.    Patient will improve strength to 50% of stated goals in order to progress towards Prior Level of Function.      Long Term Goals:  8 weeks Progress  11/2/2022   Patient will report decreased pain to <3/10 at worst on VAS to progress toward Prior Level of Function.      Patient will report improved function by a functional limitation score of <30 out of 100 on FOTO.    Patient will improve ROM to stated goals to return to patient to Prior Level of Function.    Patient will improve strength to stated goals in order to return patient  to Prior Level of Function.        PLAN   Continue Plan of Care (POC) and progress per patient tolerance. See treatment section for details on planned progressions next session.  Progress Note Due: 12/2/22      Senthil Jefferson, PT, DPT, SCS

## 2022-11-28 ENCOUNTER — OFFICE VISIT (OUTPATIENT)
Dept: INTERNAL MEDICINE | Facility: CLINIC | Age: 67
End: 2022-11-28
Payer: MEDICARE

## 2022-11-28 ENCOUNTER — TELEPHONE (OUTPATIENT)
Dept: PREADMISSION TESTING | Facility: HOSPITAL | Age: 67
End: 2022-11-28
Payer: MEDICARE

## 2022-11-28 ENCOUNTER — PATIENT MESSAGE (OUTPATIENT)
Dept: NEUROLOGY | Facility: CLINIC | Age: 67
End: 2022-11-28
Payer: MEDICARE

## 2022-11-28 ENCOUNTER — PATIENT MESSAGE (OUTPATIENT)
Dept: INTERNAL MEDICINE | Facility: CLINIC | Age: 67
End: 2022-11-28

## 2022-11-28 DIAGNOSIS — G44.89 CHRONIC MIXED HEADACHE SYNDROME: ICD-10-CM

## 2022-11-28 DIAGNOSIS — G43.809 VESTIBULAR MIGRAINE: ICD-10-CM

## 2022-11-28 DIAGNOSIS — J06.9 VIRAL UPPER RESPIRATORY TRACT INFECTION WITH COUGH: Primary | ICD-10-CM

## 2022-11-28 PROCEDURE — 1160F PR REVIEW ALL MEDS BY PRESCRIBER/CLIN PHARMACIST DOCUMENTED: ICD-10-PCS | Mod: CPTII,95,, | Performed by: NURSE PRACTITIONER

## 2022-11-28 PROCEDURE — 1159F PR MEDICATION LIST DOCUMENTED IN MEDICAL RECORD: ICD-10-PCS | Mod: CPTII,95,, | Performed by: NURSE PRACTITIONER

## 2022-11-28 PROCEDURE — 1159F MED LIST DOCD IN RCRD: CPT | Mod: CPTII,95,, | Performed by: NURSE PRACTITIONER

## 2022-11-28 PROCEDURE — 1160F RVW MEDS BY RX/DR IN RCRD: CPT | Mod: CPTII,95,, | Performed by: NURSE PRACTITIONER

## 2022-11-28 PROCEDURE — 99213 OFFICE O/P EST LOW 20 MIN: CPT | Mod: 95,,, | Performed by: NURSE PRACTITIONER

## 2022-11-28 PROCEDURE — 99213 PR OFFICE/OUTPT VISIT, EST, LEVL III, 20-29 MIN: ICD-10-PCS | Mod: 95,,, | Performed by: NURSE PRACTITIONER

## 2022-11-28 RX ORDER — BENZONATATE 100 MG/1
100 CAPSULE ORAL 3 TIMES DAILY PRN
Qty: 30 CAPSULE | Refills: 0 | Status: SHIPPED | OUTPATIENT
Start: 2022-11-28 | End: 2022-12-09

## 2022-11-28 RX ORDER — UBROGEPANT 100 MG/1
100 TABLET ORAL
Qty: 10 TABLET | Refills: 2 | Status: SHIPPED | OUTPATIENT
Start: 2022-11-28 | End: 2022-11-28 | Stop reason: SDUPTHER

## 2022-11-28 RX ORDER — METHYLPREDNISOLONE 4 MG/1
TABLET ORAL
Qty: 21 EACH | Refills: 0 | Status: SHIPPED | OUTPATIENT
Start: 2022-11-28 | End: 2022-12-19

## 2022-11-28 RX ORDER — UBROGEPANT 100 MG/1
100 TABLET ORAL
Qty: 10 TABLET | Refills: 2 | Status: SHIPPED | OUTPATIENT
Start: 2022-11-28 | End: 2023-03-14 | Stop reason: SDUPTHER

## 2022-11-28 NOTE — TELEPHONE ENCOUNTER
Returned patients call.   She has been exposed to a virus and is not feeling well. Afebrile, dry hacking cough, denies sob. She is contacting surgeon to let know. Procedure date 11/30/2022

## 2022-11-28 NOTE — PROGRESS NOTES
The patient location is: in [place] at home   The chief complaint leading to consultation is: cough    Visit type: audiovisual    Face to Face time with patient: 128 PM - 1:37 PM  12 minutes of total time spent on the encounter, which includes face to face time and non-face to face time preparing to see the patient (eg, review of tests), Obtaining and/or reviewing separately obtained history, Documenting clinical information in the electronic or other health record, Independently interpreting results (not separately reported) and communicating results to the patient/family/caregiver, or Care coordination (not separately reported).       Each patient to whom he or she provides medical services by telemedicine is:  (1) informed of the relationship between the physician and patient and the respective role of any other health care provider with respect to management of the patient; and (2) notified that he or she may decline to receive medical services by telemedicine and may withdraw from such care at any time.    Subjective:       Patient ID: Kenia Alonzo is a 67 y.o. female.    Chief Complaint: Cough    Mrs. Alonzo is seen via telehealth for cough. Productive cough started on Friday. Reports her mother and sister have similar symptoms and were seen at urgent care with negative flu and covid. Requesting refill on tessalon pearles for cough. Taking Xyzal and Singulair. Denies fever, chills, CP, or SOB.      Cough  This is a new problem. The current episode started in the past 7 days. The problem has been gradually worsening. The problem occurs hourly. The cough is Productive of sputum. Associated symptoms include headaches, myalgias, postnasal drip, rhinorrhea and a sore throat. Pertinent negatives include no chest pain, chills, ear congestion, ear pain, fever, heartburn, hemoptysis, nasal congestion, rash, shortness of breath, sweats, weight loss or wheezing. The symptoms are aggravated by dust, fumes, lying  down, pollens and stress. She has tried body position changes, cool air, leukotriene antagonists and rest for the symptoms. The treatment provided no relief. Her past medical history is significant for bronchitis and environmental allergies.     Patient Active Problem List   Diagnosis    Depression    Meniere's disease of right ear    Vestibular migraine    Abnormal involuntary movements    Irritable bowel syndrome without diarrhea    Hyperlipidemia    History of tobacco abuse    CKD (chronic kidney disease) stage 3, GFR 30-59 ml/min    Non-seasonal allergic rhinitis    Bronchitis    Chronic mixed headache syndrome    Severe dysplasia of vulva    Complicated grief    Primary osteoarthritis of left knee    Dermatitis of face    Acute gout of right foot    Vulvar dysplasia    Ingrown toenail of left foot    Foot callus    Family history of colon cancer    Bilateral renal cysts    Menopause syndrome    Atrophic vaginitis    Candidiasis of vulva and vagina    Pain in lower jaw    Headache    Decreased ROM of neck    Neck muscle weakness       Family History   Problem Relation Age of Onset    Colon cancer Father     Diabetes Father     Cancer Sister     Diabetes Sister      Past Surgical History:   Procedure Laterality Date    BRAIN SURGERY      vestibular neurectomy    BREAST BIOPSY      15 years ago?  no visible scar    BREAST SURGERY      benign     SECTION      x 1    CHOLECYSTECTOMY      COLONOSCOPY N/A 3/15/2021    Procedure: COLONOSCOPY;  Surgeon: Mita Stoddard MD;  Location: [facility];  Service: Endoscopy;  Laterality: N/A;    EXAMINATION UNDER ANESTHESIA N/A 2020    Procedure: EXAM UNDER ANESTHESIA;  Surgeon: Lucy Crane MD;  Location: [facility] OR;  Service: OB/GYN;  Laterality: N/A;    LASER ABLATION N/A 2020    Procedure: ABLATION, USING LASER;  Surgeon: Lucy Crane MD;  Location: [facility] OR;  Service: OB/GYN;  Laterality: N/A;    TONSILLECTOMY           Current Outpatient  Medications:     acyclovir 5% (ZOVIRAX) 5 % ointment, Apply topically 5 (five) times daily., Disp: 30 g, Rfl: 1    albuterol (VENTOLIN HFA) 90 mcg/actuation inhaler, Inhale 2 puffs into the lungs every 6 (six) hours as needed for Wheezing. Rescue, Disp: 18 g, Rfl: 0    azelastine (ASTELIN) 137 mcg (0.1 %) nasal spray, 1 spray (137 mcg total) by Nasal route 2 (two) times daily., Disp: 30 mL, Rfl: 11    benzonatate (TESSALON) 100 MG capsule, Take 1 capsule (100 mg total) by mouth 3 (three) times daily as needed for Cough., Disp: 30 capsule, Rfl: 0    bumetanide (BUMEX) 2 MG tablet, Take 1 tablet (2 mg total) by mouth once daily., Disp: 90 tablet, Rfl: 1    buPROPion (WELLBUTRIN XL) 150 MG TB24 tablet, Take 3 tablets (450 mg total) by mouth once daily., Disp: 90 tablet, Rfl: 3    busPIRone (BUSPAR) 30 MG Tab, Take 1 tablet (30 mg total) by mouth 2 (two) times daily., Disp: 180 tablet, Rfl: 3    C/sourcherry/celery/grape seed (TART CHERRY ORAL), Take 1 tablet by mouth daily as needed. , Disp: , Rfl:     clotrimazole-betamethasone 1-0.05% (LOTRISONE) cream, Apply to affected area 2 times daily, Disp: 15 g, Rfl: 1    co-enzyme Q-10 30 mg capsule, Take 30 mg by mouth 3 (three) times daily., Disp: , Rfl:     conjugated estrogens (PREMARIN) vaginal cream, Place 1 g vaginally twice a week., Disp: 30 g, Rfl: 3    diazePAM (VALIUM) 2 MG tablet, Take 1 tablet (2 mg total) by mouth every 6 (six) hours as needed (dizziness/Meniere's attack)., Disp: 30 tablet, Rfl: 3    dicyclomine (BENTYL) 10 MG capsule, Take 1 capsule (10 mg total) by mouth 3 (three) times daily., Disp: 90 capsule, Rfl: 1    erenumab-aooe (AIMOVIG AUTOINJECTOR) 140 mg/mL autoinjector, Inject 1 pen (140 mg total) into the skin every 28 days., Disp: 1 mL, Rfl: 11    estradiol-norethindrone (COMBIPATCH) 0.05-0.14 mg/24 hr, Place 1 patch onto the skin twice a week., Disp: 24 patch, Rfl: 4    fluticasone propionate (FLONASE) 50 mcg/actuation nasal spray, 2 sprays (100  mcg total) by Each Nostril route 2 (two) times daily., Disp: 9.9 mL, Rfl: 11    ketorolac (TORADOL) 10 mg tablet, TAKE ONE TABLET BY MOUTH ONCE DAILY AS NEEDED FOR PAIN (MAX TWO TIMES A WEEK), Disp: 8 tablet, Rfl: 3    levocetirizine (XYZAL) 5 MG tablet, Take 1 tablet (5 mg total) by mouth every morning., Disp: 30 tablet, Rfl: 3    LIDOcaine-prilocaine (EMLA) cream, , Disp: , Rfl:     linaCLOtide (LINZESS) 145 mcg Cap capsule, Take 1 capsule (145 mcg total) by mouth before breakfast., Disp: 30 capsule, Rfl: 5    magnesium 30 mg Tab, Take by mouth once., Disp: , Rfl:     MELATONIN ORAL, Take by mouth nightly as needed. , Disp: , Rfl:     methylPREDNISolone (MEDROL DOSEPACK) 4 mg tablet, use as directed, Disp: 21 each, Rfl: 0    montelukast (SINGULAIR) 10 mg tablet, Take 1 tablet (10 mg total) by mouth every evening., Disp: 90 tablet, Rfl: 3    omega-3 fatty acids/fish oil (FISH OIL-OMEGA-3 FATTY ACIDS) 300-1,000 mg capsule, Take 1 capsule by mouth once daily., Disp: , Rfl:     ondansetron (ZOFRAN-ODT) 4 MG TbDL, DISSOLVE 1 TABLET IN MOUTH EVERY 8 HOURS AS NEEDED, Disp: 60 tablet, Rfl: 1    potassium chloride SA (K-DUR,KLOR-CON M) 10 MEQ tablet, Take 1 tablet (10 mEq total) by mouth once daily., Disp: 90 tablet, Rfl: 0    simvastatin (ZOCOR) 5 MG tablet, Take 1 tablet (5 mg total) by mouth every evening., Disp: 90 tablet, Rfl: 3    topiramate (TROKENDI XR) 100 mg Cp24, Take 1 capsule (100 mg total) by mouth once daily., Disp: 90 capsule, Rfl: 1    ubrogepant (UBRELVY) 100 mg tablet, Take 1 tablet (100 mg total) by mouth every 72 hours as needed for Migraine (3 times per week prn)., Disp: 10 tablet, Rfl: 2    vitamin B complex/folic acid (B COMPLEX 100 ORAL), Take by mouth nightly., Disp: , Rfl:     zinc gluconate 50 mg tablet, Take 50 mg by mouth once daily., Disp: , Rfl:     zolpidem (AMBIEN) 5 MG Tab, Take 1/2 to 1 tablet at bedtime as needed for sleep, Disp: 30 tablet, Rfl: 3    Current Facility-Administered  Medications:     onabotulinumtoxina injection 200 Units, 200 Units, Intramuscular, Q90 Days, Vipin Matthews MD, 200 Units at 06/23/22 1515    onabotulinumtoxina injection 200 Units, 200 Units, Intramuscular, 1 time in Clinic/HOD, Danna Farrar NP    Review of Systems   Constitutional:  Negative for chills, fatigue, fever and weight loss.   HENT:  Positive for postnasal drip, rhinorrhea and sore throat. Negative for ear pain.    Respiratory:  Positive for cough. Negative for hemoptysis, shortness of breath and wheezing.    Cardiovascular:  Negative for chest pain and palpitations.   Gastrointestinal:  Negative for heartburn.   Musculoskeletal:  Positive for myalgias.   Skin:  Negative for rash.   Allergic/Immunologic: Positive for environmental allergies.   Neurological:  Positive for headaches.     Objective:   There were no vitals taken for this visit.     Physical Exam  Constitutional:       Appearance: Normal appearance. She is not ill-appearing.   HENT:      Head: Normocephalic.      Nose: Congestion present.   Eyes:      Conjunctiva/sclera: Conjunctivae normal.   Pulmonary:      Effort: Pulmonary effort is normal. No respiratory distress.      Comments: +cough  Neurological:      Mental Status: She is alert and oriented to person, place, and time.   Psychiatric:         Behavior: Behavior normal.       Assessment & Plan     Problem List Items Addressed This Visit    None  Visit Diagnoses       Viral upper respiratory tract infection with cough    -  Primary    No clinical indication for abx at this time. Likely viral. Switch Xzyal to Allegra. Symptomatic care discussed. Medrol dose pack and benzonatate sent.    Relevant Medications    benzonatate (TESSALON) 100 MG capsule    methylPREDNISolone (MEDROL DOSEPACK) 4 mg tablet          Over-the-counter Tylenol/ibuprofen as needed for pain and fever if not contraindicated   Hand hygiene   Rest and maintain adequate hydration.   Antihistamine and flonase for nasal  "congestion and upper respiratory symptoms  Warm salt water gargles, chloraseptic spray, and lozenges as needed for sore throat.  Notify clinic if symptoms do not improve or worsen.      BEVERLY Gorman      Portions of this note may have been created with voice recognition software. Occasional "wrong-word" or "sound-a-like" substitutions may have occurred due to the inherent limitations of voice recognition software. Please, read the note carefully and recognize, using context, where substitutions have occurred.       "

## 2022-11-28 NOTE — PATIENT INSTRUCTIONS
Tessalon Pearles as needed for cough  Over-the-counter Tylenol/ibuprofen as needed for pain and fever if not contraindicated   Hand hygiene   Rest and maintain adequate hydration.   Antihistamine and flonase for nasal congestion and upper respiratory symptoms  Warm salt water gargles, chloraseptic spray, and lozenges as needed for sore throat.  Notify clinic if symptoms do not improve or worsen.

## 2022-11-29 ENCOUNTER — PATIENT MESSAGE (OUTPATIENT)
Dept: PAIN MEDICINE | Facility: HOSPITAL | Age: 67
End: 2022-11-29
Payer: MEDICARE

## 2022-12-05 ENCOUNTER — TELEPHONE (OUTPATIENT)
Dept: REHABILITATION | Facility: HOSPITAL | Age: 67
End: 2022-12-05
Payer: MEDICARE

## 2022-12-05 ENCOUNTER — DOCUMENTATION ONLY (OUTPATIENT)
Dept: REHABILITATION | Facility: HOSPITAL | Age: 67
End: 2022-12-05
Payer: MEDICARE

## 2022-12-05 NOTE — TELEPHONE ENCOUNTER
Patient called back to say that she is sick and slept through her appointment time. Patient states she plans to be here for her next appointment.

## 2022-12-06 ENCOUNTER — TELEPHONE (OUTPATIENT)
Dept: REHABILITATION | Facility: HOSPITAL | Age: 67
End: 2022-12-06
Payer: MEDICARE

## 2022-12-06 NOTE — TELEPHONE ENCOUNTER
Patient called to let us know that she tested positive for Covid. She hopes to be feeling better for her next appointment in 5 days.

## 2022-12-07 ENCOUNTER — PATIENT MESSAGE (OUTPATIENT)
Dept: PSYCHIATRY | Facility: CLINIC | Age: 67
End: 2022-12-07
Payer: MEDICARE

## 2022-12-22 ENCOUNTER — PATIENT MESSAGE (OUTPATIENT)
Dept: PSYCHIATRY | Facility: CLINIC | Age: 67
End: 2022-12-22
Payer: MEDICARE

## 2022-12-26 ENCOUNTER — PATIENT MESSAGE (OUTPATIENT)
Dept: NEUROLOGY | Facility: CLINIC | Age: 67
End: 2022-12-26
Payer: MEDICARE

## 2022-12-27 ENCOUNTER — PATIENT MESSAGE (OUTPATIENT)
Dept: NEUROLOGY | Facility: CLINIC | Age: 67
End: 2022-12-27
Payer: MEDICARE

## 2022-12-27 DIAGNOSIS — G43.E09 CHRONIC MIGRAINE WITH AURA: ICD-10-CM

## 2022-12-27 DIAGNOSIS — G44.89 CHRONIC MIXED HEADACHE SYNDROME: Primary | ICD-10-CM

## 2022-12-27 DIAGNOSIS — G43.711 CHRONIC MIGRAINE WITHOUT AURA, INTRACTABLE, WITH STATUS MIGRAINOSUS: ICD-10-CM

## 2023-01-04 NOTE — TELEPHONE ENCOUNTER
Initial Aimovig consult completed on 19. Aimovig 140mg will be shipped on 19 to arrive at patient's home on 19 via [employer]. $5.00 copay and permission to charge CC on file. Patient intends to start Aimovig once received. Address confirmed. Confirmed 2 patient identifiers - name and . Therapy Appropriate.    Indication: Migraine prophylaxis   Goals of treatment: Reduction in migraine frequency, duration, and/or intensity (may take 3 months to reach full efficacy)    --Injection experience: Yes  Informed patient on online injection video on  website - links sent via [employer]    Storage: keep refrigerated, do not freeze. Take out of the refrigerator 30-60 minutes prior to injection.    Counseled patient on administration directions:  - Inject 140mg (1 auto-injector) into the skin every 28 days.   - Store in refrigerator prior to use (do not freeze, do not shake, keep in original box until use).   - Take out of the refrigerator 30-60 minutes prior to injection.  - Wash hands before and after injection.  - Monthly RX will come with gauze, band aids, and alcohol swabs.  - Patient may inject in either the tops of the thighs, abdomen- but at least 2 inches away from belly button, or the outer part of her upper arm (with assistance).   - Patient is to wipe down the injection site with the alcohol pad, wait to dry.    - Remove white cap from pen  - Gently squeeze OR stretch the area of the cleaned skin and hold it firmly.  Place the pen flat against the skin then push down on the purple button and release - there will be an initial click; in 10-15 seconds you will hear a second click and the window will go from clear to yellow, indicating injection is complete.  - Patient should rotate injection sites.   - Patient will use sharps container; once full, per [place] law, she/ he may lock the sharps container and place in trash. Pharmacy will replace the sharps at no additional  Patient notified. UTI screen ordered. Awaiting urine results at this time    Also appointment scheduled with Dr. Randall tomorrow to evaluate skin, discuss possible Dermatology referral.    charge.    Patient was counseled on possible side effects:  - Injection site reaction: redness, soreness, itching, bruising, which should resolve within 3-5 days.  - Signs/symptoms of infection at the injection site.   - constipation, muscle cramps (?2%)     No known drug interactions with Aimovig.    Advised to keep a calendar to stay compliant.     Consultation included the importance of compliance and of keeping all follow up appointments.  Patient understands to report any medication changes to [facility] and provider. All questions answered and addressed to patients satisfaction. [facility] to contact patient in 3 weeks for refills.    Noe Lopez, KristiD  Clinical Pharmacist  [facility]  P: [phone]    InLeydi sent to provider on 6/5/19

## 2023-01-05 ENCOUNTER — OFFICE VISIT (OUTPATIENT)
Dept: PSYCHIATRY | Facility: CLINIC | Age: 68
End: 2023-01-05
Payer: MEDICARE

## 2023-01-05 DIAGNOSIS — Z63.8 FAMILY CONFLICT: ICD-10-CM

## 2023-01-05 DIAGNOSIS — F41.1 GAD (GENERALIZED ANXIETY DISORDER): Primary | ICD-10-CM

## 2023-01-05 DIAGNOSIS — F33.0 MILD EPISODE OF RECURRENT MAJOR DEPRESSIVE DISORDER: ICD-10-CM

## 2023-01-05 PROCEDURE — 90834 PR PSYCHOTHERAPY W/PATIENT, 45 MIN: ICD-10-PCS | Mod: HCNC,95,, | Performed by: SOCIAL WORKER

## 2023-01-05 PROCEDURE — 1159F MED LIST DOCD IN RCRD: CPT | Mod: HCNC,CPTII,95, | Performed by: SOCIAL WORKER

## 2023-01-05 PROCEDURE — 90834 PSYTX W PT 45 MINUTES: CPT | Mod: HCNC,95,, | Performed by: SOCIAL WORKER

## 2023-01-05 PROCEDURE — 1159F PR MEDICATION LIST DOCUMENTED IN MEDICAL RECORD: ICD-10-PCS | Mod: HCNC,CPTII,95, | Performed by: SOCIAL WORKER

## 2023-01-05 SDOH — SOCIAL DETERMINANTS OF HEALTH (SDOH): OTHER SPECIFIED PROBLEMS RELATED TO PRIMARY SUPPORT GROUP: Z63.8

## 2023-01-05 NOTE — PROGRESS NOTES
"  Individual Psychotherapy Follow-up Visit Progress Note (PhD/LCSW)     Outpatient - Supportive psychotherapy 45 min - CPT Code 62154     Virtual visit with synchronous audio/video, Location of patient: her home in [place]    Each patient provided medical services by telemedicine is: (1) informed of the relationship between the provider and patient and the respective role of any other health care provider with respect to management of the patient; and (2) notified that he or she may decline to receive medical services by telemedicine and may withdraw from such care at any time.    1/5/2023  MRN: [id number]  Primary Care Provider: Almita Izaguirre, NP    Kenia Alonzo is a 67 y.o. female who presents today for follow-up of depression and anxiety. Met with patient.    ""    Subjective:       Patient report/content of session: Last seen 9/27/2022. Pt states she has been sick off and on for the past few months (flu, Meniere's flares), which exacerbated her depression. States she enjoyed the holidays for the most part but feels "exhausted by everything." Her sister has been diagnosed with brain cancer. Pt discussed ongoing conflict with her family of origin and expressed anger and hurt that none of them have made an effort to visit her since she moved back to [place] from her mother's home in [place]. She is getting along better with her son, Tad. She continues to express concern about her sister's and daughter-in-law's heavy drinking.    Current symptoms:   Depression: depression, fatigue, decreased motivation  Anxiety: excessive anxiety/worry, restlessness, irritability  Substance abuse: denied  Cognitive functioning: denied  Iram: none noted  Psychosis: none noted      Objective:       Mental Status Evaluation  Appearance: unremarkable, age appropriate  Behavior: normal, cooperative  Speech: normal tone, normal rate, normal pitch, normal volume  Mood: dysthymic  Affect: congruent and " appropriate  Thought Process: normal and logical  Thought Content: normal, no suicidality, no homicidality, delusions, or paranoia  Sensorium: grossly intact  Cognition: grossly intact  Insight: good  Judgment: adequate to circumstances    Risk parameters:  Patient reports no suicidal ideation  Patient reports no homicidal ideation  Patient reports no self-injurious behavior  Patient reports no violent behavior      Assessment & Plan:       Therapeutic interventions used: Pt was taught how to apply relaxation skills to specific situations  Assigned pt to practice relaxation on a daily basis  Pt was assisted in gaining insight into how worry is a form of avoidance of a feared problem and how it creates chronic tension  Pt was assigned to engage in behavioral activation by scheduling activities that have a high likelihood for pleasure and mastery  Helped pt to identify and accept situations in which she does not have complete control, has imperfection or needs to tolerate uncertainty and unpleasant emotions  Assessed pt's level of insight toward the presenting problems  Monitored the effectiveness and side effects of antidepressant medication prescribed by physician/NP/MP  Reinforced pt's successes in reframing cognitive distortions  Assigned pt to keep a daily gratitude journal  Encouraged pt to commit time and effort to activities that are consistent with personally meaningful values        The patient's response to the interventions is accepting    The patient's progress toward treatment goals is good    Homework assigned: daily journaling and practice relaxation skills daily, increase use of available supports    Treatment plan:   A. Target symptoms: Depression, Anxiety and Poor Coping Skills   B. Therapeutic modalities: insight oriented psychotherapy, supportive psychotherapy  C. Why chosen therapy is appropriate versus another modality: relevant to diagnosis, evidence based practice   D. Outcome monitoring  methods: self-report, observation, feedback from clinical staff     Visit Diagnosis:   1. JOSIAH (generalized anxiety disorder)    2. Mild episode of recurrent major depressive disorder    3. Family conflict          Follow-up: individual psychotherapy and medication management by physician     Return to Clinic: 3 weeks    Length of Service (minutes): 45        Beatriz Holloway LCSW  Outpatient Psychiatry

## 2023-01-16 ENCOUNTER — PATIENT MESSAGE (OUTPATIENT)
Dept: PSYCHIATRY | Facility: CLINIC | Age: 68
End: 2023-01-16
Payer: MEDICARE

## 2023-01-18 ENCOUNTER — PATIENT MESSAGE (OUTPATIENT)
Dept: NEUROLOGY | Facility: CLINIC | Age: 68
End: 2023-01-18
Payer: MEDICARE

## 2023-01-18 DIAGNOSIS — R11.0 NAUSEA: ICD-10-CM

## 2023-01-18 DIAGNOSIS — G43.809 VESTIBULAR MIGRAINE: ICD-10-CM

## 2023-01-18 DIAGNOSIS — G44.89 CHRONIC MIXED HEADACHE SYNDROME: ICD-10-CM

## 2023-01-18 RX ORDER — PROMETHAZINE HYDROCHLORIDE 25 MG/1
25 TABLET ORAL DAILY PRN
Qty: 30 TABLET | Refills: 0 | Status: SHIPPED | OUTPATIENT
Start: 2023-01-18 | End: 2023-02-22

## 2023-01-20 ENCOUNTER — TELEPHONE (OUTPATIENT)
Dept: PSYCHIATRY | Facility: CLINIC | Age: 68
End: 2023-01-20
Payer: MEDICARE

## 2023-01-23 ENCOUNTER — PATIENT MESSAGE (OUTPATIENT)
Dept: OTOLARYNGOLOGY | Facility: CLINIC | Age: 68
End: 2023-01-23
Payer: MEDICARE

## 2023-01-23 ENCOUNTER — PROCEDURE VISIT (OUTPATIENT)
Dept: NEUROLOGY | Facility: CLINIC | Age: 68
End: 2023-01-23
Payer: MEDICARE

## 2023-01-23 VITALS
WEIGHT: 131.81 LBS | OXYGEN SATURATION: 98 % | DIASTOLIC BLOOD PRESSURE: 87 MMHG | HEIGHT: 62 IN | BODY MASS INDEX: 24.26 KG/M2 | SYSTOLIC BLOOD PRESSURE: 89 MMHG | RESPIRATION RATE: 16 BRPM | HEART RATE: 87 BPM

## 2023-01-23 DIAGNOSIS — G43.E09 CHRONIC MIGRAINE WITH AURA: ICD-10-CM

## 2023-01-23 DIAGNOSIS — G44.89 CHRONIC MIXED HEADACHE SYNDROME: Primary | ICD-10-CM

## 2023-01-23 DIAGNOSIS — G43.711 CHRONIC MIGRAINE WITHOUT AURA, INTRACTABLE, WITH STATUS MIGRAINOSUS: ICD-10-CM

## 2023-01-23 DIAGNOSIS — G43.709 CHRONIC MIGRAINE W/O AURA, NOT INTRACTABLE, W/O STAT MIGR: ICD-10-CM

## 2023-01-23 DIAGNOSIS — J30.2 SEASONAL ALLERGIC RHINITIS, UNSPECIFIED TRIGGER: ICD-10-CM

## 2023-01-23 DIAGNOSIS — G43.809 VESTIBULAR MIGRAINE: ICD-10-CM

## 2023-01-23 PROCEDURE — 64615 CHEMODENERV MUSC MIGRAINE: CPT | Mod: HCNC,S$GLB,, | Performed by: NURSE PRACTITIONER

## 2023-01-23 PROCEDURE — 64615 PR CHEMODENERVATION OF MUSCLE FOR CHRONIC MIGRAINE: ICD-10-PCS | Mod: HCNC,S$GLB,, | Performed by: NURSE PRACTITIONER

## 2023-01-23 RX ORDER — LEVOCETIRIZINE DIHYDROCHLORIDE 5 MG/1
5 TABLET, FILM COATED ORAL EVERY MORNING
Qty: 30 TABLET | Refills: 3 | Status: SHIPPED | OUTPATIENT
Start: 2023-01-23 | End: 2023-05-19 | Stop reason: SDUPTHER

## 2023-01-24 ENCOUNTER — PATIENT MESSAGE (OUTPATIENT)
Dept: PSYCHIATRY | Facility: CLINIC | Age: 68
End: 2023-01-24
Payer: MEDICARE

## 2023-01-31 ENCOUNTER — PATIENT MESSAGE (OUTPATIENT)
Dept: INTERNAL MEDICINE | Facility: CLINIC | Age: 68
End: 2023-01-31
Payer: MEDICARE

## 2023-01-31 ENCOUNTER — PATIENT MESSAGE (OUTPATIENT)
Dept: PSYCHIATRY | Facility: CLINIC | Age: 68
End: 2023-01-31

## 2023-01-31 ENCOUNTER — OFFICE VISIT (OUTPATIENT)
Dept: PSYCHIATRY | Facility: CLINIC | Age: 68
End: 2023-01-31
Payer: MEDICARE

## 2023-01-31 DIAGNOSIS — F43.21 COMPLICATED GRIEF: ICD-10-CM

## 2023-01-31 DIAGNOSIS — F41.1 GAD (GENERALIZED ANXIETY DISORDER): Primary | ICD-10-CM

## 2023-01-31 DIAGNOSIS — Z12.11 COLON CANCER SCREENING: Primary | ICD-10-CM

## 2023-01-31 PROCEDURE — 99214 PR OFFICE/OUTPT VISIT, EST, LEVL IV, 30-39 MIN: ICD-10-PCS | Mod: HCNC,95,, | Performed by: PSYCHIATRY & NEUROLOGY

## 2023-01-31 PROCEDURE — 99499 UNLISTED E&M SERVICE: CPT | Mod: 95,,, | Performed by: PSYCHIATRY & NEUROLOGY

## 2023-01-31 PROCEDURE — 99214 OFFICE O/P EST MOD 30 MIN: CPT | Mod: HCNC,95,, | Performed by: PSYCHIATRY & NEUROLOGY

## 2023-01-31 PROCEDURE — 99499 RISK ADDL DX/OHS AUDIT: ICD-10-PCS | Mod: 95,,, | Performed by: PSYCHIATRY & NEUROLOGY

## 2023-01-31 RX ORDER — BUSPIRONE HYDROCHLORIDE 30 MG/1
30 TABLET ORAL 2 TIMES DAILY
Qty: 180 TABLET | Refills: 3 | Status: SHIPPED | OUTPATIENT
Start: 2023-01-31 | End: 2024-01-17 | Stop reason: SDUPTHER

## 2023-01-31 RX ORDER — DIAZEPAM 2 MG/1
2 TABLET ORAL EVERY 6 HOURS PRN
Qty: 30 TABLET | Refills: 3 | Status: SHIPPED | OUTPATIENT
Start: 2023-01-31 | End: 2023-06-23 | Stop reason: SDUPTHER

## 2023-01-31 RX ORDER — ZOLPIDEM TARTRATE 5 MG/1
TABLET ORAL
Qty: 30 TABLET | Refills: 3 | Status: SHIPPED | OUTPATIENT
Start: 2023-01-31 | End: 2023-08-15 | Stop reason: SDUPTHER

## 2023-01-31 RX ORDER — BUPROPION HYDROCHLORIDE 150 MG/1
450 TABLET ORAL DAILY
Qty: 90 TABLET | Refills: 3 | Status: SHIPPED | OUTPATIENT
Start: 2023-01-31 | End: 2023-05-12
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
## 2023-02-01 ENCOUNTER — PATIENT MESSAGE (OUTPATIENT)
Dept: INTERNAL MEDICINE | Facility: CLINIC | Age: 68
End: 2023-02-01
Payer: MEDICARE

## 2023-02-02 ENCOUNTER — DOCUMENTATION ONLY (OUTPATIENT)
Dept: REHABILITATION | Facility: HOSPITAL | Age: 68
End: 2023-02-02
Payer: MEDICARE

## 2023-02-02 DIAGNOSIS — G44.86 CERVICOGENIC HEADACHE: Primary | ICD-10-CM

## 2023-02-02 DIAGNOSIS — R29.898 DECREASED ROM OF NECK: ICD-10-CM

## 2023-02-02 DIAGNOSIS — M53.82 NECK MUSCLE WEAKNESS: ICD-10-CM

## 2023-02-02 PROBLEM — R51.9 HEADACHE: Status: RESOLVED | Noted: 2022-11-03 | Resolved: 2022-11-17

## 2023-02-02 NOTE — PROGRESS NOTES
[facility]  Physical Therapy Discharge Note    Name: Kenia Alonzo  Clinic Number: [id number]    Therapy Diagnosis:   Encounter Diagnoses   Name Primary?    Cervicogenic headache Yes    Decreased ROM of neck     Neck muscle weakness      Physician: No ref. provider found    Physician Orders: PT Eval and Treat   Medical Diagnosis from Referral: chronic mixed headache syndrome; chronic migraine with aura; chronic migraine without aura, intractable, with status migrainosus.   Evaluation Date: 11/2/2022      Date of Last visit: 11/17/22  Total Visits Received: 3    ASSESSMENT      Patient reported mild symptoms on her last visit.     Discharge reason: Patient has not attended therapy since 11/17/22.    Discharge FOTO Score: 69% impaired     GOALS:     Short Term Goals:  4 weeks Progress   11/2/2022   Patient will report decreased pain to <6/10 at worst on VAS to progress toward Prior Level of Function. Progressing   Patient will report improved function by a functional limitation score of <50 out of 100 on FOTO.     Patient will improve AROM to 50% of stated goals in order to progress towards Prior Level of Function.     Patient will improve strength to 50% of stated goals in order to progress towards Prior Level of Function.        Long Term Goals:  8 weeks Progress  11/2/2022   Patient will report decreased pain to <3/10 at worst on VAS to progress toward Prior Level of Function.       Patient will report improved function by a functional limitation score of <30 out of 100 on FOTO.     Patient will improve ROM to stated goals to return to patient to Prior Level of Function.     Patient will improve strength to stated goals in order to return patient  to Prior Level of Function.         PLAN   This patient is discharged from Physical Therapy.      Senthil Jefferson, PT

## 2023-02-07 DIAGNOSIS — Z00.00 ENCOUNTER FOR MEDICARE ANNUAL WELLNESS EXAM: ICD-10-CM

## 2023-02-08 ENCOUNTER — HOSPITAL ENCOUNTER (OUTPATIENT)
Dept: PREADMISSION TESTING | Facility: HOSPITAL | Age: 68
Discharge: HOME | End: 2023-02-08
Attending: INTERNAL MEDICINE
Payer: MEDICARE

## 2023-02-08 DIAGNOSIS — Z12.11 COLON CANCER SCREENING: Primary | ICD-10-CM

## 2023-02-08 RX ORDER — POLYETHYLENE GLYCOL 3350, SODIUM SULFATE ANHYDROUS, SODIUM BICARBONATE, SODIUM CHLORIDE, POTASSIUM CHLORIDE 236; 22.74; 6.74; 5.86; 2.97 G/4L; G/4L; G/4L; G/4L; G/4L
4 POWDER, FOR SOLUTION ORAL ONCE
Qty: 4000 ML | Refills: 0 | Status: SHIPPED | OUTPATIENT
Start: 2023-02-08 | End: 2023-02-16

## 2023-02-09 DIAGNOSIS — Z00.00 ENCOUNTER FOR MEDICARE ANNUAL WELLNESS EXAM: ICD-10-CM

## 2023-02-10 ENCOUNTER — PATIENT MESSAGE (OUTPATIENT)
Dept: INTERNAL MEDICINE | Facility: CLINIC | Age: 68
End: 2023-02-10
Payer: MEDICARE

## 2023-02-13 ENCOUNTER — HOSPITAL ENCOUNTER (OUTPATIENT)
Dept: RADIOLOGY | Facility: HOSPITAL | Age: 68
Discharge: HOME | End: 2023-02-13
Attending: NURSE PRACTITIONER
Payer: MEDICARE

## 2023-02-13 ENCOUNTER — PATIENT MESSAGE (OUTPATIENT)
Dept: OBSTETRICS AND GYNECOLOGY | Facility: CLINIC | Age: 68
End: 2023-02-13
Payer: MEDICARE

## 2023-02-13 ENCOUNTER — TELEPHONE (OUTPATIENT)
Dept: OBSTETRICS AND GYNECOLOGY | Facility: CLINIC | Age: 68
End: 2023-02-13
Payer: MEDICARE

## 2023-02-13 ENCOUNTER — OFFICE VISIT (OUTPATIENT)
Dept: INTERNAL MEDICINE | Facility: CLINIC | Age: 68
End: 2023-02-13
Payer: MEDICARE

## 2023-02-13 VITALS
DIASTOLIC BLOOD PRESSURE: 60 MMHG | WEIGHT: 131.63 LBS | BODY MASS INDEX: 24.22 KG/M2 | HEART RATE: 88 BPM | SYSTOLIC BLOOD PRESSURE: 100 MMHG | TEMPERATURE: 99 F | OXYGEN SATURATION: 97 % | HEIGHT: 62 IN

## 2023-02-13 DIAGNOSIS — N18.32 STAGE 3B CHRONIC KIDNEY DISEASE: ICD-10-CM

## 2023-02-13 DIAGNOSIS — G89.29 CHRONIC BILATERAL LOW BACK PAIN WITH LEFT-SIDED SCIATICA: ICD-10-CM

## 2023-02-13 DIAGNOSIS — G43.809 VESTIBULAR MIGRAINE: ICD-10-CM

## 2023-02-13 DIAGNOSIS — M54.42 CHRONIC BILATERAL LOW BACK PAIN WITH LEFT-SIDED SCIATICA: Primary | ICD-10-CM

## 2023-02-13 DIAGNOSIS — F17.210 CIGARETTE NICOTINE DEPENDENCE WITHOUT COMPLICATION: ICD-10-CM

## 2023-02-13 DIAGNOSIS — F32.A DEPRESSION, UNSPECIFIED DEPRESSION TYPE: ICD-10-CM

## 2023-02-13 DIAGNOSIS — G89.29 CHRONIC BILATERAL LOW BACK PAIN WITH LEFT-SIDED SCIATICA: Primary | ICD-10-CM

## 2023-02-13 DIAGNOSIS — M54.42 CHRONIC BILATERAL LOW BACK PAIN WITH LEFT-SIDED SCIATICA: ICD-10-CM

## 2023-02-13 DIAGNOSIS — E78.5 HYPERLIPIDEMIA, UNSPECIFIED HYPERLIPIDEMIA TYPE: ICD-10-CM

## 2023-02-13 PROCEDURE — 72100 XR LUMBAR SPINE 2 OR 3 VIEWS: ICD-10-PCS | Mod: 26,HCNC,, | Performed by: RADIOLOGY

## 2023-02-13 PROCEDURE — 3008F BODY MASS INDEX DOCD: CPT | Mod: HCNC,CPTII,S$GLB, | Performed by: NURSE PRACTITIONER

## 2023-02-13 PROCEDURE — 3078F PR MOST RECENT DIASTOLIC BLOOD PRESSURE < 80 MM HG: ICD-10-PCS | Mod: HCNC,CPTII,S$GLB, | Performed by: NURSE PRACTITIONER

## 2023-02-13 PROCEDURE — 3008F PR BODY MASS INDEX (BMI) DOCUMENTED: ICD-10-PCS | Mod: HCNC,CPTII,S$GLB, | Performed by: NURSE PRACTITIONER

## 2023-02-13 PROCEDURE — 71271 CT THORAX LUNG CANCER SCR C-: CPT | Mod: TC,HCNC,PO

## 2023-02-13 PROCEDURE — 1125F AMNT PAIN NOTED PAIN PRSNT: CPT | Mod: HCNC,CPTII,S$GLB, | Performed by: NURSE PRACTITIONER

## 2023-02-13 PROCEDURE — 71271 CT THORAX LUNG CANCER SCR C-: CPT | Mod: 26,HCNC,, | Performed by: RADIOLOGY

## 2023-02-13 PROCEDURE — 99999 PR PBB SHADOW E&M-EST. PATIENT-LVL V: ICD-10-PCS | Mod: PBBFAC,HCNC,, | Performed by: NURSE PRACTITIONER

## 2023-02-13 PROCEDURE — 1159F MED LIST DOCD IN RCRD: CPT | Mod: HCNC,CPTII,S$GLB, | Performed by: NURSE PRACTITIONER

## 2023-02-13 PROCEDURE — 3074F SYST BP LT 130 MM HG: CPT | Mod: HCNC,CPTII,S$GLB, | Performed by: NURSE PRACTITIONER

## 2023-02-13 PROCEDURE — 72100 X-RAY EXAM L-S SPINE 2/3 VWS: CPT | Mod: TC,HCNC,PO

## 2023-02-13 PROCEDURE — 3074F PR MOST RECENT SYSTOLIC BLOOD PRESSURE < 130 MM HG: ICD-10-PCS | Mod: HCNC,CPTII,S$GLB, | Performed by: NURSE PRACTITIONER

## 2023-02-13 PROCEDURE — 1160F RVW MEDS BY RX/DR IN RCRD: CPT | Mod: HCNC,CPTII,S$GLB, | Performed by: NURSE PRACTITIONER

## 2023-02-13 PROCEDURE — 3078F DIAST BP <80 MM HG: CPT | Mod: HCNC,CPTII,S$GLB, | Performed by: NURSE PRACTITIONER

## 2023-02-13 PROCEDURE — 1159F PR MEDICATION LIST DOCUMENTED IN MEDICAL RECORD: ICD-10-PCS | Mod: HCNC,CPTII,S$GLB, | Performed by: NURSE PRACTITIONER

## 2023-02-13 PROCEDURE — 99214 OFFICE O/P EST MOD 30 MIN: CPT | Mod: HCNC,S$GLB,, | Performed by: NURSE PRACTITIONER

## 2023-02-13 PROCEDURE — 99214 PR OFFICE/OUTPT VISIT, EST, LEVL IV, 30-39 MIN: ICD-10-PCS | Mod: HCNC,S$GLB,, | Performed by: NURSE PRACTITIONER

## 2023-02-13 PROCEDURE — 1160F PR REVIEW ALL MEDS BY PRESCRIBER/CLIN PHARMACIST DOCUMENTED: ICD-10-PCS | Mod: HCNC,CPTII,S$GLB, | Performed by: NURSE PRACTITIONER

## 2023-02-13 PROCEDURE — 99999 PR PBB SHADOW E&M-EST. PATIENT-LVL V: CPT | Mod: PBBFAC,HCNC,, | Performed by: NURSE PRACTITIONER

## 2023-02-13 PROCEDURE — 72100 X-RAY EXAM L-S SPINE 2/3 VWS: CPT | Mod: 26,HCNC,, | Performed by: RADIOLOGY

## 2023-02-13 PROCEDURE — 1125F PR PAIN SEVERITY QUANTIFIED, PAIN PRESENT: ICD-10-PCS | Mod: HCNC,CPTII,S$GLB, | Performed by: NURSE PRACTITIONER

## 2023-02-13 PROCEDURE — 71271 CT CHEST LUNG SCREENING LOW DOSE: ICD-10-PCS | Mod: 26,HCNC,, | Performed by: RADIOLOGY

## 2023-02-13 NOTE — ASSESSMENT & PLAN NOTE
Stretches  Heating pad  Massages  Baseline xray today. Will check UA to rule out UTI.   If no concerns, will send muscle relaxer. Avoid nsaids with CKD.

## 2023-02-13 NOTE — TELEPHONE ENCOUNTER
LVM to let pt know that Dr. Padilla will not be at the [place] location tomorrow and appointment has been moved to [facility]. LVM for pt to return call if that does not work for her.

## 2023-02-13 NOTE — PROGRESS NOTES
Subjective:       Patient ID: Kenia Alonzo is a 67 y.o. female.    Chief Complaint: Back Pain    Mrs. Alonzo presents to visit with complaint of fatigue. Has been worse since December. Feels weak/tired in legs. More than normal. Concerned that her potassium may be low. Also has held her fluid pill for past couple of days which has helped.     Denies depression.     Also complaining of lower back pain. Has been going on for months. Located in lower lumbar, SI region, bilaterally. Occasional radiation down L lower leg. Pt thought that it may have been associated with chronic L knee pain. Denies any urinary or GI symptoms. She is concerned about her kidneys.     Back Pain  This is a new problem. The current episode started more than 1 month ago. The problem has been waxing and waning since onset. The pain is present in the lumbar spine and sacro-iliac. The quality of the pain is described as aching. The pain is at a severity of 5/10. The pain is moderate. The symptoms are aggravated by position. Associated symptoms include headaches, leg pain (intermittent, L leg) and weakness (general). Pertinent negatives include no abdominal pain, bladder incontinence, bowel incontinence, chest pain, dysuria, fever, numbness, paresthesias, pelvic pain, tingling or weight loss. Risk factors include sedentary lifestyle and menopause. She has tried analgesics for the symptoms. The treatment provided mild relief.     Patient Active Problem List   Diagnosis    Depression    Meniere's disease of right ear    Vestibular migraine    Abnormal involuntary movements    Irritable bowel syndrome without diarrhea    Hyperlipidemia    History of tobacco abuse    CKD (chronic kidney disease) stage 3, GFR 30-59 ml/min    Non-seasonal allergic rhinitis    Bronchitis    Chronic mixed headache syndrome    Severe dysplasia of vulva    Complicated grief    Primary osteoarthritis of left knee    Dermatitis of face    Acute gout of right foot    Vulvar  dysplasia    Ingrown toenail of left foot    Foot callus    Family history of colon cancer    Bilateral renal cysts    Menopause syndrome    Atrophic vaginitis    Candidiasis of vulva and vagina    Pain in lower jaw    Chronic bilateral low back pain with left-sided sciatica         Past Surgical History:   Procedure Laterality Date    BRAIN SURGERY      vestibular neurectomy    BREAST BIOPSY      15 years ago?  no visible scar    BREAST SURGERY      benign     SECTION      x 1    CHOLECYSTECTOMY      COLONOSCOPY N/A 3/15/2021    Procedure: COLONOSCOPY;  Surgeon: Mita Stoddard MD;  Location: [facility];  Service: Endoscopy;  Laterality: N/A;    EXAMINATION UNDER ANESTHESIA N/A 2020    Procedure: EXAM UNDER ANESTHESIA;  Surgeon: Lucy Crane MD;  Location: [facility];  Service: OB/GYN;  Laterality: N/A;    LASER ABLATION N/A 2020    Procedure: ABLATION, USING LASER;  Surgeon: Lucy Crane MD;  Location: [facility];  Service: OB/GYN;  Laterality: N/A;    TONSILLECTOMY           Current Outpatient Medications:     bumetanide (BUMEX) 2 MG tablet, Take 1 tablet (2 mg total) by mouth once daily., Disp: 90 tablet, Rfl: 1    buPROPion (WELLBUTRIN XL) 150 MG TB24 tablet, Take 3 tablets (450 mg total) by mouth once daily., Disp: 90 tablet, Rfl: 3    busPIRone (BUSPAR) 30 MG Tab, Take 1 tablet (30 mg total) by mouth 2 (two) times daily., Disp: 180 tablet, Rfl: 3    co-enzyme Q-10 30 mg capsule, Take 30 mg by mouth 3 (three) times daily., Disp: , Rfl:     conjugated estrogens (PREMARIN) vaginal cream, Place 1 g vaginally twice a week., Disp: 30 g, Rfl: 3    diazePAM (VALIUM) 2 MG tablet, Take 1 tablet (2 mg total) by mouth every 6 (six) hours as needed (dizziness/Meniere's attack)., Disp: 30 tablet, Rfl: 3    dicyclomine (BENTYL) 10 MG capsule, Take 1 capsule (10 mg total) by mouth 3 (three) times daily., Disp: 90 capsule, Rfl: 1    erenumab-aooe (AIMOVIG AUTOINJECTOR) 140 mg/mL autoinjector, Inject  1 pen (140 mg total) into the skin every 28 days., Disp: 1 mL, Rfl: 11    estradiol-norethindrone (COMBIPATCH) 0.05-0.14 mg/24 hr, Place 1 patch onto the skin twice a week., Disp: 24 patch, Rfl: 4    fluticasone propionate (FLONASE) 50 mcg/actuation nasal spray, 2 sprays (100 mcg total) by Each Nostril route 2 (two) times daily., Disp: 9.9 mL, Rfl: 11    levocetirizine (XYZAL) 5 MG tablet, Take 1 tablet (5 mg total) by mouth every morning., Disp: 30 tablet, Rfl: 3    LIDOcaine-prilocaine (EMLA) cream, , Disp: , Rfl:     linaCLOtide (LINZESS) 145 mcg Cap capsule, Take 1 capsule (145 mcg total) by mouth before breakfast., Disp: 30 capsule, Rfl: 5    magnesium 30 mg Tab, Take by mouth once., Disp: , Rfl:     MELATONIN ORAL, Take by mouth nightly as needed. , Disp: , Rfl:     montelukast (SINGULAIR) 10 mg tablet, Take 1 tablet (10 mg total) by mouth every evening., Disp: 90 tablet, Rfl: 3    omega-3 fatty acids/fish oil (FISH OIL-OMEGA-3 FATTY ACIDS) 300-1,000 mg capsule, Take 1 capsule by mouth once daily., Disp: , Rfl:     potassium chloride SA (K-DUR,KLOR-CON M) 10 MEQ tablet, Take 1 tablet (10 mEq total) by mouth once daily., Disp: 90 tablet, Rfl: 0    promethazine (PHENERGAN) 25 MG tablet, Take 1 tablet (25 mg total) by mouth daily as needed for Nausea., Disp: 30 tablet, Rfl: 0    simvastatin (ZOCOR) 5 MG tablet, Take 1 tablet (5 mg total) by mouth every evening., Disp: 90 tablet, Rfl: 3    topiramate (TROKENDI XR) 100 mg Cp24, Take 1 capsule (100 mg total) by mouth once daily., Disp: 90 capsule, Rfl: 1    ubrogepant (UBRELVY) 100 mg tablet, Take 1 tablet (100 mg total) by mouth every 72 hours as needed for Migraine (3 times per week prn)., Disp: 10 tablet, Rfl: 2    vitamin B complex/folic acid (B COMPLEX 100 ORAL), Take by mouth nightly., Disp: , Rfl:     zinc gluconate 50 mg tablet, Take 50 mg by mouth once daily., Disp: , Rfl:     zolpidem (AMBIEN) 5 MG Tab, Take 1/2 to 1 tablet at bedtime as needed for  sleepTake 1/2 to 1 tablet at bedtime as needed for sleep, Disp: 30 tablet, Rfl: 3    acyclovir 5% (ZOVIRAX) 5 % ointment, Apply topically 5 (five) times daily., Disp: 30 g, Rfl: 1    albuterol (VENTOLIN HFA) 90 mcg/actuation inhaler, Inhale 2 puffs into the lungs every 6 (six) hours as needed for Wheezing. Rescue, Disp: 18 g, Rfl: 0    azelastine (ASTELIN) 137 mcg (0.1 %) nasal spray, 1 spray (137 mcg total) by Nasal route 2 (two) times daily., Disp: 30 mL, Rfl: 11    C/sourcherry/celery/grape seed (TART CHERRY ORAL), Take 1 tablet by mouth daily as needed. , Disp: , Rfl:     clotrimazole-betamethasone 1-0.05% (LOTRISONE) cream, Apply to affected area 2 times daily (Patient not taking: Reported on 2/13/2023), Disp: 15 g, Rfl: 1    ketorolac (TORADOL) 10 mg tablet, TAKE ONE TABLET BY MOUTH ONCE DAILY AS NEEDED FOR PAIN (MAX TWO TIMES A WEEK) (Patient not taking: Reported on 2/13/2023), Disp: 8 tablet, Rfl: 3    Current Facility-Administered Medications:     onabotulinumtoxina injection 200 Units, 200 Units, Intramuscular, Q90 Days, Danna Farrar NP    onabotulinumtoxina injection 200 Units, 200 Units, Intramuscular, Q90 Days, Danna Farrar NP, 200 Units at 01/23/23 1555    Review of Systems   Constitutional:  Positive for fatigue. Negative for appetite change, chills, fever, unexpected weight change and weight loss.   HENT:  Positive for rhinorrhea. Negative for hearing loss and trouble swallowing.    Eyes:  Negative for discharge and visual disturbance.   Respiratory:  Negative for chest tightness and shortness of breath.    Cardiovascular:  Negative for chest pain and palpitations.   Gastrointestinal:  Negative for abdominal pain, blood in stool, bowel incontinence, constipation, diarrhea and vomiting.   Endocrine: Negative for polydipsia and polyuria.   Genitourinary:  Negative for bladder incontinence, difficulty urinating, dysuria, hematuria, menstrual problem and pelvic pain.   Musculoskeletal:  Positive  "for arthralgias and back pain. Negative for joint swelling and neck pain.   Neurological:  Positive for weakness (general) and headaches. Negative for dizziness, tingling, light-headedness, numbness and paresthesias.   Psychiatric/Behavioral:  Negative for confusion and dysphoric mood.      Objective:   /60 (BP Location: Left arm, Patient Position: Sitting, BP Method: Medium (Manual))   Pulse 88 Comment: manual  Temp 98.6 °F (37 °C) (Temporal)   Ht 5' 2" (1.575 m)   Wt 59.7 kg (131 lb 9.8 oz)   SpO2 97%   BMI 24.07 kg/m²      Physical Exam  Constitutional:       General: She is not in acute distress.     Appearance: Normal appearance. She is not ill-appearing.   HENT:      Head: Normocephalic.   Cardiovascular:      Rate and Rhythm: Normal rate and regular rhythm.      Pulses: Normal pulses.      Heart sounds: Normal heart sounds. No murmur heard.    No friction rub. No gallop.   Pulmonary:      Effort: Pulmonary effort is normal. No respiratory distress.      Breath sounds: Normal breath sounds. No wheezing.   Abdominal:      Palpations: Abdomen is soft.      Tenderness: There is no abdominal tenderness. There is no right CVA tenderness, left CVA tenderness or guarding.   Musculoskeletal:      Lumbar back: Tenderness (bilateral SI/muscular TTP) present. No bony tenderness. Decreased range of motion.   Skin:     General: Skin is warm and dry.      Coloration: Skin is not pale.      Findings: No erythema.   Neurological:      Mental Status: She is alert and oriented to person, place, and time.   Psychiatric:         Mood and Affect: Mood normal.         Behavior: Behavior normal.       Assessment & Plan     Problem List Items Addressed This Visit          Neuro    Vestibular migraine    Relevant Orders    Ambulatory referral/consult to Outpatient Case Management       Psychiatric    Depression    Current Assessment & Plan     Stable on current medications. Improved since change in living situation.      " "      Cardiac/Vascular    Hyperlipidemia    Current Assessment & Plan     Previous lipid panel reviewed. On statin.             Renal/    CKD (chronic kidney disease) stage 3, GFR 30-59 ml/min    Current Assessment & Plan     Labs today. Do not think contributor to symptoms.          Relevant Orders    Ambulatory referral/consult to Outpatient Case Management    COMPREHENSIVE METABOLIC PANEL       Orthopedic    Chronic bilateral low back pain with left-sided sciatica - Primary    Current Assessment & Plan     Stretches  Heating pad  Massages  Baseline xray today. Will check UA to rule out UTI.   If no concerns, will send muscle relaxer. Avoid nsaids with CKD.            Relevant Orders    Ambulatory referral/consult to Outpatient Case Management    X-Ray Lumbar Spine 2 Or 3 Views    Urinalysis, Reflex to Urine Culture Urine, Clean Catch     Other Visit Diagnoses       Cigarette nicotine dependence without complication        Relevant Orders    CT Chest Lung Screening Low Dose          Follow up in about 6 months (around 8/13/2023), or if symptoms worsen or fail to improve.            Portions of this note may have been created with voice recognition software. Occasional "wrong-word" or "sound-a-like" substitutions may have occurred due to the inherent limitations of voice recognition software. Please, read the note carefully and recognize, using context, where substitutions have occurred.       "

## 2023-02-14 ENCOUNTER — PATIENT MESSAGE (OUTPATIENT)
Dept: INTERNAL MEDICINE | Facility: CLINIC | Age: 68
End: 2023-02-14
Payer: MEDICARE

## 2023-02-14 ENCOUNTER — OUTPATIENT CASE MANAGEMENT (OUTPATIENT)
Dept: ADMINISTRATIVE | Facility: OTHER | Age: 68
End: 2023-02-14
Payer: MEDICARE

## 2023-02-14 ENCOUNTER — TELEPHONE (OUTPATIENT)
Dept: OBSTETRICS AND GYNECOLOGY | Facility: CLINIC | Age: 68
End: 2023-02-14
Payer: MEDICARE

## 2023-02-14 DIAGNOSIS — R91.8 OTHER NONSPECIFIC ABNORMAL FINDING OF LUNG FIELD: Primary | ICD-10-CM

## 2023-02-14 DIAGNOSIS — M54.42 CHRONIC BILATERAL LOW BACK PAIN WITH LEFT-SIDED SCIATICA: Primary | ICD-10-CM

## 2023-02-14 DIAGNOSIS — G89.29 CHRONIC BILATERAL LOW BACK PAIN WITH LEFT-SIDED SCIATICA: Primary | ICD-10-CM

## 2023-02-14 DIAGNOSIS — R91.8 ABNORMAL CT LUNG SCREENING: ICD-10-CM

## 2023-02-14 NOTE — LETTER
February 14, 2023 February 14, 2023    Kenia Kim Alonzo  [address]  [place] [address]      Dear Ms. Clinton,     Welcome to [facility] Complex Care Management Program.  It was a pleasure talking with you today.  My name is Arturo Zaman. I look forward to working with you as your .  My goal is to help you function at the healthiest and highest level possible.  You can contact me directly at [phone].    As an [facility] patient with [employer], some of the services we may be able to provide include:      Development of an individualized care plan with a Registered Nurse    Connection with a    Connection with available resources and services     Coordinate communication among your care team members    Provide coaching and education    Help you understand your doctors treatment plan   Help you obtain information about your insurance coverage.     All services provided by [facility]s Complex Care Managers and other care team members are coordinated with and communicated to your primary care team.    As part of your enrollment, you will be receiving education materials and more information about these services in your [facility] account, by phone or through the mail.  If you do not wish to participate or receive information, please contact our office at [phone].      Sincerely,        Arturo Zaman, RN  [facility]   Out-patient RN Complex Care Manager

## 2023-02-14 NOTE — PROGRESS NOTES
Outpatient Care Management  Initial Patient Assessment    Patient: Kenia Alonzo  MRN: [id number]  Date of Service: 02/14/2023  Completed by: Arturo Zaman RN  Referral Date: 02/13/2023  Program: High Risk  Status: Ongoing  Effective Dates: 2/14/2023 - present  Responsible Staff: Arturo Zaman RN        Reason for Visit   Patient presents with    OPCM Enrollment Call    Initial Assessment    OPCM Chart Review    PHQ-9    Plan Of Care    OPCM Welcome Letter       Brief Summary:  Kenia Alonzo was referred by Almita Izaguirre for back pain. Patient qualifies for program based on her risk score of 60.6%.   Active problem list, medical, surgical and social history reviewed.  Areas of need identified by patient include pain education.   Complex care plan created with patient/caregiver input. By next encounter, patient agrees to review mailed out education and enroll in PT.   Next steps: Mail:  Welcome letter  Pain education  Follow up in two weeks

## 2023-02-14 NOTE — TELEPHONE ENCOUNTER
----- Message from Carmela Goldsmith sent at 2/13/2023  4:37 PM CST -----  Contact: Kenia  .Type:  Sooner Apoointment Request    Caller is requesting a sooner appointment.  Caller declined first available appointment listed below.  Caller will not accept being placed on the waitlist and is requesting a message be sent to doctor.  Name of Caller:Kenia  When is the first available appointment?06/21/2023  Symptoms:Annual  Would the patient rather a call back or a response via [facility]? call  Best Call Back Number:.[phone]   Additional Information:   Thanks  LR

## 2023-02-16 NOTE — TELEPHONE ENCOUNTER
----- Message from Conrado Gandhi sent at 2/16/2023 12:44 PM CST -----  Contact: Self -  Patient called in regarding referral to neuro. Please give her a call back at [phone]

## 2023-02-21 ENCOUNTER — PATIENT MESSAGE (OUTPATIENT)
Dept: INTERNAL MEDICINE | Facility: CLINIC | Age: 68
End: 2023-02-21
Payer: MEDICARE

## 2023-02-21 DIAGNOSIS — F17.210 CIGARETTE NICOTINE DEPENDENCE WITHOUT COMPLICATION: Primary | ICD-10-CM

## 2023-02-21 RX ORDER — VARENICLINE TARTRATE 1 MG/1
1 TABLET, FILM COATED ORAL 2 TIMES DAILY
Qty: 60 TABLET | Refills: 1 | Status: SHIPPED | OUTPATIENT
Start: 2023-02-21 | End: 2023-02-22 | Stop reason: SDUPTHER

## 2023-02-22 ENCOUNTER — PATIENT MESSAGE (OUTPATIENT)
Dept: INTERNAL MEDICINE | Facility: CLINIC | Age: 68
End: 2023-02-22
Payer: MEDICARE

## 2023-02-22 DIAGNOSIS — F17.210 CIGARETTE NICOTINE DEPENDENCE WITHOUT COMPLICATION: ICD-10-CM

## 2023-02-22 RX ORDER — VARENICLINE TARTRATE 1 MG/1
1 TABLET, FILM COATED ORAL 2 TIMES DAILY
Qty: 60 TABLET | Refills: 1 | Status: SHIPPED | OUTPATIENT
Start: 2023-02-22 | End: 2024-03-11

## 2023-02-26 ENCOUNTER — PATIENT MESSAGE (OUTPATIENT)
Dept: OBSTETRICS AND GYNECOLOGY | Facility: CLINIC | Age: 68
End: 2023-02-26
Payer: MEDICARE

## 2023-02-28 ENCOUNTER — OUTPATIENT CASE MANAGEMENT (OUTPATIENT)
Dept: ADMINISTRATIVE | Facility: OTHER | Age: 68
End: 2023-02-28
Payer: MEDICARE

## 2023-03-07 RX ORDER — METHYLPREDNISOLONE 4 MG/1
TABLET ORAL
COMMUNITY
Start: 2023-02-23 | End: 2023-05-01

## 2023-03-07 RX ORDER — PROMETHAZINE HYDROCHLORIDE 25 MG/1
TABLET ORAL
COMMUNITY
Start: 2023-02-23

## 2023-03-08 ENCOUNTER — PATIENT MESSAGE (OUTPATIENT)
Dept: PSYCHIATRY | Facility: CLINIC | Age: 68
End: 2023-03-08
Payer: MEDICARE

## 2023-03-10 ENCOUNTER — TELEPHONE (OUTPATIENT)
Dept: PSYCHIATRY | Facility: CLINIC | Age: 68
End: 2023-03-10
Payer: MEDICARE

## 2023-03-10 NOTE — TELEPHONE ENCOUNTER
----- Message from Roma Jean sent at 3/10/2023  3:55 PM CST -----  General call back    Patient is requesting a call back due to the appt on 5/10 at 8:00 am and would like tor someone to call her back and see if there a different time. Patient can be reached at [phone]

## 2023-03-13 ENCOUNTER — TELEPHONE (OUTPATIENT)
Dept: NEUROLOGY | Facility: CLINIC | Age: 68
End: 2023-03-13
Payer: MEDICARE

## 2023-03-13 NOTE — TELEPHONE ENCOUNTER
----- Message from Deborah Chappell sent at 3/13/2023 12:50 PM CDT -----  Contact: Anastasia[facility]  Type:  Pharmacy Calling to Clarify an RX    Name of Caller: Anastasia  Pharmacy Name: [facility]  Prescription Name: Erenumab-aooe (AIMOVIG AUTOINJECTOR) 140 mg/mL autoinjector  What do they need to clarify?: Was delivery damage and a verbal order is needed to re dispense out again  Best Call Back Number: Please call her at [phone] with ref# [id number]  Additional Information:

## 2023-03-13 NOTE — TELEPHONE ENCOUNTER
Verification has been submitted, pharmacy tech jeanne stated they would get the medication process for the patient.    Amovig- Inject 1 pen (140 mg total) into the skin every 28 days.

## 2023-03-14 ENCOUNTER — TELEPHONE (OUTPATIENT)
Dept: PSYCHIATRY | Facility: CLINIC | Age: 68
End: 2023-03-14
Payer: MEDICARE

## 2023-03-14 ENCOUNTER — PATIENT MESSAGE (OUTPATIENT)
Dept: INTERNAL MEDICINE | Facility: CLINIC | Age: 68
End: 2023-03-14
Payer: MEDICARE

## 2023-03-14 DIAGNOSIS — G44.89 CHRONIC MIXED HEADACHE SYNDROME: ICD-10-CM

## 2023-03-14 DIAGNOSIS — G43.809 VESTIBULAR MIGRAINE: ICD-10-CM

## 2023-03-14 RX ORDER — POTASSIUM CHLORIDE 750 MG/1
10 TABLET, EXTENDED RELEASE ORAL DAILY
Qty: 90 TABLET | Refills: 0 | Status: SHIPPED | OUTPATIENT
Start: 2023-03-14 | End: 2023-05-19 | Stop reason: SDUPTHER

## 2023-03-14 RX ORDER — UBROGEPANT 100 MG/1
100 TABLET ORAL
Qty: 10 TABLET | Refills: 2 | Status: SHIPPED | OUTPATIENT
Start: 2023-03-14 | End: 2023-05-23 | Stop reason: SDUPTHER

## 2023-03-14 RX ORDER — UBROGEPANT 100 MG/1
100 TABLET ORAL
Qty: 10 TABLET | Refills: 2 | Status: CANCELLED | OUTPATIENT
Start: 2023-03-14 | End: 2023-09-10

## 2023-03-14 NOTE — TELEPHONE ENCOUNTER
Refill Routing Note   Medication(s) are not appropriate for processing by [facility] for the following reason(s):       Non-participating provider    [facility] action(s):  Route         Appointments  past 12m or future 3m with PCP    Date Provider   Last Visit   2/13/2023 Almita Izaguirre NP   Next Visit   4/4/2023 Almita Izaguirre NP   ED visits in past 90 days: 0        Note composed:10:43 AM 03/14/2023

## 2023-03-21 ENCOUNTER — OUTPATIENT CASE MANAGEMENT (OUTPATIENT)
Dept: ADMINISTRATIVE | Facility: OTHER | Age: 68
End: 2023-03-21
Payer: MEDICARE

## 2023-03-21 NOTE — PROGRESS NOTES
Outpatient Care Management  Plan of Care Follow Up Visit    Patient: Kenia Alonzo  MRN: [id number]  Date of Service: 03/21/2023  Completed by: Arturo Zaman RN  Referral Date: 02/13/2023    Reason for Visit   Patient presents with    OPCM Chart Review    Update Plan Of Care       Brief Summary: Phone contact made with Ms. Alnozo and we discussed her pain care plan. No new issues at this time. Plan to follow up in three weeks.

## 2023-03-23 ENCOUNTER — ANESTHESIA EVENT (OUTPATIENT)
Dept: ENDOSCOPY | Facility: HOSPITAL | Age: 68
End: 2023-03-23
Payer: MEDICARE

## 2023-03-28 DIAGNOSIS — G44.89 CHRONIC MIXED HEADACHE SYNDROME: ICD-10-CM

## 2023-03-28 RX ORDER — ERENUMAB-AOOE 140 MG/ML
140 INJECTION, SOLUTION SUBCUTANEOUS
Qty: 1 ML | Refills: 11 | Status: SHIPPED | OUTPATIENT
Start: 2023-03-28

## 2023-03-30 ENCOUNTER — ANESTHESIA (OUTPATIENT)
Dept: ENDOSCOPY | Facility: HOSPITAL | Age: 68
End: 2023-03-30
Payer: MEDICARE

## 2023-04-03 ENCOUNTER — TELEPHONE (OUTPATIENT)
Dept: ADMINISTRATIVE | Facility: CLINIC | Age: 68
End: 2023-04-03
Payer: MEDICARE

## 2023-04-05 ENCOUNTER — PATIENT MESSAGE (OUTPATIENT)
Dept: NEUROLOGY | Facility: CLINIC | Age: 68
End: 2023-04-05
Payer: MEDICARE

## 2023-04-06 ENCOUNTER — PATIENT MESSAGE (OUTPATIENT)
Dept: INTERNAL MEDICINE | Facility: CLINIC | Age: 68
End: 2023-04-06
Payer: MEDICARE

## 2023-04-11 ENCOUNTER — OUTPATIENT CASE MANAGEMENT (OUTPATIENT)
Dept: ADMINISTRATIVE | Facility: OTHER | Age: 68
End: 2023-04-11
Payer: MEDICARE

## 2023-04-14 ENCOUNTER — OFFICE VISIT (OUTPATIENT)
Dept: OPHTHALMOLOGY | Facility: CLINIC | Age: 68
End: 2023-04-14
Payer: MEDICARE

## 2023-04-14 ENCOUNTER — TELEPHONE (OUTPATIENT)
Dept: OPHTHALMOLOGY | Facility: CLINIC | Age: 68
End: 2023-04-14
Payer: MEDICARE

## 2023-04-14 DIAGNOSIS — H52.4 ASTIGMATISM WITH PRESBYOPIA, BILATERAL: ICD-10-CM

## 2023-04-14 DIAGNOSIS — H52.203 ASTIGMATISM WITH PRESBYOPIA, BILATERAL: ICD-10-CM

## 2023-04-14 DIAGNOSIS — H25.13 NUCLEAR SCLEROSIS, BILATERAL: Primary | ICD-10-CM

## 2023-04-14 DIAGNOSIS — H25.013 CORTICAL AGE-RELATED CATARACT OF BOTH EYES: ICD-10-CM

## 2023-04-14 DIAGNOSIS — Z98.890 HX OF LASIK: ICD-10-CM

## 2023-04-14 DIAGNOSIS — H35.89 MACULAR RPE MOTTLING: ICD-10-CM

## 2023-04-14 DIAGNOSIS — D31.01 NEVUS OF CONJUNCTIVA, RIGHT: ICD-10-CM

## 2023-04-14 PROCEDURE — 92014 COMPRE OPH EXAM EST PT 1/>: CPT | Mod: HCNC,S$GLB,, | Performed by: OPTOMETRIST

## 2023-04-14 PROCEDURE — 1159F MED LIST DOCD IN RCRD: CPT | Mod: HCNC,CPTII,S$GLB, | Performed by: OPTOMETRIST

## 2023-04-14 PROCEDURE — 92134 CPTRZ OPH DX IMG PST SGM RTA: CPT | Mod: HCNC,S$GLB,, | Performed by: OPTOMETRIST

## 2023-04-14 PROCEDURE — 1160F PR REVIEW ALL MEDS BY PRESCRIBER/CLIN PHARMACIST DOCUMENTED: ICD-10-PCS | Mod: HCNC,CPTII,S$GLB, | Performed by: OPTOMETRIST

## 2023-04-14 PROCEDURE — 92015 PR REFRACTION: ICD-10-PCS | Mod: HCNC,S$GLB,, | Performed by: OPTOMETRIST

## 2023-04-14 PROCEDURE — 1159F PR MEDICATION LIST DOCUMENTED IN MEDICAL RECORD: ICD-10-PCS | Mod: HCNC,CPTII,S$GLB, | Performed by: OPTOMETRIST

## 2023-04-14 PROCEDURE — 92134 POSTERIOR SEGMENT OCT RETINA (OCULAR COHERENCE TOMOGRAPHY)-BOTH EYES: ICD-10-PCS | Mod: HCNC,S$GLB,, | Performed by: OPTOMETRIST

## 2023-04-14 PROCEDURE — 1160F RVW MEDS BY RX/DR IN RCRD: CPT | Mod: HCNC,CPTII,S$GLB, | Performed by: OPTOMETRIST

## 2023-04-14 PROCEDURE — 92014 PR EYE EXAM, EST PATIENT,COMPREHESV: ICD-10-PCS | Mod: HCNC,S$GLB,, | Performed by: OPTOMETRIST

## 2023-04-14 PROCEDURE — 92015 DETERMINE REFRACTIVE STATE: CPT | Mod: HCNC,S$GLB,, | Performed by: OPTOMETRIST

## 2023-04-14 PROCEDURE — 99999 PR PBB SHADOW E&M-EST. PATIENT-LVL III: ICD-10-PCS | Mod: PBBFAC,HCNC,, | Performed by: OPTOMETRIST

## 2023-04-14 PROCEDURE — 99999 PR PBB SHADOW E&M-EST. PATIENT-LVL III: CPT | Mod: PBBFAC,HCNC,, | Performed by: OPTOMETRIST

## 2023-04-14 NOTE — PROGRESS NOTES
"HPI     Annual Exam            Comments: Vision changes since last eye exam?: yes, "night vision is   gone"  Any eye pain today: no, just tired eyes and headaches  Other ocular symptoms: floaters when having migraines  Interested in contact lens fitting today? No  Mom has glaucoma          Last edited by Christie Sheldon MA on 4/14/2023  1:53 PM.            Assessment /Plan     For exam results, see Encounter Report.    Nuclear sclerosis, bilateral  Cortical age-related cataract of both eyes  Cataract accounts for vision change. New Rx for glasses/contacts will not improve vision.   Refer to ophthalmologist for cataract evaluation.   Pt requests late summer for cat eval    Nevus of conjunctiva, right  Stable nevus OD  No changes in size or appearance compared to previous exam  Monitor 12 months    Macular RPE mottling  No SRF on mOCT  Recommended AREDS 2  Monitor 12 months    Hx of LASIK  Astigmatism with presbyopia, bilateral  Hold spec Rx      RTC 3 months with [facility] for cataract evaluation or PRN if any problems.   Discussed above and answered questions.                 "

## 2023-04-16 ENCOUNTER — PATIENT MESSAGE (OUTPATIENT)
Dept: INTERNAL MEDICINE | Facility: CLINIC | Age: 68
End: 2023-04-16
Payer: MEDICARE

## 2023-04-17 ENCOUNTER — PATIENT MESSAGE (OUTPATIENT)
Dept: PSYCHIATRY | Facility: CLINIC | Age: 68
End: 2023-04-17
Payer: MEDICARE

## 2023-04-19 ENCOUNTER — HOSPITAL ENCOUNTER (OUTPATIENT)
Facility: HOSPITAL | Age: 68
Discharge: HOME | End: 2023-04-19
Attending: INTERNAL MEDICINE | Admitting: INTERNAL MEDICINE
Payer: MEDICARE

## 2023-04-19 DIAGNOSIS — Z86.010 PERSONAL HISTORY OF COLONIC POLYPS: Primary | ICD-10-CM

## 2023-04-19 DIAGNOSIS — Z80.0 FAMILY HISTORY OF COLON CANCER: ICD-10-CM

## 2023-04-19 PROBLEM — Z86.0100 PERSONAL HISTORY OF COLONIC POLYPS: Status: ACTIVE | Noted: 2023-04-19

## 2023-04-19 PROCEDURE — 45380 PR COLONOSCOPY,BIOPSY: ICD-10-PCS | Mod: PT,HCNC,, | Performed by: INTERNAL MEDICINE

## 2023-04-19 PROCEDURE — 88305 TISSUE EXAM BY PATHOLOGIST: ICD-10-PCS | Mod: 26,HCNC,, | Performed by: PATHOLOGY

## 2023-04-19 PROCEDURE — D9220A PRA ANESTHESIA: ICD-10-PCS | Mod: HCNC,PT,ANES, | Performed by: ANESTHESIOLOGY

## 2023-04-19 PROCEDURE — 88305 TISSUE EXAM BY PATHOLOGIST: CPT | Mod: 26,HCNC,, | Performed by: PATHOLOGY

## 2023-04-19 PROCEDURE — 45380 COLONOSCOPY AND BIOPSY: CPT | Mod: PT,HCNC | Performed by: INTERNAL MEDICINE

## 2023-04-19 PROCEDURE — D9220A PRA ANESTHESIA: Mod: HCNC,PT,CRNA, | Performed by: NURSE ANESTHETIST, CERTIFIED REGISTERED

## 2023-04-19 PROCEDURE — 88305 TISSUE EXAM BY PATHOLOGIST: CPT | Mod: HCNC | Performed by: PATHOLOGY

## 2023-04-19 PROCEDURE — 37000009 HC ANESTHESIA EA ADD 15 MINS: Mod: HCNC | Performed by: INTERNAL MEDICINE

## 2023-04-19 PROCEDURE — D9220A PRA ANESTHESIA: ICD-10-PCS | Mod: HCNC,PT,CRNA, | Performed by: NURSE ANESTHETIST, CERTIFIED REGISTERED

## 2023-04-19 PROCEDURE — 63600175 PHARM REV CODE 636 W HCPCS: Mod: HCNC | Performed by: NURSE ANESTHETIST, CERTIFIED REGISTERED

## 2023-04-19 PROCEDURE — 37000008 HC ANESTHESIA 1ST 15 MINUTES: Mod: HCNC | Performed by: INTERNAL MEDICINE

## 2023-04-19 PROCEDURE — 63600175 PHARM REV CODE 636 W HCPCS: Mod: HCNC | Performed by: INTERNAL MEDICINE

## 2023-04-19 PROCEDURE — 27201012 HC FORCEPS, HOT/COLD, DISP: Mod: HCNC | Performed by: INTERNAL MEDICINE

## 2023-04-19 PROCEDURE — D9220A PRA ANESTHESIA: Mod: HCNC,PT,ANES, | Performed by: ANESTHESIOLOGY

## 2023-04-19 PROCEDURE — 45380 COLONOSCOPY AND BIOPSY: CPT | Mod: PT,HCNC,, | Performed by: INTERNAL MEDICINE

## 2023-04-19 PROCEDURE — 25000003 PHARM REV CODE 250: Mod: HCNC | Performed by: NURSE ANESTHETIST, CERTIFIED REGISTERED

## 2023-04-19 RX ORDER — LIDOCAINE HYDROCHLORIDE 20 MG/ML
INJECTION INTRAVENOUS
Status: DISCONTINUED | OUTPATIENT
Start: 2023-04-19 | End: 2023-04-19

## 2023-04-19 RX ORDER — SODIUM CHLORIDE, SODIUM LACTATE, POTASSIUM CHLORIDE, CALCIUM CHLORIDE 600; 310; 30; 20 MG/100ML; MG/100ML; MG/100ML; MG/100ML
INJECTION, SOLUTION INTRAVENOUS CONTINUOUS
Status: DISCONTINUED | OUTPATIENT
Start: 2023-04-19 | End: 2023-10-27

## 2023-04-19 RX ORDER — PROPOFOL 10 MG/ML
VIAL (ML) INTRAVENOUS
Status: DISCONTINUED | OUTPATIENT
Start: 2023-04-19 | End: 2023-04-19

## 2023-04-19 RX ADMIN — PROPOFOL 30 MG: 10 INJECTION, EMULSION INTRAVENOUS at 10:04

## 2023-04-19 RX ADMIN — SODIUM CHLORIDE, POTASSIUM CHLORIDE, SODIUM LACTATE AND CALCIUM CHLORIDE: 600; 310; 30; 20 INJECTION, SOLUTION INTRAVENOUS at 10:04

## 2023-04-19 RX ADMIN — LIDOCAINE HYDROCHLORIDE 20 MG: 20 INJECTION INTRAVENOUS at 10:04

## 2023-04-19 RX ADMIN — PROPOFOL 10 MG: 10 INJECTION, EMULSION INTRAVENOUS at 10:04

## 2023-04-19 RX ADMIN — PROPOFOL 80 MG: 10 INJECTION, EMULSION INTRAVENOUS at 10:04

## 2023-04-19 NOTE — ANESTHESIA POSTPROCEDURE EVALUATION
Anesthesia Post Evaluation    Patient: Kenia Alonzo    Procedure(s) Performed: Procedure(s) (LRB):  COLONOSCOPY (N/A)    Final Anesthesia Type: general      Patient location during evaluation: GI PACU  Patient participation: Yes- Able to Participate  Level of consciousness: awake  Post-procedure vital signs: reviewed and stable  Pain management: adequate  Airway patency: patent    PONV status at discharge: No PONV  Anesthetic complications: no      Cardiovascular status: stable  Respiratory status: unassisted  Hydration status: euvolemic  Follow-up not needed.          Vitals Value Taken Time   /62 04/19/23 1114   Temp 37 °C (98.6 °F) 04/19/23 1100   Pulse 59 04/19/23 1115   Resp 13 04/19/23 1115   SpO2 99 % 04/19/23 1115   Vitals shown include unvalidated device data.      No case tracking events are documented in the log.      Pain/Fan Score: Fan Score: 9 (4/19/2023 11:00 AM)

## 2023-04-19 NOTE — H&P
Short Stay Endoscopy History and Physical    PCP - Almita Izaguirre NP    Procedure - Colonoscopy  ASA - 2  Mallampati - per anesthesia  History of Anesthesia problems - no  Family history Anesthesia problems -  no     HPI:  This is a 67 y.o. female here for evaluation of :   Active Hospital Problems    Diagnosis  POA    *Personal history of colonic polyps [Z86.010]  Not Applicable      Resolved Hospital Problems   No resolved problems to display.         Health Maintenance         Date Due Completion Date    DEXA Scan Never done ---    COVID-19 Vaccine (4 - Booster for Moderna series) 2022    Colorectal Cancer Screening 03/15/2022 3/15/2021    Mammogram 2023    Lipid Panel 10/28/2023 10/28/2022    LDCT Lung Screen 2024    TETANUS VACCINE 2029              ROS:  CONSTITUTIONAL: Denies weight change,  fatigue, fevers, chills, night sweats.  CARDIOVASCULAR: Denies chest pain, shortness of breath, orthopnea and edema.  RESPIRATORY: Denies cough, hemoptysis, dyspnea, and wheezing.  GI: See HPI.    Medical History:   Past Medical History:   Diagnosis Date    Arthritis     knee    Asthma     childhood     Depression     Digestive disorder     Encounter for blood transfusion     General anesthetics causing adverse effect in therapeutic use     severe headache after crainiotomy    GI problem     IBS    Gout     Headache     Hyperlipidemia     Meniere disease     MVP (mitral valve prolapse)     minor    Renal disorder     Vertigo        Surgical History:   Past Surgical History:   Procedure Laterality Date    BRAIN SURGERY      vestibular neurectomy    BREAST BIOPSY      15 years ago?  no visible scar    BREAST SURGERY      benign     SECTION      x 1    CHOLECYSTECTOMY      COLONOSCOPY N/A 3/15/2021    Procedure: COLONOSCOPY;  Surgeon: Mita Stoddard MD;  Location: [facility];  Service: Endoscopy;  Laterality: N/A;    EXAMINATION UNDER  ANESTHESIA N/A 8/12/2020    Procedure: EXAM UNDER ANESTHESIA;  Surgeon: Lucy Crane MD;  Location: [facility] OR;  Service: OB/GYN;  Laterality: N/A;    LASER ABLATION N/A 8/12/2020    Procedure: ABLATION, USING LASER;  Surgeon: Lucy Crane MD;  Location: [facility] OR;  Service: OB/GYN;  Laterality: N/A;    TONSILLECTOMY         Family History:   Family History   Problem Relation Age of Onset    Colon cancer Father     Diabetes Father     Cancer Sister     Diabetes Sister        Social History:   Social History     Tobacco Use    Smoking status: Former     Packs/day: 0.50     Years: 43.00     Pack years: 21.50     Types: Cigarettes     Start date: 1/1/1976     Quit date: 1/25/2020     Years since quitting: 3.2    Smokeless tobacco: Never    Tobacco comments:     Quit 1/26/20; quit again mid March 2021   Substance Use Topics    Alcohol use: Not Currently     Alcohol/week: 1.0 - 3.0 standard drink     Types: 1 - 3 Glasses of wine per week     Comment: social few times monthly.   No alcohol 72h prior to sx    Drug use: No       Allergies:   Review of patient's allergies indicates:   Allergen Reactions    Demerol [meperidine] Nausea And Vomiting     Pt refuses Demerol     Codeine Itching       Medications:   No current facility-administered medications on file prior to encounter.     Current Outpatient Medications on File Prior to Encounter   Medication Sig Dispense Refill    buPROPion (WELLBUTRIN XL) 150 MG TB24 tablet Take 3 tablets (450 mg total) by mouth once daily. 90 tablet 3    busPIRone (BUSPAR) 30 MG Tab Take 1 tablet (30 mg total) by mouth 2 (two) times daily. 180 tablet 3    diazePAM (VALIUM) 2 MG tablet Take 1 tablet (2 mg total) by mouth every 6 (six) hours as needed (dizziness/Meniere's attack). 30 tablet 3    levocetirizine (XYZAL) 5 MG tablet Take 1 tablet (5 mg total) by mouth every morning. 30 tablet 3    montelukast (SINGULAIR) 10 mg tablet Take 1 tablet (10 mg total) by mouth every evening. 90 tablet  3    promethazine (PHENERGAN) 25 MG tablet       simvastatin (ZOCOR) 5 MG tablet Take 1 tablet (5 mg total) by mouth every evening. 90 tablet 3    topiramate (TROKENDI XR) 100 mg Cp24 Take 1 capsule (100 mg total) by mouth once daily. 90 capsule 1    zolpidem (AMBIEN) 5 MG Tab Take 1/2 to 1 tablet at bedtime as needed for sleepTake 1/2 to 1 tablet at bedtime as needed for sleep 30 tablet 3    acyclovir 5% (ZOVIRAX) 5 % ointment Apply topically 5 (five) times daily. 30 g 1    albuterol (VENTOLIN HFA) 90 mcg/actuation inhaler Inhale 2 puffs into the lungs every 6 (six) hours as needed for Wheezing. Rescue 18 g 0    azelastine (ASTELIN) 137 mcg (0.1 %) nasal spray 1 spray (137 mcg total) by Nasal route 2 (two) times daily. 30 mL 11    C/sourcherry/celery/grape seed (TART CHERRY ORAL) Take 1 tablet by mouth daily as needed.       clotrimazole-betamethasone 1-0.05% (LOTRISONE) cream Apply to affected area 2 times daily (Patient not taking: Reported on 2/13/2023) 15 g 1    co-enzyme Q-10 30 mg capsule Take 30 mg by mouth 3 (three) times daily.      conjugated estrogens (PREMARIN) vaginal cream Place 1 g vaginally twice a week. 30 g 3    estradiol-norethindrone (COMBIPATCH) 0.05-0.14 mg/24 hr Place 1 patch onto the skin twice a week. 24 patch 4    fluticasone propionate (FLONASE) 50 mcg/actuation nasal spray 2 sprays (100 mcg total) by Each Nostril route 2 (two) times daily. 9.9 mL 11    ketorolac (TORADOL) 10 mg tablet TAKE ONE TABLET BY MOUTH ONCE DAILY AS NEEDED FOR PAIN (MAX TWO TIMES A WEEK) (Patient not taking: Reported on 2/13/2023) 8 tablet 3    LIDOcaine-prilocaine (EMLA) cream       linaCLOtide (LINZESS) 145 mcg Cap capsule Take 1 capsule (145 mcg total) by mouth before breakfast. 30 capsule 5    magnesium 30 mg Tab Take by mouth once.      MELATONIN ORAL Take by mouth nightly as needed.       methylPREDNISolone (MEDROL DOSEPACK) 4 mg tablet       omega-3 fatty acids/fish oil (FISH OIL-OMEGA-3 FATTY ACIDS)  300-1,000 mg capsule Take 1 capsule by mouth once daily.      vitamin B complex/folic acid (B COMPLEX 100 ORAL) Take by mouth nightly.      zinc gluconate 50 mg tablet Take 50 mg by mouth once daily.         Physical Exam:  Vital Signs:   Vitals:    04/19/23 0947   BP: (!) 112/54   Pulse: 81   Resp: 18   Temp: 97.1 °F (36.2 °C)     General Appearance: Well appearing in no acute distress  ENT: OP clear  Chest: CTA B  CV: RRR, no m/r/g  Abd: s/nt/nd/nabs  Ext: no edema    Labs:Reviewed    IMP:  Active Hospital Problems    Diagnosis  POA    *Personal history of colonic polyps [Z86.010]  Not Applicable      Resolved Hospital Problems   No resolved problems to display.         Plan:   I have explained the risks and benefits of colonoscopy to the patient including but not limited to bleeding, perforation, infection, and death. The patient wishes to proceed.

## 2023-04-19 NOTE — DISCHARGE SUMMARY
[facility] - Endoscopy 1st Fl  Discharge Note  Short Stay    Procedure(s) (LRB):  COLONOSCOPY (N/A)      OUTCOME: Patient tolerated treatment/procedure well without complication and is now ready for discharge.    DISPOSITION: Home or Self Care    FINAL DIAGNOSIS:  Personal history of colonic polyps    FOLLOWUP: With primary care provider    DISCHARGE INSTRUCTIONS:  No discharge procedures on file.      Clinical Reference Documents Added to Patient Instructions         Document    COLON POLYPS (ENGLISH)    DIVERTICULOSIS (ENGLISH)

## 2023-04-19 NOTE — [employer] PATIENT INSTRUCTIONS
Discharge Summary/Instructions after an Endoscopic Procedure  Patient Name: Kenia Alonzo  Patient MRN: [id number]  Patient YOB: 1955 Wednesday, April 19, 2023  Mita Stoddard MD  Dear patient,  As a result of recent federal legislation (The Federal Cures Act), you may   receive lab or pathology results from your procedure in your [facility]   account before your physician is able to contact you. Your physician or   their representative will relay the results to you with their   recommendations at their soonest availability.  Thank you,  RESTRICTIONS:  During your procedure today, you received medications for sedation.  These   medications may affect your judgment, balance and coordination.  Therefore,   for 24 hours, you have the following restrictions:   - DO NOT drive a car, operate machinery, make legal/financial decisions,   sign important papers or drink alcohol.    ACTIVITY:  Today: no heavy lifting, straining or running due to procedural   sedation/anesthesia.  The following day: return to full activity including work.  DIET:  Eat and drink normally unless instructed otherwise.     TREATMENT FOR COMMON SIDE EFFECTS:  - Mild abdominal pain, nausea, belching, bloating or excessive gas:  rest,   eat lightly and use a heating pad.  - Sore Throat: treat with throat lozenges and/or gargle with warm salt   water.  - Because air was used during the procedure, expelling large amounts of air   from your rectum or belching is normal.  - If a bowel prep was taken, you may not have a bowel movement for 1-3 days.    This is normal.  SYMPTOMS TO WATCH FOR AND REPORT TO YOUR PHYSICIAN:  1. Abdominal pain or bloating, other than gas cramps.  2. Chest pain.  3. Back pain.  4. Signs of infection such as: chills or fever occurring within 24 hours   after the procedure.  5. Rectal bleeding, which would show as bright red, maroon, or black stools.   (A tablespoon of blood from the rectum is not serious, especially  if   hemorrhoids are present.)  6. Vomiting.  7. Weakness or dizziness.  GO DIRECTLY TO THE NEAREST EMERGENCY ROOM IF YOU HAVE ANY OF THE FOLLOWING:      Difficulty breathing              Chills and/or fever over 101 F   Persistent vomiting and/or vomiting blood   Severe abdominal pain   Severe chest pain   Black, tarry stools   Bleeding- more than one tablespoon   Any other symptom or condition that you feel may need urgent attention  Your doctor recommends these additional instructions:  If any biopsies were taken, your doctors clinic will contact you in 1 to 2   weeks with any results.  - Discharge patient to home (via wheelchair).   - Resume previous diet.   - Continue present medications.   - Await pathology results.   - Repeat colonoscopy in 1 year because the bowel preparation was suboptimal.     - Telephone GI clinic for pathology results in 2 weeks.   - Patient has a contact number available for emergencies.  The signs and   symptoms of potential delayed complications were discussed with the   patient.  Return to normal activities tomorrow.  Written discharge   instructions were provided to the patient.  For questions, problems or results please call your physician Mita Stoddard MD at Work:  [phone]  If you have any questions about the above instructions, call the GI   department at [phone] or call the endoscopy unit at [phone]   from 7am until 3 pm.  [facility] [place], EMERGENCY ROOM PHONE NUMBER:   [phone]  IF A COMPLICATION OR EMERGENCY SITUATION ARISES AND YOU ARE UNABLE TO REACH   YOUR PHYSICIAN - GO DIRECTLY TO THE EMERGENCY ROOM.  I have read or have had read to me these discharge instructions for my   procedure and have received a written copy.  I understand these   instructions and will follow-up with my physician if I have any questions.     __________________________________       _____________________________________  Nurse Signature                                           Patient/Designated   Responsible Party Signature  MD Mita Parsons MD  4/19/2023 10:58:12 AM  [employer]

## 2023-04-19 NOTE — TRANSFER OF CARE
"Anesthesia Transfer of Care Note    Patient: Kenia Alonzo    Procedure(s) Performed: Procedure(s) (LRB):  COLONOSCOPY (N/A)    Patient location: PACU    Anesthesia Type: general    Transport from OR: Transported from OR on room air with adequate spontaneous ventilation    Post pain: adequate analgesia    Post assessment: no apparent anesthetic complications    Post vital signs: stable    Level of consciousness: awake    Nausea/Vomiting: no nausea/vomiting    Complications: none    Transfer of care protocol was followed      Last vitals:   Visit Vitals  BP (!) 112/54 (BP Location: Right leg, Patient Position: Sitting)   Pulse 81   Temp 36.2 °C (97.1 °F) (Temporal)   Resp 18   Ht 5' 2" (1.575 m)   Wt 58 kg (127 lb 13.9 oz)   SpO2 97%   Breastfeeding No   BMI 23.39 kg/m²     "

## 2023-04-21 ENCOUNTER — HOSPITAL ENCOUNTER (OUTPATIENT)
Dept: RADIOLOGY | Facility: HOSPITAL | Age: 68
Discharge: HOME | End: 2023-04-21
Attending: UROLOGY
Payer: MEDICARE

## 2023-04-21 DIAGNOSIS — N28.1 BILATERAL RENAL CYSTS: ICD-10-CM

## 2023-04-21 PROCEDURE — 76770 US EXAM ABDO BACK WALL COMP: CPT | Mod: TC,HCNC,PO

## 2023-04-21 PROCEDURE — 76770 US RETROPERITONEAL COMPLETE: ICD-10-PCS | Mod: 26,HCNC,, | Performed by: STUDENT IN AN ORGANIZED HEALTH CARE EDUCATION/TRAINING PROGRAM

## 2023-04-21 PROCEDURE — 76770 US EXAM ABDO BACK WALL COMP: CPT | Mod: 26,HCNC,, | Performed by: STUDENT IN AN ORGANIZED HEALTH CARE EDUCATION/TRAINING PROGRAM

## 2023-04-24 ENCOUNTER — PATIENT MESSAGE (OUTPATIENT)
Dept: UROLOGY | Facility: CLINIC | Age: 68
End: 2023-04-24
Payer: MEDICARE

## 2023-04-24 ENCOUNTER — PATIENT MESSAGE (OUTPATIENT)
Dept: OBSTETRICS AND GYNECOLOGY | Facility: CLINIC | Age: 68
End: 2023-04-24
Payer: MEDICARE

## 2023-04-24 ENCOUNTER — PATIENT MESSAGE (OUTPATIENT)
Dept: NEUROLOGY | Facility: CLINIC | Age: 68
End: 2023-04-24
Payer: MEDICARE

## 2023-04-25 ENCOUNTER — TELEPHONE (OUTPATIENT)
Dept: PAIN MEDICINE | Facility: CLINIC | Age: 68
End: 2023-04-25
Payer: MEDICARE

## 2023-04-26 ENCOUNTER — PATIENT MESSAGE (OUTPATIENT)
Dept: PAIN MEDICINE | Facility: CLINIC | Age: 68
End: 2023-04-26

## 2023-04-26 VITALS
BODY MASS INDEX: 23.53 KG/M2 | HEART RATE: 68 BPM | DIASTOLIC BLOOD PRESSURE: 62 MMHG | HEIGHT: 62 IN | WEIGHT: 127.88 LBS | RESPIRATION RATE: 16 BRPM | OXYGEN SATURATION: 100 % | SYSTOLIC BLOOD PRESSURE: 127 MMHG | TEMPERATURE: 99 F

## 2023-04-27 ENCOUNTER — PATIENT MESSAGE (OUTPATIENT)
Dept: GASTROENTEROLOGY | Facility: CLINIC | Age: 68
End: 2023-04-27
Payer: MEDICARE

## 2023-04-27 LAB
FINAL PATHOLOGIC DIAGNOSIS: NORMAL
Lab: NORMAL

## 2023-04-28 ENCOUNTER — PATIENT MESSAGE (OUTPATIENT)
Dept: INTERNAL MEDICINE | Facility: CLINIC | Age: 68
End: 2023-04-28
Payer: MEDICARE

## 2023-05-01 ENCOUNTER — APPOINTMENT (OUTPATIENT)
Dept: RADIOLOGY | Facility: HOSPITAL | Age: 68
End: 2023-05-01
Attending: OBSTETRICS & GYNECOLOGY
Payer: MEDICARE

## 2023-05-01 ENCOUNTER — PATIENT MESSAGE (OUTPATIENT)
Dept: INTERNAL MEDICINE | Facility: CLINIC | Age: 68
End: 2023-05-01
Payer: MEDICARE

## 2023-05-01 ENCOUNTER — OFFICE VISIT (OUTPATIENT)
Dept: OTOLARYNGOLOGY | Facility: CLINIC | Age: 68
End: 2023-05-01
Payer: MEDICARE

## 2023-05-01 VITALS — TEMPERATURE: 99 F | HEIGHT: 62 IN | WEIGHT: 127.88 LBS | BODY MASS INDEX: 23.53 KG/M2

## 2023-05-01 DIAGNOSIS — Z13.820 OSTEOPOROSIS SCREENING: ICD-10-CM

## 2023-05-01 DIAGNOSIS — H61.21 IMPACTED CERUMEN OF RIGHT EAR: ICD-10-CM

## 2023-05-01 DIAGNOSIS — H81.01 MENIERE'S DISEASE OF RIGHT EAR: Primary | ICD-10-CM

## 2023-05-01 DIAGNOSIS — J30.2 SEASONAL ALLERGIC RHINITIS, UNSPECIFIED TRIGGER: ICD-10-CM

## 2023-05-01 PROCEDURE — 3008F BODY MASS INDEX DOCD: CPT | Mod: CPTII,S$GLB,, | Performed by: PHYSICIAN ASSISTANT

## 2023-05-01 PROCEDURE — 77080 DXA BONE DENSITY AXIAL: CPT | Mod: TC

## 2023-05-01 PROCEDURE — 69210 PR REMOVAL IMPACTED CERUMEN REQUIRING INSTRUMENTATION, UNILATERAL: ICD-10-PCS | Mod: S$GLB,,, | Performed by: PHYSICIAN ASSISTANT

## 2023-05-01 PROCEDURE — 1159F MED LIST DOCD IN RCRD: CPT | Mod: CPTII,S$GLB,, | Performed by: PHYSICIAN ASSISTANT

## 2023-05-01 PROCEDURE — 99999 PR PBB SHADOW E&M-EST. PATIENT-LVL IV: ICD-10-PCS | Mod: PBBFAC,,, | Performed by: PHYSICIAN ASSISTANT

## 2023-05-01 PROCEDURE — 3288F PR FALLS RISK ASSESSMENT DOCUMENTED: ICD-10-PCS | Mod: CPTII,S$GLB,, | Performed by: PHYSICIAN ASSISTANT

## 2023-05-01 PROCEDURE — 99999 PR PBB SHADOW E&M-EST. PATIENT-LVL IV: CPT | Mod: PBBFAC,,, | Performed by: PHYSICIAN ASSISTANT

## 2023-05-01 PROCEDURE — 77080 DXA BONE DENSITY AXIAL: CPT | Mod: 26,,, | Performed by: RADIOLOGY

## 2023-05-01 PROCEDURE — 3288F FALL RISK ASSESSMENT DOCD: CPT | Mod: CPTII,S$GLB,, | Performed by: PHYSICIAN ASSISTANT

## 2023-05-01 PROCEDURE — 1101F PR PT FALLS ASSESS DOC 0-1 FALLS W/OUT INJ PAST YR: ICD-10-PCS | Mod: CPTII,S$GLB,, | Performed by: PHYSICIAN ASSISTANT

## 2023-05-01 PROCEDURE — 99214 OFFICE O/P EST MOD 30 MIN: CPT | Mod: 25,S$GLB,, | Performed by: PHYSICIAN ASSISTANT

## 2023-05-01 PROCEDURE — 1101F PT FALLS ASSESS-DOCD LE1/YR: CPT | Mod: CPTII,S$GLB,, | Performed by: PHYSICIAN ASSISTANT

## 2023-05-01 PROCEDURE — 77080 DEXA BONE DENSITY SPINE HIP: ICD-10-PCS | Mod: 26,,, | Performed by: RADIOLOGY

## 2023-05-01 PROCEDURE — 99214 PR OFFICE/OUTPT VISIT, EST, LEVL IV, 30-39 MIN: ICD-10-PCS | Mod: 25,S$GLB,, | Performed by: PHYSICIAN ASSISTANT

## 2023-05-01 PROCEDURE — 1159F PR MEDICATION LIST DOCUMENTED IN MEDICAL RECORD: ICD-10-PCS | Mod: CPTII,S$GLB,, | Performed by: PHYSICIAN ASSISTANT

## 2023-05-01 PROCEDURE — 3008F PR BODY MASS INDEX (BMI) DOCUMENTED: ICD-10-PCS | Mod: CPTII,S$GLB,, | Performed by: PHYSICIAN ASSISTANT

## 2023-05-01 PROCEDURE — 69210 REMOVE IMPACTED EAR WAX UNI: CPT | Mod: S$GLB,,, | Performed by: PHYSICIAN ASSISTANT

## 2023-05-01 RX ORDER — AZELASTINE 1 MG/ML
1 SPRAY, METERED NASAL 2 TIMES DAILY
Qty: 30 ML | Refills: 11 | Status: SHIPPED | OUTPATIENT
Start: 2023-05-01 | End: 2024-04-30

## 2023-05-01 RX ORDER — FLUTICASONE PROPIONATE 50 MCG
2 SPRAY, SUSPENSION (ML) NASAL DAILY
Qty: 16 G | Refills: 11 | Status: SHIPPED | OUTPATIENT
Start: 2023-05-01 | End: 2023-10-05

## 2023-05-01 RX ORDER — PREDNISONE 5 MG/1
TABLET ORAL
Qty: 9 TABLET | Refills: 0 | Status: SHIPPED | OUTPATIENT
Start: 2023-05-01 | End: 2023-05-05 | Stop reason: ALTCHOICE

## 2023-05-01 NOTE — PROGRESS NOTES
Subjective     Patient ID: Kenia Alonzo is a 67 y.o. female.    Chief Complaint: Cerumen Impaction    Patient is a 67 year old female seen today for evaluation of worsening allergy symptoms over past one week.  She also says that she has recently had a flare of her Menieres and migraines all last week, gradually improving but still not yet back to baseline.  She is following with Neurology closely, and has made progress with her medication.  She has also obtained new hearing aids, and is very happy with their fit but was told she needed to have cerumen removed from her right ear.  No ear pain or drainage.  Following with outside audiology.  Tinnitus better with hearing aids.  She is following low salt/low caffeine diet.  She reports allergy symptoms (nasal congestion, itchy, watery eyes and sneezing) increased last week after brushing her dog.  She is using Flonase and Astelin daily (requests refills), Singulair daily and recently changed from Xyzal to Allegra.  No fever or cough.    Review of Systems   Constitutional:  Negative for fever.   HENT:  Positive for facial swelling, hearing loss, postnasal drip and sinus pressure/congestion. Negative for ear discharge and ear pain.    Eyes:  Positive for photophobia and itching.   Respiratory: Negative.  Negative for cough and shortness of breath.    Cardiovascular: Negative.    Gastrointestinal:  Positive for abdominal pain and diarrhea.   Endocrine: Negative.    Genitourinary: Negative.    Integumentary:  Positive for rash.   Allergic/Immunologic: Positive for food allergies.   Neurological:  Positive for dizziness, light-headedness and headaches.   Hematological:  Bruises/bleeds easily.   Psychiatric/Behavioral:  Positive for decreased concentration.         Objective     Physical Exam  Constitutional:       Appearance: She is well-developed. She is not diaphoretic.   HENT:      Head: Normocephalic and atraumatic.      Right Ear: No decreased hearing noted. There  is impacted cerumen (removal described below).      Left Ear: No decreased hearing noted.  No middle ear effusion. Tympanic membrane is not injected, perforated or erythematous.      Nose: Mucosal edema present. No congestion or rhinorrhea.      Right Sinus: No maxillary sinus tenderness or frontal sinus tenderness.      Left Sinus: No maxillary sinus tenderness or frontal sinus tenderness.      Mouth/Throat:      Mouth: Mucous membranes are moist.      Pharynx: Oropharynx is clear.   Eyes:      General: Lids are normal.      Conjunctiva/sclera: Conjunctivae normal.   Pulmonary:      Effort: Pulmonary effort is normal. No respiratory distress.   Lymphadenopathy:      Cervical: No cervical adenopathy.   Skin:     Findings: No rash.   Neurological:      Mental Status: She is alert.   Psychiatric:         Attention and Perception: Attention normal.         Speech: Speech normal.         Behavior: Behavior normal.         Thought Content: Thought content normal.         Procedure Note    CHIEF COMPLAINT:  Cerumen Impaction    Description:  The patient was seated in an exam chair.  An ear speculum was placed in the right EAC and was examined under the microscope.  Alligator forceps were used to remove a large cerumen impaction.  The tympanic membrane was visualized and was normal in appearance.    The patient tolerated the procedure well.         Assessment and Plan     Problem List Items Addressed This Visit          ENT    Meniere's disease of right ear - Primary     Other Visit Diagnoses       Seasonal allergic rhinitis, unspecified trigger        Impacted cerumen of right ear                 Cerumen impaction:  Removed today without difficulty.  I would recommend the use of a wax softening drop, either over the counter Debrox or mineral oil, on a weekly basis.  I also instructed the patient to avoid Qtips.    Continue to follow with audiology for hearing aid adjustment.  Will send in few days of oral steroids for  Meniere's flare; discussed risks involved with steroid use.  She is taking Valium as well that is currently prescribed by Psych.  Continue to follow with neurology for headaches.   I would recommend she increase her Astelin and Flonase to twice daily (refills sent in as requested) while more symptomatic and continue daily Allegra and Singulair as well.  Recommend she call or email with any worsening symptoms.   Answers submitted by the patient for this visit:  Review of Symptoms Questionnaire  (Submitted on 5/1/2023)  Fatigue (Tiredness)?: Yes  Seasonal Allergies?: Yes  Feeling depressed?: Yes

## 2023-05-02 ENCOUNTER — TELEPHONE (OUTPATIENT)
Dept: OPHTHALMOLOGY | Facility: CLINIC | Age: 68
End: 2023-05-02
Payer: MEDICARE

## 2023-05-02 ENCOUNTER — PATIENT MESSAGE (OUTPATIENT)
Dept: NEUROLOGY | Facility: CLINIC | Age: 68
End: 2023-05-02
Payer: MEDICARE

## 2023-05-02 RX ORDER — ONDANSETRON 4 MG/1
4 TABLET, ORALLY DISINTEGRATING ORAL
Qty: 24 TABLET | Refills: 2 | Status: SHIPPED | OUTPATIENT
Start: 2023-05-02

## 2023-05-02 NOTE — TELEPHONE ENCOUNTER
----- Message from Kvng Medrano sent at 5/2/2023  8:56 AM CDT -----  Contact: Pt  Type: Needs Medical Advice    Who Called:pt  Best Call Back Number:[phone]    Additional Information Requesting a call back regarding Pt was calling to speak with office in regards to a referral that was placed pt stated they were supposed to get set up with office for an appt but never heard back please call when available Thank you  Please Advise-Thank you

## 2023-05-03 ENCOUNTER — PATIENT MESSAGE (OUTPATIENT)
Dept: PSYCHIATRY | Facility: CLINIC | Age: 68
End: 2023-05-03
Payer: MEDICARE

## 2023-05-04 ENCOUNTER — PATIENT MESSAGE (OUTPATIENT)
Dept: OTOLARYNGOLOGY | Facility: CLINIC | Age: 68
End: 2023-05-04
Payer: MEDICARE

## 2023-05-04 RX ORDER — PREDNISONE 20 MG/1
20 TABLET ORAL DAILY
Qty: 3 TABLET | Refills: 0 | Status: SHIPPED | OUTPATIENT
Start: 2023-05-04 | End: 2023-05-07

## 2023-05-05 ENCOUNTER — OFFICE VISIT (OUTPATIENT)
Dept: INTERNAL MEDICINE | Facility: CLINIC | Age: 68
End: 2023-05-05
Payer: MEDICARE

## 2023-05-05 DIAGNOSIS — M81.6 LOCALIZED OSTEOPOROSIS WITHOUT CURRENT PATHOLOGICAL FRACTURE: ICD-10-CM

## 2023-05-05 DIAGNOSIS — F33.1 MODERATE EPISODE OF RECURRENT MAJOR DEPRESSIVE DISORDER: ICD-10-CM

## 2023-05-05 DIAGNOSIS — G44.89 CHRONIC MIXED HEADACHE SYNDROME: ICD-10-CM

## 2023-05-05 DIAGNOSIS — K58.9 IRRITABLE BOWEL SYNDROME WITHOUT DIARRHEA: ICD-10-CM

## 2023-05-05 DIAGNOSIS — H81.01 MENIERE'S DISEASE OF RIGHT EAR: Primary | ICD-10-CM

## 2023-05-05 PROCEDURE — 1160F PR REVIEW ALL MEDS BY PRESCRIBER/CLIN PHARMACIST DOCUMENTED: ICD-10-PCS | Mod: CPTII,95,, | Performed by: NURSE PRACTITIONER

## 2023-05-05 PROCEDURE — 1159F PR MEDICATION LIST DOCUMENTED IN MEDICAL RECORD: ICD-10-PCS | Mod: CPTII,95,, | Performed by: NURSE PRACTITIONER

## 2023-05-05 PROCEDURE — 99214 OFFICE O/P EST MOD 30 MIN: CPT | Mod: 95,,, | Performed by: NURSE PRACTITIONER

## 2023-05-05 PROCEDURE — 1159F MED LIST DOCD IN RCRD: CPT | Mod: CPTII,95,, | Performed by: NURSE PRACTITIONER

## 2023-05-05 PROCEDURE — 99214 PR OFFICE/OUTPT VISIT, EST, LEVL IV, 30-39 MIN: ICD-10-PCS | Mod: 95,,, | Performed by: NURSE PRACTITIONER

## 2023-05-05 PROCEDURE — 1160F RVW MEDS BY RX/DR IN RCRD: CPT | Mod: CPTII,95,, | Performed by: NURSE PRACTITIONER

## 2023-05-05 RX ORDER — CITALOPRAM 10 MG/1
10 TABLET ORAL DAILY
Qty: 30 TABLET | Refills: 0 | Status: SHIPPED | OUTPATIENT
Start: 2023-05-05 | End: 2023-05-12

## 2023-05-05 RX ORDER — ALENDRONATE SODIUM 70 MG/1
70 TABLET ORAL
Qty: 4 TABLET | Refills: 11 | Status: SHIPPED | OUTPATIENT
Start: 2023-05-05 | End: 2024-05-04

## 2023-05-05 NOTE — ASSESSMENT & PLAN NOTE
Adding on celexa. Follow up with psych as scheduled in two weeks, and one month in office here for med follow up. Denies SI.

## 2023-05-05 NOTE — PROGRESS NOTES
Subjective:       Patient ID: Kenia Alonzo is a 67 y.o. female.    Chief Complaint: No chief complaint on file.    The patient location is: [place]  The chief complaint leading to consultation is: Depression    Visit type: audiovisual    Face to Face time with patient: 19 minutes  30 minutes of total time spent on the encounter, which includes face to face time and non-face to face time preparing to see the patient (eg, review of tests), Obtaining and/or reviewing separately obtained history, Documenting clinical information in the electronic or other health record, Independently interpreting results (not separately reported) and communicating results to the patient/family/caregiver, or Care coordination (not separately reported).     Each patient to whom he or she provides medical services by telemedicine is:  (1) informed of the relationship between the physician and patient and the respective role of any other health care provider with respect to management of the patient; and (2) notified that he or she may decline to receive medical services by telemedicine and may withdraw from such care at any time.    Notes:   Mrs. Alonzo presents to visit for complaint of continued meniere flare up and migraines, and depression.  Saw ENT on 5/1. On second rx for prednisone since visit. Continues with ringing in her ear, dizziness and headache. Has been going on for approx 2 weeks.     In regards to depression, feels that it has been worse lately, and even felt that she needed a medication adjustment since last visit with psych several months ago. Currently on wellbutrin and buspar. Has tried lexapro and paxil in past, but made her feel like a zombie. Did some research, and would like to try celexa after discussion of other treatment options was unable to get appt with psych until 5/15. Feels that she needs a medication changes now. Has been in her house for several weeks, not wanting to do anything. Feels that her  sisters do not support or check on her. They have not checked on her over the past few weeks even knowing that she was not feeling well.     Also recently had bone density scan performed, and was noted to have osteoporosis. Denies any recent fractures.         Patient Active Problem List   Diagnosis    Depression    Meniere's disease of right ear    Vestibular migraine    Abnormal involuntary movements    Irritable bowel syndrome without diarrhea    Hyperlipidemia    History of tobacco abuse    CKD (chronic kidney disease) stage 3, GFR 30-59 ml/min    Non-seasonal allergic rhinitis    Bronchitis    Chronic mixed headache syndrome    Severe dysplasia of vulva    Complicated grief    Primary osteoarthritis of left knee    Dermatitis of face    Acute gout of right foot    Vulvar dysplasia    Ingrown toenail of left foot    Foot callus    Family history of colon cancer    Bilateral renal cysts    Menopause syndrome    Atrophic vaginitis    Candidiasis of vulva and vagina    Pain in lower jaw    Chronic bilateral low back pain with left-sided sciatica    Personal history of colonic polyps       Family History   Problem Relation Age of Onset    Colon cancer Father     Diabetes Father     Cancer Sister     Diabetes Sister      Past Surgical History:   Procedure Laterality Date    BRAIN SURGERY      vestibular neurectomy    BREAST BIOPSY      15 years ago?  no visible scar    BREAST SURGERY      benign     SECTION      x 1    CHOLECYSTECTOMY      COLONOSCOPY N/A 3/15/2021    Procedure: COLONOSCOPY;  Surgeon: Mita Stoddard MD;  [facility]: [facility];  Service: Endoscopy;  Laterality: N/A;    COLONOSCOPY N/A 2023    Procedure: COLONOSCOPY;  Surgeon: Mita Stoddard MD;  [facility];  Service: Endoscopy;  Laterality: N/A;    EXAMINATION UNDER ANESTHESIA N/A 2020    Procedure: EXAM UNDER ANESTHESIA;  Surgeon: Lucy Crane MD;  Location: [facility] OR;   Service: OB/GYN;  Laterality: N/A;    LASER ABLATION N/A 8/12/2020    Procedure: ABLATION, USING LASER;  Surgeon: Lucy Crane MD;  Location: [facility];  Service: OB/GYN;  Laterality: N/A;    TONSILLECTOMY           Current Outpatient Medications:     acyclovir 5% (ZOVIRAX) 5 % ointment, Apply topically 5 (five) times daily., Disp: 30 g, Rfl: 1    albuterol (VENTOLIN HFA) 90 mcg/actuation inhaler, Inhale 2 puffs into the lungs every 6 (six) hours as needed for Wheezing. Rescue, Disp: 18 g, Rfl: 0    alendronate (FOSAMAX) 70 MG tablet, Take 1 tablet (70 mg total) by mouth every 7 days., Disp: 4 tablet, Rfl: 11    azelastine (ASTELIN) 137 mcg (0.1 %) nasal spray, Use 1 spray (137 mcg total) by Nasal route 2 (two) times daily., Disp: 30 mL, Rfl: 11    bumetanide (BUMEX) 2 MG tablet, TAKE 1 TABLET (2 MG TOTAL) BY MOUTH ONCE DAILY., Disp: 90 tablet, Rfl: 1    buPROPion (WELLBUTRIN XL) 150 MG TB24 tablet, Take 3 tablets (450 mg total) by mouth once daily., Disp: 90 tablet, Rfl: 3    busPIRone (BUSPAR) 30 MG Tab, Take 1 tablet (30 mg total) by mouth 2 (two) times daily., Disp: 180 tablet, Rfl: 3    C/sourcherry/celery/grape seed (TART CHERRY ORAL), Take 1 tablet by mouth daily as needed. , Disp: , Rfl:     citalopram (CELEXA) 10 MG tablet, Take 1 tablet (10 mg total) by mouth once daily., Disp: 30 tablet, Rfl: 0    clotrimazole-betamethasone 1-0.05% (LOTRISONE) cream, Apply to affected area 2 times daily (Patient not taking: Reported on 2/13/2023), Disp: 15 g, Rfl: 1    co-enzyme Q-10 30 mg capsule, Take 30 mg by mouth 3 (three) times daily., Disp: , Rfl:     conjugated estrogens (PREMARIN) vaginal cream, Place 1 g vaginally twice a week., Disp: 30 g, Rfl: 3    diazePAM (VALIUM) 2 MG tablet, Take 1 tablet (2 mg total) by mouth every 6 (six) hours as needed (dizziness/Meniere's attack)., Disp: 30 tablet, Rfl: 3    dicyclomine (BENTYL) 10 MG capsule, Take 1 capsule (10 mg total) by mouth 3 (three) times  daily., Disp: 90 capsule, Rfl: 1    erenumab-aooe (AIMOVIG AUTOINJECTOR) 140 mg/mL autoinjector, Inject 1 pen (140 mg total) into the skin every 28 days., Disp: 1 mL, Rfl: 11    estradiol-norethindrone (COMBIPATCH) 0.05-0.14 mg/24 hr, Place 1 patch onto the skin twice a week., Disp: 24 patch, Rfl: 4    fluticasone propionate (FLONASE) 50 mcg/actuation nasal spray, 2 sprays (100 mcg total) by Each Nostril route once daily., Disp: 16 g, Rfl: 11    ketorolac (TORADOL) 10 mg tablet, TAKE ONE TABLET BY MOUTH ONCE DAILY AS NEEDED FOR PAIN (MAX TWO TIMES A WEEK) (Patient not taking: Reported on 2/13/2023), Disp: 8 tablet, Rfl: 3    levocetirizine (XYZAL) 5 MG tablet, Take 1 tablet (5 mg total) by mouth every morning., Disp: 30 tablet, Rfl: 3    LIDOcaine-prilocaine (EMLA) cream, , Disp: , Rfl:     linaCLOtide (LINZESS) 145 mcg Cap capsule, Take 1 capsule (145 mcg total) by mouth before breakfast., Disp: 30 capsule, Rfl: 5    magnesium 30 mg Tab, Take by mouth once., Disp: , Rfl:     MELATONIN ORAL, Take by mouth nightly as needed. , Disp: , Rfl:     montelukast (SINGULAIR) 10 mg tablet, Take 1 tablet (10 mg total) by mouth every evening., Disp: 90 tablet, Rfl: 3    omega-3 fatty acids/fish oil (FISH OIL-OMEGA-3 FATTY ACIDS) 300-1,000 mg capsule, Take 1 capsule by mouth once daily., Disp: , Rfl:     ondansetron (ZOFRAN-ODT) 4 MG TbDL, Dissolve 1 tablet (4 mg total) by mouth every 72 hours as needed (for nausea/vomiting)., Disp: 24 tablet, Rfl: 2    potassium chloride SA (K-DUR,KLOR-CON M) 10 MEQ tablet, Take 1 tablet (10 mEq total) by mouth once daily., Disp: 90 tablet, Rfl: 0    predniSONE (DELTASONE) 20 MG tablet, Take 1 tablet (20 mg total) by mouth once daily. for 3 days, Disp: 3 tablet, Rfl: 0    promethazine (PHENERGAN) 25 MG tablet, , Disp: , Rfl:     simvastatin (ZOCOR) 5 MG tablet, Take 1 tablet (5 mg total) by mouth every evening., Disp: 90 tablet, Rfl: 3    topiramate (TROKENDI XR) 100 mg Cp24,  Take 1 capsule (100 mg total) by mouth once daily., Disp: 90 capsule, Rfl: 1    ubrogepant (UBRELVY) 100 mg tablet, Take 1 tablet (100 mg total) by mouth every 72 hours as needed for Migraine (3 times per week as needed)., Disp: 10 tablet, Rfl: 2    varenicline (CHANTIX) 1 mg Tab, Take 1 tablet (1 mg total) by mouth 2 (two) times daily., Disp: 60 tablet, Rfl: 1    vitamin B complex/folic acid (B COMPLEX 100 ORAL), Take by mouth nightly., Disp: , Rfl:     zinc gluconate 50 mg tablet, Take 50 mg by mouth once daily., Disp: , Rfl:     zolpidem (AMBIEN) 5 MG Tab, Take 1/2 to 1 tablet at bedtime as needed for sleepTake 1/2 to 1 tablet at bedtime as needed for sleep, Disp: 30 tablet, Rfl: 3    Current Facility-Administered Medications:     onabotulinumtoxina injection 200 Units, 200 Units, Intramuscular, Q90 Days, Danna Farrar NP    onabotulinumtoxina injection 200 Units, 200 Units, Intramuscular, Q90 Days, Danna Farrar NP, 200 Units at 01/23/23 1555    Facility-Administered Medications Ordered in Other Visits:     lactated ringers infusion, , Intravenous, Continuous, Rachana Teran MD, Last Rate: 10 mL/hr at 04/19/23 1005, New Bag at 04/19/23 1005    Review of Systems   Constitutional:  Positive for activity change and fatigue. Negative for chills, fever and unexpected weight change.   HENT:  Positive for ear pain (pressure) and tinnitus. Negative for hearing loss, rhinorrhea and trouble swallowing.    Eyes:  Positive for visual disturbance.   Respiratory:  Negative for chest tightness and wheezing.    Cardiovascular:  Negative for chest pain and palpitations.   Gastrointestinal:  Positive for vomiting. Negative for blood in stool, constipation and diarrhea.   Endocrine: Negative for polyuria.   Genitourinary:  Negative for difficulty urinating, dysuria, hematuria and menstrual problem.   Musculoskeletal:  Negative for arthralgias, joint swelling and neck pain.   Neurological:  Positive for dizziness,  "light-headedness and headaches. Negative for weakness.   Psychiatric/Behavioral:  Positive for dysphoric mood. Negative for self-injury and suicidal ideas. The patient is nervous/anxious.      Objective:   There were no vitals taken for this visit.     Physical Exam  Constitutional:       Appearance: Normal appearance. She is not ill-appearing.   Neurological:      Mental Status: She is alert and oriented to person, place, and time.   Psychiatric:         Mood and Affect: Mood is depressed. Mood is not anxious.         Speech: Speech normal.       Assessment & Plan     Problem List Items Addressed This Visit          Neuro    Chronic mixed headache syndrome    Current Assessment & Plan     Followed by neurology. On prednisone for menieres for now.               Psychiatric    Depression    Current Assessment & Plan     Adding on celexa. Follow up with psych as scheduled in two weeks, and one month in office here for med follow up. Denies SI.            Relevant Medications    citalopram (CELEXA) 10 MG tablet       ENT    Meniere's disease of right ear - Primary    Current Assessment & Plan     Recent appt with ENT. Has one more day of steroid. Recommend close follow up if no improvement.             Other Visit Diagnoses       Localized osteoporosis without current pathological fracture        Relevant Medications    alendronate (FOSAMAX) 70 MG tablet            Follow up in about 4 weeks (around 6/2/2023) for depression. .          Portions of this note may have been created with voice recognition software. Occasional "wrong-word" or "sound-a-like" substitutions may have occurred due to the inherent limitations of voice recognition software. Please, read the note carefully and recognize, using context, where substitutions have occurred.       "

## 2023-05-08 ENCOUNTER — PATIENT MESSAGE (OUTPATIENT)
Dept: NEUROLOGY | Facility: CLINIC | Age: 68
End: 2023-05-08

## 2023-05-10 ENCOUNTER — PATIENT MESSAGE (OUTPATIENT)
Dept: OTOLARYNGOLOGY | Facility: CLINIC | Age: 68
End: 2023-05-10
Payer: MEDICARE

## 2023-05-10 DIAGNOSIS — J32.0 CHRONIC MAXILLARY SINUSITIS: Primary | ICD-10-CM

## 2023-05-11 ENCOUNTER — PATIENT MESSAGE (OUTPATIENT)
Dept: OTOLARYNGOLOGY | Facility: CLINIC | Age: 68
End: 2023-05-11
Payer: MEDICARE

## 2023-05-11 NOTE — TELEPHONE ENCOUNTER
Please schedule CT Sinus and will contact her once I see those results.  I have already sent patient a message stating that we are not giving additional oral steroids and not refilling her Valium.

## 2023-05-12 ENCOUNTER — OFFICE VISIT (OUTPATIENT)
Dept: PSYCHIATRY | Facility: CLINIC | Age: 68
End: 2023-05-12
Payer: MEDICARE

## 2023-05-12 ENCOUNTER — PATIENT MESSAGE (OUTPATIENT)
Dept: PSYCHIATRY | Facility: CLINIC | Age: 68
End: 2023-05-12

## 2023-05-12 DIAGNOSIS — F33.1 MODERATE EPISODE OF RECURRENT MAJOR DEPRESSIVE DISORDER: Primary | ICD-10-CM

## 2023-05-12 DIAGNOSIS — G47.00 INSOMNIA, UNSPECIFIED TYPE: ICD-10-CM

## 2023-05-12 PROCEDURE — 99214 PR OFFICE/OUTPT VISIT, EST, LEVL IV, 30-39 MIN: ICD-10-PCS | Mod: 95,,, | Performed by: PSYCHIATRY & NEUROLOGY

## 2023-05-12 PROCEDURE — 99499 UNLISTED E&M SERVICE: CPT | Mod: HCNC,,, | Performed by: PSYCHIATRY & NEUROLOGY

## 2023-05-12 PROCEDURE — 99499 RISK ADDL DX/OHS AUDIT: ICD-10-PCS | Mod: HCNC,,, | Performed by: PSYCHIATRY & NEUROLOGY

## 2023-05-12 PROCEDURE — 99214 OFFICE O/P EST MOD 30 MIN: CPT | Mod: 95,,, | Performed by: PSYCHIATRY & NEUROLOGY

## 2023-05-12 RX ORDER — DULOXETIN HYDROCHLORIDE 30 MG/1
CAPSULE, DELAYED RELEASE ORAL
Qty: 60 CAPSULE | Refills: 2 | Status: SHIPPED | OUTPATIENT
Start: 2023-05-12 | End: 2023-07-05 | Stop reason: SDUPTHER

## 2023-05-12 RX ORDER — DULOXETIN HYDROCHLORIDE 30 MG/1
CAPSULE, DELAYED RELEASE ORAL
Qty: 60 CAPSULE | Refills: 2 | Status: SHIPPED | OUTPATIENT
Start: 2023-05-12 | End: 2023-05-12 | Stop reason: SDUPTHER
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
## 2023-05-14 ENCOUNTER — PATIENT MESSAGE (OUTPATIENT)
Dept: INTERNAL MEDICINE | Facility: CLINIC | Age: 68
End: 2023-05-14
Payer: MEDICARE

## 2023-05-14 ENCOUNTER — PATIENT MESSAGE (OUTPATIENT)
Dept: NEUROLOGY | Facility: CLINIC | Age: 68
End: 2023-05-14
Payer: MEDICARE

## 2023-05-15 ENCOUNTER — PATIENT MESSAGE (OUTPATIENT)
Dept: PSYCHIATRY | Facility: CLINIC | Age: 68
End: 2023-05-15
Payer: MEDICARE

## 2023-05-17 NOTE — PROGRESS NOTES
Chart reviewed.   Requested updates from Care Everywhere.  Immunizations reconciled.   HM updated.    
Noted If In Chart

## 2023-05-19 ENCOUNTER — PATIENT MESSAGE (OUTPATIENT)
Dept: PSYCHIATRY | Facility: CLINIC | Age: 68
End: 2023-05-19
Payer: MEDICARE

## 2023-05-19 DIAGNOSIS — J30.2 SEASONAL ALLERGIC RHINITIS, UNSPECIFIED TRIGGER: ICD-10-CM

## 2023-05-19 DIAGNOSIS — G44.89 CHRONIC MIXED HEADACHE SYNDROME: ICD-10-CM

## 2023-05-19 DIAGNOSIS — G43.809 VESTIBULAR MIGRAINE: ICD-10-CM

## 2023-05-19 RX ORDER — LEVOCETIRIZINE DIHYDROCHLORIDE 5 MG/1
5 TABLET, FILM COATED ORAL EVERY MORNING
Qty: 30 TABLET | Refills: 3 | Status: SHIPPED | OUTPATIENT
Start: 2023-05-19 | End: 2023-11-28 | Stop reason: SDUPTHER

## 2023-05-19 RX ORDER — POTASSIUM CHLORIDE 750 MG/1
10 TABLET, EXTENDED RELEASE ORAL DAILY
Qty: 90 TABLET | Refills: 0 | Status: SHIPPED | OUTPATIENT
Start: 2023-05-19 | End: 2023-08-16 | Stop reason: SDUPTHER

## 2023-05-19 NOTE — TELEPHONE ENCOUNTER
Refill Routing Note   Medication(s) are not appropriate for processing by [facility] for the following reason(s):      Non-participating provider    [facility] action(s):  Route None identified          Appointments  past 12m or future 3m with PCP    Date Provider   Last Visit   5/5/2023 Almita Izaguirre NP   Next Visit   6/2/2023 Almita Izaguirre NP   ED visits in past 90 days: 0        Note composed:1:11 PM 05/19/2023

## 2023-05-22 RX ORDER — TOPIRAMATE 100 MG/1
100 CAPSULE, EXTENDED RELEASE ORAL DAILY
Qty: 90 CAPSULE | Refills: 1 | Status: SHIPPED | OUTPATIENT
Start: 2023-05-22 | End: 2023-05-23 | Stop reason: SDUPTHER

## 2023-05-23 ENCOUNTER — PATIENT MESSAGE (OUTPATIENT)
Dept: NEUROLOGY | Facility: CLINIC | Age: 68
End: 2023-05-23
Payer: MEDICARE

## 2023-05-23 DIAGNOSIS — G43.809 VESTIBULAR MIGRAINE: ICD-10-CM

## 2023-05-23 DIAGNOSIS — G44.89 CHRONIC MIXED HEADACHE SYNDROME: ICD-10-CM

## 2023-05-23 RX ORDER — UBROGEPANT 100 MG/1
100 TABLET ORAL
Qty: 10 TABLET | Refills: 2 | Status: SHIPPED | OUTPATIENT
Start: 2023-05-23 | End: 2023-11-28 | Stop reason: SDUPTHER

## 2023-05-23 RX ORDER — TOPIRAMATE 100 MG/1
100 CAPSULE, EXTENDED RELEASE ORAL DAILY
Qty: 90 CAPSULE | Refills: 1 | Status: SHIPPED | OUTPATIENT
Start: 2023-05-23 | End: 2023-12-21 | Stop reason: SDUPTHER

## 2023-05-26 ENCOUNTER — PATIENT MESSAGE (OUTPATIENT)
Dept: NEUROLOGY | Facility: CLINIC | Age: 68
End: 2023-05-26
Payer: MEDICARE

## 2023-05-29 ENCOUNTER — PATIENT MESSAGE (OUTPATIENT)
Dept: INTERNAL MEDICINE | Facility: CLINIC | Age: 68
End: 2023-05-29
Payer: MEDICARE

## 2023-05-31 ENCOUNTER — PATIENT MESSAGE (OUTPATIENT)
Dept: PSYCHIATRY | Facility: CLINIC | Age: 68
End: 2023-05-31
Payer: MEDICARE

## 2023-06-04 DIAGNOSIS — K58.9 IRRITABLE BOWEL SYNDROME WITHOUT DIARRHEA: ICD-10-CM

## 2023-06-05 RX ORDER — DICYCLOMINE HYDROCHLORIDE 10 MG/1
10 CAPSULE ORAL 3 TIMES DAILY
Qty: 90 CAPSULE | Refills: 1 | Status: SHIPPED | OUTPATIENT
Start: 2023-06-05 | End: 2023-08-30 | Stop reason: SDUPTHER

## 2023-06-05 NOTE — TELEPHONE ENCOUNTER
Refill Routing Note   Medication(s) are not appropriate for processing by [facility] for the following reason(s):      Non-participating provider    [facility] action(s):  Route None identified          Appointments  past 12m or future 3m with PCP    Date Provider   Last Visit   5/5/2023 Almita Izaguirre NP   Next Visit   Visit date not found Almita Izaguirre NP   ED visits in past 90 days: 0        Note composed:10:40 AM 06/05/2023

## 2023-06-08 ENCOUNTER — PATIENT MESSAGE (OUTPATIENT)
Dept: PSYCHIATRY | Facility: CLINIC | Age: 68
End: 2023-06-08
Payer: MEDICARE

## 2023-06-19 ENCOUNTER — PATIENT MESSAGE (OUTPATIENT)
Dept: PSYCHIATRY | Facility: CLINIC | Age: 68
End: 2023-06-19
Payer: MEDICARE

## 2023-06-21 ENCOUNTER — TELEPHONE (OUTPATIENT)
Dept: PSYCHIATRY | Facility: CLINIC | Age: 68
End: 2023-06-21
Payer: MEDICARE

## 2023-06-21 NOTE — TELEPHONE ENCOUNTER
----- Message from Jane Martínez sent at 6/21/2023 10:35 AM CDT -----  Contact: philippe  Patient is calling to speak with the nurse regarding appointment. Reports she may have to cancel appointment for tomorrow, stats not sure just yet. Please give the patient a call back at .[phone]   Thanks amanda

## 2023-06-23 ENCOUNTER — OFFICE VISIT (OUTPATIENT)
Dept: PSYCHIATRY | Facility: CLINIC | Age: 68
End: 2023-06-23
Payer: MEDICARE

## 2023-06-23 ENCOUNTER — PATIENT MESSAGE (OUTPATIENT)
Dept: PSYCHIATRY | Facility: CLINIC | Age: 68
End: 2023-06-23
Payer: MEDICARE

## 2023-06-23 ENCOUNTER — PES CALL (OUTPATIENT)
Dept: ADMINISTRATIVE | Facility: CLINIC | Age: 68
End: 2023-06-23
Payer: MEDICARE

## 2023-06-23 DIAGNOSIS — F43.21 COMPLICATED GRIEF: ICD-10-CM

## 2023-06-23 DIAGNOSIS — F41.1 GAD (GENERALIZED ANXIETY DISORDER): ICD-10-CM

## 2023-06-23 DIAGNOSIS — F33.1 MODERATE EPISODE OF RECURRENT MAJOR DEPRESSIVE DISORDER: Primary | ICD-10-CM

## 2023-06-23 PROCEDURE — 90832 PR PSYCHOTHERAPY W/PATIENT, 30 MIN: ICD-10-PCS | Mod: HCNC,95,, | Performed by: SOCIAL WORKER

## 2023-06-23 PROCEDURE — 1159F PR MEDICATION LIST DOCUMENTED IN MEDICAL RECORD: ICD-10-PCS | Mod: HCNC,CPTII,95, | Performed by: SOCIAL WORKER

## 2023-06-23 PROCEDURE — 90832 PSYTX W PT 30 MINUTES: CPT | Mod: HCNC,95,, | Performed by: SOCIAL WORKER

## 2023-06-23 PROCEDURE — 1159F MED LIST DOCD IN RCRD: CPT | Mod: HCNC,CPTII,95, | Performed by: SOCIAL WORKER

## 2023-06-23 RX ORDER — DIAZEPAM 2 MG/1
2 TABLET ORAL EVERY 6 HOURS PRN
Qty: 30 TABLET | Refills: 3 | Status: SHIPPED | OUTPATIENT
Start: 2023-06-23 | End: 2023-12-27 | Stop reason: SDUPTHER

## 2023-06-23 NOTE — PROGRESS NOTES
Individual Psychotherapy Follow-up Visit Progress Note (PhD/LCSW)     Outpatient Psychotherapy - 30 minutes with patient (16-37 minutes) - [address]    Date: 06/23/2023    Visit Type: Virtual Visit with synchronous video/audio      6/23/2023  MRN: [id number]  Primary Care Provider: Almita Izaguirre NP    Kenia Alonzo is a 67 y.o. female who presents today for follow-up of depression and anxiety. Met with patient.      Preferred Name: Kenia     Subjective:     Last encounter (with this provider): 1/5/2023     Content of Current Session: Pt states she was very depressed but is feeling much better since starting Cymbalta. Her sister Nisa's cancer is back. She is bedridden and incontinent; hospice met with her earlier this week. Pt's son Tad had a kidney transplant and is doing well, although pt says he is not speaking to her. Provided support and helped pt process feelings re: grief and family conflict.    Therapeutic Interventions Utilized During Current Session: Cognitive Behavioral Therapy, Supportive Therapy      Objective:       Mental Status Evaluation  Appearance: unremarkable, age appropriate  Behavior: normal, friendly and cooperative  Speech: normal tone, normal rate, normal pitch, normal volume  Mood: anxious  Affect: congruent and appropriate, blunted  Thought Process: normal and logical  Thought Content: normal, no suicidality, no homicidality, delusions, or paranoia  Sensorium: grossly intact  Cognition: grossly intact  Insight: good  Judgment: adequate to circumstances    Risk parameters:  Patient reports no suicidal ideation  Patient reports no homicidal ideation  Patient reports no self-injurious behavior  Patient reports no violent behavior      Assessment & Plan:     The patient's response to the interventions is accepting    The patient's progress toward treatment goals is good    Homework assigned: daily journaling and practice relaxation skills daily     Treatment plan:   A. Target symptoms:  Depression and Anxiety   B. Therapeutic modalities: insight oriented psychotherapy  C. Why chosen therapy is appropriate versus another modality: relevant to diagnosis, patient responds to this modality, evidence based practice   D. Outcome monitoring methods: self report, observation, rating scales, feedback from clinical staff      Visit Diagnosis:   1. Moderate episode of recurrent major depressive disorder    2. JOSIAH (generalized anxiety disorder)        Follow-up: individual psychotherapy and medication management by physician    Return to Clinic: as scheduled  Pt Reported to Schedule Self via Epic EMR MyChart Application and/or Department Support Staff    Beatriz Holloway [id number]-[id number]

## 2023-06-29 ENCOUNTER — TELEPHONE (OUTPATIENT)
Dept: OPHTHALMOLOGY | Facility: CLINIC | Age: 68
End: 2023-06-29
Payer: MEDICARE

## 2023-06-29 ENCOUNTER — PATIENT MESSAGE (OUTPATIENT)
Dept: OPHTHALMOLOGY | Facility: CLINIC | Age: 68
End: 2023-06-29
Payer: MEDICARE

## 2023-06-29 NOTE — TELEPHONE ENCOUNTER
attempted to call pt., her appointment was cancelled by her in the [facility] system can R/S appintment to another day and time

## 2023-06-29 NOTE — TELEPHONE ENCOUNTER
----- Message from Katiana Ellis sent at 6/29/2023 12:04 PM CDT -----  [phone] pt states she is suppose to be schedule for today cataract cons please advise

## 2023-07-05 ENCOUNTER — PATIENT MESSAGE (OUTPATIENT)
Dept: PSYCHIATRY | Facility: CLINIC | Age: 68
End: 2023-07-05
Payer: MEDICARE

## 2023-07-05 RX ORDER — DULOXETIN HYDROCHLORIDE 30 MG/1
CAPSULE, DELAYED RELEASE ORAL
Qty: 90 CAPSULE | Refills: 3 | Status: SHIPPED | OUTPATIENT
Start: 2023-07-05 | End: 2023-12-22

## 2023-07-07 RX ORDER — MONTELUKAST SODIUM 10 MG/1
10 TABLET ORAL NIGHTLY
Qty: 90 TABLET | Refills: 3 | Status: SHIPPED | OUTPATIENT
Start: 2023-07-07

## 2023-07-11 ENCOUNTER — PATIENT MESSAGE (OUTPATIENT)
Dept: PSYCHIATRY | Facility: CLINIC | Age: 68
End: 2023-07-11
Payer: MEDICARE

## 2023-07-17 NOTE — TELEPHONE ENCOUNTER
Refill Routing Note   Medication(s) are not appropriate for processing by [facility] for the following reason(s):      Non-participating provider    [facility] action(s):  Route Care Due:  None identified              Appointments  past 12m or future 3m with PCP    Date Provider   Last Visit   5/5/2023 Almita Izaguirre NP   Next Visit   Visit date not found Almita Iazguirre NP   ED visits in past 90 days: 0        Note composed:4:08 PM 07/17/2023

## 2023-07-18 ENCOUNTER — OFFICE VISIT (OUTPATIENT)
Dept: PSYCHIATRY | Facility: CLINIC | Age: 68
End: 2023-07-18
Payer: MEDICARE

## 2023-07-18 DIAGNOSIS — N18.30 STAGE 3 CHRONIC KIDNEY DISEASE, UNSPECIFIED WHETHER STAGE 3A OR 3B CKD: Primary | ICD-10-CM

## 2023-07-18 DIAGNOSIS — F41.1 GAD (GENERALIZED ANXIETY DISORDER): ICD-10-CM

## 2023-07-18 DIAGNOSIS — F33.1 MODERATE EPISODE OF RECURRENT MAJOR DEPRESSIVE DISORDER: Primary | ICD-10-CM

## 2023-07-18 PROCEDURE — 1159F MED LIST DOCD IN RCRD: CPT | Mod: HCNC,CPTII,95, | Performed by: SOCIAL WORKER

## 2023-07-18 PROCEDURE — 1159F PR MEDICATION LIST DOCUMENTED IN MEDICAL RECORD: ICD-10-PCS | Mod: HCNC,CPTII,95, | Performed by: SOCIAL WORKER

## 2023-07-18 PROCEDURE — 90834 PR PSYCHOTHERAPY W/PATIENT, 45 MIN: ICD-10-PCS | Mod: HCNC,95,, | Performed by: SOCIAL WORKER

## 2023-07-18 PROCEDURE — 90834 PSYTX W PT 45 MINUTES: CPT | Mod: HCNC,95,, | Performed by: SOCIAL WORKER

## 2023-07-18 NOTE — PROGRESS NOTES
Individual Psychotherapy Follow-up Visit Progress Note (PhD/LCSW)     Outpatient Psychotherapy - 45 minutes with patient (38-52 minutes) - [id number]    Date: 07/18/2023    Visit Type: Virtual Visit with synchronous video/audio      7/18/2023  MRN: [id number]  Primary Care Provider: Almita Izaguirre NP    Kenia Alonzo is a 68 y.o. female who presents today for follow-up of depression and anxiety. Met with patient.      Preferred Name: Kenia     Subjective:     Last encounter (with this provider): 6/23/2023     Content of Current Session: Pt discussed stress due to family conflict. Her sister Nisa is in hospice, and there has been some controversy about how her medications were being administered. Provided support and helped pt process her feelings. Encouraged increased self care activities and use of available supports. Reviewed coping strategies to manage anxiety, depression and grief.    Therapeutic Interventions Utilized During Current Session: Supportive Therapy      Objective:       Mental Status Evaluation  Appearance: unremarkable, age appropriate  Behavior: normal, friendly and cooperative  Speech: normal tone, normal rate, normal pitch, normal volume  Mood: anxious, dysthymic  Affect: congruent and appropriate, blunted  Thought Process: normal and logical  Thought Content: normal, no suicidality, no homicidality, delusions, or paranoia  Sensorium: grossly intact  Cognition: grossly intact  Insight: good  Judgment: adequate to circumstances    Risk parameters:  Patient reports no suicidal ideation  Patient reports no homicidal ideation  Patient reports no self-injurious behavior  Patient reports no violent behavior      Assessment & Plan:     The patient's response to the interventions is accepting    The patient's progress toward treatment goals is good    Homework assigned: daily journaling, practice relaxation skills daily, attend a support group, and create a coping card     Treatment plan:   A. Target  symptoms: Depression and Anxiety   B. Therapeutic modalities: insight oriented psychotherapy  C. Why chosen therapy is appropriate versus another modality: relevant to diagnosis, patient responds to this modality, evidence based practice   D. Outcome monitoring methods: self report, observation, rating scales, feedback from clinical staff      Visit Diagnosis:   1. Moderate episode of recurrent major depressive disorder    2. JOSIAH (generalized anxiety disorder)        Follow-up: individual psychotherapy and medication management by physician    Return to Clinic: as scheduled  Pt Reported to Schedule Self via Epic EMR MyChart Application and/or Department Support Staff    Beatriz Holloway LCSW-GLADYSS

## 2023-07-19 RX ORDER — SIMVASTATIN 5 MG/1
5 TABLET, FILM COATED ORAL NIGHTLY
Qty: 90 TABLET | Refills: 3 | Status: SHIPPED | OUTPATIENT
Start: 2023-07-19

## 2023-07-22 ENCOUNTER — NURSE TRIAGE (OUTPATIENT)
Dept: ADMINISTRATIVE | Facility: CLINIC | Age: 68
End: 2023-07-22
Payer: MEDICARE

## 2023-07-22 NOTE — TELEPHONE ENCOUNTER
Pt called and she is asking about exposure to covid. Pt doesn't have any symptoms at this time. Pt is concerned because she cares for a cancer pt. Pt told that she needs to wear a mask for precaution and clean or sanitize stuff often. Pt told to call back if anyone needs assistance and s shows signs of infection                Reason for Disposition   Health Information question, no triage required and triager able to answer question    Protocols used: Information Only Call - No Triage-A-

## 2023-07-26 ENCOUNTER — TELEPHONE (OUTPATIENT)
Dept: INTERNAL MEDICINE | Facility: CLINIC | Age: 68
End: 2023-07-26
Payer: MEDICARE

## 2023-07-26 RX ORDER — BENZONATATE 100 MG/1
100 CAPSULE ORAL 3 TIMES DAILY PRN
Qty: 30 CAPSULE | Refills: 0 | Status: SHIPPED | OUTPATIENT
Start: 2023-07-26 | End: 2023-08-05

## 2023-07-26 NOTE — TELEPHONE ENCOUNTER
----- Message from Marisel Aparicio sent at 7/26/2023 11:18 AM CDT -----  Pt sent a message in tracx stating she tested positive for Covid and requesting meds be called into [facility].    Thank you

## 2023-07-26 NOTE — TELEPHONE ENCOUNTER
Patient was exposed to Covid on Saturday, waited and test on Yesterday and was positive. Patient stated she is having cough , sore throat and sneezing. Patient would like to know if she can get tessalon pearls of the cough, she has some and it seems to help. If you can please sen to Leann

## 2023-07-30 ENCOUNTER — PATIENT MESSAGE (OUTPATIENT)
Dept: NEUROLOGY | Facility: CLINIC | Age: 68
End: 2023-07-30
Payer: MEDICARE

## 2023-08-01 ENCOUNTER — PATIENT MESSAGE (OUTPATIENT)
Dept: PSYCHIATRY | Facility: CLINIC | Age: 68
End: 2023-08-01

## 2023-08-05 ENCOUNTER — TELEPHONE (OUTPATIENT)
Dept: NEPHROLOGY | Facility: CLINIC | Age: 68
End: 2023-08-05
Payer: MEDICARE

## 2023-08-14 ENCOUNTER — PATIENT MESSAGE (OUTPATIENT)
Dept: OPHTHALMOLOGY | Facility: CLINIC | Age: 68
End: 2023-08-14
Payer: MEDICARE

## 2023-08-16 ENCOUNTER — PATIENT MESSAGE (OUTPATIENT)
Dept: PAIN MEDICINE | Facility: CLINIC | Age: 68
End: 2023-08-16

## 2023-08-16 RX ORDER — ZOLPIDEM TARTRATE 5 MG/1
TABLET ORAL
Qty: 30 TABLET | Refills: 1 | Status: SHIPPED | OUTPATIENT
Start: 2023-08-16 | End: 2023-10-16 | Stop reason: SDUPTHER

## 2023-08-16 RX ORDER — POTASSIUM CHLORIDE 750 MG/1
10 TABLET, EXTENDED RELEASE ORAL DAILY
Qty: 90 TABLET | Refills: 0 | Status: SHIPPED | OUTPATIENT
Start: 2023-08-16 | End: 2023-11-28 | Stop reason: SDUPTHER

## 2023-08-21 ENCOUNTER — PATIENT MESSAGE (OUTPATIENT)
Dept: UROLOGY | Facility: CLINIC | Age: 68
End: 2023-08-21
Payer: MEDICARE

## 2023-08-21 DIAGNOSIS — N28.1 BILATERAL RENAL CYSTS: Primary | ICD-10-CM

## 2023-08-22 ENCOUNTER — TELEPHONE (OUTPATIENT)
Dept: UROLOGY | Facility: CLINIC | Age: 68
End: 2023-08-22
Payer: MEDICARE

## 2023-08-22 NOTE — TELEPHONE ENCOUNTER
----- Message from Arlene Ellis sent at 8/21/2023 12:11 PM CDT -----  Name of Who is Calling:NOE JOSE [[id number]]           What is the request in detail: pt is requesting blood work be ordered            Can the clinic reply by [facility]:           What Number to Call Back if not in [facility]: [phone]

## 2023-08-22 NOTE — TELEPHONE ENCOUNTER
Spoke with patient she's aware of labs being scheduled and was also scheduled an follow-up appt. UNA/ LAZARA Segovia.

## 2023-08-24 ENCOUNTER — PATIENT MESSAGE (OUTPATIENT)
Dept: PSYCHIATRY | Facility: CLINIC | Age: 68
End: 2023-08-24
Payer: MEDICARE

## 2023-08-28 ENCOUNTER — PATIENT MESSAGE (OUTPATIENT)
Dept: NEUROLOGY | Facility: CLINIC | Age: 68
End: 2023-08-28
Payer: MEDICARE

## 2023-08-28 ENCOUNTER — PATIENT MESSAGE (OUTPATIENT)
Dept: UROLOGY | Facility: CLINIC | Age: 68
End: 2023-08-28
Payer: MEDICARE

## 2023-08-30 ENCOUNTER — OFFICE VISIT (OUTPATIENT)
Dept: PSYCHIATRY | Facility: CLINIC | Age: 68
End: 2023-08-30
Payer: MEDICARE

## 2023-08-30 DIAGNOSIS — K58.9 IRRITABLE BOWEL SYNDROME WITHOUT DIARRHEA: ICD-10-CM

## 2023-08-30 DIAGNOSIS — F43.21 GRIEF: ICD-10-CM

## 2023-08-30 DIAGNOSIS — F41.1 GAD (GENERALIZED ANXIETY DISORDER): ICD-10-CM

## 2023-08-30 DIAGNOSIS — F33.1 MODERATE EPISODE OF RECURRENT MAJOR DEPRESSIVE DISORDER: Primary | ICD-10-CM

## 2023-08-30 PROCEDURE — 90834 PR PSYCHOTHERAPY W/PATIENT, 45 MIN: ICD-10-PCS | Mod: HCNC,95,, | Performed by: SOCIAL WORKER

## 2023-08-30 PROCEDURE — 90834 PSYTX W PT 45 MINUTES: CPT | Mod: HCNC,95,, | Performed by: SOCIAL WORKER
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
## 2023-08-31 RX ORDER — DICYCLOMINE HYDROCHLORIDE 10 MG/1
10 CAPSULE ORAL 3 TIMES DAILY
Qty: 90 CAPSULE | Refills: 1 | Status: SHIPPED | OUTPATIENT
Start: 2023-08-31 | End: 2023-12-21 | Stop reason: SDUPTHER

## 2023-09-01 ENCOUNTER — TELEPHONE (OUTPATIENT)
Dept: ADMINISTRATIVE | Facility: CLINIC | Age: 68
End: 2023-09-01
Payer: MEDICARE

## 2023-09-01 NOTE — TELEPHONE ENCOUNTER
Called pt, no answer; left message informing pt I was calling to remind pt of her in office EAWV on 9/5/23 and to return my call if she had any questions; left my name and number.

## 2023-09-04 ENCOUNTER — PATIENT MESSAGE (OUTPATIENT)
Dept: PSYCHIATRY | Facility: CLINIC | Age: 68
End: 2023-09-04
Payer: MEDICARE

## 2023-09-05 ENCOUNTER — LAB VISIT (OUTPATIENT)
Dept: LAB | Facility: HOSPITAL | Age: 68
End: 2023-09-05
Attending: NURSE PRACTITIONER
Payer: MEDICARE

## 2023-09-05 DIAGNOSIS — N18.30 STAGE 3 CHRONIC KIDNEY DISEASE, UNSPECIFIED WHETHER STAGE 3A OR 3B CKD: ICD-10-CM

## 2023-09-05 LAB
ANION GAP SERPL CALC-SCNC: 12 MMOL/L (ref 8–16)
BASOPHILS # BLD AUTO: 0.13 K/UL (ref 0–0.2)
BASOPHILS NFR BLD: 1.8 % (ref 0–1.9)
BUN SERPL-MCNC: 24 MG/DL (ref 8–23)
CALCIUM SERPL-MCNC: 9.8 MG/DL (ref 8.7–10.5)
CHLORIDE SERPL-SCNC: 104 MMOL/L (ref 95–110)
CO2 SERPL-SCNC: 23 MMOL/L (ref 23–29)
CREAT SERPL-MCNC: 1.3 MG/DL (ref 0.5–1.4)
DIFFERENTIAL METHOD: ABNORMAL
EOSINOPHIL # BLD AUTO: 0.3 K/UL (ref 0–0.5)
EOSINOPHIL NFR BLD: 4.1 % (ref 0–8)
ERYTHROCYTE [DISTWIDTH] IN BLOOD BY AUTOMATED COUNT: 13 % (ref 11.5–14.5)
EST. GFR  (NO RACE VARIABLE): 45.4 ML/MIN/1.73 M^2
GLUCOSE SERPL-MCNC: 96 MG/DL (ref 70–110)
HCT VFR BLD AUTO: 40.4 % (ref 37–48.5)
HGB BLD-MCNC: 13 G/DL (ref 12–16)
IMM GRANULOCYTES # BLD AUTO: 0.03 K/UL (ref 0–0.04)
IMM GRANULOCYTES NFR BLD AUTO: 0.4 % (ref 0–0.5)
LYMPHOCYTES # BLD AUTO: 2.2 K/UL (ref 1–4.8)
LYMPHOCYTES NFR BLD: 30.3 % (ref 18–48)
MCH RBC QN AUTO: 31.1 PG (ref 27–31)
MCHC RBC AUTO-ENTMCNC: 32.2 G/DL (ref 32–36)
MCV RBC AUTO: 97 FL (ref 82–98)
MONOCYTES # BLD AUTO: 0.5 K/UL (ref 0.3–1)
MONOCYTES NFR BLD: 6.8 % (ref 4–15)
NEUTROPHILS # BLD AUTO: 4.2 K/UL (ref 1.8–7.7)
NEUTROPHILS NFR BLD: 56.6 % (ref 38–73)
NRBC BLD-RTO: 0 /100 WBC
PLATELET # BLD AUTO: 431 K/UL (ref 150–450)
PMV BLD AUTO: 10.7 FL (ref 9.2–12.9)
POTASSIUM SERPL-SCNC: 3.6 MMOL/L (ref 3.5–5.1)
RBC # BLD AUTO: 4.18 M/UL (ref 4–5.4)
SODIUM SERPL-SCNC: 139 MMOL/L (ref 136–145)
WBC # BLD AUTO: 7.36 K/UL (ref 3.9–12.7)

## 2023-09-05 PROCEDURE — 80048 BASIC METABOLIC PNL TOTAL CA: CPT | Mod: HCNC | Performed by: INTERNAL MEDICINE

## 2023-09-05 PROCEDURE — 36415 COLL VENOUS BLD VENIPUNCTURE: CPT | Mod: HCNC | Performed by: INTERNAL MEDICINE

## 2023-09-05 PROCEDURE — 85025 COMPLETE CBC W/AUTO DIFF WBC: CPT | Mod: HCNC | Performed by: INTERNAL MEDICINE

## 2023-09-07 ENCOUNTER — PATIENT MESSAGE (OUTPATIENT)
Dept: OPHTHALMOLOGY | Facility: CLINIC | Age: 68
End: 2023-09-07
Payer: MEDICARE

## 2023-09-11 ENCOUNTER — PATIENT MESSAGE (OUTPATIENT)
Dept: OBSTETRICS AND GYNECOLOGY | Facility: CLINIC | Age: 68
End: 2023-09-11
Payer: MEDICARE

## 2023-09-11 ENCOUNTER — PROCEDURE VISIT (OUTPATIENT)
Dept: NEUROLOGY | Facility: CLINIC | Age: 68
End: 2023-09-11
Payer: MEDICARE

## 2023-09-11 VITALS
SYSTOLIC BLOOD PRESSURE: 116 MMHG | HEART RATE: 92 BPM | DIASTOLIC BLOOD PRESSURE: 71 MMHG | BODY MASS INDEX: 24.7 KG/M2 | WEIGHT: 134.25 LBS | HEIGHT: 62 IN | RESPIRATION RATE: 16 BRPM

## 2023-09-11 DIAGNOSIS — G43.809 VESTIBULAR MIGRAINE: ICD-10-CM

## 2023-09-11 DIAGNOSIS — G44.89 CHRONIC MIXED HEADACHE SYNDROME: ICD-10-CM

## 2023-09-11 DIAGNOSIS — G43.711 CHRONIC MIGRAINE WITHOUT AURA, INTRACTABLE, WITH STATUS MIGRAINOSUS: Primary | ICD-10-CM

## 2023-09-11 DIAGNOSIS — G43.E09 CHRONIC MIGRAINE WITH AURA: ICD-10-CM

## 2023-09-11 DIAGNOSIS — G43.709 CHRONIC MIGRAINE W/O AURA, NOT INTRACTABLE, W/O STAT MIGR: ICD-10-CM

## 2023-09-11 PROCEDURE — 64615 PR CHEMODENERVATION OF MUSCLE FOR CHRONIC MIGRAINE: ICD-10-PCS | Mod: HCNC,S$GLB,, | Performed by: NURSE PRACTITIONER

## 2023-09-11 PROCEDURE — 64615 CHEMODENERV MUSC MIGRAINE: CPT | Mod: HCNC,S$GLB,, | Performed by: NURSE PRACTITIONER

## 2023-09-11 NOTE — PROCEDURES
Procedures              1. PROCEDURE: Botox injections     2. INDICATION: Intractable Migraine     3. TIME OUT: The procedure was discussed in details with the patient and the consent form was signed. The patient verbalized understanding of the procedure . I discussed the risks that may include serious immune reaction and distant spread that could results in loss of breathing. Other side effects may include droopy eyelids, trouble swallowing and cardiac arrhythmias. It was also stressed that it is contraindicated in pregnancy.     3. COURSE:   The standard protocol was followed. the procedure was very smooth.     4. COMPLICATIONS: None. The patient tolerated the procedure very well.     5. AFTERCARE: I encouraged the patient to use ice if the sites of injection get tender and call me with any problems or complications.           As per the standard protocol, a total 155 units were injected in 31 sites.         RT  5 units in 1 site    LT  5 units in 1 site     Procerus 5 units in 1 site     RT Frontalis 10 units in 2 sites     LT Frontalis 10 units in 2 site     RT Temporalis 20 units in 4 sites    LT Temporalis 20 units in 4 sites    RT Occipitalis 15 units in 3 sites    LT Occipitalis 15  units in 3 sites     RT Cervical Paraspinals 10 units in 2 sites    LT Cervical Paraspinals 10 units in 2 sites    RT Trapezius 15 units in 3 sites    LT Trapezius 15 units in 3 sites       Per the standard of care protocol we use 155 units and waste 45 units. I gave the patient the option of following the standard protocol vs.using the extra 45 units to target areas where the pain is maximum which could potentially provide extra help with headache control. I explained to the patient that this will be an off-label use, not the standard protocol, not evidence-based and could result in more pronounced side effects. The patient verbalized full understanding of all the facts and the risks and benefits and elected  to use the extra 45 units for the following sites:         Procerus 5 units in 1 site      RT Temporalis 20 units in 4 sites     LT Temporalis 20 units in 4 sites             Britton Farrar, MSN NP      Collaborating Provider: Vipin Matthews MD, FAAN Neurologist/Epileptologist

## 2023-09-12 ENCOUNTER — TELEPHONE (OUTPATIENT)
Dept: OBSTETRICS AND GYNECOLOGY | Facility: CLINIC | Age: 68
End: 2023-09-12
Payer: MEDICARE

## 2023-09-12 NOTE — TELEPHONE ENCOUNTER
Called pt to reschedule cancelled appt.  Rescheduled pt with Jamia for annual on 9/21 at 1:00 pm at [facility].  Pt verbalized understanding of day,time and location of appt.

## 2023-09-14 ENCOUNTER — PATIENT MESSAGE (OUTPATIENT)
Dept: PSYCHIATRY | Facility: CLINIC | Age: 68
End: 2023-09-14
Payer: MEDICARE

## 2023-09-29 ENCOUNTER — HOSPITAL ENCOUNTER (OUTPATIENT)
Dept: RADIOLOGY | Facility: HOSPITAL | Age: 68
Discharge: HOME | End: 2023-09-29
Attending: NURSE PRACTITIONER
Payer: MEDICARE

## 2023-09-29 ENCOUNTER — TELEPHONE (OUTPATIENT)
Dept: ADMINISTRATIVE | Facility: CLINIC | Age: 68
End: 2023-09-29
Payer: MEDICARE

## 2023-09-29 DIAGNOSIS — N28.1 BILATERAL RENAL CYSTS: ICD-10-CM

## 2023-09-29 PROCEDURE — 76770 US EXAM ABDO BACK WALL COMP: CPT | Mod: TC,HCNC,PO

## 2023-09-29 PROCEDURE — 76770 US EXAM ABDO BACK WALL COMP: CPT | Mod: 26,HCNC,, | Performed by: STUDENT IN AN ORGANIZED HEALTH CARE EDUCATION/TRAINING PROGRAM

## 2023-09-29 PROCEDURE — 76770 US RETROPERITONEAL COMPLETE: ICD-10-PCS | Mod: 26,HCNC,, | Performed by: STUDENT IN AN ORGANIZED HEALTH CARE EDUCATION/TRAINING PROGRAM

## 2023-09-29 NOTE — TELEPHONE ENCOUNTER
Called pt, no answer; left message informing pt I was calling to remind pt of her in office EAWV on 10/2/23 and to return my call if she had any questions; left my name and number.

## 2023-10-03 ENCOUNTER — PATIENT MESSAGE (OUTPATIENT)
Dept: INTERNAL MEDICINE | Facility: CLINIC | Age: 68
End: 2023-10-03
Payer: MEDICARE

## 2023-10-03 ENCOUNTER — PATIENT MESSAGE (OUTPATIENT)
Dept: PSYCHIATRY | Facility: CLINIC | Age: 68
End: 2023-10-03
Payer: MEDICARE

## 2023-10-04 RX ORDER — BUMETANIDE 2 MG/1
2 TABLET ORAL
Qty: 90 TABLET | Refills: 1 | Status: SHIPPED | OUTPATIENT
Start: 2023-10-04 | End: 2024-03-14 | Stop reason: SDUPTHER

## 2023-10-05 ENCOUNTER — PATIENT MESSAGE (OUTPATIENT)
Dept: OBSTETRICS AND GYNECOLOGY | Facility: CLINIC | Age: 68
End: 2023-10-05

## 2023-10-05 ENCOUNTER — OFFICE VISIT (OUTPATIENT)
Dept: OPHTHALMOLOGY | Facility: CLINIC | Age: 68
End: 2023-10-05
Payer: MEDICARE

## 2023-10-05 ENCOUNTER — PATIENT MESSAGE (OUTPATIENT)
Dept: OBSTETRICS AND GYNECOLOGY | Facility: CLINIC | Age: 68
End: 2023-10-05
Payer: MEDICARE

## 2023-10-05 ENCOUNTER — OFFICE VISIT (OUTPATIENT)
Dept: OBSTETRICS AND GYNECOLOGY | Facility: CLINIC | Age: 68
End: 2023-10-05
Payer: MEDICARE

## 2023-10-05 ENCOUNTER — DOCUMENTATION ONLY (OUTPATIENT)
Dept: OPHTHALMOLOGY | Facility: CLINIC | Age: 68
End: 2023-10-05

## 2023-10-05 VITALS
DIASTOLIC BLOOD PRESSURE: 72 MMHG | BODY MASS INDEX: 24.06 KG/M2 | WEIGHT: 130.75 LBS | HEIGHT: 62 IN | SYSTOLIC BLOOD PRESSURE: 114 MMHG

## 2023-10-05 DIAGNOSIS — Z98.890 HX OF LASIK: ICD-10-CM

## 2023-10-05 DIAGNOSIS — H25.12 NUCLEAR SCLEROSIS OF LEFT EYE: ICD-10-CM

## 2023-10-05 DIAGNOSIS — N95.2 ATROPHIC VAGINITIS: ICD-10-CM

## 2023-10-05 DIAGNOSIS — R10.2 PELVIC PAIN IN FEMALE: ICD-10-CM

## 2023-10-05 DIAGNOSIS — N90.3 VULVAR DYSPLASIA: ICD-10-CM

## 2023-10-05 DIAGNOSIS — D31.01 NEVUS OF CONJUNCTIVA, RIGHT: ICD-10-CM

## 2023-10-05 DIAGNOSIS — Z12.31 ENCOUNTER FOR SCREENING MAMMOGRAM FOR BREAST CANCER: ICD-10-CM

## 2023-10-05 DIAGNOSIS — Z01.419 ENCOUNTER FOR GYNECOLOGICAL EXAMINATION WITHOUT ABNORMAL FINDING: Primary | ICD-10-CM

## 2023-10-05 DIAGNOSIS — H25.11 NUCLEAR SCLEROSIS OF RIGHT EYE: Primary | ICD-10-CM

## 2023-10-05 PROCEDURE — 1159F MED LIST DOCD IN RCRD: CPT | Mod: HCNC,CPTII,S$GLB, | Performed by: NURSE PRACTITIONER

## 2023-10-05 PROCEDURE — 1159F PR MEDICATION LIST DOCUMENTED IN MEDICAL RECORD: ICD-10-PCS | Mod: HCNC,CPTII,S$GLB, | Performed by: NURSE PRACTITIONER

## 2023-10-05 PROCEDURE — 99204 OFFICE O/P NEW MOD 45 MIN: CPT | Mod: HCNC,S$GLB,, | Performed by: STUDENT IN AN ORGANIZED HEALTH CARE EDUCATION/TRAINING PROGRAM

## 2023-10-05 PROCEDURE — 1160F PR REVIEW ALL MEDS BY PRESCRIBER/CLIN PHARMACIST DOCUMENTED: ICD-10-PCS | Mod: HCNC,CPTII,S$GLB, | Performed by: NURSE PRACTITIONER

## 2023-10-05 PROCEDURE — 87624 HPV HI-RISK TYP POOLED RSLT: CPT | Mod: HCNC | Performed by: NURSE PRACTITIONER

## 2023-10-05 PROCEDURE — 1126F AMNT PAIN NOTED NONE PRSNT: CPT | Mod: HCNC,CPTII,S$GLB, | Performed by: NURSE PRACTITIONER

## 2023-10-05 PROCEDURE — 99999 PR PBB SHADOW E&M-EST. PATIENT-LVL IV: CPT | Mod: PBBFAC,HCNC,, | Performed by: STUDENT IN AN ORGANIZED HEALTH CARE EDUCATION/TRAINING PROGRAM

## 2023-10-05 PROCEDURE — 92136 OPHTHALMIC BIOMETRY: CPT | Mod: HCNC,RT,S$GLB, | Performed by: STUDENT IN AN ORGANIZED HEALTH CARE EDUCATION/TRAINING PROGRAM

## 2023-10-05 PROCEDURE — 92136 IOL MASTER - OD - RIGHT EYE: ICD-10-PCS | Mod: HCNC,RT,S$GLB, | Performed by: STUDENT IN AN ORGANIZED HEALTH CARE EDUCATION/TRAINING PROGRAM

## 2023-10-05 PROCEDURE — 99999 PR PBB SHADOW E&M-EST. PATIENT-LVL IV: ICD-10-PCS | Mod: PBBFAC,HCNC,, | Performed by: STUDENT IN AN ORGANIZED HEALTH CARE EDUCATION/TRAINING PROGRAM

## 2023-10-05 PROCEDURE — 3288F FALL RISK ASSESSMENT DOCD: CPT | Mod: HCNC,CPTII,S$GLB, | Performed by: NURSE PRACTITIONER

## 2023-10-05 PROCEDURE — 1101F PR PT FALLS ASSESS DOC 0-1 FALLS W/OUT INJ PAST YR: ICD-10-PCS | Mod: HCNC,CPTII,S$GLB, | Performed by: NURSE PRACTITIONER

## 2023-10-05 PROCEDURE — 88175 CYTOPATH C/V AUTO FLUID REDO: CPT | Mod: HCNC | Performed by: NURSE PRACTITIONER

## 2023-10-05 PROCEDURE — 1160F PR REVIEW ALL MEDS BY PRESCRIBER/CLIN PHARMACIST DOCUMENTED: ICD-10-PCS | Mod: HCNC,CPTII,S$GLB, | Performed by: STUDENT IN AN ORGANIZED HEALTH CARE EDUCATION/TRAINING PROGRAM

## 2023-10-05 PROCEDURE — 1126F PR PAIN SEVERITY QUANTIFIED, NO PAIN PRESENT: ICD-10-PCS | Mod: HCNC,CPTII,S$GLB, | Performed by: NURSE PRACTITIONER

## 2023-10-05 PROCEDURE — 1159F PR MEDICATION LIST DOCUMENTED IN MEDICAL RECORD: ICD-10-PCS | Mod: HCNC,CPTII,S$GLB, | Performed by: STUDENT IN AN ORGANIZED HEALTH CARE EDUCATION/TRAINING PROGRAM

## 2023-10-05 PROCEDURE — 3078F PR MOST RECENT DIASTOLIC BLOOD PRESSURE < 80 MM HG: ICD-10-PCS | Mod: HCNC,CPTII,S$GLB, | Performed by: NURSE PRACTITIONER

## 2023-10-05 PROCEDURE — G0101 PR CA SCREEN;PELVIC/BREAST EXAM: ICD-10-PCS | Mod: HCNC,S$GLB,, | Performed by: NURSE PRACTITIONER

## 2023-10-05 PROCEDURE — 1160F RVW MEDS BY RX/DR IN RCRD: CPT | Mod: HCNC,CPTII,S$GLB, | Performed by: STUDENT IN AN ORGANIZED HEALTH CARE EDUCATION/TRAINING PROGRAM

## 2023-10-05 PROCEDURE — 99999 PR PBB SHADOW E&M-EST. PATIENT-LVL V: ICD-10-PCS | Mod: PBBFAC,HCNC,, | Performed by: NURSE PRACTITIONER

## 2023-10-05 PROCEDURE — 1160F RVW MEDS BY RX/DR IN RCRD: CPT | Mod: HCNC,CPTII,S$GLB, | Performed by: NURSE PRACTITIONER

## 2023-10-05 PROCEDURE — 1159F MED LIST DOCD IN RCRD: CPT | Mod: HCNC,CPTII,S$GLB, | Performed by: STUDENT IN AN ORGANIZED HEALTH CARE EDUCATION/TRAINING PROGRAM

## 2023-10-05 PROCEDURE — G0101 CA SCREEN;PELVIC/BREAST EXAM: HCPCS | Mod: HCNC,S$GLB,, | Performed by: NURSE PRACTITIONER

## 2023-10-05 PROCEDURE — 3074F PR MOST RECENT SYSTOLIC BLOOD PRESSURE < 130 MM HG: ICD-10-PCS | Mod: HCNC,CPTII,S$GLB, | Performed by: NURSE PRACTITIONER

## 2023-10-05 PROCEDURE — 3288F PR FALLS RISK ASSESSMENT DOCUMENTED: ICD-10-PCS | Mod: HCNC,CPTII,S$GLB, | Performed by: NURSE PRACTITIONER

## 2023-10-05 PROCEDURE — 3078F DIAST BP <80 MM HG: CPT | Mod: HCNC,CPTII,S$GLB, | Performed by: NURSE PRACTITIONER

## 2023-10-05 PROCEDURE — 3074F SYST BP LT 130 MM HG: CPT | Mod: HCNC,CPTII,S$GLB, | Performed by: NURSE PRACTITIONER

## 2023-10-05 PROCEDURE — 99204 PR OFFICE/OUTPT VISIT, NEW, LEVL IV, 45-59 MIN: ICD-10-PCS | Mod: HCNC,S$GLB,, | Performed by: STUDENT IN AN ORGANIZED HEALTH CARE EDUCATION/TRAINING PROGRAM

## 2023-10-05 PROCEDURE — 1101F PT FALLS ASSESS-DOCD LE1/YR: CPT | Mod: HCNC,CPTII,S$GLB, | Performed by: NURSE PRACTITIONER

## 2023-10-05 PROCEDURE — 99999 PR PBB SHADOW E&M-EST. PATIENT-LVL V: CPT | Mod: PBBFAC,HCNC,, | Performed by: NURSE PRACTITIONER

## 2023-10-05 PROCEDURE — 87625 HPV TYPES 16 & 18 ONLY: CPT | Mod: HCNC | Performed by: NURSE PRACTITIONER

## 2023-10-05 RX ORDER — CONJUGATED ESTROGENS 0.62 MG/G
1 CREAM VAGINAL
Qty: 30 G | Refills: 3 | Status: SHIPPED | OUTPATIENT
Start: 2023-10-05

## 2023-10-05 RX ORDER — PREDNISOLONE ACETATE 10 MG/ML
1 SUSPENSION/ DROPS OPHTHALMIC 4 TIMES DAILY
Qty: 5 ML | Refills: 1 | Status: SHIPPED | OUTPATIENT
Start: 2023-10-05 | End: 2023-11-17 | Stop reason: SDUPTHER

## 2023-10-05 NOTE — PROGRESS NOTES
CC: Well woman exam    Kenia Alonzo is a 68 y.o. female  presents for well woman exam.  LMP: No LMP recorded. Patient is postmenopausal..      Last seen by Dr. Crane in   Last pap in 2019    Has combipatch - needs premarin cream refilled    Has IBS but having some lower pelvic pain  and constipation     Vulvar biopsy done in  by me showed:  FINAL PATHOLOGIC DIAGNOSIS   1. VULVAR BIOPSY SHOWING HIGH-GRADE SQUAMOUS DYSPLASIA (FLORA 2-3)   2. VULVAR BIOPSY SHOWING SEVERE DYSPLASIA (FLORA 3) WITH MARKED PARAKERATOSIS, NO DEFINITIVE INVASION IDENTIFIED.   3. VULVAR BIOPSY SHOWING MILD SQUAMOUS DYSPLASIA (FLORA 1).     Past Medical History:   Diagnosis Date    Arthritis     knee    Asthma     childhood     Depression     Digestive disorder     Encounter for blood transfusion     General anesthetics causing adverse effect in therapeutic use     severe headache after crainiotomy    GI problem     IBS    Gout     Headache     Hyperlipidemia     Meniere disease     MVP (mitral valve prolapse)     minor    Renal disorder     Vertigo      Past Surgical History:   Procedure Laterality Date    BRAIN SURGERY      vestibular neurectomy    BREAST BIOPSY      15 years ago?  no visible scar    BREAST SURGERY      benign     SECTION      x 1    CHOLECYSTECTOMY      COLONOSCOPY N/A 3/15/2021    Procedure: COLONOSCOPY;  Surgeon: Mita Stoddard MD;  Location: [facility];  Service: Endoscopy;  Laterality: N/A;    COLONOSCOPY N/A 2023    Procedure: COLONOSCOPY;  Surgeon: Mita Stoddard MD;  Location: [facility] END[facility];  Service: Endoscopy;  Laterality: N/A;    EXAMINATION UNDER ANESTHESIA N/A 2020    Procedure: EXAM UNDER ANESTHESIA;  Surgeon: Lucy Crane MD;  Location[facility];  Service: OB/GYN;  Laterality: N/A;    LASER ABLATION N/A 2020    Procedure: ABLATION, USING LASER;  Surgeon: Lucy Crane MD;  Location: [facility] OR;  Service: OB/GYN;  Laterality: N/A;    TONSILLECTOMY       Social  History     Socioeconomic History    Marital status:    Tobacco Use    Smoking status: Former     Current packs/day: 0.00     Average packs/day: 0.5 packs/day for 44.1 years (22.0 ttl pk-yrs)     Types: Cigarettes     Start date: 1/1/1976     Quit date: 1/25/2020     Years since quitting: 3.6    Smokeless tobacco: Never    Tobacco comments:     Quit 1/26/20; quit again mid March 2021   Substance and Sexual Activity    Alcohol use: Not Currently     Alcohol/week: 1.0 - 3.0 standard drink of alcohol     Types: 1 - 3 Glasses of wine per week     Comment: social few times monthly.   No alcohol 72h prior to sx    Drug use: No    Sexual activity: Not Currently     Birth control/protection: None, Post-menopausal     Social Determinants of Health     Financial Resource Strain: Low Risk  (10/4/2023)    Overall Financial Resource Strain (CARDIA)     Difficulty of Paying Living Expenses: Not very hard   Food Insecurity: No Food Insecurity (10/4/2023)    Hunger Vital Sign     Worried About Running Out of Food in the Last Year: Never true     Ran Out of Food in the Last Year: Never true   Transportation Needs: No Transportation Needs (10/4/2023)    PRAPARE - Transportation     Lack of Transportation (Medical): No     Lack of Transportation (Non-Medical): No   Physical Activity: Insufficiently Active (10/4/2023)    Exercise Vital Sign     Days of Exercise per Week: 4 days     Minutes of Exercise per Session: 30 min   Stress: No Stress Concern Present (10/4/2023)    [employer] [place] [employer] - Occupational Stress Questionnaire     Feeling of Stress : Only a little   Social Connections: Unknown (10/4/2023)    Social Connection and Isolation Panel [NHANES]     Frequency of Communication with Friends and Family: More than three times a week     Frequency of Social Gatherings with Friends and Family: More than three times a week     Active Member of Clubs or Organizations: No     Attends Club or Organization  "Meetings: Never     Marital Status:    Housing Stability: Low Risk  (10/4/2023)    Housing Stability Vital Sign     Unable to Pay for Housing in the Last Year: No     Number of Places Lived in the Last Year: 1     Unstable Housing in the Last Year: No     Family History   Problem Relation Age of Onset    Colon cancer Father     Diabetes Father     Cancer Sister     Diabetes Sister      OB History          1    Para   1    Term   1            AB        Living   1         SAB        IAB        Ectopic        Multiple        Live Births                     /72 (BP Location: Left arm, Patient Position: Sitting, BP Method: Medium (Manual))   Ht 5' 2" (1.575 m)   Wt 59.3 kg (130 lb 11.7 oz)   BMI 23.91 kg/m²       ROS:  GENERAL: Denies weight gain or weight loss. Feeling well overall.   SKIN: Denies rash or lesions.   HEAD: Denies head injury or headache.   NODES: Denies enlarged lymph nodes.   CHEST: Denies chest pain or shortness of breath.   CARDIOVASCULAR: Denies palpitations or left sided chest pain.   ABDOMEN: No abdominal pain, constipation, diarrhea, nausea, vomiting or rectal bleeding.   URINARY: No frequency, dysuria, hematuria, or burning on urination.  REPRODUCTIVE: See HPI.   BREASTS: The patient performs breast self-examination and denies pain, lumps, or nipple discharge.   HEMATOLOGIC: No easy bruisability or excessive bleeding.   MUSCULOSKELETAL: Denies joint pain or swelling.   NEUROLOGIC: Denies syncope or weakness.   PSYCHIATRIC: Denies depression, anxiety or mood swings.    PHYSICAL EXAM:  APPEARANCE: Well nourished, well developed, in no acute distress.  AFFECT: WNL, alert and oriented x 3  SKIN: No acne or hirsutism  NECK: Neck symmetric without masses or thyromegaly  NODES: No inguinal, cervical, axillary, or femoral lymph node enlargement  CHEST: Good respiratory effect  ABDOMEN: Soft.  No tenderness or masses.  No hepatosplenomegaly.  No hernias.  BREASTS: " Symmetrical, no skin changes or visible lesions.  No palpable masses, nipple discharge bilaterally.  PELVIC: Normal external genitalia has an area of hypopigmented tissue to gray colored tissue at left lower labia and an area to right upper labia with hypopigment  - recommend biopsy.  Normal hair distribution.  Adequate perineal body, normal urethral meatus.  Vagina atrophic without lesions or discharge.  Cervix pink, without lesions, discharge or tenderness.  No significant cystocele or rectocele.  Bimanual exam shows uterus to be normal size, regular, mobile and tender.  Adnexa without masses with tenderness and fullness to entire pelvis   EXTREMITIES: No edema.  Physical Exam:               Genitourinary:                           1. Encounter for gynecological examination without abnormal finding  Liquid-Based Pap Smear, Screening    HPV High Risk Genotypes, PCR    CANCELED: Liquid-Based Pap Smear, Screening    CANCELED: HPV High Risk Genotypes, PCR      2. Vulvar dysplasia  Liquid-Based Pap Smear, Screening    HPV High Risk Genotypes, PCR    CANCELED: Liquid-Based Pap Smear, Screening    CANCELED: HPV High Risk Genotypes, PCR      3. Atrophic vaginitis  conjugated estrogens (PREMARIN) vaginal cream      4. Encounter for screening mammogram for breast cancer  Mammo Digital Screening Bilat w/ [facility]      5. Pelvic pain in female  US Pelvis Comp with Transvag NON-OB (xpd       AND PLAN:    Kenia was seen today for well woman.    Diagnoses and all orders for this visit:    Encounter for gynecological examination without abnormal finding  -     Cancel: Liquid-Based Pap Smear, Screening  -     Cancel: HPV High Risk Genotypes, PCR  -     Liquid-Based Pap Smear, Screening  -     HPV High Risk Genotypes, PCR    Vulvar dysplasia  -     Cancel: Liquid-Based Pap Smear, Screening  -     Cancel: HPV High Risk Genotypes, PCR  -     Liquid-Based Pap Smear, Screening  -     HPV High Risk Genotypes, PCR    Atrophic vaginitis  -      conjugated estrogens (PREMARIN) vaginal cream; Place 1 g vaginally twice a week.    Encounter for screening mammogram for breast cancer  -     Mammo Digital Screening Bilat w/ [facility]; Future    Pelvic pain in female  -     US Pelvis Comp with Transvag NON-OB (xpd; Future     RTC for biopsy  Pt scheduled for pelvic ultrasound    Patient was counseled today on A.C.S. Pap guidelines and recommendations for yearly pelvic exams, mammograms and monthly self breast exams; to see her PCP for other health maintenance.

## 2023-10-05 NOTE — PROGRESS NOTES
HPI    C/o blurred vision , glare, trouble driving at night , difficulty reading,   and seeing the television over the past several months.    Which eye is most affected? OU    Do you have difficulty, even with glasses, with the following activities?    Reading small print such as labels on medicine bottles, a telephone book,   or food labels.   Yes  Reading a newspaper or book?  Yes  Seeing steps, stairs, or curbs  No  Reading traffic signs, street signs, or store signs  No  Doing fine handwork like sewing, knitting, crocheting or carpentry?  No  Writing checks or filling out forms  No  Playing games such as bingo, [employer], card games or mahjong?  No  Watching television?  Yes  Driving at night?  Yes    Have you had any eye surgeries including LASIK or PRK?  Yes (if so, what kind) possible myopicd lasik    Do you have VA insurance?   No (if so, notify MD for potential toric lens)    1. NSC OU  2. Nevus OD  3. HX of Lasik  4. Macular RPE mottling       Last edited by Rachel Longo on 10/5/2023  3:32 PM.            Assessment /Plan     For exam results, see Encounter Report.    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 Risks, benefits, and alternatives to cataract surgery were discussed.  Discussion of risks included possibility of infection as well as permanent vision loss.The pt expressed a desire to proceed with surgery with the potential for some reasonable degree of visual improvement. Recommended regular use of artificial tears and good lid hygiene to optimize surgical outcome.     Discussed IOL options and refractive outcomes for this patient.    [place] right eye,   Topical  Will aim for Monofocal - Distance IOL    Intraop Kenalog 40: No    Post op gtts:   Durezol or  PF      Discussed that vision may be limited by:  Astigmatism >1D    Dilation: good  Alpha Blockers: none    SS record completed, IOL master reviewed, external referral completed.   Referral to [facility] for Ophthalmic [facility]  Prescriptions sent for preoperative medications  Explained that patient may need glasses after surgery.    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 report

## 2023-10-05 NOTE — PROGRESS NOTES
Short Stay Record    Diagnosis: Nuclear Sclerotic Cataract right    CC: Blurry Vision     HPI:  Kenia Alonzo is a 68 y.o. female who presents for evaluation prior to ophthalmic surgery. No current complaints.     Past Medical History:   Diagnosis Date    Arthritis     knee    Asthma     childhood     Cataract     Depression     Digestive disorder     Encounter for blood transfusion     General anesthetics causing adverse effect in therapeutic use     severe headache after crainiotomy    GI problem     IBS    Gout     Headache     Hyperlipidemia     Meniere disease     MVP (mitral valve prolapse)     minor    Renal disorder     Vertigo      Past Surgical History:   Procedure Laterality Date    BRAIN SURGERY      vestibular neurectomy    BREAST BIOPSY      15 years ago?  no visible scar    BREAST SURGERY      benign     SECTION      x 1    CHOLECYSTECTOMY      COLONOSCOPY N/A 3/15/2021    Procedure: COLONOSCOPY;  Surgeon: Mita Stoddard MD;  Location: [facility] ENDO;  Service: Endoscopy;  Laterality: N/A;    COLONOSCOPY N/A 2023    Procedure: COLONOSCOPY;  Surgeon: Mita Stoddard MD;  Location: [facility] ENDO;  Service: Endoscopy;  Laterality: N/A;    EXAMINATION UNDER ANESTHESIA N/A 2020    Procedure: EXAM UNDER ANESTHESIA;  Surgeon: Lucy Crane MD;  Location: [facility] OR;  Service: OB/GYN;  Laterality: N/A;    LASER ABLATION N/A 2020    Procedure: ABLATION, USING LASER;  Surgeon: Lucy Crane MD;  Location: [facility] OR;  Service: OB/GYN;  Laterality: N/A;    TONSILLECTOMY       Social History     Tobacco Use    Smoking status: Former     Current packs/day: 0.00     Average packs/day: 0.5 packs/day for 44.1 years (22.0 ttl pk-yrs)     Types: Cigarettes     Start date: 1976     Quit date: 2020     Years since quitting: 3.6    Smokeless tobacco: Never    Tobacco comments:     Quit 20; quit again mid 2021   Substance Use Topics    Alcohol use: Not Currently      Alcohol/week: 1.0 - 3.0 standard drink of alcohol     Types: 1 - 3 Glasses of wine per week     Comment: social few times monthly.   No alcohol 72h prior to sx     Family History   Problem Relation Age of Onset    Colon cancer Father     Diabetes Father     Cancer Sister     Diabetes Sister      Review of patient's allergies indicates:   Allergen Reactions    Demerol [meperidine] Nausea And Vomiting     Pt refuses Demerol     Codeine Itching         Current Outpatient Medications:     albuterol (VENTOLIN HFA) 90 mcg/actuation inhaler, Inhale 2 puffs into the lungs every 6 (six) hours as needed for Wheezing. Rescue, Disp: 18 g, Rfl: 0    alendronate (FOSAMAX) 70 MG tablet, Take 1 tablet (70 mg total) by mouth every 7 days., Disp: 4 tablet, Rfl: 11    azelastine (ASTELIN) 137 mcg (0.1 %) nasal spray, Use 1 spray (137 mcg total) by Nasal route 2 (two) times daily., Disp: 30 mL, Rfl: 11    bumetanide (BUMEX) 2 MG tablet, TAKE 1 TABLET EVERY DAY, Disp: 90 tablet, Rfl: 1    busPIRone (BUSPAR) 30 MG Tab, Take 1 tablet (30 mg total) by mouth 2 (two) times daily., Disp: 180 tablet, Rfl: 3    C/sourcherry/celery/grape seed (TART CHERRY ORAL), Take 1 tablet by mouth daily as needed. , Disp: , Rfl:     co-enzyme Q-10 30 mg capsule, Take 30 mg by mouth 3 (three) times daily., Disp: , Rfl:     conjugated estrogens (PREMARIN) vaginal cream, Place 1 g vaginally twice a week., Disp: 30 g, Rfl: 3    diazePAM (VALIUM) 2 MG tablet, Take 1 tablet (2 mg total) by mouth every 6 (six) hours as needed (dizziness/Meniere's attack)., Disp: 30 tablet, Rfl: 3    dicyclomine (BENTYL) 10 MG capsule, Take 1 capsule (10 mg total) by mouth 3 (three) times daily., Disp: 90 capsule, Rfl: 1    DULoxetine (CYMBALTA) 30 MG capsule, Take 3 capsules daily., Disp: 90 capsule, Rfl: 3    erenumab-aooe (AIMOVIG AUTOINJECTOR) 140 mg/mL autoinjector, Inject 1 pen (140 mg total) into the skin every 28 days., Disp: 1 mL, Rfl: 11    estradiol-norethindrone  (COMBIPATCH) 0.05-0.14 mg/24 hr, Place 1 patch onto the skin twice a week., Disp: 24 patch, Rfl: 4    levocetirizine (XYZAL) 5 MG tablet, Take 1 tablet (5 mg total) by mouth every morning., Disp: 30 tablet, Rfl: 3    LIDOcaine-prilocaine (EMLA) cream, , Disp: , Rfl:     linaCLOtide (LINZESS) 145 mcg Cap capsule, Take 1 capsule (145 mcg total) by mouth before breakfast., Disp: 30 capsule, Rfl: 5    magnesium 30 mg Tab, Take by mouth once., Disp: , Rfl:     MELATONIN ORAL, Take by mouth nightly as needed. , Disp: , Rfl:     montelukast (SINGULAIR) 10 mg tablet, Take 1 tablet (10 mg total) by mouth every evening., Disp: 90 tablet, Rfl: 3    omega-3 fatty acids/fish oil (FISH OIL-OMEGA-3 FATTY ACIDS) 300-1,000 mg capsule, Take 1 capsule by mouth once daily., Disp: , Rfl:     ondansetron (ZOFRAN-ODT) 4 MG TbDL, Dissolve 1 tablet (4 mg total) by mouth every 72 hours as needed (for nausea/vomiting)., Disp: 24 tablet, Rfl: 2    potassium chloride SA (K-DUR,KLOR-CON M) 10 MEQ tablet, Take 1 tablet (10 mEq total) by mouth once daily., Disp: 90 tablet, Rfl: 0    prednisoLONE acetate (PRED FORTE) 1 % DrpS, Place 1 drop into the right eye 4 (four) times daily., Disp: 5 mL, Rfl: 1    promethazine (PHENERGAN) 25 MG tablet, , Disp: , Rfl:     simvastatin (ZOCOR) 5 MG tablet, Take 1 tablet (5 mg total) by mouth every evening., Disp: 90 tablet, Rfl: 3    topiramate (TROKENDI XR) 100 mg Cp24, Take 1 capsule (100 mg total) by mouth once daily., Disp: 90 capsule, Rfl: 1    ubrogepant (UBRELVY) 100 mg tablet, Take 1 tablet (100 mg total) by mouth every 72 hours as needed for Migraine (3 times per week as needed)., Disp: 10 tablet, Rfl: 2    varenicline (CHANTIX) 1 mg Tab, Take 1 tablet (1 mg total) by mouth 2 (two) times daily., Disp: 60 tablet, Rfl: 1    vitamin B complex/folic acid (B COMPLEX 100 ORAL), Take by mouth nightly., Disp: , Rfl:     zinc gluconate 50 mg tablet, Take 50 mg by mouth once daily., Disp: , Rfl:     zolpidem  (AMBIEN) 5 MG Tab, Take 1/2 to 1 tablet at bedtime as needed for sleep, Disp: 30 tablet, Rfl: 1    Current Facility-Administered Medications:     onabotulinumtoxina injection 200 Units, 200 Units, Intramuscular, Q90 Days, Danna Farrar NP    onabotulinumtoxina injection 200 Units, 200 Units, Intramuscular, Q90 Days, Danna Farrar NP, 200 Units at 09/11/23 1529    [START ON 12/11/2023] onabotulinumtoxina injection 200 Units, 200 Units, Intramuscular, Q90 Days, Danna Farrar NP    Facility-Administered Medications Ordered in Other Visits:     lactated ringers infusion, , Intravenous, Continuous, Rachana Teran MD, Last Rate: 10 mL/hr at 04/19/23 1005, New Bag at 04/19/23 1005    Review of Systems:  10 Pt ROS negative except as stated in HPI    Physical Exam:  General Appearance:    A&Ox3, no distress, appears stated age   Head:    Normocephalic, without obvious abnormality, atraumatic   Eyes:    PERRL, EOM's intact   Back:     Symmetric, no curvature   Lungs:     respirations unlabored   Chest Wall:    No tenderness or deformity    Heart:  Abdomen:  Extremities:  Skin:    S1 and S2 present    Soft, non-tender    Extremities normal, atraumatic    Skin color, texture, turgor normal     Patient is stable for ophthalmic surgery under local and MAC.       _

## 2023-10-06 ENCOUNTER — TELEPHONE (OUTPATIENT)
Dept: OBSTETRICS AND GYNECOLOGY | Facility: CLINIC | Age: 68
End: 2023-10-06
Payer: MEDICARE

## 2023-10-06 NOTE — TELEPHONE ENCOUNTER
Pt needs to reschedule her biopsy/procedure appt - see if we can get her to [facility] next Th/Friday

## 2023-10-12 ENCOUNTER — PATIENT MESSAGE (OUTPATIENT)
Dept: OBSTETRICS AND GYNECOLOGY | Facility: CLINIC | Age: 68
End: 2023-10-12
Payer: MEDICARE

## 2023-10-13 ENCOUNTER — TELEPHONE (OUTPATIENT)
Dept: OBSTETRICS AND GYNECOLOGY | Facility: CLINIC | Age: 68
End: 2023-10-13
Payer: MEDICARE

## 2023-10-13 ENCOUNTER — PATIENT MESSAGE (OUTPATIENT)
Dept: PSYCHIATRY | Facility: CLINIC | Age: 68
End: 2023-10-13
Payer: MEDICARE

## 2023-10-13 NOTE — TELEPHONE ENCOUNTER
----- Message from Valentine Joy sent at 10/13/2023  9:12 AM CDT -----  Contact: Eliu  Patient is calling to speak with a nurse regarding rescheduling appt due stomach bug. Please give a call back at [phone] .

## 2023-10-16 LAB
CLINICAL INFO: NORMAL
CYTO CVX: NORMAL
CYTOLOGIST CVX/VAG CYTO: NORMAL
CYTOLOGIST CVX/VAG CYTO: NORMAL
CYTOLOGY CMNT CVX/VAG CYTO-IMP: NORMAL
CYTOLOGY PAP THIN PREP EXPLANATION: NORMAL
DATE OF PREVIOUS PAP: NORMAL
DATE PREVIOUS BX: NORMAL
GEN CATEG CVX/VAG CYTO-IMP: NORMAL
HPV I/H RISK 4 DNA CVX QL NAA+PROBE: DETECTED
HPV16 DNA CVX QL PROBE+SIG AMP: DETECTED
HPV18 DNA CVX QL PROBE+SIG AMP: NOT DETECTED
LMP START DATE: NORMAL
MICROORGANISM CVX/VAG CYTO: NORMAL
PATHOLOGIST CVX/VAG CYTO: NORMAL
SERVICE CMNT-IMP: NORMAL
SPECIMEN SOURCE CVX/VAG CYTO: NORMAL
STAT OF ADQ CVX/VAG CYTO-IMP: NORMAL

## 2023-10-16 RX ORDER — ZOLPIDEM TARTRATE 5 MG/1
TABLET ORAL
Qty: 30 TABLET | Refills: 1 | Status: SHIPPED | OUTPATIENT
Start: 2023-10-16 | End: 2024-01-23 | Stop reason: SDUPTHER

## 2023-10-26 NOTE — PROGRESS NOTES
Assessment /Plan     For exam results, see Encounter Report.    Post-operative state  Cataract extraction status of eye, right- POD#1 S/P CEIOL OD Doing well. Mild edema    Continue gtts to operative eye:  PF QID      Reinstructed in importance of absolute compliance with Post-OP instructions including medications, shield at bedtime, protective glasses during the day, and limitation of activities. Follow up appointments in approximately one and six weeks or call immediately for increased pain, redness or vision loss.     RTC 1 week. MOCT if PH worse than 20/25

## 2023-10-27 ENCOUNTER — OFFICE VISIT (OUTPATIENT)
Dept: INTERNAL MEDICINE | Facility: CLINIC | Age: 68
End: 2023-10-27
Payer: MEDICARE

## 2023-10-27 VITALS
OXYGEN SATURATION: 97 % | BODY MASS INDEX: 23.82 KG/M2 | DIASTOLIC BLOOD PRESSURE: 62 MMHG | HEART RATE: 96 BPM | WEIGHT: 129.44 LBS | HEIGHT: 62 IN | SYSTOLIC BLOOD PRESSURE: 110 MMHG | TEMPERATURE: 98 F

## 2023-10-27 DIAGNOSIS — M70.22 OLECRANON BURSITIS OF LEFT ELBOW: Primary | ICD-10-CM

## 2023-10-27 DIAGNOSIS — N18.31 STAGE 3A CHRONIC KIDNEY DISEASE: ICD-10-CM

## 2023-10-27 PROCEDURE — 99213 PR OFFICE/OUTPT VISIT, EST, LEVL III, 20-29 MIN: ICD-10-PCS | Mod: HCNC,S$GLB,, | Performed by: NURSE PRACTITIONER

## 2023-10-27 PROCEDURE — 99999 PR PBB SHADOW E&M-EST. PATIENT-LVL V: CPT | Mod: PBBFAC,HCNC,, | Performed by: NURSE PRACTITIONER

## 2023-10-27 PROCEDURE — 3288F PR FALLS RISK ASSESSMENT DOCUMENTED: ICD-10-PCS | Mod: HCNC,CPTII,S$GLB, | Performed by: NURSE PRACTITIONER

## 2023-10-27 PROCEDURE — 1125F PR PAIN SEVERITY QUANTIFIED, PAIN PRESENT: ICD-10-PCS | Mod: HCNC,CPTII,S$GLB, | Performed by: NURSE PRACTITIONER

## 2023-10-27 PROCEDURE — 3008F PR BODY MASS INDEX (BMI) DOCUMENTED: ICD-10-PCS | Mod: HCNC,CPTII,S$GLB, | Performed by: NURSE PRACTITIONER

## 2023-10-27 PROCEDURE — 1125F AMNT PAIN NOTED PAIN PRSNT: CPT | Mod: HCNC,CPTII,S$GLB, | Performed by: NURSE PRACTITIONER

## 2023-10-27 PROCEDURE — 3074F PR MOST RECENT SYSTOLIC BLOOD PRESSURE < 130 MM HG: ICD-10-PCS | Mod: HCNC,CPTII,S$GLB, | Performed by: NURSE PRACTITIONER

## 2023-10-27 PROCEDURE — 3078F DIAST BP <80 MM HG: CPT | Mod: HCNC,CPTII,S$GLB, | Performed by: NURSE PRACTITIONER

## 2023-10-27 PROCEDURE — 99999 PR PBB SHADOW E&M-EST. PATIENT-LVL V: ICD-10-PCS | Mod: PBBFAC,HCNC,, | Performed by: NURSE PRACTITIONER

## 2023-10-27 PROCEDURE — 3008F BODY MASS INDEX DOCD: CPT | Mod: HCNC,CPTII,S$GLB, | Performed by: NURSE PRACTITIONER

## 2023-10-27 PROCEDURE — 3078F PR MOST RECENT DIASTOLIC BLOOD PRESSURE < 80 MM HG: ICD-10-PCS | Mod: HCNC,CPTII,S$GLB, | Performed by: NURSE PRACTITIONER

## 2023-10-27 PROCEDURE — 1101F PT FALLS ASSESS-DOCD LE1/YR: CPT | Mod: HCNC,CPTII,S$GLB, | Performed by: NURSE PRACTITIONER

## 2023-10-27 PROCEDURE — 3074F SYST BP LT 130 MM HG: CPT | Mod: HCNC,CPTII,S$GLB, | Performed by: NURSE PRACTITIONER

## 2023-10-27 PROCEDURE — 1101F PR PT FALLS ASSESS DOC 0-1 FALLS W/OUT INJ PAST YR: ICD-10-PCS | Mod: HCNC,CPTII,S$GLB, | Performed by: NURSE PRACTITIONER

## 2023-10-27 PROCEDURE — 99213 OFFICE O/P EST LOW 20 MIN: CPT | Mod: HCNC,S$GLB,, | Performed by: NURSE PRACTITIONER

## 2023-10-27 PROCEDURE — 3288F FALL RISK ASSESSMENT DOCD: CPT | Mod: HCNC,CPTII,S$GLB, | Performed by: NURSE PRACTITIONER

## 2023-10-27 RX ORDER — METHYLPREDNISOLONE 4 MG/1
TABLET ORAL
Qty: 21 TABLET | Refills: 0 | Status: SHIPPED | OUTPATIENT
Start: 2023-10-27 | End: 2024-02-09

## 2023-10-27 NOTE — PROGRESS NOTES
Subjective:       Patient ID: Kenia Alonzo is a 68 y.o. female.    Chief Complaint: Elbow Pain    Mrs. Alonzo presents to visit for complaint of L elbow pain, onset approx Wednesday or Thursday. Currently 8/10, but better than last night which was 10/10.     Elbow Pain  This is a new problem. The current episode started in the past 7 days. The problem occurs constantly. The problem has been gradually improving. Associated symptoms include arthralgias and joint swelling. Pertinent negatives include no chest pain, chills, fatigue, fever, headaches, neck pain or vomiting. Exacerbated by: palpation. She has tried nothing for the symptoms. The treatment provided mild relief.       Patient Active Problem List   Diagnosis    Depression    Meniere's disease of right ear    Vestibular migraine    Abnormal involuntary movements    Irritable bowel syndrome without diarrhea    Hyperlipidemia    History of tobacco abuse    CKD (chronic kidney disease) stage 3, GFR 30-59 ml/min    Non-seasonal allergic rhinitis    Bronchitis    Chronic mixed headache syndrome    Severe dysplasia of vulva    Complicated grief    Primary osteoarthritis of left knee    Dermatitis of face    Acute gout of right foot    Vulvar dysplasia    Ingrown toenail of left foot    Foot callus    Family history of colon cancer    Bilateral renal cysts    Menopause syndrome    Atrophic vaginitis    Candidiasis of vulva and vagina    Pain in lower jaw    Chronic bilateral low back pain with left-sided sciatica    Personal history of colonic polyps    Chronic migraine without aura, intractable, with status migrainosus    Chronic migraine with aura    Olecranon bursitis of left elbow       Family History   Problem Relation Age of Onset    Colon cancer Father     Diabetes Father     Cancer Sister     Diabetes Sister      Past Surgical History:   Procedure Laterality Date    BRAIN SURGERY  1997    vestibular neurectomy    BREAST BIOPSY      15 years ago?  no  visible scar    BREAST SURGERY      benign     SECTION      x 1    CHOLECYSTECTOMY      COLONOSCOPY N/A 3/15/2021    Procedure: COLONOSCOPY;  Surgeon: Mita Stoddard MD;  Location: [facility] ENDO;  Service: Endoscopy;  Laterality: N/A;    COLONOSCOPY N/A 2023    Procedure: COLONOSCOPY;  Surgeon: Mita Stoddard MD;  Location: [facility] ENDO;  Service: Endoscopy;  Laterality: N/A;    EXAMINATION UNDER ANESTHESIA N/A 2020    Procedure: EXAM UNDER ANESTHESIA;  Surgeon: Lucy Crane MD;  Location: [facility] OR;  Service: OB/GYN;  Laterality: N/A;    LASER ABLATION N/A 2020    Procedure: ABLATION, USING LASER;  Surgeon: Lucy Crane MD;  Location: [facility] OR;  Service: OB/GYN;  Laterality: N/A;    TONSILLECTOMY           Current Outpatient Medications:     alendronate (FOSAMAX) 70 MG tablet, Take 1 tablet (70 mg total) by mouth every 7 days., Disp: 4 tablet, Rfl: 11    azelastine (ASTELIN) 137 mcg (0.1 %) nasal spray, Use 1 spray (137 mcg total) by Nasal route 2 (two) times daily., Disp: 30 mL, Rfl: 11    bumetanide (BUMEX) 2 MG tablet, TAKE 1 TABLET EVERY DAY, Disp: 90 tablet, Rfl: 1    busPIRone (BUSPAR) 30 MG Tab, Take 1 tablet (30 mg total) by mouth 2 (two) times daily., Disp: 180 tablet, Rfl: 3    C/sourcherry/celery/grape seed (TART CHERRY ORAL), Take 1 tablet by mouth daily as needed. , Disp: , Rfl:     co-enzyme Q-10 30 mg capsule, Take 30 mg by mouth 3 (three) times daily., Disp: , Rfl:     conjugated estrogens (PREMARIN) vaginal cream, Place 1 g vaginally twice a week., Disp: 30 g, Rfl: 3    diazePAM (VALIUM) 2 MG tablet, Take 1 tablet (2 mg total) by mouth every 6 (six) hours as needed (dizziness/Meniere's attack)., Disp: 30 tablet, Rfl: 3    dicyclomine (BENTYL) 10 MG capsule, Take 1 capsule (10 mg total) by mouth 3 (three) times daily., Disp: 90 capsule, Rfl: 1    DULoxetine (CYMBALTA) 30 MG capsule, Take 3 capsules daily., Disp: 90 capsule, Rfl: 3    erenumab-aooe (AIMOVIG  AUTOINJECTOR) 140 mg/mL autoinjector, Inject 1 pen (140 mg total) into the skin every 28 days., Disp: 1 mL, Rfl: 11    estradiol-norethindrone (COMBIPATCH) 0.05-0.14 mg/24 hr, Place 1 patch onto the skin twice a week., Disp: 24 patch, Rfl: 4    levocetirizine (XYZAL) 5 MG tablet, Take 1 tablet (5 mg total) by mouth every morning., Disp: 30 tablet, Rfl: 3    LIDOcaine-prilocaine (EMLA) cream, , Disp: , Rfl:     linaCLOtide (LINZESS) 145 mcg Cap capsule, Take 1 capsule (145 mcg total) by mouth before breakfast., Disp: 30 capsule, Rfl: 5    magnesium 30 mg Tab, Take by mouth once., Disp: , Rfl:     MELATONIN ORAL, Take by mouth nightly as needed. , Disp: , Rfl:     montelukast (SINGULAIR) 10 mg tablet, Take 1 tablet (10 mg total) by mouth every evening., Disp: 90 tablet, Rfl: 3    omega-3 fatty acids/fish oil (FISH OIL-OMEGA-3 FATTY ACIDS) 300-1,000 mg capsule, Take 1 capsule by mouth once daily., Disp: , Rfl:     ondansetron (ZOFRAN-ODT) 4 MG TbDL, Dissolve 1 tablet (4 mg total) by mouth every 72 hours as needed (for nausea/vomiting)., Disp: 24 tablet, Rfl: 2    potassium chloride SA (K-DUR,KLOR-CON M) 10 MEQ tablet, Take 1 tablet (10 mEq total) by mouth once daily., Disp: 90 tablet, Rfl: 0    prednisoLONE acetate (PRED FORTE) 1 % DrpS, Place 1 drop into the right eye 4 (four) times daily., Disp: 5 mL, Rfl: 1    promethazine (PHENERGAN) 25 MG tablet, , Disp: , Rfl:     simvastatin (ZOCOR) 5 MG tablet, Take 1 tablet (5 mg total) by mouth every evening., Disp: 90 tablet, Rfl: 3    topiramate (TROKENDI XR) 100 mg Cp24, Take 1 capsule (100 mg total) by mouth once daily., Disp: 90 capsule, Rfl: 1    ubrogepant (UBRELVY) 100 mg tablet, Take 1 tablet (100 mg total) by mouth every 72 hours as needed for Migraine (3 times per week as needed)., Disp: 10 tablet, Rfl: 2    varenicline (CHANTIX) 1 mg Tab, Take 1 tablet (1 mg total) by mouth 2 (two) times daily., Disp: 60 tablet, Rfl: 1    vitamin B complex/folic acid (B COMPLEX  "100 ORAL), Take by mouth nightly., Disp: , Rfl:     zinc gluconate 50 mg tablet, Take 50 mg by mouth once daily., Disp: , Rfl:     zolpidem (AMBIEN) 5 MG Tab, Take 1/2 to 1 tablet at bedtime as needed for sleep, Disp: 30 tablet, Rfl: 1    albuterol (VENTOLIN HFA) 90 mcg/actuation inhaler, Inhale 2 puffs into the lungs every 6 (six) hours as needed for Wheezing. Rescue, Disp: 18 g, Rfl: 0    methylPREDNISolone (MEDROL DOSEPACK) 4 mg tablet, use as directed, Disp: 21 tablet, Rfl: 0    Current Facility-Administered Medications:     onabotulinumtoxina injection 200 Units, 200 Units, Intramuscular, Q90 Days, Danna Farrar NP    onabotulinumtoxina injection 200 Units, 200 Units, Intramuscular, Q90 Days, Danna Farrar NP, 200 Units at 09/11/23 1529    [START ON 12/11/2023] onabotulinumtoxina injection 200 Units, 200 Units, Intramuscular, Q90 Days, Danna Farrar NP    Review of Systems   Constitutional:  Negative for activity change, chills, fatigue, fever and unexpected weight change.   HENT:  Negative for hearing loss, rhinorrhea and trouble swallowing.    Eyes:  Negative for discharge and visual disturbance.   Respiratory:  Negative for chest tightness and shortness of breath.    Cardiovascular:  Negative for chest pain and palpitations.   Gastrointestinal:  Negative for blood in stool, constipation, diarrhea and vomiting.   Endocrine: Negative for polydipsia and polyuria.   Genitourinary:  Negative for difficulty urinating, dysuria, hematuria and menstrual problem.   Musculoskeletal:  Positive for arthralgias and joint swelling. Negative for neck pain.   Neurological:  Negative for headaches.   Psychiatric/Behavioral:  Negative for confusion and dysphoric mood.        Objective:   /62 (BP Location: Left arm, Patient Position: Sitting, BP Method: Medium (Manual))   Pulse 96   Temp 98.1 °F (36.7 °C) (Tympanic)   Ht 5' 2" (1.575 m)   Wt 58.7 kg (129 lb 6.6 oz)   SpO2 97%   BMI 23.67 kg/m²      Physical " "Exam  Constitutional:       Appearance: Normal appearance.   Cardiovascular:      Rate and Rhythm: Normal rate and regular rhythm.      Pulses: Normal pulses.      Heart sounds: Normal heart sounds. No murmur heard.     No friction rub. No gallop.   Pulmonary:      Effort: Pulmonary effort is normal. No respiratory distress.      Breath sounds: Normal breath sounds. No wheezing.   Musculoskeletal:      Right elbow: Normal range of motion. No tenderness.      Left elbow: Swelling (olecranon with erythema) present. Normal range of motion. Tenderness present in olecranon process.   Skin:     General: Skin is warm and dry.      Coloration: Skin is not pale.      Findings: No erythema.   Neurological:      Mental Status: She is alert and oriented to person, place, and time.   Psychiatric:         Mood and Affect: Mood normal.         Behavior: Behavior normal.         Assessment & Plan     Problem List Items Addressed This Visit          Renal/    CKD (chronic kidney disease) stage 3, GFR 30-59 ml/min    Current Assessment & Plan     Avoid NSAIDs. Recent gfr 45.             Orthopedic    Olecranon bursitis of left elbow - Primary    Relevant Medications    methylPREDNISolone (MEDROL DOSEPACK) 4 mg tablet   Unable to rake NSAIDs due to CKD.   Steroid rx sent.   ROM intact with no surrounding tenderness or swelling so think inflammatory, not infectious.     Follow up if symptoms worsen or fail to improve.            Portions of this note may have been created with voice recognition software. Occasional "wrong-word" or "sound-a-like" substitutions may have occurred due to the inherent limitations of voice recognition software. Please, read the note carefully and recognize, using context, where substitutions have occurred.       "

## 2023-10-29 ENCOUNTER — PATIENT MESSAGE (OUTPATIENT)
Dept: OBSTETRICS AND GYNECOLOGY | Facility: CLINIC | Age: 68
End: 2023-10-29
Payer: MEDICARE

## 2023-10-29 ENCOUNTER — PATIENT MESSAGE (OUTPATIENT)
Dept: PSYCHIATRY | Facility: CLINIC | Age: 68
End: 2023-10-29
Payer: MEDICARE

## 2023-10-31 ENCOUNTER — TELEPHONE (OUTPATIENT)
Dept: OPHTHALMOLOGY | Facility: CLINIC | Age: 68
End: 2023-10-31
Payer: MEDICARE

## 2023-10-31 ENCOUNTER — PATIENT MESSAGE (OUTPATIENT)
Dept: OPHTHALMOLOGY | Facility: CLINIC | Age: 68
End: 2023-10-31
Payer: MEDICARE

## 2023-11-01 ENCOUNTER — OUTSIDE PLACE OF SERVICE (OUTPATIENT)
Dept: OPHTHALMOLOGY | Facility: CLINIC | Age: 68
End: 2023-11-01
Payer: MEDICARE

## 2023-11-01 ENCOUNTER — OUTPATIENT CASE MANAGEMENT (OUTPATIENT)
Dept: ADMINISTRATIVE | Facility: OTHER | Age: 68
End: 2023-11-01
Payer: MEDICARE

## 2023-11-01 PROCEDURE — 66984 XCAPSL CTRC RMVL W/O ECP: CPT | Mod: RT,,, | Performed by: STUDENT IN AN ORGANIZED HEALTH CARE EDUCATION/TRAINING PROGRAM

## 2023-11-02 ENCOUNTER — PROCEDURE VISIT (OUTPATIENT)
Dept: OBSTETRICS AND GYNECOLOGY | Facility: CLINIC | Age: 68
End: 2023-11-02
Payer: MEDICARE

## 2023-11-02 ENCOUNTER — OFFICE VISIT (OUTPATIENT)
Dept: OPHTHALMOLOGY | Facility: CLINIC | Age: 68
End: 2023-11-02
Payer: MEDICARE

## 2023-11-02 VITALS — WEIGHT: 127.88 LBS | BODY MASS INDEX: 23.39 KG/M2

## 2023-11-02 DIAGNOSIS — N90.89 LESION OF VULVA: ICD-10-CM

## 2023-11-02 DIAGNOSIS — Z98.890 POST-OPERATIVE STATE: Primary | ICD-10-CM

## 2023-11-02 DIAGNOSIS — N90.3 VULVAR DYSPLASIA: Primary | ICD-10-CM

## 2023-11-02 DIAGNOSIS — Z98.41 CATARACT EXTRACTION STATUS OF EYE, RIGHT: ICD-10-CM

## 2023-11-02 PROCEDURE — 99024 POSTOP FOLLOW-UP VISIT: CPT | Mod: HCNC,S$GLB,, | Performed by: STUDENT IN AN ORGANIZED HEALTH CARE EDUCATION/TRAINING PROGRAM

## 2023-11-02 PROCEDURE — 56605 BIOPSY OF VULVA/PERINEUM: CPT | Mod: HCNC,S$GLB,, | Performed by: NURSE PRACTITIONER

## 2023-11-02 PROCEDURE — 1160F PR REVIEW ALL MEDS BY PRESCRIBER/CLIN PHARMACIST DOCUMENTED: ICD-10-PCS | Mod: HCNC,CPTII,S$GLB, | Performed by: STUDENT IN AN ORGANIZED HEALTH CARE EDUCATION/TRAINING PROGRAM

## 2023-11-02 PROCEDURE — 1160F RVW MEDS BY RX/DR IN RCRD: CPT | Mod: HCNC,CPTII,S$GLB, | Performed by: STUDENT IN AN ORGANIZED HEALTH CARE EDUCATION/TRAINING PROGRAM

## 2023-11-02 PROCEDURE — 88342 CHG IMMUNOCYTOCHEMISTRY: ICD-10-PCS | Mod: 26,HCNC,, | Performed by: PATHOLOGY

## 2023-11-02 PROCEDURE — 88305 TISSUE EXAM BY PATHOLOGIST: CPT | Mod: 26,HCNC,, | Performed by: PATHOLOGY

## 2023-11-02 PROCEDURE — 1159F MED LIST DOCD IN RCRD: CPT | Mod: HCNC,CPTII,S$GLB, | Performed by: STUDENT IN AN ORGANIZED HEALTH CARE EDUCATION/TRAINING PROGRAM

## 2023-11-02 PROCEDURE — 1159F PR MEDICATION LIST DOCUMENTED IN MEDICAL RECORD: ICD-10-PCS | Mod: HCNC,CPTII,S$GLB, | Performed by: STUDENT IN AN ORGANIZED HEALTH CARE EDUCATION/TRAINING PROGRAM

## 2023-11-02 PROCEDURE — 56606 BIOPSY OF VULVA/PERINEUM: CPT | Mod: HCNC,S$GLB,, | Performed by: NURSE PRACTITIONER

## 2023-11-02 PROCEDURE — 88342 IMHCHEM/IMCYTCHM 1ST ANTB: CPT | Mod: 26,HCNC,, | Performed by: PATHOLOGY

## 2023-11-02 PROCEDURE — 99999 PR PBB SHADOW E&M-EST. PATIENT-LVL III: CPT | Mod: PBBFAC,HCNC,, | Performed by: STUDENT IN AN ORGANIZED HEALTH CARE EDUCATION/TRAINING PROGRAM

## 2023-11-02 PROCEDURE — 88342 IMHCHEM/IMCYTCHM 1ST ANTB: CPT | Mod: HCNC | Performed by: PATHOLOGY

## 2023-11-02 PROCEDURE — 56605 PR BIOPSY VULVA/PERINEUM,ONE LESN: ICD-10-PCS | Mod: HCNC,S$GLB,, | Performed by: NURSE PRACTITIONER

## 2023-11-02 PROCEDURE — 56606 PR BX,VULVA/PERINEUM,ADDL LESION: ICD-10-PCS | Mod: HCNC,S$GLB,, | Performed by: NURSE PRACTITIONER

## 2023-11-02 PROCEDURE — 99999 PR PBB SHADOW E&M-EST. PATIENT-LVL III: ICD-10-PCS | Mod: PBBFAC,HCNC,, | Performed by: STUDENT IN AN ORGANIZED HEALTH CARE EDUCATION/TRAINING PROGRAM

## 2023-11-02 PROCEDURE — 99024 PR POST-OP FOLLOW-UP VISIT: ICD-10-PCS | Mod: HCNC,S$GLB,, | Performed by: STUDENT IN AN ORGANIZED HEALTH CARE EDUCATION/TRAINING PROGRAM

## 2023-11-02 PROCEDURE — 88305 TISSUE EXAM BY PATHOLOGIST: ICD-10-PCS | Mod: 26,HCNC,, | Performed by: PATHOLOGY

## 2023-11-02 PROCEDURE — 88305 TISSUE EXAM BY PATHOLOGIST: CPT | Mod: 59,HCNC | Performed by: PATHOLOGY

## 2023-11-02 NOTE — PROCEDURES
Biopsy    Date/Time: 11/2/2023 10:30 AM    Performed by: Jamia Borges NP  Authorized by: Jamia Borges NP  Preparation: Patient was prepped and draped in the usual sterile fashion.  Local anesthesia used: yes  Anesthesia: local infiltration    Anesthesia:  Local anesthesia used: yes  Local Anesthetic: lidocaine 1% with epinephrine  Anesthetic total: 1 mL    Sedation:  Patient sedated: no    Patient tolerance: patient tolerated the procedure well with no immediate complications  Comments: Scissors and pickups used to remove tissue to right lower vulva - area cauterized with silver nitrate  Appears today pt has more areas of pigmentation than a month ago       Biopsy    Date/Time: 11/2/2023 10:30 AM    Performed by: Jamia Borges NP  Authorized by: Jamia Borges NP  Preparation: Patient was prepped and draped in the usual sterile fashion.  Local anesthesia used: yes  Anesthesia: local infiltration    Anesthesia:  Local anesthesia used: yes  Local Anesthetic: lidocaine 1% with epinephrine  Anesthetic total: 3 mL    Sedation:  Patient sedated: no    Patient tolerance: patient tolerated the procedure well with no immediate complications  Comments: Scissors and pickups used to remove section of tissue from left lower vulva   Tissues is white to hypopigmented in appearance  Cauterized area with silver nitrate  Sending biopsy to pathology with right side biopsy  Await biopsy results to see if needs gyn oncology - pt does not want to to to [place] but see at [facility] - and pending biopsy she needs colposcopy but all can be done with gyn oncology if needed

## 2023-11-03 DIAGNOSIS — N18.30 STAGE 3 CHRONIC KIDNEY DISEASE, UNSPECIFIED WHETHER STAGE 3A OR 3B CKD: Primary | ICD-10-CM

## 2023-11-04 NOTE — TELEPHONE ENCOUNTER
Called pt to reschedule her apt to 6/1/2020. Pt understood.         ----- Message from Jewell Gentile sent at 5/19/2020  4:53 PM CDT -----  Contact: pt   Would like to consult with nurse regarding appt on 5/21/20, would like to get it back due to her covid-19 test coming back negative. Please give a call back at [phone].              Thanks,  Jewell BARROSO     Pre-op local: 10cc mixture of 0.5% marcaine and 2% Lidocaine plain Ankle block with 20cc of mixture of 2% lidocaine plain and 0.5%marcaine plain 1:1

## 2023-11-07 ENCOUNTER — LAB VISIT (OUTPATIENT)
Dept: LAB | Facility: HOSPITAL | Age: 68
End: 2023-11-07
Attending: INTERNAL MEDICINE
Payer: MEDICARE

## 2023-11-07 DIAGNOSIS — N18.30 STAGE 3 CHRONIC KIDNEY DISEASE, UNSPECIFIED WHETHER STAGE 3A OR 3B CKD: ICD-10-CM

## 2023-11-07 LAB
ANION GAP SERPL CALC-SCNC: 11 MMOL/L (ref 8–16)
BASOPHILS # BLD AUTO: 0.15 K/UL (ref 0–0.2)
BASOPHILS NFR BLD: 1.4 % (ref 0–1.9)
BUN SERPL-MCNC: 28 MG/DL (ref 8–23)
CALCIUM SERPL-MCNC: 10 MG/DL (ref 8.7–10.5)
CHLORIDE SERPL-SCNC: 113 MMOL/L (ref 95–110)
CO2 SERPL-SCNC: 20 MMOL/L (ref 23–29)
CREAT SERPL-MCNC: 1.3 MG/DL (ref 0.5–1.4)
DIFFERENTIAL METHOD: ABNORMAL
EOSINOPHIL # BLD AUTO: 0.2 K/UL (ref 0–0.5)
EOSINOPHIL NFR BLD: 2.1 % (ref 0–8)
ERYTHROCYTE [DISTWIDTH] IN BLOOD BY AUTOMATED COUNT: 13.3 % (ref 11.5–14.5)
EST. GFR  (NO RACE VARIABLE): 45.4 ML/MIN/1.73 M^2
GLUCOSE SERPL-MCNC: 96 MG/DL (ref 70–110)
HCT VFR BLD AUTO: 46.7 % (ref 37–48.5)
HGB BLD-MCNC: 14.7 G/DL (ref 12–16)
IMM GRANULOCYTES # BLD AUTO: 0.06 K/UL (ref 0–0.04)
IMM GRANULOCYTES NFR BLD AUTO: 0.6 % (ref 0–0.5)
LYMPHOCYTES # BLD AUTO: 4.3 K/UL (ref 1–4.8)
LYMPHOCYTES NFR BLD: 41.3 % (ref 18–48)
MCH RBC QN AUTO: 30.3 PG (ref 27–31)
MCHC RBC AUTO-ENTMCNC: 31.5 G/DL (ref 32–36)
MCV RBC AUTO: 96 FL (ref 82–98)
MONOCYTES # BLD AUTO: 0.6 K/UL (ref 0.3–1)
MONOCYTES NFR BLD: 5.5 % (ref 4–15)
NEUTROPHILS # BLD AUTO: 5.1 K/UL (ref 1.8–7.7)
NEUTROPHILS NFR BLD: 49.1 % (ref 38–73)
NRBC BLD-RTO: 0 /100 WBC
PLATELET # BLD AUTO: 423 K/UL (ref 150–450)
PMV BLD AUTO: 10.7 FL (ref 9.2–12.9)
POTASSIUM SERPL-SCNC: 3.4 MMOL/L (ref 3.5–5.1)
RBC # BLD AUTO: 4.85 M/UL (ref 4–5.4)
SODIUM SERPL-SCNC: 144 MMOL/L (ref 136–145)
WBC # BLD AUTO: 10.38 K/UL (ref 3.9–12.7)

## 2023-11-07 PROCEDURE — 36415 COLL VENOUS BLD VENIPUNCTURE: CPT | Mod: HCNC | Performed by: INTERNAL MEDICINE

## 2023-11-07 PROCEDURE — 80048 BASIC METABOLIC PNL TOTAL CA: CPT | Mod: HCNC | Performed by: INTERNAL MEDICINE

## 2023-11-07 PROCEDURE — 85025 COMPLETE CBC W/AUTO DIFF WBC: CPT | Mod: HCNC | Performed by: INTERNAL MEDICINE

## 2023-11-08 ENCOUNTER — OFFICE VISIT (OUTPATIENT)
Dept: NEPHROLOGY | Facility: CLINIC | Age: 68
End: 2023-11-08
Payer: MEDICARE

## 2023-11-08 ENCOUNTER — PATIENT MESSAGE (OUTPATIENT)
Dept: OBSTETRICS AND GYNECOLOGY | Facility: CLINIC | Age: 68
End: 2023-11-08
Payer: MEDICARE

## 2023-11-08 VITALS
DIASTOLIC BLOOD PRESSURE: 60 MMHG | HEART RATE: 60 BPM | BODY MASS INDEX: 23.92 KG/M2 | RESPIRATION RATE: 18 BRPM | SYSTOLIC BLOOD PRESSURE: 124 MMHG | HEIGHT: 62 IN | WEIGHT: 130 LBS

## 2023-11-08 DIAGNOSIS — N18.30 STAGE 3 CHRONIC KIDNEY DISEASE, UNSPECIFIED WHETHER STAGE 3A OR 3B CKD: Primary | ICD-10-CM

## 2023-11-08 LAB
COMMENT: ABNORMAL
FINAL PATHOLOGIC DIAGNOSIS: ABNORMAL
GROSS: ABNORMAL
Lab: ABNORMAL

## 2023-11-08 PROCEDURE — 3066F PR DOCUMENTATION OF TREATMENT FOR NEPHROPATHY: ICD-10-PCS | Mod: HCNC,CPTII,S$GLB, | Performed by: INTERNAL MEDICINE

## 2023-11-08 PROCEDURE — 1101F PT FALLS ASSESS-DOCD LE1/YR: CPT | Mod: HCNC,CPTII,S$GLB, | Performed by: INTERNAL MEDICINE

## 2023-11-08 PROCEDURE — 99215 OFFICE O/P EST HI 40 MIN: CPT | Mod: HCNC,S$GLB,, | Performed by: INTERNAL MEDICINE

## 2023-11-08 PROCEDURE — 99999 PR PBB SHADOW E&M-EST. PATIENT-LVL IV: CPT | Mod: PBBFAC,HCNC,, | Performed by: INTERNAL MEDICINE

## 2023-11-08 PROCEDURE — 1159F MED LIST DOCD IN RCRD: CPT | Mod: HCNC,CPTII,S$GLB, | Performed by: INTERNAL MEDICINE

## 2023-11-08 PROCEDURE — 99215 PR OFFICE/OUTPT VISIT, EST, LEVL V, 40-54 MIN: ICD-10-PCS | Mod: HCNC,S$GLB,, | Performed by: INTERNAL MEDICINE

## 2023-11-08 PROCEDURE — 3074F PR MOST RECENT SYSTOLIC BLOOD PRESSURE < 130 MM HG: ICD-10-PCS | Mod: HCNC,CPTII,S$GLB, | Performed by: INTERNAL MEDICINE

## 2023-11-08 PROCEDURE — 99999 PR PBB SHADOW E&M-EST. PATIENT-LVL IV: ICD-10-PCS | Mod: PBBFAC,HCNC,, | Performed by: INTERNAL MEDICINE

## 2023-11-08 PROCEDURE — 3008F PR BODY MASS INDEX (BMI) DOCUMENTED: ICD-10-PCS | Mod: HCNC,CPTII,S$GLB, | Performed by: INTERNAL MEDICINE

## 2023-11-08 PROCEDURE — 3078F DIAST BP <80 MM HG: CPT | Mod: HCNC,CPTII,S$GLB, | Performed by: INTERNAL MEDICINE

## 2023-11-08 PROCEDURE — 3288F PR FALLS RISK ASSESSMENT DOCUMENTED: ICD-10-PCS | Mod: HCNC,CPTII,S$GLB, | Performed by: INTERNAL MEDICINE

## 2023-11-08 PROCEDURE — 3066F NEPHROPATHY DOC TX: CPT | Mod: HCNC,CPTII,S$GLB, | Performed by: INTERNAL MEDICINE

## 2023-11-08 PROCEDURE — 3008F BODY MASS INDEX DOCD: CPT | Mod: HCNC,CPTII,S$GLB, | Performed by: INTERNAL MEDICINE

## 2023-11-08 PROCEDURE — 1126F AMNT PAIN NOTED NONE PRSNT: CPT | Mod: HCNC,CPTII,S$GLB, | Performed by: INTERNAL MEDICINE

## 2023-11-08 PROCEDURE — 3074F SYST BP LT 130 MM HG: CPT | Mod: HCNC,CPTII,S$GLB, | Performed by: INTERNAL MEDICINE

## 2023-11-08 PROCEDURE — 3288F FALL RISK ASSESSMENT DOCD: CPT | Mod: HCNC,CPTII,S$GLB, | Performed by: INTERNAL MEDICINE

## 2023-11-08 PROCEDURE — 3078F PR MOST RECENT DIASTOLIC BLOOD PRESSURE < 80 MM HG: ICD-10-PCS | Mod: HCNC,CPTII,S$GLB, | Performed by: INTERNAL MEDICINE

## 2023-11-08 PROCEDURE — 1101F PR PT FALLS ASSESS DOC 0-1 FALLS W/OUT INJ PAST YR: ICD-10-PCS | Mod: HCNC,CPTII,S$GLB, | Performed by: INTERNAL MEDICINE

## 2023-11-08 PROCEDURE — 1159F PR MEDICATION LIST DOCUMENTED IN MEDICAL RECORD: ICD-10-PCS | Mod: HCNC,CPTII,S$GLB, | Performed by: INTERNAL MEDICINE

## 2023-11-08 PROCEDURE — 1126F PR PAIN SEVERITY QUANTIFIED, NO PAIN PRESENT: ICD-10-PCS | Mod: HCNC,CPTII,S$GLB, | Performed by: INTERNAL MEDICINE

## 2023-11-08 NOTE — PROGRESS NOTES
Renal clinic f/u note:  Date of clinic visit: 23  Reason for f/u and chief c/o: CKD stage 3     HPI: Pt is a 69 y/o female with CKD stage 3, with noted fluctuations in s Cr and h/o of HTN, who presents for f/u. Pt was last seen in renal clinic about 2 years ago. Pertinently, pt also has a h/o of irritable bowel disease and taked linzess for constipation. Pt reports experiencing diarrhea almost daily. Pt says that on days she has diarrhea she hold does not take bumex. On f/u today, pt has no new c/o's, no discomfort. Pt's h/o, meds, and labs reviewed with her. Noted no h/o of CHF in the chart. Noted K is slightly low, no palpitation or weakness reported.        PAST MEDICAL HISTORY:  CKD stage 3, HTN,  Arthritis, Asthma, Depression, Digestive disorder, IBD, Gout, Hyperlipidemia, Meniere disease, MVP (mitral valve prolapse), AF     PAST SURGICAL HISTORY:  She  has a past surgical history that includes  section; Breast surgery; Brain surgery (); Tonsillectomy; Cholecystectomy; Examination under anesthesia (N/A, 2020); Laser ablation (N/A, 2020); Colonoscopy (N/A, 3/15/2021); and Breast biopsy.     SOCIAL HISTORY:  She  reports that she quit smoking about 20 months ago. Her smoking use included cigarettes. She started smoking about 45 years ago. She has a 21.50 pack-year smoking history. She has never used smokeless tobacco. She reports previous alcohol use of about 1.0 - 3.0 standard drink of alcohol per week. She reports that she does not use drugs.     FAMILY MEDICAL HISTORY:  Her family history includes Colon cancer in her father; Diabetes in her father and sister.           Review of patient's allergies indicates:   Allergen Reactions    Demerol [meperidine] Nausea And Vomiting       Pt refuses Demerol     Codeine Itching              Current Outpatient Medications:     albuterol (VENTOLIN HFA) 90 mcg/actuation inhaler, Inhale 2 puffs into the lungs every 6 (six) hours as needed for  Wheezing. Rescue, Disp: 18 g, Rfl: 0    alendronate (FOSAMAX) 70 MG tablet, Take 1 tablet (70 mg total) by mouth every 7 days., Disp: 4 tablet, Rfl: 11    azelastine (ASTELIN) 137 mcg (0.1 %) nasal spray, Use 1 spray (137 mcg total) by Nasal route 2 (two) times daily., Disp: 30 mL, Rfl: 11    bumetanide (BUMEX) 2 MG tablet, TAKE 1 TABLET EVERY DAY, Disp: 90 tablet, Rfl: 1    busPIRone (BUSPAR) 30 MG Tab, Take 1 tablet (30 mg total) by mouth 2 (two) times daily., Disp: 180 tablet, Rfl: 3    C/sourcherry/celery/grape seed (TART CHERRY ORAL), Take 1 tablet by mouth daily as needed. , Disp: , Rfl:     co-enzyme Q-10 30 mg capsule, Take 30 mg by mouth 3 (three) times daily., Disp: , Rfl:     conjugated estrogens (PREMARIN) vaginal cream, Place 1 g vaginally twice a week., Disp: 30 g, Rfl: 3    diazePAM (VALIUM) 2 MG tablet, Take 1 tablet (2 mg total) by mouth every 6 (six) hours as needed (dizziness/Meniere's attack)., Disp: 30 tablet, Rfl: 3    dicyclomine (BENTYL) 10 MG capsule, Take 1 capsule (10 mg total) by mouth 3 (three) times daily., Disp: 90 capsule, Rfl: 1    DULoxetine (CYMBALTA) 30 MG capsule, Take 3 capsules daily., Disp: 90 capsule, Rfl: 3    erenumab-aooe (AIMOVIG AUTOINJECTOR) 140 mg/mL autoinjector, Inject 1 pen (140 mg total) into the skin every 28 days., Disp: 1 mL, Rfl: 11    estradiol-norethindrone (COMBIPATCH) 0.05-0.14 mg/24 hr, Place 1 patch onto the skin twice a week., Disp: 24 patch, Rfl: 4    levocetirizine (XYZAL) 5 MG tablet, Take 1 tablet (5 mg total) by mouth every morning., Disp: 30 tablet, Rfl: 3    LIDOcaine-prilocaine (EMLA) cream, , Disp: , Rfl:     linaCLOtide (LINZESS) 145 mcg Cap capsule, Take 1 capsule (145 mcg total) by mouth before breakfast., Disp: 30 capsule, Rfl: 5    magnesium 30 mg Tab, Take by mouth once., Disp: , Rfl:     MELATONIN ORAL, Take by mouth nightly as needed. , Disp: , Rfl:     methylPREDNISolone (MEDROL DOSEPACK) 4 mg tablet, use as directed, Disp: 21 tablet,  "Rfl: 0    montelukast (SINGULAIR) 10 mg tablet, Take 1 tablet (10 mg total) by mouth every evening., Disp: 90 tablet, Rfl: 3    omega-3 fatty acids/fish oil (FISH OIL-OMEGA-3 FATTY ACIDS) 300-1,000 mg capsule, Take 1 capsule by mouth once daily., Disp: , Rfl:     ondansetron (ZOFRAN-ODT) 4 MG TbDL, Dissolve 1 tablet (4 mg total) by mouth every 72 hours as needed (for nausea/vomiting)., Disp: 24 tablet, Rfl: 2    potassium chloride SA (K-DUR,KLOR-CON M) 10 MEQ tablet, Take 1 tablet (10 mEq total) by mouth once daily., Disp: 90 tablet, Rfl: 0    prednisoLONE acetate (PRED FORTE) 1 % DrpS, Place 1 drop into the right eye 4 (four) times daily., Disp: 5 mL, Rfl: 1    promethazine (PHENERGAN) 25 MG tablet, , Disp: , Rfl:     simvastatin (ZOCOR) 5 MG tablet, Take 1 tablet (5 mg total) by mouth every evening., Disp: 90 tablet, Rfl: 3    topiramate (TROKENDI XR) 100 mg Cp24, Take 1 capsule (100 mg total) by mouth once daily., Disp: 90 capsule, Rfl: 1    ubrogepant (UBRELVY) 100 mg tablet, Take 1 tablet (100 mg total) by mouth every 72 hours as needed for Migraine (3 times per week as needed)., Disp: 10 tablet, Rfl: 2    varenicline (CHANTIX) 1 mg Tab, Take 1 tablet (1 mg total) by mouth 2 (two) times daily., Disp: 60 tablet, Rfl: 1    vitamin B complex/folic acid (B COMPLEX 100 ORAL), Take by mouth nightly., Disp: , Rfl:     zinc gluconate 50 mg tablet, Take 50 mg by mouth once daily., Disp: , Rfl:     zolpidem (AMBIEN) 5 MG Tab, Take 1/2 to 1 tablet at bedtime as needed for sleep, Disp: 30 tablet, Rfl: 1        REVIEW OF SYSTEMS:  Patient has no fever, fatigue, visual changes, chest pain, edema, cough, dyspnea, nausea, vomiting, constipation, diarrhea, arthralgias, pruritis, dizziness, weakness, depression, confusion.        PHYSICAL EXAM: Blood pressure 124/60, pulse 60, resp. rate 18, height 5' 2" (1.575 m), weight 59 kg (130 lb).  Ambulatopry by herself  Gen:    WDWN female in no apparent distress  Normal mood and affect " and speech  RRR s1 and s2 audible  CTA B no rales, symmetric, unlabored  Ext's no edema     Labs reviewed   BMP  Lab Results   Component Value Date     11/07/2023    K 3.4 (L) 11/07/2023     (H) 11/07/2023    CO2 20 (L) 11/07/2023    BUN 28 (H) 11/07/2023    CREATININE 1.3 11/07/2023    CALCIUM 10.0 11/07/2023    ANIONGAP 11 11/07/2023    EGFRNORACEVR 45.4 (A) 11/07/2023     Lab Results   Component Value Date    WBC 10.38 11/07/2023    HGB 14.7 11/07/2023    HCT 46.7 11/07/2023    MCV 96 11/07/2023     11/07/2023     Lab Results   Component Value Date    PTH 91.9 (H) 10/01/2021    CALCIUM 10.0 11/07/2023    PHOS 2.5 (L) 10/01/2021     Lab Results   Component Value Date    URICACID 8.5 (H) 04/01/2021       U/a: no protein, no blood  Urine p/cr unremarkable        IMPRESSION AND RECOMMENDATIONS:  69 y/o female with h/o of CKD presents for f/u:     1. Renal: s Cr stable, not worse, within the prior baseline range, in fact slightly lower than past Cr measurements.   CKD stage 3, stable renal function.  CKD likely related to HTN, gout (uric acid nephropathy), past NSAIds?  S Cr fluctuations due to frequent diarrhea   Mild borderline hypernatremia, due to diarrhea    K, mild hypokalemia: due to having frequent diarrhea, also effect of bumex.  Discussed in details with pt  Continue K replacement  Pt takes linzess, which causes diarrhea. The dose should be adjusted or tapered for relief of constipation and not causing actual diarrhea  Advised pt to take linzess qod instead of qd. Pt agreed  By reducing linzess, diarrhea will improve, and K will not be as low  Also can include K rich foods in diet, fruits, bananas, tomatoes, oranges.  Metabolic acidosis, also c/w having diarrhea    H/o hyperuricemia/gout  No new attacks  Elevated uric acid  Pt was given a list of high risks foods for gout to avoid     Ca normal  Secondary hyperparathyroidism (HPT): mild. Will monitor  Hypophosphatemia, mild. C/w with  diarrhea and early stage secondary HPT     2. HTN: controlled  Mesd reviewed     3. Med review: was done.     Plans and recommendations:  As discussed above  Total time spent 40 minutes including time needed to review the records, the   patient evaluation, documentation, face-to-face discussion with the patient,   more than 50% of the time was spent on coordination of care and counseling.    Level V visit.  RTC 1 year or sooner as needed  F/u with PCP.     David Bill MD

## 2023-11-09 ENCOUNTER — PATIENT MESSAGE (OUTPATIENT)
Dept: OPHTHALMOLOGY | Facility: CLINIC | Age: 68
End: 2023-11-09

## 2023-11-09 ENCOUNTER — PATIENT MESSAGE (OUTPATIENT)
Dept: OBSTETRICS AND GYNECOLOGY | Facility: CLINIC | Age: 68
End: 2023-11-09
Payer: MEDICARE

## 2023-11-14 ENCOUNTER — PATIENT MESSAGE (OUTPATIENT)
Dept: OPHTHALMOLOGY | Facility: CLINIC | Age: 68
End: 2023-11-14
Payer: MEDICARE

## 2023-11-16 ENCOUNTER — PATIENT MESSAGE (OUTPATIENT)
Dept: PSYCHIATRY | Facility: CLINIC | Age: 68
End: 2023-11-16
Payer: MEDICARE

## 2023-11-17 ENCOUNTER — DOCUMENTATION ONLY (OUTPATIENT)
Dept: OPHTHALMOLOGY | Facility: CLINIC | Age: 68
End: 2023-11-17

## 2023-11-17 ENCOUNTER — OFFICE VISIT (OUTPATIENT)
Dept: OPHTHALMOLOGY | Facility: CLINIC | Age: 68
End: 2023-11-17
Payer: MEDICARE

## 2023-11-17 DIAGNOSIS — Z98.41 CATARACT EXTRACTION STATUS OF EYE, RIGHT: ICD-10-CM

## 2023-11-17 DIAGNOSIS — H25.12 NUCLEAR SCLEROSIS OF LEFT EYE: ICD-10-CM

## 2023-11-17 DIAGNOSIS — Z98.890 POST-OPERATIVE STATE: Primary | ICD-10-CM

## 2023-11-17 PROCEDURE — 99999 PR PBB SHADOW E&M-EST. PATIENT-LVL III: CPT | Mod: PBBFAC,HCNC,, | Performed by: STUDENT IN AN ORGANIZED HEALTH CARE EDUCATION/TRAINING PROGRAM

## 2023-11-17 PROCEDURE — 99999 PR PBB SHADOW E&M-EST. PATIENT-LVL III: ICD-10-PCS | Mod: PBBFAC,HCNC,, | Performed by: STUDENT IN AN ORGANIZED HEALTH CARE EDUCATION/TRAINING PROGRAM

## 2023-11-17 PROCEDURE — 92136 IOL MASTER - OS - LEFT EYE: ICD-10-PCS | Mod: 26,HCNC,LT,S$GLB | Performed by: STUDENT IN AN ORGANIZED HEALTH CARE EDUCATION/TRAINING PROGRAM

## 2023-11-17 PROCEDURE — 92136 OPHTHALMIC BIOMETRY: CPT | Mod: 26,HCNC,LT,S$GLB | Performed by: STUDENT IN AN ORGANIZED HEALTH CARE EDUCATION/TRAINING PROGRAM

## 2023-11-17 PROCEDURE — 3066F NEPHROPATHY DOC TX: CPT | Mod: HCNC,CPTII,S$GLB, | Performed by: STUDENT IN AN ORGANIZED HEALTH CARE EDUCATION/TRAINING PROGRAM

## 2023-11-17 PROCEDURE — 99024 PR POST-OP FOLLOW-UP VISIT: ICD-10-PCS | Mod: HCNC,S$GLB,, | Performed by: STUDENT IN AN ORGANIZED HEALTH CARE EDUCATION/TRAINING PROGRAM

## 2023-11-17 PROCEDURE — 3066F PR DOCUMENTATION OF TREATMENT FOR NEPHROPATHY: ICD-10-PCS | Mod: HCNC,CPTII,S$GLB, | Performed by: STUDENT IN AN ORGANIZED HEALTH CARE EDUCATION/TRAINING PROGRAM

## 2023-11-17 PROCEDURE — 1159F PR MEDICATION LIST DOCUMENTED IN MEDICAL RECORD: ICD-10-PCS | Mod: HCNC,CPTII,S$GLB, | Performed by: STUDENT IN AN ORGANIZED HEALTH CARE EDUCATION/TRAINING PROGRAM

## 2023-11-17 PROCEDURE — 1159F MED LIST DOCD IN RCRD: CPT | Mod: HCNC,CPTII,S$GLB, | Performed by: STUDENT IN AN ORGANIZED HEALTH CARE EDUCATION/TRAINING PROGRAM

## 2023-11-17 PROCEDURE — 99024 POSTOP FOLLOW-UP VISIT: CPT | Mod: HCNC,S$GLB,, | Performed by: STUDENT IN AN ORGANIZED HEALTH CARE EDUCATION/TRAINING PROGRAM

## 2023-11-17 RX ORDER — PREDNISOLONE ACETATE 10 MG/ML
1 SUSPENSION/ DROPS OPHTHALMIC EVERY 4 HOURS
Qty: 5 ML | Refills: 1 | Status: SHIPPED | OUTPATIENT
Start: 2023-11-17 | End: 2024-11-16

## 2023-11-17 RX ORDER — BUPROPION HYDROCHLORIDE 150 MG/1
TABLET ORAL
Qty: 90 TABLET | Refills: 2 | Status: SHIPPED | OUTPATIENT
Start: 2023-11-17 | End: 2024-03-11

## 2023-11-17 NOTE — PROGRESS NOTES
Short Stay Record    Diagnosis: Nuclear Sclerotic Cataract left    CC: Blurry Vision     HPI:  Kenia Alonzo is a 68 y.o. female who presents for evaluation prior to ophthalmic surgery. No current complaints.     Past Medical History:   Diagnosis Date    Arthritis     knee    Asthma     childhood     Cataract     Depression     Digestive disorder     Encounter for blood transfusion     General anesthetics causing adverse effect in therapeutic use     severe headache after crainiotomy    GI problem     IBS    Gout     Headache     Hyperlipidemia     Meniere disease     MVP (mitral valve prolapse)     minor    Renal disorder     Vertigo      Past Surgical History:   Procedure Laterality Date    BRAIN SURGERY      vestibular neurectomy    BREAST BIOPSY      15 years ago?  no visible scar    BREAST SURGERY      benign     SECTION      x 1    CHOLECYSTECTOMY      COLONOSCOPY N/A 3/15/2021    Procedure: COLONOSCOPY;  Surgeon: Mita Stoddard MD;  Location: [facility] ENDO;  Service: Endoscopy;  Laterality: N/A;    COLONOSCOPY N/A 2023    Procedure: COLONOSCOPY;  Surgeon: Mita Stoddard MD;  Location: [facility] ENDO;  Service: Endoscopy;  Laterality: N/A;    EXAMINATION UNDER ANESTHESIA N/A 2020    Procedure: EXAM UNDER ANESTHESIA;  Surgeon: Lucy Crane MD;  Location: [facility] OR;  Service: OB/GYN;  Laterality: N/A;    LASER ABLATION N/A 2020    Procedure: ABLATION, USING LASER;  Surgeon: Lucy Crane MD;  Location: [facility] OR;  Service: OB/GYN;  Laterality: N/A;    TONSILLECTOMY       Social History     Tobacco Use    Smoking status: Former     Current packs/day: 0.00     Average packs/day: 0.5 packs/day for 44.1 years (22.0 ttl pk-yrs)     Types: Cigarettes     Start date: 1976     Quit date: 2020     Years since quitting: 3.8    Smokeless tobacco: Never    Tobacco comments:     Quit 20; quit again mid 2021   Substance Use Topics    Alcohol use: Not Currently      Alcohol/week: 1.0 - 3.0 standard drink of alcohol     Types: 1 - 3 Glasses of wine per week     Comment: social few times monthly.   No alcohol 72h prior to sx     Family History   Problem Relation Age of Onset    Colon cancer Father     Diabetes Father     Cancer Sister     Diabetes Sister      Review of patient's allergies indicates:   Allergen Reactions    Demerol [meperidine] Nausea And Vomiting     Pt refuses Demerol     Codeine Itching         Current Outpatient Medications:     albuterol (VENTOLIN HFA) 90 mcg/actuation inhaler, Inhale 2 puffs into the lungs every 6 (six) hours as needed for Wheezing. Rescue, Disp: 18 g, Rfl: 0    alendronate (FOSAMAX) 70 MG tablet, Take 1 tablet (70 mg total) by mouth every 7 days., Disp: 4 tablet, Rfl: 11    azelastine (ASTELIN) 137 mcg (0.1 %) nasal spray, Use 1 spray (137 mcg total) by Nasal route 2 (two) times daily., Disp: 30 mL, Rfl: 11    bumetanide (BUMEX) 2 MG tablet, TAKE 1 TABLET EVERY DAY, Disp: 90 tablet, Rfl: 1    busPIRone (BUSPAR) 30 MG Tab, Take 1 tablet (30 mg total) by mouth 2 (two) times daily., Disp: 180 tablet, Rfl: 3    C/sourcherry/celery/grape seed (TART CHERRY ORAL), Take 1 tablet by mouth daily as needed. , Disp: , Rfl:     co-enzyme Q-10 30 mg capsule, Take 30 mg by mouth 3 (three) times daily., Disp: , Rfl:     conjugated estrogens (PREMARIN) vaginal cream, Place 1 g vaginally twice a week., Disp: 30 g, Rfl: 3    diazePAM (VALIUM) 2 MG tablet, Take 1 tablet (2 mg total) by mouth every 6 (six) hours as needed (dizziness/Meniere's attack)., Disp: 30 tablet, Rfl: 3    dicyclomine (BENTYL) 10 MG capsule, Take 1 capsule (10 mg total) by mouth 3 (three) times daily., Disp: 90 capsule, Rfl: 1    DULoxetine (CYMBALTA) 30 MG capsule, Take 3 capsules daily., Disp: 90 capsule, Rfl: 3    erenumab-aooe (AIMOVIG AUTOINJECTOR) 140 mg/mL autoinjector, Inject 1 pen (140 mg total) into the skin every 28 days., Disp: 1 mL, Rfl: 11    estradiol-norethindrone  (COMBIPATCH) 0.05-0.14 mg/24 hr, Place 1 patch onto the skin twice a week., Disp: 24 patch, Rfl: 4    levocetirizine (XYZAL) 5 MG tablet, Take 1 tablet (5 mg total) by mouth every morning., Disp: 30 tablet, Rfl: 3    LIDOcaine-prilocaine (EMLA) cream, , Disp: , Rfl:     linaCLOtide (LINZESS) 145 mcg Cap capsule, Take 1 capsule (145 mcg total) by mouth before breakfast., Disp: 30 capsule, Rfl: 5    magnesium 30 mg Tab, Take by mouth once., Disp: , Rfl:     MELATONIN ORAL, Take by mouth nightly as needed. , Disp: , Rfl:     methylPREDNISolone (MEDROL DOSEPACK) 4 mg tablet, use as directed, Disp: 21 tablet, Rfl: 0    montelukast (SINGULAIR) 10 mg tablet, Take 1 tablet (10 mg total) by mouth every evening., Disp: 90 tablet, Rfl: 3    omega-3 fatty acids/fish oil (FISH OIL-OMEGA-3 FATTY ACIDS) 300-1,000 mg capsule, Take 1 capsule by mouth once daily., Disp: , Rfl:     ondansetron (ZOFRAN-ODT) 4 MG TbDL, Dissolve 1 tablet (4 mg total) by mouth every 72 hours as needed (for nausea/vomiting)., Disp: 24 tablet, Rfl: 2    potassium chloride SA (K-DUR,KLOR-CON M) 10 MEQ tablet, Take 1 tablet (10 mEq total) by mouth once daily., Disp: 90 tablet, Rfl: 0    prednisoLONE acetate (PRED FORTE) 1 % DrpS, Place 1 drop into the left eye every 4 (four) hours., Disp: 5 mL, Rfl: 1    promethazine (PHENERGAN) 25 MG tablet, , Disp: , Rfl:     simvastatin (ZOCOR) 5 MG tablet, Take 1 tablet (5 mg total) by mouth every evening., Disp: 90 tablet, Rfl: 3    topiramate (TROKENDI XR) 100 mg Cp24, Take 1 capsule (100 mg total) by mouth once daily., Disp: 90 capsule, Rfl: 1    ubrogepant (UBRELVY) 100 mg tablet, Take 1 tablet (100 mg total) by mouth every 72 hours as needed for Migraine (3 times per week as needed)., Disp: 10 tablet, Rfl: 2    varenicline (CHANTIX) 1 mg Tab, Take 1 tablet (1 mg total) by mouth 2 (two) times daily., Disp: 60 tablet, Rfl: 1    vitamin B complex/folic acid (B COMPLEX 100 ORAL), Take by mouth nightly., Disp: , Rfl:      zinc gluconate 50 mg tablet, Take 50 mg by mouth once daily., Disp: , Rfl:     zolpidem (AMBIEN) 5 MG Tab, Take 1/2 to 1 tablet at bedtime as needed for sleep, Disp: 30 tablet, Rfl: 1    Current Facility-Administered Medications:     onabotulinumtoxina injection 200 Units, 200 Units, Intramuscular, Q90 Days, Danna Farrar NP    onabotulinumtoxina injection 200 Units, 200 Units, Intramuscular, Q90 Days, Danna Farrar NP, 200 Units at 09/11/23 1529    [START ON 12/11/2023] onabotulinumtoxina injection 200 Units, 200 Units, Intramuscular, Q90 Days, Danna Farrar NP    Review of Systems:  10 Pt ROS negative except as stated in HPI    Physical Exam:  General Appearance:    A&Ox3, no distress, appears stated age   Head:    Normocephalic, without obvious abnormality, atraumatic   Eyes:    PERRL, EOM's intact   Back:     Symmetric, no curvature   Lungs:     respirations unlabored   Chest Wall:    No tenderness or deformity    Heart:  Abdomen:  Extremities:  Skin:    S1 and S2 present    Soft, non-tender    Extremities normal, atraumatic    Skin color, texture, turgor normal     Patient is stable for ophthalmic surgery under local and MAC.       _

## 2023-11-17 NOTE — PROGRESS NOTES
Assessment /Plan     For exam results, see Encounter Report.    Post-operative state  Cataract extraction status of eye, right- Impression/Plan  POW#1 S/P CEIOL OD : Doing well with no evidence of infection.     Persistent inflammation. PF QID until next visit    Pt given and instructed in one week postop instructions. Can resume normal activitites and d/c eye shield. OTC reading glasses can be used until evaluated for final       Nuclear sclerosis of left eye- Patient reports decreased vision in the fellow eye consistent with the clinical amount of lenticular opacity, which reaches the level of visual significance and affects activities of daily living including reading and glare. Risks, benefits, and alternatives to cataract surgery were discussed and pt desired to schedule cataract surgery. Pt was consented and the biometry and lens options were reviewed.     [place] left eye,   Topical  Will aim for Monofocal - Distance IOL    Intraop Kenalog 40: No    Post op gtts:   PF      Discussed that vision may be limited by:  none    Dilation: good  Alpha Blockers: none    SS record completed, IOL master reviewed, external referral completed.   Referral to [facility] for Ophthalmic [facility]  Prescriptions sent for preoperative medications  Explained that patient may need glasses after surgery.    RTC for POD#1

## 2023-11-24 ENCOUNTER — OFFICE VISIT (OUTPATIENT)
Dept: INTERNAL MEDICINE | Facility: CLINIC | Age: 68
End: 2023-11-24
Payer: MEDICARE

## 2023-11-24 DIAGNOSIS — J01.00 ACUTE NON-RECURRENT MAXILLARY SINUSITIS: Primary | ICD-10-CM

## 2023-11-24 PROCEDURE — 99213 OFFICE O/P EST LOW 20 MIN: CPT | Mod: HCNC,95,, | Performed by: NURSE PRACTITIONER

## 2023-11-24 PROCEDURE — 1159F MED LIST DOCD IN RCRD: CPT | Mod: HCNC,CPTII,95, | Performed by: NURSE PRACTITIONER

## 2023-11-24 PROCEDURE — 1160F PR REVIEW ALL MEDS BY PRESCRIBER/CLIN PHARMACIST DOCUMENTED: ICD-10-PCS | Mod: HCNC,CPTII,95, | Performed by: NURSE PRACTITIONER

## 2023-11-24 PROCEDURE — 3066F PR DOCUMENTATION OF TREATMENT FOR NEPHROPATHY: ICD-10-PCS | Mod: HCNC,CPTII,95, | Performed by: NURSE PRACTITIONER

## 2023-11-24 PROCEDURE — 3066F NEPHROPATHY DOC TX: CPT | Mod: HCNC,CPTII,95, | Performed by: NURSE PRACTITIONER

## 2023-11-24 PROCEDURE — 1159F PR MEDICATION LIST DOCUMENTED IN MEDICAL RECORD: ICD-10-PCS | Mod: HCNC,CPTII,95, | Performed by: NURSE PRACTITIONER

## 2023-11-24 PROCEDURE — 99213 PR OFFICE/OUTPT VISIT, EST, LEVL III, 20-29 MIN: ICD-10-PCS | Mod: HCNC,95,, | Performed by: NURSE PRACTITIONER

## 2023-11-24 PROCEDURE — 1160F RVW MEDS BY RX/DR IN RCRD: CPT | Mod: HCNC,CPTII,95, | Performed by: NURSE PRACTITIONER

## 2023-11-24 RX ORDER — DOXYCYCLINE 100 MG/1
100 CAPSULE ORAL 2 TIMES DAILY
Qty: 14 CAPSULE | Refills: 0 | Status: SHIPPED | OUTPATIENT
Start: 2023-11-24 | End: 2023-12-01

## 2023-11-24 RX ORDER — BENZONATATE 100 MG/1
100 CAPSULE ORAL 3 TIMES DAILY PRN
Qty: 20 CAPSULE | Refills: 0 | Status: SHIPPED | OUTPATIENT
Start: 2023-11-24 | End: 2023-12-01

## 2023-11-24 NOTE — PROGRESS NOTES
The patient location is: [place]  The chief complaint leading to consultation is: cough    Visit type: audiovisual    Face to Face time with patient: 3:00 PM - 3:16 PM  18 minutes of total time spent on the encounter, which includes face to face time and non-face to face time preparing to see the patient (eg, review of tests), Obtaining and/or reviewing separately obtained history, Documenting clinical information in the electronic or other health record, Independently interpreting results (not separately reported) and communicating results to the patient/family/caregiver, or Care coordination (not separately reported).         Each patient to whom he or she provides medical services by telemedicine is:  (1) informed of the relationship between the physician and patient and the respective role of any other health care provider with respect to management of the patient; and (2) notified that he or she may decline to receive medical services by telemedicine and may withdraw from such care at any time.      Subjective:       Patient ID: Kenia Alonzo is a 68 y.o. female.    Chief Complaint: No chief complaint on file.    Mrs. Alonzo is seen as a virtual visit for headache, nasal congestion, and postnasal drip x 5 days. Initially started with a cough 9-10 days prior which has progressively worsened.  Denies fever or chills.  Denies known ill contacts.    Cough  This is a new problem. The current episode started in the past 7 days. The problem has been gradually worsening. The problem occurs hourly. The cough is Non-productive and productive of sputum. Associated symptoms include headaches, myalgias, nasal congestion, postnasal drip, rhinorrhea, shortness of breath (intermittent) and wheezing (intermittent). Pertinent negatives include no chest pain, chills, ear congestion, ear pain, fever, heartburn, hemoptysis, rash, sore throat, sweats or weight loss. The symptoms are aggravated by dust, exercise, fumes, pollens  and stress. Risk factors for lung disease include travel. She has tried nothing, a beta-agonist inhaler, leukotriene antagonists and prescription cough suppressant for the symptoms. The treatment provided mild relief. Her past medical history is significant for asthma and environmental allergies. There is no history of bronchiectasis, bronchitis, COPD, emphysema or pneumonia.       Patient Active Problem List   Diagnosis    Depression    Meniere's disease of right ear    Vestibular migraine    Abnormal involuntary movements    Irritable bowel syndrome without diarrhea    Hyperlipidemia    History of tobacco abuse    CKD (chronic kidney disease) stage 3, GFR 30-59 ml/min    Non-seasonal allergic rhinitis    Bronchitis    Chronic mixed headache syndrome    Severe dysplasia of vulva    Complicated grief    Primary osteoarthritis of left knee    Dermatitis of face    Acute gout of right foot    Vulvar dysplasia    Ingrown toenail of left foot    Foot callus    Family history of colon cancer    Bilateral renal cysts    Menopause syndrome    Atrophic vaginitis    Candidiasis of vulva and vagina    Pain in lower jaw    Chronic bilateral low back pain with left-sided sciatica    Personal history of colonic polyps    Chronic migraine without aura, intractable, with status migrainosus    Chronic migraine with aura    Olecranon bursitis of left elbow       Family History   Problem Relation Age of Onset    Colon cancer Father     Diabetes Father     Cancer Sister     Diabetes Sister      Past Surgical History:   Procedure Laterality Date    BRAIN SURGERY      vestibular neurectomy    BREAST BIOPSY      15 years ago?  no visible scar    BREAST SURGERY      benign     SECTION      x 1    CHOLECYSTECTOMY      COLONOSCOPY N/A 3/15/2021    Procedure: COLONOSCOPY;  Surgeon: Mita Stoddard MD;  Location: [facility];  Service: Endoscopy;  Laterality: N/A;    COLONOSCOPY N/A 2023    Procedure: COLONOSCOPY;  Surgeon:  Mita Stoddard MD;  Location: [facility];  Service: Endoscopy;  Laterality: N/A;    EXAMINATION UNDER ANESTHESIA N/A 8/12/2020    Procedure: EXAM UNDER ANESTHESIA;  Surgeon: Lucy Crane MD;  Location: [facility] OR;  Service: OB/GYN;  Laterality: N/A;    LASER ABLATION N/A 8/12/2020    Procedure: ABLATION, USING LASER;  Surgeon: Lucy Crane MD;  Location: [facility] OR;  Service: OB/GYN;  Laterality: N/A;    TONSILLECTOMY           Current Outpatient Medications:     albuterol (VENTOLIN HFA) 90 mcg/actuation inhaler, Inhale 2 puffs into the lungs every 6 (six) hours as needed for Wheezing. Rescue, Disp: 18 g, Rfl: 0    alendronate (FOSAMAX) 70 MG tablet, Take 1 tablet (70 mg total) by mouth every 7 days., Disp: 4 tablet, Rfl: 11    azelastine (ASTELIN) 137 mcg (0.1 %) nasal spray, Use 1 spray (137 mcg total) by Nasal route 2 (two) times daily., Disp: 30 mL, Rfl: 11    benzonatate (TESSALON) 100 MG capsule, Take 1 capsule (100 mg total) by mouth 3 (three) times daily as needed for Cough., Disp: 20 capsule, Rfl: 0    bumetanide (BUMEX) 2 MG tablet, TAKE 1 TABLET EVERY DAY, Disp: 90 tablet, Rfl: 1    buPROPion (WELLBUTRIN XL) 150 MG TB24 tablet, Take 1 daily for 4 days then 2 daily for 4 days then 3 daily thereafter., Disp: 90 tablet, Rfl: 2    busPIRone (BUSPAR) 30 MG Tab, Take 1 tablet (30 mg total) by mouth 2 (two) times daily., Disp: 180 tablet, Rfl: 3    C/sourcherry/celery/grape seed (TART CHERRY ORAL), Take 1 tablet by mouth daily as needed. , Disp: , Rfl:     co-enzyme Q-10 30 mg capsule, Take 30 mg by mouth 3 (three) times daily., Disp: , Rfl:     conjugated estrogens (PREMARIN) vaginal cream, Place 1 g vaginally twice a week., Disp: 30 g, Rfl: 3    diazePAM (VALIUM) 2 MG tablet, Take 1 tablet (2 mg total) by mouth every 6 (six) hours as needed (dizziness/Meniere's attack)., Disp: 30 tablet, Rfl: 3    dicyclomine (BENTYL) 10 MG capsule, Take 1 capsule (10 mg total) by mouth 3 (three) times daily., Disp: 90  capsule, Rfl: 1    doxycycline (MONODOX) 100 MG capsule, Take 1 capsule (100 mg total) by mouth 2 (two) times daily. for 7 days, Disp: 14 capsule, Rfl: 0    DULoxetine (CYMBALTA) 30 MG capsule, Take 3 capsules daily., Disp: 90 capsule, Rfl: 3    erenumab-aooe (AIMOVIG AUTOINJECTOR) 140 mg/mL autoinjector, Inject 1 pen (140 mg total) into the skin every 28 days., Disp: 1 mL, Rfl: 11    estradiol-norethindrone (COMBIPATCH) 0.05-0.14 mg/24 hr, Place 1 patch onto the skin twice a week., Disp: 24 patch, Rfl: 4    levocetirizine (XYZAL) 5 MG tablet, Take 1 tablet (5 mg total) by mouth every morning., Disp: 30 tablet, Rfl: 3    LIDOcaine-prilocaine (EMLA) cream, , Disp: , Rfl:     linaCLOtide (LINZESS) 145 mcg Cap capsule, Take 1 capsule (145 mcg total) by mouth before breakfast., Disp: 30 capsule, Rfl: 5    magnesium 30 mg Tab, Take by mouth once., Disp: , Rfl:     MELATONIN ORAL, Take by mouth nightly as needed. , Disp: , Rfl:     methylPREDNISolone (MEDROL DOSEPACK) 4 mg tablet, use as directed, Disp: 21 tablet, Rfl: 0    montelukast (SINGULAIR) 10 mg tablet, Take 1 tablet (10 mg total) by mouth every evening., Disp: 90 tablet, Rfl: 3    omega-3 fatty acids/fish oil (FISH OIL-OMEGA-3 FATTY ACIDS) 300-1,000 mg capsule, Take 1 capsule by mouth once daily., Disp: , Rfl:     ondansetron (ZOFRAN-ODT) 4 MG TbDL, Dissolve 1 tablet (4 mg total) by mouth every 72 hours as needed (for nausea/vomiting)., Disp: 24 tablet, Rfl: 2    potassium chloride SA (K-DUR,KLOR-CON M) 10 MEQ tablet, Take 1 tablet (10 mEq total) by mouth once daily., Disp: 90 tablet, Rfl: 0    prednisoLONE acetate (PRED FORTE) 1 % DrpS, Place 1 drop into the left eye every 4 (four) hours., Disp: 5 mL, Rfl: 1    promethazine (PHENERGAN) 25 MG tablet, , Disp: , Rfl:     simvastatin (ZOCOR) 5 MG tablet, Take 1 tablet (5 mg total) by mouth every evening., Disp: 90 tablet, Rfl: 3    topiramate (TROKENDI XR) 100 mg Cp24, Take 1 capsule (100 mg total) by mouth once  daily., Disp: 90 capsule, Rfl: 1    varenicline (CHANTIX) 1 mg Tab, Take 1 tablet (1 mg total) by mouth 2 (two) times daily., Disp: 60 tablet, Rfl: 1    vitamin B complex/folic acid (B COMPLEX 100 ORAL), Take by mouth nightly., Disp: , Rfl:     zinc gluconate 50 mg tablet, Take 50 mg by mouth once daily., Disp: , Rfl:     zolpidem (AMBIEN) 5 MG Tab, Take 1/2 to 1 tablet at bedtime as needed for sleep, Disp: 30 tablet, Rfl: 1    Current Facility-Administered Medications:     onabotulinumtoxina injection 200 Units, 200 Units, Intramuscular, Q90 Days, Danna Farrar NP    onabotulinumtoxina injection 200 Units, 200 Units, Intramuscular, Q90 Days, Danna Farrar NP, 200 Units at 09/11/23 1529    [START ON 12/11/2023] onabotulinumtoxina injection 200 Units, 200 Units, Intramuscular, Q90 Days, Danna Farrar NP    Review of Systems   Constitutional:  Negative for chills, fever and weight loss.   HENT:  Positive for congestion, postnasal drip, rhinorrhea and sinus pressure. Negative for ear pain and sore throat.    Respiratory:  Positive for cough, shortness of breath (intermittent) and wheezing (intermittent). Negative for hemoptysis.    Cardiovascular:  Negative for chest pain.   Gastrointestinal:  Negative for heartburn.   Musculoskeletal:  Positive for myalgias.   Skin:  Negative for rash.   Allergic/Immunologic: Positive for environmental allergies.   Neurological:  Positive for headaches.       Objective:   There were no vitals taken for this visit.     Physical Exam  Constitutional:       General: She is not in acute distress.     Appearance: She is ill-appearing (mild).   HENT:      Nose: Congestion present.      Right Sinus: Maxillary sinus tenderness (tenderness to self palpation) present.      Left Sinus: Maxillary sinus tenderness (tenderness to self palpation) present.   Eyes:      Conjunctiva/sclera: Conjunctivae normal.   Pulmonary:      Effort: Pulmonary effort is normal. No respiratory distress.       "Comments: +cough  Neurological:      Mental Status: She is alert and oriented to person, place, and time.   Psychiatric:         Behavior: Behavior normal.         Assessment & Plan     1. Acute non-recurrent maxillary sinusitis  Comments:  Start antibiotic due to duration of symptoms.  Continue supportive care.  Return to clinic precautions reviewed.  Orders:  -     benzonatate (TESSALON) 100 MG capsule; Take 1 capsule (100 mg total) by mouth 3 (three) times daily as needed for Cough.  Dispense: 20 capsule; Refill: 0  -     doxycycline (MONODOX) 100 MG capsule; Take 1 capsule (100 mg total) by mouth 2 (two) times daily. for 7 days  Dispense: 14 capsule; Refill: 0            BEVERLY Gorman      Portions of this note may have been created with voice recognition software. Occasional "wrong-word" or "sound-a-like" substitutions may have occurred due to the inherent limitations of voice recognition software. Please, read the note carefully and recognize, using context, where substitutions have occurred.     "

## 2023-11-28 DIAGNOSIS — G43.809 VESTIBULAR MIGRAINE: ICD-10-CM

## 2023-11-28 DIAGNOSIS — J30.2 SEASONAL ALLERGIC RHINITIS, UNSPECIFIED TRIGGER: ICD-10-CM

## 2023-11-28 DIAGNOSIS — G44.89 CHRONIC MIXED HEADACHE SYNDROME: ICD-10-CM

## 2023-11-28 RX ORDER — POTASSIUM CHLORIDE 750 MG/1
10 TABLET, EXTENDED RELEASE ORAL DAILY
Qty: 90 TABLET | Refills: 3 | Status: SHIPPED | OUTPATIENT
Start: 2023-11-28

## 2023-11-28 RX ORDER — UBROGEPANT 100 MG/1
100 TABLET ORAL
Qty: 10 TABLET | Refills: 2 | Status: SHIPPED | OUTPATIENT
Start: 2023-11-28 | End: 2024-03-11

## 2023-11-28 RX ORDER — LEVOCETIRIZINE DIHYDROCHLORIDE 5 MG/1
5 TABLET, FILM COATED ORAL EVERY MORNING
Qty: 30 TABLET | Refills: 3 | Status: SHIPPED | OUTPATIENT
Start: 2023-11-28

## 2023-11-29 ENCOUNTER — OUTSIDE PLACE OF SERVICE (OUTPATIENT)
Dept: OPHTHALMOLOGY | Facility: CLINIC | Age: 68
End: 2023-11-29
Payer: MEDICARE

## 2023-11-29 ENCOUNTER — TELEPHONE (OUTPATIENT)
Dept: OPHTHALMOLOGY | Facility: CLINIC | Age: 68
End: 2023-11-29
Payer: MEDICARE

## 2023-11-29 PROCEDURE — 66984 XCAPSL CTRC RMVL W/O ECP: CPT | Mod: LT,,, | Performed by: STUDENT IN AN ORGANIZED HEALTH CARE EDUCATION/TRAINING PROGRAM

## 2023-11-29 NOTE — TELEPHONE ENCOUNTER
Spoke with pt and her appt was canceled on accident I told her to come for 1 since she is a post op

## 2023-11-29 NOTE — TELEPHONE ENCOUNTER
----- Message from Lele Olea sent at 11/29/2023  4:15 PM CST -----  Regarding: PT call back  Name of Who is Calling:Pt         What is the request in detail: Requesting call back in regards of office needing to reschedule please advise            Can the clinic reply by [facility]: yes         What Number to Call Back if not in [facility]:Telephone Information:  [employer]          [phone]

## 2023-11-30 ENCOUNTER — OFFICE VISIT (OUTPATIENT)
Dept: PSYCHIATRY | Facility: CLINIC | Age: 68
End: 2023-11-30
Payer: MEDICARE

## 2023-11-30 ENCOUNTER — OFFICE VISIT (OUTPATIENT)
Dept: OPHTHALMOLOGY | Facility: CLINIC | Age: 68
End: 2023-11-30
Payer: MEDICARE

## 2023-11-30 DIAGNOSIS — F43.21 GRIEF: ICD-10-CM

## 2023-11-30 DIAGNOSIS — F41.1 GAD (GENERALIZED ANXIETY DISORDER): ICD-10-CM

## 2023-11-30 DIAGNOSIS — Z98.890 POST-OPERATIVE STATE: Primary | ICD-10-CM

## 2023-11-30 DIAGNOSIS — Z98.42 CATARACT EXTRACTION STATUS OF EYE, LEFT: ICD-10-CM

## 2023-11-30 DIAGNOSIS — F33.1 MODERATE EPISODE OF RECURRENT MAJOR DEPRESSIVE DISORDER: Primary | ICD-10-CM

## 2023-11-30 PROCEDURE — 99024 PR POST-OP FOLLOW-UP VISIT: ICD-10-PCS | Mod: HCNC,S$GLB,, | Performed by: STUDENT IN AN ORGANIZED HEALTH CARE EDUCATION/TRAINING PROGRAM

## 2023-11-30 PROCEDURE — 99999 PR PBB SHADOW E&M-EST. PATIENT-LVL I: CPT | Mod: PBBFAC,HCNC,, | Performed by: STUDENT IN AN ORGANIZED HEALTH CARE EDUCATION/TRAINING PROGRAM

## 2023-11-30 PROCEDURE — 1159F MED LIST DOCD IN RCRD: CPT | Mod: HCNC,CPTII,S$GLB, | Performed by: STUDENT IN AN ORGANIZED HEALTH CARE EDUCATION/TRAINING PROGRAM

## 2023-11-30 PROCEDURE — 1159F PR MEDICATION LIST DOCUMENTED IN MEDICAL RECORD: ICD-10-PCS | Mod: HCNC,CPTII,95, | Performed by: SOCIAL WORKER

## 2023-11-30 PROCEDURE — 3066F PR DOCUMENTATION OF TREATMENT FOR NEPHROPATHY: ICD-10-PCS | Mod: HCNC,CPTII,95, | Performed by: SOCIAL WORKER

## 2023-11-30 PROCEDURE — 90834 PSYTX W PT 45 MINUTES: CPT | Mod: HCNC,95,, | Performed by: SOCIAL WORKER

## 2023-11-30 PROCEDURE — 1160F PR REVIEW ALL MEDS BY PRESCRIBER/CLIN PHARMACIST DOCUMENTED: ICD-10-PCS | Mod: HCNC,CPTII,S$GLB, | Performed by: STUDENT IN AN ORGANIZED HEALTH CARE EDUCATION/TRAINING PROGRAM

## 2023-11-30 PROCEDURE — 1159F MED LIST DOCD IN RCRD: CPT | Mod: HCNC,CPTII,95, | Performed by: SOCIAL WORKER

## 2023-11-30 PROCEDURE — 3066F NEPHROPATHY DOC TX: CPT | Mod: HCNC,CPTII,95, | Performed by: SOCIAL WORKER

## 2023-11-30 PROCEDURE — 3066F PR DOCUMENTATION OF TREATMENT FOR NEPHROPATHY: ICD-10-PCS | Mod: HCNC,CPTII,S$GLB, | Performed by: STUDENT IN AN ORGANIZED HEALTH CARE EDUCATION/TRAINING PROGRAM

## 2023-11-30 PROCEDURE — 99024 POSTOP FOLLOW-UP VISIT: CPT | Mod: HCNC,S$GLB,, | Performed by: STUDENT IN AN ORGANIZED HEALTH CARE EDUCATION/TRAINING PROGRAM

## 2023-11-30 PROCEDURE — 99999 PR PBB SHADOW E&M-EST. PATIENT-LVL I: ICD-10-PCS | Mod: PBBFAC,HCNC,, | Performed by: STUDENT IN AN ORGANIZED HEALTH CARE EDUCATION/TRAINING PROGRAM

## 2023-11-30 PROCEDURE — 1160F RVW MEDS BY RX/DR IN RCRD: CPT | Mod: HCNC,CPTII,S$GLB, | Performed by: STUDENT IN AN ORGANIZED HEALTH CARE EDUCATION/TRAINING PROGRAM

## 2023-11-30 PROCEDURE — 1159F PR MEDICATION LIST DOCUMENTED IN MEDICAL RECORD: ICD-10-PCS | Mod: HCNC,CPTII,S$GLB, | Performed by: STUDENT IN AN ORGANIZED HEALTH CARE EDUCATION/TRAINING PROGRAM

## 2023-11-30 PROCEDURE — 3066F NEPHROPATHY DOC TX: CPT | Mod: HCNC,CPTII,S$GLB, | Performed by: STUDENT IN AN ORGANIZED HEALTH CARE EDUCATION/TRAINING PROGRAM

## 2023-11-30 PROCEDURE — 90834 PR PSYCHOTHERAPY W/PATIENT, 45 MIN: ICD-10-PCS | Mod: HCNC,95,, | Performed by: SOCIAL WORKER

## 2023-11-30 NOTE — PROGRESS NOTES
HPI     Post-op Evaluation     Additional comments: POD#1 s/p CEIOL OS. Has received appropriate post-op   drops. Denies any discomfort. No new ocular complaints.             Comments    1. Nevus OD  2. HX of Lasik- myopic OU  3. Macular RPE mottling  4. PCIOL OD 11/01/2023  5. PCIOL OS 11/29/2023    Pred OU QID             Last edited by Juhi Acuna on 11/30/2023  1:43 PM.            Assessment /Plan     For exam results, see Encounter Report.    Post-operative state  Cataract extraction status of eye, left- POD#1 S/P CEIOL OS Doing well. Mild edema    Continue gtts to operative eye:  PF QID      Reinstructed in importance of absolute compliance with Post-OP instructions including medications, shield at bedtime, protective glasses during the day, and limitation of activities. Follow up appointments in approximately one and six weeks or call immediately for increased pain, redness or vision loss.     RTC 1 week. MOCT if PH worse than 20/25

## 2023-11-30 NOTE — PROGRESS NOTES
Individual Psychotherapy Follow-up Visit Progress Note (PhD/LCSW)     Outpatient Psychotherapy - 45 minutes with patient (38-52 minutes) - [id number]    Date: 11/30/2023    Visit Type: Virtual Visit with synchronous video/audio    Pt's confirmed location at the time of visit: home/residence in [place].    Due to the nature of this visit type, a virtual visit with synchronous audio and video, each patient to whom this provider administers behavioral health services by telemedicine is: (1) informed of the relationship between the provider and patient and the respective role of any other health care provider with respect to management of the patient; and (2) notified that he or she may decline to receive services by telemedicine and may withdraw from such care at any time.    The patient was informed of the following:     Provider's contact info:  [facility]  [id number] [place] [address], [address] [place] [address]  (Phone) [phone]    If technology issues occur, call office phone: Ph: [phone]  If crisis: Dial 911 or go to nearest Emergency Room (ER)  If questions related to privacy practices: contact [facility] Department: [phone]    Crisis Disclaimer: Patient was informed that due to the virtual nature of the visit, that if a crisis develops, protocols will be implemented to ensure patient safety, including but not limited to: 1) Initiating a welfare check with local law enforcement and/or 2) Calling 911      11/30/2023  MRN: [id number]  Primary Care Provider: Almita Izaguirre NP    Kenia Alonzo is a 68 y.o. female who presents today for follow-up of depression and anxiety. Met with patient.      Preferred Name: Kenia     Subjective:     Last encounter (with this provider): 8/30/2023     Content of Current Session: Pt states she has switched back to Wellbutrin. She had cataract surgery yesterday and is hoping she will be able to drive at night  again. She feels she is doing well with her grief due to sister Nisa's death in August. We discussed her concerns about her sister, who is reportedly an alcoholic. Provided support and helped pt process her feelings.    Therapeutic Interventions Utilized During Current Session: Supportive Therapy      Objective:       Mental Status Evaluation  Appearance: unremarkable, age appropriate  Behavior: normal, friendly and cooperative  Speech: normal tone, normal rate, normal pitch, normal volume  Mood: anxious, dysthymic  Affect: congruent and appropriate, blunted  Thought Process: normal and logical  Thought Content: normal, no suicidality, no homicidality, delusions, or paranoia  Sensorium: grossly intact  Cognition: grossly intact  Insight: good  Judgment: adequate to circumstances    Risk parameters:  Patient reports no suicidal ideation  Patient reports no homicidal ideation  Patient reports no self-injurious behavior  Patient reports no violent behavior      Assessment & Plan:     The patient's response to the interventions is accepting    The patient's progress toward treatment goals is good    Homework assigned: daily journaling, practice relaxation skills daily, and attend a support group     Treatment plan:   A. Target symptoms: Depression and Anxiety   B. Therapeutic modalities: insight oriented psychotherapy  C. Why chosen therapy is appropriate versus another modality: relevant to diagnosis, patient responds to this modality, evidence based practice   D. Outcome monitoring methods: self report, observation, rating scales, feedback from clinical staff      Visit Diagnosis:   1. Moderate episode of recurrent major depressive disorder    2. JOSIAH (generalized anxiety disorder)    3. Grief        Follow-up: individual psychotherapy and medication management by physician    Return to Clinic: as scheduled  Pt Reported to Schedule Self via Epic EMR MyChart Application and/or Department Support Staff    Beatriz Maldonado  Jewel, LCSW-BACS, CFSW

## 2023-12-01 ENCOUNTER — PATIENT MESSAGE (OUTPATIENT)
Dept: OBSTETRICS AND GYNECOLOGY | Facility: CLINIC | Age: 68
End: 2023-12-01
Payer: MEDICARE

## 2023-12-11 ENCOUNTER — PATIENT MESSAGE (OUTPATIENT)
Dept: PSYCHIATRY | Facility: CLINIC | Age: 68
End: 2023-12-11
Payer: MEDICARE

## 2023-12-11 ENCOUNTER — PATIENT MESSAGE (OUTPATIENT)
Dept: OPHTHALMOLOGY | Facility: CLINIC | Age: 68
End: 2023-12-11
Payer: MEDICARE

## 2023-12-15 ENCOUNTER — PATIENT MESSAGE (OUTPATIENT)
Dept: PSYCHIATRY | Facility: CLINIC | Age: 68
End: 2023-12-15
Payer: MEDICARE

## 2023-12-21 DIAGNOSIS — K58.9 IRRITABLE BOWEL SYNDROME WITHOUT DIARRHEA: ICD-10-CM

## 2023-12-21 DIAGNOSIS — G44.89 CHRONIC MIXED HEADACHE SYNDROME: ICD-10-CM

## 2023-12-21 DIAGNOSIS — G43.809 VESTIBULAR MIGRAINE: ICD-10-CM

## 2023-12-21 RX ORDER — DICYCLOMINE HYDROCHLORIDE 10 MG/1
10 CAPSULE ORAL 3 TIMES DAILY
Qty: 90 CAPSULE | Refills: 1 | Status: SHIPPED | OUTPATIENT
Start: 2023-12-21

## 2023-12-21 RX ORDER — TOPIRAMATE 100 MG/1
100 CAPSULE, EXTENDED RELEASE ORAL DAILY
Qty: 90 CAPSULE | Refills: 1 | Status: SHIPPED | OUTPATIENT
Start: 2023-12-21 | End: 2024-06-25

## 2023-12-22 ENCOUNTER — PATIENT MESSAGE (OUTPATIENT)
Dept: PSYCHIATRY | Facility: CLINIC | Age: 68
End: 2023-12-22
Payer: MEDICARE

## 2023-12-22 RX ORDER — DULOXETIN HYDROCHLORIDE 30 MG/1
CAPSULE, DELAYED RELEASE ORAL
Qty: 60 CAPSULE | Refills: 2 | Status: SHIPPED | OUTPATIENT
Start: 2023-12-22 | End: 2024-03-11

## 2023-12-27 DIAGNOSIS — F43.21 COMPLICATED GRIEF: ICD-10-CM

## 2023-12-27 RX ORDER — DIAZEPAM 2 MG/1
2 TABLET ORAL EVERY 6 HOURS PRN
Qty: 30 TABLET | Refills: 1 | Status: SHIPPED | OUTPATIENT
Start: 2023-12-27

## 2024-01-07 ENCOUNTER — PATIENT MESSAGE (OUTPATIENT)
Dept: PSYCHIATRY | Facility: CLINIC | Age: 69
End: 2024-01-07
Payer: MEDICARE

## 2024-01-17 RX ORDER — BUSPIRONE HYDROCHLORIDE 30 MG/1
30 TABLET ORAL 2 TIMES DAILY
Qty: 180 TABLET | Refills: 0 | Status: SHIPPED | OUTPATIENT
Start: 2024-01-17 | End: 2024-04-29 | Stop reason: SDUPTHER

## 2024-01-19 ENCOUNTER — TELEPHONE (OUTPATIENT)
Dept: PSYCHIATRY | Facility: CLINIC | Age: 69
End: 2024-01-19
Payer: MEDICARE

## 2024-01-21 ENCOUNTER — PATIENT MESSAGE (OUTPATIENT)
Dept: PSYCHIATRY | Facility: CLINIC | Age: 69
End: 2024-01-21
Payer: MEDICARE

## 2024-01-22 ENCOUNTER — PATIENT MESSAGE (OUTPATIENT)
Dept: ADMINISTRATIVE | Facility: HOSPITAL | Age: 69
End: 2024-01-22
Payer: MEDICARE

## 2024-01-22 ENCOUNTER — PATIENT OUTREACH (OUTPATIENT)
Dept: ADMINISTRATIVE | Facility: HOSPITAL | Age: 69
End: 2024-01-22
Payer: MEDICARE

## 2024-01-22 ENCOUNTER — PATIENT MESSAGE (OUTPATIENT)
Dept: INTERNAL MEDICINE | Facility: CLINIC | Age: 69
End: 2024-01-22
Payer: MEDICARE

## 2024-01-23 ENCOUNTER — OFFICE VISIT (OUTPATIENT)
Dept: PSYCHIATRY | Facility: CLINIC | Age: 69
End: 2024-01-23
Payer: MEDICARE

## 2024-01-23 ENCOUNTER — TELEPHONE (OUTPATIENT)
Dept: PSYCHIATRY | Facility: CLINIC | Age: 69
End: 2024-01-23
Payer: MEDICARE

## 2024-01-23 DIAGNOSIS — F33.1 MODERATE EPISODE OF RECURRENT MAJOR DEPRESSIVE DISORDER: Primary | ICD-10-CM

## 2024-01-23 DIAGNOSIS — F41.1 GAD (GENERALIZED ANXIETY DISORDER): ICD-10-CM

## 2024-01-23 DIAGNOSIS — F43.21 GRIEF: ICD-10-CM

## 2024-01-23 DIAGNOSIS — N95.1 MENOPAUSE SYNDROME: ICD-10-CM

## 2024-01-23 PROCEDURE — 90834 PSYTX W PT 45 MINUTES: CPT | Mod: HCNC,95,, | Performed by: SOCIAL WORKER

## 2024-01-24 RX ORDER — ESTRADIOL/NORETHINDRONE ACETATE TRANSDERMAL SYSTEM .05; .14 MG/D; MG/D
1 PATCH, EXTENDED RELEASE TRANSDERMAL
Qty: 24 PATCH | Refills: 4 | Status: SHIPPED | OUTPATIENT
Start: 2024-01-25

## 2024-01-24 RX ORDER — ZOLPIDEM TARTRATE 5 MG/1
TABLET ORAL
Qty: 30 TABLET | Refills: 1 | Status: SHIPPED | OUTPATIENT
Start: 2024-01-24 | End: 2024-05-10 | Stop reason: SDUPTHER

## 2024-01-29 ENCOUNTER — PATIENT MESSAGE (OUTPATIENT)
Dept: NEUROLOGY | Facility: CLINIC | Age: 69
End: 2024-01-29
Payer: MEDICARE

## 2024-02-02 ENCOUNTER — PATIENT MESSAGE (OUTPATIENT)
Dept: OBSTETRICS AND GYNECOLOGY | Facility: CLINIC | Age: 69
End: 2024-02-02
Payer: MEDICARE

## 2024-02-02 ENCOUNTER — TELEPHONE (OUTPATIENT)
Dept: PSYCHIATRY | Facility: CLINIC | Age: 69
End: 2024-02-02
Payer: MEDICARE

## 2024-02-02 NOTE — PROGRESS NOTES
Individual Psychotherapy Follow-up Visit Progress Note (PhD/LCSW)     Outpatient Psychotherapy - 45 minutes with patient (38-52 minutes) - [id number]    Date: 02/02/2024    Visit Type: Virtual Visit with synchronous video/audio    Pt's confirmed location at the time of visit: home/residence in [place].    Due to the nature of this visit type, a virtual visit with synchronous audio and video, each patient to whom this provider administers behavioral health services by telemedicine is: (1) informed of the relationship between the provider and patient and the respective role of any other health care provider with respect to management of the patient; and (2) notified that he or she may decline to receive services by telemedicine and may withdraw from such care at any time.    The patient was informed of the following:     Provider's contact info:  [facility] [address], [address] [place] [address]  (Phone) [phone]    If technology issues occur, call office phone: Ph: [phone]  If crisis: Dial 911 or go to nearest Emergency Room (ER)  If questions related to privacy practices: contact [facility] Department: [phone]    Crisis Disclaimer: Patient was informed that due to the virtual nature of the visit, that if a crisis develops, protocols will be implemented to ensure patient safety, including but not limited to: 1) Initiating a welfare check with local law enforcement and/or 2) Calling 911      1/23/2024  MRN: [id number]  Primary Care Provider: Almita Izaguirre NP    Kenia Alonzo is a 68 y.o. female who presents today for follow-up of depression and anxiety. Met with patient.      Preferred Name: Kenia     Subjective:     Last encounter (with this provider): 11/30/2023     Content of Current Session: Pt discussed conflict with her sister, Jackelyn, who is reportedly a severe alcoholic. Provided support and helped pt process her  feelings of sadness and anger. She is working on setting firmer boundaries with her sister. She continues to grief her sister Nisa's death. Encouraged increased use of available supports. She does have several outings planned over the next few months, which was affirmed.      Therapeutic Interventions Utilized During Current Session: Supportive Therapy      Objective:       Mental Status Evaluation  Appearance: unremarkable, age appropriate  Behavior: normal, friendly and cooperative  Speech: normal tone, normal rate, normal pitch, normal volume  Mood: anxious, dysthymic  Affect: congruent and appropriate, blunted  Thought Process: normal and logical  Thought Content: normal, no suicidality, no homicidality, delusions, or paranoia  Sensorium: grossly intact  Cognition: grossly intact  Insight: good  Judgment: adequate to circumstances    Risk parameters:  Patient reports no suicidal ideation  Patient reports no homicidal ideation  Patient reports no self-injurious behavior  Patient reports no violent behavior      Assessment & Plan:     The patient's response to the interventions is accepting    The patient's progress toward treatment goals is good    Homework assigned: daily journaling, practice relaxation skills daily, and attend a support group     Treatment plan:   A. Target symptoms: Depression and Anxiety   B. Therapeutic modalities: insight oriented psychotherapy  C. Why chosen therapy is appropriate versus another modality: relevant to diagnosis, patient responds to this modality, evidence based practice   D. Outcome monitoring methods: self report, observation, rating scales, feedback from clinical staff      Visit Diagnosis:   1. Moderate episode of recurrent major depressive disorder    2. JOSIAH (generalized anxiety disorder)    3. Grief        Follow-up: individual psychotherapy and medication management by physician    Return to Clinic: as scheduled  Pt Reported to Schedule Self via [employer]  Application and/or Department Support Staff    Beatriz Holloway, [facility], CFSW

## 2024-02-05 ENCOUNTER — TELEPHONE (OUTPATIENT)
Dept: PSYCHIATRY | Facility: CLINIC | Age: 69
End: 2024-02-05
Payer: MEDICARE

## 2024-02-05 NOTE — TELEPHONE ENCOUNTER
----- Message from Kelly Mathew sent at 2/5/2024  2:50 PM CST -----  States she has a stomach virus. She has an appt on tomorrow. She would like to reschedule. Please call pt [phone]. Thank you

## 2024-02-08 ENCOUNTER — PATIENT MESSAGE (OUTPATIENT)
Dept: PSYCHIATRY | Facility: CLINIC | Age: 69
End: 2024-02-08
Payer: MEDICARE

## 2024-02-09 ENCOUNTER — HOSPITAL ENCOUNTER (OUTPATIENT)
Dept: RADIOLOGY | Facility: HOSPITAL | Age: 69
Discharge: HOME | End: 2024-02-09
Attending: NURSE PRACTITIONER
Payer: MEDICARE

## 2024-02-09 ENCOUNTER — OFFICE VISIT (OUTPATIENT)
Dept: INTERNAL MEDICINE | Facility: CLINIC | Age: 69
End: 2024-02-09
Payer: MEDICARE

## 2024-02-09 ENCOUNTER — PATIENT MESSAGE (OUTPATIENT)
Dept: PSYCHIATRY | Facility: CLINIC | Age: 69
End: 2024-02-09
Payer: MEDICARE

## 2024-02-09 VITALS
SYSTOLIC BLOOD PRESSURE: 120 MMHG | OXYGEN SATURATION: 98 % | HEIGHT: 62 IN | RESPIRATION RATE: 18 BRPM | BODY MASS INDEX: 23.49 KG/M2 | DIASTOLIC BLOOD PRESSURE: 60 MMHG | TEMPERATURE: 97 F | HEART RATE: 115 BPM | WEIGHT: 127.63 LBS

## 2024-02-09 DIAGNOSIS — R91.8 OTHER NONSPECIFIC ABNORMAL FINDING OF LUNG FIELD: Primary | ICD-10-CM

## 2024-02-09 DIAGNOSIS — Z12.31 ENCOUNTER FOR SCREENING MAMMOGRAM FOR BREAST CANCER: ICD-10-CM

## 2024-02-09 DIAGNOSIS — Z87.891 HISTORY OF TOBACCO ABUSE: ICD-10-CM

## 2024-02-09 DIAGNOSIS — R91.8 ABNORMAL CT LUNG SCREENING: ICD-10-CM

## 2024-02-09 DIAGNOSIS — Z12.11 COLON CANCER SCREENING: ICD-10-CM

## 2024-02-09 DIAGNOSIS — N18.31 STAGE 3A CHRONIC KIDNEY DISEASE: Primary | ICD-10-CM

## 2024-02-09 DIAGNOSIS — F33.1 MODERATE EPISODE OF RECURRENT MAJOR DEPRESSIVE DISORDER: ICD-10-CM

## 2024-02-09 DIAGNOSIS — I70.0 AORTIC ATHEROSCLEROSIS: ICD-10-CM

## 2024-02-09 DIAGNOSIS — R91.8 OTHER NONSPECIFIC ABNORMAL FINDING OF LUNG FIELD: ICD-10-CM

## 2024-02-09 DIAGNOSIS — E78.5 HYPERLIPIDEMIA, UNSPECIFIED HYPERLIPIDEMIA TYPE: ICD-10-CM

## 2024-02-09 DIAGNOSIS — R91.8 MULTIPLE LUNG NODULES ON CT: ICD-10-CM

## 2024-02-09 DIAGNOSIS — M10.9 ACUTE GOUT INVOLVING TOE OF RIGHT FOOT, UNSPECIFIED CAUSE: ICD-10-CM

## 2024-02-09 DIAGNOSIS — G43.711 CHRONIC MIGRAINE WITHOUT AURA, INTRACTABLE, WITH STATUS MIGRAINOSUS: ICD-10-CM

## 2024-02-09 PROCEDURE — 77067 SCR MAMMO BI INCL CAD: CPT | Mod: TC,HCNC,PO

## 2024-02-09 PROCEDURE — 77067 SCR MAMMO BI INCL CAD: CPT | Mod: 26,HCNC,, | Performed by: RADIOLOGY

## 2024-02-09 PROCEDURE — 1126F AMNT PAIN NOTED NONE PRSNT: CPT | Mod: HCNC,CPTII,S$GLB, | Performed by: NURSE PRACTITIONER

## 2024-02-09 PROCEDURE — 3288F FALL RISK ASSESSMENT DOCD: CPT | Mod: HCNC,CPTII,S$GLB, | Performed by: NURSE PRACTITIONER

## 2024-02-09 PROCEDURE — 71250 CT THORAX DX C-: CPT | Mod: TC,HCNC,PO

## 2024-02-09 PROCEDURE — 3078F DIAST BP <80 MM HG: CPT | Mod: HCNC,CPTII,S$GLB, | Performed by: NURSE PRACTITIONER

## 2024-02-09 PROCEDURE — 99214 OFFICE O/P EST MOD 30 MIN: CPT | Mod: HCNC,S$GLB,, | Performed by: NURSE PRACTITIONER

## 2024-02-09 PROCEDURE — 99999 PR PBB SHADOW E&M-EST. PATIENT-LVL V: CPT | Mod: PBBFAC,HCNC,, | Performed by: NURSE PRACTITIONER

## 2024-02-09 PROCEDURE — 1101F PT FALLS ASSESS-DOCD LE1/YR: CPT | Mod: HCNC,CPTII,S$GLB, | Performed by: NURSE PRACTITIONER

## 2024-02-09 PROCEDURE — 1160F RVW MEDS BY RX/DR IN RCRD: CPT | Mod: HCNC,CPTII,S$GLB, | Performed by: NURSE PRACTITIONER

## 2024-02-09 PROCEDURE — 1159F MED LIST DOCD IN RCRD: CPT | Mod: HCNC,CPTII,S$GLB, | Performed by: NURSE PRACTITIONER

## 2024-02-09 PROCEDURE — 3074F SYST BP LT 130 MM HG: CPT | Mod: HCNC,CPTII,S$GLB, | Performed by: NURSE PRACTITIONER

## 2024-02-09 PROCEDURE — 3008F BODY MASS INDEX DOCD: CPT | Mod: HCNC,CPTII,S$GLB, | Performed by: NURSE PRACTITIONER

## 2024-02-09 PROCEDURE — 71250 CT THORAX DX C-: CPT | Mod: 26,HCNC,, | Performed by: RADIOLOGY

## 2024-02-09 PROCEDURE — 77063 BREAST TOMOSYNTHESIS BI: CPT | Mod: 26,HCNC,, | Performed by: RADIOLOGY

## 2024-02-09 RX ORDER — METHYLPREDNISOLONE 4 MG/1
TABLET ORAL
Qty: 21 TABLET | Refills: 0 | Status: SHIPPED | OUTPATIENT
Start: 2024-02-09

## 2024-02-09 RX ORDER — DOXYCYCLINE 100 MG/1
100 CAPSULE ORAL 2 TIMES DAILY
Qty: 14 CAPSULE | Refills: 0 | Status: SHIPPED | OUTPATIENT
Start: 2024-02-09 | End: 2024-03-11

## 2024-02-09 RX ORDER — DOXYCYCLINE 100 MG/1
100 CAPSULE ORAL 2 TIMES DAILY
Qty: 14 CAPSULE | Refills: 0 | Status: SHIPPED | OUTPATIENT
Start: 2024-02-09 | End: 2024-02-09

## 2024-02-09 NOTE — PROGRESS NOTES
Subjective:       Patient ID: Kenia Alonzo is a 68 y.o. female.    Chief Complaint: Annual Exam    Mrs. Alonzo presents a visit for routine annual checkup.  Due for cholesterol check.  Due for mammogram and flu shot.  Will get flu shot today.  Discussed getting COVID and RSV vaccine from pharmacy.  Patient also did not repeat CT chest that was recommended last February. Sister hx of lung CA. Pt quit smoking 2 years ago.     Also complaining of gout to bilateral great toes that has been going on for approximately 2 weeks.  No improvement with cherry juice in Advil.  Has upcoming appointment with gyn Oncology for vulva procedure.   Also has appropriate follow-up with Urology, Neurology, and Psychiatry.        Patient Active Problem List   Diagnosis    Depression    Meniere's disease of right ear    Vestibular migraine    Abnormal involuntary movements    Irritable bowel syndrome without diarrhea    Hyperlipidemia    History of tobacco abuse    CKD (chronic kidney disease) stage 3, GFR 30-59 ml/min    Non-seasonal allergic rhinitis    Bronchitis    Chronic mixed headache syndrome    Severe dysplasia of vulva    Complicated grief    Primary osteoarthritis of left knee    Dermatitis of face    Acute gout of right foot    Vulvar dysplasia    Ingrown toenail of left foot    Foot callus    Family history of colon cancer    Bilateral renal cysts    Menopause syndrome    Atrophic vaginitis    Candidiasis of vulva and vagina    Pain in lower jaw    Chronic bilateral low back pain with left-sided sciatica    Personal history of colonic polyps    Chronic migraine without aura, intractable, with status migrainosus    Chronic migraine with aura    Olecranon bursitis of left elbow    Moderate episode of recurrent major depressive disorder    Aortic atherosclerosis       Family History   Problem Relation Age of Onset    Colon cancer Father     Diabetes Father     Cancer Sister     Diabetes Sister      Past Surgical History:    Procedure Laterality Date    BRAIN SURGERY      vestibular neurectomy    BREAST BIOPSY      15 years ago?  no visible scar    BREAST SURGERY      benign     SECTION      x 1    CHOLECYSTECTOMY      COLONOSCOPY N/A 3/15/2021    Procedure: COLONOSCOPY;  Surgeon: Mita Stoddard MD;  Location: [facility];  Service: Endoscopy;  Laterality: N/A;    COLONOSCOPY N/A 2023    Procedure: COLONOSCOPY;  Surgeon: Mita Stoddard MD;  Location: [facility];  Service: Endoscopy;  Laterality: N/A;    EXAMINATION UNDER ANESTHESIA N/A 2020    Procedure: EXAM UNDER ANESTHESIA;  Surgeon: Lucy Crane MD;  Location: [facility] OR;  Service: OB/GYN;  Laterality: N/A;    LASER ABLATION N/A 2020    Procedure: ABLATION, USING LASER;  Surgeon: Lucy Crane MD;  Location: [facility] OR;  Service: OB/GYN;  Laterality: N/A;    TONSILLECTOMY           Current Outpatient Medications:     alendronate (FOSAMAX) 70 MG tablet, Take 1 tablet (70 mg total) by mouth every 7 days., Disp: 4 tablet, Rfl: 11    azelastine (ASTELIN) 137 mcg (0.1 %) nasal spray, Use 1 spray (137 mcg total) by Nasal route 2 (two) times daily., Disp: 30 mL, Rfl: 11    bumetanide (BUMEX) 2 MG tablet, TAKE 1 TABLET EVERY DAY, Disp: 90 tablet, Rfl: 1    buPROPion (WELLBUTRIN XL) 150 MG TB24 tablet, Take 1 daily for 4 days then 2 daily for 4 days then 3 daily thereafter., Disp: 90 tablet, Rfl: 2    busPIRone (BUSPAR) 30 MG Tab, Take 1 tablet (30 mg total) by mouth 2 (two) times daily., Disp: 180 tablet, Rfl: 0    C/sourcherry/celery/grape seed (TART CHERRY ORAL), Take 1 tablet by mouth daily as needed. , Disp: , Rfl:     co-enzyme Q-10 30 mg capsule, Take 30 mg by mouth 3 (three) times daily., Disp: , Rfl:     conjugated estrogens (PREMARIN) vaginal cream, Place 1 g vaginally twice a week., Disp: 30 g, Rfl: 3    diazePAM (VALIUM) 2 MG tablet, Take 1 tablet (2 mg total) by mouth every 6 (six) hours as needed (dizziness/Meniere's attack)., Disp: 30  tablet, Rfl: 1    dicyclomine (BENTYL) 10 MG capsule, Take 1 capsule (10 mg total) by mouth 3 (three) times daily., Disp: 90 capsule, Rfl: 1    DULoxetine (CYMBALTA) 30 MG capsule, Take 1 daily for 7 days then 2 daily thereafter., Disp: 60 capsule, Rfl: 2    estradiol-norethindrone (COMBIPATCH) 0.05-0.14 mg/24 hr, Place 1 patch onto the skin twice a week., Disp: 24 patch, Rfl: 4    imiquimod (ALDARA) 5 % cream, 1 application topically 3 times per week; apply to affected area 3 times a week, Disp: 12 packet, Rfl: 2    levocetirizine (XYZAL) 5 MG tablet, Take 1 tablet (5 mg total) by mouth every morning., Disp: 30 tablet, Rfl: 3    LIDOcaine-prilocaine (EMLA) cream, , Disp: , Rfl:     linaCLOtide (LINZESS) 145 mcg Cap capsule, Take 1 capsule (145 mcg total) by mouth before breakfast., Disp: 30 capsule, Rfl: 5    magnesium 30 mg Tab, Take by mouth once., Disp: , Rfl:     MELATONIN ORAL, Take by mouth nightly as needed. , Disp: , Rfl:     montelukast (SINGULAIR) 10 mg tablet, Take 1 tablet (10 mg total) by mouth every evening., Disp: 90 tablet, Rfl: 3    omega-3 fatty acids/fish oil (FISH OIL-OMEGA-3 FATTY ACIDS) 300-1,000 mg capsule, Take 1 capsule by mouth once daily., Disp: , Rfl:     ondansetron (ZOFRAN-ODT) 4 MG TbDL, Dissolve 1 tablet (4 mg total) by mouth every 72 hours as needed (for nausea/vomiting)., Disp: 24 tablet, Rfl: 2    potassium chloride SA (K-DUR,KLOR-CON M) 10 MEQ tablet, Take 1 tablet (10 mEq total) by mouth once daily., Disp: 90 tablet, Rfl: 3    prednisoLONE acetate (PRED FORTE) 1 % DrpS, Place 1 drop into the left eye every 4 (four) hours., Disp: 5 mL, Rfl: 1    promethazine (PHENERGAN) 25 MG tablet, , Disp: , Rfl:     simvastatin (ZOCOR) 5 MG tablet, Take 1 tablet (5 mg total) by mouth every evening., Disp: 90 tablet, Rfl: 3    topiramate (TROKENDI XR) 100 mg Cp24, Take 1 capsule (100 mg total) by mouth once daily., Disp: 90 capsule, Rfl: 1    ubrogepant (UBRELVY) 100 mg tablet, Take 1 tablet  (100 mg total) by mouth every 72 hours as needed for Migraine (3 times per week as needed)., Disp: 10 tablet, Rfl: 2    varenicline (CHANTIX) 1 mg Tab, Take 1 tablet (1 mg total) by mouth 2 (two) times daily., Disp: 60 tablet, Rfl: 1    vitamin B complex/folic acid (B COMPLEX 100 ORAL), Take by mouth nightly., Disp: , Rfl:     zinc gluconate 50 mg tablet, Take 50 mg by mouth once daily., Disp: , Rfl:     zolpidem (AMBIEN) 5 MG Tab, Take 1/2 to 1 tablet at bedtime as needed for sleep, Disp: 30 tablet, Rfl: 1    albuterol (VENTOLIN HFA) 90 mcg/actuation inhaler, Inhale 2 puffs into the lungs every 6 (six) hours as needed for Wheezing. Rescue, Disp: 18 g, Rfl: 0    erenumab-aooe (AIMOVIG AUTOINJECTOR) 140 mg/mL autoinjector, Inject 1 pen (140 mg total) into the skin every 28 days., Disp: 1 mL, Rfl: 11    methylPREDNISolone (MEDROL DOSEPACK) 4 mg tablet, use as directed, Disp: 21 tablet, Rfl: 0    Current Facility-Administered Medications:     onabotulinumtoxina injection 200 Units, 200 Units, Intramuscular, Q90 Days, Danna Farrar NP    onabotulinumtoxina injection 200 Units, 200 Units, Intramuscular, Q90 Days, Danna Farrar NP, 200 Units at 09/11/23 1529    onabotulinumtoxina injection 200 Units, 200 Units, Intramuscular, Q90 Days, Danna Farrar NP    Review of Systems   Constitutional:  Negative for appetite change, chills, fatigue and fever.   HENT:  Positive for congestion, postnasal drip and rhinorrhea. Negative for sinus pressure, sinus pain, sneezing and sore throat.    Eyes:  Negative for visual disturbance.   Respiratory:  Negative for shortness of breath.    Cardiovascular:  Negative for chest pain and palpitations.   Gastrointestinal:  Negative for abdominal distention, abdominal pain, blood in stool and constipation.   Endocrine: Negative for polydipsia, polyphagia and polyuria.   Genitourinary:  Negative for dysuria and frequency.   Musculoskeletal:  Positive for arthralgias.   Neurological:   "Positive for headaches. Negative for dizziness and light-headedness.   Psychiatric/Behavioral:  Negative for dysphoric mood. The patient is not nervous/anxious.        Objective:   /60 (BP Location: Left arm, Patient Position: Sitting, BP Method: Large (Manual))   Pulse (!) 115   Temp 97.2 °F (36.2 °C) (Temporal)   Resp 18   Ht 5' 2" (1.575 m)   Wt 57.9 kg (127 lb 10.3 oz)   SpO2 98%   BMI 23.35 kg/m²      Physical Exam  Constitutional:       General: She is not in acute distress.     Appearance: Normal appearance. She is not ill-appearing.   Cardiovascular:      Rate and Rhythm: Normal rate and regular rhythm.      Pulses: Normal pulses.      Heart sounds: Normal heart sounds. No murmur heard.     No friction rub. No gallop.   Pulmonary:      Effort: Pulmonary effort is normal. No respiratory distress.      Breath sounds: Normal breath sounds. No wheezing.   Abdominal:      Palpations: Abdomen is soft.      Tenderness: There is no abdominal tenderness. There is no guarding.   Musculoskeletal:      Comments: Erythema and tenderness to MTP joint of right great toe   Skin:     General: Skin is warm and dry.      Coloration: Skin is not pale.      Findings: No erythema.   Neurological:      Mental Status: She is alert and oriented to person, place, and time.   Psychiatric:         Mood and Affect: Mood normal.         Behavior: Behavior normal.         Assessment & Plan     Problem List Items Addressed This Visit          Neuro    Chronic migraine without aura, intractable, with status migrainosus    Current Assessment & Plan     Followed by Neurology.            Psychiatric    Moderate episode of recurrent major depressive disorder    Current Assessment & Plan     Stable.  Followed by Psychiatry.  Continue current medications for now.            Cardiac/Vascular    Hyperlipidemia    Current Assessment & Plan     Lipid panel ordered.  On statin.         Relevant Orders    Lipid Panel    Aortic " "atherosclerosis    Current Assessment & Plan     Lipid panel ordered.  On statin.  Blood pressure controlled.         Relevant Orders    Lipid Panel       Renal/    CKD (chronic kidney disease) stage 3, GFR 30-59 ml/min - Primary    Current Assessment & Plan     Labs today.  Followed by Nephrology.  Avoid NSAIDs.         Relevant Orders    COMPREHENSIVE METABOLIC PANEL       Other    History of tobacco abuse    Current Assessment & Plan     Chest CT today.          Other Visit Diagnoses       Colon cancer screening        Relevant Orders    Ambulatory referral/consult to Endo Procedure     Acute gout involving toe of right foot, unspecified cause        Relevant Medications    methylPREDNISolone (MEDROL DOSEPACK) 4 mg tablet          Follow up in about 6 months (around 8/9/2024).            Portions of this note may have been created with voice recognition software. Occasional "wrong-word" or "sound-a-like" substitutions may have occurred due to the inherent limitations of voice recognition software. Please, read the note carefully and recognize, using context, where substitutions have occurred.       "

## 2024-02-12 RX ORDER — FLUOXETINE HYDROCHLORIDE 20 MG/1
20 CAPSULE ORAL DAILY
Qty: 30 CAPSULE | Refills: 1 | Status: SHIPPED | OUTPATIENT
Start: 2024-02-12 | End: 2024-03-22 | Stop reason: SDUPTHER

## 2024-02-20 ENCOUNTER — TELEPHONE (OUTPATIENT)
Dept: PSYCHIATRY | Facility: CLINIC | Age: 69
End: 2024-02-20
Payer: MEDICARE

## 2024-02-22 ENCOUNTER — PATIENT MESSAGE (OUTPATIENT)
Dept: PSYCHIATRY | Facility: CLINIC | Age: 69
End: 2024-02-22
Payer: MEDICARE

## 2024-02-22 ENCOUNTER — OFFICE VISIT (OUTPATIENT)
Dept: PSYCHIATRY | Facility: CLINIC | Age: 69
End: 2024-02-22
Payer: MEDICARE

## 2024-02-22 ENCOUNTER — PATIENT MESSAGE (OUTPATIENT)
Dept: INTERNAL MEDICINE | Facility: CLINIC | Age: 69
End: 2024-02-22
Payer: MEDICARE

## 2024-02-22 ENCOUNTER — TELEPHONE (OUTPATIENT)
Dept: PSYCHIATRY | Facility: CLINIC | Age: 69
End: 2024-02-22
Payer: MEDICARE

## 2024-02-22 DIAGNOSIS — F33.1 MODERATE EPISODE OF RECURRENT MAJOR DEPRESSIVE DISORDER: Primary | ICD-10-CM

## 2024-02-22 PROCEDURE — 99499 UNLISTED E&M SERVICE: CPT | Mod: HCNC,95,, | Performed by: SOCIAL WORKER

## 2024-02-22 NOTE — TELEPHONE ENCOUNTER
----- Message from Marce Ho sent at 2/22/2024  1:01 PM CST -----  Contact: philippe  Patient is on the phone waiting , but it is showing that she is present and not arrived. Please callback [phone]. Needs to be switched back to virtual

## 2024-02-27 ENCOUNTER — OFFICE VISIT (OUTPATIENT)
Dept: UROLOGY | Facility: CLINIC | Age: 69
End: 2024-02-27
Payer: MEDICARE

## 2024-02-27 VITALS
BODY MASS INDEX: 23.37 KG/M2 | HEIGHT: 62 IN | WEIGHT: 127 LBS | DIASTOLIC BLOOD PRESSURE: 69 MMHG | HEART RATE: 81 BPM | SYSTOLIC BLOOD PRESSURE: 143 MMHG

## 2024-02-27 DIAGNOSIS — N28.1 RENAL CYST: Primary | ICD-10-CM

## 2024-02-27 PROBLEM — M10.9 ACUTE GOUT OF RIGHT FOOT: Status: RESOLVED | Noted: 2020-07-27 | Resolved: 2024-02-27

## 2024-02-27 PROBLEM — N90.3 VULVAR DYSPLASIA: Status: RESOLVED | Noted: 2020-08-12 | Resolved: 2024-02-27

## 2024-02-27 PROBLEM — F43.21 COMPLICATED GRIEF: Status: RESOLVED | Noted: 2019-09-27 | Resolved: 2024-02-27

## 2024-02-27 PROBLEM — R68.84 PAIN IN LOWER JAW: Status: RESOLVED | Noted: 2022-06-08 | Resolved: 2024-02-27

## 2024-02-27 LAB
BILIRUB UR QL STRIP: NEGATIVE
GLUCOSE UR QL STRIP: NEGATIVE
KETONES UR QL STRIP: NEGATIVE
LEUKOCYTE ESTERASE UR QL STRIP: NEGATIVE
PH, POC UA: 6
POC BLOOD, URINE: NEGATIVE
POC NITRATES, URINE: NEGATIVE
POC RESIDUAL URINE VOLUME: 3 ML (ref 0–100)
PROT UR QL STRIP: NEGATIVE
SP GR UR STRIP: 1.02 (ref 1–1.03)
UROBILINOGEN UR STRIP-ACNC: 0.2 (ref 0.1–1.1)

## 2024-02-27 PROCEDURE — 99214 OFFICE O/P EST MOD 30 MIN: CPT | Mod: HCNC,S$GLB,, | Performed by: NURSE PRACTITIONER

## 2024-02-27 PROCEDURE — 1159F MED LIST DOCD IN RCRD: CPT | Mod: HCNC,CPTII,S$GLB, | Performed by: NURSE PRACTITIONER

## 2024-02-27 PROCEDURE — 51798 US URINE CAPACITY MEASURE: CPT | Mod: HCNC,S$GLB,, | Performed by: NURSE PRACTITIONER

## 2024-02-27 PROCEDURE — 3078F DIAST BP <80 MM HG: CPT | Mod: HCNC,CPTII,S$GLB, | Performed by: NURSE PRACTITIONER

## 2024-02-27 PROCEDURE — 1126F AMNT PAIN NOTED NONE PRSNT: CPT | Mod: HCNC,CPTII,S$GLB, | Performed by: NURSE PRACTITIONER

## 2024-02-27 PROCEDURE — 3008F BODY MASS INDEX DOCD: CPT | Mod: HCNC,CPTII,S$GLB, | Performed by: NURSE PRACTITIONER

## 2024-02-27 PROCEDURE — 81003 URINALYSIS AUTO W/O SCOPE: CPT | Mod: QW,HCNC,S$GLB, | Performed by: NURSE PRACTITIONER

## 2024-02-27 PROCEDURE — 3077F SYST BP >= 140 MM HG: CPT | Mod: HCNC,CPTII,S$GLB, | Performed by: NURSE PRACTITIONER

## 2024-02-27 PROCEDURE — 99999 PR PBB SHADOW E&M-EST. PATIENT-LVL V: CPT | Mod: PBBFAC,HCNC,, | Performed by: NURSE PRACTITIONER

## 2024-02-27 RX ORDER — FLUCONAZOLE 150 MG/1
150 TABLET ORAL DAILY
Qty: 2 TABLET | Refills: 0 | Status: SHIPPED | OUTPATIENT
Start: 2024-02-27 | End: 2024-03-11

## 2024-02-27 NOTE — PROGRESS NOTES
Chief Complaint:   Renal cysts    HPI:   Patient is a 68-year-old female that is presenting to review renal ultrasound, that was obtained in August of last year.  Patient states she has had some mental health issues, and rescheduled appointment several times.  Urine in clinic is negative PVR is 14 mL.  Denies gross hematuria or flank pain.  Sister had a history of bladder cancer.  Renal ultrasound indicated bilateral simple and mildly complex stable cysts.  No hydronephrosis or solid mass  Jerome LOJA  4/25/22- patient is doing well with no complaints, here today to discuss her ultrasound.  65-year-old female who comes in with a complicated bilateral renal cysts which appear to benign.  She has been followed by Nephrology up to this point, she is asymptomatic in regards to these.  No gross hematuria, no microscopic hematuria, she is an active smoker.  She has renal insufficiency secondary to prolonged ibuprofen use in 2015.  No other urological gynecological history.  She has no evidence of incontinence.  Her sister has bladder cancer, no other family history of urological cancers or stones.    Allergies:  Demerol [meperidine] and Codeine    Medications:  has a current medication list which includes the following prescription(s): alendronate, azelastine, bumetanide, bupropion, buspirone, c/sourcherry/celery/grape seed, co-enzyme q-10, premarin, diazepam, dicyclomine, duloxetine, aimovig autoinjector, combipatch, fluoxetine, imiquimod, levocetirizine, lidocaine-prilocaine, linaclotide, magnesium, melatonin, methylprednisolone, montelukast, fish oil-omega-3 fatty acids, ondansetron, potassium chloride sa, prednisolone acetate, promethazine, simvastatin, topiramate, ubrelvy, varenicline, vitamin b complex/folic acid, zinc gluconate, zolpidem, and albuterol, and the following Facility-Administered Medications: onabotulinumtoxina, onabotulinumtoxina, and onabotulinumtoxina.    Review of Systems:  General: No fever,  chills, fatigability, or weight loss.  Skin: No rashes, itching, or changes in color or texture of skin.  Chest: Denies GLEASON, cyanosis, wheezing, cough, and sputum production.  Abdomen: Appetite fine. No weight loss. Denies diarrhea, abdominal pain, hematemesis, or blood in stool.  Musculoskeletal: No joint stiffness or swelling. Denies back pain.  : As above.  All other review of systems negative.    PMH:   has a past medical history of Arthritis, Asthma, Cataract, Depression, Digestive disorder, Encounter for blood transfusion, General anesthetics causing adverse effect in therapeutic use, GI problem, Gout, Headache, Hyperlipidemia, Meniere disease, MVP (mitral valve prolapse), Renal disorder, and Vertigo.    PSH:   has a past surgical history that includes  section; Breast surgery; Brain surgery (); Tonsillectomy; Cholecystectomy; Examination under anesthesia (N/A, 2020); Laser ablation (N/A, 2020); Colonoscopy (N/A, 3/15/2021); Breast biopsy; and Colonoscopy (N/A, 2023).    FamHx: family history includes Cancer in her sister; Colon cancer in her father; Diabetes in her father and sister.    SocHx:  reports that she quit smoking about 4 years ago. Her smoking use included cigarettes. She started smoking about 48 years ago. She has a 22.0 pack-year smoking history. She has never used smokeless tobacco. She reports that she does not currently use alcohol after a past usage of about 1.0 - 3.0 standard drink of alcohol per week. She reports that she does not use drugs.      Physical Exam:  Vitals:    24 1422   BP: (!) 143/69   Pulse: 81     General: A&Ox3, no apparent distress, no deformities  Neck: No masses, normal thyroid  Lungs: normal inspiration, no use of accessory muscles  Heart: normal pulse, no arrhythmias  Abdomen: Soft, NT, ND, no masses, no hernias, no hepatosplenomegaly  Lymphatic: Neck and groin nodes negative  Skin: The skin is warm and dry. No  jaundice.    Labs/Studies:   YING stable bilateral complex renal cysts, possible right renal stones, medical renal disease 4/22  CT urogram bilateral complex renal cysts 4/19  Renal ultrasound increased growth of bilateral complicated renal cysts 11/20  Creatinine 1.4 10/21  Impression/Plan:   Bilateral complicated renal cysts- ultrasound appears to be stable, currently asymptomatic.  Will have her return in  6 months with a renal ultrasound and a BMP.  Call with any complaints prior to the next appointment.

## 2024-02-28 ENCOUNTER — TELEPHONE (OUTPATIENT)
Dept: PULMONOLOGY | Facility: CLINIC | Age: 69
End: 2024-02-28

## 2024-02-28 ENCOUNTER — OFFICE VISIT (OUTPATIENT)
Dept: OTOLARYNGOLOGY | Facility: CLINIC | Age: 69
End: 2024-02-28
Payer: MEDICARE

## 2024-02-28 ENCOUNTER — HOSPITAL ENCOUNTER (OUTPATIENT)
Dept: RADIOLOGY | Facility: HOSPITAL | Age: 69
Discharge: HOME | End: 2024-02-28
Attending: NURSE PRACTITIONER
Payer: MEDICARE

## 2024-02-28 ENCOUNTER — OFFICE VISIT (OUTPATIENT)
Dept: PULMONOLOGY | Facility: CLINIC | Age: 69
End: 2024-02-28
Payer: MEDICARE

## 2024-02-28 VITALS
BODY MASS INDEX: 23.65 KG/M2 | HEIGHT: 62 IN | DIASTOLIC BLOOD PRESSURE: 70 MMHG | HEART RATE: 86 BPM | OXYGEN SATURATION: 100 % | SYSTOLIC BLOOD PRESSURE: 124 MMHG | RESPIRATION RATE: 18 BRPM | WEIGHT: 128.5 LBS

## 2024-02-28 DIAGNOSIS — R91.8 MULTIPLE LUNG NODULES ON CT: ICD-10-CM

## 2024-02-28 DIAGNOSIS — R91.8 OTHER NONSPECIFIC ABNORMAL FINDING OF LUNG FIELD: ICD-10-CM

## 2024-02-28 DIAGNOSIS — R91.1 SOLITARY PULMONARY NODULE: Primary | ICD-10-CM

## 2024-02-28 DIAGNOSIS — J30.89 NON-SEASONAL ALLERGIC RHINITIS, UNSPECIFIED TRIGGER: Primary | ICD-10-CM

## 2024-02-28 DIAGNOSIS — H91.91 CHANGE IN HEARING OF RIGHT EAR: ICD-10-CM

## 2024-02-28 DIAGNOSIS — H61.21 IMPACTED CERUMEN OF RIGHT EAR: ICD-10-CM

## 2024-02-28 PROCEDURE — 99214 OFFICE O/P EST MOD 30 MIN: CPT | Mod: 25,HCNC,S$GLB, | Performed by: PHYSICIAN ASSISTANT

## 2024-02-28 PROCEDURE — 3288F FALL RISK ASSESSMENT DOCD: CPT | Mod: HCNC,CPTII,S$GLB, | Performed by: INTERNAL MEDICINE

## 2024-02-28 PROCEDURE — 1101F PT FALLS ASSESS-DOCD LE1/YR: CPT | Mod: HCNC,CPTII,S$GLB, | Performed by: PHYSICIAN ASSISTANT

## 2024-02-28 PROCEDURE — 1101F PT FALLS ASSESS-DOCD LE1/YR: CPT | Mod: HCNC,CPTII,S$GLB, | Performed by: INTERNAL MEDICINE

## 2024-02-28 PROCEDURE — 1159F MED LIST DOCD IN RCRD: CPT | Mod: HCNC,CPTII,S$GLB, | Performed by: INTERNAL MEDICINE

## 2024-02-28 PROCEDURE — 99999 PR PBB SHADOW E&M-EST. PATIENT-LVL V: CPT | Mod: PBBFAC,HCNC,, | Performed by: INTERNAL MEDICINE

## 2024-02-28 PROCEDURE — 3288F FALL RISK ASSESSMENT DOCD: CPT | Mod: HCNC,CPTII,S$GLB, | Performed by: PHYSICIAN ASSISTANT

## 2024-02-28 PROCEDURE — 71250 CT THORAX DX C-: CPT | Mod: 26,HCNC,, | Performed by: RADIOLOGY

## 2024-02-28 PROCEDURE — 99999 PR PBB SHADOW E&M-EST. PATIENT-LVL III: CPT | Mod: PBBFAC,HCNC,, | Performed by: PHYSICIAN ASSISTANT

## 2024-02-28 PROCEDURE — 71250 CT THORAX DX C-: CPT | Mod: TC,HCNC

## 2024-02-28 PROCEDURE — 3008F BODY MASS INDEX DOCD: CPT | Mod: HCNC,CPTII,S$GLB, | Performed by: INTERNAL MEDICINE

## 2024-02-28 PROCEDURE — 3078F DIAST BP <80 MM HG: CPT | Mod: HCNC,CPTII,S$GLB, | Performed by: INTERNAL MEDICINE

## 2024-02-28 PROCEDURE — 69210 REMOVE IMPACTED EAR WAX UNI: CPT | Mod: HCNC,S$GLB,, | Performed by: PHYSICIAN ASSISTANT

## 2024-02-28 PROCEDURE — 1160F RVW MEDS BY RX/DR IN RCRD: CPT | Mod: HCNC,CPTII,S$GLB, | Performed by: INTERNAL MEDICINE

## 2024-02-28 PROCEDURE — 3074F SYST BP LT 130 MM HG: CPT | Mod: HCNC,CPTII,S$GLB, | Performed by: INTERNAL MEDICINE

## 2024-02-28 PROCEDURE — 1159F MED LIST DOCD IN RCRD: CPT | Mod: HCNC,CPTII,S$GLB, | Performed by: PHYSICIAN ASSISTANT

## 2024-02-28 PROCEDURE — 99204 OFFICE O/P NEW MOD 45 MIN: CPT | Mod: HCNC,S$GLB,, | Performed by: INTERNAL MEDICINE

## 2024-02-28 RX ORDER — PREDNISONE 5 MG/1
5 TABLET ORAL DAILY
Qty: 5 TABLET | Refills: 0 | Status: SHIPPED | OUTPATIENT
Start: 2024-02-28 | End: 2024-03-11

## 2024-02-28 NOTE — PROGRESS NOTES
Subjective     Patient ID: Kenia Alonzo is a 68 y.o. female.    Chief Complaint: Cerumen Impaction and Other (Pt states that she has been dealing with her allergies as well )    Patient is a 68 year old female seen today for evaluation of worsening allergy symptoms over the past 2 weeks.  She describes sinus pressure, congestion, lots of sneezing and clear nasal drainage.  Reports her nephrologist recommended she consider getting off of Xyzal.  She is not using any nasal sprays currently (has Flonase and Astelin at home).   Takes Singulair daily.  No fever.  She has known allergies and has been on SCIT in the past.    She also mentions slight decline in hearing (right side).  Noted to have cerumen impaction at PCP's office recently but they could not remove it with flushing.    She has known migraines and Meniere's; no issues recently.  Reports her stress has improved with Prozac.            Review of Systems   Constitutional:  Negative for fever.   HENT:  Positive for nasal congestion, hearing loss, rhinorrhea (clear), sinus pressure/congestion and sneezing. Negative for ear discharge and ear pain.    Eyes:  Positive for itching.   Neurological:  Negative for headaches.          Objective     Physical Exam  Vitals reviewed.   Constitutional:       Appearance: She is well-developed. She is not ill-appearing or diaphoretic.   HENT:      Head: Normocephalic and atraumatic.      Right Ear: Ear canal and external ear normal. No decreased hearing noted. There is impacted cerumen (removal described below).      Left Ear: Tympanic membrane, ear canal and external ear normal. No decreased hearing noted.  No middle ear effusion. Tympanic membrane is not injected or erythematous.      Nose: Mucosal edema and congestion present. No rhinorrhea.      Right Sinus: No maxillary sinus tenderness or frontal sinus tenderness.      Left Sinus: No maxillary sinus tenderness or frontal sinus tenderness.      Mouth/Throat:      Mouth:  "Mucous membranes are moist.      Pharynx: Oropharynx is clear. No posterior oropharyngeal erythema.   Eyes:      General: Lids are normal.      Conjunctiva/sclera: Conjunctivae normal.   Pulmonary:      Effort: Pulmonary effort is normal.   Skin:     Findings: No rash.   Neurological:      Mental Status: She is alert.   Psychiatric:         Attention and Perception: Attention normal.         Speech: Speech normal.         Behavior: Behavior normal. Behavior is cooperative.         Thought Content: Thought content normal.       Procedure Note    CHIEF COMPLAINT:  Cerumen Impaction    Description:  The patient was seated in an exam chair.  An ear speculum was placed in the right EAC and was examined under the microscope.  Alligator forceps were used to remove a large cerumen impaction.  The tympanic membrane was visualized and was normal in appearance.   The patient tolerated the procedure well.       Assessment and Plan     1. Non-seasonal allergic rhinitis, unspecified trigger    2. Change in hearing of right ear    3. Impacted cerumen of right ear    Other orders  -     predniSONE (DELTASONE) 5 MG tablet; Take 1 tablet (5 mg total) by mouth once daily. for 5 days  Dispense: 5 tablet; Refill: 0        I would recommend the patient start on daily, consistent medication for allergies.  I would recommend daily use of an oral antihistamine as well as a steroid nasal spray.  There are a variety of oral antihistamines available over the counter, and one is not inherently the "best," though often patients will find that one works best for them.  We also discussed the proper mechanism of using a steroid nasal spray, to direct the spray away from the patient's nasal septum.  I would recommend continuing with these medications for 2-3 weeks, and then will have the patient contact me with progress.  I would recommend she resume use of her Astelin and Flonase and continue daily Xyzal (or another OTC antihistamine) and Singulair " as well.  Recommend she call or email with any worsening symptoms.  She requests steroids today.  Will send in few days of Prednisone 5mg.  She has a prescription for Medrol Dose Pack recently prescribed but says that's to have on hand if her gout flares.  We discussed that she should not take in addition to Prednisone.      Cerumen impaction:  Removed today without difficulty.  I would recommend the use of a wax softening drop, either over the counter Debrox or mineral oil, on a weekly basis.  I also instructed the patient to avoid Qtips.    No follow-ups on file.

## 2024-02-28 NOTE — PROGRESS NOTES
Subjective:      Patient ID: Kenia Alonzo is a 68 y.o. female.    Chief Complaint: Lung nodule    Shortness of Breath        68-year-old female former smoker with seasonal allergies, reflux and a previously documented right upper lobe nodule on low-dose screening CT recently had her annual screening which showed increased size/number of right upper lobe nodules.  Combined size is approximately 10 mm.  She is here today for that reason.  Main complaint is of some allergic symptoms and an postnasal drip and related cough.  Otherwise no other pulmonary symptoms.  Specifically denies wheezing, hemoptysis, unintentional weight loss, chest pain, fever or chills.    Past Medical History:   Diagnosis Date    Arthritis     knee    Asthma     childhood     Cataract     Depression     Digestive disorder     Encounter for blood transfusion     General anesthetics causing adverse effect in therapeutic use     severe headache after crainiotomy    GI problem     IBS    Gout     Headache     Hyperlipidemia     Meniere disease     MVP (mitral valve prolapse)     minor    Renal disorder     Vertigo      Past Surgical History:   Procedure Laterality Date    BRAIN SURGERY      vestibular neurectomy    BREAST BIOPSY      15 years ago?  no visible scar    BREAST SURGERY      benign     SECTION      x 1    CHOLECYSTECTOMY      COLONOSCOPY N/A 3/15/2021    Procedure: COLONOSCOPY;  Surgeon: Mita Stoddard MD;  Location: [facility];  Service: Endoscopy;  Laterality: N/A;    COLONOSCOPY N/A 2023    Procedure: COLONOSCOPY;  Surgeon: Mita Stoddard MD;  Location: [facility];  Service: Endoscopy;  Laterality: N/A;    EXAMINATION UNDER ANESTHESIA N/A 2020    Procedure: EXAM UNDER ANESTHESIA;  Surgeon: Lucy Crane MD;  Location: [facility] OR;  Service: OB/GYN;  Laterality: N/A;    LASER ABLATION N/A 2020    Procedure: ABLATION, USING LASER;  Surgeon: Lucy Crane MD;  Location: [facility] OR;  Service: OB/GYN;   "Laterality: N/A;    TONSILLECTOMY       Social History     Tobacco Use    Smoking status: Former     Current packs/day: 0.00     Average packs/day: 0.5 packs/day for 44.1 years (22.0 ttl pk-yrs)     Types: Cigarettes     Start date: 1976     Quit date: 2020     Years since quittin.0    Smokeless tobacco: Never    Tobacco comments:     Quit 20; quit again mid 2021   Substance Use Topics    Alcohol use: Not Currently     Alcohol/week: 1.0 - 3.0 standard drink of alcohol     Types: 1 - 3 Glasses of wine per week     Comment: social few times monthly.   No alcohol 72h prior to sx    Drug use: No     Family History   Problem Relation Age of Onset    Colon cancer Father     Diabetes Father     Cancer Sister     Diabetes Sister          Review of Systems   Respiratory:  Positive for shortness of breath.     as per HPI otherwise negative    Objective:     Physical Exam   Constitutional: She is oriented to person, place, and time. She appears well-developed. No distress.   HENT:   Head: Normocephalic.   Neck: No JVD present.   Cardiovascular: Normal rate and regular rhythm.   Pulmonary/Chest: Normal expansion, symmetric chest wall expansion and effort normal.   Abdominal: Soft.   Musculoskeletal:      Cervical back: Neck supple.   Neurological: She is alert and oriented to person, place, and time.   Psychiatric: She has a normal mood and affect.   Nursing note and vitals reviewed.          2024     1:31 PM 2024     2:22 PM 2024     1:15 PM 2023     2:24 PM 2023    11:07 AM 10/27/2023     1:55 PM 10/5/2023     1:11 PM   Pulmonary Function Tests   SpO2 100 %  98 %   97 %    Height 5' 2" (1.575 m) 5' 2" (1.575 m) 5' 2" (1.575 m) 5' 2" (1.575 m)  5' 2" (1.575 m) 5' 2" (1.575 m)   Weight 58.3 kg (128 lb 8.5 oz) 57.6 kg (127 lb) 57.9 kg (127 lb 10.3 oz) 59 kg (130 lb) 58 kg (127 lb 13.9 oz) 58.7 kg (129 lb 6.6 oz) 59.3 kg (130 lb 11.7 oz)   BMI (Calculated) 23.5 23.2 23.3 23.8  23.7 " 23.9        Assessment:     1. Solitary pulmonary nodule    2. Other nonspecific abnormal finding of lung field    3. Multiple lung nodules on CT         Orders Placed This Encounter   Procedures    CT Chest Without Contrast     Standing Status:   Future     Standing Expiration Date:   2/28/2025     Order Specific Question:   May the Radiologist modify the order per protocol to meet the clinical needs of the patient?     Answer:   Yes     I personally reviewed CT chest images obtained today as well as from February 9th.  My interpretation is as per history of present illness      Plan:     Kennedy calculator assessed risk is 17% risk of malignancy  Discussed with the patient that this is a low to moderate risk  Recommended biomarker testing with Nodify CDT  We will send that test via home phlebotomy  We will notify patient of results  Otherwise plans are to repeat noncontrast CT imaging in 6 months unless blood tests indicates higher risk assessment  I discussed all of the above in detail with the patient who voiced understanding and agreement with this plan

## 2024-03-04 ENCOUNTER — TELEPHONE (OUTPATIENT)
Dept: PSYCHIATRY | Facility: CLINIC | Age: 69
End: 2024-03-04
Payer: MEDICARE

## 2024-03-06 ENCOUNTER — OFFICE VISIT (OUTPATIENT)
Dept: PSYCHIATRY | Facility: CLINIC | Age: 69
End: 2024-03-06
Payer: MEDICARE

## 2024-03-06 ENCOUNTER — PATIENT MESSAGE (OUTPATIENT)
Dept: PSYCHIATRY | Facility: CLINIC | Age: 69
End: 2024-03-06

## 2024-03-06 DIAGNOSIS — F41.1 GAD (GENERALIZED ANXIETY DISORDER): ICD-10-CM

## 2024-03-06 DIAGNOSIS — F33.1 MODERATE EPISODE OF RECURRENT MAJOR DEPRESSIVE DISORDER: Primary | ICD-10-CM

## 2024-03-06 DIAGNOSIS — F43.21 GRIEF: ICD-10-CM

## 2024-03-06 PROCEDURE — 90832 PSYTX W PT 30 MINUTES: CPT | Mod: HCNC,95,, | Performed by: SOCIAL WORKER

## 2024-03-06 NOTE — PROGRESS NOTES
Individual Psychotherapy Follow-up Visit Progress Note (PhD/LCSW)     Outpatient Psychotherapy - 30 minutes with patient (16-37 minutes) - [id number]    Date: 03/06/2024    Visit Type: Virtual Visit with synchronous video/audio    Pt's confirmed location at the time of visit: home/residence in [place].    Due to the nature of this visit type, a virtual visit with synchronous audio and video, each patient to whom this provider administers behavioral health services by telemedicine is: (1) informed of the relationship between the provider and patient and the respective role of any other health care provider with respect to management of the patient; and (2) notified that he or she may decline to receive services by telemedicine and may withdraw from such care at any time.    The patient was informed of the following:     Provider's contact info:  [facility] [address] [place] [address]  (Phone) [phone]    If technology issues occur, call office phone: Ph: [phone]  If crisis: Dial 911 or go to nearest Emergency Room (ER)  If questions related to privacy practices: contact [facility] Department: [phone]    Crisis Disclaimer: Patient was informed that due to the virtual nature of the visit, that if a crisis develops, protocols will be implemented to ensure patient safety, including but not limited to: 1) Initiating a welfare check with local law enforcement and/or 2) Calling 911      3/6/2024  MRN: [id number]  Primary Care Provider: Almita Izaguirre NP    Kenia Alonzo is a 68 y.o. female who presents today for follow-up of depression and anxiety. Met with patient.      Preferred Name: Kenia     Subjective:     Last encounter (with this provider): 1/23/2024     Content of Current Session: Pt states she switched to Prozac and is feeling much better. She has more motivation and energy to do things. She is considering  getting a part-time job at [employer]. She has not worked in a few years due to her health issues and is hopeful about doing something to feel productive again. She and her sister are getting along better. She discussed frustration with her son and daughter-in-law, whom she feels are not conscientious enough in their parenting of her granddaughter. Reviewed progress and goals for therapy.     Therapeutic Interventions Utilized During Current Session: Supportive Therapy      Objective:       Mental Status Evaluation  Appearance: unremarkable, age appropriate  Behavior: normal, friendly and cooperative  Speech: normal tone, normal rate, normal pitch, normal volume  Mood: anxious  Affect: congruent and appropriate  Thought Process: normal and logical  Thought Content: normal, no suicidality, no homicidality, delusions, or paranoia  Sensorium: grossly intact  Cognition: grossly intact  Insight: good  Judgment: adequate to circumstances    Risk parameters:  Patient reports no suicidal ideation  Patient reports no homicidal ideation  Patient reports no self-injurious behavior  Patient reports no violent behavior      Assessment & Plan:     The patient's response to the interventions is accepting    The patient's progress toward treatment goals is good    Homework assigned: daily journaling, practice relaxation skills daily, and write down 3 goals each morning     Treatment plan:   A. Target symptoms: Depression and Anxiety   B. Therapeutic modalities: insight oriented psychotherapy  C. Why chosen therapy is appropriate versus another modality: relevant to diagnosis, patient responds to this modality, evidence based practice   D. Outcome monitoring methods: self report, observation, rating scales, feedback from clinical staff      Visit Diagnosis:   1. Moderate episode of recurrent major depressive disorder    2. JOSIAH (generalized anxiety disorder)    3. Grief        Follow-up: individual psychotherapy and medication  management by physician    Return to Clinic: as scheduled  Pt Reported to Schedule Self via Epic EMR MyChart Application and/or Department Support Staff    Beatriz Holloway, LCSW-BACS, CFSW

## 2024-03-11 ENCOUNTER — PROCEDURE VISIT (OUTPATIENT)
Dept: NEUROLOGY | Facility: CLINIC | Age: 69
End: 2024-03-11
Payer: MEDICARE

## 2024-03-11 ENCOUNTER — PATIENT MESSAGE (OUTPATIENT)
Dept: PSYCHIATRY | Facility: CLINIC | Age: 69
End: 2024-03-11
Payer: MEDICARE

## 2024-03-11 VITALS
RESPIRATION RATE: 16 BRPM | HEART RATE: 61 BPM | DIASTOLIC BLOOD PRESSURE: 61 MMHG | SYSTOLIC BLOOD PRESSURE: 127 MMHG | BODY MASS INDEX: 23.57 KG/M2 | WEIGHT: 128.06 LBS | HEIGHT: 62 IN

## 2024-03-11 DIAGNOSIS — G44.89 CHRONIC MIXED HEADACHE SYNDROME: Primary | ICD-10-CM

## 2024-03-11 DIAGNOSIS — G43.809 VESTIBULAR MIGRAINE: ICD-10-CM

## 2024-03-11 DIAGNOSIS — G43.709 CHRONIC MIGRAINE W/O AURA, NOT INTRACTABLE, W/O STAT MIGR: ICD-10-CM

## 2024-03-11 DIAGNOSIS — G43.E11 INTRACTABLE CHRONIC MIGRAINE WITH AURA WITH STATUS MIGRAINOSUS: ICD-10-CM

## 2024-03-11 DIAGNOSIS — G43.711 CHRONIC MIGRAINE WITHOUT AURA, INTRACTABLE, WITH STATUS MIGRAINOSUS: ICD-10-CM

## 2024-03-11 PROCEDURE — 64615 CHEMODENERV MUSC MIGRAINE: CPT | Mod: HCNC,S$GLB,, | Performed by: NURSE PRACTITIONER

## 2024-03-11 RX ORDER — RIMEGEPANT SULFATE 75 MG/75MG
75 TABLET, ORALLY DISINTEGRATING ORAL
Qty: 8 TABLET | Refills: 2 | Status: SHIPPED | OUTPATIENT
Start: 2024-03-11

## 2024-03-11 NOTE — PROCEDURES
BOTOX  HAS PROVEN VERY EFFECTIVE IN SIGNIFICANTLY REDUCING THE SEVERITY AND FREQUENCY OF MIGRAINE HEADACHES           PROCEDURE NAME:      INTRAMUSCULAR BOTOX INJECTIONS         PROCEDURE INDICATION:     INTRACTABLE (PHARMACOLOGICALLY UNRESPONSIVE/RESISTANT) MIGRAINE        PROCEDURE DESCRIPTION:    The procedure was discussed in detail with the patient and the consent form was signed. The patient verbalized understanding of the procedure .    I discussed the risks that may include serious immune reaction and distant spread that could results in loss of breathing. Other side effects may include droopy eyelids, trouble swallowing and cardiac arrhythmias. It was also stressed that it is contraindicated in pregnancy.       PROCEDURE TIME OUT PROCESS:     Time Out was called.     Two patient identifiers were used (first and last name in addition to date of birth). The patient stated the procedure being done (Botox injections) in the scalp muscles (both sides).          PROCEDURE EXECUTION:     As per the standard protocol, a total 155 units were injected in 31 sites.         RT  5 units in 1 site    LT  5 units in 1 site     Procerus 5 units in 1 site     RT Frontalis 10 units in 2 sites     LT Frontalis 10 units in 2 sites     RT Temporalis 20 units in 4 sites    LT Temporalis 20 units in 4 sites    RT Occipitalis 15 units in 3 sites    LT Occipitalis 15 units in 3 sites     RT Cervical Paraspinals 10 units in 2 sites    LT Cervical Paraspinals 10 units in 2 sites    RT Trapezius 15 units in 3 sites    LT Trapezius 15 units in 3 sites       Per the standard of care protocol we use 155 units and waste 45 units. I gave the patient the option of following the standard protocol vs.using the extra 45 units to target areas where the pain is maximum which could potentially provide extra help with headache control. I explained to the patient that this will be an off-label use, not the standard  protocol, not evidence-based and could result in more pronounced side effects. The patient verbalized full understanding of all the facts and the risks and benefits and elected to use the extra 45 units for the following sites:      Procerus 5 units in 1 site     RT Temporalis 20 units in 4 sites    LT Temporalis 20 units in 4 sites        PROCEDURE COURSE:       The standard protocol was followed. The procedure was executed very smoothly.         PROCEDURE COMPLICATIONS:     None. The patient tolerated the procedure very well.         PROCEDURE AFTERCARE:     I encouraged the patient to use ice if the sites of injection get tender and call me with any problems or complications.         FOLLOW UP:      RTC in 3 months for a new set of injections and call with any questions and/or concerns.

## 2024-03-14 ENCOUNTER — PATIENT MESSAGE (OUTPATIENT)
Dept: PSYCHIATRY | Facility: CLINIC | Age: 69
End: 2024-03-14
Payer: MEDICARE

## 2024-03-14 RX ORDER — BUMETANIDE 2 MG/1
2 TABLET ORAL DAILY
Qty: 90 TABLET | Refills: 1 | Status: SHIPPED | OUTPATIENT
Start: 2024-03-14

## 2024-03-21 ENCOUNTER — TELEPHONE (OUTPATIENT)
Dept: PSYCHIATRY | Facility: CLINIC | Age: 69
End: 2024-03-21
Payer: MEDICARE

## 2024-03-22 RX ORDER — FLUOXETINE HYDROCHLORIDE 20 MG/1
20 CAPSULE ORAL DAILY
Qty: 30 CAPSULE | Refills: 1 | Status: SHIPPED | OUTPATIENT
Start: 2024-03-22 | End: 2024-05-10 | Stop reason: SDUPTHER

## 2024-03-25 ENCOUNTER — PATIENT MESSAGE (OUTPATIENT)
Dept: PULMONOLOGY | Facility: CLINIC | Age: 69
End: 2024-03-25
Payer: MEDICARE

## 2024-03-25 ENCOUNTER — PATIENT MESSAGE (OUTPATIENT)
Dept: PSYCHIATRY | Facility: CLINIC | Age: 69
End: 2024-03-25
Payer: MEDICARE

## 2024-04-07 DIAGNOSIS — K58.9 IRRITABLE BOWEL SYNDROME WITHOUT DIARRHEA: ICD-10-CM

## 2024-04-08 RX ORDER — ZOLPIDEM TARTRATE 5 MG/1
TABLET ORAL
Qty: 30 TABLET | Refills: 1 | OUTPATIENT
Start: 2024-04-08

## 2024-04-12 DIAGNOSIS — G44.89 CHRONIC MIXED HEADACHE SYNDROME: ICD-10-CM

## 2024-04-12 RX ORDER — ERENUMAB-AOOE 140 MG/ML
140 INJECTION, SOLUTION SUBCUTANEOUS
Qty: 1 ML | Refills: 11 | Status: SHIPPED | OUTPATIENT
Start: 2024-04-12

## 2024-04-14 DIAGNOSIS — M81.6 LOCALIZED OSTEOPOROSIS WITHOUT CURRENT PATHOLOGICAL FRACTURE: ICD-10-CM

## 2024-04-15 ENCOUNTER — TELEPHONE (OUTPATIENT)
Dept: PSYCHIATRY | Facility: CLINIC | Age: 69
End: 2024-04-15
Payer: MEDICARE

## 2024-04-15 RX ORDER — ALENDRONATE SODIUM 70 MG/1
70 TABLET ORAL
Qty: 4 TABLET | Refills: 11 | Status: SHIPPED | OUTPATIENT
Start: 2024-04-15 | End: 2025-04-15

## 2024-04-16 ENCOUNTER — PATIENT MESSAGE (OUTPATIENT)
Dept: PSYCHIATRY | Facility: CLINIC | Age: 69
End: 2024-04-16
Payer: MEDICARE

## 2024-04-17 DIAGNOSIS — J30.2 SEASONAL ALLERGIC RHINITIS, UNSPECIFIED TRIGGER: ICD-10-CM

## 2024-04-17 RX ORDER — LEVOCETIRIZINE DIHYDROCHLORIDE 5 MG/1
5 TABLET, FILM COATED ORAL EVERY MORNING
Qty: 30 TABLET | Refills: 3 | Status: SHIPPED | OUTPATIENT
Start: 2024-04-17

## 2024-04-17 RX ORDER — BUSPIRONE HYDROCHLORIDE 30 MG/1
30 TABLET ORAL 2 TIMES DAILY
Qty: 180 TABLET | Refills: 0 | OUTPATIENT
Start: 2024-04-17

## 2024-04-19 RX ORDER — BUSPIRONE HYDROCHLORIDE 30 MG/1
30 TABLET ORAL 2 TIMES DAILY
Qty: 180 TABLET | Refills: 0 | OUTPATIENT
Start: 2024-04-19

## 2024-04-22 RX ORDER — BUSPIRONE HYDROCHLORIDE 30 MG/1
30 TABLET ORAL 2 TIMES DAILY
Qty: 180 TABLET | Refills: 0 | OUTPATIENT
Start: 2024-04-22

## 2024-04-22 RX ORDER — ZOLPIDEM TARTRATE 5 MG/1
TABLET ORAL
Qty: 30 TABLET | Refills: 1 | OUTPATIENT
Start: 2024-04-22

## 2024-04-30 RX ORDER — BUSPIRONE HYDROCHLORIDE 30 MG/1
30 TABLET ORAL 2 TIMES DAILY
Qty: 180 TABLET | Refills: 0 | Status: SHIPPED | OUTPATIENT
Start: 2024-04-30 | End: 2024-05-10 | Stop reason: SDUPTHER

## 2024-05-01 ENCOUNTER — PATIENT MESSAGE (OUTPATIENT)
Dept: PSYCHIATRY | Facility: CLINIC | Age: 69
End: 2024-05-01
Payer: MEDICARE

## 2024-05-02 DIAGNOSIS — F43.21 COMPLICATED GRIEF: ICD-10-CM

## 2024-05-02 RX ORDER — DIAZEPAM 2 MG/1
2 TABLET ORAL EVERY 6 HOURS PRN
Qty: 30 TABLET | Refills: 0 | Status: SHIPPED | OUTPATIENT
Start: 2024-05-02

## 2024-05-08 ENCOUNTER — TELEPHONE (OUTPATIENT)
Dept: PSYCHIATRY | Facility: CLINIC | Age: 69
End: 2024-05-08
Payer: MEDICARE

## 2024-05-10 ENCOUNTER — OFFICE VISIT (OUTPATIENT)
Dept: PSYCHIATRY | Facility: CLINIC | Age: 69
End: 2024-05-10
Payer: MEDICARE

## 2024-05-10 ENCOUNTER — TELEPHONE (OUTPATIENT)
Dept: PSYCHIATRY | Facility: CLINIC | Age: 69
End: 2024-05-10
Payer: MEDICARE

## 2024-05-10 DIAGNOSIS — G47.00 INSOMNIA, UNSPECIFIED TYPE: ICD-10-CM

## 2024-05-10 DIAGNOSIS — F33.0 MILD EPISODE OF RECURRENT MAJOR DEPRESSIVE DISORDER: Primary | ICD-10-CM

## 2024-05-10 DIAGNOSIS — F43.21 GRIEF: ICD-10-CM

## 2024-05-10 DIAGNOSIS — F41.1 GAD (GENERALIZED ANXIETY DISORDER): ICD-10-CM

## 2024-05-10 PROCEDURE — 99214 OFFICE O/P EST MOD 30 MIN: CPT | Mod: HCNC,95,, | Performed by: PSYCHIATRY & NEUROLOGY

## 2024-05-10 PROCEDURE — 90833 PSYTX W PT W E/M 30 MIN: CPT | Mod: HCNC,95,, | Performed by: PSYCHIATRY & NEUROLOGY

## 2024-05-10 RX ORDER — BUSPIRONE HYDROCHLORIDE 30 MG/1
30 TABLET ORAL 2 TIMES DAILY
Qty: 180 TABLET | Refills: 0 | Status: SHIPPED | OUTPATIENT
Start: 2024-05-10

## 2024-05-10 RX ORDER — ZOLPIDEM TARTRATE 5 MG/1
TABLET ORAL
Qty: 30 TABLET | Refills: 1 | Status: SHIPPED | OUTPATIENT
Start: 2024-05-10

## 2024-05-10 RX ORDER — FLUOXETINE HYDROCHLORIDE 20 MG/1
20 CAPSULE ORAL DAILY
Qty: 90 CAPSULE | Refills: 1 | Status: SHIPPED | OUTPATIENT
Start: 2024-05-10
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
## 2024-06-11 ENCOUNTER — TELEPHONE (OUTPATIENT)
Dept: PSYCHIATRY | Facility: CLINIC | Age: 69
End: 2024-06-11
Payer: MEDICARE

## 2024-06-11 ENCOUNTER — PATIENT MESSAGE (OUTPATIENT)
Dept: NEUROLOGY | Facility: CLINIC | Age: 69
End: 2024-06-11
Payer: MEDICARE

## 2024-06-12 ENCOUNTER — PATIENT MESSAGE (OUTPATIENT)
Dept: PSYCHIATRY | Facility: CLINIC | Age: 69
End: 2024-06-12
Payer: MEDICARE

## 2024-06-14 ENCOUNTER — TELEPHONE (OUTPATIENT)
Dept: PSYCHIATRY | Facility: CLINIC | Age: 69
End: 2024-06-14
Payer: MEDICARE

## 2024-06-24 DIAGNOSIS — G44.89 CHRONIC MIXED HEADACHE SYNDROME: ICD-10-CM

## 2024-06-24 DIAGNOSIS — G43.809 VESTIBULAR MIGRAINE: ICD-10-CM

## 2024-06-24 RX ORDER — TOPIRAMATE 100 MG/1
100 CAPSULE, EXTENDED RELEASE ORAL DAILY
Qty: 90 CAPSULE | Refills: 1 | Status: SHIPPED | OUTPATIENT
Start: 2024-06-24 | End: 2024-06-26 | Stop reason: SDUPTHER

## 2024-06-26 DIAGNOSIS — G44.89 CHRONIC MIXED HEADACHE SYNDROME: ICD-10-CM

## 2024-06-26 DIAGNOSIS — G43.809 VESTIBULAR MIGRAINE: ICD-10-CM

## 2024-06-26 RX ORDER — TOPIRAMATE 100 MG/1
100 CAPSULE, EXTENDED RELEASE ORAL DAILY
Qty: 90 CAPSULE | Refills: 1 | Status: SHIPPED | OUTPATIENT
Start: 2024-06-26 | End: 2024-12-23

## 2024-07-03 ENCOUNTER — HOSPITAL ENCOUNTER (OUTPATIENT)
Dept: PREADMISSION TESTING | Facility: HOSPITAL | Age: 69
Discharge: HOME | End: 2024-07-03
Attending: INTERNAL MEDICINE
Payer: MEDICARE

## 2024-07-03 DIAGNOSIS — Z12.11 COLON CANCER SCREENING: ICD-10-CM

## 2024-07-14 ENCOUNTER — PATIENT MESSAGE (OUTPATIENT)
Dept: INTERNAL MEDICINE | Facility: CLINIC | Age: 69
End: 2024-07-14
Payer: MEDICARE

## 2024-07-24 DIAGNOSIS — G44.89 CHRONIC MIXED HEADACHE SYNDROME: ICD-10-CM

## 2024-07-24 DIAGNOSIS — G43.711 CHRONIC MIGRAINE WITHOUT AURA, INTRACTABLE, WITH STATUS MIGRAINOSUS: ICD-10-CM

## 2024-07-24 RX ORDER — RIMEGEPANT SULFATE 75 MG/75MG
75 TABLET, ORALLY DISINTEGRATING ORAL
Qty: 8 TABLET | Refills: 2 | Status: SHIPPED | OUTPATIENT
Start: 2024-07-24

## 2024-07-30 ENCOUNTER — TELEPHONE (OUTPATIENT)
Dept: NEUROLOGY | Facility: CLINIC | Age: 69
End: 2024-07-30
Payer: MEDICARE

## 2024-07-30 NOTE — TELEPHONE ENCOUNTER
----- Message from Rahat Fitzpatrick sent at 7/30/2024  2:49 PM CDT -----  Contact: Kenia Aquino is needing a call back in regards to resubmitting her botox appt for approval. She stated that to needs to resubmitted as medical and not medicine. She stated that it is $480 copay. Please give her a call back at [phone]

## 2024-07-30 NOTE — TELEPHONE ENCOUNTER
Message sent to Auth Department to see what can be done about the patient copayment. She did verbalized understanding.

## 2024-08-01 RX ORDER — SIMVASTATIN 5 MG/1
5 TABLET, FILM COATED ORAL NIGHTLY
Qty: 90 TABLET | Refills: 3 | Status: SHIPPED | OUTPATIENT
Start: 2024-08-01

## 2024-08-02 ENCOUNTER — PATIENT MESSAGE (OUTPATIENT)
Dept: NEUROLOGY | Facility: CLINIC | Age: 69
End: 2024-08-02
Payer: MEDICARE

## 2024-08-02 NOTE — TELEPHONE ENCOUNTER
I spoke with patient in regards to Botox appt and advised her that unfortunately we were not able to reduced the cost. Patient does not qualify for the saving card and she did verbalize understanding. Patient advise to keep appointment as is and she would be doing payments on this upcoming Botox. I did verbalize understanding and confirm appt.

## 2024-08-04 DIAGNOSIS — F43.21 COMPLICATED GRIEF: ICD-10-CM

## 2024-08-05 ENCOUNTER — PROCEDURE VISIT (OUTPATIENT)
Dept: NEUROLOGY | Facility: CLINIC | Age: 69
End: 2024-08-05
Payer: MEDICARE

## 2024-08-05 VITALS
DIASTOLIC BLOOD PRESSURE: 85 MMHG | HEART RATE: 90 BPM | SYSTOLIC BLOOD PRESSURE: 135 MMHG | RESPIRATION RATE: 16 BRPM | WEIGHT: 127.88 LBS | HEIGHT: 62 IN | BODY MASS INDEX: 23.53 KG/M2

## 2024-08-05 DIAGNOSIS — G43.809 VESTIBULAR MIGRAINE: ICD-10-CM

## 2024-08-05 DIAGNOSIS — G43.711 CHRONIC MIGRAINE WITHOUT AURA, INTRACTABLE, WITH STATUS MIGRAINOSUS: ICD-10-CM

## 2024-08-05 DIAGNOSIS — G43.E11 INTRACTABLE CHRONIC MIGRAINE WITH AURA WITH STATUS MIGRAINOSUS: ICD-10-CM

## 2024-08-05 DIAGNOSIS — G43.709 CHRONIC MIGRAINE W/O AURA, NOT INTRACTABLE, W/O STAT MIGR: ICD-10-CM

## 2024-08-05 DIAGNOSIS — G44.89 CHRONIC MIXED HEADACHE SYNDROME: Primary | ICD-10-CM

## 2024-08-05 RX ORDER — DIAZEPAM 2 MG/1
2 TABLET ORAL EVERY 6 HOURS PRN
Qty: 30 TABLET | Refills: 0 | Status: SHIPPED | OUTPATIENT
Start: 2024-08-05

## 2024-08-09 ENCOUNTER — OFFICE VISIT (OUTPATIENT)
Dept: INTERNAL MEDICINE | Facility: CLINIC | Age: 69
End: 2024-08-09
Payer: MEDICARE

## 2024-08-09 VITALS
HEIGHT: 62 IN | DIASTOLIC BLOOD PRESSURE: 70 MMHG | HEART RATE: 91 BPM | SYSTOLIC BLOOD PRESSURE: 100 MMHG | OXYGEN SATURATION: 97 % | RESPIRATION RATE: 18 BRPM | BODY MASS INDEX: 24.46 KG/M2 | WEIGHT: 132.94 LBS | TEMPERATURE: 98 F

## 2024-08-09 DIAGNOSIS — H81.01 MENIERE'S DISEASE OF RIGHT EAR: ICD-10-CM

## 2024-08-09 DIAGNOSIS — N18.31 STAGE 3A CHRONIC KIDNEY DISEASE: ICD-10-CM

## 2024-08-09 DIAGNOSIS — J06.9 VIRAL UPPER RESPIRATORY TRACT INFECTION WITH COUGH: Primary | ICD-10-CM

## 2024-08-09 DIAGNOSIS — Z86.010 PERSONAL HISTORY OF COLONIC POLYPS: ICD-10-CM

## 2024-08-09 DIAGNOSIS — Z12.11 COLON CANCER SCREENING: ICD-10-CM

## 2024-08-09 DIAGNOSIS — G43.711 CHRONIC MIGRAINE WITHOUT AURA, INTRACTABLE, WITH STATUS MIGRAINOSUS: ICD-10-CM

## 2024-08-09 DIAGNOSIS — R09.81 SINUS CONGESTION: ICD-10-CM

## 2024-08-09 LAB
CTP QC/QA: YES
SARS-COV-2 RDRP RESP QL NAA+PROBE: NEGATIVE

## 2024-08-09 PROCEDURE — 3078F DIAST BP <80 MM HG: CPT | Mod: HCNC,CPTII,S$GLB, | Performed by: NURSE PRACTITIONER

## 2024-08-09 PROCEDURE — 3008F BODY MASS INDEX DOCD: CPT | Mod: HCNC,CPTII,S$GLB, | Performed by: NURSE PRACTITIONER

## 2024-08-09 PROCEDURE — 3288F FALL RISK ASSESSMENT DOCD: CPT | Mod: HCNC,CPTII,S$GLB, | Performed by: NURSE PRACTITIONER

## 2024-08-09 PROCEDURE — 99214 OFFICE O/P EST MOD 30 MIN: CPT | Mod: HCNC,S$GLB,, | Performed by: NURSE PRACTITIONER

## 2024-08-09 PROCEDURE — 1101F PT FALLS ASSESS-DOCD LE1/YR: CPT | Mod: HCNC,CPTII,S$GLB, | Performed by: NURSE PRACTITIONER

## 2024-08-09 PROCEDURE — 99999 PR PBB SHADOW E&M-EST. PATIENT-LVL V: CPT | Mod: PBBFAC,HCNC,, | Performed by: NURSE PRACTITIONER

## 2024-08-09 PROCEDURE — 1126F AMNT PAIN NOTED NONE PRSNT: CPT | Mod: HCNC,CPTII,S$GLB, | Performed by: NURSE PRACTITIONER

## 2024-08-09 PROCEDURE — 1160F RVW MEDS BY RX/DR IN RCRD: CPT | Mod: HCNC,CPTII,S$GLB, | Performed by: NURSE PRACTITIONER

## 2024-08-09 PROCEDURE — 1159F MED LIST DOCD IN RCRD: CPT | Mod: HCNC,CPTII,S$GLB, | Performed by: NURSE PRACTITIONER

## 2024-08-09 PROCEDURE — 3074F SYST BP LT 130 MM HG: CPT | Mod: HCNC,CPTII,S$GLB, | Performed by: NURSE PRACTITIONER

## 2024-08-09 PROCEDURE — 87635 SARS-COV-2 COVID-19 AMP PRB: CPT | Mod: QW,HCNC,S$GLB, | Performed by: NURSE PRACTITIONER

## 2024-08-09 RX ORDER — METHYLPREDNISOLONE 4 MG/1
TABLET ORAL
Qty: 21 TABLET | Refills: 0 | Status: SHIPPED | OUTPATIENT
Start: 2024-08-09

## 2024-08-09 NOTE — PROGRESS NOTES
Subjective:       Patient ID: Kenia Alonzo is a 69 y.o. female.    Chief Complaint: Allergies (Ear ringing, headache )    Cough  This is a new problem. The current episode started in the past 7 days. The problem has been gradually worsening. The problem occurs every few hours. The cough is Non-productive. Associated symptoms include chills, ear congestion, ear pain, headaches, myalgias, nasal congestion, postnasal drip, rhinorrhea, a sore throat and wheezing. Pertinent negatives include no chest pain, fever, heartburn, hemoptysis, rash, shortness of breath, sweats or weight loss. The symptoms are aggravated by animals, dust, exercise, fumes, lying down and pollens. Risk factors for lung disease include smoking/tobacco exposure. She has tried body position changes, cool air, leukotriene antagonists and rest for the symptoms. The treatment provided no relief. Her past medical history is significant for bronchitis and environmental allergies. There is no history of asthma, bronchiectasis, COPD, emphysema or pneumonia.       Patient Active Problem List   Diagnosis    Depression    Meniere's disease of right ear    Vestibular migraine    Abnormal involuntary movements    Irritable bowel syndrome without diarrhea    History of tobacco abuse    CKD (chronic kidney disease) stage 3, GFR 30-59 ml/min    Non-seasonal allergic rhinitis    Bronchitis    Chronic mixed headache syndrome    Severe dysplasia of vulva    Primary osteoarthritis of left knee    Dermatitis of face    Ingrown toenail of left foot    Foot callus    Family history of colon cancer    Bilateral renal cysts    Menopause syndrome    Atrophic vaginitis    Candidiasis of vulva and vagina    Chronic bilateral low back pain with left-sided sciatica    Personal history of colonic polyps    Chronic migraine without aura, intractable, with status migrainosus    Chronic migraine with aura    Olecranon bursitis of left elbow    Moderate episode of recurrent major  depressive disorder    Aortic atherosclerosis       Family History   Problem Relation Name Age of Onset    Colon cancer Father      Diabetes Father      Cancer Sister      Diabetes Sister       Past Surgical History:   Procedure Laterality Date    BRAIN SURGERY      vestibular neurectomy    BREAST BIOPSY      15 years ago?  no visible scar    BREAST SURGERY      benign     SECTION      x 1    CHOLECYSTECTOMY      COLONOSCOPY N/A 3/15/2021    Procedure: COLONOSCOPY;  Surgeon: Mita Stoddard MD;  Location: [facility] ENDO;  Service: Endoscopy;  Laterality: N/A;    COLONOSCOPY N/A 2023    Procedure: COLONOSCOPY;  Surgeon: Mita Stoddard MD;  Location: [facility] ENDO;  Service: Endoscopy;  Laterality: N/A;    EXAMINATION UNDER ANESTHESIA N/A 2020    Procedure: EXAM UNDER ANESTHESIA;  Surgeon: Lucy Crane MD;  Location: [facility] OR;  Service: OB/GYN;  Laterality: N/A;    LASER ABLATION N/A 2020    Procedure: ABLATION, USING LASER;  Surgeon: Lucy Crane MD;  Location: [facility] OR;  Service: OB/GYN;  Laterality: N/A;    TONSILLECTOMY           Current Outpatient Medications:     alendronate (FOSAMAX) 70 MG tablet, Take 1 tablet (70 mg total) by mouth every 7 days., Disp: 4 tablet, Rfl: 11    bumetanide (BUMEX) 2 MG tablet, Take 1 tablet (2 mg total) by mouth once daily., Disp: 90 tablet, Rfl: 1    busPIRone (BUSPAR) 30 MG Tab, Take 1 tablet (30 mg total) by mouth 2 (two) times daily., Disp: 180 tablet, Rfl: 0    C/sourcherry/celery/grape seed (TART CHERRY ORAL), Take 1 tablet by mouth daily as needed. , Disp: , Rfl:     co-enzyme Q-10 30 mg capsule, Take 30 mg by mouth 3 (three) times daily., Disp: , Rfl:     conjugated estrogens (PREMARIN) vaginal cream, Place 1 g vaginally twice a week., Disp: 30 g, Rfl: 3    diazePAM (VALIUM) 2 MG tablet, Take 1 tablet (2 mg total) by mouth every 6 (six) hours as needed (dizziness/Meniere's attack)., Disp: 30 tablet, Rfl: 0    dicyclomine (BENTYL) 10 MG  capsule, Take 1 capsule (10 mg total) by mouth 3 (three) times daily., Disp: 90 capsule, Rfl: 1    erenumab-aooe (AIMOVIG AUTOINJECTOR) 140 mg/mL autoinjector, Inject 1 pen (140 mg total) into the skin every 28 days., Disp: 1 mL, Rfl: 11    estradiol-norethindrone (COMBIPATCH) 0.05-0.14 mg/24 hr, Place 1 patch onto the skin twice a week., Disp: 24 patch, Rfl: 4    FLUoxetine 20 MG capsule, Take 1 capsule (20 mg total) by mouth once daily., Disp: 90 capsule, Rfl: 1    levocetirizine (XYZAL) 5 MG tablet, Take 1 tablet (5 mg total) by mouth every morning., Disp: 30 tablet, Rfl: 3    LIDOcaine-prilocaine (EMLA) cream, , Disp: , Rfl:     linaCLOtide (LINZESS) 145 mcg Cap capsule, Take 1 capsule (145 mcg total) by mouth before breakfast., Disp: 30 capsule, Rfl: 5    magnesium 30 mg Tab, Take by mouth once., Disp: , Rfl:     MELATONIN ORAL, Take by mouth nightly as needed. , Disp: , Rfl:     montelukast (SINGULAIR) 10 mg tablet, Take 1 tablet (10 mg total) by mouth every evening., Disp: 90 tablet, Rfl: 3    omega-3 fatty acids/fish oil (FISH OIL-OMEGA-3 FATTY ACIDS) 300-1,000 mg capsule, Take 1 capsule by mouth once daily., Disp: , Rfl:     ondansetron (ZOFRAN-ODT) 4 MG TbDL, Dissolve 1 tablet (4 mg total) by mouth every 72 hours as needed (for nausea/vomiting)., Disp: 24 tablet, Rfl: 2    potassium chloride SA (K-DUR,KLOR-CON M) 10 MEQ tablet, Take 1 tablet (10 mEq total) by mouth once daily., Disp: 90 tablet, Rfl: 3    prednisoLONE acetate (PRED FORTE) 1 % DrpS, Place 1 drop into the left eye every 4 (four) hours., Disp: 5 mL, Rfl: 1    promethazine (PHENERGAN) 25 MG tablet, , Disp: , Rfl:     rimegepant (NURTEC) 75 mg odt, Take 1 tablet (75 mg total) by mouth every 72 hours as needed for Migraine (2 to 3 times per week prn). Place ODT tablet on the tongue; alternatively the ODT tablet may be placed under the tongue, Disp: 8 tablet, Rfl: 2    simvastatin (ZOCOR) 5 MG tablet, Take 1 tablet (5 mg total) by mouth every  evening., Disp: 90 tablet, Rfl: 3    topiramate (TROKENDI XR) 100 mg Cp24, Take 1 capsule (100 mg total) by mouth once daily., Disp: 90 capsule, Rfl: 1    vitamin B complex/folic acid (B COMPLEX 100 ORAL), Take by mouth nightly., Disp: , Rfl:     zinc gluconate 50 mg tablet, Take 50 mg by mouth once daily., Disp: , Rfl:     zolpidem (AMBIEN) 5 MG Tab, Take 1/2 to 1 tablet at bedtime as needed for sleep, Disp: 30 tablet, Rfl: 1    albuterol (VENTOLIN HFA) 90 mcg/actuation inhaler, Inhale 2 puffs into the lungs every 6 (six) hours as needed for Wheezing. Rescue, Disp: 18 g, Rfl: 0    azelastine (ASTELIN) 137 mcg (0.1 %) nasal spray, Use 1 spray (137 mcg total) by Nasal route 2 (two) times daily., Disp: 30 mL, Rfl: 11    methylPREDNISolone (MEDROL DOSEPACK) 4 mg tablet, use as directed, Disp: 21 tablet, Rfl: 0    Current Facility-Administered Medications:     onabotulinumtoxina injection 200 Units, 200 Units, Intramuscular, Q90 Days, Charan, Danna, NP    onabotulinumtoxina injection 200 Units, 200 Units, Intramuscular, Q90 Days, Jluis Farrara, NP, 200 Units at 09/11/23 1529    onabotulinumtoxina injection 200 Units, 200 Units, Intramuscular, Q90 Days, CharanMiguel ADanna, NP, 200 Units at 03/11/24 1535    onabotulinumtoxina injection 200 Units, 200 Units, Intramuscular, Q90 Days, Charan, Danna, NP, 200 Units at 08/05/24 1503    [START ON 12/9/2024] onabotulinumtoxina injection 200 Units, 200 Units, Intramuscular, Q28 Days,     Review of Systems   Constitutional:  Positive for activity change, chills and fatigue. Negative for appetite change, fever and weight loss.   HENT:  Positive for congestion, ear pain, postnasal drip, rhinorrhea, sinus pressure, sneezing, sore throat and tinnitus. Negative for sinus pain.    Respiratory:  Positive for cough and wheezing. Negative for hemoptysis and shortness of breath.    Cardiovascular:  Negative for chest pain.   Gastrointestinal:  Positive for nausea. Negative for abdominal  "pain, diarrhea, heartburn and vomiting.   Musculoskeletal:  Positive for myalgias.   Skin:  Negative for rash.   Allergic/Immunologic: Positive for environmental allergies.   Neurological:  Positive for headaches. Negative for dizziness and light-headedness.       Objective:   /70 (BP Location: Left arm, Patient Position: Sitting, BP Method: Medium (Manual))   Pulse 91   Temp 97.7 °F (36.5 °C)   Resp 18   Ht 5' 2" (1.575 m)   Wt 60.3 kg (132 lb 15 oz)   SpO2 97%   BMI 24.31 kg/m²      Physical Exam  Constitutional:       General: She is not in acute distress.     Appearance: Normal appearance. She is not ill-appearing.   HENT:      Head: Normocephalic.      Right Ear: Tympanic membrane normal.      Left Ear: Tympanic membrane normal.      Nose: Congestion and rhinorrhea present.      Mouth/Throat:      Mouth: Mucous membranes are moist.      Pharynx: Oropharynx is clear. No oropharyngeal exudate or posterior oropharyngeal erythema.   Eyes:      General:         Right eye: No discharge.         Left eye: No discharge.      Extraocular Movements: Extraocular movements intact.      Conjunctiva/sclera: Conjunctivae normal.      Pupils: Pupils are equal, round, and reactive to light.   Cardiovascular:      Rate and Rhythm: Normal rate and regular rhythm.      Pulses: Normal pulses.      Heart sounds: Normal heart sounds. No murmur heard.     No friction rub. No gallop.   Pulmonary:      Effort: Pulmonary effort is normal. No respiratory distress.      Breath sounds: Normal breath sounds. No wheezing.   Abdominal:      Palpations: Abdomen is soft.      Tenderness: There is no abdominal tenderness. There is no guarding.   Skin:     General: Skin is warm and dry.      Coloration: Skin is not pale.      Findings: No erythema.   Neurological:      Mental Status: She is alert and oriented to person, place, and time.   Psychiatric:         Mood and Affect: Mood normal.         Behavior: Behavior normal.       " "  Assessment & Plan     Problem List Items Addressed This Visit          Neuro    Chronic migraine without aura, intractable, with status migrainosus    Current Assessment & Plan     Followed by neurology. Recently had botox injections on Monday            ENT    Meniere's disease of right ear    Current Assessment & Plan     Followed by ENT.          Relevant Medications    methylPREDNISolone (MEDROL DOSEPACK) 4 mg tablet       Renal/    CKD (chronic kidney disease) stage 3, GFR 30-59 ml/min    Current Assessment & Plan     Avoid nsaids            GI    Personal history of colonic polyps    Relevant Orders    Ambulatory referral/consult to Endo Procedure      Other Visit Diagnoses       Viral upper respiratory tract infection with cough    -  Primary    Relevant Medications    methylPREDNISolone (MEDROL DOSEPACK) 4 mg tablet    Sinus congestion        Relevant Orders    POCT COVID-19 Rapid Screening (Completed)    Colon cancer screening        Relevant Orders    Ambulatory referral/consult to Endo Procedure         Tylenol/ibuprofen for pain and fever.   Hand hygiene.   Maintain adequate hydration.   Antihistamine and flonase for nasal congestion and upper respiratory symptoms.   Rest.       Follow up if symptoms worsen or fail to improve.          Portions of this note may have been created with voice recognition software. Occasional "wrong-word" or "sound-a-like" substitutions may have occurred due to the inherent limitations of voice recognition software. Please, read the note carefully and recognize, using context, where substitutions have occurred.       "

## 2024-09-02 ENCOUNTER — PATIENT MESSAGE (OUTPATIENT)
Dept: PSYCHIATRY | Facility: CLINIC | Age: 69
End: 2024-09-02
Payer: MEDICARE

## 2024-09-04 ENCOUNTER — OFFICE VISIT (OUTPATIENT)
Dept: PULMONOLOGY | Facility: CLINIC | Age: 69
End: 2024-09-04
Attending: INTERNAL MEDICINE
Payer: MEDICARE

## 2024-09-04 ENCOUNTER — HOSPITAL ENCOUNTER (OUTPATIENT)
Dept: RADIOLOGY | Facility: HOSPITAL | Age: 69
Discharge: HOME | End: 2024-09-04
Attending: INTERNAL MEDICINE
Payer: MEDICARE

## 2024-09-04 VITALS
WEIGHT: 130.31 LBS | OXYGEN SATURATION: 96 % | HEART RATE: 80 BPM | RESPIRATION RATE: 17 BRPM | DIASTOLIC BLOOD PRESSURE: 72 MMHG | BODY MASS INDEX: 23.98 KG/M2 | HEIGHT: 62 IN | SYSTOLIC BLOOD PRESSURE: 120 MMHG

## 2024-09-04 DIAGNOSIS — Z87.891 PERSONAL HISTORY OF NICOTINE DEPENDENCE: ICD-10-CM

## 2024-09-04 DIAGNOSIS — F17.200 SMOKING: Primary | ICD-10-CM

## 2024-09-04 DIAGNOSIS — R91.1 SOLITARY PULMONARY NODULE: ICD-10-CM

## 2024-09-04 DIAGNOSIS — R91.8 MULTIPLE LUNG NODULES ON CT: Chronic | ICD-10-CM

## 2024-09-04 PROCEDURE — 71250 CT THORAX DX C-: CPT | Mod: TC,HCNC

## 2024-09-04 PROCEDURE — 1160F RVW MEDS BY RX/DR IN RCRD: CPT | Mod: HCNC,CPTII,S$GLB, | Performed by: INTERNAL MEDICINE

## 2024-09-04 PROCEDURE — 99999 PR PBB SHADOW E&M-EST. PATIENT-LVL V: CPT | Mod: PBBFAC,HCNC,, | Performed by: INTERNAL MEDICINE

## 2024-09-04 PROCEDURE — 99214 OFFICE O/P EST MOD 30 MIN: CPT | Mod: HCNC,S$GLB,, | Performed by: INTERNAL MEDICINE

## 2024-09-04 PROCEDURE — 3008F BODY MASS INDEX DOCD: CPT | Mod: HCNC,CPTII,S$GLB, | Performed by: INTERNAL MEDICINE

## 2024-09-04 PROCEDURE — 1101F PT FALLS ASSESS-DOCD LE1/YR: CPT | Mod: HCNC,CPTII,S$GLB, | Performed by: INTERNAL MEDICINE

## 2024-09-04 PROCEDURE — 3078F DIAST BP <80 MM HG: CPT | Mod: HCNC,CPTII,S$GLB, | Performed by: INTERNAL MEDICINE

## 2024-09-04 PROCEDURE — 3288F FALL RISK ASSESSMENT DOCD: CPT | Mod: HCNC,CPTII,S$GLB, | Performed by: INTERNAL MEDICINE

## 2024-09-04 PROCEDURE — 3074F SYST BP LT 130 MM HG: CPT | Mod: HCNC,CPTII,S$GLB, | Performed by: INTERNAL MEDICINE

## 2024-09-04 PROCEDURE — 1159F MED LIST DOCD IN RCRD: CPT | Mod: HCNC,CPTII,S$GLB, | Performed by: INTERNAL MEDICINE

## 2024-09-04 NOTE — PROGRESS NOTES
"Subjective:      Patient ID: Kenia Alonzo is a 69 y.o. female.    Chief Complaint: Cough    Cough        Feb 2024  68-year-old female former smoker with seasonal allergies, reflux and a previously documented right upper lobe nodule on low-dose screening CT recently had her annual screening which showed increased size/number of right upper lobe nodules.  Combined size is approximately 10 mm.  She is here today for that reason.  Main complaint is of some allergic symptoms and an postnasal drip and related cough.  Otherwise no other pulmonary symptoms.  Specifically denies wheezing, hemoptysis, unintentional weight loss, chest pain, fever or chills.     Aug 2024  Here for follow up of the above  After last visit we sent Trulix Nodify which showed no increased risk  Here for interval six-month repeat CT  Having some upper respiratory congestion and cough  Otherwise no new complaints    Review of Systems   Respiratory:  Positive for cough.     as per history of present illness otherwise negative  Objective:     Physical Exam   Constitutional: She is oriented to person, place, and time. She appears well-developed. No distress.   HENT:   Head: Normocephalic.   Cardiovascular: Normal rate and regular rhythm.   Pulmonary/Chest: Normal expansion, symmetric chest wall expansion, effort normal and breath sounds normal.   Musculoskeletal:      Cervical back: Neck supple.   Neurological: She is alert and oriented to person, place, and time.   Psychiatric: She has a normal mood and affect.   Nursing note and vitals reviewed.          9/4/2024     1:18 PM 8/9/2024     1:12 PM 8/5/2024     2:45 PM 3/11/2024     3:06 PM 2/28/2024     1:31 PM 2/27/2024     2:22 PM 2/9/2024     1:15 PM   Pulmonary Function Tests   SpO2 96 % 97 %   100 %  98 %   Height 5' 2" (1.575 m) 5' 2" (1.575 m) 5' 2" (1.575 m) 5' 2" (1.575 m) 5' 2" (1.575 m) 5' 2" (1.575 m) 5' 2" (1.575 m)   Weight 59.1 kg (130 lb 4.7 oz) 60.3 kg (132 lb 15 oz) 58 kg (127 lb " 13.9 oz) 58.1 kg (128 lb 1.4 oz) 58.3 kg (128 lb 8.5 oz) 57.6 kg (127 lb) 57.9 kg (127 lb 10.3 oz)   BMI (Calculated) 23.8 24.3 23.4 23.4 23.5 23.2 23.3        Assessment:     1. Smoking    2. Personal history of nicotine dependence    3. Multiple lung nodules on CT         Orders Placed This Encounter   Procedures    CT Chest Lung Screening Low Dose     Standing Status:   Future     Standing Expiration Date:   9/4/2026     Order Specific Question:   Is there documentation of shared decision making for this lung screening exam?     Answer:   Yes     Order Specific Question:   Is the patient a current smoker?     Answer:   Yes     Order Specific Question:   Does the patient have a 20-pack/year or greater smoke history?     Answer:   Yes     Order Specific Question:   Is the patient between the ages 50-80 years old?     Answer:   Yes     Order Specific Question:   Does the patient show any signs or symptoms of lung cancer?     Answer:   No     Order Specific Question:   Is this the first (baseline) CT or an annual exam?     Answer:   Annual [2]     Order Specific Question:   May the Radiologist modify the order per protocol to meet the clinical needs of the patient?     Answer:   Yes     Order Specific Question:   Is this a low dose screening chest CT?     Answer:   Yes     Order Specific Question:   Does the patient wear a continuous glucose monitor?     Answer:   No     I personally reviewed CT chest images obtained today.  My interpretation is:  Resolution of previously described cluster of nodules in the right lower lobe.  There are several scattered subcentimeter nodules which appear stable.  There is some linear left basilar atelectasis.  No new detrimental findings    Plan:     Stable to improved overall imaging  Recommended ongoing surveillance with low-dose CT in 1 year  I will see her back in 1 year with low-dose CT, sooner if needed  Recommended Mucinex DM for cough and congestion symptoms

## 2024-09-05 DIAGNOSIS — K58.9 IRRITABLE BOWEL SYNDROME WITHOUT DIARRHEA: ICD-10-CM

## 2024-09-05 RX ORDER — DICYCLOMINE HYDROCHLORIDE 10 MG/1
10 CAPSULE ORAL 3 TIMES DAILY
Qty: 90 CAPSULE | Refills: 1 | Status: SHIPPED | OUTPATIENT
Start: 2024-09-05

## 2024-09-05 NOTE — TELEPHONE ENCOUNTER
Refill Routing Note   Medication(s) are not appropriate for processing by [facility] for the following reason(s):        Non-participating provider    [facility] action(s):  Route               Appointments  past 12m or future 3m with PCP    Date Provider   Last Visit   8/9/2024 Almita Izaguirre NP   Next Visit   9/6/2024 Almita Izaguirre NP   ED visits in past 90 days: 0        Note composed:9:16 AM 09/05/2024            Passed

## 2024-09-06 ENCOUNTER — TELEPHONE (OUTPATIENT)
Dept: UROLOGY | Facility: CLINIC | Age: 69
End: 2024-09-06
Payer: MEDICARE

## 2024-09-19 DIAGNOSIS — J30.2 SEASONAL ALLERGIC RHINITIS, UNSPECIFIED TRIGGER: ICD-10-CM

## 2024-09-19 RX ORDER — LEVOCETIRIZINE DIHYDROCHLORIDE 5 MG/1
5 TABLET, FILM COATED ORAL EVERY MORNING
Qty: 30 TABLET | Refills: 3 | Status: SHIPPED | OUTPATIENT
Start: 2024-09-19

## 2024-09-20 ENCOUNTER — PATIENT MESSAGE (OUTPATIENT)
Dept: INTERNAL MEDICINE | Facility: CLINIC | Age: 69
End: 2024-09-20
Payer: MEDICARE

## 2024-09-20 ENCOUNTER — OFFICE VISIT (OUTPATIENT)
Dept: INTERNAL MEDICINE | Facility: CLINIC | Age: 69
End: 2024-09-20
Payer: MEDICARE

## 2024-09-20 ENCOUNTER — TELEPHONE (OUTPATIENT)
Dept: INTERNAL MEDICINE | Facility: CLINIC | Age: 69
End: 2024-09-20
Payer: MEDICARE

## 2024-09-20 ENCOUNTER — HOSPITAL ENCOUNTER (OUTPATIENT)
Dept: CARDIOLOGY | Facility: HOSPITAL | Age: 69
Discharge: HOME | End: 2024-09-20
Payer: MEDICARE

## 2024-09-20 VITALS
DIASTOLIC BLOOD PRESSURE: 72 MMHG | TEMPERATURE: 99 F | RESPIRATION RATE: 14 BRPM | SYSTOLIC BLOOD PRESSURE: 128 MMHG | HEIGHT: 62 IN | HEART RATE: 101 BPM | BODY MASS INDEX: 23.82 KG/M2 | OXYGEN SATURATION: 99 % | WEIGHT: 129.44 LBS

## 2024-09-20 DIAGNOSIS — Z01.818 PRE-OP EVALUATION: Primary | ICD-10-CM

## 2024-09-20 DIAGNOSIS — I70.0 AORTIC ATHEROSCLEROSIS: ICD-10-CM

## 2024-09-20 DIAGNOSIS — D07.1 SEVERE DYSPLASIA OF VULVA: ICD-10-CM

## 2024-09-20 DIAGNOSIS — N18.31 STAGE 3A CHRONIC KIDNEY DISEASE: ICD-10-CM

## 2024-09-20 DIAGNOSIS — D07.1 VIN III (VULVAR INTRAEPITHELIAL NEOPLASIA III): ICD-10-CM

## 2024-09-20 DIAGNOSIS — F33.1 MODERATE EPISODE OF RECURRENT MAJOR DEPRESSIVE DISORDER: ICD-10-CM

## 2024-09-20 LAB
OHS QRS DURATION: 72 MS
OHS QTC CALCULATION: 445 MS

## 2024-09-20 PROCEDURE — 1159F MED LIST DOCD IN RCRD: CPT | Mod: HCNC,CPTII,S$GLB, | Performed by: NURSE PRACTITIONER

## 2024-09-20 PROCEDURE — 3074F SYST BP LT 130 MM HG: CPT | Mod: HCNC,CPTII,S$GLB, | Performed by: NURSE PRACTITIONER

## 2024-09-20 PROCEDURE — 1101F PT FALLS ASSESS-DOCD LE1/YR: CPT | Mod: HCNC,CPTII,S$GLB, | Performed by: NURSE PRACTITIONER

## 2024-09-20 PROCEDURE — 1126F AMNT PAIN NOTED NONE PRSNT: CPT | Mod: HCNC,CPTII,S$GLB, | Performed by: NURSE PRACTITIONER

## 2024-09-20 PROCEDURE — 3078F DIAST BP <80 MM HG: CPT | Mod: HCNC,CPTII,S$GLB, | Performed by: NURSE PRACTITIONER

## 2024-09-20 PROCEDURE — 99214 OFFICE O/P EST MOD 30 MIN: CPT | Mod: HCNC,S$GLB,, | Performed by: NURSE PRACTITIONER

## 2024-09-20 PROCEDURE — 1160F RVW MEDS BY RX/DR IN RCRD: CPT | Mod: HCNC,CPTII,S$GLB, | Performed by: NURSE PRACTITIONER

## 2024-09-20 PROCEDURE — 3008F BODY MASS INDEX DOCD: CPT | Mod: HCNC,CPTII,S$GLB, | Performed by: NURSE PRACTITIONER

## 2024-09-20 PROCEDURE — 93010 ELECTROCARDIOGRAM REPORT: CPT | Mod: HCNC,,, | Performed by: STUDENT IN AN ORGANIZED HEALTH CARE EDUCATION/TRAINING PROGRAM

## 2024-09-20 PROCEDURE — 99999 PR PBB SHADOW E&M-EST. PATIENT-LVL V: CPT | Mod: PBBFAC,HCNC,, | Performed by: NURSE PRACTITIONER

## 2024-09-20 PROCEDURE — 3288F FALL RISK ASSESSMENT DOCD: CPT | Mod: HCNC,CPTII,S$GLB, | Performed by: NURSE PRACTITIONER

## 2024-09-20 PROCEDURE — 93005 ELECTROCARDIOGRAM TRACING: CPT | Mod: HCNC,PO

## 2024-09-20 NOTE — PROGRESS NOTES
Subjective:       Patient ID: Kenia Alonzo is a 69 y.o. female.    Chief Complaint: Pre-op Exam (2024)      Kenia Alonzo is here today for preop examination and clearance.  I have reviewed the patient's medical history in detail and updated the computerized patient record.  Denies any previous complications related to previous surgery or anesthesia.  No acute complaints today.     Surgery: Laser of Vulva  MD: Dr Ma  Date: 2024  Preop Testing: CBC, CMP, EKG.                    Patient Active Problem List   Diagnosis    Depression    Meniere's disease of right ear    Vestibular migraine    Abnormal involuntary movements    Irritable bowel syndrome without diarrhea    History of tobacco abuse    CKD (chronic kidney disease) stage 3, GFR 30-59 ml/min    Non-seasonal allergic rhinitis    Bronchitis    Chronic mixed headache syndrome    Severe dysplasia of vulva    Primary osteoarthritis of left knee    Dermatitis of face    Ingrown toenail of left foot    Foot callus    Family history of colon cancer    Bilateral renal cysts    Menopause syndrome    Atrophic vaginitis    Candidiasis of vulva and vagina    Chronic bilateral low back pain with left-sided sciatica    Personal history of colonic polyps    Chronic migraine without aura, intractable, with status migrainosus    Chronic migraine with aura    Olecranon bursitis of left elbow    Moderate episode of recurrent major depressive disorder    Aortic atherosclerosis    Multiple lung nodules on CT         Past Surgical History:   Procedure Laterality Date    BRAIN SURGERY      vestibular neurectomy    BREAST BIOPSY      15 years ago?  no visible scar    BREAST SURGERY      benign     SECTION      x 1    CHOLECYSTECTOMY      COLONOSCOPY N/A 3/15/2021    Procedure: COLONOSCOPY;  Surgeon: Mita Stoddard MD;  Location: [facility];  Service: Endoscopy;  Laterality: N/A;    COLONOSCOPY N/A 2023    Procedure: COLONOSCOPY;  Surgeon:  Mita Stoddard MD;  Location: [facility];  Service: Endoscopy;  Laterality: N/A;    EXAMINATION UNDER ANESTHESIA N/A 8/12/2020    Procedure: EXAM UNDER ANESTHESIA;  Surgeon: Lucy Crane MD;  Location: [facility] OR;  Service: OB/GYN;  Laterality: N/A;    LASER ABLATION N/A 8/12/2020    Procedure: ABLATION, USING LASER;  Surgeon: Lucy Crane MD;  Location: [facility] OR;  Service: OB/GYN;  Laterality: N/A;    TONSILLECTOMY           Current Outpatient Medications:     alendronate (FOSAMAX) 70 MG tablet, Take 1 tablet (70 mg total) by mouth every 7 days., Disp: 4 tablet, Rfl: 11    bumetanide (BUMEX) 2 MG tablet, Take 1 tablet (2 mg total) by mouth once daily., Disp: 90 tablet, Rfl: 1    busPIRone (BUSPAR) 30 MG Tab, Take 1 tablet (30 mg total) by mouth 2 (two) times daily., Disp: 180 tablet, Rfl: 0    C/sourcherry/celery/grape seed (TART CHERRY ORAL), Take 1 tablet by mouth daily as needed. , Disp: , Rfl:     co-enzyme Q-10 30 mg capsule, Take 30 mg by mouth 3 (three) times daily., Disp: , Rfl:     conjugated estrogens (PREMARIN) vaginal cream, Place 1 g vaginally twice a week., Disp: 30 g, Rfl: 3    diazePAM (VALIUM) 2 MG tablet, Take 1 tablet (2 mg total) by mouth every 6 (six) hours as needed (dizziness/Meniere's attack)., Disp: 30 tablet, Rfl: 0    dicyclomine (BENTYL) 10 MG capsule, Take 1 capsule (10 mg total) by mouth 3 (three) times daily., Disp: 90 capsule, Rfl: 1    erenumab-aooe (AIMOVIG AUTOINJECTOR) 140 mg/mL autoinjector, Inject 1 pen (140 mg total) into the skin every 28 days., Disp: 1 mL, Rfl: 11    estradiol-norethindrone (COMBIPATCH) 0.05-0.14 mg/24 hr, Place 1 patch onto the skin twice a week., Disp: 24 patch, Rfl: 4    FLUoxetine 20 MG capsule, Take 1 capsule (20 mg total) by mouth once daily., Disp: 90 capsule, Rfl: 1    levocetirizine (XYZAL) 5 MG tablet, Take 1 tablet (5 mg total) by mouth every morning., Disp: 30 tablet, Rfl: 3    LIDOcaine-prilocaine (EMLA) cream, , Disp: , Rfl:      linaCLOtide (LINZESS) 145 mcg Cap capsule, Take 1 capsule (145 mcg total) by mouth before breakfast., Disp: 30 capsule, Rfl: 5    magnesium 30 mg Tab, Take by mouth once., Disp: , Rfl:     MELATONIN ORAL, Take by mouth nightly as needed. , Disp: , Rfl:     montelukast (SINGULAIR) 10 mg tablet, Take 1 tablet (10 mg total) by mouth every evening., Disp: 90 tablet, Rfl: 3    omega-3 fatty acids/fish oil (FISH OIL-OMEGA-3 FATTY ACIDS) 300-1,000 mg capsule, Take 1 capsule by mouth once daily., Disp: , Rfl:     ondansetron (ZOFRAN-ODT) 4 MG TbDL, Dissolve 1 tablet (4 mg total) by mouth every 72 hours as needed (for nausea/vomiting)., Disp: 24 tablet, Rfl: 2    potassium chloride SA (K-DUR,KLOR-CON M) 10 MEQ tablet, Take 1 tablet (10 mEq total) by mouth once daily., Disp: 90 tablet, Rfl: 3    prednisoLONE acetate (PRED FORTE) 1 % DrpS, Place 1 drop into the left eye every 4 (four) hours., Disp: 5 mL, Rfl: 1    promethazine (PHENERGAN) 25 MG tablet, , Disp: , Rfl:     rimegepant (NURTEC) 75 mg odt, Take 1 tablet (75 mg total) by mouth every 72 hours as needed for Migraine (2 to 3 times per week prn). Place ODT tablet on the tongue; alternatively the ODT tablet may be placed under the tongue, Disp: 8 tablet, Rfl: 2    simvastatin (ZOCOR) 5 MG tablet, Take 1 tablet (5 mg total) by mouth every evening., Disp: 90 tablet, Rfl: 3    topiramate (TROKENDI XR) 100 mg Cp24, Take 1 capsule (100 mg total) by mouth once daily., Disp: 90 capsule, Rfl: 1    vitamin B complex/folic acid (B COMPLEX 100 ORAL), Take by mouth nightly., Disp: , Rfl:     zinc gluconate 50 mg tablet, Take 50 mg by mouth once daily., Disp: , Rfl:     zolpidem (AMBIEN) 5 MG Tab, Take 1/2 to 1 tablet at bedtime as needed for sleep, Disp: 30 tablet, Rfl: 1    albuterol (VENTOLIN HFA) 90 mcg/actuation inhaler, Inhale 2 puffs into the lungs every 6 (six) hours as needed for Wheezing. Rescue, Disp: 18 g, Rfl: 0    azelastine (ASTELIN) 137 mcg (0.1 %) nasal spray, Use 1  "spray (137 mcg total) by Nasal route 2 (two) times daily., Disp: 30 mL, Rfl: 11    Current Facility-Administered Medications:     onabotulinumtoxina injection 200 Units, 200 Units, Intramuscular, Q90 Days, Danna Farrar NP    onabotulinumtoxina injection 200 Units, 200 Units, Intramuscular, Q90 Days, Jluis Farrara, NP, 200 Units at 09/11/23 1529    onabotulinumtoxina injection 200 Units, 200 Units, Intramuscular, Q90 Days, Jluis Farrara, NP, 200 Units at 03/11/24 1535    onabotulinumtoxina injection 200 Units, 200 Units, Intramuscular, Q90 Days, Danna Farrar, NP, 200 Units at 08/05/24 1503    [START ON 12/9/2024] onabotulinumtoxina injection 200 Units, 200 Units, Intramuscular, Q28 Days,     Review of Systems   Constitutional:  Negative for activity change, appetite change, chills, fatigue and fever.   HENT:  Positive for postnasal drip.    Respiratory:  Negative for apnea, cough, chest tightness, shortness of breath and wheezing.    Cardiovascular:  Negative for chest pain, palpitations and leg swelling.   Gastrointestinal:  Negative for abdominal pain, blood in stool, constipation, diarrhea, nausea and vomiting.   Genitourinary:  Negative for dysuria, frequency and urgency.   Musculoskeletal:  Negative for arthralgias, back pain and gait problem.   Skin:  Negative for rash.   Neurological:  Positive for dizziness (intermittent, chronic). Negative for syncope, weakness, light-headedness, numbness and headaches.   Psychiatric/Behavioral:  Negative for agitation and dysphoric mood. The patient is not nervous/anxious.        Objective:   /72 (BP Location: Left arm, Patient Position: Sitting, BP Method: Large (Manual))   Pulse 101   Temp 98.8 °F (37.1 °C) (Oral)   Resp 14   Ht 5' 2" (1.575 m)   Wt 58.7 kg (129 lb 6.6 oz)   SpO2 99%   BMI 23.67 kg/m²      Physical Exam  Constitutional:       General: She is not in acute distress.     Appearance: Normal appearance. She is not ill-appearing.   HENT: "      Head: Normocephalic.   Cardiovascular:      Rate and Rhythm: Normal rate and regular rhythm.      Pulses: Normal pulses.      Heart sounds: Normal heart sounds. No murmur heard.     No friction rub. No gallop.   Pulmonary:      Effort: Pulmonary effort is normal. No respiratory distress.      Breath sounds: Normal breath sounds. No wheezing.   Abdominal:      Palpations: Abdomen is soft.      Tenderness: There is no abdominal tenderness. There is no guarding.   Skin:     General: Skin is warm and dry.      Coloration: Skin is not pale.      Findings: No erythema.   Neurological:      Mental Status: She is alert and oriented to person, place, and time.   Psychiatric:         Mood and Affect: Mood normal.         Behavior: Behavior normal.         Assessment & Plan     Problem List Items Addressed This Visit          Psychiatric    Moderate episode of recurrent major depressive disorder    Current Assessment & Plan     Stable. Followed by psych. Continue current medications            Cardiac/Vascular    Aortic atherosclerosis    Current Assessment & Plan     On statin. BP controlled.          Relevant Orders    EKG 12-lead (Completed)       Renal/    CKD (chronic kidney disease) stage 3, GFR 30-59 ml/min    Current Assessment & Plan     Avoid nsaid. Labs reviewed.          Relevant Orders    CBC Auto Differential (Completed)    COMPREHENSIVE METABOLIC PANEL (Completed)       Oncology    Severe dysplasia of vulva    Current Assessment & Plan     Upcoming procedure with gyn/onc          Other Visit Diagnoses       Pre-op evaluation    -  Primary    Relevant Orders    EKG 12-lead (Completed)    CBC Auto Differential (Completed)    COMPREHENSIVE METABOLIC PANEL (Completed)    FLORA III (vulvar intraepithelial neoplasia III)            No concerns on labs or EKG. Ok to proceed with surgery as scheduled.                      Follow up in about 6 months (around 3/20/2025), or if symptoms worsen or fail to  "improve.          Portions of this note may have been created with voice recognition software. Occasional "wrong-word" or "sound-a-like" substitutions may have occurred due to the inherent limitations of voice recognition software. Please, read the note carefully and recognize, using context, where substitutions have occurred.       This note was generated with the assistance of ambient listening technology. Verbal consent was obtained by the patient and accompanying visitor(s) for the recording of patient appointment to facilitate this note. I attest to having reviewed and edited the generated note for accuracy, though some syntax or spelling errors may persist. Please contact the author of this note for any clarification.     "

## 2024-09-20 NOTE — TELEPHONE ENCOUNTER
----- Message from Ezequiel Pineda sent at 9/20/2024  3:55 PM CDT -----  Contact: Kenia  Type:  Patient Returning Call    Who Called:Kenia  Who Left Message for Patient:Cystal  Does the patient know what this is regarding?:missed call, results   Would the patient rather a call back or a response via [facility]? call  Best Call Back Number:[phone]   Additional Information:

## 2024-10-07 RX ORDER — BUMETANIDE 2 MG/1
2 TABLET ORAL DAILY
Qty: 90 TABLET | Refills: 1 | Status: SHIPPED | OUTPATIENT
Start: 2024-10-07

## 2024-10-10 RX ORDER — MONTELUKAST SODIUM 10 MG/1
10 TABLET ORAL NIGHTLY
Qty: 90 TABLET | Refills: 3 | Status: SHIPPED | OUTPATIENT
Start: 2024-10-10

## 2024-10-18 ENCOUNTER — PATIENT MESSAGE (OUTPATIENT)
Dept: PSYCHIATRY | Facility: CLINIC | Age: 69
End: 2024-10-18
Payer: MEDICARE

## 2024-10-18 DIAGNOSIS — F43.21 COMPLICATED GRIEF: ICD-10-CM

## 2024-10-18 RX ORDER — DIAZEPAM 2 MG/1
2 TABLET ORAL EVERY 6 HOURS PRN
Qty: 30 TABLET | Refills: 1 | Status: SHIPPED | OUTPATIENT
Start: 2024-10-18

## 2024-10-22 ENCOUNTER — PATIENT MESSAGE (OUTPATIENT)
Dept: PSYCHIATRY | Facility: CLINIC | Age: 69
End: 2024-10-22
Payer: MEDICARE

## 2024-10-22 ENCOUNTER — HOSPITAL ENCOUNTER (OUTPATIENT)
Dept: PREADMISSION TESTING | Facility: HOSPITAL | Age: 69
Discharge: HOME | End: 2024-10-22
Attending: FAMILY MEDICINE
Payer: MEDICARE

## 2024-10-22 DIAGNOSIS — Z86.0100 HISTORY OF COLONIC POLYPS: ICD-10-CM

## 2024-10-22 DIAGNOSIS — Z12.11 COLON CANCER SCREENING: Primary | ICD-10-CM

## 2024-10-22 RX ORDER — BUSPIRONE HYDROCHLORIDE 30 MG/1
30 TABLET ORAL 2 TIMES DAILY
Qty: 180 TABLET | Refills: 0 | Status: SHIPPED | OUTPATIENT
Start: 2024-10-22

## 2024-10-22 RX ORDER — POLYETHYLENE GLYCOL 3350, SODIUM SULFATE ANHYDROUS, SODIUM BICARBONATE, SODIUM CHLORIDE, POTASSIUM CHLORIDE 236; 22.74; 6.74; 5.86; 2.97 G/4L; G/4L; G/4L; G/4L; G/4L
4 POWDER, FOR SOLUTION ORAL ONCE
Qty: 4000 ML | Refills: 0 | Status: SHIPPED | OUTPATIENT
Start: 2024-10-22 | End: 2024-10-24

## 2024-10-30 RX ORDER — BUSPIRONE HYDROCHLORIDE 30 MG/1
30 TABLET ORAL 2 TIMES DAILY
Qty: 180 TABLET | Refills: 0 | OUTPATIENT
Start: 2024-10-30

## 2024-11-11 ENCOUNTER — ANESTHESIA EVENT (OUTPATIENT)
Dept: ENDOSCOPY | Facility: HOSPITAL | Age: 69
End: 2024-11-11
Payer: MEDICARE

## 2024-11-11 NOTE — ANESTHESIA PREPROCEDURE EVALUATION
2024  Kenia Alonzo is a 69 y.o., female.  Past Medical History:   Diagnosis Date    Arthritis     knee    Asthma     childhood     Cataract     Depression     Digestive disorder     Encounter for blood transfusion     General anesthetics causing adverse effect in therapeutic use     severe headache after crainiotomy    GI problem     IBS    Gout     Headache     Hyperlipidemia     Meniere disease     Multiple lung nodules on CT 2024    MVP (mitral valve prolapse)     minor    Renal disorder     Vertigo      Past Surgical History:   Procedure Laterality Date    BRAIN SURGERY      vestibular neurectomy    BREAST BIOPSY      15 years ago?  no visible scar    BREAST SURGERY      benign     SECTION      x 1    CHOLECYSTECTOMY      COLONOSCOPY N/A 3/15/2021    Procedure: COLONOSCOPY;  Surgeon: Mita Stoddard MD;  Location: [facility] END[facility];  Service: Endoscopy;  Laterality: N/A;    COLONOSCOPY N/A 2023    Procedure: COLONOSCOPY;  Surgeon: Mita Stoddard MD;  Location: [facility] ENDO;  Service: Endoscopy;  Laterality: N/A;    EXAMINATION UNDER ANESTHESIA N/A 2020    Procedure: EXAM UNDER ANESTHESIA;  Surgeon: Lucy Crane MD;  Location: [facility] OR;  Service: OB/GYN;  Laterality: N/A;    LASER ABLATION N/A 2020    Procedure: ABLATION, USING LASER;  Surgeon: Lucy Crane MD;  Location: [facility] OR;  Service: OB/GYN;  Laterality: N/A;    TONSILLECTOMY           Pre-op Assessment    I have reviewed the Patient Summary Reports.     I have reviewed the Nursing Notes. I have reviewed the NPO Status.   I have reviewed the Medications.     Review of Systems  Anesthesia Hx:  No problems with previous Anesthesia   History of prior surgery of interest to airway management or planning:            Denies Personal Hx of Anesthesia complications.                    Social:  Former Smoker,  Social Alcohol Use       Pulmonary:    Asthma                    Renal/:  Chronic Renal Disease, CKD   Stage 3             Hepatic/GI:  Bowel Prep.      Family hx colon cancer             Musculoskeletal:  Arthritis               Neurological:      Headaches                                 Psych:  Psychiatric History  depression                Physical Exam  General: Well nourished, Cooperative, Alert and Oriented    Airway:  Mallampati: II   Mouth Opening: Normal  Tongue: Normal  Neck ROM: Normal ROM    Dental:  Intact        Anesthesia Plan  Type of Anesthesia, risks & benefits discussed:    Anesthesia Type: Gen Natural Airway  Intra-op Monitoring Plan: Standard ASA Monitors  Post Op Pain Control Plan: multimodal analgesia  Induction:  IV  Informed Consent: Informed consent signed with the Patient and all parties understand the risks and agree with anesthesia plan.  All questions answered. Patient consented to blood products? No  ASA Score: 2  Day of Surgery Review of History & Physical: H&P Update referred to the surgeon/provider.    Ready For Surgery From Anesthesia Perspective.     .

## 2024-11-14 ENCOUNTER — ANESTHESIA (OUTPATIENT)
Dept: ENDOSCOPY | Facility: HOSPITAL | Age: 69
End: 2024-11-14
Payer: MEDICARE

## 2024-11-19 DIAGNOSIS — G43.711 CHRONIC MIGRAINE WITHOUT AURA, INTRACTABLE, WITH STATUS MIGRAINOSUS: ICD-10-CM

## 2024-11-19 DIAGNOSIS — G44.89 CHRONIC MIXED HEADACHE SYNDROME: ICD-10-CM

## 2024-11-19 RX ORDER — FLUOXETINE HYDROCHLORIDE 20 MG/1
20 CAPSULE ORAL DAILY
Qty: 90 CAPSULE | Refills: 1 | Status: SHIPPED | OUTPATIENT
Start: 2024-11-19

## 2024-11-19 RX ORDER — RIMEGEPANT SULFATE 75 MG/75MG
75 TABLET, ORALLY DISINTEGRATING ORAL
Qty: 8 TABLET | Refills: 2 | Status: SHIPPED | OUTPATIENT
Start: 2024-11-19

## 2024-12-03 ENCOUNTER — TELEPHONE (OUTPATIENT)
Dept: PREADMISSION TESTING | Facility: HOSPITAL | Age: 69
End: 2024-12-03
Payer: MEDICARE

## 2024-12-03 NOTE — TELEPHONE ENCOUNTER
----- Message from Briana sent at 12/3/2024  9:31 AM CST -----  Regarding: reschedule  Patient needs to reschedule her procedure due to Covid, please call her back at [phone]- Thanks

## 2024-12-11 ENCOUNTER — PATIENT MESSAGE (OUTPATIENT)
Dept: INTERNAL MEDICINE | Facility: CLINIC | Age: 69
End: 2024-12-11
Payer: MEDICARE

## 2024-12-11 DIAGNOSIS — N18.30 CKD (CHRONIC KIDNEY DISEASE) STAGE 3, GFR 30-59 ML/MIN: ICD-10-CM

## 2024-12-16 DIAGNOSIS — K58.9 IRRITABLE BOWEL SYNDROME WITHOUT DIARRHEA: ICD-10-CM

## 2024-12-16 RX ORDER — DICYCLOMINE HYDROCHLORIDE 10 MG/1
10 CAPSULE ORAL 3 TIMES DAILY
Qty: 90 CAPSULE | Refills: 1 | Status: SHIPPED | OUTPATIENT
Start: 2024-12-16

## 2024-12-18 ENCOUNTER — PATIENT MESSAGE (OUTPATIENT)
Dept: NEUROLOGY | Facility: CLINIC | Age: 69
End: 2024-12-18
Payer: MEDICARE

## 2025-01-06 ENCOUNTER — PATIENT MESSAGE (OUTPATIENT)
Dept: PSYCHIATRY | Facility: CLINIC | Age: 70
End: 2025-01-06
Payer: MEDICARE

## 2025-01-07 ENCOUNTER — PATIENT MESSAGE (OUTPATIENT)
Dept: NEUROLOGY | Facility: CLINIC | Age: 70
End: 2025-01-07
Payer: MEDICARE

## 2025-01-07 DIAGNOSIS — G43.711 CHRONIC MIGRAINE WITHOUT AURA, INTRACTABLE, WITH STATUS MIGRAINOSUS: ICD-10-CM

## 2025-01-07 DIAGNOSIS — G44.89 CHRONIC MIXED HEADACHE SYNDROME: ICD-10-CM

## 2025-01-07 DIAGNOSIS — G43.809 VESTIBULAR MIGRAINE: ICD-10-CM

## 2025-01-07 RX ORDER — TOPIRAMATE 100 MG/1
100 CAPSULE, EXTENDED RELEASE ORAL DAILY
Qty: 90 CAPSULE | Refills: 1 | Status: SHIPPED | OUTPATIENT
Start: 2025-01-07 | End: 2025-07-06

## 2025-01-07 RX ORDER — ERENUMAB-AOOE 140 MG/ML
140 INJECTION, SOLUTION SUBCUTANEOUS
Qty: 1 ML | Refills: 11 | Status: SHIPPED | OUTPATIENT
Start: 2025-01-07

## 2025-01-07 RX ORDER — RIMEGEPANT SULFATE 75 MG/75MG
75 TABLET, ORALLY DISINTEGRATING ORAL
Qty: 8 TABLET | Refills: 2 | Status: SHIPPED | OUTPATIENT
Start: 2025-01-07

## 2025-01-09 ENCOUNTER — HOSPITAL ENCOUNTER (OUTPATIENT)
Facility: HOSPITAL | Age: 70
Discharge: HOME | End: 2025-01-09
Attending: INTERNAL MEDICINE | Admitting: INTERNAL MEDICINE
Payer: MEDICARE

## 2025-01-09 VITALS
DIASTOLIC BLOOD PRESSURE: 61 MMHG | HEART RATE: 60 BPM | BODY MASS INDEX: 24.44 KG/M2 | SYSTOLIC BLOOD PRESSURE: 124 MMHG | RESPIRATION RATE: 20 BRPM | WEIGHT: 132.81 LBS | HEIGHT: 62 IN | OXYGEN SATURATION: 98 % | TEMPERATURE: 97 F

## 2025-01-09 DIAGNOSIS — Z12.11 COLON CANCER SCREENING: ICD-10-CM

## 2025-01-09 PROCEDURE — 63600175 PHARM REV CODE 636 W HCPCS: Mod: HCNC | Performed by: NURSE ANESTHETIST, CERTIFIED REGISTERED

## 2025-01-09 PROCEDURE — 45385 COLONOSCOPY W/LESION REMOVAL: CPT | Mod: PT,HCNC,, | Performed by: INTERNAL MEDICINE

## 2025-01-09 PROCEDURE — 25000003 PHARM REV CODE 250: Mod: HCNC | Performed by: INTERNAL MEDICINE

## 2025-01-09 PROCEDURE — 88305 TISSUE EXAM BY PATHOLOGIST: CPT | Mod: HCNC | Performed by: PATHOLOGY

## 2025-01-09 PROCEDURE — 37000009 HC ANESTHESIA EA ADD 15 MINS: Mod: HCNC | Performed by: INTERNAL MEDICINE

## 2025-01-09 PROCEDURE — 27201089 HC SNARE, DISP (ANY): Mod: HCNC | Performed by: INTERNAL MEDICINE

## 2025-01-09 PROCEDURE — 37000008 HC ANESTHESIA 1ST 15 MINUTES: Mod: HCNC | Performed by: INTERNAL MEDICINE

## 2025-01-09 PROCEDURE — 88305 TISSUE EXAM BY PATHOLOGIST: CPT | Mod: 26,HCNC,, | Performed by: PATHOLOGY

## 2025-01-09 PROCEDURE — 45385 COLONOSCOPY W/LESION REMOVAL: CPT | Mod: PT,HCNC | Performed by: INTERNAL MEDICINE

## 2025-01-09 RX ORDER — DEXTROMETHORPHAN/PSEUDOEPHED 2.5-7.5/.8
DROPS ORAL
Status: DISCONTINUED | OUTPATIENT
Start: 2025-01-09 | End: 2025-01-09 | Stop reason: HOSPADM

## 2025-01-09 RX ORDER — LIDOCAINE HYDROCHLORIDE 20 MG/ML
INJECTION, SOLUTION EPIDURAL; INFILTRATION; INTRACAUDAL; PERINEURAL
Status: DISCONTINUED | OUTPATIENT
Start: 2025-01-09 | End: 2025-01-09

## 2025-01-09 RX ORDER — PROPOFOL 10 MG/ML
VIAL (ML) INTRAVENOUS
Status: DISCONTINUED | OUTPATIENT
Start: 2025-01-09 | End: 2025-01-09

## 2025-01-09 RX ADMIN — PROPOFOL 30 MG: 10 INJECTION, EMULSION INTRAVENOUS at 11:01

## 2025-01-09 RX ADMIN — LIDOCAINE HYDROCHLORIDE 60 MG: 20 INJECTION, SOLUTION EPIDURAL; INFILTRATION; INTRACAUDAL; PERINEURAL at 11:01

## 2025-01-09 RX ADMIN — PROPOFOL 20 MG: 10 INJECTION, EMULSION INTRAVENOUS at 11:01

## 2025-01-09 RX ADMIN — PROPOFOL 60 MG: 10 INJECTION, EMULSION INTRAVENOUS at 11:01

## 2025-01-09 RX ADMIN — PROPOFOL 40 MG: 10 INJECTION, EMULSION INTRAVENOUS at 11:01

## 2025-01-09 NOTE — H&P
Endoscopy History and Physical    PCP - Almita Izaguirre NP  Referring Physician - Almita Izaguirre NP  [id number] [address]      ASA - per anesthesia  Mallampati - per anesthesia  History of Anesthesia problems - no  Family history Anesthesia problems -  no   Plan of anesthesia - General    HPI  69 y.o. female    Planned Procedure: Colonoscopy  Diagnosis: screening for colon cancer  Chief Complaint: Same as above    Personnel H/o colon polyps:no  FH of colon cancer:yes  Anticoagulation:no      ROS:  Constitutional: No fevers, chills, No weight loss  CV: No chest pain  Pulm: No cough, No shortness of breath  GI: see HPI    Medical History:  has a past medical history of Arthritis, Asthma, Cataract, Depression, Digestive disorder, Encounter for blood transfusion, General anesthetics causing adverse effect in therapeutic use, GI problem, Gout, Headache, Hyperlipidemia, Meniere disease, Multiple lung nodules on CT (2024), MVP (mitral valve prolapse), Renal disorder, and Vertigo.    Surgical History:  has a past surgical history that includes  section; Breast surgery; Brain surgery (); Tonsillectomy; Cholecystectomy; Examination under anesthesia (N/A, 2020); Laser ablation (N/A, 2020); Colonoscopy (N/A, 3/15/2021); Breast biopsy; and Colonoscopy (N/A, 2023).    Family History: family history includes Cancer in her sister; Colon cancer in her father; Diabetes in her father and sister..    Social History:  reports that she quit smoking about 4 years ago. Her smoking use included cigarettes. She started smoking about 49 years ago. She has a 22 pack-year smoking history. She has never used smokeless tobacco. She reports that she does not currently use alcohol after a past usage of about 1.0 - 3.0 standard drink of alcohol per week. She reports that she does not use drugs.    Review of patient's allergies indicates:   Allergen Reactions    Demerol [meperidine] Nausea  And Vomiting     Pt refuses Demerol     Codeine Itching       Medications:   Facility-Administered Medications Prior to Admission   Medication Dose Route Frequency Provider Last Rate Last Admin    onabotulinumtoxina injection 200 Units  200 Units Intramuscular Q90 Days Danna Farrar NP        onabotulinumtoxina injection 200 Units  200 Units Intramuscular Q90 Days Danna Farrar, NP   200 Units at 09/11/23 1529    onabotulinumtoxina injection 200 Units  200 Units Intramuscular Q90 Days Danna Farrar NP   200 Units at 03/11/24 1535    onabotulinumtoxina injection 200 Units  200 Units Intramuscular Q90 Days Danna Farrar NP   200 Units at 08/05/24 1503    onabotulinumtoxina injection 200 Units  200 Units Intramuscular Q28 Days          Medications Prior to Admission   Medication Sig Dispense Refill Last Dose/Taking    albuterol (VENTOLIN HFA) 90 mcg/actuation inhaler Inhale 2 puffs into the lungs every 6 (six) hours as needed for Wheezing. Rescue 18 g 0     alendronate (FOSAMAX) 70 MG tablet Take 1 tablet (70 mg total) by mouth every 7 days. 4 tablet 11     azelastine (ASTELIN) 137 mcg (0.1 %) nasal spray Use 1 spray (137 mcg total) by Nasal route 2 (two) times daily. 30 mL 11     bumetanide (BUMEX) 2 MG tablet Take 1 tablet (2 mg total) by mouth once daily. 90 tablet 1     busPIRone (BUSPAR) 30 MG Tab Take 1 tablet (30 mg total) by mouth 2 (two) times daily. 180 tablet 0     C/sourcherry/celery/grape seed (TART CHERRY ORAL) Take 1 tablet by mouth daily as needed.        co-enzyme Q-10 30 mg capsule Take 30 mg by mouth 3 (three) times daily.       conjugated estrogens (PREMARIN) vaginal cream Place 1 g vaginally twice a week. 30 g 3     diazePAM (VALIUM) 2 MG tablet Take 1 tablet (2 mg total) by mouth every 6 (six) hours as needed (dizziness/Meniere's attack). 30 tablet 1     dicyclomine (BENTYL) 10 MG capsule Take 1 capsule (10 mg total) by mouth 3 (three) times daily. 90 capsule 1     erenumab-aooe  (AIMOVIG AUTOINJECTOR) 140 mg/mL autoinjector Inject 1 pen (140 mg total) into the skin every 28 days. 1 mL 11     estradiol-norethindrone (COMBIPATCH) 0.05-0.14 mg/24 hr Place 1 patch onto the skin twice a week. 24 patch 4     FLUoxetine 20 MG capsule Take 1 capsule (20 mg total) by mouth once daily. 90 capsule 1     levocetirizine (XYZAL) 5 MG tablet Take 1 tablet (5 mg total) by mouth every morning. 30 tablet 3     LIDOcaine-prilocaine (EMLA) cream        linaCLOtide (LINZESS) 145 mcg Cap capsule Take 1 capsule (145 mcg total) by mouth before breakfast. 30 capsule 5     magnesium 30 mg Tab Take by mouth once.       MELATONIN ORAL Take by mouth nightly as needed.        montelukast (SINGULAIR) 10 mg tablet Take 1 tablet (10 mg total) by mouth every evening. 90 tablet 3     omega-3 fatty acids/fish oil (FISH OIL-OMEGA-3 FATTY ACIDS) 300-1,000 mg capsule Take 1 capsule by mouth once daily.       ondansetron (ZOFRAN-ODT) 4 MG TbDL Dissolve 1 tablet (4 mg total) by mouth every 72 hours as needed (for nausea/vomiting). 24 tablet 2     potassium chloride SA (K-DUR,KLOR-CON M) 10 MEQ tablet Take 1 tablet (10 mEq total) by mouth once daily. 90 tablet 3     promethazine (PHENERGAN) 25 MG tablet        rimegepant (NURTEC) 75 mg odt Take 1 tablet (75 mg total) by mouth every 72 hours as needed for Migraine (2 to 3 times per week prn). Place ODT tablet on the tongue; alternatively the ODT tablet may be placed under the tongue 8 tablet 2     simvastatin (ZOCOR) 5 MG tablet Take 1 tablet (5 mg total) by mouth every evening. 90 tablet 3     topiramate (TROKENDI XR) 100 mg Cp24 Take 1 capsule (100 mg total) by mouth once daily. 90 capsule 1     vitamin B complex/folic acid (B COMPLEX 100 ORAL) Take by mouth nightly.       zinc gluconate 50 mg tablet Take 50 mg by mouth once daily.       zolpidem (AMBIEN) 5 MG Tab Take 1/2 to 1 tablet at bedtime as needed for sleep 30 tablet 1        Physical Exam:    Vital Signs: There were no  vitals filed for this visit.    General Appearance: Well appearing in no acute distress  Abdomen: Soft, non tender, non distended with normal bowel sounds, no masses    Labs:  Lab Results   Component Value Date    WBC 10.49 09/20/2024    HGB 13.8 09/20/2024    HCT 42.0 09/20/2024     09/20/2024    CHOL 240 (H) 02/09/2024    TRIG 138 02/09/2024    HDL 43 02/09/2024    ALT 15 09/20/2024    AST 13 09/20/2024     09/20/2024    K 3.7 09/20/2024     09/20/2024    CREATININE 1.5 (H) 09/20/2024    BUN 30 (H) 09/20/2024    CO2 24 09/20/2024    TSH 0.968 04/23/2019    HGBA1C 5.0 08/18/2017       I have explained the risks and benefits of this endoscopic procedure to the patient including but not limited to bleeding, inflammation, infection, perforation, and death.    SEDATION PLAN: per anesthesia       History reviewed, vital signs satisfactory, cardiopulmonary status satisfactory, sedation options, risks and plans have been discussed with the patient  All their questions were answered and the patient agrees to the sedation procedures as planned and the patient is deemed an appropriate candidate for the sedation as planned.     The risks, benefits and alternatives of the procedure were discussed with the patient in detail. This discussion was had in the presence of endoscopy staff. The risks include, risks of adverse reaction to sedation requiring the use of reversal agents, bleeding requiring blood transfusion, perforation requiring surgical intervention and technical failure. Other risks include aspiration leading to respiratory distress and respiratory failure resulting in endotracheal intubation and mechanical ventilation including death. If anesthesia is being utilized for this procedure, it is up to the anesthesiologist to determine airway safety including elective endotracheal intubation. Questions were answered, they agree to proceed. There was no language barriers.       Procedure explained to  patient, informed consent obtained and placed in chart.       Tristan Ibarra MD

## 2025-01-09 NOTE — ANESTHESIA POSTPROCEDURE EVALUATION
Anesthesia Post Evaluation    Patient: Kenia Alonzo    Procedure(s) Performed: Procedure(s) (LRB):  COLONOSCOPY, SCREENING, HIGH RISK PATIENT (N/A)    Final Anesthesia Type: general      Patient location during evaluation: PACU  Patient participation: Yes- Able to Participate  Level of consciousness: awake and alert and oriented  Post-procedure vital signs: reviewed and stable  Pain management: adequate  Airway patency: patent    PONV status at discharge: No PONV  Anesthetic complications: no      Cardiovascular status: blood pressure returned to baseline, stable and hemodynamically stable  Respiratory status: unassisted  Hydration status: euvolemic  Follow-up not needed.              Vitals Value Taken Time   /61 01/09/25 1223   Temp 36.2 °C (97.1 °F) 01/09/25 1153   Pulse 60 01/09/25 1223   Resp 20 01/09/25 1223   SpO2 98 % 01/09/25 1223         Event Time   Out of Recovery 12:23:00         Pain/Fan Score: Fan Score: 10 (1/9/2025 12:23 PM)

## 2025-01-09 NOTE — PLAN OF CARE
Discharge instructions reviewed with pt, handouts given, verbalized understanding with no further questions at this time. Dr. Ibarra spoke to pt at bedside, reviewed procedure and answered questions aware they are awaiting biopsy results with MD telephone number provided per AVS sheet. VSS on RA, no pain or nausea noted, tolerating po fluids without difficulty, no other complaints noted. Fall precautions reviewed, consents in chart.

## 2025-01-09 NOTE — PROVATION PATIENT INSTRUCTIONS
Discharge Summary/Instructions after an Endoscopic Procedure  Patient Name: Kenia Alonzo  Patient MRN: [id number]  Patient YOB: 1955 Thursday, January 9, 2025  Tristan Ibarra MD  Dear patient,  As a result of recent federal legislation (The Federal Cures Act), you may   receive lab or pathology results from your procedure in your [facility]   account before your physician is able to contact you. Your physician or   their representative will relay the results to you with their   recommendations at their soonest availability.  Thank you,  RESTRICTIONS:  During your procedure today, you received medications for sedation.  These   medications may affect your judgment, balance and coordination.  Therefore,   for 24 hours, you have the following restrictions:   - DO NOT drive a car, operate machinery, make legal/financial decisions,   sign important papers or drink alcohol.    ACTIVITY:  Today: no heavy lifting, straining or running due to procedural   sedation/anesthesia.  The following day: return to full activity including work.  DIET:  Eat and drink normally unless instructed otherwise.     TREATMENT FOR COMMON SIDE EFFECTS:  - Mild abdominal pain, nausea, belching, bloating or excessive gas:  rest,   eat lightly and use a heating pad.  - Sore Throat: treat with throat lozenges and/or gargle with warm salt   water.  - Because air was used during the procedure, expelling large amounts of air   from your rectum or belching is normal.  - If a bowel prep was taken, you may not have a bowel movement for 1-3 days.    This is normal.  SYMPTOMS TO WATCH FOR AND REPORT TO YOUR PHYSICIAN:  1. Abdominal pain or bloating, other than gas cramps.  2. Chest pain.  3. Back pain.  4. Signs of infection such as: chills or fever occurring within 24 hours   after the procedure.  5. Rectal bleeding, which would show as bright red, maroon, or black stools.   (A tablespoon of blood from the rectum is not serious, especially  if   hemorrhoids are present.)  6. Vomiting.  7. Weakness or dizziness.  GO DIRECTLY TO THE NEAREST EMERGENCY ROOM IF YOU HAVE ANY OF THE FOLLOWING:      Difficulty breathing              Chills and/or fever over 101 F   Persistent vomiting and/or vomiting blood   Severe abdominal pain   Severe chest pain   Black, tarry stools   Bleeding- more than one tablespoon   Any other symptom or condition that you feel may need urgent attention  Your doctor recommends these additional instructions:  If any biopsies were taken, your doctors clinic will contact you in 1 to 2   weeks with any results.  - Discharge patient to home.   - Resume previous diet.   - Continue present medications.   - Await pathology results.   - Repeat colonoscopy in 1 year because the bowel preparation was suboptimal.   Plan with extended prerp  - Return to referring physician as previously scheduled.   - Patient has a contact number available for emergencies.  The signs and   symptoms of potential delayed complications were discussed with the   patient.  Return to normal activities tomorrow.  Written discharge   instructions were provided to the patient.  For questions, problems or results please call your physician Tristan Ibarra MD at Work:  [phone]  If you have any questions about the above instructions, call the GI   department at [phone] or call the endoscopy unit at [phone]   from 7am until 3 pm.  [facility] [place], EMERGENCY ROOM PHONE NUMBER:   [phone]  IF A COMPLICATION OR EMERGENCY SITUATION ARISES AND YOU ARE UNABLE TO REACH   YOUR PHYSICIAN - GO DIRECTLY TO THE EMERGENCY ROOM.  I have read or have had read to me these discharge instructions for my   procedure and have received a written copy.  I understand these   instructions and will follow-up with my physician if I have any questions.     __________________________________       _____________________________________  Nurse Signature                                           Patient/Designated   Responsible Party Signature  MD Tristan Muñiz MD  1/9/2025 11:50:16 AM  This report has been verified and signed electronically.  Dear patient,  As a result of recent federal legislation (The Federal Cures Act), you may   receive lab or pathology results from your procedure in your [facility]   account before your physician is able to contact you. Your physician or   their representative will relay the results to you with their   recommendations at their soonest availability.  Thank you,  PROVATION

## 2025-01-09 NOTE — DISCHARGE SUMMARY
[facility]  Discharge Note  Short Stay    Procedure(s) (LRB):  COLONOSCOPY, SCREENING, HIGH RISK PATIENT (N/A)      OUTCOME: Patient tolerated treatment/procedure well without complication and is now ready for discharge.    DISPOSITION: Home or Self Care    FINAL DIAGNOSIS:  Colon cancer screening    FOLLOWUP: With primary care provider    DISCHARGE INSTRUCTIONS:  No discharge procedures on file.     TIME SPENT ON DISCHARGE: 20 minutes

## 2025-01-09 NOTE — TRANSFER OF CARE
"Anesthesia Transfer of Care Note    Patient: Kenia Alonzo    Procedure(s) Performed: Procedure(s) (LRB):  COLONOSCOPY, SCREENING, HIGH RISK PATIENT (N/A)    Patient location: PACU    Anesthesia Type: general    Transport from OR: Transported from OR on room air with adequate spontaneous ventilation    Post pain: adequate analgesia    Post assessment: no apparent anesthetic complications and tolerated procedure well    Post vital signs: stable    Level of consciousness: awake, alert and oriented    Nausea/Vomiting: no nausea/vomiting    Complications: none    Transfer of care protocol was followed      Last vitals: Visit Vitals  /86 (BP Location: Right arm, Patient Position: Sitting)   Pulse 84   Temp 36.3 °C (97.4 °F) (Temporal)   Resp 18   Ht 5' 2" (1.575 m)   Wt 60.2 kg (132 lb 13.2 oz)   SpO2 99%   Breastfeeding No   BMI 24.29 kg/m²     "

## 2025-01-14 LAB
FINAL PATHOLOGIC DIAGNOSIS: NORMAL
GROSS: NORMAL
Lab: NORMAL

## 2025-01-16 ENCOUNTER — PATIENT MESSAGE (OUTPATIENT)
Dept: NEUROLOGY | Facility: CLINIC | Age: 70
End: 2025-01-16
Payer: MEDICARE

## 2025-01-16 ENCOUNTER — PATIENT MESSAGE (OUTPATIENT)
Dept: PSYCHIATRY | Facility: CLINIC | Age: 70
End: 2025-01-16
Payer: MEDICARE

## 2025-01-20 ENCOUNTER — PATIENT MESSAGE (OUTPATIENT)
Dept: PSYCHIATRY | Facility: CLINIC | Age: 70
End: 2025-01-20
Payer: MEDICARE

## 2025-01-21 RX ORDER — FLUOXETINE HYDROCHLORIDE 40 MG/1
40 CAPSULE ORAL DAILY
Qty: 90 CAPSULE | Refills: 1 | Status: SHIPPED | OUTPATIENT
Start: 2025-01-21 | End: 2025-01-22 | Stop reason: SDUPTHER

## 2025-01-22 ENCOUNTER — OFFICE VISIT (OUTPATIENT)
Dept: PSYCHIATRY | Facility: CLINIC | Age: 70
End: 2025-01-22
Payer: MEDICARE

## 2025-01-22 DIAGNOSIS — G47.00 INSOMNIA, UNSPECIFIED TYPE: ICD-10-CM

## 2025-01-22 DIAGNOSIS — F33.0 MILD EPISODE OF RECURRENT MAJOR DEPRESSIVE DISORDER: Primary | ICD-10-CM

## 2025-01-22 DIAGNOSIS — F41.1 GAD (GENERALIZED ANXIETY DISORDER): ICD-10-CM

## 2025-01-22 RX ORDER — BUSPIRONE HYDROCHLORIDE 30 MG/1
30 TABLET ORAL 2 TIMES DAILY
Qty: 180 TABLET | Refills: 1 | Status: SHIPPED | OUTPATIENT
Start: 2025-01-22

## 2025-01-22 RX ORDER — FLUOXETINE HYDROCHLORIDE 40 MG/1
40 CAPSULE ORAL DAILY
Qty: 90 CAPSULE | Refills: 1 | Status: SHIPPED | OUTPATIENT
Start: 2025-01-22 | End: 2025-07-21
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
## 2025-01-30 DIAGNOSIS — Z00.00 ENCOUNTER FOR MEDICARE ANNUAL WELLNESS EXAM: ICD-10-CM

## 2025-02-12 DIAGNOSIS — Z12.31 OTHER SCREENING MAMMOGRAM: ICD-10-CM

## 2025-02-17 ENCOUNTER — TELEPHONE (OUTPATIENT)
Dept: NEUROLOGY | Facility: CLINIC | Age: 70
End: 2025-02-17
Payer: MEDICARE

## 2025-02-17 NOTE — TELEPHONE ENCOUNTER
Spoke with patient  in regards to her catching a fever and she rescheduled to tomorrow. Patient did accept appointment.

## 2025-02-17 NOTE — TELEPHONE ENCOUNTER
----- Message from Brenda sent at 2/17/2025 10:08 AM CST -----  Contact: patient  Kenia Alonzo would like a call back @[phone],in regards to rescheduling her botox procedure due to not feeling well.

## 2025-02-18 ENCOUNTER — PROCEDURE VISIT (OUTPATIENT)
Dept: NEUROLOGY | Facility: CLINIC | Age: 70
End: 2025-02-18
Payer: MEDICARE

## 2025-02-18 VITALS
SYSTOLIC BLOOD PRESSURE: 148 MMHG | HEIGHT: 62 IN | HEART RATE: 88 BPM | WEIGHT: 130.94 LBS | DIASTOLIC BLOOD PRESSURE: 77 MMHG | RESPIRATION RATE: 16 BRPM | BODY MASS INDEX: 24.09 KG/M2

## 2025-02-18 DIAGNOSIS — G43.711 CHRONIC MIGRAINE WITHOUT AURA, INTRACTABLE, WITH STATUS MIGRAINOSUS: Primary | ICD-10-CM

## 2025-02-18 NOTE — PROCEDURES
1. PROCEDURE: Botox injections      2. INDICATION: Intractable Migraine      3. TIME OUT: The procedure was discussed in details with the patient and the consent form was signed. The patient verbalized understanding of the procedure . I discussed the risks that may include serious immune reaction and distant spread that could results in loss of breathing. Other side effects may include droopy eyelids, trouble swallowing and cardiac arrhythmias. It was also stressed that it is contraindicated in pregnancy.      3. COURSE:   The standard protocol was followed. the procedure was very smooth.      4. COMPLICATIONS: None. The patient tolerated the procedure very well.      5. AFTERCARE: I encouraged the patient to use ice if the sites of injection get tender and call me with any problems or complications.               As per the standard protocol, a total 155 units were injected in 31 sites.            RT  5 units in 1 site     LT  5 units in 1 site      Procerus 5 units in 1 site      RT Frontalis 10 units in 2 sites      LT Frontalis 10 units in 2 site      RT Temporalis 20 units in 4 sites     LT Temporalis 20 units in 4 sites     RT Occipitalis 15 units in 3 sites     LT Occipitalis 15  units in 3 sites      RT Cervical Paraspinals 10 units in 2 sites     LT Cervical Paraspinals 10 units in 2 sites     RT Trapezius 15 units in 3 sites     LT Trapezius 15 units in 3 sites         Per the standard of care protocol we use 155 units and waste 45 units. I gave the patient the option of following the standard protocol vs.using the extra 45 units to target areas where the pain is maximum which could potentially provide extra help with headache control. I explained to the patient that this will be an off-label use, not the standard protocol, not evidence-based and could result in more pronounced side effects. The patient verbalized full understanding of all the facts and the risks and benefits  and elected to use the extra 45 units for the following sites:             RT Temporalis 20 units in 4 sites     LT Temporalis 20 units in 4 sites     RT Cervical Paraspinals 5 units in 2 sites        Britton Farrar, MSN NP      Collaborating Provider: Vipin Matthews MD, FAAN Neurologist/Epileptologist

## 2025-02-27 ENCOUNTER — TELEPHONE (OUTPATIENT)
Dept: UROLOGY | Facility: CLINIC | Age: 70
End: 2025-02-27
Payer: MEDICARE

## 2025-02-27 DIAGNOSIS — N28.1 RENAL CYST: Primary | ICD-10-CM

## 2025-03-01 DIAGNOSIS — N95.1 MENOPAUSE SYNDROME: ICD-10-CM

## 2025-03-01 DIAGNOSIS — N95.2 ATROPHIC VAGINITIS: ICD-10-CM

## 2025-03-01 DIAGNOSIS — G44.89 CHRONIC MIXED HEADACHE SYNDROME: ICD-10-CM

## 2025-03-01 DIAGNOSIS — G43.711 CHRONIC MIGRAINE WITHOUT AURA, INTRACTABLE, WITH STATUS MIGRAINOSUS: ICD-10-CM

## 2025-03-03 RX ORDER — RIMEGEPANT SULFATE 75 MG/75MG
75 TABLET, ORALLY DISINTEGRATING ORAL
Qty: 8 TABLET | Refills: 2 | Status: SHIPPED | OUTPATIENT
Start: 2025-03-03

## 2025-03-03 RX ORDER — ESTRADIOL/NORETHINDRONE ACETATE TRANSDERMAL SYSTEM .05; .14 MG/D; MG/D
1 PATCH, EXTENDED RELEASE TRANSDERMAL
Qty: 24 PATCH | Refills: 4 | Status: SHIPPED | OUTPATIENT
Start: 2025-03-03

## 2025-03-03 NOTE — TELEPHONE ENCOUNTER
Refill Routing Note   Medication(s) are not appropriate for processing by [facility] for the following reason(s):        Patient not seen by provider within 15 months  Required vitals abnormal    [facility] action(s):  Defer        Medication Therapy Plan: LOV:  Not Found        Mammo: 2/9/24      BP:  2/18/25 out of range        No contra Dx; Hx of cancer      Appointments  past 12m or future 3m with PCP    Date Provider   Last Visit   8/24/2021 Lizz Padilla MD   Next Visit   Visit date not found Lizz Padilla MD   ED visits in past 90 days: 0        Note composed:9:04 AM 03/03/2025

## 2025-03-04 RX ORDER — CONJUGATED ESTROGENS 0.62 MG/G
1 CREAM VAGINAL
Qty: 30 G | Refills: 3 | Status: SHIPPED | OUTPATIENT
Start: 2025-03-06

## 2025-03-04 RX ORDER — POTASSIUM CHLORIDE 750 MG/1
10 TABLET, EXTENDED RELEASE ORAL DAILY
Qty: 90 TABLET | Refills: 3 | Status: SHIPPED | OUTPATIENT
Start: 2025-03-04

## 2025-03-20 DIAGNOSIS — K58.9 IRRITABLE BOWEL SYNDROME WITHOUT DIARRHEA: ICD-10-CM

## 2025-03-21 RX ORDER — DICYCLOMINE HYDROCHLORIDE 10 MG/1
10 CAPSULE ORAL 3 TIMES DAILY
Qty: 90 CAPSULE | Refills: 1 | Status: SHIPPED | OUTPATIENT
Start: 2025-03-21

## 2025-03-26 ENCOUNTER — PATIENT MESSAGE (OUTPATIENT)
Dept: UROLOGY | Facility: CLINIC | Age: 70
End: 2025-03-26

## 2025-03-31 DIAGNOSIS — M81.6 LOCALIZED OSTEOPOROSIS WITHOUT CURRENT PATHOLOGICAL FRACTURE: ICD-10-CM

## 2025-03-31 DIAGNOSIS — K58.9 IRRITABLE BOWEL SYNDROME WITHOUT DIARRHEA: ICD-10-CM

## 2025-03-31 DIAGNOSIS — F43.21 COMPLICATED GRIEF: ICD-10-CM

## 2025-03-31 DIAGNOSIS — G47.00 INSOMNIA, UNSPECIFIED TYPE: Primary | ICD-10-CM

## 2025-03-31 RX ORDER — DIAZEPAM 2 MG/1
TABLET ORAL
Qty: 30 TABLET | Refills: 1 | Status: SHIPPED | OUTPATIENT
Start: 2025-03-31

## 2025-03-31 RX ORDER — BUSPIRONE HYDROCHLORIDE 30 MG/1
30 TABLET ORAL 2 TIMES DAILY
Qty: 60 TABLET | Refills: 1 | Status: SHIPPED | OUTPATIENT
Start: 2025-03-31

## 2025-03-31 RX ORDER — FLUOXETINE HYDROCHLORIDE 40 MG/1
40 CAPSULE ORAL DAILY
Qty: 30 CAPSULE | Refills: 1 | Status: SHIPPED | OUTPATIENT
Start: 2025-03-31

## 2025-03-31 RX ORDER — ZOLPIDEM TARTRATE 5 MG/1
TABLET ORAL
Qty: 30 TABLET | Refills: 1 | Status: SHIPPED | OUTPATIENT
Start: 2025-03-31

## 2025-04-01 RX ORDER — POTASSIUM CHLORIDE 750 MG/1
10 TABLET, EXTENDED RELEASE ORAL DAILY
Qty: 90 TABLET | Refills: 3 | Status: SHIPPED | OUTPATIENT
Start: 2025-04-01

## 2025-04-01 RX ORDER — DICYCLOMINE HYDROCHLORIDE 10 MG/1
10 CAPSULE ORAL 3 TIMES DAILY PRN
Qty: 180 CAPSULE | Refills: 0 | Status: SHIPPED | OUTPATIENT
Start: 2025-04-01

## 2025-04-01 RX ORDER — ALENDRONATE SODIUM 70 MG/1
70 TABLET ORAL
Qty: 12 TABLET | Refills: 3 | Status: SHIPPED | OUTPATIENT
Start: 2025-04-01

## 2025-04-01 RX ORDER — BUMETANIDE 2 MG/1
2 TABLET ORAL DAILY
Qty: 90 TABLET | Refills: 1 | Status: SHIPPED | OUTPATIENT
Start: 2025-04-01

## 2025-04-08 DIAGNOSIS — G44.89 CHRONIC MIXED HEADACHE SYNDROME: ICD-10-CM

## 2025-04-08 DIAGNOSIS — G43.809 VESTIBULAR MIGRAINE: ICD-10-CM

## 2025-04-08 RX ORDER — TOPIRAMATE 100 MG/1
1 CAPSULE, EXTENDED RELEASE ORAL
Qty: 100 CAPSULE | Refills: 3 | Status: SHIPPED | OUTPATIENT
Start: 2025-04-08

## 2025-04-25 NOTE — PATIENT INSTRUCTIONS
Give a brochure about total knee replacement  Plan  Injection of steroid into the left knee today 80 mg Depo-Medrol mixed with 5 cc 1% lidocaine 11/30/2020  Return end of January 8 weeks for repeat injection and we will get approval for Synvisc-One at that point which she is due in the middle of February  Received Euflexxa last injection 08/17/2020 and steroid injection 10/30/2020 into the left knee  May take maximum 650 mg of Tylenol 3 times a day if needed   No

## 2025-04-29 ENCOUNTER — HOSPITAL ENCOUNTER (OUTPATIENT)
Dept: RADIOLOGY | Facility: HOSPITAL | Age: 70
Discharge: HOME | End: 2025-04-29
Attending: NURSE PRACTITIONER
Payer: MEDICARE

## 2025-04-29 ENCOUNTER — OFFICE VISIT (OUTPATIENT)
Dept: UROLOGY | Facility: CLINIC | Age: 70
End: 2025-04-29
Payer: MEDICARE

## 2025-04-29 VITALS
SYSTOLIC BLOOD PRESSURE: 113 MMHG | HEIGHT: 62 IN | WEIGHT: 128 LBS | HEART RATE: 67 BPM | DIASTOLIC BLOOD PRESSURE: 76 MMHG | BODY MASS INDEX: 23.55 KG/M2

## 2025-04-29 DIAGNOSIS — N28.89 OTHER SPECIFIED DISORDERS OF KIDNEY AND URETER: Primary | ICD-10-CM

## 2025-04-29 DIAGNOSIS — N28.1 RENAL CYST: ICD-10-CM

## 2025-04-29 PROBLEM — L84 FOOT CALLUS: Status: RESOLVED | Noted: 2021-01-29 | Resolved: 2025-04-29

## 2025-04-29 PROBLEM — L60.0 INGROWN TOENAIL OF LEFT FOOT: Status: RESOLVED | Noted: 2021-01-29 | Resolved: 2025-04-29

## 2025-04-29 PROBLEM — N95.1 MENOPAUSE SYNDROME: Status: RESOLVED | Noted: 2021-05-10 | Resolved: 2025-04-29

## 2025-04-29 PROBLEM — L30.9 DERMATITIS OF FACE: Status: RESOLVED | Noted: 2020-01-16 | Resolved: 2025-04-29

## 2025-04-29 PROBLEM — N95.2 ATROPHIC VAGINITIS: Status: RESOLVED | Noted: 2021-05-10 | Resolved: 2025-04-29

## 2025-04-29 PROBLEM — J40 BRONCHITIS: Status: RESOLVED | Noted: 2018-10-31 | Resolved: 2025-04-29

## 2025-04-29 LAB
BILIRUBIN, UA POC OHS: NEGATIVE
BLOOD, UA POC OHS: NEGATIVE
CLARITY, UA POC OHS: CLEAR
COLOR, UA POC OHS: YELLOW
GLUCOSE, UA POC OHS: NEGATIVE
KETONES, UA POC OHS: NEGATIVE
LEUKOCYTES, UA POC OHS: NEGATIVE
NITRITE, UA POC OHS: NEGATIVE
PH, UA POC OHS: 5
POC RESIDUAL URINE VOLUME: 0 ML (ref 0–100)
PROTEIN, UA POC OHS: NEGATIVE
SPECIFIC GRAVITY, UA POC OHS: 1.01
UROBILINOGEN, UA POC OHS: 0.2

## 2025-04-29 PROCEDURE — 3008F BODY MASS INDEX DOCD: CPT | Mod: CPTII,S$GLB,, | Performed by: NURSE PRACTITIONER

## 2025-04-29 PROCEDURE — 1101F PT FALLS ASSESS-DOCD LE1/YR: CPT | Mod: CPTII,S$GLB,, | Performed by: NURSE PRACTITIONER

## 2025-04-29 PROCEDURE — 1159F MED LIST DOCD IN RCRD: CPT | Mod: CPTII,S$GLB,, | Performed by: NURSE PRACTITIONER

## 2025-04-29 PROCEDURE — 3074F SYST BP LT 130 MM HG: CPT | Mod: CPTII,S$GLB,, | Performed by: NURSE PRACTITIONER

## 2025-04-29 PROCEDURE — 99999 PR PBB SHADOW E&M-EST. PATIENT-LVL V: CPT | Mod: PBBFAC,HCNC,, | Performed by: NURSE PRACTITIONER

## 2025-04-29 PROCEDURE — 1126F AMNT PAIN NOTED NONE PRSNT: CPT | Mod: CPTII,S$GLB,, | Performed by: NURSE PRACTITIONER

## 2025-04-29 PROCEDURE — 81003 URINALYSIS AUTO W/O SCOPE: CPT | Mod: QW,S$GLB,, | Performed by: NURSE PRACTITIONER

## 2025-04-29 PROCEDURE — 76770 US EXAM ABDO BACK WALL COMP: CPT | Mod: TC,HCNC

## 2025-04-29 PROCEDURE — 76770 US EXAM ABDO BACK WALL COMP: CPT | Mod: 26,HCNC,, | Performed by: RADIOLOGY

## 2025-04-29 PROCEDURE — 51798 US URINE CAPACITY MEASURE: CPT | Mod: S$GLB,,, | Performed by: NURSE PRACTITIONER

## 2025-04-29 PROCEDURE — 1160F RVW MEDS BY RX/DR IN RCRD: CPT | Mod: CPTII,S$GLB,, | Performed by: NURSE PRACTITIONER

## 2025-04-29 PROCEDURE — 3078F DIAST BP <80 MM HG: CPT | Mod: CPTII,S$GLB,, | Performed by: NURSE PRACTITIONER

## 2025-04-29 PROCEDURE — 99214 OFFICE O/P EST MOD 30 MIN: CPT | Mod: S$GLB,,, | Performed by: NURSE PRACTITIONER

## 2025-04-29 PROCEDURE — 3288F FALL RISK ASSESSMENT DOCD: CPT | Mod: CPTII,S$GLB,, | Performed by: NURSE PRACTITIONER

## 2025-04-29 NOTE — PROGRESS NOTES
Chief Complaint:   Renal cysts    HPI:   Patient is a 69-year-old female that is presenting to review renal ultrasound.  Patient has a history of renal cysts.  Recent renal ultrasound indicated multiple, small, complex renal cysts.  No  cancers in her family.  Denies gross hematuria.  Urine in clinic is negative, for the exception of bilirubin.  02/27/2024  Patient is a 68-year-old female that is presenting to review renal ultrasound, that was obtained in August of last year.  Patient states she has had some mental health issues, and rescheduled appointment several times.  Urine in clinic is negative PVR is 14 mL.  Denies gross hematuria or flank pain.  Sister had a history of bladder cancer.  Renal ultrasound indicated bilateral simple and mildly complex stable cysts.  No hydronephrosis or solid mass  Jerome LOJA  4/25/22- patient is doing well with no complaints, here today to discuss her ultrasound.  65-year-old female who comes in with a complicated bilateral renal cysts which appear to benign.  She has been followed by Nephrology up to this point, she is asymptomatic in regards to these.  No gross hematuria, no microscopic hematuria, she is an active smoker.  She has renal insufficiency secondary to prolonged ibuprofen use in 2015.  No other urological gynecological history.  She has no evidence of incontinence.  Her sister has bladder cancer, no other family history of urological cancers or stones.  Allergies:  Demerol [meperidine] and Codeine    Medications:  has a current medication list which includes the following prescription(s): albuterol, alendronate, azelastine, bumetanide, buspirone, c/sourcherry/celery/grape seed, co-enzyme q-10, premarin, diazepam, dicyclomine, aimovig autoinjector, combipatch, fluoxetine, imiquimod, levocetirizine, lidocaine-prilocaine, linaclotide, magnesium, melatonin, montelukast, fish oil-omega-3 fatty acids, ondansetron, potassium chloride sa, promethazine, nurtec,  simvastatin, topiramate, vitamin b complex/folic acid, zinc gluconate, and zolpidem, and the following Facility-Administered Medications: onabotulinumtoxina, onabotulinumtoxina, onabotulinumtoxina, onabotulinumtoxina, onabotulinumtoxina, and [START ON 2025] onabotulinumtoxina.    Review of Systems:  General: No fever, chills, fatigability, or weight loss.  Skin: No rashes, itching, or changes in color or texture of skin.  Chest: Denies GLEASON, cyanosis, wheezing, cough, and sputum production.  Abdomen: Appetite fine. No weight loss. Denies diarrhea, abdominal pain, hematemesis, or blood in stool.  Musculoskeletal: No joint stiffness or swelling. Denies back pain.  : As above.  All other review of systems negative.    PMH:   has a past medical history of Arthritis, Asthma, Cataract, Depression, Digestive disorder, Encounter for blood transfusion, General anesthetics causing adverse effect in therapeutic use, GI problem, Gout, Headache, Hyperlipidemia, Meniere disease, Multiple lung nodules on CT (2024), MVP (mitral valve prolapse), Renal disorder, and Vertigo.    PSH:   has a past surgical history that includes  section; Breast surgery; Brain surgery (); Tonsillectomy; Cholecystectomy; Examination under anesthesia (N/A, 2020); Laser ablation (N/A, 2020); Colonoscopy (N/A, 3/15/2021); Breast biopsy; Colonoscopy (N/A, 2023); and colonoscopy, screening, high risk patient (N/A, 2025).    FamHx: family history includes Cancer in her sister; Colon cancer in her father; Diabetes in her father and sister.    SocHx:  reports that she quit smoking about 5 years ago. Her smoking use included cigarettes. She started smoking about 49 years ago. She has a 22 pack-year smoking history. She has never used smokeless tobacco. She reports that she does not currently use alcohol after a past usage of about 1.0 - 3.0 standard drink of alcohol per week. She reports that she does not use drugs.       Physical Exam:  General: A&Ox3, no apparent distress, no deformities  Neck: No masses, normal thyroid  Lungs: normal inspiration, no use of accessory muscles  Heart: normal pulse, no arrhythmias  Abdomen: Soft, NT, ND, no masses, no hernias, no hepatosplenomegaly  Lymphatic: Neck and groin nodes negative  Skin: The skin is warm and dry. No jaundice.  Ext: No c/c/e.    Labs/Studies:   04/29/2025  EXAMINATION:  US RETROPERITONEAL COMPLETE     CLINICAL HISTORY:  Cyst of kidney, acquired     TECHNIQUE:  Ultrasound of the kidneys and urinary bladder was performed including color flow and Doppler evaluation of the kidneys.     COMPARISON:  04/29/2025     FINDINGS:  The right kidney measures 11.8 cm. The left kidney measures 10.5 cm.  There is increased echogenicity of the renal parenchyma bilaterally consistent medical renal disease.  There are at least 4 complex right-sided renal cysts within the upper pole and lower pole that range in size from 1.6-2.7 cm in size.  The left kidney demonstrates 3 complex cysts within the upper to interpolar region that range in size from 1.8-3.1 cm.  No hydronephrosis.The bladder is unremarkable in appearance.     Impression:  1. Multiple thinly septated bilateral renal cysts.  2. Findings consistent with medical renal disease.  Impression/Plan:   Renal cysts  Recent renal ultrasound indicated multiple thinly septated bilateral renal cysts, will proceed with CT renal mass protocol and patient will be contacted with results and needed follow-up.    Personal note, patient has not followed up in over 15 months, stated that her depression has exacerbated and she continued to reschedule appointment.

## 2025-04-30 ENCOUNTER — PATIENT OUTREACH (OUTPATIENT)
Dept: ADMINISTRATIVE | Facility: HOSPITAL | Age: 70
End: 2025-04-30
Payer: MEDICARE

## 2025-04-30 NOTE — PROGRESS NOTES
Working CKD Lab Report.  Pt has lab appt scheduled, 5/12/25.  Micro Albumin urine linked to appt,

## 2025-05-05 ENCOUNTER — PATIENT MESSAGE (OUTPATIENT)
Dept: UROLOGY | Facility: CLINIC | Age: 70
End: 2025-05-05
Payer: MEDICARE

## 2025-05-15 ENCOUNTER — HOSPITAL ENCOUNTER (OUTPATIENT)
Dept: RADIOLOGY | Facility: HOSPITAL | Age: 70
Discharge: HOME | End: 2025-05-15
Attending: NURSE PRACTITIONER
Payer: MEDICARE

## 2025-05-15 ENCOUNTER — RESULTS FOLLOW-UP (OUTPATIENT)
Dept: UROLOGY | Facility: CLINIC | Age: 70
End: 2025-05-15

## 2025-05-15 DIAGNOSIS — N28.89 OTHER SPECIFIED DISORDERS OF KIDNEY AND URETER: ICD-10-CM

## 2025-05-15 PROCEDURE — 74170 CT ABD WO CNTRST FLWD CNTRST: CPT | Mod: TC

## 2025-05-15 PROCEDURE — 25500020 PHARM REV CODE 255: Performed by: NURSE PRACTITIONER

## 2025-05-15 PROCEDURE — 74170 CT ABD WO CNTRST FLWD CNTRST: CPT | Mod: 26,,, | Performed by: RADIOLOGY

## 2025-05-15 RX ADMIN — IOHEXOL 100 ML: 350 INJECTION, SOLUTION INTRAVENOUS at 01:05

## 2025-05-21 RX ORDER — BUSPIRONE HYDROCHLORIDE 30 MG/1
30 TABLET ORAL 2 TIMES DAILY
Qty: 180 TABLET | Refills: 0 | Status: SHIPPED | OUTPATIENT
Start: 2025-05-21

## 2025-05-21 RX ORDER — FLUOXETINE HYDROCHLORIDE 40 MG/1
40 CAPSULE ORAL
Qty: 90 CAPSULE | Refills: 0 | Status: SHIPPED | OUTPATIENT
Start: 2025-05-21

## 2025-05-28 DIAGNOSIS — G43.711 CHRONIC MIGRAINE WITHOUT AURA, INTRACTABLE, WITH STATUS MIGRAINOSUS: ICD-10-CM

## 2025-05-28 DIAGNOSIS — G44.89 CHRONIC MIXED HEADACHE SYNDROME: ICD-10-CM

## 2025-05-28 RX ORDER — RIMEGEPANT SULFATE 75 MG/75MG
75 TABLET, ORALLY DISINTEGRATING ORAL ONCE AS NEEDED
Qty: 8 TABLET | Refills: 3 | Status: SHIPPED | OUTPATIENT
Start: 2025-05-28 | End: 2025-05-28

## 2025-06-09 ENCOUNTER — PROCEDURE VISIT (OUTPATIENT)
Dept: NEUROLOGY | Facility: CLINIC | Age: 70
End: 2025-06-09
Payer: MEDICARE

## 2025-06-09 VITALS
BODY MASS INDEX: 24.18 KG/M2 | HEIGHT: 62 IN | HEART RATE: 65 BPM | DIASTOLIC BLOOD PRESSURE: 67 MMHG | WEIGHT: 131.38 LBS | SYSTOLIC BLOOD PRESSURE: 122 MMHG | RESPIRATION RATE: 18 BRPM

## 2025-06-09 DIAGNOSIS — G43.E11 INTRACTABLE CHRONIC MIGRAINE WITH AURA WITH STATUS MIGRAINOSUS: ICD-10-CM

## 2025-06-09 DIAGNOSIS — G43.709 CHRONIC MIGRAINE W/O AURA, NOT INTRACTABLE, W/O STAT MIGR: ICD-10-CM

## 2025-06-09 DIAGNOSIS — G43.711 CHRONIC MIGRAINE WITHOUT AURA, INTRACTABLE, WITH STATUS MIGRAINOSUS: Primary | ICD-10-CM

## 2025-06-09 PROCEDURE — 64615 CHEMODENERV MUSC MIGRAINE: CPT | Mod: HCNC,S$GLB,, | Performed by: NURSE PRACTITIONER

## 2025-06-16 DIAGNOSIS — K58.9 IRRITABLE BOWEL SYNDROME WITHOUT DIARRHEA: ICD-10-CM

## 2025-06-16 DIAGNOSIS — I70.0 AORTIC ATHEROSCLEROSIS: Primary | ICD-10-CM

## 2025-06-16 RX ORDER — DICYCLOMINE HYDROCHLORIDE 10 MG/1
CAPSULE ORAL
Qty: 180 CAPSULE | Refills: 5 | Status: SHIPPED | OUTPATIENT
Start: 2025-06-16

## 2025-06-17 RX ORDER — SIMVASTATIN 5 MG/1
5 TABLET, FILM COATED ORAL NIGHTLY
Qty: 90 TABLET | Refills: 1 | Status: SHIPPED | OUTPATIENT
Start: 2025-06-17

## 2025-06-27 ENCOUNTER — PATIENT MESSAGE (OUTPATIENT)
Dept: OTOLARYNGOLOGY | Facility: CLINIC | Age: 70
End: 2025-06-27
Payer: MEDICARE

## 2025-06-27 RX ORDER — MONTELUKAST SODIUM 10 MG/1
10 TABLET ORAL NIGHTLY
Qty: 90 TABLET | Refills: 3 | Status: SHIPPED | OUTPATIENT
Start: 2025-06-27

## 2025-07-02 DIAGNOSIS — Z78.0 MENOPAUSE: ICD-10-CM

## 2025-07-03 ENCOUNTER — PATIENT MESSAGE (OUTPATIENT)
Dept: PSYCHIATRY | Facility: CLINIC | Age: 70
End: 2025-07-03
Payer: MEDICARE

## 2025-07-03 DIAGNOSIS — F43.21 COMPLICATED GRIEF: ICD-10-CM

## 2025-07-03 RX ORDER — DIAZEPAM 2 MG/1
TABLET ORAL
Qty: 30 TABLET | Refills: 1 | Status: SHIPPED | OUTPATIENT
Start: 2025-07-03

## 2025-07-03 RX ORDER — FLUOXETINE 20 MG/1
CAPSULE ORAL
Qty: 30 CAPSULE | Refills: 2 | Status: SHIPPED | OUTPATIENT
Start: 2025-07-03

## 2025-07-09 RX ORDER — BUMETANIDE 2 MG/1
2 TABLET ORAL DAILY
Qty: 90 TABLET | Refills: 1 | Status: SHIPPED | OUTPATIENT
Start: 2025-07-09

## 2025-07-09 NOTE — TELEPHONE ENCOUNTER
Refill Routing Note   Medication(s) are not appropriate for processing by [facility] for the following reason(s):        Non-participating provider    [facility] action(s):  Route               Appointments  past 12m or future 3m with PCP    Date Provider   Last Visit   9/20/2024 Almita Izaguirre NP   Next Visit   7/9/2025 Almita Izaguirre NP   ED visits in past 90 days: 0        Note composed:2:20 PM 07/09/2025

## 2025-08-04 ENCOUNTER — OFFICE VISIT (OUTPATIENT)
Dept: PSYCHIATRY | Facility: CLINIC | Age: 70
End: 2025-08-04
Payer: MEDICARE

## 2025-08-04 VITALS
SYSTOLIC BLOOD PRESSURE: 137 MMHG | HEART RATE: 84 BPM | DIASTOLIC BLOOD PRESSURE: 82 MMHG | WEIGHT: 130.94 LBS | BODY MASS INDEX: 23.95 KG/M2

## 2025-08-04 DIAGNOSIS — F41.1 GAD (GENERALIZED ANXIETY DISORDER): Primary | ICD-10-CM

## 2025-08-04 DIAGNOSIS — F43.21 COMPLICATED GRIEF: ICD-10-CM

## 2025-08-04 DIAGNOSIS — F33.0 MILD EPISODE OF RECURRENT MAJOR DEPRESSIVE DISORDER: ICD-10-CM

## 2025-08-04 PROCEDURE — 3079F DIAST BP 80-89 MM HG: CPT | Mod: CPTII,HCNC,S$GLB, | Performed by: PSYCHIATRY & NEUROLOGY

## 2025-08-04 PROCEDURE — 99214 OFFICE O/P EST MOD 30 MIN: CPT | Mod: HCNC,S$GLB,, | Performed by: PSYCHIATRY & NEUROLOGY

## 2025-08-04 PROCEDURE — 3075F SYST BP GE 130 - 139MM HG: CPT | Mod: CPTII,HCNC,S$GLB, | Performed by: PSYCHIATRY & NEUROLOGY

## 2025-08-04 PROCEDURE — 99999 PR PBB SHADOW E&M-EST. PATIENT-LVL II: CPT | Mod: PBBFAC,HCNC,, | Performed by: PSYCHIATRY & NEUROLOGY

## 2025-08-04 PROCEDURE — 3008F BODY MASS INDEX DOCD: CPT | Mod: CPTII,HCNC,S$GLB, | Performed by: PSYCHIATRY & NEUROLOGY

## 2025-08-04 RX ORDER — BUSPIRONE HYDROCHLORIDE 30 MG/1
TABLET ORAL
Qty: 180 TABLET | Refills: 0 | Status: SHIPPED | OUTPATIENT
Start: 2025-08-04

## 2025-08-04 RX ORDER — FLUOXETINE HYDROCHLORIDE 40 MG/1
40 CAPSULE ORAL DAILY
Qty: 90 CAPSULE | Refills: 1 | Status: SHIPPED | OUTPATIENT
Start: 2025-08-04

## 2025-08-04 RX ORDER — DIAZEPAM 2 MG/1
TABLET ORAL
Qty: 30 TABLET | Refills: 3 | Status: SHIPPED | OUTPATIENT
Start: 2025-08-04

## 2025-08-04 NOTE — PROGRESS NOTES
"Outpatient Psychiatry Follow-up Visit (MD/NP)    8/4/2025    Kenia Alonzo, a 70 y.o. female, presenting for follow-up visit. Met with patient.    Reason for Encounter: Patient complains of depression, anxiety    History of Present Illness:     Patient presents for a follow-up visit to discuss medication management and recent life stressors, particularly caring for a friend with severe health issues. Patient attempted to increase her Prozac dosage but experienced intolerable side effects, including excessive daytime sleepiness, even when taking it at night. As a result, she returned to her previous dose of 40mg. She also reduced her Buspirone (Buspar) intake to daily due to feeling "foggy brained."    Patient has been using Valium (diazepam) to manage anxiety related to caring for her friend Jackelyn, referring to these as her "Jackelyn pills." She reports taking half of a 2mg tablet, or occasionally a whole tablet when particularly stressed, which is notably less than the 10mg dose she previously required for Meniere's disease.    Patient is experiencing significant stress due to caring for Jackelyn, who has severe health issues including alcoholism, cirrhosis, and potential brain damage. Jackelyn was recently hospitalized for sepsis and now requires 24-hour care. Patient has been managing Jackelyn's care, including arranging sitters and handling finances, which has been overwhelming and chaotic.    Patient mentions reconnecting with a high school friend, Melanie, an RN with personal experience of alcoholism in her family, who has been a significant source of support in coping with the challenges of caring for Jackelyn.    Recognizing the need for self-care, the patient has started to set boundaries, such as taking time off from caregiving duties over the weekend. She engaged in cleaning activities at home as a form of stress relief and to regain a sense of control over her environment.    Patient expresses fear and avoidance of " "alcohol, recognizing her family history and the current situation with her friend as risk factors. She describes carefully planning to have a single alcoholic drink for her birthday, ensuring she did not have access to more alcohol at home. Patient denies current alcohol use or keeping alcohol in her home.    MEDICATIONS:  Patient is on Prozac 40 mg daily for depression, Buspirone (Buspar) 10 mg daily for anxiety, and Valium 1-2 mg as needed for anxiety related to her friend's condition (referred to as "Jackelyn pills"). She is also taking Lactulose (referred to as "artichoke stuff") 4 times daily for liver function.    FAMILY HISTORY:  Family history is significant for mother with a history of alcohol issues and her father with a history of anger issues. Patient also mentions that her friend Melanie's siblings have a history of alcoholism.    SURGICAL HISTORY:  Patient has undergone surgery for severe symptoms of Meniere's disease.    LABS:  Patient's recent lab results show elevated levels of AST and ALT (liver enzymes). An extended alcohol panel was also conducted recently, covering approximately 3 weeks prior, with negative results.    SUBSTANCE USE:  Patient has a history of alcoholism but is currently abstinent, as confirmed by a negative alcohol panel. She expresses fear of relapse and avoids keeping alcohol at home.    LIFESTYLE: Patient lives in a 1700 square feet house with wood floors. Cleans her house meticulously, including detailing the floors, as one of her hobbies. Currently, she is dedicating a significant amount of time to caring for a seriously ill friend named Jackelyn. This involves coordinating 24-hour sitters, managing medical appointments, and dealing with Jackelyn's  who has dementia. Patient expresses feeling overwhelmed by these responsibilities and mentions needing to set boundaries to take care of herself.         Review Of Systems:     GENERAL:  No weight gain or loss  SKIN:  No rashes " or lacerations  HEAD:  No headaches  EYES:  No exophthalmos, jaundice or blindness  EARS:  No dizziness, tinnitus or hearing loss  NOSE:  No changes in smell  MOUTH & THROAT:  No dyskinetic movements or obvious goiter  CHEST:  No shortness of breath, hyperventilation or cough  CARDIOVASCULAR:  No tachycardia or chest pain  ABDOMEN:  No nausea, vomiting, pain, constipation or diarrhea  URINARY:  No frequency, dysuria or sexual dysfunction  ENDOCRINE:  No polydipsia, polyuria  MUSCULOSKELETAL:  No pain or stiffness of the joints  NEUROLOGIC:  No weakness, sensory changes, seizures, confusion, memory loss, tremor or other abnormal movements    Current Evaluation:     Nutritional Screening: Considering the patient's height and weight, medications, medical history and preferences, should a referral be made to the dietitian? no    Constitutional  Vitals:  Most recent vital signs, dated less than 90 days prior to this appointment, were reviewed.    Vitals:    08/04/25 1318   BP: 137/82   Pulse: 84   Weight: 59.4 kg (130 lb 15.3 oz)        General:  unremarkable, age appropriate     Musculoskeletal  Muscle Strength/Tone:  no tremor, no tic   Gait & Station:  non-ataxic     Psychiatric  Appearance: casually dressed & groomed;   Behavior: calm,   Cooperation: cooperative with assessment  Speech: normal rate, volume, tone  Thought Process: linear, goal-directed  Thought Content: No suicidal or homicidal ideation; no delusions  Affect: anxious  Mood: anxious  Perceptions: No auditory or visual hallucinations  Level of Consciousness: alert throughout interview  Insight: fair  Cognition: Oriented to person, place, time, & situation  Memory: no apparent deficits to general clinical interview; not formally assessed  Attention/Concentration: no apparent deficits to general clinical interview; not formally assessed  Fund of Knowledge: average by vocabulary/education    Laboratory Data  No visits with results within 1 Month(s) from  this visit.   Latest known visit with results is:   Lab Visit on 05/15/2025   Component Date Value Ref Range Status    Creatinine 05/15/2025 1.2  0.5 - 1.4 mg/dL Final    eGFR 05/15/2025 50 (L)  >60 mL/min/1.73/m2 Final     Medications  Encounter Medications[1]    Assessment - Diagnosis - Goals:     Impression:     70 year old woman with chronic anxiety disorder, ongoing family stressors, helpful ongoing treatment.     Treatment Plan/Recommendations:    Major depressive disorder, single episode, unspecified  Generalized anxiety disorder  Alcohol dependence  Meniere's disease    Assessed response to increased Prozac dose; determined 60 mg was not tolerable due to excessive daytime sleepiness.  Evaluated current medication regimen; patient self-reduced Buspirone to daily due to cognitive fog.  Recommend splitting Buspirone dose to twice daily (5 mg twice daily instead of 10 mg daily) for improved efficacy, more consistent blood levels, and potentially reduced side effects.  Maintained vigilance regarding potential for alcohol use given family history and recent stressors.    MAJOR DEPRESSIVE DISORDER:  1. Continued fluoxetine 40 mg daily.    GENERALIZED ANXIETY DISORDER:  1. Discussed importance of consistent daily Buspirone use rather than as-needed dosing for anxiety management.  2. Fluoxetine 40 mg daily  2. Continued Valium 2 mg as needed for anxiety, particularly when visiting Jackelyn.  3. Recommend splitting Buspirone dose to twice daily (5 mg twice daily instead of 10 mg daily) for improved efficacy, more consistent blood levels, and potentially reduced side effects.    OTHER SPECIFIED PROBLEMS RELATED TO PRIMARY SUPPORT GROUP:  1. Continued Valium 2 mg as needed for anxiety, particularly when doing stressful family visits.    LIFESTYLE CHANGES:  1. Follow up in 4 months (first week of December).  2. Option for next appointment to be conducted via video or in-clinic, based on patient preference.    LUCINDA Suarez  MD [place]  Psychiatry  [facility]   [id number] [facility].  [phone]    This note was generated with the assistance of ambient listening technology. Verbal consent was obtained by the patient and accompanying visitor(s) for the recording of patient appointment to facilitate this note. I attest to having reviewed and edited the generated note for accuracy, though some syntax or spelling errors may persist. Please contact the author of this note for any clarification.              [1]   Outpatient Encounter Medications as of 8/4/2025   Medication Sig Dispense Refill    albuterol (VENTOLIN HFA) 90 mcg/actuation inhaler Inhale 2 puffs into the lungs every 6 (six) hours as needed for Wheezing. Rescue 18 g 0    alendronate (FOSAMAX) 70 MG tablet Take 1 tablet (70 mg total) by mouth every 7 days. 12 tablet 3    bumetanide (BUMEX) 2 MG tablet Take 1 tablet (2 mg total) by mouth once daily. 90 tablet 1    busPIRone (BUSPAR) 30 MG Tab Take 1/2 tablet twice daily 180 tablet 0    C/sourcherry/celery/grape seed (TART CHERRY ORAL) Take 1 tablet by mouth daily as needed.       co-enzyme Q-10 30 mg capsule Take 30 mg by mouth 3 (three) times daily.      conjugated estrogens (PREMARIN) vaginal cream Place 1 g vaginally twice a week. 30 g 3    diazePAM (VALIUM) 2 MG tablet Take 1 tablet daily as needed for anxiety. 30 tablet 3    dicyclomine (BENTYL) 10 MG capsule TAKE 1 CAPSULE THREE TIMES DAILY AS NEEDED 180 capsule 5    erenumab-aooe (AIMOVIG AUTOINJECTOR) 140 mg/mL autoinjector Inject 1 pen (140 mg total) into the skin every 28 days. 1 mL 11    estradiol-norethindrone (COMBIPATCH) 0.05-0.14 mg/24 hr Place 1 patch onto the skin twice a week. 24 patch 4    FLUoxetine 40 MG capsule Take 1 capsule (40 mg total) by mouth once daily. 90 capsule 1    imiquimod (ALDARA) 5 % cream Apply 1 packet topically 3 times a week. 12 packet 1    linaCLOtide (LINZESS) 145 mcg Cap capsule Take 1 capsule (145 mcg total) by mouth before  breakfast. 90 capsule 1    magnesium 30 mg Tab Take by mouth once.      montelukast (SINGULAIR) 10 mg tablet Take 1 tablet (10 mg total) by mouth every evening. 90 tablet 3    omega-3 fatty acids/fish oil (FISH OIL-OMEGA-3 FATTY ACIDS) 300-1,000 mg capsule Take 1 capsule by mouth once daily.      ondansetron (ZOFRAN-ODT) 4 MG TbDL Dissolve 1 tablet (4 mg total) by mouth every 72 hours as needed (for nausea/vomiting). 24 tablet 2    potassium chloride SA (K-DUR,KLOR-CON M) 10 MEQ tablet Take 1 tablet (10 mEq total) by mouth once daily. 90 tablet 3    promethazine (PHENERGAN) 25 MG tablet       simvastatin (ZOCOR) 5 MG tablet Take 1 tablet (5 mg total) by mouth every evening. 90 tablet 1    topiramate (TROKENDI XR) 100 mg Cp24 TAKE 1 CAPSULE ONE TIME DAILY 100 capsule 3    vitamin B complex/folic acid (B COMPLEX 100 ORAL) Take by mouth nightly.      zinc gluconate 50 mg tablet Take 50 mg by mouth once daily.      zolpidem (AMBIEN) 5 MG Tab Take 1/2 to 1 tablet at bedtime as needed for sleep. 30 tablet 1    [DISCONTINUED] bumetanide (BUMEX) 2 MG tablet Take 1 tablet (2 mg total) by mouth once daily. 90 tablet 1    [DISCONTINUED] busPIRone (BUSPAR) 30 MG Tab TAKE 1 TABLET TWICE DAILY (Patient not taking: Reported on 6/9/2025) 180 tablet 0    [DISCONTINUED] diazePAM (VALIUM) 2 MG tablet Take 1 tablet daily as needed for anxiety. 30 tablet 1    [DISCONTINUED] FLUoxetine 20 MG capsule Take 1 capsule daily in addition to a 40 mg capsule. 30 capsule 2    [DISCONTINUED] FLUoxetine 40 MG capsule TAKE 1 CAPSULE ONE TIME DAILY 90 capsule 0     Facility-Administered Encounter Medications as of 8/4/2025   Medication Dose Route Frequency Provider Last Rate Last Admin    onabotulinumtoxina injection 200 Units  200 Units Intramuscular Q90 Days Danna Farrar NP        onabotulinumtoxina injection 200 Units  200 Units Intramuscular Q90 Days Danna Farrar NP   200 Units at 09/11/23 1529    onabotulinumtoxina injection 200 Units   200 Units Intramuscular Q90 Days Danna Farrar NP   200 Units at 03/11/24 1535    onabotulinumtoxina injection 200 Units  200 Units Intramuscular Q90 Days Danna Farrar NP   200 Units at 08/05/24 1503    onabotulinumtoxina injection 200 Units  200 Units Intramuscular Q28 Days    200 Units at 02/18/25 1350    [START ON 9/8/2025] onabotulinumtoxina injection 200 Units  200 Units Intramuscular 1 time in Clinic/HOD

## 2025-08-06 DIAGNOSIS — G44.89 CHRONIC MIXED HEADACHE SYNDROME: ICD-10-CM

## 2025-08-06 DIAGNOSIS — G43.711 CHRONIC MIGRAINE WITHOUT AURA, INTRACTABLE, WITH STATUS MIGRAINOSUS: ICD-10-CM

## 2025-08-07 RX ORDER — RIMEGEPANT SULFATE 75 MG/75MG
TABLET, ORALLY DISINTEGRATING ORAL
Qty: 8 TABLET | Refills: 3 | Status: SHIPPED | OUTPATIENT
Start: 2025-08-07

## 2025-08-12 ENCOUNTER — PATIENT MESSAGE (OUTPATIENT)
Dept: ADMINISTRATIVE | Facility: CLINIC | Age: 70
End: 2025-08-12
Payer: MEDICARE

## 2025-08-18 ENCOUNTER — PATIENT MESSAGE (OUTPATIENT)
Dept: INTERNAL MEDICINE | Facility: CLINIC | Age: 70
End: 2025-08-18

## 2025-08-18 ENCOUNTER — OFFICE VISIT (OUTPATIENT)
Dept: INTERNAL MEDICINE | Facility: CLINIC | Age: 70
End: 2025-08-18
Payer: MEDICARE

## 2025-08-18 DIAGNOSIS — N18.31 STAGE 3A CHRONIC KIDNEY DISEASE: ICD-10-CM

## 2025-08-18 DIAGNOSIS — G43.711 CHRONIC MIGRAINE WITHOUT AURA, INTRACTABLE, WITH STATUS MIGRAINOSUS: ICD-10-CM

## 2025-08-18 DIAGNOSIS — J01.40 ACUTE NON-RECURRENT PANSINUSITIS: Primary | ICD-10-CM

## 2025-08-18 DIAGNOSIS — J30.89 NON-SEASONAL ALLERGIC RHINITIS, UNSPECIFIED TRIGGER: ICD-10-CM

## 2025-08-18 RX ORDER — AMOXICILLIN AND CLAVULANATE POTASSIUM 875; 125 MG/1; MG/1
1 TABLET, FILM COATED ORAL EVERY 12 HOURS
Qty: 14 TABLET | Refills: 0 | Status: SHIPPED | OUTPATIENT
Start: 2025-08-18 | End: 2025-08-25

## 2025-08-22 ENCOUNTER — E-VISIT (OUTPATIENT)
Dept: INTERNAL MEDICINE | Facility: CLINIC | Age: 70
End: 2025-08-22
Payer: MEDICARE

## 2025-08-22 DIAGNOSIS — U07.1 COVID: Primary | ICD-10-CM

## 2025-08-22 PROCEDURE — 99499 UNLISTED E&M SERVICE: CPT | Mod: ,,, | Performed by: NURSE PRACTITIONER

## 2025-09-04 ENCOUNTER — TELEPHONE (OUTPATIENT)
Dept: PULMONOLOGY | Facility: CLINIC | Age: 70
End: 2025-09-04
Payer: MEDICARE

## (undated) DEVICE — SUT D SPECIAL VICRYL 2-0

## (undated) DEVICE — ELECTRODE REM PLYHSV RETURN 9

## (undated) DEVICE — PACK DRAPE PERI/GYN TIBURON

## (undated) DEVICE — LUBRICANT SURGILUBE 2 OZ

## (undated) DEVICE — SEE MEDLINE ITEM 157117

## (undated) DEVICE — SEE MEDLINE ITEM 154981

## (undated) DEVICE — SEE L#152161

## (undated) DEVICE — SEE MEDLINE ITEM 146417

## (undated) DEVICE — PAD ABD 8X10 STERILE

## (undated) DEVICE — SEE MEDLINE ITEM 157027

## (undated) DEVICE — SEE MEDLINE ITEM 152622